# Patient Record
Sex: FEMALE | Race: WHITE | NOT HISPANIC OR LATINO | Employment: OTHER | URBAN - METROPOLITAN AREA
[De-identification: names, ages, dates, MRNs, and addresses within clinical notes are randomized per-mention and may not be internally consistent; named-entity substitution may affect disease eponyms.]

---

## 2017-02-07 ENCOUNTER — TELEPHONE (OUTPATIENT)
Dept: INTERNAL MEDICINE | Facility: CLINIC | Age: 62
End: 2017-02-07

## 2017-02-07 NOTE — TELEPHONE ENCOUNTER
Panel Management Review      Patient has the following on her problem list:     Hypertension   Last three blood pressure readings:  BP Readings from Last 3 Encounters:   10/20/16 130/86   08/18/16 156/96   02/15/16 118/70     Blood pressure: Her 8/18/16 BP was too high, but her BP medications were adjusted to account for this when she was seen both on 8/18/16 and 10/20/16.    HTN Guidelines:  Age 18-59 BP range:  Less than 140/90  Age 60-85 with Diabetes:  Less than 140/90  Age 60-85 without Diabetes:  less than 150/90      Composite cancer screening  Chart review shows that this patient is due/due soon for the following None  Summary:    Patient is due/failing the following:   None    Action needed:   None    Type of outreach:    None needed. She is currently up-to-date.    Questions for provider review:    None                                                                                                                                    Sagrario Quintanilla [facility]       Chart not routed.

## 2017-02-09 ENCOUNTER — APPOINTMENT (OUTPATIENT)
Dept: MRI IMAGING | Facility: CLINIC | Age: 62
End: 2017-02-09
Attending: EMERGENCY MEDICINE
Payer: COMMERCIAL

## 2017-02-09 ENCOUNTER — TELEPHONE (OUTPATIENT)
Dept: INTERNAL MEDICINE | Facility: CLINIC | Age: 62
End: 2017-02-09

## 2017-02-09 ENCOUNTER — APPOINTMENT (OUTPATIENT)
Dept: ULTRASOUND IMAGING | Facility: CLINIC | Age: 62
End: 2017-02-09
Attending: INTERNAL MEDICINE
Payer: COMMERCIAL

## 2017-02-09 ENCOUNTER — HOSPITAL ENCOUNTER (OUTPATIENT)
Facility: CLINIC | Age: 62
Setting detail: OBSERVATION
Discharge: HOME | End: 2017-02-10
Attending: EMERGENCY MEDICINE | Admitting: INTERNAL MEDICINE
Payer: COMMERCIAL

## 2017-02-09 DIAGNOSIS — I63.89 LEFT TEMPORAL LOBE INFARCTION (H): ICD-10-CM

## 2017-02-09 DIAGNOSIS — Q21.12 PFO (PATENT FORAMEN OVALE): Primary | ICD-10-CM

## 2017-02-09 PROBLEM — I63.9 STROKE (H): Status: ACTIVE | Noted: 2017-02-09

## 2017-02-09 LAB
ANION GAP SERPL CALCULATED.3IONS-SCNC: 8 MMOL/L (ref 3–14)
BASOPHILS # BLD AUTO: 0.1 10E9/L (ref 0–0.2)
BASOPHILS NFR BLD AUTO: 0.6 %
BUN SERPL-MCNC: 13 MG/DL (ref 7–30)
CALCIUM SERPL-MCNC: 8.9 MG/DL (ref 8.5–10.1)
CHLORIDE SERPL-SCNC: 103 MMOL/L (ref 94–109)
CO2 SERPL-SCNC: 28 MMOL/L (ref 20–32)
CREAT SERPL-MCNC: 0.9 MG/DL (ref 0.52–1.04)
DIFFERENTIAL METHOD BLD: NORMAL
EOSINOPHIL # BLD AUTO: 0.1 10E9/L (ref 0–0.7)
EOSINOPHIL NFR BLD AUTO: 0.9 %
ERYTHROCYTE [DISTWIDTH] IN BLOOD BY AUTOMATED COUNT: 13.2 % (ref 10–15)
GFR SERPL CREATININE-BSD FRML MDRD: 63 ML/MIN/1.7M2
GLUCOSE SERPL-MCNC: 96 MG/DL (ref 70–99)
HCT VFR BLD AUTO: 41.6 % (ref 35–47)
HGB BLD-MCNC: 13.8 G/DL (ref 11.7–15.7)
IMM GRANULOCYTES # BLD: 0 10E9/L (ref 0–0.4)
IMM GRANULOCYTES NFR BLD: 0.2 %
LYMPHOCYTES # BLD AUTO: 2.1 10E9/L (ref 0.8–5.3)
LYMPHOCYTES NFR BLD AUTO: 23.2 %
MCH RBC QN AUTO: 30.2 PG (ref 26.5–33)
MCHC RBC AUTO-ENTMCNC: 33.2 G/DL (ref 31.5–36.5)
MCV RBC AUTO: 91 FL (ref 78–100)
MONOCYTES # BLD AUTO: 1 10E9/L (ref 0–1.3)
MONOCYTES NFR BLD AUTO: 11.2 %
NEUTROPHILS # BLD AUTO: 5.6 10E9/L (ref 1.6–8.3)
NEUTROPHILS NFR BLD AUTO: 63.9 %
NRBC # BLD AUTO: 0 10*3/UL
NRBC BLD AUTO-RTO: 0 /100
PLATELET # BLD AUTO: 322 10E9/L (ref 150–450)
POTASSIUM SERPL-SCNC: 3.5 MMOL/L (ref 3.4–5.3)
RBC # BLD AUTO: 4.57 10E12/L (ref 3.8–5.2)
SODIUM SERPL-SCNC: 139 MMOL/L (ref 133–144)
WBC # BLD AUTO: 8.8 10E9/L (ref 4–11)

## 2017-02-09 PROCEDURE — 99220 ZZC INITIAL OBSERVATION CARE,LEVL III: CPT | Performed by: INTERNAL MEDICINE

## 2017-02-09 PROCEDURE — A9585 GADOBUTROL INJECTION: HCPCS | Performed by: RADIOLOGY

## 2017-02-09 PROCEDURE — 93880 EXTRACRANIAL BILAT STUDY: CPT

## 2017-02-09 PROCEDURE — 85025 COMPLETE CBC W/AUTO DIFF WBC: CPT | Performed by: EMERGENCY MEDICINE

## 2017-02-09 PROCEDURE — 25000128 H RX IP 250 OP 636: Performed by: EMERGENCY MEDICINE

## 2017-02-09 PROCEDURE — 25000132 ZZH RX MED GY IP 250 OP 250 PS 637: Performed by: INTERNAL MEDICINE

## 2017-02-09 PROCEDURE — 99285 EMERGENCY DEPT VISIT HI MDM: CPT | Mod: 25

## 2017-02-09 PROCEDURE — 25500064 ZZH RX 255 OP 636: Performed by: RADIOLOGY

## 2017-02-09 PROCEDURE — 93005 ELECTROCARDIOGRAM TRACING: CPT

## 2017-02-09 PROCEDURE — G0378 HOSPITAL OBSERVATION PER HR: HCPCS

## 2017-02-09 PROCEDURE — 25000132 ZZH RX MED GY IP 250 OP 250 PS 637: Performed by: EMERGENCY MEDICINE

## 2017-02-09 PROCEDURE — 70553 MRI BRAIN STEM W/O & W/DYE: CPT

## 2017-02-09 PROCEDURE — 80048 BASIC METABOLIC PNL TOTAL CA: CPT | Performed by: EMERGENCY MEDICINE

## 2017-02-09 RX ORDER — LOSARTAN POTASSIUM 25 MG/1
100 TABLET ORAL DAILY
Status: DISCONTINUED | OUTPATIENT
Start: 2017-02-10 | End: 2017-02-10 | Stop reason: HOSPADM

## 2017-02-09 RX ORDER — LABETALOL HYDROCHLORIDE 5 MG/ML
10-40 INJECTION, SOLUTION INTRAVENOUS EVERY 10 MIN PRN
Status: DISCONTINUED | OUTPATIENT
Start: 2017-02-09 | End: 2017-02-10 | Stop reason: HOSPADM

## 2017-02-09 RX ORDER — BISACODYL 10 MG
10 SUPPOSITORY, RECTAL RECTAL DAILY PRN
Status: DISCONTINUED | OUTPATIENT
Start: 2017-02-09 | End: 2017-02-10 | Stop reason: HOSPADM

## 2017-02-09 RX ORDER — ASPIRIN 81 MG/1
324 TABLET, CHEWABLE ORAL ONCE
Status: COMPLETED | OUTPATIENT
Start: 2017-02-09 | End: 2017-02-09

## 2017-02-09 RX ORDER — ACETAMINOPHEN 325 MG/1
650 TABLET ORAL EVERY 4 HOURS PRN
Status: DISCONTINUED | OUTPATIENT
Start: 2017-02-09 | End: 2017-02-10 | Stop reason: HOSPADM

## 2017-02-09 RX ORDER — ONDANSETRON 2 MG/ML
4 INJECTION INTRAMUSCULAR; INTRAVENOUS EVERY 6 HOURS PRN
Status: DISCONTINUED | OUTPATIENT
Start: 2017-02-09 | End: 2017-02-10 | Stop reason: HOSPADM

## 2017-02-09 RX ORDER — PROCHLORPERAZINE 25 MG
25 SUPPOSITORY, RECTAL RECTAL EVERY 12 HOURS PRN
Status: DISCONTINUED | OUTPATIENT
Start: 2017-02-09 | End: 2017-02-10 | Stop reason: HOSPADM

## 2017-02-09 RX ORDER — PROCHLORPERAZINE MALEATE 5 MG
5-10 TABLET ORAL EVERY 6 HOURS PRN
Status: DISCONTINUED | OUTPATIENT
Start: 2017-02-09 | End: 2017-02-10 | Stop reason: HOSPADM

## 2017-02-09 RX ORDER — IMIPRAMINE HCL 25 MG
25 TABLET ORAL DAILY
Status: DISCONTINUED | OUTPATIENT
Start: 2017-02-10 | End: 2017-02-10 | Stop reason: HOSPADM

## 2017-02-09 RX ORDER — GADOBUTROL 604.72 MG/ML
15 INJECTION INTRAVENOUS ONCE
Status: COMPLETED | OUTPATIENT
Start: 2017-02-09 | End: 2017-02-09

## 2017-02-09 RX ORDER — ATORVASTATIN CALCIUM 40 MG/1
40 TABLET, FILM COATED ORAL
Status: DISCONTINUED | OUTPATIENT
Start: 2017-02-09 | End: 2017-02-10 | Stop reason: HOSPADM

## 2017-02-09 RX ORDER — CETIRIZINE HYDROCHLORIDE 10 MG/1
10 TABLET ORAL EVERY EVENING
Status: DISCONTINUED | OUTPATIENT
Start: 2017-02-09 | End: 2017-02-10 | Stop reason: HOSPADM

## 2017-02-09 RX ORDER — AMOXICILLIN 250 MG
1-2 CAPSULE ORAL 2 TIMES DAILY PRN
Status: DISCONTINUED | OUTPATIENT
Start: 2017-02-09 | End: 2017-02-10 | Stop reason: HOSPADM

## 2017-02-09 RX ORDER — ONDANSETRON 4 MG/1
4 TABLET, ORALLY DISINTEGRATING ORAL EVERY 6 HOURS PRN
Status: DISCONTINUED | OUTPATIENT
Start: 2017-02-09 | End: 2017-02-10 | Stop reason: HOSPADM

## 2017-02-09 RX ORDER — ATENOLOL 25 MG/1
50 TABLET ORAL DAILY
Status: DISCONTINUED | OUTPATIENT
Start: 2017-02-10 | End: 2017-02-10 | Stop reason: HOSPADM

## 2017-02-09 RX ORDER — HYDRALAZINE HYDROCHLORIDE 20 MG/ML
10-20 INJECTION INTRAMUSCULAR; INTRAVENOUS
Status: DISCONTINUED | OUTPATIENT
Start: 2017-02-09 | End: 2017-02-10 | Stop reason: HOSPADM

## 2017-02-09 RX ORDER — HYDROCHLOROTHIAZIDE 25 MG/1
25 TABLET ORAL DAILY
Status: DISCONTINUED | OUTPATIENT
Start: 2017-02-10 | End: 2017-02-10 | Stop reason: HOSPADM

## 2017-02-09 RX ORDER — NALOXONE HYDROCHLORIDE 0.4 MG/ML
.1-.4 INJECTION, SOLUTION INTRAMUSCULAR; INTRAVENOUS; SUBCUTANEOUS
Status: DISCONTINUED | OUTPATIENT
Start: 2017-02-09 | End: 2017-02-10 | Stop reason: HOSPADM

## 2017-02-09 RX ORDER — CLOPIDOGREL BISULFATE 75 MG/1
75 TABLET ORAL DAILY
Status: DISCONTINUED | OUTPATIENT
Start: 2017-02-09 | End: 2017-02-10 | Stop reason: HOSPADM

## 2017-02-09 RX ORDER — AMLODIPINE BESYLATE 5 MG/1
5 TABLET ORAL DAILY
Status: DISCONTINUED | OUTPATIENT
Start: 2017-02-10 | End: 2017-02-10 | Stop reason: HOSPADM

## 2017-02-09 RX ADMIN — ASPIRIN 81 MG 324 MG: 81 TABLET ORAL at 17:12

## 2017-02-09 RX ADMIN — CLOPIDOGREL 75 MG: 75 TABLET, FILM COATED ORAL at 20:14

## 2017-02-09 RX ADMIN — GADOBUTROL 12 ML: 604.72 INJECTION INTRAVENOUS at 16:55

## 2017-02-09 RX ADMIN — ATORVASTATIN CALCIUM 40 MG: 40 TABLET, FILM COATED ORAL at 20:14

## 2017-02-09 RX ADMIN — SODIUM CHLORIDE 1000 ML: 9 INJECTION, SOLUTION INTRAVENOUS at 15:51

## 2017-02-09 ASSESSMENT — ENCOUNTER SYMPTOMS
WEAKNESS: 0
CONFUSION: 1
HEADACHES: 1
FACIAL ASYMMETRY: 0
SPEECH DIFFICULTY: 1
NUMBNESS: 1

## 2017-02-09 NOTE — ED PROVIDER NOTES
"  History     Chief Complaint:  Stroke-Like Symptoms     HPI   Jose Coronado is a 61 year old female with a PMH significant for hypertension who presents to the emergency department for evaluation of stroke-like symptoms. The patient reports waking for a short period of time at 0700 this morning feeling fine, but when she woke again later at 0815 she felt \"wierd.\" She reports difficulty holding onto her toothbrush, dizziness while walking down stairs, short-lasting and intermittent confusion, numbness to the right side of her face, and expressive aphasia. The patient denies extremity weakness or facial asymmetry. 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 Here in the ED, the patient says her symptoms have all resolved except for a mild diffuse headache, which she says she has been getting recently. Her primary concern is for TIA. The patient says she has otherwise been feeling well lately.     Allergies:  Contrast  Sulfa     Medications:    Evista  Tofranil  Norvasc  HCTZ  Cozaar  Tenormin  Valtrex  Excedrin Migraine  Zaditor  Zyrtec    Past Medical History:    Diffuse cystic mastopathy  Labyrinthitis  Sleep apnea  Osteopenia  Hypertension  Vitamin D deficiencies  Hyperparathyroidism    Past Surgical History:    Breast biopsies  T&A  [place] teeth extraction  Gastric bypass  Parathyroidectomy  Wrist fracture repair    Family History:    Breast cancer  Hypertension  CVA: maternal grandmother    Social History:   Tobacco use:    Negative  Alcohol use:    Occasional  Marital status:    Single  Accompanied to ED by:  Alone     Review of Systems   Neurological: Positive for speech difficulty (now resolved), numbness (now resolved) and headaches. Negative for facial asymmetry and weakness.   Psychiatric/Behavioral: Positive for confusion (now resolved).   All other systems reviewed and are " negative.    Physical Exam   First Vitals:  /94 mmHg  Pulse 88  Temp(Src) 98.8  F (37.1  C)  Resp 16  SpO2 99%    Physical Exam  Constitutional: Alert, attentive  HENT:    Nose: Nose normal.    Mouth/Throat: Oropharynx is clear, mucous membranes are moist  Eyes: EOM are normal. Pupils are equal, round, and reactive to light.   CV: Regular rate and rhythm, no murmurs, rubs or gallops.  Chest: Effort normal and breath sounds normal.   GI: No distension. There is no tenderness  MSK: Normal range of motion.   Neurological:   GCS 15; A/Ox3; Cranial nerves 2-12 intact;   5/5 strength throughout the upper and lower extremities;   sensation intact to light touch throughout the upper and lower extremities;   2+ DTRs to the bilateral upper and lower extremities (biceps, BRs, patellar, achilles);   normal fine motor coordination intact bilaterally;   normal gait   No meningismus   Skin: Skin is warm and dry.        Emergency Department Course   ECG (15:42:20):  Indication: Screening for cardiovascular disease.   Rate 89 bpm. MN interval 222. QRS duration 96. QT/QTc 370/450. P-R-T axes 47 -25 55.   Interpretation: Sinus rhythm with 1st degree AV block with premature atrial complexes. Incomplete right bundle branch block. Septal infarct, age undetermined. Abnormal ECG.   Agree with computer interpretation.   Interpreted at 1545 by Dr. Mendoza.     Imaging:  Radiographic findings were communicated with the patient who voiced understanding of the findings.  MR brain w/ & w/o contrast:  Small cortical area of restricted diffusion left superior mid temporal lobe consistent with tiny area of recent infarction.  Radiology report.     Laboratory:  CBC: WNL (WBC 8.8, HGB 13.8, )   BMP: WNL (Creatinine 0.90)    Emergency Department Course:  Nursing notes and vitals reviewed.   1541: I performed an exam of the patient as documented above.   The above workup was undertaken.   1551: NS 1 L IV  1712: Aspirin 324 mg Tablet  PO  Findings and plan explained to the Patient who consents to admission. Discussed the patient with Dr. Berry, who will admit the patient to a obs tele bed for further monitoring, evaluation, and treatment.     Impression & Plan      CMS Diagnoses: The patient has stroke symptoms:           ED Stroke specific documentation           NIHSS PDF          Protocol PDF     Patient last known well time: 0700  ED Provider first to bedside at: 1541  CT Results received at: 1707  Patient was not treated with TPA due to the following reason(s):  Out of the treatment time window  Mild stroke symptoms ( NIHSS < 4 and not globally aphasic)    National Institutes of Health Stroke Scale (Baseline)  Time Performed: 1541      Score    Level of consciousness: (0)   Alert, keenly responsive    LOC questions: (0)   Answers both questions correctly    LOC commands: (0)   Performs both tasks correctly    Best gaze: (0)   Normal    Visual: (0)   No visual loss    Facial palsy: (0)   Normal symmetrical movements    Motor arm (left): (0)   No drift    Motor arm (right): (0)   No drift    Motor leg (left): (0)   No drift    Motor leg (right): (0)   No drift    Limb ataxia: (0)   Absent    Sensory: (0)   Normal- no sensory loss    Best language: (0)   Normal- no aphasia    Dysarthria: (0)   Normal    Extinction and inattention: (0)   No abnormality        Total Score:  0        Stroke Mimics were considered (including migraine headache, seizure disorder, hypoglycemia (or hyperglycemia), head or spinal trauma, CNS infection, Toxin ingestion and shock state (e.g. sepsis) .    Medical Decision Making:  Jose Coronado is a 61 year old female with a history of hypertension who presents for evaluation of the above symptoms, most notably mild headache and expressive aphasia, possibly consistent with word salad briefly. Her symptoms are now resolved. NIH is 0. MRI shows a small left temporal infarct, consistent with her symptoms. She is not a TPA  candidate. She will be admitted for further supportive cares and evaluation, as well as neurology consults and secondary stroke prevention initiation. Workup was otherwise unremarkable and he is safe for admission.     Diagnosis:    ICD-10-CM    1. Left temporal lobe infarction (H) I63.50     subacute     Disposition:  Admitted to Dr. Beryr.       I, Pipo Roy, am serving as a scribe at 3:41 PM on 2/9/2017 to document services personally performed by Dr. Mendoza, based on my observations and the provider's statements to me.   Pipo Roy  2/9/2017   [facility] EMERGENCY DEPARTMENT        Senthil Mendoza MD  02/09/17 [id number]

## 2017-02-09 NOTE — ED AVS SNAPSHOT
[facility] Observation Department    [address]    [facility] [address]    Phone:  [phone]                                       Jose Coronado   MRN: [id number]    Department:  [facility] Observation Department   Date of Visit:  2/9/2017           After Visit Summary Signature Page     I have received my discharge instructions, and my questions have been answered. I have discussed any challenges I see with this plan with the nurse or doctor.    ..........................................................................................................................................  Patient/Patient Representative Signature      ..........................................................................................................................................  Patient Representative Print Name and Relationship to Patient    ..................................................               ................................................  Date                                            Time    ..........................................................................................................................................  Reviewed by Signature/Title    ...................................................              ..............................................  Date                                                            Time

## 2017-02-09 NOTE — TELEPHONE ENCOUNTER
"Patient calling in thinking she has had a small stroke/ TIA. When woke up less than one hour later she reports having felt weird\", could walk normally, something was off, not thinking well, more tired, tooth brush fell out of mouth and took a while to look for tooth brush. Dizziness and weakness, right side nose dripping /\"leaking\" down the side of mouth, numbness to right side. Right side sensation felt \"weird\", could still smile. Having trouble putting two and two together. Went to work, drove ok. Had speech problems since 1100 today. Instructed pt that she should go to the ER at immediately. Pt asks if [facility] is ok, this writer informed her it was ok to go there. Pt does not have someone to drive her to the ER, she will drive herself. Pt agrees to plan of care.    "

## 2017-02-09 NOTE — PHARMACY-ADMISSION MEDICATION HISTORY
Admission medication history interview status for this patient is complete. See [facility] admission navigator for allergy information, prior to admission medications and immunization status.     Medication history interview source(s):Patient  Medication history resources (including written lists, pill bottles, clinic record):Epic, pt's med list    Changes made to PTA medication list:  Added: none  Deleted: Fish oil  Changed: calcium, Vit B12, Iron, Vit C      Medication reconciliation/reorder completed by provider prior to medication history? No    For patients on insulin therapy: NO       Prior to Admission medications    Medication Sig Last Dose Taking? Auth Provider   Calcium Citrate-Vitamin D (CALCIUM CITRATE + D PO) Take 1 tablet by mouth every morning 2/9/2017 at Unknown time Yes Unknown, Entered By History   Ferrous Sulfate 27 MG TABS Take 27 mg by mouth 2 times daily 2/9/2017 at am Yes Unknown, Entered By History   Cyanocobalamin 2500 MCG TABS Take 2,500 mcg by mouth twice a week Mon, Thur 2/9/2017 at am Yes Unknown, Entered By History   raloxifene (EVISTA) 60 MG tablet Take 1 tablet (60 mg) by mouth daily 2/9/2017 at Unknown time Yes Mary Mathews MD   imipramine (TOFRANIL) 25 MG tablet Take 1 tablet (25 mg) by mouth daily 2/9/2017 at am Yes Mary Mathews MD   amLODIPine (NORVASC) 5 MG tablet Take 1 tablet (5 mg) by mouth daily 2/9/2017 at Unknown time Yes Mary Mathews MD   hydrochlorothiazide (HYDRODIURIL) 25 MG tablet Take 1 tablet (25 mg) by mouth daily 2/9/2017 at am Yes Mary Mathews MD   losartan (COZAAR) 100 MG tablet Take 1 tablet (100 mg) by mouth daily 2/9/2017 at Unknown time Yes Mary Mathews MD   atenolol (TENORMIN) 50 MG tablet Take 1 tablet (50 mg) by mouth daily 2/9/2017 at Unknown time Yes Mary Mathews MD   ascorbic acid (VITAMIN C) 500 MG tablet Take 250 mg by mouth 2 times daily   2/9/2017 at am Yes Mary Mathews MD   cholecalciferol (VITAMIN D) 1000 UNIT tablet Take 0.5 tablets (500 Units) by mouth daily 2/9/2017 at am Yes Mary Mathews MD   aspirin-acetaminophen-caffeine (EXCEDRIN MIGRAINE) 250-250-65 MG per tablet Take 1 tablet by mouth every 6 hours as needed for headaches  Yes Mary Mathews MD   ketotifen (ZADITOR) 0.025 % SOLN Place 1 drop into both eyes every 12 hours as needed for itching  Yes Mary Mathews MD   cetirizine (ZYRTEC) 10 MG tablet Take 1 tablet by mouth every evening. 2/8/2017 at Unknown time Yes Mary Mathews MD   Probiotic Product (PROBIOTIC PO) Take 1 tablet by mouth daily. 2/9/2017 at Unknown time Yes Reported, Patient   valACYclovir (VALTREX) 1000 mg tablet Take 2 tablets (2,000 mg) by mouth 2 times daily as needed PRN  Mary Mathews MD

## 2017-02-09 NOTE — ED AVS SNAPSHOT
MRN:[id number]                      After Visit Summary   2/9/2017    Jose Coronado    MRN: [id number]           Thank you!     Thank you for choosing [facility] for your care. Our goal is always to provide you with excellent care. Hearing back from our patients is one way we can continue to improve our services. Please take a few minutes to complete the written survey that you may receive in the mail after you visit. If you would like to speak to someone directly about your visit please contact Patient Relations at [phone]. Thank you!          Patient Information     Date Of Birth          1955        About your hospital stay     You were admitted on:  February 9, 2017 You last received care in the:  [facility] Observation Department    You were discharged on:  February 10, 2017        Reason for your hospital stay       You were evaluated in the hospital for difficulty with speech, and on your brain MRI you were noted to have a small temporal stroke.  Your symptoms resolved after arrival in the hospital.  You were brought on observation to complete a stroke workup to evaluate possible causes.  You were monitored on telemetry, a repeat echo was obtained (confirming a patent foramen ovale), and were started on atorvastatin to take each night.  Cardiology was consulted to help with deciding whether further intervention was needed for your PFO.  The cardiologist, Dr. Presley, recommended that you be started on anticoagulation with warfarin (coumadin), as your patent foramen ovale puts you at significant increased stroke risk if you were to form a clot in your vessels.  Take 5 mg warfarin daily. He also recommended a BROCK (trans-esophageal echocardiogram) to evaluate the position of your PFO, and then to follow up with the PFO clinic.  He will help set up these appointments for you.  He also wanted you to be on a cardiac event monitor for the next 30 days to  evaluate for any evidence of episodic irregular cardiac rhythms.  You should also schedule an appointment early next week (Monday or Tuesday) to see a neurologist since you did have a confirmed stroke.  Get your INR checked in clinic on Tuesday.  We will collect labs for a hypercoagulation study prior to your discharge, and those results can be followed up on during your outpatient appointments.                  Who to Call     For medical emergencies, please call 911.  For non-urgent questions about your medical care, please call your primary care provider or clinic, [phone]          Attending Provider     Provider    Senthil Mendoza MD Se[place], Daniel L, MD       Primary Care Provider Office Phone # Fax #    Mary Mathews [phone]       [facility] [address] [place] [address]         When to contact your care team       Call your primary doctor if you have any of the following: temperature greater than 101, increased shortness of breath, increased pain, new difficulty with speech, weakness, or numbness/tingling.                  After Care Instructions     Activity       Your activity upon discharge: activity as tolerated            Diet       Follow this diet upon discharge: Orders Placed This Encounter  Regular Diet Adult                  Follow-up Appointments     Follow-up and recommended labs and tests        Follow up with primary care provider, Mary Mathews, within 7-14 days to evaluate medication change and for hospital follow- up.  No follow up labs or test are needed.    Get your INR checked next week on Tuesday 2/14/16.    Follow up with Cardiology for outpatient BROCK and PFO clinic visit within 1 week.  Dr. Presley is assisting in setting up this outpatient follow up and the [place] [facility] should be contacting you about it.   The results of your cardiac event monitor will be sent to your primary care doctor +/-  your cardiologist +/- neurologist.                  Additional Services     NEUROLOGY ADULT REFERRAL       Your provider has referred you to: [facility]: [facility] [place] [phone]   [email or web address]    Reason for Referral: Consult - Patient had a confirmed temporal CVA on MRI, symptoms completely resolved.  Has PFO confirmed on echo.  Seen by cardiology and started on warfarin.  She is also started on atorvastatin.    Please be aware that coverage of these services is subject to the terms and limitations of your health insurance plan.  Call member services at your health plan with any benefit or coverage questions.      Please bring the following with you to your appointment:    (1) Any X-Rays, CTs or MRIs which have been performed.  Contact the facility where they were done to arrange for  prior to your scheduled appointment.    (2) List of current medications  (3) This referral request   (4) Any documents/labs given to you for this referral            Follow-Up with cardiac sub-specialty clinic                            Future tests that were ordered for you     Cardiac Event Monitor - Peds/Adult           Transesophageal Echocardiogram       The supervising Cardiologist may change the type of echocardiogram requested to answer a specific clinical question based on existing protocols in the Echocardiography laboratory.    Use of contrast is at the discretion of the supervising Cardiologist.                  Warfarin Instruction     You have started taking a medicine called warfarin. This is a blood-thinning medicine (anticoagulant). It helps prevent and treat blood clots.      Before leaving the hospital, make sure you know how much to take and how long to take it.      You will need regular blood tests to make sure your blood is clotting safely. It is very important to see your doctor for regular blood tests.    Talk to your doctor before taking  "any new medicine (this includes over-the-counter drugs and herbal products). Many medicines can interact with warfarin. This may cause more bleeding or too much clotting.     Eating a lot of vitamin K--found in green, leafy vegetables--can change the way warfarin works in your body. Do NOT avoid these foods. Instead, try to eat the same amount each day.     Bleeding is the most common side-effect of warfarin. You may notice bleeding gums, a bloody nose, bruises and bleeding longer when you cut yourself. See a doctor at once if:   o You cough up blood  o You find blood in your stool (poop)  o You have a deep cut, or a cut that bleeds longer than 10 minutes   o You have a bad cut, hard fall, accident or hit your head (go to urgent care or the emergency room).    For women who can get pregnant: This medicine can harm an unborn baby. Be very careful not to get pregnant while taking this medicine. If you think you might be pregnant, call your doctor right away.    For more information, read \"Guide to Warfarin Therapy,  the booklet you received in the hospital.        Pending Results     Date and Time Order Name Status Description    2/10/2017 1226 Cardiac event monitor In process     2/10/2017 1112 Protein S Antigen Free In process     2/10/2017 1112 Protein C chromogenic In process     2/10/2017 1112 Factor 5 leiden mutation analysis In process     2/10/2017 1112 Antithrombin III In process             Statement of Approval     Ordered          02/10/17 1236  I have reviewed and agree with all the recommendations and orders detailed in this document.   EFFECTIVE NOW     Approved and electronically signed by:  Genoveva Jacob PA-C             Admission Information        Provider Department Dept Phone    2/9/2017 Chauncey Berry MD  Observation Dept [phone]      Your Vitals Were     Blood Pressure Pulse Temperature    127/65 mmHg 62 97.6  F (36.4  C) (Oral)    Respirations Height Weight    16 1.664 m (5' " "5.5\") 110.963 kg (244 lb 10.1 oz)    BMI (Body Mass Index) Pulse Oximetry       40.07 kg/m2 92%       Smart GPS Backpackhart Information     [id number] gives you secure access to your electronic health record. If you see a primary care provider, you can also send messages to your care team and make appointments. If you have questions, please call your primary care clinic.  If you do not have a primary care provider, please call [phone] and they will assist you.        Care EveryWhere ID     This is your Care EveryWhere ID. This could be used by other organizations to access your [place] medical records  [id number]           Review of your medicines      START taking        Dose / Directions    atorvastatin 40 MG tablet   Commonly known as:  LIPITOR   Used for:  Left temporal lobe infarction (H)        Dose:  40 mg   Take 1 tablet (40 mg) by mouth daily   Quantity:  30 tablet   Refills:  0       warfarin 5 MG tablet   Commonly known as:  COUMADIN   Used for:  Left temporal lobe infarction (H), PFO (patent foramen ovale)        Dose:  5 mg   Take 1 tablet (5 mg) by mouth daily   Quantity:  30 tablet   Refills:  0         CONTINUE these medicines which have NOT CHANGED        Dose / Directions    amLODIPine 5 MG tablet   Commonly known as:  NORVASC   Used for:  Essential hypertension with goal blood pressure less than 140/90        Dose:  5 mg   Take 1 tablet (5 mg) by mouth daily   Quantity:  90 tablet   Refills:  1       ascorbic acid 500 MG tablet   Commonly known as:  VITAMIN C        Dose:  250 mg   Take 250 mg by mouth 2 times daily   Quantity:  30 tablet   Refills:  0       atenolol 50 MG tablet   Commonly known as:  TENORMIN   Used for:  Palpitations        Dose:  50 mg   Take 1 tablet (50 mg) by mouth daily   Quantity:  90 tablet   Refills:  3       CALCIUM CITRATE + D PO        Dose:  1 tablet   Take 1 tablet by mouth every morning   Refills:  0       cetirizine 10 MG tablet   Commonly known as:  zyrTEC        Dose:  " 10 mg   Take 1 tablet by mouth every evening.   Quantity:  30 tablet   Refills:  1       cholecalciferol 1000 UNIT tablet   Commonly known as:  vitamin D        Dose:  500 Units   Take 0.5 tablets (500 Units) by mouth daily   Quantity:  30 tablet   Refills:  0       Cyanocobalamin 2500 MCG Tabs        Dose:  2500 mcg   Take 2,500 mcg by mouth twice a week Mon, Thur   Refills:  0       Ferrous Sulfate 27 MG Tabs        Dose:  27 mg   Take 27 mg by mouth 2 times daily   Refills:  0       hydrochlorothiazide 25 MG tablet   Commonly known as:  HYDRODIURIL   Used for:  Essential hypertension with goal blood pressure less than 140/90        Dose:  25 mg   Take 1 tablet (25 mg) by mouth daily   Quantity:  90 tablet   Refills:  1       imipramine 25 MG tablet   Commonly known as:  TOFRANIL   Used for:  Bladder dysfunction        Dose:  25 mg   Take 1 tablet (25 mg) by mouth daily   Quantity:  100 tablet   Refills:  2       losartan 100 MG tablet   Commonly known as:  COZAAR   Used for:  Essential hypertension with goal blood pressure less than 140/90        Dose:  100 mg   Take 1 tablet (100 mg) by mouth daily   Quantity:  90 tablet   Refills:  3       PROBIOTIC PO        Dose:  1 tablet   Take 1 tablet by mouth daily.   Refills:  0       raloxifene 60 MG tablet   Commonly known as:  EVISTA   Used for:  Osteopenia        Dose:  1 tablet   Take 1 tablet (60 mg) by mouth daily   Quantity:  90 tablet   Refills:  2       valACYclovir 1000 mg tablet   Commonly known as:  VALTREX   Used for:  Cold sore        Dose:  2000 mg   Take 2 tablets (2,000 mg) by mouth 2 times daily as needed   Quantity:  4 tablet   Refills:  5       ZADITOR 0.025 % Soln ophthalmic solution   Generic drug:  ketotifen        Dose:  1 drop   Place 1 drop into both eyes every 12 hours as needed for itching   Quantity:  1 Bottle   Refills:  0         STOP taking     aspirin-acetaminophen-caffeine 250-250-65 MG per tablet   Commonly known as:  EXCEDRIN  MIGRAINE                Where to get your medicines      These medications were sent to [facility]pharmacy #[id number] - [place], [place] - [address] [address] [facility]  [address], [place] [address]     Phone:  [phone]    - atorvastatin 40 MG tablet  - warfarin 5 MG tablet             Protect others around you: Learn how to safely use, store and throw away your medicines at www.disposemymeds.org.             Medication List: This is a list of all your medications and when to take them. Check marks below indicate your daily home schedule. Keep this list as a reference.      Medications           Morning Afternoon Evening Bedtime As Needed    amLODIPine 5 MG tablet   Commonly known as:  NORVASC   Take 1 tablet (5 mg) by mouth daily   Last time this was given:  5 mg on 2/10/2017  9:44 AM                                ascorbic acid 500 MG tablet   Commonly known as:  VITAMIN C   Take 250 mg by mouth 2 times daily                                atenolol 50 MG tablet   Commonly known as:  TENORMIN   Take 1 tablet (50 mg) by mouth daily   Last time this was given:  50 mg on 2/10/2017  9:44 AM                                atorvastatin 40 MG tablet   Commonly known as:  LIPITOR   Take 1 tablet (40 mg) by mouth daily   Last time this was given:  40 mg on 2/9/2017  8:14 PM                                CALCIUM CITRATE + D PO   Take 1 tablet by mouth every morning                                cetirizine 10 MG tablet   Commonly known as:  zyrTEC   Take 1 tablet by mouth every evening.                                cholecalciferol 1000 UNIT tablet   Commonly known as:  vitamin D   Take 0.5 tablets (500 Units) by mouth daily                                Cyanocobalamin 2500 MCG Tabs   Take 2,500 mcg by mouth twice a week Mon, Thur                                Ferrous Sulfate 27 MG Tabs   Take 27 mg by mouth 2 times daily                                hydrochlorothiazide 25 MG tablet   Commonly known as:  HYDRODIURIL   Take 1  tablet (25 mg) by mouth daily   Last time this was given:  25 mg on 2/10/2017  9:44 AM                                imipramine 25 MG tablet   Commonly known as:  TOFRANIL   Take 1 tablet (25 mg) by mouth daily   Last time this was given:  25 mg on 2/10/2017  9:45 AM                                losartan 100 MG tablet   Commonly known as:  COZAAR   Take 1 tablet (100 mg) by mouth daily   Last time this was given:  100 mg on 2/10/2017  9:44 AM                                PROBIOTIC PO   Take 1 tablet by mouth daily.                                raloxifene 60 MG tablet   Commonly known as:  EVISTA   Take 1 tablet (60 mg) by mouth daily                                valACYclovir 1000 mg tablet   Commonly known as:  VALTREX   Take 2 tablets (2,000 mg) by mouth 2 times daily as needed                                warfarin 5 MG tablet   Commonly known as:  COUMADIN   Take 1 tablet (5 mg) by mouth daily                                ZADITOR 0.025 % Soln ophthalmic solution   Place 1 drop into both eyes every 12 hours as needed for itching   Generic drug:  ketotifen

## 2017-02-09 NOTE — ED NOTES
"Pt awoke from a nap and \"felt weird\" stated \"when i was brushing my teeth the toothbrush fell out of my hand\", and  Per pt \"had and episode and needed to sit down when going up stairs\" Reports when no weekend on either hemisphere. Pt stated little bit of a headache now.   "

## 2017-02-10 ENCOUNTER — TELEPHONE (OUTPATIENT)
Dept: INTERNAL MEDICINE | Facility: CLINIC | Age: 62
End: 2017-02-10

## 2017-02-10 ENCOUNTER — APPOINTMENT (OUTPATIENT)
Dept: CARDIOLOGY | Facility: CLINIC | Age: 62
End: 2017-02-10
Attending: INTERNAL MEDICINE
Payer: COMMERCIAL

## 2017-02-10 VITALS
BODY MASS INDEX: 39.31 KG/M2 | HEART RATE: 62 BPM | RESPIRATION RATE: 18 BRPM | DIASTOLIC BLOOD PRESSURE: 77 MMHG | OXYGEN SATURATION: 99 % | HEIGHT: 66 IN | SYSTOLIC BLOOD PRESSURE: 147 MMHG | WEIGHT: 244.63 LBS | TEMPERATURE: 98.4 F

## 2017-02-10 DIAGNOSIS — I63.89 LEFT TEMPORAL LOBE INFARCTION (H): Primary | ICD-10-CM

## 2017-02-10 LAB
ALBUMIN SERPL-MCNC: 3.2 G/DL (ref 3.4–5)
ALP SERPL-CCNC: 53 U/L (ref 40–150)
ALT SERPL W P-5'-P-CCNC: 20 U/L (ref 0–50)
AST SERPL W P-5'-P-CCNC: 23 U/L (ref 0–45)
BILIRUB DIRECT SERPL-MCNC: 0.1 MG/DL (ref 0–0.2)
BILIRUB SERPL-MCNC: 0.3 MG/DL (ref 0.2–1.3)
CHOLEST SERPL-MCNC: 166 MG/DL
HDLC SERPL-MCNC: 51 MG/DL
INR PPP: 1.23 (ref 0.86–1.14)
INTERPRETATION ECG - MUSE: NORMAL
LDLC SERPL CALC-MCNC: 95 MG/DL
NONHDLC SERPL-MCNC: 115 MG/DL
PROT SERPL-MCNC: 5.9 G/DL (ref 6.8–8.8)
TRIGL SERPL-MCNC: 98 MG/DL
TSH SERPL DL<=0.005 MIU/L-ACNC: 1.05 MU/L (ref 0.4–4)

## 2017-02-10 PROCEDURE — 85300 ANTITHROMBIN III ACTIVITY: CPT | Performed by: PHYSICIAN ASSISTANT

## 2017-02-10 PROCEDURE — 99205 OFFICE O/P NEW HI 60 MIN: CPT | Mod: 25 | Performed by: INTERNAL MEDICINE

## 2017-02-10 PROCEDURE — 85610 PROTHROMBIN TIME: CPT | Performed by: PHYSICIAN ASSISTANT

## 2017-02-10 PROCEDURE — 99217 ZZC OBSERVATION CARE DISCHARGE: CPT | Performed by: PHYSICIAN ASSISTANT

## 2017-02-10 PROCEDURE — G0378 HOSPITAL OBSERVATION PER HR: HCPCS

## 2017-02-10 PROCEDURE — 80076 HEPATIC FUNCTION PANEL: CPT | Performed by: INTERNAL MEDICINE

## 2017-02-10 PROCEDURE — 85303 CLOT INHIBIT PROT C ACTIVITY: CPT | Performed by: PHYSICIAN ASSISTANT

## 2017-02-10 PROCEDURE — 84443 ASSAY THYROID STIM HORMONE: CPT | Performed by: INTERNAL MEDICINE

## 2017-02-10 PROCEDURE — 93306 TTE W/DOPPLER COMPLETE: CPT

## 2017-02-10 PROCEDURE — 25000132 ZZH RX MED GY IP 250 OP 250 PS 637: Performed by: INTERNAL MEDICINE

## 2017-02-10 PROCEDURE — 80061 LIPID PANEL: CPT | Performed by: INTERNAL MEDICINE

## 2017-02-10 PROCEDURE — 93306 TTE W/DOPPLER COMPLETE: CPT | Mod: 26 | Performed by: INTERNAL MEDICINE

## 2017-02-10 PROCEDURE — 93270 REMOTE 30 DAY ECG REV/REPORT: CPT | Performed by: PHYSICIAN ASSISTANT

## 2017-02-10 PROCEDURE — 36415 COLL VENOUS BLD VENIPUNCTURE: CPT | Performed by: PHYSICIAN ASSISTANT

## 2017-02-10 PROCEDURE — 93272 ECG/REVIEW INTERPRET ONLY: CPT | Performed by: INTERNAL MEDICINE

## 2017-02-10 PROCEDURE — 85306 CLOT INHIBIT PROT S FREE: CPT | Performed by: PHYSICIAN ASSISTANT

## 2017-02-10 PROCEDURE — 81241 F5 GENE: CPT | Performed by: PHYSICIAN ASSISTANT

## 2017-02-10 PROCEDURE — 36415 COLL VENOUS BLD VENIPUNCTURE: CPT | Performed by: INTERNAL MEDICINE

## 2017-02-10 RX ORDER — ATORVASTATIN CALCIUM 40 MG/1
40 TABLET, FILM COATED ORAL DAILY
Qty: 30 TABLET | Refills: 0 | Status: SHIPPED | OUTPATIENT
Start: 2017-02-10 | End: 2017-03-09

## 2017-02-10 RX ORDER — WARFARIN SODIUM 5 MG/1
5 TABLET ORAL DAILY
Qty: 30 TABLET | Refills: 0 | Status: SHIPPED | OUTPATIENT
Start: 2017-02-10 | End: 2017-03-07

## 2017-02-10 RX ADMIN — CLOPIDOGREL 75 MG: 75 TABLET, FILM COATED ORAL at 09:44

## 2017-02-10 RX ADMIN — LOSARTAN POTASSIUM 100 MG: 25 TABLET, FILM COATED ORAL at 09:44

## 2017-02-10 RX ADMIN — AMLODIPINE BESYLATE 5 MG: 5 TABLET ORAL at 09:44

## 2017-02-10 RX ADMIN — HYDROCHLOROTHIAZIDE 25 MG: 25 TABLET ORAL at 09:44

## 2017-02-10 RX ADMIN — IMIPRAMINE HYDROCHLORIDE 25 MG: 25 TABLET, FILM COATED ORAL at 09:45

## 2017-02-10 RX ADMIN — ATENOLOL 50 MG: 25 TABLET ORAL at 09:44

## 2017-02-10 NOTE — ED NOTES
PRIMARY DIAGNOSIS: TIA (speech difficulty,numbness,headache,confusion)  resolved  OUTPATIENT/OBSERVATION GOALS TO BE MET BEFORE DISCHARGE:    1. Diagnostic testing complete & WNL:  completed  2. Cleared by neurology consultant (if involved): No  3. Patient at neurological baseline: Yes  4. ADLs back to baseline?  Yes  5. Activity and level of assistance: Ambulating independently.  6. Pain status: Pain free.  7. Barriers to discharge noted No  8. Interpretation of rhythm per telemetry tech:  sr with 1'avb,bbb, pvc/pac    Is patient a falls risk? No  Falls armband on?  No  Within Arm's Reach? No.Reason not within arm's reach is: ind  Bed alarm turned on?   No  Personal alarm in place and turned on?   No    Pt Echo shows PFO. Neuro assessment intact. Patient feels back to baseline. Patient will discharge with monitor and FU with cards and neuro

## 2017-02-10 NOTE — ED NOTES
Observation Brochure and Video   Patient informed of observation status based on provider's order. Observation Brochure was given and video watched.  Chelsy Chaparro RN

## 2017-02-10 NOTE — ED NOTES
OBSERVATION patient IN TIME: 1856  ROOM #225    Living Situation (if not independent, order SW consult):House with sister  Facility name:NA    Activity level at baseline:IND  Activity level on admit:IND    Is patient a falls risk? No  Falls armband on? Not applicable  Within Arm's Reach? No.Reason not within arm's reach is: A/Ox4  Bed alarm turned on?   Not applicable  Personal alarm in place and turned on?   Not applicable    Patient registered to observation; given Patient Bill of Rights; given the opportunity to ask questions about observation status and their plan of care.  Patient has been oriented to the observation room, bathroom, and call light is in place.    :Fatou Urrutia - Sister

## 2017-02-10 NOTE — ED NOTES
PRIMARY DIAGNOSIS: TIA (speech difficulty,numbness,headache,confusion)  resolved  OUTPATIENT/OBSERVATION GOALS TO BE MET BEFORE DISCHARGE:    1. Diagnostic testing complete & WNL:  completed  2. Cleared by neurology consultant (if involved): No  3. Patient at neurological baseline: Yes  4. ADLs back to baseline?  Yes  5. Activity and level of assistance: Ambulating independently.  6. Pain status: Pain free.  7. Barriers to discharge noted No  8. Interpretation of rhythm per telemetry tech:  sr with 1'avb,bbb, pvc/pac    Is patient a falls risk? No  Falls armband on?  No  Within Arm's Reach? No.Reason not within arm's reach is: ind  Bed alarm turned on?   No  Personal alarm in place and turned on?   No    Pt will have bubble Echo and cards consult. Neuro intact. Patient feels back to baseline.

## 2017-02-10 NOTE — ED NOTES
PRIMARY DIAGNOSIS: TIA (speech difficulty,numbness,headache,confusion)  resolved  OUTPATIENT/OBSERVATION GOALS TO BE MET BEFORE DISCHARGE:    1. Diagnostic testing complete & WNL: not completed  2. Cleared by neurology consultant (if involved): No  3. Patient at neurological baseline: Yes  4. ADLs back to baseline?  Yes  5. Activity and level of assistance: Ambulating independently.  6. Pain status: Pain free.  7. Barriers to discharge noted No  8. Interpretation of rhythm per telemetry tech:  sr with 1'avb,bbb,pvc,pac.    Is patient a falls risk? No  Falls armband on?  No  Within Arm's Reach? No.Reason not within arm's reach is: ind  Bed alarm turned on?   No  Personal alarm in place and turned on?   No

## 2017-02-10 NOTE — ED NOTES
PRIMARY DIAGNOSIS: TIA (speech difficulty,numbness,headache,confusion)  resolved  OUTPATIENT/OBSERVATION GOALS TO BE MET BEFORE DISCHARGE:    1. Diagnostic testing complete & WNL: not completed  2. Cleared by neurology consultant (if involved): No  3. Patient at neurological baseline: Yes  4. ADLs back to baseline?  Yes  5. Activity and level of assistance: Ambulating independently.  6. Pain status: Pain free.  7. Barriers to discharge noted No  8. Interpretation of rhythm per telemetry tech:  sr with 1'avb,bbb, pvc/pac    Is patient a falls risk? No  Falls armband on?  No  Within Arm's Reach? No.Reason not within arm's reach is: ind  Bed alarm turned on?   No  Personal alarm in place and turned on?   No    PT/OT/bubble echo/cards/speech

## 2017-02-10 NOTE — CONSULTS
"[facility]    Cardiology Consultation     Date of Admission:  2/9/2017  Date of Consult (When I saw the patient): 02/10/2017    Assessment and Plan  Jose Coronado is a 61 year old female who was admitted on 2/9/2017.    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  No other CVI findings.    2-small PFO/ASD.  There is both left-to-right and right-to-left shunting with a moderately positive bubble study.  Otherwise structurally and functionally normal cardiac echo    3-hypertension, treated for the last 7-8 years.  Fairly resistant has been controlled on 4 medications    4-recent history of palpitations.  Event monitor and seal patch showing nonsustained \"PSVT\" up to 20 or so beats.  In reviewing the strips in detail these rhythms are irregular and could herald the predisposition or even presence of paroxysmal atrial fibrillation.  However she has not had prolonged symptoms, and she is usually sensitive to these runs.    5-mild dilatation of the ascending aorta likely related her history of hypertension.    6-normal carotid ultrasound this admission    Recommendation  1-anticoagulation with warfarin.  She has several potential contributors/possible sources of stroke but PFO with paradoxical embolism is one of the more likely ones.  This should be initially treated with anticoagulation rather than antiplatelet therapy.    2-thrombophilia workup.  I have initiated this prior to discharge.    3-transesophageal echo.  This is necessary to further evaluate the PFO and potential for it to be percutaneously closed    4-event monitor for 1 month to reevaluate presence and/or risk for unrecognized atrial fibrillation    5-she takes Excedrin/aspirin about 4 days a week because of a history of migraine headaches.  This mildly increases her anticoagulation risk.  She could consider other migraine prophylactic/treatment strategies if there are " any problems with continuing her current strategy while anticoagulated.    6-I will arrange follow-up in structural heart clinic shortly after her transesophageal echo.    Paulie Hi M.D.    Primary Care Physician  Mary Mathews    Reason for Consult  Reason for consult: I was asked by Aleks Berry MD to evaluate this patient for role of PFO in her acute CVI.    History of Present Illness  Jose Coronado is a 61 year old female who presents with expressive partial aphasia, facial drooping, confusion, and right facial numbness and is found to have a left temporal small CVI on MRI.  Symptoms rapidly improved and she states they have all resolved today.  She's never had a similar prior episode.  She has a long history of migraine headaches for more than 20 years, often just with visual migraine aura but also with headache.  She's never had any other focal neurologic symptoms with these.  If she takes her Excedrin early in the course of symptoms or even prophylactically this seems to work well.  3 weeks ago she had an unusual episode where the visual symptoms and right-sided visual field loss actually persisted for many hours, and then she felt somewhat strange for several days.  However she had no other neurologic symptoms and no symptoms that she presented with currently.  She has a history of hypertension and has been treated since about 2009, currently requiring 4 drugs.  She states is controlled on that.  Her echo did show some mild dilatation of the ascending aorta suggesting her hypertension may have been more severe and of longer duration.  She presented earlier this year with complaints of paroxysmal palpitations.  Evaluation that time with various event monitors did show nonsustained runs of supraventricular rhythm with a long his episodes about 20 beats.  There is also apparently one episode of 45 beats of wide complex tachycardia.  Evaluation with echocardiography at that time showed  essentially a normal heart other than there is evidence of a PFO with left-to-right shunt as well as a moderately positive bubble study.  In reviewing those event monitors her rhythms are irregular most of the time although no longer than 20 beats.  However , if they persisted long enough they would probably fit in atrial fibrillation defecation.  She has not been aware of any episodes lasting for minutes little lone hours.  She just got back about 10 days ago from a trip to the [place].  Show 5 hour airline flight coming back but states she was up and walking around frequently during that flight.  She has noticed no leg or ankle swelling pain tenderness or erythema.  She otherwise been without any symptoms up until this event including no chest discomfort, shortness of breath, other focal neurologic symptoms, trauma, edema.  She notes she bruises a little bit, relatively easy but no significant bleeding history.  Her mother had a small stroke at age 79.  She was placed on anticoagulation and 6 months later had a large intracerebral bleed causing her death.  Otherwise no family history of cardiac disease, stroke, premature death.  There is no history previously of DVTs or other thrombotic predisposition.    I reviewed with her and her family in detail issues around cryptogenic stroke risks and causes, and possibility of paradoxical embolism through her PFO in this.  She does have other possible risk factors such as hypertension or and a slight possibility that she may have atrial fibrillation, but no other strong likelihood of source.  I did discuss the potential benefits and risks of anticoagulation with warfarin, interactions with her aspirin and how to manage that with other migraine medications.  I discussed that further evaluation and management for a paradoxical embolus source would require transesophageal echo and then  follow-up in structural heart clinic.  We did discuss other options such as  empiric anticoagulation, or just empiric antiplatelet therapy with the addition of Plavix.  After this discussion she wanted to proceed with the above further evaluation for the role and management of the PFO and her events.        Patient Active Problem List   Diagnosis     Family history of malignant neoplasm of breast     Female stress incontinence     Sleep apnea     Osteopenia     S/P gastric bypass     Vitamin D deficiency     Hyperparathyroidism (H)     Hirsutism     Advanced directives, counseling/discussion     Overweight, BMI > 35     Iron deficiency anemia     Lump or mass in breast     PFO (patent foramen ovale)     Essential hypertension with goal blood pressure less than 140/90     Left temporal lobe infarction (H)     Stroke (H)       Past Medical History  I have reviewed this patient's medical history and updated it with pertinent information if needed.   Past Medical History   Diagnosis Date     Diffuse cystic mastopathy      Fibrocystic breast disease     Infectious mononucleosis      Mono at age 16     Labyrinthitis, unspecified      Pain in joint, shoulder region      Secondary to a fall     Sleep apnea      she is on CPAP     Osteopenia      HTN, goal below 140/90      S/P gastric bypass June, 2010     Vitamin D deficiencies      Hyperparathyroidism (H)        Past Surgical History  I have reviewed this patient's surgical history and updated it with pertinent information if needed.  Past Surgical History   Procedure Laterality Date     C nonspecific procedure       S/P multiple breast biopies - all negative / benign     C nonspecific procedure       S/P T&A     C nonspecific procedure       [facility] teeth extraction     C nonspecific procedure       S/P (? unreadable) ankle     Gastric bypass  June 24, 2010     Parathyroidectomy  9/19/11     Orthopedic surgery Left 2010     wrist fracture     Colonoscopy N/A 8/12/2015     Procedure: COLONOSCOPY;  Surgeon: Deandre Brooks MD;  Location:  GI        Prior to Admission Medications  Prior to Admission Medications   Prescriptions Last Dose Informant Patient Reported? Taking?   Calcium Citrate-Vitamin D (CALCIUM CITRATE + D PO) 2/9/2017 at Unknown time  Yes Yes   Sig: Take 1 tablet by mouth every morning   Cyanocobalamin 2500 MCG TABS 2/9/2017 at am  Yes Yes   Sig: Take 2,500 mcg by mouth twice a week Mon, Thur   Ferrous Sulfate 27 MG TABS 2/9/2017 at am  Yes Yes   Sig: Take 27 mg by mouth 2 times daily   Probiotic Product (PROBIOTIC PO) 2/9/2017 at Unknown time  Yes Yes   Sig: Take 1 tablet by mouth daily.   amLODIPine (NORVASC) 5 MG tablet 2/9/2017 at Unknown time  No Yes   Sig: Take 1 tablet (5 mg) by mouth daily   ascorbic acid (VITAMIN C) 500 MG tablet 2/9/2017 at am  Yes Yes   Sig: Take 250 mg by mouth 2 times daily    aspirin-acetaminophen-caffeine (EXCEDRIN MIGRAINE) 250-250-65 MG per tablet   Yes Yes   Sig: Take 1 tablet by mouth every 6 hours as needed for headaches   atenolol (TENORMIN) 50 MG tablet 2/9/2017 at Unknown time  No Yes   Sig: Take 1 tablet (50 mg) by mouth daily   cetirizine (ZYRTEC) 10 MG tablet 2/8/2017 at Unknown time  Yes Yes   Sig: Take 1 tablet by mouth every evening.   cholecalciferol (VITAMIN D) 1000 UNIT tablet 2/9/2017 at am  Yes Yes   Sig: Take 0.5 tablets (500 Units) by mouth daily   hydrochlorothiazide (HYDRODIURIL) 25 MG tablet 2/9/2017 at am  No Yes   Sig: Take 1 tablet (25 mg) by mouth daily   imipramine (TOFRANIL) 25 MG tablet 2/9/2017 at am  No Yes   Sig: Take 1 tablet (25 mg) by mouth daily   ketotifen (ZADITOR) 0.025 % SOLN   Yes Yes   Sig: Place 1 drop into both eyes every 12 hours as needed for itching   losartan (COZAAR) 100 MG tablet 2/9/2017 at Unknown time  No Yes   Sig: Take 1 tablet (100 mg) by mouth daily   raloxifene (EVISTA) 60 MG tablet 2/9/2017 at Unknown time  No Yes   Sig: Take 1 tablet (60 mg) by mouth daily   valACYclovir (VALTREX) 1000 mg tablet PRN  No Yes   Sig: Take 2 tablets (2,000 mg) by mouth  "2 times daily as needed      Facility-Administered Medications: None     Current Facility-Administered Medications   Medication Dose Route Frequency     amLODIPine  5 mg Oral Daily     atenolol  50 mg Oral Daily     cetirizine  10 mg Oral QPM     hydrochlorothiazide  25 mg Oral Daily     imipramine  25 mg Oral Daily     losartan  100 mg Oral Daily     clopidogrel  75 mg Oral or NG Tube Daily     atorvastatin  40 mg Oral Daily at 8 pm     Current Facility-Administered Medications   Medication Last Rate     Allergies  Allergies   Allergen Reactions     Contrast Dye Itching     Reaction of immediate burning and severe itching in Right ear after injection for CT.      Sulfa Drugs      hives       Social History   reports that she has never smoked. She has never used smokeless tobacco. She reports that she drinks alcohol. She reports that she does not use illicit drugs.    Family History  Family History   Problem Relation Age of Onset     Breast Cancer Mother      Hypertension Mother      Arthritis Mother      Thyroid Disease Mother      hypo     Breast Cancer Maternal Aunt      Hypertension Paternal Grandmother      Hypertension Sister      CEREBROVASCULAR DISEASE Maternal Grandmother      Colon Cancer No family hx of        Review of Systems  The 10 point Review of Systems is negative other than noted in the HPI or here.       Physical Exam  Vital Signs with Ranges  Temp:  [96.8  F (36  C)-98.8  F (37.1  C)] 96.9  F (36.1  C)  Pulse:  [62-88] 62  Heart Rate:  [68-88] 68  Resp:  [14-35] 16  BP: (112-144)/(47-97) 112/69 mmHg  SpO2:  [95 %-100 %] 97 %  Filed Vitals:    02/09/17 1903   Weight: 110.963 kg (244 lb 10.1 oz)          Vitals: /69 mmHg  Pulse 62  Temp(Src) 96.9  F (36.1  C) (Oral)  Resp 16  Ht 1.664 m (5' 5.5\")  Wt 110.963 kg (244 lb 10.1 oz)  BMI 40.07 kg/m2  SpO2 97%    Constitutional: Normally developed, resting comfortably, no distress    Skin: Clear    Head/Eyes/ENT:  No icterus, cyanosis, " pallor, trauma.  Throat clear, Mallampati 2    Neck:  Supple, normal carotid upstrokes without bruits, no masses    Chest:  Clear    Cardiac: Regular rhythm without murmur, rub, gallop, JVD or HJR    Abdomen:  Obese but nontender without gross organomegaly and without bruits    Vascular: Peripheral pulses symmetrical and intact and normal.  No bruits    Extremities and Back:  no deformities, cyanosis, erythema, edema    Neurological:    Alert and oriented ×3.  Pupils are equal.  Extraocular motions intact.  No diplopia.  Face is symmetrical, tongue midline, no focal cranial nerve findings.     No lab results found in last 7 days.    Invalid input(s): TROPONINIES      Recent Labs  Lab 02/10/17  0635 02/09/17  1545   WBC  --  8.8   HGB  --  13.8   MCV  --  91   PLT  --  322   NA  --  139   POTASSIUM  --  3.5   CHLORIDE  --  103   CO2  --  28   BUN  --  13   CR  --  0.90   GFRESTIMATED  --  63   GFRESTBLACK  --  77   ANIONGAP  --  8   MAXIMILIANO  --  8.9   GLC  --  96   ALBUMIN 3.2*  --    PROTTOTAL 5.9*  --    BILITOTAL 0.3  --    ALKPHOS 53  --    ALT 20  --    AST 23  --        Imaging:  Recent Results (from the past 48 hour(s))   MR Brain w/o & w Contrast    Narrative    MR BRAIN WITHOUT AND WITH CONTRAST  2/9/2017 4:57 PM    HISTORY: Expressive aphasia, right facial numbness    COMPARISON: None.    TECHNIQUE: Sagittal T1, axial diffusion scan, axial T2, axial coronal  FLAIR, coronal T2, axial T1, axial and coronal post gadolinium  enhanced T1-weighted images. 12 mL Gadavist.    FINDINGS: No convincing infarction on diffusion study. There is a tiny  area of high signal on the mid left temporal lobe on series 2 image 75  of the diffusion study, also seen on series 9 image 73 of the coronal  perfusion study. This is consistent with a tiny area of infarction.  Mild scattered hyperintensities in the white matter of both cerebral  hemispheres consistent with chronic small vessel ischemic disease.  There is no pathologic  enhancement with gadolinium.    Sinuses are clear.      Impression    IMPRESSION: Small cortical area of restricted diffusion left superior  mid temporal lobe consistent with tiny area of recent infarction.    MICHELLE FLOWERS MD   US carotid bilateral    Narrative    BILATERAL DUPLEX CAROTID ULTRASOUND  2017 10:52 PM     HISTORY: Cerebral venous infarction.    COMPARISON: None.    FINDINGS: There is no atherosclerotic plaque in the carotid  bifurcations. Flow velocities and waveforms show less than 50%  diameter stenosis in both the right and left internal carotid arteries  as assessed by each ICA PSV and EDV and ICA/DCCA ratio. Antegrade flow  is present in both vertebral and external carotid arteries.       Impression    IMPRESSION: Normal carotid ultrasound with less than 50% diameter  stenosis in both internal carotid arteries relative to the distal ICA  diameters.     NICKY HUGHES MD       Echo:  Recent Results (from the past 4320 hour(s))   Echocardiogram - bubble study    Narrative    [id number]  [id number]  [id number]  [id number]^JOAN^NICKY^LIZETT           [facility]  Echocardiography Laboratory  [address] [place] [address]        Name: LASHELL BOYD  MRN: [id number]  : 1955  Study Date: 02/10/2017 08:12 AM  Age: 61 yrs  Gender: Female  Patient Location: [facility]  Reason For Study: Cerebrovascular Incident  Ordering Physician: NICKY FRANCO  Referring Physician: TAMMIE WAGNER  Performed By: Julian Ireland RDCS     BSA: 2.2 m2  Height: 65 in  Weight: 244 lb  HR: 70  BP: 144/71 mmHg  _____________________________________________________________________________  __        Procedure  Complete Portable Echo Adult.  _____________________________________________________________________________  __        Interpretation Summary     The left ventricle is normal in size. There is normal left ventricular wall  thickness. Left ventricular systolic function is normal. The visual  ejection  fraction is estimated at 55-60%. Grade I or early diastolic dysfunction. No  regional wall motion abnormalities noted.  Normal right ventricular size and systolic function.  Normal left atrial size. Right atrial size is normal. A patent foramen ovale  is present.  Trace to mild mitral and tricuspid regurgitation.  Mild dilatation of the aortic root and ascending aorta.  No pericardial effusion.  In comparison to the previous report dated 01/18/2016, the findings are  similar.  _____________________________________________________________________________  __        Left Ventricle  The left ventricle is normal in size. There is normal left ventricular wall  thickness. Left ventricular systolic function is normal. The visual ejection  fraction is estimated at 55-60%. Grade I or early diastolic dysfunction. No  regional wall motion abnormalities noted.     Right Ventricle  The right ventricle is normal size. The right ventricular systolic function is  normal.     Atria  Normal left atrial size. Right atrial size is normal. A patent foramen ovale  is present.     Mitral Valve  The mitral valve leaflets are mildly thickened. There is trace mitral  regurgitation.        Tricuspid Valve  There is mild (1+) tricuspid regurgitation. The right ventricular systolic  pressure is approximated at 23.7 mmHg plus the right atrial pressure.     Aortic Valve  There is mild trileaflet aortic sclerosis. There is trace aortic  regurgitation. No aortic stenosis is present.     Pulmonic Valve  There is trace pulmonic valvular regurgitation. There is no pulmonic valvular  stenosis.     Vessels  Mild aortic root dilatation. The ascending aorta is Mildly dilated. Descending  aortic velocity normal. The IVC is normal in size and reactivity with  respiration, suggesting normal central venous pressure.     Rhythm  The rhythm was normal sinus.      _____________________________________________________________________________  __  MMode/2D Measurements & Calculations  IVSd: 1.0 cm  LVIDd: 4.9 cm  LVIDs: 2.9 cm  LVPWd: 1.1 cm  FS: 40.5 %  EDV(Teich): 111.4 ml  ESV(Teich): 32.2 ml  LV mass(C)d: 185.1 grams     Ao root diam: 3.8 cm  LA dimension: 3.5 cm  asc Aorta Diam: 3.9 cm  LA/Ao: 0.91        Doppler Measurements & Calculations  MV E max hay: 71.3 cm/sec  MV A max hay: 87.1 cm/sec  MV E/A: 0.82  MV dec time: 0.25 sec  PA acc time: 0.09 sec  TR max hay: 243.2 cm/sec  TR max P.7 mmHg  Lateral E/e': 11.3  Medial E/e': 12.0              _____________________________________________________________________________  __        Report approved by: Clarice Connor 02/10/2017 09:28 AM

## 2017-02-10 NOTE — PHARMACY-ANTICOAGULATION SERVICE
Clinical Pharmacy- Warfarin Discharge Note  This patient is currently on warfarin for the treatment of Atrial fibrillation.  INR Goal= 2-3  Expected length of therapy per PCP.    Anticoagulation Dose History     Recent Dosing and Labs Latest Ref Rng 2/4/2010 2/10/2017    INR 0.86 - 1.14 0.92 1.23(H)          Recommend discharging the patient on a warfarin regimen of 5mg daily with a prescription for warfarin 5mg tablets.      The patient should have an INR checked on Tuesday 2/14/17.    Leila Gross, PharmD  February 10, 2017

## 2017-02-10 NOTE — H&P
[facility]  History and Physical   Hospitalist Service    Chauncey Berry MD    Jose Coronado MRN# [id number]   YOB: 1955 Age: 61 year old      Date of Admission:  2/9/2017           Assessment and Plan:   Patient is a 61-year-old female with history of obstructive sleep apnea for which she uses C Pap, hypertension, osteopenia, vitamin D deficiency, hyperparathyroidism, supraventricular tachycardia, and a moderate patent foramen ovale identified by echocardiogram on 1/18/16.  She presented to the emergency department on 2/9/17 for evaluation of expressive aphasia, headache, dizziness and some difficulty with complex ideas.  Emergency department workup showed small left temporal lobe stroke by MRI.  The patient's symptoms had almost entirely resolved during her time in the emergency department.  I was asked to admit her to observation to complete stroke workup.    Problem list:    1.  Small left temporal lobe stroke. Symptoms were headache, dizziness, expressive aphasia, and some difficulty with complex ideas.  Symptoms have resolved.  Stroke was identified by MRI. Patient has risk factors for stroke of hypertension and known moderate patent foramen ovale (this was diagnosed by echocardiogram on 1/18/16).  She will be admitted under observation status to complete stroke workup.  She'll be watched on telemetry.  She will be started on high intensity statin therapy with Lipitor 40 mg each night.  Liver function tests and fasting lipid panel will be checked in the morning.  She does not take aspirin daily for prophylaxis but does take Excedrin Migraine which contains aspirin on most days.  With this in mind, I will start Plavix 75 mg daily.  Carotid artery ultrasound will be obtained. Echocardiogram will be obtained to rule out intracardiac clot.  I suspect that moderate patent foramen ovale will be confirmed.  I will request cardiology consult to help determine if patient would benefit  from closure of patent foramen ovale. Physical therapy, occupational therapy, and speech therapy will be consulted.    2.  Obstructive sleep apnea.  C Pap will be ordered as used at home.    3.  Hypertension.  Continue prior to admission antihypertensive medications including Norvasc, hydrochlorothiazide, Cozaar, and Tenormin.    Full code  Mechanical DVT prophylaxis  Disposition.  Admit under observation status.       Code Status:   Full Code         Primary Care Physician:   Mary Mathews [phone]         Chief Complaint:   Difficulty with word finding, headache, dizziness, and difficulties with complex ideas    History is obtained from Jose, her family, Dr. Mendoza, and the medical record         History of Present Illness:   Jose Coronado is a 61-year-old female with history of obstructive sleep apnea for which she uses C Pap, hypertension, osteopenia, vitamin D deficiency, hyperparathyroidism, supraventricular tachycardia, and a moderate patent foramen ovale identified by echocardiogram on 1/18/16.  She presented to the emergency department on 2/9/17 for evaluation of expressive aphasia, headache, dizziness and some difficulty with complex ideas. She noted these symptoms when she woke up at approximately 8:00 this morning. She had some headache and felt slightly dizzy.  She did not feel quite right.  She went to the bathroom to do her hair and ended up not doing her hair because the task seemed to complex. She did not have focal weakness or problems with coordination, however. The right side of her face felt funny and when she looked in the mirror she noticed that her nose was running from her right nostril and that she could not feel it. These symptoms persisted somewhat and as she began to have conversation with people during the day she realized that she was having difficulty with word finding when it came to complex subjects.  She came to the emergency department for evaluation.   Vital signs were stable.  Labs including basic metabolic panel and CBC were unremarkable.  MRI of the brain showed small left temporal lobe stroke.  She was given aspirin in the emergency department.  Her symptoms almost entirely resolved during her time in the emergency department.  I was asked to admit her to observation to complete stroke workup.  Of note, a little more than one year agoJan had some evaluation for tachycardia.  I believe she was found to have supraventricular tachycardia.  Echocardiogram at that time showed moderate patent foramen ovale.  This was on 1/18/16.          Past Medical History:     Patient Active Problem List   Diagnosis     Family history of malignant neoplasm of breast     Female stress incontinence     Sleep apnea     Osteopenia     S/P gastric bypass     Vitamin D deficiency     Hyperparathyroidism (H)     Hirsutism     Advanced directives, counseling/discussion     Overweight, BMI > 35     Iron deficiency anemia     Lump or mass in breast     PFO (patent foramen ovale)     Essential hypertension with goal blood pressure less than 140/90     Left temporal lobe infarction (H)     Stroke (H)      Past Medical History   Diagnosis Date     Diffuse cystic mastopathy      Fibrocystic breast disease     Infectious mononucleosis      Mono at age 16     Labyrinthitis, unspecified      Pain in joint, shoulder region      Secondary to a fall     Sleep apnea      she is on CPAP     Osteopenia      HTN, goal below 140/90      S/P gastric bypass June, 2010     Vitamin D deficiencies      Hyperparathyroidism (H)              Past Surgical History:     Past Surgical History   Procedure Laterality Date     C nonspecific procedure       S/P multiple breast biopies - all negative / benign     C nonspecific procedure       S/P T&A     C nonspecific procedure       Rayne teeth extraction     C nonspecific procedure       S/P (? unreadable) ankle     Gastric bypass  June 24, 2010     Parathyroidectomy   9/19/11     Orthopedic surgery Left 2010     wrist fracture     Colonoscopy N/A 8/12/2015     Procedure: COLONOSCOPY;  Surgeon: Deandre Brooks MD;  Location:  GI            Home Medications:     Prior to Admission medications    Medication Sig Last Dose Taking? Auth Provider   Calcium Citrate-Vitamin D (CALCIUM CITRATE + D PO) Take 1 tablet by mouth every morning 2/9/2017 at Unknown time Yes Unknown, Entered By History   Ferrous Sulfate 27 MG TABS Take 27 mg by mouth 2 times daily 2/9/2017 at am Yes Unknown, Entered By History   Cyanocobalamin 2500 MCG TABS Take 2,500 mcg by mouth twice a week Mon, Thur 2/9/2017 at am Yes Unknown, Entered By History   raloxifene (EVISTA) 60 MG tablet Take 1 tablet (60 mg) by mouth daily 2/9/2017 at Unknown time Yes Mray Mathews MD   imipramine (TOFRANIL) 25 MG tablet Take 1 tablet (25 mg) by mouth daily 2/9/2017 at am Yes Mary Mathews MD   amLODIPine (NORVASC) 5 MG tablet Take 1 tablet (5 mg) by mouth daily 2/9/2017 at Unknown time Yes Mary Mathews MD   hydrochlorothiazide (HYDRODIURIL) 25 MG tablet Take 1 tablet (25 mg) by mouth daily 2/9/2017 at am Yes Mary Mathews MD   losartan (COZAAR) 100 MG tablet Take 1 tablet (100 mg) by mouth daily 2/9/2017 at Unknown time Yes Mary Mathews MD   atenolol (TENORMIN) 50 MG tablet Take 1 tablet (50 mg) by mouth daily 2/9/2017 at Unknown time Yes Mary Mathews MD   valACYclovir (VALTREX) 1000 mg tablet Take 2 tablets (2,000 mg) by mouth 2 times daily as needed PRN Yes Mary Mathews MD   ascorbic acid (VITAMIN C) 500 MG tablet Take 250 mg by mouth 2 times daily  2/9/2017 at am Yes Mary Mathews MD   cholecalciferol (VITAMIN D) 1000 UNIT tablet Take 0.5 tablets (500 Units) by mouth daily 2/9/2017 at am Yes Mary Mathews MD   aspirin-acetaminophen-caffeine (EXCEDRIN MIGRAINE)  "250-250-65 MG per tablet Take 1 tablet by mouth every 6 hours as needed for headaches  Yes Mary Mathews MD   ketotifen (ZADITOR) 0.025 % SOLN Place 1 drop into both eyes every 12 hours as needed for itching  Yes Mary Mathews MD   cetirizine (ZYRTEC) 10 MG tablet Take 1 tablet by mouth every evening. 2/8/2017 at Unknown time Yes Mary Mathews MD   Probiotic Product (PROBIOTIC PO) Take 1 tablet by mouth daily. 2/9/2017 at Unknown time Yes Reported, Patient            Allergies:     Allergies   Allergen Reactions     Contrast Dye Itching     Reaction of immediate burning and severe itching in Right ear after injection for CT.      Sulfa Drugs      hives            Social History:     Social History   Substance Use Topics     Smoking status: Never Smoker      Smokeless tobacco: Never Used     Alcohol Use: 0.0 oz/week     0 Standard drinks or equivalent per week      Comment: occasional             Family History:     Family History   Problem Relation Age of Onset     Breast Cancer Mother      Hypertension Mother      Arthritis Mother      Thyroid Disease Mother      hypo     Breast Cancer Maternal Aunt      Hypertension Paternal Grandmother      Hypertension Sister      CEREBROVASCULAR DISEASE Maternal Grandmother      Colon Cancer No family hx of               Review of Systems:   The 10 point Review of Systems is negative other than as noted in the HPI.           Physical Exam:   Blood pressure 144/71, pulse 81, temperature 96.8  F (36  C), temperature source Oral, resp. rate 16, height 1.664 m (5' 5.5\"), weight 110.963 kg (244 lb 10.1 oz), SpO2 95 %, not currently breastfeeding.  244 lbs 10.08 oz      GENERAL: Pleasant and cooperative. No acute distress.  EYES: Pupils equal and round. No scleral erythema or icterus.  ENT: External ears are normal without deformity. Posterior oropharynx is without erythem, swelling, or exudate.  NECK: Supple. No masses or " swelling. No tenderness. Thyroid is normal without mass or tenderness.  CHEST: Clear to auscultation. Normal breath sounds. No retractions.   CV: Regular rate and rhythm. No JVD. Pulses normal.  ABDOMEN: Bowel sounds present. No tenderness. No masses or hernia.  EXTREMETIES: No clubbing, cyanosis, or ischemia.  SKIN: Warm and dry to touch. No wounds or rashes.  NEUROLOGIC: Strength and sensation are normal. Deep tendon reflexes are normal. Cranial nerves are normal. No confusion or disorientation.            Data:   All new lab and imaging data was reviewed.     Results for orders placed or performed during the hospital encounter of 02/09/17 (from the past 24 hour(s))   EKG 12-lead, tracing only   Result Value Ref Range    Interpretation ECG Click View Image link to view waveform and result    CBC + differential   Result Value Ref Range    WBC 8.8 4.0 - 11.0 10e9/L    RBC Count 4.57 3.8 - 5.2 10e12/L    Hemoglobin 13.8 11.7 - 15.7 g/dL    Hematocrit 41.6 35.0 - 47.0 %    MCV 91 78 - 100 fl    MCH 30.2 26.5 - 33.0 pg    MCHC 33.2 31.5 - 36.5 g/dL    RDW 13.2 10.0 - 15.0 %    Platelet Count 322 150 - 450 10e9/L    Diff Method Automated Method     % Neutrophils 63.9 %    % Lymphocytes 23.2 %    % Monocytes 11.2 %    % Eosinophils 0.9 %    % Basophils 0.6 %    % Immature Granulocytes 0.2 %    Nucleated RBCs 0 0 /100    Absolute Neutrophil 5.6 1.6 - 8.3 10e9/L    Absolute Lymphocytes 2.1 0.8 - 5.3 10e9/L    Absolute Monocytes 1.0 0.0 - 1.3 10e9/L    Absolute Eosinophils 0.1 0.0 - 0.7 10e9/L    Absolute Basophils 0.1 0.0 - 0.2 10e9/L    Abs Immature Granulocytes 0.0 0 - 0.4 10e9/L    Absolute Nucleated RBC 0.0    Basic metabolic panel (BMP)   Result Value Ref Range    Sodium 139 133 - 144 mmol/L    Potassium 3.5 3.4 - 5.3 mmol/L    Chloride 103 94 - 109 mmol/L    Carbon Dioxide 28 20 - 32 mmol/L    Anion Gap 8 3 - 14 mmol/L    Glucose 96 70 - 99 mg/dL    Urea Nitrogen 13 7 - 30 mg/dL    Creatinine 0.90 0.52 - 1.04 mg/dL     GFR Estimate 63 >60 mL/min/1.7m2    GFR Estimate If Black 77 >60 mL/min/1.7m2    Calcium 8.9 8.5 - 10.1 mg/dL   MR Brain w/o & w Contrast    Narrative    MR BRAIN WITHOUT AND WITH CONTRAST  2/9/2017 4:57 PM    HISTORY: Expressive aphasia, right facial numbness    COMPARISON: None.    TECHNIQUE: Sagittal T1, axial diffusion scan, axial T2, axial coronal  FLAIR, coronal T2, axial T1, axial and coronal post gadolinium  enhanced T1-weighted images. 12 mL Gadavist.    FINDINGS: No convincing infarction on diffusion study. There is a tiny  area of high signal on the mid left temporal lobe on series 2 image 75  of the diffusion study, also seen on series 9 image 73 of the coronal  perfusion study. This is consistent with a tiny area of infarction.  Mild scattered hyperintensities in the white matter of both cerebral  hemispheres consistent with chronic small vessel ischemic disease.  There is no pathologic enhancement with gadolinium.    Sinuses are clear.      Impression    IMPRESSION: Small cortical area of restricted diffusion left superior  mid temporal lobe consistent with tiny area of recent infarction.    MICHELLE FLOWERS MD

## 2017-02-10 NOTE — TELEPHONE ENCOUNTER
"Pt calls, reports d/c'd from hospital today d/t \"stroke\" per epic pt was seen for TIA with echo showing PFO. Asking who would be f/u with her and follow lab results and records as her PCP is on leave at this time. Discuss may have f/u appt with any other provider. Pt requests appt with Dr. Martinez. Scheduled for 02/23 as pt was requested to f/u within 14 days. Indicates she's f/u with cardio and neuro as well. Also was instructed to f/u with INR check on Tues the 14th AM.    Please advise if pt needs INR referral.    Sent to Dr. Martinez as FYI of pt's situation and INR referral. Sent to INR clinic's nurse to be aware.  "

## 2017-02-10 NOTE — TELEPHONE ENCOUNTER
Left message to call office to schedule INR appointment.    Please sign referral.  Bree Mejia RN

## 2017-02-10 NOTE — ED NOTES
PRIMARY DIAGNOSIS: TIA (speech difficulty,numbness,headache,confusion)  resolved  OUTPATIENT/OBSERVATION GOALS TO BE MET BEFORE DISCHARGE:    1. Diagnostic testing complete & WNL: not completed  2. Cleared by neurology consultant (if involved): No  3. Patient at neurological baseline: Yes  4. ADLs back to baseline?  Yes  5. Activity and level of assistance: Ambulating independently.  6. Pain status: Pain free.  7. Barriers to discharge noted No  8. Interpretation of rhythm per telemetry tech:  sr    Is patient a falls risk? No  Falls armband on?  No  Within Arm's Reach? No.Reason not within arm's reach is: ind  Bed alarm turned on?   No  Personal alarm in place and turned on?   No

## 2017-02-10 NOTE — PHARMACY - DISCHARGE MEDICATION RECONCILIATION
Discharge medication review for this patient is complete.   Patient was not counseled or given any education materials as discharged before pharmacist was able to  on new Warfarin & Atorvastatin.  See EPIC for allergy information, prior to admission medications and immunization status.   Pharmacist assisted with medication reconciliation of discharge medications with PTA medications.    MD was contacted with any questions/concerns:None    Additional medication history information:None    Discharge Medication List     Review of your medicines      START taking       Dose / Directions    atorvastatin 40 MG tablet   Commonly known as:  LIPITOR   Used for:  Left temporal lobe infarction (H)        Dose:  40 mg   Take 1 tablet (40 mg) by mouth daily   Quantity:  30 tablet   Refills:  0       warfarin 5 MG tablet   Commonly known as:  COUMADIN   Used for:  Left temporal lobe infarction (H), PFO (patent foramen ovale)        Dose:  5 mg   Take 1 tablet (5 mg) by mouth daily   Quantity:  30 tablet   Refills:  0         CONTINUE these medicines which have NOT CHANGED       Dose / Directions    amLODIPine 5 MG tablet   Commonly known as:  NORVASC   Used for:  Essential hypertension with goal blood pressure less than 140/90        Dose:  5 mg   Take 1 tablet (5 mg) by mouth daily   Quantity:  90 tablet   Refills:  1       ascorbic acid 500 MG tablet   Commonly known as:  VITAMIN C        Dose:  250 mg   Take 250 mg by mouth 2 times daily   Quantity:  30 tablet   Refills:  0       atenolol 50 MG tablet   Commonly known as:  TENORMIN   Used for:  Palpitations        Dose:  50 mg   Take 1 tablet (50 mg) by mouth daily   Quantity:  90 tablet   Refills:  3       CALCIUM CITRATE + D PO        Dose:  1 tablet   Take 1 tablet by mouth every morning   Refills:  0       cetirizine 10 MG tablet   Commonly known as:  zyrTEC        Dose:  10 mg   Take 1 tablet by mouth every evening.   Quantity:  30 tablet   Refills:  1        cholecalciferol 1000 UNIT tablet   Commonly known as:  vitamin D        Dose:  500 Units   Take 0.5 tablets (500 Units) by mouth daily   Quantity:  30 tablet   Refills:  0       Cyanocobalamin 2500 MCG Tabs        Dose:  2500 mcg   Take 2,500 mcg by mouth twice a week Mon, Thur   Refills:  0       Ferrous Sulfate 27 MG Tabs        Dose:  27 mg   Take 27 mg by mouth 2 times daily   Refills:  0       hydrochlorothiazide 25 MG tablet   Commonly known as:  HYDRODIURIL   Used for:  Essential hypertension with goal blood pressure less than 140/90        Dose:  25 mg   Take 1 tablet (25 mg) by mouth daily   Quantity:  90 tablet   Refills:  1       imipramine 25 MG tablet   Commonly known as:  TOFRANIL   Used for:  Bladder dysfunction        Dose:  25 mg   Take 1 tablet (25 mg) by mouth daily   Quantity:  100 tablet   Refills:  2       losartan 100 MG tablet   Commonly known as:  COZAAR   Used for:  Essential hypertension with goal blood pressure less than 140/90        Dose:  100 mg   Take 1 tablet (100 mg) by mouth daily   Quantity:  90 tablet   Refills:  3       PROBIOTIC PO        Dose:  1 tablet   Take 1 tablet by mouth daily.   Refills:  0       raloxifene 60 MG tablet   Commonly known as:  EVISTA   Used for:  Osteopenia        Dose:  1 tablet   Take 1 tablet (60 mg) by mouth daily   Quantity:  90 tablet   Refills:  2       valACYclovir 1000 mg tablet   Commonly known as:  VALTREX   Used for:  Cold sore        Dose:  2000 mg   Take 2 tablets (2,000 mg) by mouth 2 times daily as needed   Quantity:  4 tablet   Refills:  5       ZADITOR 0.025 % Soln ophthalmic solution   Generic drug:  ketotifen        Dose:  1 drop   Place 1 drop into both eyes every 12 hours as needed for itching   Quantity:  1 Bottle   Refills:  0         STOP taking          aspirin-acetaminophen-caffeine 250-250-65 MG per tablet   Commonly known as:  EXCEDRIN MIGRAINE                Where to get your medicines      These medications were sent to  [id number] - [place], [place] - [address]  [address] [address] [facility] [address]     Phone:  [phone]    - atorvastatin 40 MG tablet  - warfarin 5 MG tablet

## 2017-02-11 LAB — AT III ACT/NOR PPP CHRO: 107 % (ref 85–135)

## 2017-02-12 NOTE — DISCHARGE SUMMARY
[facility] Outpatient / Observation Unit  Discharge Summary        Jose Coronado MRN# [id number]   YOB: 1955 Age: 61 year old     Date of Admission:  2/9/2017  Date of Discharge:  2/10/2017  Admitting Physician:  Chauncey Berry MD  Discharge Physician: Genoveva Jacob PA-C  Discharging Service: Hospitalist      Primary Provider: Mary Mathews  Primary Care Physician Phone Number: [phone]         Primary Discharge Diagnoses:    Jose Coronado is a 61-year-old female with history of obstructive sleep apnea for which she uses C Pap, hypertension, osteopenia, vitamin D deficiency, hyperparathyroidism, supraventricular tachycardia, and a moderate patent foramen ovale identified by echocardiogram on 1/18/16. She presented to the emergency department on 2/9/17 for evaluation of expressive aphasia, headache, dizziness and some difficulty with complex ideas. Emergency department workup showed small left temporal lobe stroke by MRI. The patient's symptoms had almost entirely resolved during her time in the emergency department. She was registered to observation for complete stroke workup.     1. Small left temporal lobe stroke: Identified on MRI of brain.  Symptoms now resolved. No events noted on telemetry overnight.  Started on high intensity statin therapy Lipitor 40 mg HS. Carotid ultrasound wnl. Echocardiogram repeated and reconfirmed moderate PFO seen on echo 1/18/16.  PT, OT, and SLP were initially consulted but then cancelled as her symptoms were completely resolved overnight.  Cardiology saw the patient in consultation and gave the following recommendations carried out upon discharge:  -Start anticoagulation for cryptogenic stroke with confirmed PFO.  Started on warfarin 5 mg daily on day of admission.  -Discontinuation of Excedrin/aspirin for migraine headaches given initiation of anticoagulation.  -INR check in clinic on 2/14/17.  -Thrombophilia workup initiated (protein C, protein  S, antithrombin III, Factor V Leiden), drawn and sent for outpatient follow up.  -Transesophageal echo to further evaluated PFO and potential for percutaneous closure.  -Follow up in PFO clinic to be arranged with assistance of cardiology after undergoing BROCK.  -Continue PTA anihypertensive regimen as well as Lipitor 40 mg HS.  -Cardiac event monitor x 30 days to evaluate for further dysrhythmias (has possible hx of non-sustained PSVT, ?a fib seen on previous event monitor evaluation).  -We have also requested that she follow up closely in the outpatient setting with neurology following this CVA for any further recommendations, referral provided.          Secondary Discharge Diagnoses:     Past Medical History   Diagnosis Date     Diffuse cystic mastopathy      Fibrocystic breast disease     HTN, goal below 140/90      Hyperparathyroidism (H)      Infectious mononucleosis      Mono at age 16     Labyrinthitis, unspecified      Osteopenia      Pain in joint, shoulder region      Secondary to a fall     S/P gastric bypass June, 2010     Sleep apnea      she is on CPAP     Vitamin D deficiencies                 Code Status:      Full Code        Brief Hospital Summary:        Reason for your hospital stay       You were evaluated in the hospital for difficulty with speech, and on   your brain MRI you were noted to have a small temporal stroke.  Your   symptoms resolved after arrival in the hospital.  You were brought on   observation to complete a stroke workup to evaluate possible causes.  You   were monitored on telemetry, a repeat echo was obtained (confirming a   patent foramen ovale), and were started on atorvastatin to take each   night.  Cardiology was consulted to help with deciding whether further   intervention was needed for your PFO.  The cardiologist, Dr. Presley,   recommended that you be started on anticoagulation with warfarin   (coumadin), as your patent foramen ovale puts you at significant increased    stroke risk if you were to form a clot in your vessels.  Take 5 mg   warfarin daily. He also recommended a BROCK (trans-esophageal   echocardiogram) to evaluate the position of your PFO, and then to follow   up with the PFO clinic.  He will help set up these appointments for you.    He also wanted you to be on a cardiac event monitor for the next 30 days   to evaluate for any evidence of episodic irregular cardiac rhythms.  You   should also schedule an appointment early next week (Monday or Tuesday) to   see a neurologist since you did have a confirmed stroke.  Get your INR   checked in clinic on Tuesday.  We will collect labs for a hypercoagulation   study prior to your discharge, and those results can be followed up on   during your outpatient appointments.                    Please refer to initial admission history and physical for further details.   Briefly, Jose Coronado was admitted on 2/9/2017 for complaints of expressive aphasia, dizziness, headache, difficulty with complex ideas, concerning for CVA.    Initial work up in the ED revealed a negative CT scan of the head for any acute process.   EKG did not reveal evidence of significant cardiac arrhythmias or ischemia.  MRI of brain confirmed small left temporal lobe stroke.  Symptoms were resolving in the ED. Pt was registered to the Observation Unit for further evaluation.     Pt was placed on telemetry and underwent frequent neuro checks.   Pt was started on Lipitor 40 mg HS and Plavix 75 mg daily.  Laboratory results were reviewed and significant findings addressed.   ECHO with bubble was performed as well as MR of the brain and carotid ultrasound of the neck (with results listed below) Neurology consult was not obtained in the inpatient setting, recommended close outpatient follow up with them and provided outpatient referral.  Cardiology consultation was obtained with recommendations provided as listed above in Assessment & Plan.  On the day of  discharge, patient had resolution of the symptoms, telemetry did not reveal any significant arrhythmias, vitals remained stable and pt was deemed safe for discharge. Medications were reviewed and adjustments made as necessary. Pt is instructed to follow up as below.           Significant Labs & Imaging During Hospitalization:        Results for orders placed or performed during the hospital encounter of 02/09/17   MR Brain w/o & w Contrast    Narrative    MR BRAIN WITHOUT AND WITH CONTRAST  2/9/2017 4:57 PM    HISTORY: Expressive aphasia, right facial numbness    COMPARISON: None.    TECHNIQUE: Sagittal T1, axial diffusion scan, axial T2, axial coronal  FLAIR, coronal T2, axial T1, axial and coronal post gadolinium  enhanced T1-weighted images. 12 mL Gadavist.    FINDINGS: No convincing infarction on diffusion study. There is a tiny  area of high signal on the mid left temporal lobe on series 2 image 75  of the diffusion study, also seen on series 9 image 73 of the coronal  perfusion study. This is consistent with a tiny area of infarction.  Mild scattered hyperintensities in the white matter of both cerebral  hemispheres consistent with chronic small vessel ischemic disease.  There is no pathologic enhancement with gadolinium.    Sinuses are clear.      Impression    IMPRESSION: Small cortical area of restricted diffusion left superior  mid temporal lobe consistent with tiny area of recent infarction.    MICHELLE FLOWERS MD   US carotid bilateral    Narrative    BILATERAL DUPLEX CAROTID ULTRASOUND  2/9/2017 10:52 PM     HISTORY: Cerebral venous infarction.    COMPARISON: None.    FINDINGS: There is no atherosclerotic plaque in the carotid  bifurcations. Flow velocities and waveforms show less than 50%  diameter stenosis in both the right and left internal carotid arteries  as assessed by each ICA PSV and EDV and ICA/DCCA ratio. Antegrade flow  is present in both vertebral and external carotid arteries.        Impression    IMPRESSION: Normal carotid ultrasound with less than 50% diameter  stenosis in both internal carotid arteries relative to the distal ICA  diameters.     NICKY HUGHES MD   CBC + differential   Result Value Ref Range    WBC 8.8 4.0 - 11.0 10e9/L    RBC Count 4.57 3.8 - 5.2 10e12/L    Hemoglobin 13.8 11.7 - 15.7 g/dL    Hematocrit 41.6 35.0 - 47.0 %    MCV 91 78 - 100 fl    MCH 30.2 26.5 - 33.0 pg    MCHC 33.2 31.5 - 36.5 g/dL    RDW 13.2 10.0 - 15.0 %    Platelet Count 322 150 - 450 10e9/L    Diff Method Automated Method     % Neutrophils 63.9 %    % Lymphocytes 23.2 %    % Monocytes 11.2 %    % Eosinophils 0.9 %    % Basophils 0.6 %    % Immature Granulocytes 0.2 %    Nucleated RBCs 0 0 /100    Absolute Neutrophil 5.6 1.6 - 8.3 10e9/L    Absolute Lymphocytes 2.1 0.8 - 5.3 10e9/L    Absolute Monocytes 1.0 0.0 - 1.3 10e9/L    Absolute Eosinophils 0.1 0.0 - 0.7 10e9/L    Absolute Basophils 0.1 0.0 - 0.2 10e9/L    Abs Immature Granulocytes 0.0 0 - 0.4 10e9/L    Absolute Nucleated RBC 0.0    Basic metabolic panel (BMP)   Result Value Ref Range    Sodium 139 133 - 144 mmol/L    Potassium 3.5 3.4 - 5.3 mmol/L    Chloride 103 94 - 109 mmol/L    Carbon Dioxide 28 20 - 32 mmol/L    Anion Gap 8 3 - 14 mmol/L    Glucose 96 70 - 99 mg/dL    Urea Nitrogen 13 7 - 30 mg/dL    Creatinine 0.90 0.52 - 1.04 mg/dL    GFR Estimate 63 >60 mL/min/1.7m2    GFR Estimate If Black 77 >60 mL/min/1.7m2    Calcium 8.9 8.5 - 10.1 mg/dL   Lipid panel reflex to direct LDL   Result Value Ref Range    Cholesterol 166 <200 mg/dL    Triglycerides 98 <150 mg/dL    HDL Cholesterol 51 >49 mg/dL    LDL Cholesterol Calculated 95 <100 mg/dL    Non HDL Cholesterol 115 <130 mg/dL   TSH with free T4 reflex   Result Value Ref Range    TSH 1.05 0.40 - 4.00 mU/L   Hepatic panel   Result Value Ref Range    Bilirubin Direct 0.1 0.0 - 0.2 mg/dL    Bilirubin Total 0.3 0.2 - 1.3 mg/dL    Albumin 3.2 (L) 3.4 - 5.0 g/dL    Protein Total 5.9 (L) 6.8 - 8.8 g/dL     Alkaline Phosphatase 53 40 - 150 U/L    ALT 20 0 - 50 U/L    AST 23 0 - 45 U/L   Antithrombin III   Result Value Ref Range    Antithrombin III Chromogenic 107 85 - 135 %   INR   Result Value Ref Range    INR 1.23 (H) 0.86 - 1.14   EKG 12-lead, tracing only   Result Value Ref Range    Interpretation ECG Click View Image link to view waveform and result    Cardiac event monitor    Narrative    [facility] OBSERVATION DEPARTMENT  [address] [place] [address]  [phone]  2/10/2017      Patient:  Jose Coronado  Chart: [id number]  :  1955  Age:  61 year old  Sex:  female       Procedure:  Event Monitor Placed: Please see scanned document for  result once interpretation is completed.        Technician performing hook-up:  Jenny Sheppard       Echocardiogram - bubble study    Narrative    [id number]  [id number]  [id number]  [id number]^JOAN^NICKY^LIZETT           [facility]  Echocardiography Laboratory  [address] [place] [address]        Name: JOSE CORONADO  MRN: [id number]  : 1955  Study Date: 02/10/2017 08:12 AM  Age: 61 yrs  Gender: Female  Patient Location: [facility]  Reason For Study: Cerebrovascular Incident  Ordering Physician: NICKY FRANCO  Referring Physician: TAMMIE WAGNER  Performed By: Julian Ireland RDCS     BSA: 2.2 m2  Height: 65 in  Weight: 244 lb  HR: 70  BP: 144/71 mmHg  _____________________________________________________________________________  __        Procedure  Complete Portable Echo Adult.  _____________________________________________________________________________  __        Interpretation Summary     The left ventricle is normal in size. There is normal left ventricular wall  thickness. Left ventricular systolic function is normal. The visual ejection  fraction is estimated at 55-60%. Grade I or early diastolic dysfunction. No  regional wall motion abnormalities noted.  Normal right ventricular size and systolic  function.  Normal left atrial size. Right atrial size is normal. A patent foramen ovale  is present.  Trace to mild mitral and tricuspid regurgitation.  Mild dilatation of the aortic root and ascending aorta.  No pericardial effusion.  In comparison to the previous report dated 01/18/2016, the findings are  similar.  _____________________________________________________________________________  __        Left Ventricle  The left ventricle is normal in size. There is normal left ventricular wall  thickness. Left ventricular systolic function is normal. The visual ejection  fraction is estimated at 55-60%. Grade I or early diastolic dysfunction. No  regional wall motion abnormalities noted.     Right Ventricle  The right ventricle is normal size. The right ventricular systolic function is  normal.     Atria  Normal left atrial size. Right atrial size is normal. A patent foramen ovale  is present.     Mitral Valve  The mitral valve leaflets are mildly thickened. There is trace mitral  regurgitation.        Tricuspid Valve  There is mild (1+) tricuspid regurgitation. The right ventricular systolic  pressure is approximated at 23.7 mmHg plus the right atrial pressure.     Aortic Valve  There is mild trileaflet aortic sclerosis. There is trace aortic  regurgitation. No aortic stenosis is present.     Pulmonic Valve  There is trace pulmonic valvular regurgitation. There is no pulmonic valvular  stenosis.     Vessels  Mild aortic root dilatation. The ascending aorta is Mildly dilated. Descending  aortic velocity normal. The IVC is normal in size and reactivity with  respiration, suggesting normal central venous pressure.     Rhythm  The rhythm was normal sinus.     _____________________________________________________________________________  __  MMode/2D Measurements & Calculations  IVSd: 1.0 cm  LVIDd: 4.9 cm  LVIDs: 2.9 cm  LVPWd: 1.1 cm  FS: 40.5 %  EDV(Teich): 111.4 ml  ESV(Teich): 32.2 ml  LV mass(C)d: 185.1 grams      Ao root diam: 3.8 cm  LA dimension: 3.5 cm  asc Aorta Diam: 3.9 cm  LA/Ao: 0.91        Doppler Measurements & Calculations  MV E max hay: 71.3 cm/sec  MV A max hay: 87.1 cm/sec  MV E/A: 0.82  MV dec time: 0.25 sec  PA acc time: 0.09 sec  TR max hay: 243.2 cm/sec  TR max P.7 mmHg  Lateral E/e': 11.3  Medial E/e': 12.0              _____________________________________________________________________________  __        Report approved by: Clarice Connor 02/10/2017 09:28 AM                Pending Results:        Unresulted Labs Ordered in the Past 30 Days of this Admission     Date and Time Order Name Status Description    2/10/2017 1112 Protein S Antigen Free In process     2/10/2017 1112 Protein C chromogenic In process     2/10/2017 1112 Factor 5 leiden mutation analysis In process               Consultations This Hospital Stay:      Consultation during this admission received from cardiology         Discharge Instructions and Follow-Up:      Follow-up Appointments     Follow-up and recommended labs and tests        Follow up with primary care provider, Mary Mathews,   within 7-14 days to evaluate medication change and for hospital follow-   up.  No follow up labs or test are needed.    Get your INR checked next week on 16.    Follow up with Cardiology for outpatient BROCK and PFO clinic visit within 1   week.  Dr. Presley is assisting in setting up this outpatient follow up and   the [place] [facility] should be contacting you about it.   The results of your cardiac event monitor will be sent to your primary   care doctor +/- your cardiologist +/- neurologist.                  Pt instructed to follow up with PCP or Neurologist within 2-3 days of discharge.    Follow-up Labs INR 17        Discharge Disposition:      Discharged to home         Discharge Medications:        Discharge Medication List as of 2/10/2017  2:05 PM      START taking these medications    Details    atorvastatin (LIPITOR) 40 MG tablet Take 1 tablet (40 mg) by mouth daily, Disp-30 tablet, R-0, E-Prescribe      warfarin (COUMADIN) 5 MG tablet Take 1 tablet (5 mg) by mouth daily, Disp-30 tablet, R-0, E-Prescribe         CONTINUE these medications which have NOT CHANGED    Details   Calcium Citrate-Vitamin D (CALCIUM CITRATE + D PO) Take 1 tablet by mouth every morning, Historical      Ferrous Sulfate 27 MG TABS Take 27 mg by mouth 2 times daily, Historical      Cyanocobalamin 2500 MCG TABS Take 2,500 mcg by mouth twice a week Mon, Thur, Historical      raloxifene (EVISTA) 60 MG tablet Take 1 tablet (60 mg) by mouth daily, Disp-90 tablet, R-2, E-Prescribe      imipramine (TOFRANIL) 25 MG tablet Take 1 tablet (25 mg) by mouth daily, Disp-100 tablet, R-2, E-Prescribe      amLODIPine (NORVASC) 5 MG tablet Take 1 tablet (5 mg) by mouth daily, Disp-90 tablet, R-1, E-Prescribe      hydrochlorothiazide (HYDRODIURIL) 25 MG tablet Take 1 tablet (25 mg) by mouth daily, Disp-90 tablet, R-1, E-Prescribe      losartan (COZAAR) 100 MG tablet Take 1 tablet (100 mg) by mouth daily, Disp-90 tablet, R-3, E-Prescribe      atenolol (TENORMIN) 50 MG tablet Take 1 tablet (50 mg) by mouth daily, Disp-90 tablet, R-3, E-Prescribe      valACYclovir (VALTREX) 1000 mg tablet Take 2 tablets (2,000 mg) by mouth 2 times daily as needed, Disp-4 tablet, R-5, E-Prescribe      ascorbic acid (VITAMIN C) 500 MG tablet Take 250 mg by mouth 2 times daily , Disp-30 tablet, Historical      cholecalciferol (VITAMIN D) 1000 UNIT tablet Take 0.5 tablets (500 Units) by mouth daily, Disp-30 tablet, Historical      ketotifen (ZADITOR) 0.025 % SOLN Place 1 drop into both eyes every 12 hours as needed for itching, Disp-1 Bottle, Historical      cetirizine (ZYRTEC) 10 MG tablet Take 1 tablet by mouth every evening., Disp-30 tablet, R-1, Historical      Probiotic Product (PROBIOTIC PO) Take 1 tablet by mouth daily., Historical         STOP taking these  "medications       aspirin-acetaminophen-caffeine (EXCEDRIN MIGRAINE) 250-250-65 MG per tablet Comments:   Reason for Stopping:                   Allergies:         Allergies   Allergen Reactions     Contrast Dye Itching     Reaction of immediate burning and severe itching in Right ear after injection for CT.      Sulfa Drugs      hives           Condition and Physical on Discharge:      Discharge condition: Stable   Vitals: Blood pressure 147/77, pulse 62, temperature 98.4  F (36.9  C), temperature source Oral, resp. rate 18, height 1.664 m (5' 5.5\"), weight 111 kg (244 lb 10.1 oz), SpO2 99 %, not currently breastfeeding.  244 lbs 10.08 oz      GENERAL:  Comfortable.  PSYCH: pleasant, oriented, No acute distress.  HEENT:  PERRLA. Normal conjunctiva, normal hearing, nasal mucosa and Oropharynx are normal.  NECK:  Supple, no neck vein distention, adenopathy or bruits, normal thyroid.  HEART:  Normal S1, S2 with no murmur, no pericardial rub, gallops or S3 or S4.  LUNGS:  Clear to auscultation, normal Respiratory effort. No wheezing, rales or ronchi.  ABDOMEN:  Soft, no hepatosplenomegaly, normal bowel sounds. Non-tender, non distended.   EXTREMITIES:  No pedal edema, +2 pulses bilateral and equal.  SKIN:  Dry to touch, No rash, wound or ulcerations.  NEUROLOGIC:  CN 2-12 intact, BL 5/5 symmetric upper and lower extremity strength, sensation is intact with no focal deficits.         "

## 2017-02-14 ENCOUNTER — CARE COORDINATION (OUTPATIENT)
Dept: CARDIOLOGY | Facility: CLINIC | Age: 62
End: 2017-02-14

## 2017-02-14 ENCOUNTER — ANTICOAGULATION THERAPY VISIT (OUTPATIENT)
Dept: ANTICOAGULATION | Facility: CLINIC | Age: 62
End: 2017-02-14
Payer: COMMERCIAL

## 2017-02-14 DIAGNOSIS — Z79.01 LONG-TERM (CURRENT) USE OF ANTICOAGULANTS: ICD-10-CM

## 2017-02-14 LAB
COPATH REPORT: NORMAL
INR POINT OF CARE: 1 (ref 0.86–1.14)
PROT C ACT/NOR PPP CHRO: 118 % (ref 70–170)
PROT S FREE AG ACT/NOR PPP IA: 140 % (ref 55–125)

## 2017-02-14 PROCEDURE — 85610 PROTHROMBIN TIME: CPT | Mod: QW

## 2017-02-14 PROCEDURE — 99211 OFF/OP EST MAY X REQ PHY/QHP: CPT

## 2017-02-14 PROCEDURE — 36416 COLLJ CAPILLARY BLOOD SPEC: CPT

## 2017-02-14 NOTE — MR AVS SNAPSHOT
Jose Coronado   2/14/2017 9:15 AM   Anticoagulation Therapy Visit    Description:  61 year old female   Provider:  [facility]   Department:  [facility]           INR as of 2/14/2017     Today's INR 1.0!      Anticoagulation Summary as of 2/14/2017     INR goal 2.0-3.0   Today's INR 1.0!   Full instructions 2/14: 10 mg; 2/15: 5 mg; 2/16: 10 mg; Otherwise No maintenance plan   Next INR check 2/17/2017    Indications   Long-term (current) use of anticoagulants [Z79.01] [Z79.01]  Stroke (H) [I63.9]         Your next Anticoagulation Clinic appointment(s)     Feb 17, 2017 11:15 AM CST   [facility]agulation [facility] with [facility]   [facility]    [address] [facility] [address]  [facility] [id number] [phone]            Feb 21, 2017  4:30 PM CST   [facility]agulation Visit with [facility]   [facility]    [address] [address]  [facility] [id number] [phone]              Contact Numbers     [facility] Phone Numbers:  Anticoagulation Clinic Appointments : [phone]  Anticoagulation Nurse: [phone]         February 2017 Details    Sun Mon Tue Wed Thu Fri Sat        1               2               3               4                 5               6               7               8               9               10               11                 12               13               14      10 mg   See details      15      5 mg         16      10 mg         17            18                 19               20               21               22               23               24               25                 26               27               28                    Date Details   02/14 This INR check       Date of next INR:  2/17/2017         How to take your warfarin dose     To take:  5 mg Take 1 of the 5 mg tablets.    To take:  10 mg Take 2 of the 5 mg  tablets.

## 2017-02-14 NOTE — PROGRESS NOTES
ANTICOAGULATION INITIAL CLINIC VISIT    Patient Name:  Jose Coronado  Date:  2/14/2017  Referred by: Mary Spaulding  Contact Type:  Face to Face    SUBJECTIVE:  Coumadin education was completed today.  Topics covered include:  -Introduction to coumadin  -Proper Administration  -INR Testing  -Sign/Symptoms of Bleeding  -Signs/Symptoms of Clot Formation or Stroke  -Dietary Intake of Vitamin K  -Drug Interactions  -Anticoagulation Identification (bracelet, necklace or wallet card)  -Future Surgery  -Effects of Alcohol, Tobacco, and Exercise on Coumadin    Coumadin Education Booklet and Coumadin Identification Wallet Card were given to the patient.       Patient Findings     Positives Initiation of therapy          OBJECTIVE    INR Protime   Date Value Ref Range Status   02/14/2017 1.0 0.86 - 1.14 Final       ASSESSMENT / PLAN  INR assessment SUB    Recheck INR In: 3 DAYS    INR Location Clinic      Anticoagulation Summary as of 2/14/2017     INR goal 2.0-3.0   Today's INR 1.0!   Maintenance plan No maintenance plan   Full instructions 2/14: 10 mg; 2/15: 5 mg; 2/16: 10 mg; Otherwise No maintenance plan   Next INR check 2/17/2017   Target end date     Indications   Long-term (current) use of anticoagulants [Z79.01] [Z79.01]  Stroke (H) [I63.9]         Anticoagulation Episode Summary     INR check location     Preferred lab     Send INR reminders to [facility]    Comments       Anticoagulation Care Providers     Provider Role Specialty Phone number    Mary Mathews MD [facility] [phone]            See the Encounter Report to view Anticoagulation Flowsheet and Dosing Calendar (Go to Encounters tab in chart review, and find the Anticoagulation Therapy Visit)    Dosage adjustment made based on physician directed care plan.    Millie Peacock RN

## 2017-02-14 NOTE — PROGRESS NOTES
Called patient to discuss any post hospital d/c questions she may have, review medication changes, and confirm f/u appts. Patient denied any questions regarding new medications or changes to some of her current medications that she was taking prior to admission. Patient denied any SOB, chest pain, or light headedness. RN confirmed with patient that she has an apt scheduled on 2/14/17. Patient advised to call clinic with any cardiac related questions or concerns. Patient verbalized understanding and agreed with plan.

## 2017-02-15 ENCOUNTER — TELEPHONE (OUTPATIENT)
Dept: CARDIOLOGY | Facility: CLINIC | Age: 62
End: 2017-02-15

## 2017-02-15 NOTE — TELEPHONE ENCOUNTER
Contacted patient to discuss BROCK prep instructions. She is scheduled for BROCK on 2/16/17 for further evaluate the PFO and potential for it to be percutaneously closed. Pt aware that she will be NPO 6 hours prior to procedure. She will take her medications with small sips of water the morning of the procedure, with the exception of her hydrochlorothiazide. She denied having any loose teeth or trouble swallowing. Arrival/Procedure times confirmed as well as date and location ([facility]). All questions were answered.

## 2017-02-16 ENCOUNTER — HOSPITAL ENCOUNTER (OUTPATIENT)
Facility: CLINIC | Age: 62
Discharge: HOME | End: 2017-02-16
Attending: INTERNAL MEDICINE | Admitting: INTERNAL MEDICINE
Payer: COMMERCIAL

## 2017-02-16 ENCOUNTER — HOSPITAL ENCOUNTER (OUTPATIENT)
Dept: CARDIOLOGY | Facility: CLINIC | Age: 62
End: 2017-02-16
Attending: INTERNAL MEDICINE | Admitting: INTERNAL MEDICINE
Payer: COMMERCIAL

## 2017-02-16 VITALS
HEART RATE: 56 BPM | TEMPERATURE: 98 F | BODY MASS INDEX: 55.54 KG/M2 | WEIGHT: 240 LBS | HEIGHT: 55 IN | DIASTOLIC BLOOD PRESSURE: 61 MMHG | OXYGEN SATURATION: 96 % | RESPIRATION RATE: 16 BRPM | SYSTOLIC BLOOD PRESSURE: 98 MMHG

## 2017-02-16 DIAGNOSIS — I63.89 LEFT TEMPORAL LOBE INFARCTION (H): ICD-10-CM

## 2017-02-16 DIAGNOSIS — Q21.12 PFO (PATENT FORAMEN OVALE): ICD-10-CM

## 2017-02-16 PROBLEM — R94.31 ABNORMAL ELECTROCARDIOGRAM: Status: ACTIVE | Noted: 2017-02-16

## 2017-02-16 PROBLEM — R94.31 ABNORMAL ELECTROCARDIOGRAM: Status: RESOLVED | Noted: 2017-02-16 | Resolved: 2017-02-16

## 2017-02-16 PROBLEM — I25.9 CARDIAC ISCHEMIA: Status: RESOLVED | Noted: 2017-02-16 | Resolved: 2017-02-16

## 2017-02-16 PROBLEM — I25.9 CARDIAC ISCHEMIA: Status: ACTIVE | Noted: 2017-02-16

## 2017-02-16 PROCEDURE — 25000128 H RX IP 250 OP 636: Performed by: INTERNAL MEDICINE

## 2017-02-16 PROCEDURE — 40000857 ZZH STATISTIC TEE INCLUDES SEDATION

## 2017-02-16 PROCEDURE — 40000235 ZZH STATISTIC TELEMETRY

## 2017-02-16 PROCEDURE — 93320 DOPPLER ECHO COMPLETE: CPT | Mod: 26 | Performed by: INTERNAL MEDICINE

## 2017-02-16 PROCEDURE — 25000132 ZZH RX MED GY IP 250 OP 250 PS 637: Performed by: INTERNAL MEDICINE

## 2017-02-16 PROCEDURE — 93325 DOPPLER ECHO COLOR FLOW MAPG: CPT

## 2017-02-16 PROCEDURE — 93312 ECHO TRANSESOPHAGEAL: CPT | Mod: 26 | Performed by: INTERNAL MEDICINE

## 2017-02-16 PROCEDURE — 25800025 ZZH RX 258: Performed by: INTERNAL MEDICINE

## 2017-02-16 PROCEDURE — 25000125 ZZHC RX 250: Performed by: INTERNAL MEDICINE

## 2017-02-16 PROCEDURE — 93325 DOPPLER ECHO COLOR FLOW MAPG: CPT | Mod: 26 | Performed by: INTERNAL MEDICINE

## 2017-02-16 RX ORDER — FENTANYL CITRATE 50 UG/ML
25-50 INJECTION, SOLUTION INTRAMUSCULAR; INTRAVENOUS
Status: DISCONTINUED | OUTPATIENT
Start: 2017-02-16 | End: 2017-02-16 | Stop reason: HOSPADM

## 2017-02-16 RX ORDER — SODIUM CHLORIDE 9 MG/ML
INJECTION, SOLUTION INTRAVENOUS CONTINUOUS PRN
Status: DISCONTINUED | OUTPATIENT
Start: 2017-02-16 | End: 2017-02-16 | Stop reason: HOSPADM

## 2017-02-16 RX ORDER — GLYCOPYRROLATE 0.2 MG/ML
0.1 INJECTION, SOLUTION INTRAMUSCULAR; INTRAVENOUS ONCE
Status: COMPLETED | OUTPATIENT
Start: 2017-02-16 | End: 2017-02-16

## 2017-02-16 RX ORDER — FLUMAZENIL 0.1 MG/ML
0.2 INJECTION, SOLUTION INTRAVENOUS
Status: DISCONTINUED | OUTPATIENT
Start: 2017-02-16 | End: 2017-02-16 | Stop reason: HOSPADM

## 2017-02-16 RX ORDER — ACETAMINOPHEN 325 MG/1
325-975 TABLET ORAL EVERY 4 HOURS PRN
Qty: 1 BOTTLE | COMMUNITY
Start: 2017-02-16 | End: 2017-02-18

## 2017-02-16 RX ORDER — FENTANYL CITRATE 50 UG/ML
50-100 INJECTION, SOLUTION INTRAMUSCULAR; INTRAVENOUS
Status: COMPLETED | OUTPATIENT
Start: 2017-02-16 | End: 2017-02-16

## 2017-02-16 RX ORDER — ALUMINA, MAGNESIA, AND SIMETHICONE 2400; 2400; 240 MG/30ML; MG/30ML; MG/30ML
SUSPENSION ORAL
Qty: 1 BOTTLE | Refills: 0 | COMMUNITY
Start: 2017-02-16 | End: 2017-04-05

## 2017-02-16 RX ORDER — DEXTROSE MONOHYDRATE 25 G/50ML
9.5 INJECTION, SOLUTION INTRAVENOUS
Status: DISCONTINUED | OUTPATIENT
Start: 2017-02-16 | End: 2017-02-16 | Stop reason: HOSPADM

## 2017-02-16 RX ORDER — LIDOCAINE HYDROCHLORIDE 40 MG/ML
1.5 SOLUTION TOPICAL ONCE
Status: COMPLETED | OUTPATIENT
Start: 2017-02-16 | End: 2017-02-16

## 2017-02-16 RX ORDER — NALOXONE HYDROCHLORIDE 0.4 MG/ML
.1-.4 INJECTION, SOLUTION INTRAMUSCULAR; INTRAVENOUS; SUBCUTANEOUS
Status: DISCONTINUED | OUTPATIENT
Start: 2017-02-16 | End: 2017-02-16 | Stop reason: HOSPADM

## 2017-02-16 RX ADMIN — DEXTROSE MONOHYDRATE 9.5 ML: 25 INJECTION, SOLUTION INTRAVENOUS at 08:55

## 2017-02-16 RX ADMIN — FENTANYL CITRATE 50 MCG: 50 INJECTION, SOLUTION INTRAMUSCULAR; INTRAVENOUS at 08:43

## 2017-02-16 RX ADMIN — MIDAZOLAM HYDROCHLORIDE 2 MG: 1 INJECTION, SOLUTION INTRAMUSCULAR; INTRAVENOUS at 08:41

## 2017-02-16 RX ADMIN — FENTANYL CITRATE 50 MCG: 50 INJECTION, SOLUTION INTRAMUSCULAR; INTRAVENOUS at 08:41

## 2017-02-16 RX ADMIN — SODIUM CHLORIDE: 9 INJECTION, SOLUTION INTRAVENOUS at 08:13

## 2017-02-16 RX ADMIN — LIDOCAINE HYDROCHLORIDE 1.5 ML: 40 SOLUTION TOPICAL at 08:15

## 2017-02-16 RX ADMIN — BENZOCAINE 2 SPRAY: 220 SPRAY, METERED PERIODONTAL at 08:21

## 2017-02-16 RX ADMIN — GLYCOPYRROLATE 0.1 MG: 0.2 INJECTION, SOLUTION INTRAMUSCULAR; INTRAVENOUS at 08:13

## 2017-02-16 RX ADMIN — MIDAZOLAM HYDROCHLORIDE 4 MG: 1 INJECTION, SOLUTION INTRAMUSCULAR; INTRAVENOUS at 08:57

## 2017-02-16 NOTE — DISCHARGE INSTRUCTIONS
BROCK  (Transesophageal Echocardiogram)   Discharge Instructions    After you go home:      Have an adult stay with you for 6 hours.       For 24 hours - due to the sedation you received:    Relax and take it easy.    Do NOT make any important or legal decisions.    Do NOT drive or operate machines at home or at work.    Do NOT drink alcohol.    Diet:      You may resume your normal diet, but no scratchy foods for two days.    If your throat is sore, eat cold, bland or soft foods.    You may have heartburn if the tube used in the exam entered your stomach.  If so:   - Do not eat acidic and spicy foods.   - Do not eat three hours before bedtime.  Clear liquids are okay.   - When lying down, use two pillows to raise your head.    Medicines:      Take your medications, including blood thinners, unless your provider tells you not to.    If you have stopped any other medicines, check with your provider about when to restart them.    You may take Tylenol (Acetaminophen) if your throat is sore.    You may take antacids if you have heartburn.      Follow Up Appointments:      Follow up with your cardiologist at [facility] of patient preference as instructed.    Follow up with your primary care provider as needed.      Call the clinic if:      You have heartburn that is severe or lasts more than 72 hours.    You have a sore throat that feels worse after 72 hours.    You have shortness of breath, neck pain, chest pain, fever, chills, coughing up blood, or other unusual signs.    Call your cardiologist right away if you have an irregular heartbeat, shortness of breath or feel dizzy.    Questions or concerns      [facility] [place]:    [phone] [facility] (7 days a week)

## 2017-02-16 NOTE — IP AVS SNAPSHOT
MRN:[id number]                      After Visit Summary   2/16/2017    Jose Coronado    MRN: [id number]           Visit Information        Department      2/16/2017  6:53 AM [facility]          Review of your medicines      UNREVIEWED medicines. Ask your doctor about these medicines        Dose / Directions    amLODIPine 5 MG tablet   Commonly known as:  NORVASC   Used for:  Essential hypertension with goal blood pressure less than 140/90        Dose:  5 mg   Take 1 tablet (5 mg) by mouth daily   Quantity:  90 tablet   Refills:  1       ascorbic acid 500 MG tablet   Commonly known as:  VITAMIN C        Dose:  250 mg   Take 250 mg by mouth 2 times daily   Quantity:  30 tablet   Refills:  0       atenolol 50 MG tablet   Commonly known as:  TENORMIN   Used for:  Palpitations        Dose:  50 mg   Take 1 tablet (50 mg) by mouth daily   Quantity:  90 tablet   Refills:  3       atorvastatin 40 MG tablet   Commonly known as:  LIPITOR   Used for:  Left temporal lobe infarction (H)        Dose:  40 mg   Take 1 tablet (40 mg) by mouth daily   Quantity:  30 tablet   Refills:  0       CALCIUM CITRATE + D PO        Dose:  1 tablet   Take 1 tablet by mouth every morning   Refills:  0       cetirizine 10 MG tablet   Commonly known as:  zyrTEC        Dose:  10 mg   Take 1 tablet by mouth every evening.   Quantity:  30 tablet   Refills:  1       cholecalciferol 1000 UNIT tablet   Commonly known as:  vitamin D        Dose:  500 Units   Take 0.5 tablets (500 Units) by mouth daily   Quantity:  30 tablet   Refills:  0       Cyanocobalamin 2500 MCG Tabs        Dose:  2500 mcg   Take 2,500 mcg by mouth twice a week Mon, Thur   Refills:  0       Ferrous Sulfate 27 MG Tabs        Dose:  27 mg   Take 27 mg by mouth 2 times daily   Refills:  0       hydrochlorothiazide 25 MG tablet   Commonly known as:  HYDRODIURIL   Used for:  Essential hypertension with goal blood pressure less than 140/90        Dose:   25 mg   Take 1 tablet (25 mg) by mouth daily   Quantity:  90 tablet   Refills:  1       imipramine 25 MG tablet   Commonly known as:  TOFRANIL   Used for:  Bladder dysfunction        Dose:  25 mg   Take 1 tablet (25 mg) by mouth daily   Quantity:  100 tablet   Refills:  2       losartan 100 MG tablet   Commonly known as:  COZAAR   Used for:  Essential hypertension with goal blood pressure less than 140/90        Dose:  100 mg   Take 1 tablet (100 mg) by mouth daily   Quantity:  90 tablet   Refills:  3       PROBIOTIC PO        Dose:  1 tablet   Take 1 tablet by mouth daily.   Refills:  0       raloxifene 60 MG tablet   Commonly known as:  EVISTA   Used for:  Osteopenia        Dose:  1 tablet   Take 1 tablet (60 mg) by mouth daily   Quantity:  90 tablet   Refills:  2       valACYclovir 1000 mg tablet   Commonly known as:  VALTREX   Used for:  Cold sore        Dose:  2000 mg   Take 2 tablets (2,000 mg) by mouth 2 times daily as needed   Quantity:  4 tablet   Refills:  5       warfarin 5 MG tablet   Commonly known as:  COUMADIN   Used for:  Left temporal lobe infarction (H), PFO (patent foramen ovale)        Dose:  5 mg   Take 1 tablet (5 mg) by mouth daily   Quantity:  30 tablet   Refills:  0       ZADITOR 0.025 % Soln ophthalmic solution   Generic drug:  ketotifen        Dose:  1 drop   Place 1 drop into both eyes every 12 hours as needed for itching   Quantity:  1 Bottle   Refills:  0                Protect others around you: Learn how to safely use, store and throw away your medicines at www.disposemymeds.org.         Follow-ups after your visit        Your next 10 appointments already scheduled     Feb 16, 2017  8:30 AM ALE Meyer with SHECHR2   [facility] ([facility])    [address] [place] [address]   [phone]           1.  Please bring or wear a comfortable two-piece outfit. 2.  Arrival time: -   [facility]:  arrive 75 minutes prior to examination time. -    [facility]:  arrive 90 minutes prior to examination time. -   [facility]:   arrive 15 minutes prior to examination time. 3.  Plan to have someone here to drive you home after the test. -   Someone should stay with you for 6 hours after your test. 4.  No food or drink: -   6 hours before the test 5.  If you take antacids or water pills (diuretics): Do not take them until after your test. You may take blood pressure medicine with a few sips of water. 6.  If you have diabetes: -   Morning slots preferred -   If you take insulin, call your diabetes care team. Ask if you should take a   dose the morning of your test. -   If you take diabetes medicine by mouth, don't take it on the morning of your test. Bring it with you to take after the test. (If you have questions, call your diabetes care team.) 7.  Bring a list of any medicines you are taking. 8.  Do not drive for 24 hours after the test. 9.  For any questions that cannot be answered, please contact the ordering physician            Feb 17, 2017 11:15 AM CST   [facility]tico[facility] Visit with [facility]   [facility]    [address] [place] [address]  [facility] [address] [phone]            Feb 21, 2017  4:30 PM CST   Anticoagulation Visit with [facility]   [facility])    [address]  [facility] [address]   [phone]            Feb 23, 2017  9:20 AM CST   SHORT with Lian Martinez MD   [facility] ([facility])    [address]  [facility] [address] [phone]            Mar 07, 2017 10:15 AM CST   Stuctural Heart New with Luis Church MD   [facility] [place] ([facility])    [address] [place], [place] [address]  [facility] [id number] [phone]               Care Instructions        Further instructions from your care team        BROCK  (Transesophageal Echocardiogram)   Discharge Instructions    After you go home:      Have an adult stay with you for 6 hours.       For 24 hours - due to the sedation you received:    Relax and take it easy.    Do NOT make any important or legal decisions.    Do NOT drive or operate machines at home or at work.    Do NOT drink alcohol.    Diet:      You may resume your normal diet, but no scratchy foods for two days.    If your throat is sore, eat cold, bland or soft foods.    You may have heartburn if the tube used in the exam entered your stomach.  If so:   - Do not eat acidic and spicy foods.   - Do not eat three hours before bedtime.  Clear liquids are okay.   - When lying down, use two pillows to raise your head.    Medicines:      Take your medications, including blood thinners, unless your provider tells you not to.    If you have stopped any other medicines, check with your provider about when to restart them.    You may take Tylenol (Acetaminophen) if your throat is sore.    You may take antacids if you have heartburn.      Follow Up Appointments:      Follow up with your cardiologist at [facility] of patient preference as instructed.    Follow up with your primary care provider as needed.      Call the clinic if:      You have heartburn that is severe or lasts more than 72 hours.    You have a sore throat that feels worse after 72 hours.    You have shortness of breath, neck pain, chest pain, fever, chills, coughing up blood, or other unusual signs.    Call your cardiologist right away if you have an irregular heartbeat, shortness of breath or feel dizzy.    Questions or concerns      [facility] [place]:    [phone] [facility] (7 days a week)                 Additional Information About Your Visit        [facility] Information     [facility] gives you secure access to your electronic health record. If you see a primary care provider, you can also send messages to  "your care team and make appointments. If you have questions, please call your primary care clinic.  If you do not have a primary care provider, please call [phone] and they will assist you.        Care EveryWhere ID     This is your Care EveryWhere ID. This could be used by other organizations to access your [facility] medical records  [id number]        Your Vitals Were     Blood Pressure Pulse Temperature Respirations Height Weight    121/71 (BP Location: Left arm) 70 98  F (36.7  C) (Oral) 18 0.055 m (2.17\") 108.9 kg (240 lb)    Pulse Oximetry BMI (Body Mass Index)                96% 35,999.64 kg/m2           Primary Care Provider Office Phone # Fax #    Mary Mathews [phone]      Thank you!     Thank you for choosing [facility] for your care. Our goal is always to provide you with excellent care. Hearing back from our patients is one way we can continue to improve our services. Please take a few minutes to complete the written survey that you may receive in the mail after you visit with us. Thank you!             Medication List: This is a list of all your medications and when to take them. Check marks below indicate your daily home schedule. Keep this list as a reference.      Medications           Morning Afternoon Evening Bedtime As Needed    amLODIPine 5 MG tablet   Commonly known as:  NORVASC   Take 1 tablet (5 mg) by mouth daily                                ascorbic acid 500 MG tablet   Commonly known as:  VITAMIN C   Take 250 mg by mouth 2 times daily                                atenolol 50 MG tablet   Commonly known as:  TENORMIN   Take 1 tablet (50 mg) by mouth daily                                atorvastatin 40 MG tablet   Commonly known as:  LIPITOR   Take 1 tablet (40 mg) by mouth daily                                CALCIUM CITRATE + D PO   Take 1 tablet by mouth every morning                                cetirizine 10 MG tablet   Commonly known as:  " zyrTEC   Take 1 tablet by mouth every evening.                                cholecalciferol 1000 UNIT tablet   Commonly known as:  vitamin D   Take 0.5 tablets (500 Units) by mouth daily                                Cyanocobalamin 2500 MCG Tabs   Take 2,500 mcg by mouth twice a week Mon, Thur                                Ferrous Sulfate 27 MG Tabs   Take 27 mg by mouth 2 times daily                                hydrochlorothiazide 25 MG tablet   Commonly known as:  HYDRODIURIL   Take 1 tablet (25 mg) by mouth daily                                imipramine 25 MG tablet   Commonly known as:  TOFRANIL   Take 1 tablet (25 mg) by mouth daily                                losartan 100 MG tablet   Commonly known as:  COZAAR   Take 1 tablet (100 mg) by mouth daily                                PROBIOTIC PO   Take 1 tablet by mouth daily.                                raloxifene 60 MG tablet   Commonly known as:  EVISTA   Take 1 tablet (60 mg) by mouth daily                                valACYclovir 1000 mg tablet   Commonly known as:  VALTREX   Take 2 tablets (2,000 mg) by mouth 2 times daily as needed                                warfarin 5 MG tablet   Commonly known as:  COUMADIN   Take 1 tablet (5 mg) by mouth daily                                ZADITOR 0.025 % Soln ophthalmic solution   Place 1 drop into both eyes every 12 hours as needed for itching   Generic drug:  ketotifen

## 2017-02-16 NOTE — IP AVS SNAPSHOT
[place] [address]    [place] [address]    Phone:  [phone]                                       After Visit Summary   2/16/2017    Jose Coronado    MRN: [id number]           After Visit Summary Signature Page     I have received my discharge instructions, and my questions have been answered. I have discussed any challenges I see with this plan with the nurse or doctor.    ..........................................................................................................................................  Patient/Patient Representative Signature      ..........................................................................................................................................  Patient Representative Print Name and Relationship to Patient    ..................................................               ................................................  Date                                            Time    ..........................................................................................................................................  Reviewed by Signature/Title    ...................................................              ..............................................  Date                                                            Time

## 2017-02-16 NOTE — PROGRESS NOTES
Heart tones normal.  Explained BROCK and gave d/c instructions with verbalized understanding.  Resting on cart with call light in reach.

## 2017-02-16 NOTE — PROGRESS NOTES
Denies c/o other than a dry mouth.  See flow sheets.  Care Suites Discharge Summary    Discharge Criteria:   Discharge Criteria met per MD orders: Yes.   Vital signs stable.     Pt demonstrates ability to ambulate safely: Yes.  (See discharge questionnaire for additional information)    Discharge instructions & education:   Discharge instructions reviewed with patient . Patient verbalizes  understanding.    Medications:   Patient will be discharging on new medications- No. Patient verbalizes reason for use, start date, and side effects NA.    Items returned to patient:   Home and hospital acquired medications returned to patient NA   Listed belongings gathered and returned to patient: Yes    Patient discharged to home via w/c with sister.    Carola Wang

## 2017-02-17 ENCOUNTER — ANTICOAGULATION THERAPY VISIT (OUTPATIENT)
Dept: ANTICOAGULATION | Facility: CLINIC | Age: 62
End: 2017-02-17
Payer: COMMERCIAL

## 2017-02-17 DIAGNOSIS — Z79.01 LONG-TERM (CURRENT) USE OF ANTICOAGULANTS: ICD-10-CM

## 2017-02-17 LAB — INR POINT OF CARE: 1.8 (ref 0.86–1.14)

## 2017-02-17 PROCEDURE — 36416 COLLJ CAPILLARY BLOOD SPEC: CPT

## 2017-02-17 PROCEDURE — 99207 ZZC NO CHARGE NURSE ONLY: CPT

## 2017-02-17 PROCEDURE — 85610 PROTHROMBIN TIME: CPT | Mod: QW

## 2017-02-17 NOTE — MR AVS SNAPSHOT
Jose Coronado   2/17/2017 11:15 AM   Anticoagulation Therapy Visit    Description:  61 year old female   Provider[facility]  [facility]   Department:  [facility]           INR as of 2/17/2017     Today's INR 1.8!      Anticoagulation Summary as of 2/17/2017     INR goal 2.0-3.0   Today's INR 1.8!   Full instructions 2/17: 5 mg; 2/18: 5 mg; 2/19: 5 mg; 2/20: 5 mg; Otherwise No maintenance plan   Next INR check 2/21/2017    Indications   Long-term (current) use of anticoagulants [Z79.01] [Z79.01]  Stroke (H) [I63.9]         Your next Anticoagulation Clinic appointment(s)     Feb 21, 2017  4:30 PM CST   Anticoagulation Visit with [facility]   [facility] ([facility])    [address] [facility] [address]  [facility] [address] [phone]              Contact Numbers     [facility] Phone Numbers:  Anticoagulation Clinic Appointments : [phone]  Anticoagulation Nurse: [phone]         February 2017 Details    Sun Mon Tue Wed Thu Fri Sat        1               2               3               4                 5               6               7               8               9               10               11                 12               13               14               15               16               17      5 mg   See details      18      5 mg           19      5 mg         20      5 mg         21            22               23               24               25                 26               27               28                    Date Details   02/17 This INR check       Date of next INR:  2/21/2017         How to take your warfarin dose     To take:  5 mg Take 1 of the 5 mg tablets.

## 2017-02-17 NOTE — PROGRESS NOTES
ANTICOAGULATION FOLLOW-UP CLINIC VISIT    Patient Name:  Jose Coronado  Date:  2/17/2017  Contact Type:  Face to Face    SUBJECTIVE:     Patient Findings     Positives Initiation of therapy (Started on [employer] )           OBJECTIVE    INR Protime   Date Value Ref Range Status   02/17/2017 1.8 (A) 0.86 - 1.14 Final       ASSESSMENT / PLAN  INR assessment SUB    Recheck INR In: 4 DAYS    INR Location Clinic      Anticoagulation Summary as of 2/17/2017     INR goal 2.0-3.0   Today's INR 1.8!   Maintenance plan No maintenance plan   Full instructions 2/17: 5 mg; 2/18: 5 mg; 2/19: 5 mg; 2/20: 5 mg; Otherwise No maintenance plan   Next INR check 2/21/2017   Target end date     Indications   Long-term (current) use of anticoagulants [Z79.01] [Z79.01]  Stroke (H) [I63.9]         Anticoagulation Episode Summary     INR check location     Preferred lab     Send INR reminders to [facility]    Comments       Anticoagulation Care Providers     Provider Role Specialty Phone number    Mary Mathews MD [facility] Medicine [phone]            See the Encounter Report to view Anticoagulation Flowsheet and Dosing Calendar (Go to Encounters tab in chart review, and find the Anticoagulation Therapy Visit)        Millie Peacock RN

## 2017-02-21 ENCOUNTER — ANTICOAGULATION THERAPY VISIT (OUTPATIENT)
Dept: ANTICOAGULATION | Facility: CLINIC | Age: 62
End: 2017-02-21
Payer: COMMERCIAL

## 2017-02-21 DIAGNOSIS — Z79.01 LONG-TERM (CURRENT) USE OF ANTICOAGULANTS: ICD-10-CM

## 2017-02-21 LAB — INR POINT OF CARE: 2.3 (ref 0.86–1.14)

## 2017-02-21 PROCEDURE — 36416 COLLJ CAPILLARY BLOOD SPEC: CPT

## 2017-02-21 PROCEDURE — 85610 PROTHROMBIN TIME: CPT | Mod: QW

## 2017-02-21 PROCEDURE — 99207 ZZC NO CHARGE NURSE ONLY: CPT

## 2017-02-21 NOTE — MR AVS SNAPSHOT
Jose Coronado   2/21/2017 4:30 PM   Anticoagulation Therapy Visit    Description:  61 year old female   Provider[facility]  [facility]   Department:  [facility]           INR as of 2/21/2017     Today's INR 2.3      Anticoagulation Summary as of 2/21/2017     INR goal 2.0-3.0   Today's INR 2.3   Full instructions 2/21: 5 mg; 2/22: 10 mg; 2/23: 5 mg; Otherwise No maintenance plan   Next INR check 2/24/2017    Indications   Long-term (current) use of anticoagulants [Z79.01] [Z79.01]  Stroke (H) [I63.9]         Your next Anticoagulation Clinic appointment(s)     Feb 24, 2017  2:45 PM CST   Anticoagulation Visit with [facility]   [facility]    [address] [facility] [address]  [facility] [id number] [phone]              Contact Numbers     [facility] Phone Numbers:  Anticoagulation Clinic Appointments : [phone]  Anticoagulation Nurse: [phone]         February 2017 Details    Sun Mon Tue Wed Thu Fri Sat        1               2               3               4                 5               6               7               8               9               10               11                 12               13               14               15               16               17               18                 19               20               21      5 mg   See details      22      10 mg         23      5 mg         24            25                 26               27               28                    Date Details   02/21 This INR check       Date of next INR:  2/24/2017         How to take your warfarin dose     To take:  5 mg Take 1 of the 5 mg tablets.    To take:  10 mg Take 2 of the 5 mg tablets.

## 2017-02-21 NOTE — PROGRESS NOTES
ANTICOAGULATION FOLLOW-UP CLINIC VISIT    Patient Name:  Jose Coronado  Date:  2/21/2017  Contact Type:  Face to Face    SUBJECTIVE:     Patient Findings     Positives Initiation of therapy, No Problem Findings           OBJECTIVE    INR Protime   Date Value Ref Range Status   02/21/2017 2.3 (A) 0.86 - 1.14 Final       ASSESSMENT / PLAN  INR assessment THER    Recheck INR In: 3 DAYS    INR Location Clinic      Anticoagulation Summary as of 2/21/2017     INR goal 2.0-3.0   Today's INR 2.3   Maintenance plan No maintenance plan   Full instructions 2/21: 5 mg; 2/22: 10 mg; 2/23: 5 mg; Otherwise No maintenance plan   Next INR check 2/24/2017   Target end date     Indications   Long-term (current) use of anticoagulants [Z79.01] [Z79.01]  Stroke (H) [I63.9]         Anticoagulation Episode Summary     INR check location     Preferred lab     Send INR reminders to [facility]    Comments       Anticoagulation Care Providers     Provider Role Specialty Phone number    Mary Mathews MD [facility] Internal Medicine [phone]            See the Encounter Report to view Anticoagulation Flowsheet and Dosing Calendar (Go to Encounters tab in chart review, and find the Anticoagulation Therapy Visit)    Dosage adjustment made based on physician directed care plan.    Sylvia Bright RN

## 2017-02-23 ENCOUNTER — OFFICE VISIT (OUTPATIENT)
Dept: INTERNAL MEDICINE | Facility: CLINIC | Age: 62
End: 2017-02-23
Payer: COMMERCIAL

## 2017-02-23 VITALS
HEIGHT: 66 IN | TEMPERATURE: 98.6 F | DIASTOLIC BLOOD PRESSURE: 62 MMHG | SYSTOLIC BLOOD PRESSURE: 102 MMHG | OXYGEN SATURATION: 92 % | WEIGHT: 240 LBS | RESPIRATION RATE: 14 BRPM | BODY MASS INDEX: 38.57 KG/M2 | HEART RATE: 97 BPM

## 2017-02-23 DIAGNOSIS — E66.01 MORBID OBESITY DUE TO EXCESS CALORIES (H): Chronic | ICD-10-CM

## 2017-02-23 DIAGNOSIS — Q21.12 PFO (PATENT FORAMEN OVALE): Chronic | ICD-10-CM

## 2017-02-23 DIAGNOSIS — I63.89 LEFT TEMPORAL LOBE INFARCTION (H): Primary | ICD-10-CM

## 2017-02-23 DIAGNOSIS — Z11.59 NEED FOR HEPATITIS C SCREENING TEST: ICD-10-CM

## 2017-02-23 PROCEDURE — 99495 TRANSJ CARE MGMT MOD F2F 14D: CPT | Performed by: INTERNAL MEDICINE

## 2017-02-23 NOTE — MR AVS SNAPSHOT
After Visit Summary   2/23/2017    Jose Coronado    MRN: [id number]           Patient Information     Date Of Birth          1955        Visit Information        Provider Department      2/23/2017 9:20 AM Lian Martinez MD [facility]        Today's Diagnoses     Left temporal lobe infarction (H)    -  1    Morbid obesity due to excess calories (H)        PFO (patent foramen ovale)        Need for hepatitis C screening test           Follow-ups after your visit        Your next 10 appointments already scheduled     Feb 24, 2017  2:45 PM CST   Anticoagulation Visit with [facility]   [facility] ([facility])    [address] [place] [address]  [facility] [id number] [phone]            Mar 07, 2017 10:15 AM CST   Stuctural Heart New with Luis Church MD   [facility] [place] ([facility])    [address] [place], [place] [id number]  [facility] [id number] [phone]              Who to contact     If you have questions or need follow up information about today's clinic visit or your schedule please contact [facility] directly at [phone].  Normal or non-critical lab and imaging results will be communicated to you by MyChart, letter or phone within 4 business days after the clinic has received the results. If you do not hear from us within 7 days, please contact the clinic through MyChart or phone. If you have a critical or abnormal lab result, we will notify you by phone as soon as possible.  Submit refill requests through [employer] or call your pharmacy and they will forward the refill request to us. Please allow 3 business days for your refill to be completed.          Additional Information About Your Visit        MyChart Information     [employer] gives you secure access to your electronic health record. If you see a primary care provider, you can also send messages to  "your care team and make appointments. If you have questions, please call your primary care clinic.  If you do not have a primary care provider, please call [phone] and they will assist you.        Care EveryWhere ID     This is your Care EveryWhere ID. This could be used by other organizations to access your [facility] medical records  [id number]        Your Vitals Were     Pulse Temperature Respirations Height Pulse Oximetry Breastfeeding?    97 98.6  F (37  C) (Oral) 14 5' 5.5\" (1.664 m) 92% No    BMI (Body Mass Index)                   39.33 kg/m2            Blood Pressure from Last 3 Encounters:   02/23/17 102/62   02/16/17 98/61   02/10/17 147/77    Weight from Last 3 Encounters:   02/23/17 240 lb (108.9 kg)   02/16/17 240 lb (108.9 kg)   02/09/17 244 lb 10.1 oz (111 kg)              We Performed the Following     Hepatitis C antibody          Today's Medication Changes          These changes are accurate as of: 2/23/17 11:53 AM.  If you have any questions, ask your nurse or doctor.               These medicines have changed or have updated prescriptions.        Dose/Directions    warfarin 5 MG tablet   Commonly known as:  COUMADIN   This may have changed:  additional instructions   Used for:  Left temporal lobe infarction (H), PFO (patent foramen ovale)        Dose:  5 mg   Take 1 tablet (5 mg) by mouth daily   Quantity:  30 tablet   Refills:  0                Primary Care Provider Office Phone # Fax #    Mary Haydee Mathews [phone]       [facility] [address] [place] [address]        Thank you!     Thank you for choosing [facility]  for your care. Our goal is always to provide you with excellent care. Hearing back from our patients is one way we can continue to improve our services. Please take a few minutes to complete the written survey that you may receive in the mail after your visit with us. Thank you!             Your Updated " Medication List - Protect others around you: Learn how to safely use, store and throw away your medicines at www.disposemymeds.org.          This list is accurate as of: 2/23/17 11:53 AM.  Always use your most recent med list.                   Brand Name Dispense Instructions for use    alum & mag hydroxide-simethicone 400-400-40 MG/5ML Susp suspension    MYLANTA ES/MAALOX  ES    1 Bottle    Shake well.  Take 30 mL (2 tablespoons) by mouth every 8 hours as needed for heartburn for 2 days.       amLODIPine 5 MG tablet    NORVASC    90 tablet    Take 1 tablet (5 mg) by mouth daily       ascorbic acid 500 MG tablet    VITAMIN C    30 tablet    Take 250 mg by mouth 2 times daily       atenolol 50 MG tablet    TENORMIN    90 tablet    Take 1 tablet (50 mg) by mouth daily       atorvastatin 40 MG tablet    LIPITOR    30 tablet    Take 1 tablet (40 mg) by mouth daily       CALCIUM CITRATE + D PO      Take 1 tablet by mouth every morning       cetirizine 10 MG tablet    zyrTEC    30 tablet    Take 1 tablet by mouth every evening.       cholecalciferol 1000 UNIT tablet    vitamin D    30 tablet    Take 0.5 tablets (500 Units) by mouth daily       Cyanocobalamin 2500 MCG Tabs      Take 2,500 mcg by mouth twice a week Mon, Thur       Ferrous Sulfate 27 MG Tabs      Take 27 mg by mouth 2 times daily       hydrochlorothiazide 25 MG tablet    HYDRODIURIL    90 tablet    Take 1 tablet (25 mg) by mouth daily       imipramine 25 MG tablet    TOFRANIL    100 tablet    Take 1 tablet (25 mg) by mouth daily       losartan 100 MG tablet    COZAAR    90 tablet    Take 1 tablet (100 mg) by mouth daily       PROBIOTIC PO      Take 1 tablet by mouth daily.       raloxifene 60 MG tablet    EVISTA    90 tablet    Take 1 tablet (60 mg) by mouth daily       valACYclovir 1000 mg tablet    VALTREX    4 tablet    Take 2 tablets (2,000 mg) by mouth 2 times daily as needed       warfarin 5 MG tablet    COUMADIN    30 tablet    Take 1 tablet (5 mg)  by mouth daily       ZADITOR 0.025 % Soln ophthalmic solution   Generic drug:  ketotifen     1 Bottle    Place 1 drop into both eyes every 12 hours as needed for itching

## 2017-02-23 NOTE — NURSING NOTE
"Chief Complaint   Patient presents with     Hospital F/U       Initial /62 (BP Location: Left arm, Patient Position: Chair, Cuff Size: Adult Large)  Pulse 97  Temp 98.6  F (37  C) (Oral)  Resp 14  Ht 5' 5.5\" (1.664 m)  Wt 240 lb (108.9 kg)  SpO2 92%  Breastfeeding? No  BMI 39.33 kg/m2 Estimated body mass index is 39.33 kg/(m^2) as calculated from the following:    Height as of this encounter: 5' 5.5\" (1.664 m).    Weight as of this encounter: 240 lb (108.9 kg).  Medication Reconciliation: complete   Jesica CARDOZA      "

## 2017-02-24 ENCOUNTER — ANTICOAGULATION THERAPY VISIT (OUTPATIENT)
Dept: ANTICOAGULATION | Facility: CLINIC | Age: 62
End: 2017-02-24
Payer: COMMERCIAL

## 2017-02-24 DIAGNOSIS — Z79.01 LONG-TERM (CURRENT) USE OF ANTICOAGULANTS: ICD-10-CM

## 2017-02-24 DIAGNOSIS — I63.9 STROKE (H): ICD-10-CM

## 2017-02-24 LAB — INR POINT OF CARE: 2.8 (ref 0.86–1.14)

## 2017-02-24 PROCEDURE — 99207 ZZC NO CHARGE NURSE ONLY: CPT

## 2017-02-24 PROCEDURE — 36416 COLLJ CAPILLARY BLOOD SPEC: CPT

## 2017-02-24 PROCEDURE — 85610 PROTHROMBIN TIME: CPT | Mod: QW

## 2017-02-24 NOTE — MR AVS SNAPSHOT
Jose Coronado   2/24/2017 2:45 PM   Anticoagulation Therapy Visit    Description:  61 year old female   Provider:  [facility]   Department:  [facility] Anti Coagulation           INR as of 2/24/2017     Today's INR 2.8      Anticoagulation Summary as of 2/24/2017     INR goal 2.0-3.0   Today's INR 2.8   Full instructions 2/24: 5 mg; 2/25: 5 mg; 2/26: 5 mg; 2/27: 5 mg; Otherwise No maintenance plan   Next INR check 2/28/2017    Indications   Long-term (current) use of anticoagulants [Z79.01] [Z79.01]  Stroke (H) [I63.9]         Your next Anticoagulation Clinic appointment(s)     Feb 28, 2017 11:45 AM CST   Anticoagulation Visit with [facility]   [facility]    [address] [facility] [address]  [facility] [address] [phone]              Contact Numbers     [facility] Phone Numbers:  Anticoagulation Clinic Appointments : [phone]  Anticoagulation Nurse: [phone]         February 2017 Details    Sun Mon Tue Wed Thu Fri Sat        1               2               3               4                 5               6               7               8               9               10               11                 12               13               14               15               16               17               18                 19               20               21               22               23               24      5 mg   See details      25      5 mg           26      5 mg         27      5 mg         28                 Date Details   02/24 This INR check       Date of next INR:  2/28/2017         How to take your warfarin dose     To take:  5 mg Take 1 of the 5 mg tablets.

## 2017-02-24 NOTE — PROGRESS NOTES
ANTICOAGULATION FOLLOW-UP CLINIC VISIT    Patient Name:  Jose Coronado  Date:  2/24/2017  Contact Type:  Face to Face    SUBJECTIVE:     Patient Findings     Positives No Problem Findings           OBJECTIVE    INR Protime   Date Value Ref Range Status   02/24/2017 2.8 (A) 0.86 - 1.14 Final       ASSESSMENT / PLAN  INR assessment THER    Recheck INR In: 4 DAYS    INR Location Clinic      Anticoagulation Summary as of 2/24/2017     INR goal 2.0-3.0   Today's INR 2.8   Maintenance plan No maintenance plan   Full instructions 2/24: 5 mg; 2/25: 5 mg; 2/26: 5 mg; 2/27: 5 mg; Otherwise No maintenance plan   Next INR check 2/28/2017   Target end date     Indications   Long-term (current) use of anticoagulants [Z79.01] [Z79.01]  Stroke (H) [I63.9]         Anticoagulation Episode Summary     INR check location     Preferred lab     Send INR reminders to [facility]    Comments       Anticoagulation Care Providers     Provider Role Specialty Phone number    Mary Mathews MD [facility] Internal Medicine [phone]            See the Encounter Report to view Anticoagulation Flowsheet and Dosing Calendar (Go to Encounters tab in chart review, and find the Anticoagulation Therapy Visit)    Dosage adjustment made based on physician directed care plan.    Bree Mejia RN

## 2017-02-27 ENCOUNTER — TRANSFERRED RECORDS (OUTPATIENT)
Dept: CARDIOLOGY | Facility: CLINIC | Age: 62
End: 2017-02-27

## 2017-02-27 DIAGNOSIS — G43.109 MIGRAINE WITH AURA AND WITHOUT STATUS MIGRAINOSUS, NOT INTRACTABLE: ICD-10-CM

## 2017-02-27 DIAGNOSIS — I63.312 CEREBROVASCULAR ACCIDENT (CVA) DUE TO THROMBOSIS OF LEFT MIDDLE CEREBRAL ARTERY (H): ICD-10-CM

## 2017-02-27 DIAGNOSIS — I10 HTN (HYPERTENSION): Primary | ICD-10-CM

## 2017-02-28 ENCOUNTER — ANTICOAGULATION THERAPY VISIT (OUTPATIENT)
Dept: ANTICOAGULATION | Facility: CLINIC | Age: 62
End: 2017-02-28
Payer: COMMERCIAL

## 2017-02-28 DIAGNOSIS — G43.109 MIGRAINE WITH AURA AND WITHOUT STATUS MIGRAINOSUS, NOT INTRACTABLE: ICD-10-CM

## 2017-02-28 DIAGNOSIS — Z79.01 LONG-TERM (CURRENT) USE OF ANTICOAGULANTS: ICD-10-CM

## 2017-02-28 DIAGNOSIS — I63.312 CEREBROVASCULAR ACCIDENT (CVA) DUE TO THROMBOSIS OF LEFT MIDDLE CEREBRAL ARTERY (H): ICD-10-CM

## 2017-02-28 DIAGNOSIS — I10 HTN (HYPERTENSION): ICD-10-CM

## 2017-02-28 LAB
ERYTHROCYTE [SEDIMENTATION RATE] IN BLOOD BY WESTERGREN METHOD: 8 MM/H (ref 0–30)
INR POINT OF CARE: 2.6 (ref 0.86–1.14)

## 2017-02-28 PROCEDURE — 36416 COLLJ CAPILLARY BLOOD SPEC: CPT

## 2017-02-28 PROCEDURE — 85652 RBC SED RATE AUTOMATED: CPT | Performed by: PSYCHIATRY & NEUROLOGY

## 2017-02-28 PROCEDURE — 99207 ZZC NO CHARGE NURSE ONLY: CPT

## 2017-02-28 PROCEDURE — 85610 PROTHROMBIN TIME: CPT | Mod: QW

## 2017-02-28 NOTE — MR AVS SNAPSHOT
Jose MERVIN Coronado   2/28/2017 11:45 AM   Anticoagulation Therapy Visit    Description:  61 year old female   Provider:  [facility]   Department:  [facility] Anti Coagulation           INR as of 2/28/2017     Today's INR 2.6      Anticoagulation Summary as of 2/28/2017     INR goal 2.0-3.0   Today's INR 2.6   Full instructions 7.5 mg on Mon, Thu; 5 mg all other days   Next INR check 3/7/2017    Indications   Long-term (current) use of anticoagulants [Z79.01] [Z79.01]  Stroke (H) [I63.9]         Your next Anticoagulation Clinic appointment(s)     Mar 07, 2017  2:45 PM CST   [facility]    [address] [facility] [address]  [facility] [address] [phone]              Contact Numbers     [facility] Phone Numbers:  Anticoagulation Clinic Appointments : [phone]  Anticoagulation Nurse: [phone]         February 2017 Details    Sun Mon Tue Wed Thu Fri Sat        1               2               3               4                 5               6               7               8               9               10               11                 12               13               14               15               16               17               18                 19               20               21               22               23               24               25                 26               27               28      5 mg   See details           Date Details   02/28 This INR check               How to take your warfarin dose     To take:  5 mg Take 1 of the 5 mg tablets.           March 2017 Details    Sun Mon Tue Wed Thu Fri Sat        1      5 mg         2      7.5 mg         3      5 mg         4      5 mg           5      5 mg         6      7.5 mg         7            8               9               10               11                 12               13               14               15                16               17               18                 19               20               21               22               23               24               25                 26               27               28               29               30               31                 Date Details   No additional details    Date of next INR:  3/7/2017         How to take your warfarin dose     To take:  5 mg Take 1 of the 5 mg tablets.    To take:  7.5 mg Take 1.5 of the 5 mg tablets.

## 2017-02-28 NOTE — PROGRESS NOTES
ANTICOAGULATION FOLLOW-UP CLINIC VISIT    Patient Name:  Jose Coronado  Date:  2/28/2017  Contact Type:  Face to Face    SUBJECTIVE:     Patient Findings     Positives Change in medications (Pt states will begin a seizure med today but does not know the name.), Initiation of therapy           OBJECTIVE    INR Protime   Date Value Ref Range Status   02/28/2017 2.6 (A) 0.86 - 1.14 Corrected       ASSESSMENT / PLAN  INR assessment THER    Recheck INR In: 1 WEEK    INR Location Clinic      Anticoagulation Summary as of 2/28/2017     INR goal 2.0-3.0   Today's INR 2.6   Maintenance plan 7.5 mg (5 mg x 1.5) on Mon, Thu; 5 mg (5 mg x 1) all other days   Full instructions 7.5 mg on Mon, Thu; 5 mg all other days   Weekly total 40 mg   Plan last modified Sylvia Bright RN (2/28/2017)   Next INR check 3/7/2017   Priority INR   Target end date     Indications   Long-term (current) use of anticoagulants [Z79.01] [Z79.01]  Stroke (H) [I63.9]         Anticoagulation Episode Summary     INR check location     Preferred lab     Send INR reminders to [facility]    Comments       Anticoagulation Care Providers     Provider Role Specialty Phone number    Mary Mathews MD Responsible Internal Medicine [phone]            See the Encounter Report to view Anticoagulation Flowsheet and Dosing Calendar (Go to Encounters tab in chart review, and find the Anticoagulation Therapy Visit)    Dosage adjustment made based on physician directed care plan.    Sylvia Bright, RN

## 2017-03-02 ENCOUNTER — TRANSFERRED RECORDS (OUTPATIENT)
Dept: CARDIOLOGY | Facility: CLINIC | Age: 62
End: 2017-03-02

## 2017-03-07 ENCOUNTER — TELEPHONE (OUTPATIENT)
Dept: OBGYN | Facility: CLINIC | Age: 62
End: 2017-03-07

## 2017-03-07 ENCOUNTER — ANTICOAGULATION THERAPY VISIT (OUTPATIENT)
Dept: ANTICOAGULATION | Facility: CLINIC | Age: 62
End: 2017-03-07
Payer: COMMERCIAL

## 2017-03-07 ENCOUNTER — OFFICE VISIT (OUTPATIENT)
Dept: CARDIOLOGY | Facility: CLINIC | Age: 62
End: 2017-03-07
Attending: INTERNAL MEDICINE
Payer: COMMERCIAL

## 2017-03-07 VITALS
HEIGHT: 65 IN | SYSTOLIC BLOOD PRESSURE: 110 MMHG | DIASTOLIC BLOOD PRESSURE: 70 MMHG | BODY MASS INDEX: 39.85 KG/M2 | WEIGHT: 239.2 LBS | HEART RATE: 68 BPM

## 2017-03-07 DIAGNOSIS — Z79.01 LONG-TERM (CURRENT) USE OF ANTICOAGULANTS: ICD-10-CM

## 2017-03-07 DIAGNOSIS — Q21.12 PFO (PATENT FORAMEN OVALE): ICD-10-CM

## 2017-03-07 DIAGNOSIS — I63.89 LEFT TEMPORAL LOBE INFARCTION (H): ICD-10-CM

## 2017-03-07 LAB — INR POINT OF CARE: 2.2 (ref 0.86–1.14)

## 2017-03-07 PROCEDURE — 99215 OFFICE O/P EST HI 40 MIN: CPT | Performed by: INTERNAL MEDICINE

## 2017-03-07 PROCEDURE — 99207 ZZC NO CHARGE NURSE ONLY: CPT

## 2017-03-07 PROCEDURE — 36416 COLLJ CAPILLARY BLOOD SPEC: CPT

## 2017-03-07 PROCEDURE — 85610 PROTHROMBIN TIME: CPT | Mod: QW

## 2017-03-07 RX ORDER — WARFARIN SODIUM 5 MG/1
TABLET ORAL
Qty: 96 TABLET | Refills: 0 | Status: ON HOLD | OUTPATIENT
Start: 2017-03-07 | End: 2017-04-10

## 2017-03-07 NOTE — TELEPHONE ENCOUNTER
Pt calling in to share some information and to ask if she needs to be on a new med.    Pt has a small stroke on 2/9/17, pt in to see cardiologist this am.  He noted that pt has been on raloxifene (EVISTA) 60 MG tablet, which can cause strokes.    Pt took her pill this am so she will stop tomorrow March 8, 2017.  Pt is wondering if she needs to be on something else in replace for Evista or can just be done with this med.    Please advise  Alphonso SANTIAGO RN

## 2017-03-07 NOTE — TELEPHONE ENCOUNTER
Pt called and reached.  Informed of Dr. Perez comments.  She states that she has appt with Dr. Spaulding in about a month will discuss medication with her.    Alphonso SANTIAGO RN

## 2017-03-07 NOTE — TELEPHONE ENCOUNTER
Please advise     Thank-you for update re: change in medical status     Is appropriate to stop Evista, but in reviewing last bone density        -may wish to start an alternative (non-estrogen related) choice     May start through our office or IM may start

## 2017-03-07 NOTE — MR AVS SNAPSHOT
After Visit Summary   3/7/2017    Jose Coronado    MRN: [id number]           Patient Information     Date Of Birth          1955        Visit Information        Provider Department      3/7/2017 10:15 AM Luis Santamaria MD [facility]        Today's Diagnoses     Left temporal lobe infarction (H)        PFO (patent foramen ovale)           Follow-ups after your visit        Additional Services     Follow-Up with Cardiologist       1-2 months with tutu santamaria                  Your next 10 appointments already scheduled     Mar 07, 2017  2:45 PM CST   Anticoagulation Visit with [facility]    [address]  [facility] [address] [phone]              Future tests that were ordered for you today     Open Future Orders        Priority Expected Expires Ordered    Follow-Up with Cardiologist Routine 4/6/2017 3/7/2018 3/7/2017    US Lower Extremity Venous Duplex Bilateral Routine 3/14/2017 3/7/2018 3/7/2017            Who to contact     If you have questions or need follow up information about today's clinic visit or your schedule please contact [facility] directly at [phone].  Normal or non-critical lab and imaging results will be communicated to you by MyChart, letter or phone within 4 business days after the clinic has received the results. If you do not hear from us within 7 days, please contact the clinic through MyChart or phone. If you have a critical or abnormal lab result, we will notify you by phone as soon as possible.  Submit refill requests through [employer] or call your pharmacy and they will forward the refill request to us. Please allow 3 business days for your refill to be completed.          Additional Information About Your Visit        [employer] Information     [employer] gives you secure access to  "your electronic health record. If you see a primary care provider, you can also send messages to your care team and make appointments. If you have questions, please call your primary care clinic.  If you do not have a primary care provider, please call [phone] and they will assist you.        Care EveryWhere ID     This is your Care EveryWhere ID. This could be used by other organizations to access your [place] medical records  [id number]        Your Vitals Were     Pulse Height BMI (Body Mass Index)             68 1.651 m (5' 5\") 39.8 kg/m2          Blood Pressure from Last 3 Encounters:   03/07/17 110/70   02/23/17 102/62   02/16/17 98/61    Weight from Last 3 Encounters:   03/07/17 108.5 kg (239 lb 3.2 oz)   02/23/17 108.9 kg (240 lb)   02/16/17 108.9 kg (240 lb)              We Performed the Following     Follow-Up with cardiac sub-specialty clinic          Today's Medication Changes          These changes are accurate as of: 3/7/17 11:12 AM.  If you have any questions, ask your nurse or doctor.               These medicines have changed or have updated prescriptions.        Dose/Directions    warfarin 5 MG tablet   Commonly known as:  COUMADIN   This may have changed:  additional instructions   Used for:  Left temporal lobe infarction (H), PFO (patent foramen ovale)        Dose:  5 mg   Take 1 tablet (5 mg) by mouth daily   Quantity:  30 tablet   Refills:  0                Primary Care Provider Office Phone # Fax #    Mary Haydee Mathews [phone]       [facility] [address] [place] [address]        Thank you!     Thank you for choosing [facility] [place]  for your care. Our goal is always to provide you with excellent care. Hearing back from our patients is one way we can continue to improve our services. Please take a few minutes to complete the written survey that you may receive in the mail after your visit with " us. Thank you!             Your Updated Medication List - Protect others around you: Learn how to safely use, store and throw away your medicines at www.disposemymeds.org.          This list is accurate as of: 3/7/17 11:12 AM.  Always use your most recent med list.                   Brand Name Dispense Instructions for use    alum & mag hydroxide-simethicone 400-400-40 MG/5ML Susp suspension    MYLANTA ES/MAALOX  ES    1 Bottle    Shake well.  Take 30 mL (2 tablespoons) by mouth every 8 hours as needed for heartburn for 2 days.       amLODIPine 5 MG tablet    NORVASC    90 tablet    Take 1 tablet (5 mg) by mouth daily       ascorbic acid 500 MG tablet    VITAMIN C    30 tablet    Take 250 mg by mouth 2 times daily       atenolol 50 MG tablet    TENORMIN    90 tablet    Take 1 tablet (50 mg) by mouth daily       atorvastatin 40 MG tablet    LIPITOR    30 tablet    Take 1 tablet (40 mg) by mouth daily       CALCIUM CITRATE + D PO      Take 1 tablet by mouth every morning       cetirizine 10 MG tablet    zyrTEC    30 tablet    Take 1 tablet by mouth every evening.       cholecalciferol 1000 UNIT tablet    vitamin D    30 tablet    Take 0.5 tablets (500 Units) by mouth daily       Cyanocobalamin 2500 MCG Tabs      Take 2,500 mcg by mouth twice a week Mon, Thur       Ferrous Sulfate 27 MG Tabs      Take 27 mg by mouth 2 times daily       hydrochlorothiazide 25 MG tablet    HYDRODIURIL    90 tablet    Take 1 tablet (25 mg) by mouth daily       imipramine 25 MG tablet    TOFRANIL    100 tablet    Take 1 tablet (25 mg) by mouth daily       losartan 100 MG tablet    COZAAR    90 tablet    Take 1 tablet (100 mg) by mouth daily       PROBIOTIC PO      Take 1 tablet by mouth daily.       raloxifene 60 MG tablet    EVISTA    90 tablet    Take 1 tablet (60 mg) by mouth daily       valACYclovir 1000 mg tablet    VALTREX    4 tablet    Take 2 tablets (2,000 mg) by mouth 2 times daily as needed       warfarin 5 MG tablet    COUMADIN     30 tablet    Take 1 tablet (5 mg) by mouth daily       ZADITOR 0.025 % Soln ophthalmic solution   Generic drug:  ketotifen     1 Bottle    Place 1 drop into both eyes every 12 hours as needed for itching

## 2017-03-07 NOTE — PROGRESS NOTES
ANTICOAGULATION FOLLOW-UP CLINIC VISIT    Patient Name:  Jose Coronado  Date:  3/7/2017  Contact Type:  Face to Face    SUBJECTIVE:     Patient Findings     Positives Change in medications (Neuro  stopped Evista), Initiation of therapy           OBJECTIVE    INR Protime   Date Value Ref Range Status   03/07/2017 2.2 (A) 0.86 - 1.14 Final       ASSESSMENT / PLAN  No question data found.  Anticoagulation Summary as of 3/7/2017     INR goal 2.0-3.0   Today's INR 2.2   Maintenance plan 7.5 mg (5 mg x 1.5) on Mon, Thu; 5 mg (5 mg x 1) all other days   Full instructions 7.5 mg on Mon, Thu; 5 mg all other days   Weekly total 40 mg   Plan last modified Sylvia Bright RN (2/28/2017)   Next INR check 3/13/2017   Priority INR   Target end date     Indications   Long-term (current) use of anticoagulants [Z79.01] [Z79.01]  Stroke (H) [I63.9]         Anticoagulation Episode Summary     INR check location     Preferred lab     Send INR reminders to [facility]    Comments       Anticoagulation Care Providers     Provider Role Specialty Phone number    Mary Mathwes MD Responsible Internal Medicine [phone]            See the Encounter Report to view Anticoagulation Flowsheet and Dosing Calendar (Go to Encounters tab in chart review, and find the Anticoagulation Therapy Visit)    Dosage adjustment made based on physician directed care plan.    Sylvia Bright, RN

## 2017-03-07 NOTE — MR AVS SNAPSHOT
Jose Coronado   3/7/2017 2:45 PM   Anticoagulation Therapy Visit    Description:  61 year old female   Provider[facility]  [facility]   Department:  [facility]           INR as of 3/7/2017     Today's INR 2.2      Anticoagulation Summary as of 3/7/2017     INR goal 2.0-3.0   Today's INR 2.2   Full instructions 7.5 mg on Mon, Thu; 5 mg all other days   Next INR check 3/13/2017    Indications   Long-term (current) use of anticoagulants [Z79.01] [Z79.01]  Stroke (H) [I63.9]         Your next Anticoagulation Clinic appointment(s)     Mar 13, 2017  2:00 PM CDT   [facility]ticoagulation Visit with [facility]   [facility] ([facility]    [address] [facility] [place] [address]  [facility] [address] [phone]              Contact Numbers     [facility] Phone Numbers:  Anticoagulation Clinic Appointments : [phone]  Anticoagulation Nurse: [phone]         March 2017 Details    Sun Mon Tue Wed Thu Fri Sat        1               2               3               4                 5               6               7      5 mg   See details      8      5 mg         9      7.5 mg         10      5 mg         11      5 mg           12      5 mg         13            14               15               16               17               18                 19               20               21               22               23               24               25                 26               27               28               29               30               31                 Date Details   03/07 This INR check       Date of next INR:  3/13/2017         How to take your warfarin dose     To take:  5 mg Take 1 of the 5 mg tablets.    To take:  7.5 mg Take 1.5 of the 5 mg tablets.

## 2017-03-08 ENCOUNTER — TELEPHONE (OUTPATIENT)
Dept: CARDIOLOGY | Facility: CLINIC | Age: 62
End: 2017-03-08

## 2017-03-08 ENCOUNTER — TRANSFERRED RECORDS (OUTPATIENT)
Dept: CARDIOLOGY | Facility: CLINIC | Age: 62
End: 2017-03-08

## 2017-03-08 DIAGNOSIS — I63.89 LEFT TEMPORAL LOBE INFARCTION (H): ICD-10-CM

## 2017-03-08 NOTE — PROGRESS NOTES
HISTORY OF PRESENT ILLNESS:  I had the pleasure of meeting Jose Coronado today at the [facility] [place].  She is a 61-year-old woman referred in to see me by my partner, Dr. Hi.  This patient has a longstanding history of migraine headaches.  She also has hypertension and sleep apnea.  She uses a CPAP machine.  She saw Dr. Ean Adams a year ago and was noted to have brief, nonsustained VT and up to 11 seconds of PAT but not atrial fibrillation.  In addition, the patient is on Evista.  This patient had a stroke about a month ago.  She was hospitalized and she had a workup including MRI of the brain that showed the stroke in the left side; carotid disease was essentially ruled out.  A mini hypercoagulable workup was negative including a negative factor V Leiden, antithrombin III, protein C and protein S levels.  A transesophageal echo was performed which I reviewed with her today.  Her heart function is normal.  She has an obvious patent foramen ovale.   I would say it is actually a stretched PFO since there was spontaneous left-to-right flow in addition to right-to-left flow. My guess is a PFO which stretched over time because of history of hypertension or sleep apnea.  There was no clot in left atrium.  When she was in the hospital there was no atrial fibrillation.  She is currently wearing a 30-day event monitor.  She has not completed it yet so we cannot determine if she has any atrial fib.  She was sent to see me for evaluation of PFO closure.  I went through the details, I showed her the device, we talked about how the procedure is done including the usual overnight stay, general anesthesia likely.  We talked about risks and benefits including bleeding, infection, cardiac perforation, tamponade device migration and arrhythmia.  At this point, I think this story is very, very complicated and we should not rush into it.  She had a history of migraine headaches with aura 2-3 weeks before the stroke occurred.   She saw Dr. Tello.  I do not have Dr. Tello' note but I suspect Dr. Tello was strongly suspicious that the migraine headache could have led to the stroke.  However, I would also caution that the week before the stroke, she was on a long airplane flight to the [facility] [place] and then of course came back.  She was not checked for DVT in the hospital.  She has no history of hypercoagulable state.  Therefore, this is very muddy picture.  She could have had a stroke from migraine headache.  She has the clinical set up of being on Evista which can cause hypercoagulable state, long airplane ride and then literally a week later having a stroke which could represent paradoxical stroke of the PFO.  She has a history of atrial arrhythmia but as far as we know so far not atrial fib.        At this juncture I have explained this in detail to her over the course of this 1-hour visit.  I believe we should do the following:  She is still wearing the heart monitor so I will ask her to complete that and will see her in a month or two after we get the report back from the heart monitor to see if she has any atrial fib.  I actually even told her about the Reveal implantable EKG device that runs for 3 years.  I am going to ask her to call her OB/GYN doctor to determine if she should come off Evista given that she had a stroke regardless of what the cause was.   She is currently on Coumadin so she is protected.  She has a followup visit with Dr. Iona Tello and I asked Dr. Tello if she would be willing to make a statement in terms of what she thinks the stroke may have come from.  Since it appears to be only a single area, it does not really help as opposed to a cardioembolic stroke which could be multiple areas.  We will reconvene after she sees Dr. Tello and hopefully Dr Tello can put forward a statement about what she thinks the relative risk of the stroke for migraine versus anything else was.  Certainly if we see atrial  fibrillation, the patient will need to be on Coumadin lifelong.  I will go ahead and do a venous ultrasound of the lower extremities.  Again if we see clot, that would again necessitate Coumadin.  If Dr. Tello does not think the migraine caused the stroke with this complex cardiac history and travel history and we do not find anything else, then we could make a case for possible PFO closure.  We reviewed with the patient today the trials of cryptogenic stroke with PFO closure as being positive trials but again we have multiple loose ends here.       Thank you for allowing me to see Ms. Coronado.  We will keep you apprised of her workup and followup visits.  This was a greater than 60-minute visit; greater than 50% counseling.      Sincerely,      Luis Byrne MD      cc:     Paulie Hi MD   [facility] at [address], [facility] [address]   [place]   [address]      Mary Mathews MD   [facility]   [address] [place]   [place]   [address]      [facility]   [address] [place]   [place]  [address]      Trae Perez MD   [facility]   [address] [place]   [address]         LUIS BYRNE MD             D: 2017 11:11   T: 2017 18:21   MT: MICHAEL      Name:     LASHELL CORONADO   MRN:      [id number]        Account:      [id number]   :      1955           Service Date: 2017      Document: [id number]

## 2017-03-08 NOTE — TELEPHONE ENCOUNTER
Pt asking that Holter report be sent to DR Humphries as soon as it is available. 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 Dr Humphries will be waiting to find out if Pt has Afib. Per Pt DR Humphries did not feel aneurysm was reason for CVA.  GARRICK Alaniz RN

## 2017-03-09 RX ORDER — ATORVASTATIN CALCIUM 40 MG/1
40 TABLET, FILM COATED ORAL DAILY
Qty: 90 TABLET | Refills: 1 | Status: ON HOLD | OUTPATIENT
Start: 2017-03-09 | End: 2017-07-13

## 2017-03-09 NOTE — TELEPHONE ENCOUNTER
Left message for Pt to call. Per strips reviewed with DR Church Pt has Afib, and DR Church recommends staying on warfain and no PFO closure.  GARRICK Alaniz RN

## 2017-03-09 NOTE — TELEPHONE ENCOUNTER
Atorvastatin   Need as new--started 2/10/17 during hospitalization, 0 refills          Last Written Prescription Date: 2/10/17  Last Fill Quantity: 30, # refills: 0    Last Office Visit with [facility], [facility] or [facility] prescribing provider:  2/23/17   Future Office Visit:    Next 5 appointments (look out 90 days)     May 26, 2017  7:45 AM CDT   Return Visit with Luis Church MD   [facility]    [address] [place] [id number] [phone]                  Cholesterol   Date Value Ref Range Status   02/10/2017 166 <200 mg/dL Final     HDL Cholesterol   Date Value Ref Range Status   02/10/2017 51 >49 mg/dL Final     LDL Cholesterol Calculated   Date Value Ref Range Status   02/10/2017 95 <100 mg/dL Final     Comment:     Desirable:       <100 mg/dl     Triglycerides   Date Value Ref Range Status   02/10/2017 98 <150 mg/dL Final     Cholesterol/HDL Ratio   Date Value Ref Range Status   07/29/2015 2.9 0.0 - 5.0 Final     ALT   Date Value Ref Range Status   02/10/2017 20 0 - 50 U/L Final

## 2017-03-10 NOTE — TELEPHONE ENCOUNTER
Pt informed of Afib on her Holter per DR Church. She will remain on coumadin, and no PFO closure. Pt asking if DR Church will be her cardiologist and assist with her aneurysm repair with giving instructions for coumadin and any bridging needed. Informed Pt would message  her request.  Did review Afib and with Pt. GARRICK Alaniz RN

## 2017-03-12 NOTE — TELEPHONE ENCOUNTER
i have no knowledge of aneurysm-i didn't get neurologist note   Bridge/coumadin can be managed by us or her pmd depending on what they have planned for her  She can f/u with our clinic anytime  She was only sent to see me to discuss pfo closure which in light of afib we won't be doing.  I or EP can be her cardiologist since afib is now the main cardio issue

## 2017-03-13 ENCOUNTER — ANTICOAGULATION THERAPY VISIT (OUTPATIENT)
Dept: ANTICOAGULATION | Facility: CLINIC | Age: 62
End: 2017-03-13
Payer: COMMERCIAL

## 2017-03-13 DIAGNOSIS — G43.909 MIGRAINE: Primary | ICD-10-CM

## 2017-03-13 DIAGNOSIS — Z79.01 LONG-TERM (CURRENT) USE OF ANTICOAGULANTS: ICD-10-CM

## 2017-03-13 LAB — INR POINT OF CARE: 2.1 (ref 0.86–1.14)

## 2017-03-13 PROCEDURE — 36416 COLLJ CAPILLARY BLOOD SPEC: CPT

## 2017-03-13 PROCEDURE — 99207 ZZC NO CHARGE NURSE ONLY: CPT

## 2017-03-13 PROCEDURE — 85610 PROTHROMBIN TIME: CPT | Mod: QW

## 2017-03-13 RX ORDER — TOPIRAMATE 25 MG/1
25 TABLET, FILM COATED ORAL 2 TIMES DAILY
Qty: 180 TABLET | Refills: 3
Start: 2017-03-13 | End: 2018-12-05

## 2017-03-13 NOTE — PROGRESS NOTES
ANTICOAGULATION FOLLOW-UP CLINIC VISIT    Patient Name:  Jose Coronado  Date:  3/13/2017  Contact Type:  Face to Face    SUBJECTIVE:     Patient Findings     Positives No Problem Findings           OBJECTIVE    INR Protime   Date Value Ref Range Status   03/13/2017 2.1 (A) 0.86 - 1.14 Final       ASSESSMENT / PLAN  INR assessment THER    Recheck INR In: 10 DAYS    INR Location Clinic      Anticoagulation Summary as of 3/13/2017     INR goal 2.0-3.0   Today's INR 2.1   Maintenance plan 7.5 mg (5 mg x 1.5) on Mon, Thu; 5 mg (5 mg x 1) all other days   Full instructions 7.5 mg on Mon, Thu; 5 mg all other days   Weekly total 40 mg   No change documented Millie Peacock, RN   Plan last modified Sylvia Bright RN (2/28/2017)   Next INR check 3/23/2017   Priority INR   Target end date     Indications   Long-term (current) use of anticoagulants [Z79.01] [Z79.01]  Stroke (H) [I63.9]         Anticoagulation Episode Summary     INR check location     Preferred lab     Send INR reminders to [facility]    Comments       Anticoagulation Care Providers     Provider Role Specialty Phone number    Mary Mathews MD Responsible Internal Medicine [phone]            See the Encounter Report to view Anticoagulation Flowsheet and Dosing Calendar (Go to Encounters tab in chart review, and find the Anticoagulation Therapy Visit)    Dosage adjustment made based on physician directed care plan.    Millie Peacock RN

## 2017-03-13 NOTE — MR AVS SNAPSHOT
Jose MERVIN Coronado   3/13/2017 3:45 PM   Anticoagulation Therapy Visit    Description:  61 year old female   Provider[facility]  [facility]   Department:  [facility]           INR as of 3/13/2017     Today's INR 2.1      Anticoagulation Summary as of 3/13/2017     INR goal 2.0-3.0   Today's INR 2.1   Full instructions 7.5 mg on Mon, Thu; 5 mg all other days   Next INR check 3/23/2017    Indications   Long-term (current) use of anticoagulants [Z79.01] [Z79.01]  Stroke (H) [I63.9]         Your next Anticoagulation Clinic appointment(s)     Mar 23, 2017  2:30 PM CDT   [facility]agulation [facility] with [facility]   [facility]    [address] [facility] [place] [address]  [facility] [address] [phone]              Contact Numbers     [facility] Phone Numbers:  Anticoagulation Clinic Appointments : [phone]  Anticoagulation Nurse: [phone]         March 2017 Details    Sun Mon Tue Wed Thu Fri Sat        1               2               3               4                 5               6               7               8               9               10               11                 12               13      7.5 mg   See details      14      5 mg         15      5 mg         16      7.5 mg         17      5 mg         18      5 mg           19      5 mg         20      7.5 mg         21      5 mg         22      5 mg         23            24               25                 26               27               28               29               30               31                 Date Details   03/13 This INR check       Date of next INR:  3/23/2017         How to take your warfarin dose     To take:  5 mg Take 1 of the 5 mg tablets.    To take:  7.5 mg Take 1.5 of the 5 mg tablets.

## 2017-03-14 ENCOUNTER — TELEPHONE (OUTPATIENT)
Dept: CARDIOLOGY | Facility: CLINIC | Age: 62
End: 2017-03-14

## 2017-03-14 NOTE — TELEPHONE ENCOUNTER
Pt asking if she should still do lower ext duplex? Spoke with DR Church and yes. GARRICK Alaniz RN

## 2017-03-15 ENCOUNTER — RADIANT APPOINTMENT (OUTPATIENT)
Dept: VASCULAR ULTRASOUND | Facility: CLINIC | Age: 62
End: 2017-03-15
Attending: INTERNAL MEDICINE
Payer: COMMERCIAL

## 2017-03-15 DIAGNOSIS — I63.89 LEFT TEMPORAL LOBE INFARCTION (H): ICD-10-CM

## 2017-03-15 PROCEDURE — 93970 EXTREMITY STUDY: CPT | Mod: 26 | Performed by: INTERNAL MEDICINE

## 2017-03-16 ENCOUNTER — TELEPHONE (OUTPATIENT)
Dept: CARDIOLOGY | Facility: CLINIC | Age: 62
End: 2017-03-16

## 2017-03-16 NOTE — TELEPHONE ENCOUNTER
LOWER EXTREMITY AND LIMITED ILIAC BILATERAL VENOUS DUPLEX ULTRASOUND STUDY- Normal and Pt informed of results. GARRICK Alaniz RN

## 2017-03-22 ENCOUNTER — TELEPHONE (OUTPATIENT)
Dept: OTHER | Facility: CLINIC | Age: 62
End: 2017-03-22

## 2017-03-23 ENCOUNTER — ANTICOAGULATION THERAPY VISIT (OUTPATIENT)
Dept: ANTICOAGULATION | Facility: CLINIC | Age: 62
End: 2017-03-23
Payer: COMMERCIAL

## 2017-03-23 DIAGNOSIS — Z79.01 LONG-TERM (CURRENT) USE OF ANTICOAGULANTS: ICD-10-CM

## 2017-03-23 LAB — INR POINT OF CARE: 1.8 (ref 0.86–1.14)

## 2017-03-23 PROCEDURE — 85610 PROTHROMBIN TIME: CPT | Mod: QW

## 2017-03-23 PROCEDURE — 36416 COLLJ CAPILLARY BLOOD SPEC: CPT

## 2017-03-23 PROCEDURE — 99207 ZZC NO CHARGE NURSE ONLY: CPT

## 2017-03-23 NOTE — PROGRESS NOTES
ANTICOAGULATION FOLLOW-UP CLINIC VISIT    Patient Name:  Jose Coronado  Date:  3/23/2017  Contact Type:  Face to Face    SUBJECTIVE:     Patient Findings     Positives Change in diet/appetite (Pt reports greens consistent in diet), Unexplained INR or factor level change    Comments Pt reports no changes           OBJECTIVE    INR Protime   Date Value Ref Range Status   03/23/2017 1.8 (A) 0.86 - 1.14 Final       ASSESSMENT / PLAN  INR assessment SUB    Recheck INR In: 2 WEEKS    INR Location Clinic      Anticoagulation Summary as of 3/23/2017     INR goal 2.0-3.0   Today's INR 1.8!   Maintenance plan 7.5 mg (5 mg x 1.5) on Mon, Thu; 5 mg (5 mg x 1) all other days   Full instructions 3/23: 10 mg; 3/24: 7.5 mg; 4/1: 7.5 mg; Otherwise 7.5 mg on Mon, Thu; 5 mg all other days   Weekly total 40 mg   Plan last modified Sylvia Bright RN (2/28/2017)   Next INR check 4/6/2017   Priority INR   Target end date     Indications   Long-term (current) use of anticoagulants [Z79.01] [Z79.01]  Stroke (H) [I63.9]         Anticoagulation Episode Summary     INR check location     Preferred lab     Send INR reminders to [facility]    Comments       Anticoagulation Care Providers     Provider Role Specialty Phone number    Mary Mathews MD [facility] Internal Medicine [phone]            See the Encounter Report to view Anticoagulation Flowsheet and Dosing Calendar (Go to Encounters tab in chart review, and find the Anticoagulation Therapy Visit)    Dosage adjustment made based on physician directed care plan.    Sylvia Bright, RN

## 2017-03-23 NOTE — MR AVS SNAPSHOT
Jose Coronado   3/23/2017 2:30 PM   Anticoagulation Therapy Visit    Description:  61 year old female   Provider[facility]  [facility]   Department:  [facility]           INR as of 3/23/2017     Today's INR 1.8!      Anticoagulation Summary as of 3/23/2017     INR goal 2.0-3.0   Today's INR 1.8!   Full instructions 3/23: 10 mg; 3/24: 7.5 mg; 4/1: 7.5 mg; Otherwise 7.5 mg on Mon, Thu; 5 mg all other days   Next INR check 4/6/2017    Indications   Long-term (current) use of anticoagulants [Z79.01] [Z79.01]  Stroke (H) [I63.9]         Your next Anticoagulation Clinic appointment(s)     Apr 06, 2017  2:30 PM CDT   [facility]    [address] [facility] [address]  [facility] [id number] [phone]              Contact Numbers     [facility] Phone Numbers:  Anticoagulation Clinic Appointments : [phone]  Anticoagulation Nurse: [phone]         March 2017 Details    Sun Mon Tue Wed Thu Fri Sat        1               2               3               4                 5               6               7               8               9               10               11                 12               13               14               15               16               17               18                 19               20               21               22               23      10 mg   See details      24      7.5 mg         25      5 mg           26      5 mg         27      7.5 mg         28      5 mg         29      5 mg         30      7.5 mg         31      5 mg           Date Details   03/23 This INR check               How to take your warfarin dose     To take:  5 mg Take 1 of the 5 mg tablets.    To take:  7.5 mg Take 1.5 of the 5 mg tablets.    To take:  10 mg Take 2 of the 5 mg tablets.           April 2017 Details    Sun Mon Tue Wed Thu Fri Sat           1      7.5 mg           2      5  mg         3      7.5 mg         4      5 mg         5      5 mg         6            7               8                 9               10               11               12               13               14               15                 16               17               18               19               20               21               22                 23               24               25               26               27               28               29                 30                      Date Details   No additional details    Date of next INR:  4/6/2017         How to take your warfarin dose     To take:  5 mg Take 1 of the 5 mg tablets.    To take:  7.5 mg Take 1.5 of the 5 mg tablets.

## 2017-03-30 ENCOUNTER — TRANSFERRED RECORDS (OUTPATIENT)
Dept: HEALTH INFORMATION MANAGEMENT | Facility: CLINIC | Age: 62
End: 2017-03-30

## 2017-03-31 ENCOUNTER — TELEPHONE (OUTPATIENT)
Dept: INTERNAL MEDICINE | Facility: CLINIC | Age: 62
End: 2017-03-31

## 2017-03-31 ENCOUNTER — TELEPHONE (OUTPATIENT)
Dept: CARDIOLOGY | Facility: CLINIC | Age: 62
End: 2017-03-31

## 2017-03-31 ENCOUNTER — CARE COORDINATION (OUTPATIENT)
Dept: CARE COORDINATION | Facility: CLINIC | Age: 62
End: 2017-03-31

## 2017-03-31 DIAGNOSIS — I67.1 CEREBRAL ANEURYSM, NONRUPTURED: Primary | ICD-10-CM

## 2017-03-31 NOTE — TELEPHONE ENCOUNTER
Care Coordination/Jsoe has reached out to patient.  Will hold encounter open for now.  BROOKLYNN Rosario R.N.

## 2017-03-31 NOTE — TELEPHONE ENCOUNTER
Call from patient with update for Dr Church ie Hx CFA, PFO, Atrial fib and on warfarin.  Had brain aneurysms and is scheduled to have Cerebral angio w/stenting and coilng on 4/10/2017 - plavix and ASA will be started post procedure for approx 3 months but will not know how long until procedure. Surgeon asking for 4 days off warfarin hold preop w/ bridging and possible switch to another form of anticoagulation later ie eliquis. See notes in EPIC. See notes from Dr Church and Mitali EDWARDS 3/10 - 3/11/2017. 30 day event monitor completed and scanned in chart - short runs atrial fib. Per Patient  Ghanshyam wants to switch to eliquis post procedure.   Reviewed w/ Dr Church -ok to hold warfarin the 4 days, no lovenox bridging is needed or recommended as majority of time in SR, while on plavix would prefer warfarin as studies on xarelto but not eliquis with plavix, prefer not on asa with plavix and warfarin. Consider eliquis at later date. Patient will be seeing anticoag nurse next week and will have her contact Dr Church team w/ questions.  Lily Bonds RN

## 2017-03-31 NOTE — TELEPHONE ENCOUNTER
Patient just had a stroke was found to have A-fib, PFO, aneurysmal septum, and newly discovered cerebral aneurysm.  Has been trying to coordinate her cares between all of her specialists and is so tired and frustrated.  Fees that no one is taking control of situation, hearing conflicting things from providers.      She had a 4/10 appt scheduled to see PCP but interventional radiologist will be doing cerebral angiogram that day now so needed to cancel.  Needs to come off the warfarin and be bridged with Lovenox, has spoken with INR nurse and will get instructions through them on giving shots.  Vascular neurologist (Dr. Navas) does not even want her on warfarin, he wants her on Eliquis which is another current issue.      Patient had to get off phone to take call from one of her specialists so have not yet rescheduled her.  She wants MD to know what has been going on and to review her chart before she comes in as a 20 minute appointment will not be long enough she states.  BROOKLYNN Rosario R.N.

## 2017-03-31 NOTE — PROGRESS NOTES
Clinic Care Coordination Contact  [facility]/Voicemail    Referral Source: PCP  Clinical Data: Care Coordinator Outreach  Outreach attempted x 1.  Left message on voicemail with call back information and requested return call.  Plan: Care Coordinator will try to reach patient again in 1-2 business days.    Jose Fernando RN/CC  Care Coordinator [facility]  [phone]

## 2017-03-31 NOTE — TELEPHONE ENCOUNTER
I received the notes about the stroke. Really sorry. i was aware about the storke, but not about all these struggling.     Would care coordinating be helpful for her ?   If yes, Jose please make referral

## 2017-04-04 ENCOUNTER — CARE COORDINATION (OUTPATIENT)
Dept: CARE COORDINATION | Facility: CLINIC | Age: 62
End: 2017-04-04

## 2017-04-04 NOTE — PROGRESS NOTES
Clinic Care Coordination Contact  OUTREACH    Referral Information:  Referral Source: PCP  Reason for Contact: Referral   Care Conference: No     Universal Utilization:   ED Visits in last year:  (na)  Hospital visits in last year:  (na)  Last PCP appointment:  (na)  Missed Appointments:  (na)  Concerns:  (na)  Multiple Providers or Specialists:  (na)    Clinical Concerns:  Current Medical Concerns: Pt reported that she has managed to get her follow up.  Will have surgical intervention with cerebral angiogram and coiling on 4/10.  Has scheduled up with her PCP on 4/17.  She will also have a pre-op with provider on 4/5.    Pt stated she is more comfortable with her plans at this time.  We discussed care coordination at this time and she declined but stated she will reach out if needs present.      Medication Management:  Pt stated she understands her medications and has not questions. Declined review     Functional Status:  Mobility Status: Independent     Transportation: Self/family           Psychosocial:  Current living arrangement:: I live in a private home     Advanced Care Plans/Directives on file::  (na)       Patient/Caregiver understanding: Pt has a good understanding of her medical management  Frequency of Care Coordination:  (na)  Upcoming appointment: 04/05/17     Plan: Pt will follow up as needed and as scheduled with providers.  Pt declined care coordination but will reach out if needs present.    Jose Fernando RN/CC  Care Coordinator [facility]  [phone]

## 2017-04-05 ENCOUNTER — RADIANT APPOINTMENT (OUTPATIENT)
Dept: GENERAL RADIOLOGY | Facility: CLINIC | Age: 62
End: 2017-04-05
Attending: NURSE PRACTITIONER
Payer: COMMERCIAL

## 2017-04-05 ENCOUNTER — OFFICE VISIT (OUTPATIENT)
Dept: INTERNAL MEDICINE | Facility: CLINIC | Age: 62
End: 2017-04-05
Payer: COMMERCIAL

## 2017-04-05 VITALS
OXYGEN SATURATION: 96 % | TEMPERATURE: 98.1 F | HEART RATE: 88 BPM | HEIGHT: 65 IN | DIASTOLIC BLOOD PRESSURE: 66 MMHG | BODY MASS INDEX: 37.74 KG/M2 | SYSTOLIC BLOOD PRESSURE: 94 MMHG | WEIGHT: 226.5 LBS

## 2017-04-05 DIAGNOSIS — Z01.818 PREOP GENERAL PHYSICAL EXAM: ICD-10-CM

## 2017-04-05 DIAGNOSIS — Z01.818 PREOP GENERAL PHYSICAL EXAM: Primary | ICD-10-CM

## 2017-04-05 LAB
ERYTHROCYTE [DISTWIDTH] IN BLOOD BY AUTOMATED COUNT: 13.9 % (ref 10–15)
HCT VFR BLD AUTO: 41.3 % (ref 35–47)
HGB BLD-MCNC: 13.8 G/DL (ref 11.7–15.7)
MCH RBC QN AUTO: 30.2 PG (ref 26.5–33)
MCHC RBC AUTO-ENTMCNC: 33.4 G/DL (ref 31.5–36.5)
MCV RBC AUTO: 90 FL (ref 78–100)
PLATELET # BLD AUTO: 243 10E9/L (ref 150–450)
RBC # BLD AUTO: 4.57 10E12/L (ref 3.8–5.2)
WBC # BLD AUTO: 5.5 10E9/L (ref 4–11)

## 2017-04-05 PROCEDURE — 99214 OFFICE O/P EST MOD 30 MIN: CPT | Performed by: NURSE PRACTITIONER

## 2017-04-05 PROCEDURE — 85027 COMPLETE CBC AUTOMATED: CPT | Performed by: NURSE PRACTITIONER

## 2017-04-05 PROCEDURE — 71020 XR CHEST 2 VW: CPT

## 2017-04-05 PROCEDURE — 36415 COLL VENOUS BLD VENIPUNCTURE: CPT | Performed by: NURSE PRACTITIONER

## 2017-04-05 PROCEDURE — 80048 BASIC METABOLIC PNL TOTAL CA: CPT | Performed by: NURSE PRACTITIONER

## 2017-04-05 RX ORDER — CLOPIDOGREL BISULFATE 75 MG/1
75 TABLET ORAL DAILY
Qty: 90 TABLET | Refills: 3 | COMMUNITY
Start: 2017-04-05 | End: 2017-08-01

## 2017-04-05 NOTE — MR AVS SNAPSHOT
After Visit Summary   4/5/2017    Jose Coronado    MRN: [id number]           Patient Information     Date Of Birth          1955        Visit Information        Provider Department      4/5/2017 8:20 AM Emma Taveras NP [facility]        Today's Diagnoses     Preop general physical exam    -  1      Care Instructions    Labs and CXR pending  Follow blood thinner directions per surgeons    Emma Taveras CNP          Follow-ups after your visit        Your next 10 appointments already scheduled     Apr 06, 2017  2:30 PM CDT   Anticoagulation Visit with [facility] ANT[facility]AGULATION CLINIC   [facility] ([facility]    [address] [place] [address]  [facility] [address]   [phone]            Apr 07, 2017  1:00 PM CDT   LAB with  LAB ONLY   [facility] ([facility])    [address] [place] [address] [phone]           Patient must bring picture ID.  Patient should be prepared to give a urine specimen  Please do not eat 10-12 hours before your appointment if you are coming in fasting for labs on lipids, cholesterol, or glucose (sugar).  Pregnant women should follow their Care Team instructions. Water with medications is okay. Do not drink coffee or other fluids.   If you have concerns about taking  your medications, please ask at office or if scheduling via Ub[employer], send a message by clicking on Secure Messaging, Message Your Care Team.            Apr 10, 2017   Procedure with GENERIC ANESTHESIA PROVIDER   [facility] PeriOP Services (--)    [address]  [facility] [id number] [phone]            Apr 10, 2017  9:00 AM CDT   IR INTRACRANIAL EMBOLIZATION RIGHT with SHIR3   [facility] Interventional Radiology ([facility])    [address] [facility] [address] [phone]           1. You will need to have had a history and physical exam within 7  days of the procedure. 2. Laboratory test are to be obtained by your doctor prior to the exam (CBCP, INR and PTT) 3. Someone will need to drive you to and from the hospital. 4. If you are or may be pregnant, contact your doctor or a Radiology nurse prior to the day of the exam. 5. If you have diabetes, check with your doctor or a Radiology nurse to see if your insulin needs to be adjusted for the exam. 6. If you are taking Coumadin (to thin you blood) please contact your doctor or a Radiology nurse at least 3 days before the exam for special instructions. 7. The day before your exam you may eat your regular diet and are encouraged to drink at least 2 quarts of clear liquids. Drink no alcoholic beverages for 24 hours prior to the exam. 8. Do not eat any solid food or milk products for 6 hours prior to the exam. You may drink clear liquids until 2 hours prior to the exam. Clear liquids include the following: water, Jell-O, clear broth, apple juice or any noncarbonated drink that you can see through (no pop!) 9. The morning of the exam you may brush your teeth and take medications as directed with a sip of water. 10. Tell the Radiology nurse if you have any allergies.            Apr 17, 2017  9:40 AM CDT   MyChart Long with Lian Martinez MD   [facility]    [address]  [facility] [address]   [phone]            May 26, 2017  7:45 AM CDT   Return Visit with Luis Church MD   [facility] ([facility])    [address] [place] [id number] [phone]              Future tests that were ordered for you today     Open Future Orders        Priority Expected Expires Ordered    XR Chest 2 Views Routine 4/5/2017 4/5/2018 4/5/2017            Who to contact     If you have questions or need follow up information about today's clinic visit or your schedule please contact [facility]  "[facility] directly at [phone].  Normal or non-critical lab and imaging results will be communicated to you by MyChart, letter or phone within 4 business days after the clinic has received the results. If you do not hear from us within 7 days, please contact the clinic through [employer] or phone. If you have a critical or abnormal lab result, we will notify you by phone as soon as possible.  Submit refill requests through [employer] or call your pharmacy and they will forward the refill request to us. Please allow 3 business days for your refill to be completed.          Additional Information About Your Visit        [employer] Information     [employer] gives you secure access to your electronic health record. If you see a primary care provider, you can also send messages to your care team and make appointments. If you have questions, please call your primary care clinic.  If you do not have a primary care provider, please call [phone] and they will assist you.        Care EveryWhere ID     This is your Care EveryWhere ID. This could be used by other organizations to access your [place] medical records  [id number]        Your Vitals Were     Pulse Temperature Height Pulse Oximetry Breastfeeding? BMI (Body Mass Index)    88 98.1  F (36.7  C) (Oral) 5' 5\" (1.651 m) 96% No 37.69 kg/m2       Blood Pressure from Last 3 Encounters:   04/05/17 94/66   03/07/17 110/70   02/23/17 102/62    Weight from Last 3 Encounters:   04/05/17 226 lb 8 oz (102.7 kg)   03/07/17 239 lb 3.2 oz (108.5 kg)   02/23/17 240 lb (108.9 kg)              We Performed the Following     Basic metabolic panel     CBC with platelets        Primary Care Provider Office Phone # Fax #    Mary Mathews [phone]       [facility] [address] [facility] [address]        Thank you!     Thank you for choosing [facility]  for your care. Our goal is always to provide you with " excellent care. Hearing back from our patients is one way we can continue to improve our services. Please take a few minutes to complete the written survey that you may receive in the mail after your visit with us. Thank you!             Your Updated Medication List - Protect others around you: Learn how to safely use, store and throw away your medicines at www.disposemymeds.org.          This list is accurate as of: 4/5/17  9:13 AM.  Always use your most recent med list.                   Brand Name Dispense Instructions for use    amLODIPine 5 MG tablet    NORVASC    90 tablet    Take 1 tablet (5 mg) by mouth daily       ascorbic acid 500 MG tablet    VITAMIN C    30 tablet    Take 250 mg by mouth 2 times daily       aspirin 81 MG tablet     30 tablet    Take by mouth daily Taking this in preparation for her surgery 4/10/17       atenolol 50 MG tablet    TENORMIN    90 tablet    Take 1 tablet (50 mg) by mouth daily       atorvastatin 40 MG tablet    LIPITOR    90 tablet    Take 1 tablet (40 mg) by mouth daily       CALCIUM CITRATE + D PO      Take 1 tablet by mouth every morning       cetirizine 10 MG tablet    zyrTEC    30 tablet    Take 1 tablet by mouth every evening.       cholecalciferol 1000 UNIT tablet    vitamin D    30 tablet    Take 0.5 tablets (500 Units) by mouth daily       Cyanocobalamin 2500 MCG Tabs      Take 2,500 mcg by mouth twice a week Mon, Thur       Ferrous Sulfate 27 MG Tabs      Take 27 mg by mouth 2 times daily       hydrochlorothiazide 25 MG tablet    HYDRODIURIL    90 tablet    Take 1 tablet (25 mg) by mouth daily       imipramine 25 MG tablet    TOFRANIL    100 tablet    Take 1 tablet (25 mg) by mouth daily       losartan 100 MG tablet    COZAAR    90 tablet    Take 1 tablet (100 mg) by mouth daily       PLAVIX 75 MG tablet   Generic drug:  clopidogrel     90 tablet    Take 1 tablet (75 mg) by mouth daily Taking this in preparation for her surgery 4/10/17       PROBIOTIC PO      Take 1  tablet by mouth daily.       topiramate 25 MG tablet    TOPAMAX    180 tablet    Take 1 tablet (25 mg) by mouth 2 times daily Prescribed by Dr Tello       valACYclovir 1000 mg tablet    VALTREX    4 tablet    Take 2 tablets (2,000 mg) by mouth 2 times daily as needed       warfarin 5 MG tablet    COUMADIN    96 tablet    Take 1 tablet (5 mg) every day except take 1 1/2 tablets (7.5 mg) on Mon and Thur or as instructed by INR clinic       ZADITOR 0.025 % Soln ophthalmic solution   Generic drug:  ketotifen     1 Bottle    Place 1 drop into both eyes every 12 hours as needed for itching

## 2017-04-05 NOTE — NURSING NOTE
"Chief Complaint   Patient presents with     Pre-Op Exam     Was advised she needs Hemoglobin, PLT, Basic Metabolic, EKG, and chest x-ray       Initial BP 94/66 (BP Location: Left arm, Patient Position: Chair, Cuff Size: Adult Large)  Pulse 88  Temp 98.1  F (36.7  C) (Oral)  Ht 5' 5\" (1.651 m)  Wt 226 lb 8 oz (102.7 kg)  SpO2 96%  Breastfeeding? No  BMI 37.69 kg/m2 Estimated body mass index is 37.69 kg/(m^2) as calculated from the following:    Height as of this encounter: 5' 5\" (1.651 m).    Weight as of this encounter: 226 lb 8 oz (102.7 kg).  Medication Reconciliation: complete   Sagrario Quintanilla CMA      "

## 2017-04-05 NOTE — PROGRESS NOTES
[facility]  [address]  [facility] [address]  [phone]  Dept: [phone]    PRE-OP EVALUATION:  Today's date: 2017    Jose Coronado (: 1955) presents for pre-operative evaluation assessment as requested by Dr. Maikol Peace and Dr. Ayden Grider. She requires evaluation and anesthesia risk assessment prior to undergoing surgery/procedure for treatment of cerebral aneurysm.  Proposed procedure: Cerebral angiogram, coiling embolization and neuroform stent    Date of Surgery/ Procedure: 4/10/17  Time of Surgery/ Procedure: 9AM  Hospital/Surgical Facility: [facility]  Needs pre-op faxed to [phone].  Primary Physician: Mary Mathews  Type of Anesthesia Anticipated: General    Patient has a Health Care Directive or Living Will:  YES she plans to bring a copy of her health care directive and her POA with her to the hospital.    1. YES - DO YOU HAVE A HISTORY OF HEART ATTACK, STROKE, STENT, BYPASS OR SURGERY ON AN ARTERY IN THE HEAD, NECK, HEART OR LEG? Stroke 2017  2. NO - Do you ever have any pain or discomfort in your chest?  3. NO - Do you have a history of  Heart Failure?  4. NO - Are you troubled by shortness of breath when: walking on the level, up a slight hill or at night?  5. NO - Do you currently have a cold, bronchitis or other respiratory infection?  6. NO - Do you have a cough, shortness of breath or wheezing?  7. NO - Do you sometimes get pains in the calves of your legs when you walk?  8. YES - DO YOU OR ANYONE IN YOUR FAMILY HAVE PREVIOUS HISTORY OF BLOOD CLOTS? Mother possible  9. NO - Do you or does anyone in your family have a serious bleeding problem such as prolonged bleeding following surgeries or cuts?  10. YES - HAVE YOU EVER HAD PROBLEMS WITH ANEMIA OR BEEN TOLD TO TAKE IRON PILLS? Currently on iron supps  11. NO - Have you had any abnormal blood loss such as black, tarry or bloody stools, or  abnormal vaginal bleeding?  12. NO - Have you ever had a blood transfusion?  13. YES - HAVE YOU OR ANY OF YOUR RELATIVES EVER HAD PROBLEMS WITH ANESTHESIA? Headaches  14. YES - DO YOU HAVE SLEEP APNEA, EXCESSIVE SNORING OR DAYTIME DROWSINESS? CPAP  15. NO - Do you have any prosthetic heart valves?  16. NO - Do you have prosthetic joints?  17. NO - Is there any chance that you may be pregnant?      HPI:                                                      Brief HPI related to upcoming procedure: Cerebral aneurysm       HYPERTENSION - Patient has longstanding history of mod-severe HTN , currently denies any symptoms referable to elevated blood pressure. Specifically denies chest pain, palpitations, dyspnea, orthopnea, PND or peripheral edema. Blood pressure readings have been in normal range. Current medication regimen is as listed below. Patient denies any side effects of medication.                                                                                                                                                                                          .  ANEMIA - Patient has a recent history of moderate severity anemia, which has not been symptomatic. Work up to date has revealed Hgb 13.8. Treatment has been ferrous sulfate.                                                                                                                   .    MEDICAL HISTORY:                                                      Patient Active Problem List    Diagnosis Date Noted     Long-term (current) use of anticoagulants [Z79.01] 02/14/2017     Priority: Medium     Left temporal lobe infarction (H) 02/09/2017     Priority: Medium     Stroke (H) 02/09/2017     Priority: Medium     Essential hypertension with goal blood pressure less than 140/90 10/31/2016     Priority: Medium     PFO (patent foramen ovale) 02/15/2016     Priority: Medium     Lump or mass in breast 11/09/2015     Priority: Medium     Overweight, BMI >  35 10/16/2015     Priority: Medium     Iron deficiency anemia 10/16/2015     Priority: Medium     Advanced directives, counseling/discussion 08/23/2012     Discussed Advance Directive planning with patient; information given to patient to review.       Hirsutism 05/24/2011     Vitamin D deficiency      (Problem list name updated by automated process. Provider to review and confirm.)       Hyperparathyroidism (H)      S/P gastric bypass 06/01/2010     Osteopenia      Sleep apnea 08/28/2009     Female stress incontinence 06/09/2008     (Problem list name updated by automated process. Provider to review and confirm.)       Family history of malignant neoplasm of breast 10/02/2003      Past Medical History:   Diagnosis Date     CVA (cerebral vascular accident) (H) 2017    ?migraine, ?pfo--negative vasc w/u, neg hypercoag w/u     Diffuse cystic mastopathy     Fibrocystic breast disease     HTN, goal below 140/90      Hyperparathyroidism (H)      Infectious mononucleosis     Mono at age 16     Labyrinthitis, unspecified      Migraine headache with aura      Osteopenia      Pain in joint, shoulder region     Secondary to a fall     PFO (patent foramen ovale)      S/P gastric bypass June, 2010     Sleep apnea     she is on CPAP     Vitamin D deficiencies      Past Surgical History:   Procedure Laterality Date     C NONSPECIFIC PROCEDURE      S/P multiple breast biopies - all negative / benign     C NONSPECIFIC PROCEDURE      S/P T&A     C NONSPECIFIC PROCEDURE      Rudyard teeth extraction     C NONSPECIFIC PROCEDURE      S/P (? unreadable) ankle     COLONOSCOPY N/A 8/12/2015    Procedure: COLONOSCOPY;  Surgeon: Deandre Brooks MD;  Location:  GI     GASTRIC BYPASS  June 24, 2010     ORTHOPEDIC SURGERY Left 2010    wrist fracture     PARATHYROIDECTOMY  9/19/11     Current Outpatient Prescriptions   Medication Sig Dispense Refill     topiramate (TOPAMAX) 25 MG tablet Take 1 tablet (25 mg) by mouth 2 times daily  Prescribed by Dr Tello 180 tablet 3     atorvastatin (LIPITOR) 40 MG tablet Take 1 tablet (40 mg) by mouth daily 90 tablet 1     warfarin (COUMADIN) 5 MG tablet Take 1 tablet (5 mg) every day except take 1 1/2 tablets (7.5 mg) on Mon and Thur or as instructed by INR clinic 96 tablet 0     alum & mag hydroxide-simethicone (MYLANTA ES/MAALOX  ES) 400-400-40 MG/5ML SUSP suspension Shake well.  Take 30 mL (2 tablespoons) by mouth every 8 hours as needed for heartburn for 2 days. 1 Bottle 0     Calcium Citrate-Vitamin D (CALCIUM CITRATE + D PO) Take 1 tablet by mouth every morning       Ferrous Sulfate 27 MG TABS Take 27 mg by mouth 2 times daily       Cyanocobalamin 2500 MCG TABS Take 2,500 mcg by mouth twice a week Mon, Thur       imipramine (TOFRANIL) 25 MG tablet Take 1 tablet (25 mg) by mouth daily 100 tablet 2     amLODIPine (NORVASC) 5 MG tablet Take 1 tablet (5 mg) by mouth daily 90 tablet 1     hydrochlorothiazide (HYDRODIURIL) 25 MG tablet Take 1 tablet (25 mg) by mouth daily 90 tablet 1     losartan (COZAAR) 100 MG tablet Take 1 tablet (100 mg) by mouth daily 90 tablet 3     atenolol (TENORMIN) 50 MG tablet Take 1 tablet (50 mg) by mouth daily 90 tablet 3     valACYclovir (VALTREX) 1000 mg tablet Take 2 tablets (2,000 mg) by mouth 2 times daily as needed 4 tablet 5     ascorbic acid (VITAMIN C) 500 MG tablet Take 250 mg by mouth 2 times daily  30 tablet      cholecalciferol (VITAMIN D) 1000 UNIT tablet Take 0.5 tablets (500 Units) by mouth daily 30 tablet      ketotifen (ZADITOR) 0.025 % SOLN Place 1 drop into both eyes every 12 hours as needed for itching 1 Bottle      cetirizine (ZYRTEC) 10 MG tablet Take 1 tablet by mouth every evening. 30 tablet 1     Probiotic Product (PROBIOTIC PO) Take 1 tablet by mouth daily.       OTC products: None, except as noted above    Allergies   Allergen Reactions     Contrast Dye Itching     Reaction of immediate burning and severe itching in Right ear after injection for CT.       Sulfa Drugs      hives      Latex Allergy: No problems with latex, but she does have issues with certain types of adhesives.    Social History   Substance Use Topics     Smoking status: Never Smoker     Smokeless tobacco: Never Used     Alcohol use 0.0 oz/week     0 Standard drinks or equivalent per week      Comment: 1 glass wine 4-5 days a week     History   Drug Use No       REVIEW OF SYSTEMS:                                                    C: NEGATIVE for fever, chills, change in weight  E/M: NEGATIVE for ear, mouth and throat problems  R: NEGATIVE for significant cough or SOB  CV: NEGATIVE for chest pain, palpitations or peripheral edema  GI: NEGATIVE for nausea, abdominal pain, heartburn, or change in bowel habits  : NEGATIVE for frequency, dysuria, or hematuria  M: NEGATIVE for significant arthralgias or myalgia  N: NEGATIVE for weakness, dizziness or paresthesias  E: NEGATIVE for temperature intolerance, skin/hair changes  H: NEGATIVE for bleeding problems  P: NEGATIVE for changes in mood or affect    EXAM:                                                    There were no vitals taken for this visit.    GENERAL APPEARANCE: healthy, alert and no distress     NECK: no adenopathy, no asymmetry, masses, or scars and thyroid normal to palpation     RESP: lungs clear to auscultation - no rales, rhonchi or wheezes     CV: regular rates and rhythm, normal S1 S2, no S3 or S4 and no murmur, click or rub     ABDOMEN:  soft, nontender, no HSM or masses and bowel sounds normal     MS: extremities normal- no gross deformities noted, no evidence of inflammation in joints, FROM in all extremities.     NEURO: Normal strength and tone, sensory exam grossly normal, mentation intact and speech normal     PSYCH: mentation appears normal. and affect normal/bright     LYMPHATICS: No axillary, cervical, or supraclavicular nodes    DIAGNOSTICS:                                                    EKG: sinus rhythm with 1st  degree AV block, PAC on 2/9/17    Recent Labs   Lab Test 03/23/17 03/13/17 02/09/17   1545  08/10/16   0937   06/17/10   0922   01/14/10   0822   HGB   --    --    --   13.8  13.6   < >   --    < >   --    PLT   --    --    --   322  237   < >   --    < >   --    INR  1.8*  2.1*   < >   --    --    --    --    < >   --    NA   --    --    --   139  142   < >  140   < >  138   POTASSIUM   --    --    --   3.5  4.0   < >  4.1   < >  4.6   CR   --    --    --   0.90  0.73   < >  0.95   < >  0.80   A1C   --    --    --    --    --    --   5.7   --   6.1*    < > = values in this interval not displayed.        IMPRESSION:                                                    Reason for surgery/procedure: cerebral aneurysm     The proposed surgical procedure is considered INTERMEDIATE risk.    REVISED CARDIAC RISK INDEX  The patient has the following serious cardiovascular risks for perioperative complications such as (MI, PE, VFib and 3  AV Block):  Cerebrovascular Disease (TIA or CVA)  INTERPRETATION: 1 risks: Class II (low risk - 0.9% complication rate)    The patient has the following additional risks for perioperative complications:  No identified additional risks    (Z01.818) Preop general physical exam  (primary encounter diagnosis)  Comment:   Plan: CBC with platelets, Basic metabolic panel, XR         Chest 2 Views                RECOMMENDATIONS:                                                      --Consult hospital rounder / IM to assist post-op medical management               Signed Electronically by: Emma Taveras NP    Copy of this evaluation report is provided to requesting physicianRonna Young Preop Guidelines

## 2017-04-06 LAB
ANION GAP SERPL CALCULATED.3IONS-SCNC: 5 MMOL/L (ref 3–14)
BUN SERPL-MCNC: 14 MG/DL (ref 7–30)
CALCIUM SERPL-MCNC: 9.2 MG/DL (ref 8.5–10.1)
CHLORIDE SERPL-SCNC: 105 MMOL/L (ref 94–109)
CO2 SERPL-SCNC: 30 MMOL/L (ref 20–32)
CREAT SERPL-MCNC: 0.8 MG/DL (ref 0.52–1.04)
GFR SERPL CREATININE-BSD FRML MDRD: 73 ML/MIN/1.7M2
GLUCOSE SERPL-MCNC: 90 MG/DL (ref 70–99)
POTASSIUM SERPL-SCNC: 3.7 MMOL/L (ref 3.4–5.3)
SODIUM SERPL-SCNC: 140 MMOL/L (ref 133–144)

## 2017-04-06 NOTE — H&P (VIEW-ONLY)
[facility]  [address]  [facility] [address]  [phone]  Dept: [phone]    PRE-OP EVALUATION:  Today's date: 2017    Jose Coronado (: 1955) presents for pre-operative evaluation assessment as requested by Dr. Maikol Peace and Dr. Ayden Grider. She requires evaluation and anesthesia risk assessment prior to undergoing surgery/procedure for treatment of cerebral aneurysm.  Proposed procedure: Cerebral angiogram, coiling embolization and neuroform stent    Date of Surgery/ Procedure: 4/10/17  Time of Surgery/ Procedure: 9AM  Hospital/Surgical Facility: [facility]  Needs pre-op faxed to [phone].  Primary Physician: Mary Mathews  Type of Anesthesia Anticipated: General    Patient has a Health Care Directive or Living Will:  YES she plans to bring a copy of her health care directive and her POA with her to the hospital.    1. YES - DO YOU HAVE A HISTORY OF HEART ATTACK, STROKE, STENT, BYPASS OR SURGERY ON AN ARTERY IN THE HEAD, NECK, HEART OR LEG? Stroke 2017  2. NO - Do you ever have any pain or discomfort in your chest?  3. NO - Do you have a history of  Heart Failure?  4. NO - Are you troubled by shortness of breath when: walking on the level, up a slight hill or at night?  5. NO - Do you currently have a cold, bronchitis or other respiratory infection?  6. NO - Do you have a cough, shortness of breath or wheezing?  7. NO - Do you sometimes get pains in the calves of your legs when you walk?  8. YES - DO YOU OR ANYONE IN YOUR FAMILY HAVE PREVIOUS HISTORY OF BLOOD CLOTS? Mother possible  9. NO - Do you or does anyone in your family have a serious bleeding problem such as prolonged bleeding following surgeries or cuts?  10. YES - HAVE YOU EVER HAD PROBLEMS WITH ANEMIA OR BEEN TOLD TO TAKE IRON PILLS? Currently on iron supps  11. NO - Have you had any abnormal blood loss such as black, tarry or bloody stools, or  abnormal vaginal bleeding?  12. NO - Have you ever had a blood transfusion?  13. YES - HAVE YOU OR ANY OF YOUR RELATIVES EVER HAD PROBLEMS WITH ANESTHESIA? Headaches  14. YES - DO YOU HAVE SLEEP APNEA, EXCESSIVE SNORING OR DAYTIME DROWSINESS? CPAP  15. NO - Do you have any prosthetic heart valves?  16. NO - Do you have prosthetic joints?  17. NO - Is there any chance that you may be pregnant?      HPI:                                                      Brief HPI related to upcoming procedure: Cerebral aneurysm       HYPERTENSION - Patient has longstanding history of mod-severe HTN , currently denies any symptoms referable to elevated blood pressure. Specifically denies chest pain, palpitations, dyspnea, orthopnea, PND or peripheral edema. Blood pressure readings have been in normal range. Current medication regimen is as listed below. Patient denies any side effects of medication.                                                                                                                                                                                          .  ANEMIA - Patient has a recent history of moderate severity anemia, which has not been symptomatic. Work up to date has revealed Hgb 13.8. Treatment has been ferrous sulfate.                                                                                                                   .    MEDICAL HISTORY:                                                      Patient Active Problem List    Diagnosis Date Noted     Long-term (current) use of anticoagulants [Z79.01] 02/14/2017     Priority: Medium     Left temporal lobe infarction (H) 02/09/2017     Priority: Medium     Stroke (H) 02/09/2017     Priority: Medium     Essential hypertension with goal blood pressure less than 140/90 10/31/2016     Priority: Medium     PFO (patent foramen ovale) 02/15/2016     Priority: Medium     Lump or mass in breast 11/09/2015     Priority: Medium     Overweight, BMI >  35 10/16/2015     Priority: Medium     Iron deficiency anemia 10/16/2015     Priority: Medium     Advanced directives, counseling/discussion 08/23/2012     Discussed Advance Directive planning with patient; information given to patient to review.       Hirsutism 05/24/2011     Vitamin D deficiency      (Problem list name updated by automated process. Provider to review and confirm.)       Hyperparathyroidism (H)      S/P gastric bypass 06/01/2010     Osteopenia      Sleep apnea 08/28/2009     Female stress incontinence 06/09/2008     (Problem list name updated by automated process. Provider to review and confirm.)       Family history of malignant neoplasm of breast 10/02/2003      Past Medical History:   Diagnosis Date     CVA (cerebral vascular accident) (H) 2017    ?migraine, ?pfo--negative vasc w/u, neg hypercoag w/u     Diffuse cystic mastopathy     Fibrocystic breast disease     HTN, goal below 140/90      Hyperparathyroidism (H)      Infectious mononucleosis     Mono at age 16     Labyrinthitis, unspecified      Migraine headache with aura      Osteopenia      Pain in joint, shoulder region     Secondary to a fall     PFO (patent foramen ovale)      S/P gastric bypass June, 2010     Sleep apnea     she is on CPAP     Vitamin D deficiencies      Past Surgical History:   Procedure Laterality Date     C NONSPECIFIC PROCEDURE      S/P multiple breast biopies - all negative / benign     C NONSPECIFIC PROCEDURE      S/P T&A     C NONSPECIFIC PROCEDURE      Kell teeth extraction     C NONSPECIFIC PROCEDURE      S/P (? unreadable) ankle     COLONOSCOPY N/A 8/12/2015    Procedure: COLONOSCOPY;  Surgeon: Deandre Brooks MD;  Location:  GI     GASTRIC BYPASS  June 24, 2010     ORTHOPEDIC SURGERY Left 2010    wrist fracture     PARATHYROIDECTOMY  9/19/11     Current Outpatient Prescriptions   Medication Sig Dispense Refill     topiramate (TOPAMAX) 25 MG tablet Take 1 tablet (25 mg) by mouth 2 times daily  Prescribed by Dr Tello 180 tablet 3     atorvastatin (LIPITOR) 40 MG tablet Take 1 tablet (40 mg) by mouth daily 90 tablet 1     warfarin (COUMADIN) 5 MG tablet Take 1 tablet (5 mg) every day except take 1 1/2 tablets (7.5 mg) on Mon and Thur or as instructed by INR clinic 96 tablet 0     alum & mag hydroxide-simethicone (MYLANTA ES/MAALOX  ES) 400-400-40 MG/5ML SUSP suspension Shake well.  Take 30 mL (2 tablespoons) by mouth every 8 hours as needed for heartburn for 2 days. 1 Bottle 0     Calcium Citrate-Vitamin D (CALCIUM CITRATE + D PO) Take 1 tablet by mouth every morning       Ferrous Sulfate 27 MG TABS Take 27 mg by mouth 2 times daily       Cyanocobalamin 2500 MCG TABS Take 2,500 mcg by mouth twice a week Mon, Thur       imipramine (TOFRANIL) 25 MG tablet Take 1 tablet (25 mg) by mouth daily 100 tablet 2     amLODIPine (NORVASC) 5 MG tablet Take 1 tablet (5 mg) by mouth daily 90 tablet 1     hydrochlorothiazide (HYDRODIURIL) 25 MG tablet Take 1 tablet (25 mg) by mouth daily 90 tablet 1     losartan (COZAAR) 100 MG tablet Take 1 tablet (100 mg) by mouth daily 90 tablet 3     atenolol (TENORMIN) 50 MG tablet Take 1 tablet (50 mg) by mouth daily 90 tablet 3     valACYclovir (VALTREX) 1000 mg tablet Take 2 tablets (2,000 mg) by mouth 2 times daily as needed 4 tablet 5     ascorbic acid (VITAMIN C) 500 MG tablet Take 250 mg by mouth 2 times daily  30 tablet      cholecalciferol (VITAMIN D) 1000 UNIT tablet Take 0.5 tablets (500 Units) by mouth daily 30 tablet      ketotifen (ZADITOR) 0.025 % SOLN Place 1 drop into both eyes every 12 hours as needed for itching 1 Bottle      cetirizine (ZYRTEC) 10 MG tablet Take 1 tablet by mouth every evening. 30 tablet 1     Probiotic Product (PROBIOTIC PO) Take 1 tablet by mouth daily.       OTC products: None, except as noted above    Allergies   Allergen Reactions     Contrast Dye Itching     Reaction of immediate burning and severe itching in Right ear after injection for CT.       Sulfa Drugs      hives      Latex Allergy: No problems with latex, but she does have issues with certain types of adhesives.    Social History   Substance Use Topics     Smoking status: Never Smoker     Smokeless tobacco: Never Used     Alcohol use 0.0 oz/week     0 Standard drinks or equivalent per week      Comment: 1 glass wine 4-5 days a week     History   Drug Use No       REVIEW OF SYSTEMS:                                                    C: NEGATIVE for fever, chills, change in weight  E/M: NEGATIVE for ear, mouth and throat problems  R: NEGATIVE for significant cough or SOB  CV: NEGATIVE for chest pain, palpitations or peripheral edema  GI: NEGATIVE for nausea, abdominal pain, heartburn, or change in bowel habits  : NEGATIVE for frequency, dysuria, or hematuria  M: NEGATIVE for significant arthralgias or myalgia  N: NEGATIVE for weakness, dizziness or paresthesias  E: NEGATIVE for temperature intolerance, skin/hair changes  H: NEGATIVE for bleeding problems  P: NEGATIVE for changes in mood or affect    EXAM:                                                    There were no vitals taken for this visit.    GENERAL APPEARANCE: healthy, alert and no distress     NECK: no adenopathy, no asymmetry, masses, or scars and thyroid normal to palpation     RESP: lungs clear to auscultation - no rales, rhonchi or wheezes     CV: regular rates and rhythm, normal S1 S2, no S3 or S4 and no murmur, click or rub     ABDOMEN:  soft, nontender, no HSM or masses and bowel sounds normal     MS: extremities normal- no gross deformities noted, no evidence of inflammation in joints, FROM in all extremities.     NEURO: Normal strength and tone, sensory exam grossly normal, mentation intact and speech normal     PSYCH: mentation appears normal. and affect normal/bright     LYMPHATICS: No axillary, cervical, or supraclavicular nodes    DIAGNOSTICS:                                                    EKG: sinus rhythm with 1st  degree AV block, PAC on 2/9/17    Recent Labs   Lab Test 03/23/17 03/13/17 02/09/17   1545  08/10/16   0937   06/17/10   0922   01/14/10   0822   HGB   --    --    --   13.8  13.6   < >   --    < >   --    PLT   --    --    --   322  237   < >   --    < >   --    INR  1.8*  2.1*   < >   --    --    --    --    < >   --    NA   --    --    --   139  142   < >  140   < >  138   POTASSIUM   --    --    --   3.5  4.0   < >  4.1   < >  4.6   CR   --    --    --   0.90  0.73   < >  0.95   < >  0.80   A1C   --    --    --    --    --    --   5.7   --   6.1*    < > = values in this interval not displayed.        IMPRESSION:                                                    Reason for surgery/procedure: cerebral aneurysm     The proposed surgical procedure is considered INTERMEDIATE risk.    REVISED CARDIAC RISK INDEX  The patient has the following serious cardiovascular risks for perioperative complications such as (MI, PE, VFib and 3  AV Block):  Cerebrovascular Disease (TIA or CVA)  INTERPRETATION: 1 risks: Class II (low risk - 0.9% complication rate)    The patient has the following additional risks for perioperative complications:  No identified additional risks    (Z01.818) Preop general physical exam  (primary encounter diagnosis)  Comment:   Plan: CBC with platelets, Basic metabolic panel, XR         Chest 2 Views                RECOMMENDATIONS:                                                      --Consult hospital rounder / IM to assist post-op medical management               Signed Electronically by: Emma Taveras NP    Copy of this evaluation report is provided to requesting physicianRonna Young Preop Guidelines

## 2017-04-07 ENCOUNTER — HOSPITAL ENCOUNTER (OUTPATIENT)
Dept: LAB | Facility: CLINIC | Age: 62
Discharge: HOME | End: 2017-04-07
Attending: NURSE PRACTITIONER | Admitting: NURSE PRACTITIONER
Payer: COMMERCIAL

## 2017-04-07 ENCOUNTER — TRANSFERRED RECORDS (OUTPATIENT)
Dept: HEALTH INFORMATION MANAGEMENT | Facility: CLINIC | Age: 62
End: 2017-04-07

## 2017-04-07 DIAGNOSIS — I67.1 CEREBRAL ANEURYSM, NONRUPTURED: ICD-10-CM

## 2017-04-07 LAB — PLATELET REACTIVITY P2Y12: 54 PRU

## 2017-04-07 PROCEDURE — 85576 BLOOD PLATELET AGGREGATION: CPT | Performed by: NURSE PRACTITIONER

## 2017-04-07 PROCEDURE — 36415 COLL VENOUS BLD VENIPUNCTURE: CPT | Performed by: NURSE PRACTITIONER

## 2017-04-10 ENCOUNTER — ANESTHESIA EVENT (OUTPATIENT)
Dept: SURGERY | Facility: CLINIC | Age: 62
DRG: 026 | End: 2017-04-10
Payer: COMMERCIAL

## 2017-04-10 ENCOUNTER — APPOINTMENT (OUTPATIENT)
Dept: INTERVENTIONAL RADIOLOGY/VASCULAR | Facility: CLINIC | Age: 62
DRG: 026 | End: 2017-04-10
Attending: PSYCHIATRY & NEUROLOGY
Payer: COMMERCIAL

## 2017-04-10 ENCOUNTER — APPOINTMENT (OUTPATIENT)
Dept: INTERVENTIONAL RADIOLOGY/VASCULAR | Facility: CLINIC | Age: 62
DRG: 026 | End: 2017-04-10
Attending: RADIOLOGY
Payer: COMMERCIAL

## 2017-04-10 ENCOUNTER — HOSPITAL ENCOUNTER (INPATIENT)
Facility: CLINIC | Age: 62
LOS: 1 days | Discharge: HOME | DRG: 026 | End: 2017-04-11
Attending: RADIOLOGY | Admitting: RADIOLOGY
Payer: COMMERCIAL

## 2017-04-10 ENCOUNTER — TELEPHONE (OUTPATIENT)
Dept: CARDIOLOGY | Facility: CLINIC | Age: 62
End: 2017-04-10

## 2017-04-10 ENCOUNTER — ANESTHESIA (OUTPATIENT)
Dept: SURGERY | Facility: CLINIC | Age: 62
DRG: 026 | End: 2017-04-10
Payer: COMMERCIAL

## 2017-04-10 DIAGNOSIS — I67.1 CEREBRAL ANEURYSM WITHOUT RUPTURE: Primary | ICD-10-CM

## 2017-04-10 DIAGNOSIS — I48.0 PAROXYSMAL ATRIAL FIBRILLATION (H): ICD-10-CM

## 2017-04-10 DIAGNOSIS — I67.1 CEREBRAL ANEURYSM, NONRUPTURED: ICD-10-CM

## 2017-04-10 DIAGNOSIS — Q21.12 PFO WITH ATRIAL SEPTAL ANEURYSM: ICD-10-CM

## 2017-04-10 DIAGNOSIS — I25.3 PFO WITH ATRIAL SEPTAL ANEURYSM: ICD-10-CM

## 2017-04-10 LAB
ANION GAP SERPL CALCULATED.3IONS-SCNC: 7 MMOL/L (ref 3–14)
BUN SERPL-MCNC: 14 MG/DL (ref 7–30)
CALCIUM SERPL-MCNC: 9.1 MG/DL (ref 8.5–10.1)
CHLORIDE SERPL-SCNC: 104 MMOL/L (ref 94–109)
CO2 SERPL-SCNC: 30 MMOL/L (ref 20–32)
CREAT SERPL-MCNC: 0.81 MG/DL (ref 0.52–1.04)
ERYTHROCYTE [DISTWIDTH] IN BLOOD BY AUTOMATED COUNT: 13.5 % (ref 10–15)
GFR SERPL CREATININE-BSD FRML MDRD: 72 ML/MIN/1.7M2
GLUCOSE SERPL-MCNC: 96 MG/DL (ref 70–99)
HCT VFR BLD AUTO: 38.3 % (ref 35–47)
HGB BLD-MCNC: 13.3 G/DL (ref 11.7–15.7)
INR PPP: 1.07 (ref 0.86–1.14)
MCH RBC QN AUTO: 30.7 PG (ref 26.5–33)
MCHC RBC AUTO-ENTMCNC: 34.7 G/DL (ref 31.5–36.5)
MCV RBC AUTO: 89 FL (ref 78–100)
PLATELET # BLD AUTO: 245 10E9/L (ref 150–450)
POTASSIUM SERPL-SCNC: 3.4 MMOL/L (ref 3.4–5.3)
RBC # BLD AUTO: 4.33 10E12/L (ref 3.8–5.2)
SODIUM SERPL-SCNC: 141 MMOL/L (ref 133–144)
WBC # BLD AUTO: 5.5 10E9/L (ref 4–11)

## 2017-04-10 PROCEDURE — C1769 GUIDE WIRE: HCPCS

## 2017-04-10 PROCEDURE — 85027 COMPLETE CBC AUTOMATED: CPT | Performed by: NURSE PRACTITIONER

## 2017-04-10 PROCEDURE — 25000125 ZZHC RX 250: Performed by: NURSE ANESTHETIST, CERTIFIED REGISTERED

## 2017-04-10 PROCEDURE — 25000566 ZZH SEVOFLURANE, EA 15 MIN

## 2017-04-10 PROCEDURE — 03LK3DZ OCCLUSION OF RIGHT INTERNAL CAROTID ARTERY WITH INTRALUMINAL DEVICE, PERCUTANEOUS APPROACH: ICD-10-PCS | Performed by: RADIOLOGY

## 2017-04-10 PROCEDURE — 25500064 ZZH RX 255 OP 636: Performed by: RADIOLOGY

## 2017-04-10 PROCEDURE — 20000003 ZZH R&B ICU

## 2017-04-10 PROCEDURE — 27210732 ZZH ACCESSORY CR1

## 2017-04-10 PROCEDURE — 93005 ELECTROCARDIOGRAM TRACING: CPT

## 2017-04-10 PROCEDURE — 25000132 ZZH RX MED GY IP 250 OP 250 PS 637

## 2017-04-10 PROCEDURE — 85610 PROTHROMBIN TIME: CPT | Performed by: RADIOLOGY

## 2017-04-10 PROCEDURE — 25000128 H RX IP 250 OP 636

## 2017-04-10 PROCEDURE — 25000128 H RX IP 250 OP 636: Performed by: NURSE ANESTHETIST, CERTIFIED REGISTERED

## 2017-04-10 PROCEDURE — 36415 COLL VENOUS BLD VENIPUNCTURE: CPT | Performed by: RADIOLOGY

## 2017-04-10 PROCEDURE — 27210889 IR INTRACRANIAL EMBOLIZATION RIGHT

## 2017-04-10 PROCEDURE — 25000128 H RX IP 250 OP 636: Performed by: NURSE PRACTITIONER

## 2017-04-10 PROCEDURE — 25000128 H RX IP 250 OP 636: Performed by: RADIOLOGY

## 2017-04-10 PROCEDURE — 27210738 ZZH ACCESSORY CR2

## 2017-04-10 PROCEDURE — 37000008 ZZH ANESTHESIA TECHNICAL FEE, 1ST 30 MIN

## 2017-04-10 PROCEDURE — 75898 FOLLOW-UP ANGIOGRAPHY: CPT

## 2017-04-10 PROCEDURE — 27210906 ZZH KIT CR8

## 2017-04-10 PROCEDURE — C1887 CATHETER, GUIDING: HCPCS

## 2017-04-10 PROCEDURE — 93010 ELECTROCARDIOGRAM REPORT: CPT | Performed by: INTERNAL MEDICINE

## 2017-04-10 PROCEDURE — 84132 ASSAY OF SERUM POTASSIUM: CPT | Performed by: RADIOLOGY

## 2017-04-10 PROCEDURE — 27210806 ZZH SHEATH CR5

## 2017-04-10 PROCEDURE — 27211145 ZZHC CATH NEURO CR19

## 2017-04-10 PROCEDURE — 25000132 ZZH RX MED GY IP 250 OP 250 PS 637: Performed by: RADIOLOGY

## 2017-04-10 PROCEDURE — 80048 BASIC METABOLIC PNL TOTAL CA: CPT | Performed by: NURSE PRACTITIONER

## 2017-04-10 PROCEDURE — 71000015 ZZH RECOVERY PHASE 1 LEVEL 2 EA ADDTL HR

## 2017-04-10 PROCEDURE — 40000333 IR DISCONTINUE SHEATH

## 2017-04-10 PROCEDURE — 85347 COAGULATION TIME ACTIVATED: CPT

## 2017-04-10 PROCEDURE — 37000009 ZZH ANESTHESIA TECHNICAL FEE, EACH ADDTL 15 MIN

## 2017-04-10 PROCEDURE — 27210804 ZZH SHEATH CR3

## 2017-04-10 PROCEDURE — 25000125 ZZHC RX 250: Performed by: ANESTHESIOLOGY

## 2017-04-10 PROCEDURE — 40000169 ZZH STATISTIC PRE-PROCEDURE ASSESSMENT I

## 2017-04-10 PROCEDURE — 71000014 ZZH RECOVERY PHASE 1 LEVEL 2 FIRST HR

## 2017-04-10 PROCEDURE — 36223 PLACE CATH CAROTID/INOM ART: CPT

## 2017-04-10 RX ORDER — DIPHENHYDRAMINE HYDROCHLORIDE 50 MG/ML
50 INJECTION INTRAMUSCULAR; INTRAVENOUS
Status: COMPLETED | OUTPATIENT
Start: 2017-04-10 | End: 2017-04-10

## 2017-04-10 RX ORDER — FENTANYL CITRATE 50 UG/ML
INJECTION, SOLUTION INTRAMUSCULAR; INTRAVENOUS PRN
Status: DISCONTINUED | OUTPATIENT
Start: 2017-04-10 | End: 2017-04-10

## 2017-04-10 RX ORDER — METHYLPREDNISOLONE SODIUM SUCCINATE 125 MG/2ML
125 INJECTION, POWDER, LYOPHILIZED, FOR SOLUTION INTRAMUSCULAR; INTRAVENOUS
Status: COMPLETED | OUTPATIENT
Start: 2017-04-10 | End: 2017-04-10

## 2017-04-10 RX ORDER — IOPAMIDOL 612 MG/ML
150 INJECTION, SOLUTION INTRAVASCULAR ONCE
Status: DISCONTINUED | OUTPATIENT
Start: 2017-04-10 | End: 2017-04-10 | Stop reason: HOSPADM

## 2017-04-10 RX ORDER — DIPHENHYDRAMINE HYDROCHLORIDE 50 MG/ML
INJECTION INTRAMUSCULAR; INTRAVENOUS
Status: COMPLETED
Start: 2017-04-10 | End: 2017-04-10

## 2017-04-10 RX ORDER — SODIUM CHLORIDE 9 MG/ML
INJECTION, SOLUTION INTRAVENOUS CONTINUOUS
Status: DISCONTINUED | OUTPATIENT
Start: 2017-04-10 | End: 2017-04-11 | Stop reason: HOSPADM

## 2017-04-10 RX ORDER — ASPIRIN 81 MG/1
81 TABLET ORAL DAILY
Status: DISCONTINUED | OUTPATIENT
Start: 2017-04-11 | End: 2017-04-11 | Stop reason: HOSPADM

## 2017-04-10 RX ORDER — NEOSTIGMINE METHYLSULFATE 1 MG/ML
VIAL (ML) INJECTION PRN
Status: DISCONTINUED | OUTPATIENT
Start: 2017-04-10 | End: 2017-04-10

## 2017-04-10 RX ORDER — VECURONIUM BROMIDE 1 MG/ML
INJECTION, POWDER, LYOPHILIZED, FOR SOLUTION INTRAVENOUS PRN
Status: DISCONTINUED | OUTPATIENT
Start: 2017-04-10 | End: 2017-04-10

## 2017-04-10 RX ORDER — OXYCODONE HYDROCHLORIDE 5 MG/1
5 TABLET ORAL EVERY 4 HOURS PRN
Status: DISCONTINUED | OUTPATIENT
Start: 2017-04-10 | End: 2017-04-11 | Stop reason: HOSPADM

## 2017-04-10 RX ORDER — LIDOCAINE HYDROCHLORIDE 20 MG/ML
INJECTION, SOLUTION INFILTRATION; PERINEURAL PRN
Status: DISCONTINUED | OUTPATIENT
Start: 2017-04-10 | End: 2017-04-10

## 2017-04-10 RX ORDER — SODIUM CHLORIDE, SODIUM LACTATE, POTASSIUM CHLORIDE, CALCIUM CHLORIDE 600; 310; 30; 20 MG/100ML; MG/100ML; MG/100ML; MG/100ML
INJECTION, SOLUTION INTRAVENOUS CONTINUOUS
Status: DISCONTINUED | OUTPATIENT
Start: 2017-04-10 | End: 2017-04-10 | Stop reason: HOSPADM

## 2017-04-10 RX ORDER — ATORVASTATIN CALCIUM 10 MG/1
40 TABLET, FILM COATED ORAL AT BEDTIME
Status: DISCONTINUED | OUTPATIENT
Start: 2017-04-10 | End: 2017-04-11 | Stop reason: HOSPADM

## 2017-04-10 RX ORDER — AMLODIPINE BESYLATE 5 MG/1
5 TABLET ORAL DAILY
Status: DISCONTINUED | OUTPATIENT
Start: 2017-04-11 | End: 2017-04-11 | Stop reason: HOSPADM

## 2017-04-10 RX ORDER — ACETAMINOPHEN 325 MG/1
325-650 TABLET ORAL EVERY 4 HOURS PRN
Status: DISCONTINUED | OUTPATIENT
Start: 2017-04-10 | End: 2017-04-11 | Stop reason: HOSPADM

## 2017-04-10 RX ORDER — ONDANSETRON 4 MG/1
4 TABLET, ORALLY DISINTEGRATING ORAL EVERY 30 MIN PRN
Status: DISCONTINUED | OUTPATIENT
Start: 2017-04-10 | End: 2017-04-10 | Stop reason: HOSPADM

## 2017-04-10 RX ORDER — ONDANSETRON 2 MG/ML
4 INJECTION INTRAMUSCULAR; INTRAVENOUS EVERY 30 MIN PRN
Status: DISCONTINUED | OUTPATIENT
Start: 2017-04-10 | End: 2017-04-10 | Stop reason: HOSPADM

## 2017-04-10 RX ORDER — EPHEDRINE SULFATE 50 MG/ML
INJECTION, SOLUTION INTRAMUSCULAR; INTRAVENOUS; SUBCUTANEOUS PRN
Status: DISCONTINUED | OUTPATIENT
Start: 2017-04-10 | End: 2017-04-10

## 2017-04-10 RX ORDER — IMIPRAMINE HCL 25 MG
25 TABLET ORAL DAILY
Status: DISCONTINUED | OUTPATIENT
Start: 2017-04-10 | End: 2017-04-11 | Stop reason: HOSPADM

## 2017-04-10 RX ORDER — ATENOLOL 25 MG/1
50 TABLET ORAL DAILY
Status: DISCONTINUED | OUTPATIENT
Start: 2017-04-11 | End: 2017-04-11 | Stop reason: HOSPADM

## 2017-04-10 RX ORDER — NALOXONE HYDROCHLORIDE 0.4 MG/ML
.1-.4 INJECTION, SOLUTION INTRAMUSCULAR; INTRAVENOUS; SUBCUTANEOUS
Status: DISCONTINUED | OUTPATIENT
Start: 2017-04-10 | End: 2017-04-11 | Stop reason: HOSPADM

## 2017-04-10 RX ORDER — HYDROMORPHONE HYDROCHLORIDE 1 MG/ML
0.2 INJECTION, SOLUTION INTRAMUSCULAR; INTRAVENOUS; SUBCUTANEOUS
Status: DISCONTINUED | OUTPATIENT
Start: 2017-04-10 | End: 2017-04-11 | Stop reason: HOSPADM

## 2017-04-10 RX ORDER — GLYCOPYRROLATE 0.2 MG/ML
INJECTION, SOLUTION INTRAMUSCULAR; INTRAVENOUS PRN
Status: DISCONTINUED | OUTPATIENT
Start: 2017-04-10 | End: 2017-04-10

## 2017-04-10 RX ORDER — TOPIRAMATE 25 MG/1
25 TABLET, FILM COATED ORAL 2 TIMES DAILY
Status: DISCONTINUED | OUTPATIENT
Start: 2017-04-10 | End: 2017-04-11 | Stop reason: HOSPADM

## 2017-04-10 RX ORDER — FENTANYL CITRATE 50 UG/ML
25-50 INJECTION, SOLUTION INTRAMUSCULAR; INTRAVENOUS
Status: DISCONTINUED | OUTPATIENT
Start: 2017-04-10 | End: 2017-04-10 | Stop reason: HOSPADM

## 2017-04-10 RX ORDER — LOSARTAN POTASSIUM 100 MG/1
100 TABLET ORAL DAILY
Status: DISCONTINUED | OUTPATIENT
Start: 2017-04-11 | End: 2017-04-11 | Stop reason: HOSPADM

## 2017-04-10 RX ORDER — HYDROMORPHONE HYDROCHLORIDE 1 MG/ML
.3-.5 INJECTION, SOLUTION INTRAMUSCULAR; INTRAVENOUS; SUBCUTANEOUS EVERY 5 MIN PRN
Status: DISCONTINUED | OUTPATIENT
Start: 2017-04-10 | End: 2017-04-10 | Stop reason: HOSPADM

## 2017-04-10 RX ORDER — IOPAMIDOL 612 MG/ML
150 INJECTION, SOLUTION INTRAVASCULAR ONCE
Status: COMPLETED | OUTPATIENT
Start: 2017-04-10 | End: 2017-04-10

## 2017-04-10 RX ORDER — PROPOFOL 10 MG/ML
INJECTION, EMULSION INTRAVENOUS PRN
Status: DISCONTINUED | OUTPATIENT
Start: 2017-04-10 | End: 2017-04-10

## 2017-04-10 RX ORDER — ONDANSETRON 2 MG/ML
INJECTION INTRAMUSCULAR; INTRAVENOUS PRN
Status: DISCONTINUED | OUTPATIENT
Start: 2017-04-10 | End: 2017-04-10

## 2017-04-10 RX ORDER — SODIUM CHLORIDE 9 MG/ML
INJECTION, SOLUTION INTRAVENOUS CONTINUOUS
Status: DISCONTINUED | OUTPATIENT
Start: 2017-04-10 | End: 2017-04-10 | Stop reason: HOSPADM

## 2017-04-10 RX ORDER — LIDOCAINE 40 MG/G
CREAM TOPICAL
Status: DISCONTINUED | OUTPATIENT
Start: 2017-04-10 | End: 2017-04-10 | Stop reason: HOSPADM

## 2017-04-10 RX ORDER — SODIUM CHLORIDE 9 MG/ML
INJECTION, SOLUTION INTRAVENOUS CONTINUOUS PRN
Status: DISCONTINUED | OUTPATIENT
Start: 2017-04-10 | End: 2017-04-10

## 2017-04-10 RX ORDER — FENTANYL CITRATE 50 UG/ML
INJECTION, SOLUTION INTRAMUSCULAR; INTRAVENOUS
Status: DISCONTINUED
Start: 2017-04-10 | End: 2017-04-10 | Stop reason: HOSPADM

## 2017-04-10 RX ORDER — HYDROCHLOROTHIAZIDE 25 MG/1
25 TABLET ORAL DAILY
Status: DISCONTINUED | OUTPATIENT
Start: 2017-04-11 | End: 2017-04-11 | Stop reason: HOSPADM

## 2017-04-10 RX ORDER — ONDANSETRON 4 MG/1
4 TABLET, ORALLY DISINTEGRATING ORAL EVERY 6 HOURS PRN
Status: DISCONTINUED | OUTPATIENT
Start: 2017-04-10 | End: 2017-04-11 | Stop reason: HOSPADM

## 2017-04-10 RX ORDER — CLOPIDOGREL BISULFATE 75 MG/1
75 TABLET ORAL DAILY
Status: DISCONTINUED | OUTPATIENT
Start: 2017-04-11 | End: 2017-04-11 | Stop reason: HOSPADM

## 2017-04-10 RX ORDER — ONDANSETRON 2 MG/ML
4 INJECTION INTRAMUSCULAR; INTRAVENOUS EVERY 6 HOURS PRN
Status: DISCONTINUED | OUTPATIENT
Start: 2017-04-10 | End: 2017-04-11 | Stop reason: HOSPADM

## 2017-04-10 RX ORDER — HEPARIN SODIUM 1000 [USP'U]/ML
INJECTION, SOLUTION INTRAVENOUS; SUBCUTANEOUS
Status: COMPLETED
Start: 2017-04-10 | End: 2017-04-10

## 2017-04-10 RX ORDER — ALUMINA, MAGNESIA, AND SIMETHICONE 2400; 2400; 240 MG/30ML; MG/30ML; MG/30ML
30 SUSPENSION ORAL EVERY 4 HOURS PRN
Status: DISCONTINUED | OUTPATIENT
Start: 2017-04-10 | End: 2017-04-11 | Stop reason: HOSPADM

## 2017-04-10 RX ADMIN — ATORVASTATIN CALCIUM 40 MG: 10 TABLET, FILM COATED ORAL at 23:09

## 2017-04-10 RX ADMIN — VECURONIUM BROMIDE 2 MG: 1 INJECTION, POWDER, LYOPHILIZED, FOR SOLUTION INTRAVENOUS at 10:30

## 2017-04-10 RX ADMIN — Medication 10 MG: at 09:23

## 2017-04-10 RX ADMIN — Medication 10 MG: at 09:20

## 2017-04-10 RX ADMIN — GLYCOPYRROLATE 1 MG: 0.2 INJECTION, SOLUTION INTRAMUSCULAR; INTRAVENOUS at 11:22

## 2017-04-10 RX ADMIN — SODIUM CHLORIDE: 9 INJECTION, SOLUTION INTRAVENOUS at 07:45

## 2017-04-10 RX ADMIN — LIDOCAINE HYDROCHLORIDE 0.3 ML: 10 INJECTION, SOLUTION EPIDURAL; INFILTRATION; INTRACAUDAL; PERINEURAL at 08:33

## 2017-04-10 RX ADMIN — Medication 10 MG: at 10:56

## 2017-04-10 RX ADMIN — Medication 10 MG: at 10:05

## 2017-04-10 RX ADMIN — METHYLPREDNISOLONE SODIUM SUCCINATE 125 MG: 125 INJECTION, POWDER, FOR SOLUTION INTRAMUSCULAR; INTRAVENOUS at 09:25

## 2017-04-10 RX ADMIN — ONDANSETRON 4 MG: 2 INJECTION INTRAMUSCULAR; INTRAVENOUS at 10:47

## 2017-04-10 RX ADMIN — PROPOFOL 200 MG: 10 INJECTION, EMULSION INTRAVENOUS at 09:14

## 2017-04-10 RX ADMIN — DIPHENHYDRAMINE HYDROCHLORIDE 50 MG: 50 INJECTION INTRAMUSCULAR; INTRAVENOUS at 09:23

## 2017-04-10 RX ADMIN — Medication 10 MG: at 09:33

## 2017-04-10 RX ADMIN — NEOSTIGMINE METHYLSULFATE 5 MG: 1 INJECTION INTRAMUSCULAR; INTRAVENOUS; SUBCUTANEOUS at 11:22

## 2017-04-10 RX ADMIN — PHENYLEPHRINE HYDROCHLORIDE 100 MCG: 10 INJECTION, SOLUTION INTRAMUSCULAR; INTRAVENOUS; SUBCUTANEOUS at 09:32

## 2017-04-10 RX ADMIN — SODIUM CHLORIDE: 9 INJECTION, SOLUTION INTRAVENOUS at 10:27

## 2017-04-10 RX ADMIN — Medication 2000 UNITS: at 10:26

## 2017-04-10 RX ADMIN — LIDOCAINE HYDROCHLORIDE 100 MG: 20 INJECTION, SOLUTION INFILTRATION; PERINEURAL at 09:14

## 2017-04-10 RX ADMIN — TOPIRAMATE 25 MG: 25 TABLET, FILM COATED ORAL at 19:49

## 2017-04-10 RX ADMIN — MIDAZOLAM HYDROCHLORIDE 2 MG: 1 INJECTION, SOLUTION INTRAMUSCULAR; INTRAVENOUS at 09:03

## 2017-04-10 RX ADMIN — IMIPRAMINE HYDROCHLORIDE 25 MG: 25 TABLET, FILM COATED ORAL at 18:26

## 2017-04-10 RX ADMIN — SODIUM CHLORIDE: 9 INJECTION, SOLUTION INTRAVENOUS at 14:27

## 2017-04-10 RX ADMIN — PHENYLEPHRINE HYDROCHLORIDE 100 MCG: 10 INJECTION, SOLUTION INTRAMUSCULAR; INTRAVENOUS; SUBCUTANEOUS at 10:05

## 2017-04-10 RX ADMIN — ACETAMINOPHEN 650 MG: 325 TABLET, FILM COATED ORAL at 23:07

## 2017-04-10 RX ADMIN — DIPHENHYDRAMINE HYDROCHLORIDE 50 MG: 50 INJECTION, SOLUTION INTRAMUSCULAR; INTRAVENOUS at 09:23

## 2017-04-10 RX ADMIN — Medication 6000 UNITS: at 10:00

## 2017-04-10 RX ADMIN — SODIUM CHLORIDE: 9 INJECTION, SOLUTION INTRAVENOUS at 08:33

## 2017-04-10 RX ADMIN — Medication 2000 UNITS: at 10:14

## 2017-04-10 RX ADMIN — IOPAMIDOL 70 ML: 612 INJECTION, SOLUTION INTRAVENOUS at 10:48

## 2017-04-10 RX ADMIN — FENTANYL CITRATE 100 MCG: 50 INJECTION, SOLUTION INTRAMUSCULAR; INTRAVENOUS at 09:14

## 2017-04-10 RX ADMIN — ACETAMINOPHEN 650 MG: 325 TABLET, FILM COATED ORAL at 16:22

## 2017-04-10 RX ADMIN — PHENYLEPHRINE HYDROCHLORIDE 100 MCG: 10 INJECTION, SOLUTION INTRAMUSCULAR; INTRAVENOUS; SUBCUTANEOUS at 09:25

## 2017-04-10 RX ADMIN — ROCURONIUM BROMIDE 50 MG: 10 INJECTION INTRAVENOUS at 09:15

## 2017-04-10 RX ADMIN — PHENYLEPHRINE HYDROCHLORIDE 0.25 MCG/KG/MIN: 10 INJECTION, SOLUTION INTRAMUSCULAR; INTRAVENOUS; SUBCUTANEOUS at 09:37

## 2017-04-10 RX ADMIN — Medication 5 MG: at 09:18

## 2017-04-10 RX ADMIN — ONDANSETRON 4 MG: 2 INJECTION INTRAMUSCULAR; INTRAVENOUS at 10:46

## 2017-04-10 RX ADMIN — Medication 140 MG: at 18:26

## 2017-04-10 RX ADMIN — GLYCOPYRROLATE 0.2 MG: 0.2 INJECTION, SOLUTION INTRAMUSCULAR; INTRAVENOUS at 09:25

## 2017-04-10 RX ADMIN — PHENYLEPHRINE HYDROCHLORIDE 100 MCG: 10 INJECTION, SOLUTION INTRAMUSCULAR; INTRAVENOUS; SUBCUTANEOUS at 10:54

## 2017-04-10 RX ADMIN — Medication 2 LOZENGE: at 23:12

## 2017-04-10 ASSESSMENT — LIFESTYLE VARIABLES: TOBACCO_USE: 0

## 2017-04-10 NOTE — CONSULTS
[facility] [place]  [facility]    NeuroCritical Care Consultation Note     Jose Coronado MRN# [id number]   YOB: 1955 Age: 61 year old    Code Status:Prior   Date of Admission: 4/10/2017  Date of Consult: 04/10/2017    _________________________________   Primary Care Physician   Lian Martinez  ______________________________________________         Assessment & Plan   ______________________________________________  (I67.1) Cerebral aneurysm, nonruptured  --Successful embolization of 3.5 mm posterior paraophthalmic right ICA aneurysm  --Continue clinical observation  --Continue aspirin / plavix  ------Hold anticoagulation for now (used for atrial fibrillation)  --D/c home tomorrow  #. DVT Prophylaxis  --Mechanical   #. PT/OT/Speech  --no needs  #. Nutrition / GI Prophylaxis  --Per recommendations of speech therapy    #. Code Status: Full Code    ----------------------------------------------------------------------------------  ----------------------------------------------------------------------------------  Reason for consult: I was asked by Dr. Grider to evaluate this patient for aneurysm .    Chief Complaint   ______________________________________________  Aneurysm coiling  History is obtained from the patient    History of Present Illness   ______________________________________________  Patient reports she had a stroke in February 2017 and had MRA imaging which showed incidental right internal carotid artery aneurysm. Patient met with Dr. Grider in clinic on 3/14/2017 to discuss this aneurysm and treatment options. Patient has elected to proceed with cerebral angiogram and possible endovascular coil embolization of a right internal carotid artery aneurysm and possible Neuroform stent.  Procedure was performed on 4/10/17 without complications. NCC was asked to follow along in ICU   Past Medical History    ______________________________________________  Past  Medical History:   Diagnosis Date     Anemia      CVA (cerebral vascular accident) (H) 2017    ?migraine, ?pfo--negative vasc w/u, neg hypercoag w/u     Diffuse cystic mastopathy     Fibrocystic breast disease     HTN, goal below 140/90      Hyperparathyroidism (H)      Infectious mononucleosis     Mono at age 16     Irregular heart beat     atrial fibulation     Labyrinthitis, unspecified      Migraine headache with aura      Osteopenia      Pain in joint, shoulder region     Secondary to a fall     PFO (patent foramen ovale)      S/P gastric bypass June, 2010     Sleep apnea     she is on CPAP     Vitamin D deficiencies      Past Surgical History   ______________________________________________  Past Surgical History:   Procedure Laterality Date     C NONSPECIFIC PROCEDURE      S/P multiple breast biopies - all negative / benign     C NONSPECIFIC PROCEDURE      S/P T&A     C NONSPECIFIC PROCEDURE      Vaughn teeth extraction     C NONSPECIFIC PROCEDURE      S/P (? unreadable) ankle     COLONOSCOPY N/A 8/12/2015    Procedure: COLONOSCOPY;  Surgeon: Deandre Brooks MD;  Location:  GI     GASTRIC BYPASS  June 24, 2010     ORTHOPEDIC SURGERY Left 2010    wrist fracture     PARATHYROIDECTOMY  9/19/11     Prior to Admission Medications   ______________________________________________  Prior to Admission Medications   Prescriptions Last Dose Informant Patient Reported? Taking?   Ascorbic Acid (VITAMIN C PO) 4/9/2017 at PM Self Yes Yes   Sig: Take 250 mg by mouth 2 times daily (0.5 x 500 mg tablet = 250 mg dose)   Calcium Citrate-Vitamin D (CALCIUM CITRATE + D PO) 4/9/2017 at AM Self Yes Yes   Sig: Take 1 tablet by mouth every morning   Cyanocobalamin 2500 MCG TABS 4/6/2017 at AM Self Yes Yes   Sig: Take 2,500 mcg by mouth twice a week Mon, Thur   Ferrous Sulfate 27 MG TABS 4/9/2017 at PM Self Yes Yes   Sig: Take 27 mg by mouth 2 times daily   Probiotic Product (PROBIOTIC PO) 4/9/2017 at AM Self Yes Yes   Sig: Take  1 tablet by mouth daily.   VITAMIN D, CHOLECALCIFEROL, PO 4/9/2017 at AM Self Yes Yes   Sig: Take 1,000 Units by mouth daily   WARFARIN SODIUM PO 4/5/2017 at PM Self Yes No   Sig: Take 5-7.5 mg by mouth daily -take 7.5 mg on Monday and Thursday  -take 5 mg on Tuesday, Wednesday, Friday, Saturday and Sunday   amLODIPine (NORVASC) 5 MG tablet 4/10/2017 at 0600 Self No Yes   Sig: Take 1 tablet (5 mg) by mouth daily   aspirin 81 MG tablet 4/10/2017 at 0600 Self Yes Yes   Sig: Take 81 mg by mouth daily (Taking this in preparation for her surgery 4/10/17)   atenolol (TENORMIN) 50 MG tablet 4/10/2017 at 0600 Self No Yes   Sig: Take 1 tablet (50 mg) by mouth daily   atorvastatin (LIPITOR) 40 MG tablet 4/9/2017 at PM Self No Yes   Sig: Take 1 tablet (40 mg) by mouth daily   cetirizine (ZYRTEC) 10 MG tablet 4/9/2017 at PM Self Yes Yes   Sig: Take 1 tablet by mouth every evening.   clopidogrel (PLAVIX) 75 MG tablet 4/10/2017 at 0600 Self Yes Yes   Sig: Take 1 tablet (75 mg) by mouth daily Taking this in preparation for her surgery 4/10/17   hydrochlorothiazide (HYDRODIURIL) 25 MG tablet 4/10/2017 at 0600 Self No Yes   Sig: Take 1 tablet (25 mg) by mouth daily   imipramine (TOFRANIL) 25 MG tablet 4/9/2017 at AM Self No Yes   Sig: Take 1 tablet (25 mg) by mouth daily   ketotifen (ZADITOR) 0.025 % SOLN 4/9/2017 at PM Self Yes Yes   Sig: Place 1 drop into both eyes every 12 hours as needed for itching   losartan (COZAAR) 100 MG tablet 4/10/2017 at 0600 Self No Yes   Sig: Take 1 tablet (100 mg) by mouth daily   topiramate (TOPAMAX) 25 MG tablet 4/9/2017 at PM Self No Yes   Sig: Take 1 tablet (25 mg) by mouth 2 times daily Prescribed by Dr Tello   valACYclovir (VALTREX) 1000 mg tablet More than a Month at PRN Self No Yes   Sig: Take 2 tablets (2,000 mg) by mouth 2 times daily as needed      Facility-Administered Medications: None     Allergies   Allergies   Allergen Reactions     Contrast Dye Itching     Reaction of immediate  burning and severe itching in Right ear after injection for CT.      Sulfa Drugs      hives       Social History   ______________________________________________  Social History     Social History     Marital status: Single     Spouse name: N/A     Number of children: N/A     Years of education: N/A     Social History Main Topics     Smoking status: Never Smoker     Smokeless tobacco: Never Used     Alcohol use 0.0 oz/week     0 Standard drinks or equivalent per week      Comment: 1 glass wine 4-5 days a week     Drug use: No     Sexual activity: Not Currently     Partners: Male     Other Topics Concern     None     Social History Narrative       Family History   ______________________________________________  Family History   Problem Relation Age of Onset     Breast Cancer Mother      Hypertension Mother      Arthritis Mother      Thyroid Disease Mother      hypo     Breast Cancer Maternal Aunt      Hypertension Paternal Grandmother      Hypertension Sister      CEREBROVASCULAR DISEASE Maternal Grandmother      Colon Cancer No family hx of        Review of Systems   ______________________________________________  A comprehensive review of  10 systems was performed and found to be negative except as described in this note  CONSTITUTIONAL: negative for fever, chills, change in weight  INTEGUMENTARY/SKIN: no rash or obvious new lesions  ENT/MOUTH: no sore throat, new sinus pain or nasal drainage, no neck mass noted  RESP: No pleuretic pain, No cough, no hemoptysis, No SOB   CV: negative for chest pain, palpitations or peripheral edema  GI: no nausea, vomiting, change in stools  : no dysuria or hematuria  MUSCULOSKELETAL: no myalgias, arthralgias or join efffusion  ENDOCRINE: no history of polyuria, polydyspsia or symptoms of thyroid dysfunction  PSYCHIATRIC: no change in mood stable  LYMPHATIC: no new lymphadenopathy  HEME: no bleeding or easy bruisability  NEURO: see HPI    Physical Exam  "  ______________________________________________  Weight:226 lbs 0 oz; Height:5' 6\"  Temp: 98.3  F (36.8  C) Temp src: Oral BP: 90/66 Pulse: 65 Heart Rate: 56 Resp: 15 SpO2: 95 % O2 Device: None (Room air)    General Appearance:  No acute distress  Neuro:       Mental Status Exam:   Awake, alert, oriented X3. Speech and language are intact. Mental status is normal       Cranial Nerves:  Pupils 3 mm, reactive. EOMI. Face sensation is normal. Face is symmetric.Tongue and uvula are midline. Other CN are normal           Motor:  5/5 X 4. Tone and bulk are normal           Reflexes:  Normal DTR.Toes downgoing.        Sensory:  Normal to PP, LT, vibration and JUSTO                   Coordination:   Intact finger-to-nose and toe-to-finger tests       Gait: bedrest  Neck: no nuchal rigidity, normal thyroid. No carotid bruits.    Cardiovascular: Regular rate and rhythm, no m/r/g  Lungs: Clear to auscultation  Abdomen: Soft, not tender, not distended  Extremities: No clubbing, no cyanosis, no edema    Data   ______________________________________________  All Data personally reviewed:       Labs:   CBC RESULTS:     Recent Labs  Lab 04/10/17  0759 04/05/17  0916   WBC 5.5 5.5   RBC 4.33 4.57   HGB 13.3 13.8   HCT 38.3 41.3    243     Basic Metabolic Panel:   Recent Labs   Lab Test  04/10/17   0759 04/05/17   0916  02/09/17   1545   NA  141  140  139   POTASSIUM  3.4  3.7  3.5   CHLORIDE  104  105  103   CO2  30  30  28   BUN  14  14  13   CR  0.81  0.80  0.90   GLC  96  90  96   MAXIMILIANO  9.1  9.2  8.9     Liver panel:  Recent Labs   Lab Test  02/10/17   0635  08/10/16   0937  07/29/15   0845  09/14/12   0955  05/17/11   1527   PROTTOTAL  5.9*  6.5*  6.3*  6.2*  6.6*   ALBUMIN  3.2*  3.6  3.4  3.8  3.9   BILITOTAL  0.3  0.4  0.3  0.5  0.3   ALKPHOS  53  73  68  66  84   AST  23  17  15  29  29   ALT  20  20  20  12  21     INR:  Recent Labs   Lab Test  04/10/17   0759 03/23/17 03/13/17 03/07/17 02/28/17   INR  1.07  1.8*  2.1* "  2.2*  2.6*      Lipid Profile:  Recent Labs   Lab Test  02/10/17   0635  08/10/16   0937  07/29/15   0845  09/14/12   0955  10/03/11   1321  03/07/11   0842  06/17/10   0922   CHOL  166  192  178  170  156  151  187   HDL  51  65  62  73  59  51  51   LDL  95  112*  100  84  86  86  105   TRIG  98  77  78  65  52  76  154*   CHOLHDLRATIO   --    --   2.9  2.3  2.6  3.0  3.7     Thyroid Panel:  Recent Labs   Lab Test  02/10/17   0635  08/10/16   0937  11/16/15   1459  09/14/12   0955  11/25/11   0900  10/03/11   0750  05/17/11   1527   TSH  1.05  0.83  0.53  0.63  0.60  0.11*  0.57   T4   --    --    --   0.90  1.33  1.13  0.93      Vitamin B12:   Recent Labs   Lab Test  08/10/16   0937  02/08/16   1438  08/24/15   0917   B12  866  1731*  1184*      Vitamin D level:   Recent Labs   Lab Test  08/10/16   0937  02/08/16   1438  07/29/15   0845  09/14/12   0955   VITDT  37  56  49  55     A1C:   Recent Labs   Lab Test  06/17/10   0922  01/14/10   0822  09/28/09   1651   A1C  5.7  6.1*  6.0     Troponin I: No lab results found.  Ammonia: No lab results found.  CK: No lab results found.     CRP inflammation: No lab results found.  ESR:   Recent Labs   Lab Test  02/28/17   1122   SED  8       JUDITH: No lab results found.    ANCA: No lab results found.   Drug Screen: No lab results found.  Alcohol level:No lab results found.  UA Results:  Recent Labs   Lab Test  09/12/14   1056   COLOR  Yellow   APPEARANCE  Clear   URINEGLC  Negative   URINEBILI  Negative   URINEKETONE  Negative   SG  1.015   UBLD  Small*   URINEPH  6.5   PROTEIN  Negative   UROBILINOGEN  0.2   NITRITE  Negative   LEUKEST  Moderate*   RBCU  O - 2   WBCU  5-10*     Most Recent 6 Bacteria Isolates From Any Culture (See EPIC Reports for Culture Details):  Recent Labs   Lab Test  09/12/14   1305   CULT  >100,000 colonies/mL Proteus mirabilis*            Imaging:   All imaging studies were reviewed personally        Recent Results (from the past 24 hour(s))   IR  Intracranial Embolization Right    Narrative    PROCEDURES:    1. RIGHT AND LEFT COMMON CAROTID ARTERY ANGIOGRAPHY FOCUSED ON THE  NECK VASCULATURE.  2. RIGHT INTERNAL CAROTID ARTERY AND LEFT COMMON CAROTID ARTERY  ANGIOGRAPHY FOCUSED ON THE INTRACRANIAL VASCULATURE.  3. LEFT VERTEBRAL ARTERY ANGIOGRAPHY FOCUSED ON THE INTRACRANIAL  VASCULATURE.  4. ENDOVASCULAR COIL EMBOLIZATION OF A 3.5 MM POSTERIOR PARAOPHTHALMIC  RIGHT INTERNAL CAROTID ARTERY ANEURYSM AUGMENTED BY NEUROFORM STENT  PLACEMENT UNDER GENERAL ANESTHESIA.    CLINICAL HISTORY:  61-year-old female with an incidental 3.5 mm saccular aneurysm arising  from the proximal posterior supraclinoid right internal carotid artery  by MRA. Patient today presents for endovascular coil embolization of  this aneurysm under general anesthesia likely augmented by Neuroform  stent placement.    The risks and potential complications associated with this procedure  were explained to the patient and her family prior to surgery and  informed verbal and written consent was obtained.    TECHNIQUE:    Utilizing sterile technique, local anesthesia, and fluoroscopic  guidance, direct percutaneous puncture of the right femoral artery was  performed and a 7 Maltese sheath was placed into the right femoral  artery. The patient was fully anticoagulated with heparin. A 6 Maltese  diagnostic catheter was then utilized to subselectively catheterize  the right common carotid artery, the cervical right internal carotid  artery, the left common carotid artery, and the left vertebral artery  in sequential fashion. Diagnostic carotid and cerebral angiography was  performed in each of these separate catheter positions focused on the  neck vasculature and the intracranial vasculature multiple  projections.    FINDINGS:    Right internal carotid artery angiography demonstrates presence of a  saccular aneurysm arising from the posterior proximal supraclinoid  right internal carotid artery, a 3.5  mm posterior paraophthalmic  aneurysm with a 3 mm neck. No other aneurysms or other significant  intracranial abnormalities are identified.    Left common carotid artery angiography focused on the intracranial  vasculature is unremarkable. No aneurysm or other significant  intracranial abnormality is identified.    Left vertebral artery angiography is unremarkable. No aneurysm or  other significant intracranial normality is identified. The left  vertebral artery is nondominant. The right vertebral artery is  dominant.    Right and left common carotid artery angiography focused on the neck  vasculature are both normal. No stenosis is suggested at the right or  left carotid bifurcation.    Following diagnostic angiography the procedure continued with  endovascular coil embolization of the posterior paraophthalmic right  internal carotid artery aneurysm.    TECHNIQUE:    With the 6 Italian catheter and the proximal cervical right internal  carotid artery a Neuroform stent 4.5 mm in diameter by 20 mm in length  was advanced and deployed within the supraclinoid and cavernous  segments of the right internal carotid artery across the neck of the  posterior paraophthalmic right internal carotid artery aneurysm.  Following stent deployment right internal carotid artery angiography  confirms appropriate stent placement and fails to demonstrate or  suggest complication to stent placement. At this point in the  procedure a microcatheter was inserted in coaxial fashion over a  microguidewire and was utilized to subselectively catheterize the  distal right internal carotid artery and subsequently the posterior  paraophthalmic right internal carotid artery aneurysm. Following  subselective catheterization of the aneurysm the aneurysm was  successfully coil embolized utilizing 3 separate Barricade coils. The  coils are as follows: Complex finish coil 3 mm in diameter by 10 cm in  length. Complex finish coil 1 mm in diameter by 3 cm  in length.  Complex finish coil 1 mm in diameter by 3 cm in length. Prior to  deployment of each of these 3 separate coils for total coil length of  16 cm diagnostic follow-up right internal carotid artery angiography  was performed confirm coil position within the aneurysm and the  absence of parent artery compromise. Following deployment of the third  and final coil diagnostic right internal carotid artery angiography  suggests occlusion/near occlusion of the posterior paraophthalmic  aneurysm. At this point the procedure was terminated. The  microcatheter was withdrawn from the patient. Final diagnostic right  internal carotid artery angiography confirms occlusion/near occlusion  of the posterior paraophthalmic right internal carotid artery aneurysm  post coil embolization augmented by Neuroform stent placement. No  angiographic complications are identified are suggested. Following the  procedure all catheters were removed the patient. The right femoral  arterial sheath will be removed 3 hours postprocedure pending  resolution of any coagulation. The patient awoke from general  anesthesia neurologically intact and her preprocedure baseline.    TOTAL FLUOROSCOPY TIME: 18.8 minutes.    TOTAL FLUOROSCOPY DOSE: 2702 mGy.    TOTAL CONTRAST: 70 cc of Isovue 320.    CONCLUSION:    TECHNICALLY SUCCESSFUL ENDOVASCULAR COIL EMBOLIZATION AND  OCCLUSION/NEAR OCCLUSION OF A 3.5 MM SACCULAR POSTERIOR PARAOPHTHALMIC  RIGHT INTERNAL CAROTID ARTERY ANEURYSM WITH 3 SEPARATE COILS FOR TOTAL  COIL LENGTH OF 16 CM. COIL EMBOLIZATION AUGMENTED BY NEUROFORM STENT  PLACEMENT. PATIENT WILL BE MAINTAINED ON ASPIRIN AND PLAVIX FOR. A 3  MONTHS OF FOLLOW-UP MRA WILL BE OBTAINED IN ONE YEAR. REPORT CALLED  [facility].    ANA LUISA LEE MD   IR Discontinue Sheath    Narrative    This exam was marked as non-reportable because it will not be read by a   radiologist or a [facility] non-radiologist provider.

## 2017-04-10 NOTE — PLAN OF CARE
Problem: Individualization  Goal: Patient Preferences  Outcome: No Change  Vitally and neurologically stable, as documented.  Slight headache.  Zamzam Radford, CCRN-CSC

## 2017-04-10 NOTE — ANESTHESIA CARE TRANSFER NOTE
Patient: Jose Coronado    Procedure(s):  coiling embolization     Diagnosis: aneurysm   Diagnosis Additional Information: No value filed.    Anesthesia Type:   ETT, General     Note:  Airway :Face Mask  Patient transferred to:PACU  Comments: Pt exhibits spont resps, all monitors and alarms on in pacu, report given to RN, vss.      Vitals: (Last set prior to Anesthesia Care Transfer)    CRNA VITALS  4/10/2017 1100 - 4/10/2017 1141      4/10/2017             Pulse: 78    Ht Rate: 78    SpO2: 100 %    Resp Rate (observed): (!)  1    Resp Rate (set): 10                Electronically Signed By: RON Thomson CRNA  April 10, 2017  11:41 AM

## 2017-04-10 NOTE — PROCEDURES
Interventional Neuroradiology Post Procedure    Patient Name: Jose Coronado  MRN: [id number]  Date of Procedure: April 10, 2017    Procedure: Cerebral angiogram and endovascular coil embolization of right posterior paraophthalmic internal carotid artery aneurysm augmented by Neuroform stent     Radiologist: Ayden Grider    Contrast: 70 ml Isovue  Fluoro Time: 18.8 minutes  Air Kerma: 2702 mGy  Medications: Heparin 10,000 units IV     Sedation Time: general anesthesia (see flowsheets)    EBL: minimal  Complications: none    Preliminary Report:   (See dictation for full detail)  Occlusion/near occlusion of 3.5 mm right posterior paraophthalmic internal carotid artery aneurysm post stent/coil embolization.     Assess/Plan:  Admit to ICU for overnight monitoring  Bedrest while sheath in place and for 4 hours after removal  Connect sheath to pressure bag, transducer, and monitoring   Sheath out in 3 hours  Aspirin and Plavix for 3 months   May resume warfarin this evening   Follow up MRA in 1 year   Report to Dr. Ghanshyam Grider MD  [phone]

## 2017-04-10 NOTE — PROGRESS NOTES
IR Nursing note: right 7fr sheath removed fully intact. Manual presure held 15 min, no signs oozing or hematoma noted. Pt given instructions on proper groin care and importance of keeping flat. Pt understands. Site double checked with Zamzam EDWARDS. Total Face to Face time 25 min.

## 2017-04-10 NOTE — IP AVS SNAPSHOT
[facility] Intensive [facility] Unit    [address] [place] [address]    Phone:  [phone]                                       After Visit Summary   4/10/2017    Jose Coronado    MRN: [id number]           After Visit Summary Signature Page     I have received my discharge instructions, and my questions have been answered. I have discussed any challenges I see with this plan with the nurse or doctor.    ..........................................................................................................................................  Patient/Patient Representative Signature      ..........................................................................................................................................  Patient Representative Print Name and Relationship to Patient    ..................................................               ................................................  Date                                            Time    ..........................................................................................................................................  Reviewed by Signature/Title    ...................................................              ..............................................  Date                                                            Time

## 2017-04-10 NOTE — PROGRESS NOTES
Admission medication history interview status for the 4/10/2017  admission is complete. See EPIC admission navigator for prior to admission medications     Medication history source reliability:Good    Medication history interview source(s):Patient    Medication history resources (including written lists, pill bottles, clinic record):None    Primary pharmacy.[facility]    Additional medication history information not noted on PTA med list :None    Time spent in this activity: 40  minutes    Prior to Admission medications    Medication Sig Last Dose Taking? Auth Provider   Ascorbic Acid (VITAMIN C PO) Take 250 mg by mouth 2 times daily (0.5 x 500 mg tablet = 250 mg dose) 4/9/2017 at PM Yes Reported, Patient   VITAMIN D, CHOLECALCIFEROL, PO Take 1,000 Units by mouth daily 4/9/2017 at AM Yes Reported, Patient   clopidogrel (PLAVIX) 75 MG tablet Take 1 tablet (75 mg) by mouth daily Taking this in preparation for her surgery 4/10/17 4/10/2017 at 0600 Yes Emma Taveras NP   aspirin 81 MG tablet Take 81 mg by mouth daily (Taking this in preparation for her surgery 4/10/17) 4/10/2017 at 0600 Yes Emma Taveras NP   topiramate (TOPAMAX) 25 MG tablet Take 1 tablet (25 mg) by mouth 2 times daily Prescribed by Dr Tello 4/9/2017 at PM Yes Mary Mathews MD   atorvastatin (LIPITOR) 40 MG tablet Take 1 tablet (40 mg) by mouth daily 4/9/2017 at PM Yes Mary Mathews MD   Calcium Citrate-Vitamin D (CALCIUM CITRATE + D PO) Take 1 tablet by mouth every morning 4/9/2017 at AM Yes Unknown, Entered By History   Ferrous Sulfate 27 MG TABS Take 27 mg by mouth 2 times daily 4/9/2017 at PM Yes Unknown, Entered By History   Cyanocobalamin 2500 MCG TABS Take 2,500 mcg by mouth twice a week Mon, Thur 4/6/2017 at AM Yes Unknown, Entered By History   imipramine (TOFRANIL) 25 MG tablet Take 1 tablet (25 mg) by mouth daily 4/9/2017 at AM Yes Mary Mathews MD   amLODIPine (NORVASC) 5 MG  tablet Take 1 tablet (5 mg) by mouth daily 4/10/2017 at 0600 Yes Mary Mathews MD   hydrochlorothiazide (HYDRODIURIL) 25 MG tablet Take 1 tablet (25 mg) by mouth daily 4/10/2017 at 0600 Yes Mary Mathews MD   losartan (COZAAR) 100 MG tablet Take 1 tablet (100 mg) by mouth daily 4/10/2017 at 0600 Yes Mary Mathews MD   atenolol (TENORMIN) 50 MG tablet Take 1 tablet (50 mg) by mouth daily 4/10/2017 at 0600 Yes Mary Mathews MD   valACYclovir (VALTREX) 1000 mg tablet Take 2 tablets (2,000 mg) by mouth 2 times daily as needed More than a Month at PRN Yes Mary Mathews MD   ketotifen (ZADITOR) 0.025 % SOLN Place 1 drop into both eyes every 12 hours as needed for itching 4/9/2017 at PM Yes Mary Mathews MD   cetirizine (ZYRTEC) 10 MG tablet Take 1 tablet by mouth every evening. 4/9/2017 at PM Yes Mary Mathews MD   Probiotic Product (PROBIOTIC PO) Take 1 tablet by mouth daily. 4/9/2017 at AM Yes Reported, Patient   WARFARIN SODIUM PO Take 5-7.5 mg by mouth daily -take 7.5 mg on Monday and Thursday  -take 5 mg on Tuesday, Wednesday, Friday, Saturday and Sunday 4/5/2017 at PM  Reported, Patient

## 2017-04-10 NOTE — IP AVS SNAPSHOT
MRN:[id number]                      After Visit Summary   4/10/2017    Jose Coronado    MRN: [id number]           Thank you!     Thank you for choosing [facility] for your care. Our goal is always to provide you with excellent care. Hearing back from our patients is one way we can continue to improve our services. Please take a few minutes to complete the written survey that you may receive in the mail after you visit with us. Thank you!        Patient Information     Date Of Birth          1955        Designated Caregiver       Most Recent Value    Caregiver    Will someone help with your care after discharge? yes    Name of designated caregiver reina my sister      About your hospital stay     You were admitted on:  April 10, 2017 You last received care in the:  [facility]    You were discharged on:  April 11, 2017        Reason for your hospital stay       Coil embolization of cerebral aneurysm                  Who to Call     For medical emergencies, please call 911.  For non-urgent questions about your medical care, please call your primary care provider or clinic, [phone]  For questions related to your surgery, please call your surgery clinic        Attending Provider     Provider Specialty    Maikol Peace MD Radiology    [place], Ayden MCCAULEY MD Radiology       Primary Care Provider Office Phone # Fax #    Lian Martinez [phone]       [facility] [address]   [place] [id number]        After Care Instructions     Activity       Your activity upon discharge: avoid lifting more than 10 pounds or strenuous activity for 2 days            Diet       Follow this diet upon discharge: Orders Placed This Encounter      Advance Diet as Tolerated: Regular Diet Adult            Wound care and dressings       Instructions to care for your wound at home: Okay to shower today, no tub for 5 days. May remove  "dressing today or tomorrow                  Follow-up Appointments     Follow Up and recommended labs and tests       If you do not hear from Dr Holland office regarding blood thinner management by Thursday please call his office            Follow-up and recommended labs and tests        MRA in 3 months per Dr Morrissey                  Your next 10 appointments already scheduled     Apr 17, 2017  9:40 AM CDT   [facility]Chart Long with Lian [facility] MD   [facility]    [address]  [facility] [address] [phone]            May 26, 2017  7:45 AM CDT   Return Visit with Luis Church MD   [facility] [place] [facility]    [address]  [place] [address] [phone]              Pending Results     Date and Time Order Name Status Description    4/10/2017 0741 EKG 12-lead, tracing only Preliminary             Statement of Approval     Ordered          04/11/17 0921  I have reviewed and agree with all the recommendations and orders detailed in this document.  EFFECTIVE NOW     Approved and electronically signed by:  Padmaja Ahuja APRN CNP             Admission Information     Date & Time Provider Department Dept. Phone    4/10/2017 Ayden Grider MD [facility] Intensive [facility] Unit [phone]      Your Vitals Were     Blood Pressure Pulse Temperature Respirations Height Weight    103/67 65 97.8  F (36.6  C) (Oral) 21 1.676 m (5' 6\") 105.3 kg (232 lb 2.3 oz)    Pulse Oximetry BMI (Body Mass Index)                96% 37.47 kg/m2          MyChart Information     [facility] gives you secure access to your electronic health record. If you see a primary care provider, you can also send messages to your care team and make appointments. If you have questions, please call your primary care clinic.  If you do not have a primary care provider, please call [phone] and they will assist " you.        Care EveryWhere ID     This is your Care EveryWhere ID. This could be used by other organizations to access your [facility] medical records  [id number]           Review of your medicines      START taking        Dose / Directions    methylPREDNISolone 4 MG tablet   Commonly known as:  MEDROL DOSEPAK   Used for:  Cerebral aneurysm, nonruptured        Follow package instructions   Quantity:  21 tablet   Refills:  0       oxyCODONE 5 MG IR tablet   Commonly known as:  ROXICODONE   Used for:  Cerebral aneurysm, nonruptured        Dose:  5 mg   Take 1 tablet (5 mg) by mouth every 4 hours as needed for moderate to severe pain   Quantity:  15 tablet   Refills:  0         CONTINUE these medicines which have NOT CHANGED        Dose / Directions    amLODIPine 5 MG tablet   Commonly known as:  NORVASC   Used for:  Essential hypertension with goal blood pressure less than 140/90        Dose:  5 mg   Take 1 tablet (5 mg) by mouth daily   Quantity:  90 tablet   Refills:  1       aspirin 81 MG tablet        Dose:  81 mg   Take 81 mg by mouth daily (Taking this in preparation for her surgery 4/10/17)   Quantity:  30 tablet   Refills:  0       atenolol 50 MG tablet   Commonly known as:  TENORMIN   Used for:  Palpitations        Dose:  50 mg   Take 1 tablet (50 mg) by mouth daily   Quantity:  90 tablet   Refills:  3       atorvastatin 40 MG tablet   Commonly known as:  LIPITOR   Used for:  Left temporal lobe infarction (H)        Dose:  40 mg   Take 1 tablet (40 mg) by mouth daily   Quantity:  90 tablet   Refills:  1       CALCIUM CITRATE + D PO        Dose:  1 tablet   Take 1 tablet by mouth every morning   Refills:  0       cetirizine 10 MG tablet   Commonly known as:  zyrTEC        Dose:  10 mg   Take 1 tablet by mouth every evening.   Quantity:  30 tablet   Refills:  1       Cyanocobalamin 2500 MCG Tabs        Dose:  2500 mcg   Take 2,500 mcg by mouth twice a week Mon, Thur   Refills:  0       Ferrous Sulfate 27 MG  Tabs        Dose:  27 mg   Take 27 mg by mouth 2 times daily   Refills:  0       hydrochlorothiazide 25 MG tablet   Commonly known as:  HYDRODIURIL   Used for:  Essential hypertension with goal blood pressure less than 140/90        Dose:  25 mg   Take 1 tablet (25 mg) by mouth daily   Quantity:  90 tablet   Refills:  1       imipramine 25 MG tablet   Commonly known as:  TOFRANIL   Used for:  Bladder dysfunction        Dose:  25 mg   Take 1 tablet (25 mg) by mouth daily   Quantity:  100 tablet   Refills:  2       losartan 100 MG tablet   Commonly known as:  COZAAR   Used for:  Essential hypertension with goal blood pressure less than 140/90        Dose:  100 mg   Take 1 tablet (100 mg) by mouth daily   Quantity:  90 tablet   Refills:  3       PLAVIX 75 MG tablet   Generic drug:  clopidogrel        Dose:  75 mg   Take 1 tablet (75 mg) by mouth daily Taking this in preparation for her surgery 4/10/17   Quantity:  90 tablet   Refills:  3       PROBIOTIC PO        Dose:  1 tablet   Take 1 tablet by mouth daily.   Refills:  0       topiramate 25 MG tablet   Commonly known as:  TOPAMAX   Used for:  Migraine        Dose:  25 mg   Take 1 tablet (25 mg) by mouth 2 times daily Prescribed by Dr Tello   Quantity:  180 tablet   Refills:  3       valACYclovir 1000 mg tablet   Commonly known as:  VALTREX   Used for:  Cold sore        Dose:  2000 mg   Take 2 tablets (2,000 mg) by mouth 2 times daily as needed   Quantity:  4 tablet   Refills:  5       VITAMIN C PO        Dose:  250 mg   Take 250 mg by mouth 2 times daily (0.5 x 500 mg tablet = 250 mg dose)   Refills:  0       VITAMIN D (CHOLECALCIFEROL) PO        Dose:  1000 Units   Take 1,000 Units by mouth daily   Refills:  0       ZADITOR 0.025 % Soln ophthalmic solution   Generic drug:  ketotifen        Dose:  1 drop   Place 1 drop into both eyes every 12 hours as needed for itching   Quantity:  1 Bottle   Refills:  0         STOP taking     WARFARIN SODIUM PO                 Where to get your medicines      These medications were sent to [facility] [place], [place] - [address]  [address] [place], [place] [address]     Phone:  [phone]     methylPREDNISolone 4 MG tablet         Some of these will need a paper prescription and others can be bought over the counter. Ask your nurse if you have questions.     Bring a paper prescription for each of these medications     oxyCODONE 5 MG IR tablet                Protect others around you: Learn how to safely use, store and throw away your medicines at www.disposemymeds.org.             Medication List: This is a list of all your medications and when to take them. Check marks below indicate your daily home schedule. Keep this list as a reference.      Medications           Morning Afternoon Evening Bedtime As Needed    amLODIPine 5 MG tablet   Commonly known as:  NORVASC   Take 1 tablet (5 mg) by mouth daily   Last time this was given:  5 mg on 4/11/2017  8:53 AM                                aspirin 81 MG tablet   Take 81 mg by mouth daily (Taking this in preparation for her surgery 4/10/17)                                atenolol 50 MG tablet   Commonly known as:  TENORMIN   Take 1 tablet (50 mg) by mouth daily   Last time this was given:  50 mg on 4/11/2017  8:53 AM                                atorvastatin 40 MG tablet   Commonly known as:  LIPITOR   Take 1 tablet (40 mg) by mouth daily   Last time this was given:  40 mg on 4/10/2017 11:09 PM                                CALCIUM CITRATE + D PO   Take 1 tablet by mouth every morning                                cetirizine 10 MG tablet   Commonly known as:  zyrTEC   Take 1 tablet by mouth every evening.                                Cyanocobalamin 2500 MCG Tabs   Take 2,500 mcg by mouth twice a week Mon, Thur                                Ferrous Sulfate 27 MG Tabs   Take 27 mg by mouth 2 times daily                                hydrochlorothiazide 25  MG tablet   Commonly known as:  HYDRODIURIL   Take 1 tablet (25 mg) by mouth daily   Last time this was given:  25 mg on 4/11/2017  8:53 AM                                imipramine 25 MG tablet   Commonly known as:  TOFRANIL   Take 1 tablet (25 mg) by mouth daily   Last time this was given:  25 mg on 4/11/2017  8:53 AM                                losartan 100 MG tablet   Commonly known as:  COZAAR   Take 1 tablet (100 mg) by mouth daily   Last time this was given:  100 mg on 4/11/2017  8:53 AM                                methylPREDNISolone 4 MG tablet   Commonly known as:  MEDROL DOSEPAK   Follow package instructions                                oxyCODONE 5 MG IR tablet   Commonly known as:  ROXICODONE   Take 1 tablet (5 mg) by mouth every 4 hours as needed for moderate to severe pain                                PLAVIX 75 MG tablet   Take 1 tablet (75 mg) by mouth daily Taking this in preparation for her surgery 4/10/17   Last time this was given:  75 mg on 4/11/2017  8:53 AM   Generic drug:  clopidogrel                                PROBIOTIC PO   Take 1 tablet by mouth daily.                                topiramate 25 MG tablet   Commonly known as:  TOPAMAX   Take 1 tablet (25 mg) by mouth 2 times daily Prescribed by Dr Tello   Last time this was given:  25 mg on 4/11/2017  8:53 AM                                valACYclovir 1000 mg tablet   Commonly known as:  VALTREX   Take 2 tablets (2,000 mg) by mouth 2 times daily as needed                                VITAMIN C PO   Take 250 mg by mouth 2 times daily (0.5 x 500 mg tablet = 250 mg dose)                                VITAMIN D (CHOLECALCIFEROL) PO   Take 1,000 Units by mouth daily                                ZADITOR 0.025 % Soln ophthalmic solution   Place 1 drop into both eyes every 12 hours as needed for itching   Generic drug:  ketotifen

## 2017-04-10 NOTE — TELEPHONE ENCOUNTER
Did review with NP doing H&P for Pt's surgery that DR Church did not think bridging required for procedure. Informed her do not know her dosing for coumadin and she would need to contact Pt's coumadin clinic. Informed her typically when coumadin restarted is at Pt's normal dosing.  GARRICK Alaniz RN

## 2017-04-10 NOTE — PROGRESS NOTES
[facility] Pre-Procedure Note      Reason for procedure: Cerebral aneurysm     HPI: Patient reports she had a stroke in February 2017 and had MRA imaging which showed incidental right internal carotid artery aneurysm. Patient met with Dr. Grider in clinic on 3/14/2017 to discuss this aneurysm and treatment options. Patient has elected to proceed with cerebral angiogram and possible endovascular coil embolization of a right internal carotid artery aneurysm and possible Neuroform stent.      History and physical reviewed and no updates needed. Yes    Past Medical History:   Diagnosis Date     Anemia      CVA (cerebral vascular accident) (H) 2017    ?migraine, ?pfo--negative vasc w/u, neg hypercoag w/u     Diffuse cystic mastopathy     Fibrocystic breast disease     HTN, goal below 140/90      Hyperparathyroidism (H)      Infectious mononucleosis     Mono at age 16     Irregular heart beat     atrial fibulation     Labyrinthitis, unspecified      Migraine headache with aura      Osteopenia      Pain in joint, shoulder region     Secondary to a fall     PFO (patent foramen ovale)      S/P gastric bypass June, 2010     Sleep apnea     she is on CPAP     Vitamin D deficiencies      Past Surgical History:   Procedure Laterality Date     C NONSPECIFIC PROCEDURE      S/P multiple breast biopies - all negative / benign     C NONSPECIFIC PROCEDURE      S/P T&A     C NONSPECIFIC PROCEDURE      [place] teeth extraction     C NONSPECIFIC PROCEDURE      S/P (? unreadable) ankle     COLONOSCOPY N/A 8/12/2015    Procedure: COLONOSCOPY;  Surgeon: Deandre Brooks MD;  Location:  GI     GASTRIC BYPASS  June 24, 2010     ORTHOPEDIC SURGERY Left 2010    wrist fracture     PARATHYROIDECTOMY  9/19/11       Imaging:   On PACS - MRA 3/2/2017    Medications:    Current Facility-Administered Medications:      lidocaine 1 % 1 mL, 1 mL, Other, Q1H PRN, Padmaja Ahuja, APRN CNP     lidocaine (LMX4) cream, ,  "Topical, Q1H PRN, Padmaja Ahuja APRN CNP     sodium chloride (PF) 0.9% PF flush 3 mL, 3 mL, Intracatheter, Q1H PRN, Padmaja Ahuja APRN CNP     sodium chloride (PF) 0.9% PF flush 3 mL, 3 mL, Intracatheter, Q8H, Padmaja Ahuja APRN CNP     medication instruction, , Does not apply, Continuous PRN, Padmaja Ahuja APRN CNP     0.9% sodium chloride infusion, , Intravenous, Continuous, Padmaja Ahuja APRN CNP     lidocaine 1 % 1 mL, 1 mL, Other, Q1H PRN, Gjerde, Jenny     sodium chloride (PF) 0.9% PF flush 3 mL, 3 mL, Intracatheter, Q1H PRN, Gjerde, Jenny     lactated ringers infusion, , Intravenous, Continuous, Gjerde, Jenny     heparin (porcine) (PRESSURE BAG) 2-0.9 UNIT/ML-% infusion, , , ,      midazolam (VERSED) 1 MG/ML injection, , , ,      fentaNYL Citrate (PF) (SUBLIMAZE) 100 MCG/2ML injection, , , ,     Allergies:   Allergies   Allergen Reactions     Contrast Dye Itching     Reaction of immediate burning and severe itching in Right ear after injection for CT.      Sulfa Drugs      hives       Pre-Procedure Assessment done at 0830.  Pulse 65  Temp 97.6  F (36.4  C) (Temporal)  Resp 16  Ht 1.676 m (5' 6\")  Wt 102.5 kg (226 lb)  SpO2 99%  BMI 36.48 kg/m2    Heart: Normal heart sounds and rate  Lungs: Lungs Clear with good breath sounds bilaterally.  Mallampati:  Grade 3:  Soft palate visible, posterior pharyngeal wall not visible  ASA Class:  Class 2 - MILD SYSTEMIC DISEASE, NO ACUTE PROBLEMS, NO FUNCTIONAL LIMITATIONS.  Abnormal reaction to sedation in the past: No   Expected Level:  Deep Sedation - General anesthesia   PO Intake:  Appropriately NPO for procedure  Sleep apnea: Yes, uses CPAP    Labs:  Lab Results   Component Value Date    HGB 13.3 04/10/2017     Lab Results   Component Value Date     04/10/2017     Creatinine   Date Value Ref Range Status   04/10/2017 0.81 0.52 - 1.04 mg/dL Final   ]  Lab Results   Component Value Date    INR 1.07 04/10/2017     PRU 4/7/2017:  54    Impression: " This is a 61 year old female with incidentally discovered right internal carotid artery aneurysm during workup of stroke in February 2017. She is on warfarin for history of atrial fibrillation. Last dose of warfarin was on 4/5/2017 in preparation for procedure. She started aspirin and Plavix about 1 week ago. Plavix resistance level was checked on 4/7 and is therapeutic at 54. She was advised to continue 75 mg Plavix daily including morning of the procedure. She has a history of contrast allergy - will premedicate pre-procedure. Otherwise, patient is adequately NPO and medically stable to proceed with planned procedure under general anesthesia.       Plan: Cerebral angiogram and possible endovascular coil embolization and possible Neuroform stent under general anesthesia    The procedure its risks, benefits, and alternatives including but not limited to stroke, bleeding, renal failure, contrast dye or medication reaction, vessel injury, infection were discussed with the patient and her family. Questions answered and the patient gives written and verbal consent to proceed.      Karmen Avalos, CNP

## 2017-04-10 NOTE — ANESTHESIA POSTPROCEDURE EVALUATION
Patient: Jose Coronado    Procedure(s):  coiling embolization     Diagnosis:aneurysm   Diagnosis Additional Information: No value filed.    Anesthesia Type:  ETT, General    Note:  Anesthesia Post Evaluation    Patient location during evaluation: PACU  Patient participation: Able to fully participate in evaluation  Level of consciousness: awake and alert  Pain management: adequate  Airway patency: patent  Cardiovascular status: acceptable  Respiratory status: acceptable  Hydration status: acceptable  PONV: none     Anesthetic complications: None          Last vitals:  Vitals:    04/10/17 1320 04/10/17 1330 04/10/17 1425   BP: 98/56 91/55    Pulse:      Resp: 12 11 12   Temp:   36.8  C (98.3  F)   SpO2: 94% 93% 96%         Electronically Signed By: Senthil Cordova MD  April 10, 2017  3:09 PM

## 2017-04-11 VITALS
DIASTOLIC BLOOD PRESSURE: 67 MMHG | TEMPERATURE: 97.8 F | SYSTOLIC BLOOD PRESSURE: 103 MMHG | BODY MASS INDEX: 37.31 KG/M2 | HEIGHT: 66 IN | RESPIRATION RATE: 21 BRPM | OXYGEN SATURATION: 96 % | WEIGHT: 232.14 LBS | HEART RATE: 65 BPM

## 2017-04-11 LAB
ANION GAP SERPL CALCULATED.3IONS-SCNC: 8 MMOL/L (ref 3–14)
BUN SERPL-MCNC: 8 MG/DL (ref 7–30)
CALCIUM SERPL-MCNC: 8.2 MG/DL (ref 8.5–10.1)
CHLORIDE SERPL-SCNC: 108 MMOL/L (ref 94–109)
CO2 SERPL-SCNC: 28 MMOL/L (ref 20–32)
CREAT SERPL-MCNC: 0.79 MG/DL (ref 0.52–1.04)
ERYTHROCYTE [DISTWIDTH] IN BLOOD BY AUTOMATED COUNT: 13.8 % (ref 10–15)
GFR SERPL CREATININE-BSD FRML MDRD: 73 ML/MIN/1.7M2
GLUCOSE SERPL-MCNC: 86 MG/DL (ref 70–99)
HCT VFR BLD AUTO: 36 % (ref 35–47)
HGB BLD-MCNC: 12.1 G/DL (ref 11.7–15.7)
MCH RBC QN AUTO: 30.3 PG (ref 26.5–33)
MCHC RBC AUTO-ENTMCNC: 33.6 G/DL (ref 31.5–36.5)
MCV RBC AUTO: 90 FL (ref 78–100)
PLATELET # BLD AUTO: 226 10E9/L (ref 150–450)
POTASSIUM SERPL-SCNC: 3.3 MMOL/L (ref 3.4–5.3)
RBC # BLD AUTO: 3.99 10E12/L (ref 3.8–5.2)
SODIUM SERPL-SCNC: 144 MMOL/L (ref 133–144)
WBC # BLD AUTO: 7 10E9/L (ref 4–11)

## 2017-04-11 PROCEDURE — 36415 COLL VENOUS BLD VENIPUNCTURE: CPT | Performed by: RADIOLOGY

## 2017-04-11 PROCEDURE — 25000132 ZZH RX MED GY IP 250 OP 250 PS 637: Performed by: RADIOLOGY

## 2017-04-11 PROCEDURE — 80048 BASIC METABOLIC PNL TOTAL CA: CPT | Performed by: RADIOLOGY

## 2017-04-11 PROCEDURE — 25000128 H RX IP 250 OP 636: Performed by: RADIOLOGY

## 2017-04-11 PROCEDURE — 85027 COMPLETE CBC AUTOMATED: CPT | Performed by: RADIOLOGY

## 2017-04-11 RX ORDER — METHYLPREDNISOLONE 4 MG
TABLET, DOSE PACK ORAL
Qty: 21 TABLET | Refills: 0 | Status: SHIPPED | OUTPATIENT
Start: 2017-04-11 | End: 2017-04-17

## 2017-04-11 RX ORDER — OXYCODONE HYDROCHLORIDE 5 MG/1
5 TABLET ORAL EVERY 4 HOURS PRN
Qty: 15 TABLET | Refills: 0 | Status: SHIPPED | OUTPATIENT
Start: 2017-04-11 | End: 2017-04-17

## 2017-04-11 RX ADMIN — IMIPRAMINE HYDROCHLORIDE 25 MG: 25 TABLET, FILM COATED ORAL at 08:53

## 2017-04-11 RX ADMIN — LOSARTAN POTASSIUM 100 MG: 100 TABLET, FILM COATED ORAL at 08:53

## 2017-04-11 RX ADMIN — Medication 140 MG: at 08:53

## 2017-04-11 RX ADMIN — TOPIRAMATE 25 MG: 25 TABLET, FILM COATED ORAL at 08:53

## 2017-04-11 RX ADMIN — SODIUM CHLORIDE: 9 INJECTION, SOLUTION INTRAVENOUS at 03:10

## 2017-04-11 RX ADMIN — ASPIRIN 81 MG: 81 TABLET, COATED ORAL at 08:53

## 2017-04-11 RX ADMIN — ATENOLOL 50 MG: 25 TABLET ORAL at 08:53

## 2017-04-11 RX ADMIN — AMLODIPINE BESYLATE 5 MG: 5 TABLET ORAL at 08:53

## 2017-04-11 RX ADMIN — HYDROCHLOROTHIAZIDE 25 MG: 25 TABLET ORAL at 08:53

## 2017-04-11 RX ADMIN — CLOPIDOGREL BISULFATE 75 MG: 75 TABLET, FILM COATED ORAL at 08:53

## 2017-04-11 NOTE — PLAN OF CARE
Problem: Individualization  Goal: Patient Preferences  Outcome: Improving  Pt is A&O x4. Neuro's intact. Right groin site CD&I. Up with minimal assist to restroom. C/O sore throat x1, lozengers given x1. Pt slept with home CPAP.

## 2017-04-11 NOTE — DISCHARGE SUMMARY
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 augmented by Neuroform stent          Medications Prior to Admission:     Prescriptions Prior to Admission   Medication Sig Dispense Refill Last Dose     Ascorbic Acid (VITAMIN C PO) Take 250 mg by mouth 2 times daily (0.5 x 500 mg tablet = 250 mg dose)   4/9/2017 at PM     VITAMIN D, CHOLECALCIFEROL, PO Take 1,000 Units by mouth daily   4/9/2017 at AM     clopidogrel (PLAVIX) 75 MG tablet Take 1 tablet (75 mg) by mouth daily Taking this in preparation for her surgery 4/10/17 90 tablet 3 4/10/2017 at 0600     aspirin 81 MG tablet Take 81 mg by mouth daily (Taking this in preparation for her surgery 4/10/17) 30 tablet  4/10/2017 at 0600     topiramate (TOPAMAX) 25 MG tablet Take 1 tablet (25 mg) by mouth 2 times daily Prescribed by Dr Tello 180 tablet 3 4/9/2017 at PM     atorvastatin (LIPITOR) 40 MG tablet Take 1 tablet (40 mg) by mouth daily 90 tablet 1 4/9/2017 at PM     Calcium Citrate-Vitamin D (CALCIUM CITRATE + D PO) Take 1 tablet by mouth every morning   4/9/2017 at AM     Ferrous Sulfate 27 MG TABS Take 27 mg by mouth 2 times daily   4/9/2017 at PM     Cyanocobalamin 2500 MCG TABS Take 2,500 mcg by mouth twice a week Mon, Thur 4/6/2017 at AM     imipramine (TOFRANIL) 25 MG tablet Take 1 tablet (25 mg) by mouth daily 100 tablet 2 4/9/2017 at  AM     amLODIPine (NORVASC) 5 MG tablet Take 1 tablet (5 mg) by mouth daily 90 tablet 1 4/10/2017 at 0600     hydrochlorothiazide (HYDRODIURIL) 25 MG tablet Take 1 tablet (25 mg) by mouth daily 90 tablet 1 4/10/2017 at 0600     losartan (COZAAR) 100 MG tablet Take 1 tablet (100 mg) by mouth daily 90 tablet 3 4/10/2017 at 0600     atenolol (TENORMIN) 50 MG tablet Take 1 tablet (50 mg) by mouth daily 90 tablet 3 4/10/2017 at 0600     valACYclovir (VALTREX) 1000 mg tablet Take 2 tablets (2,000 mg) by mouth 2 times daily as needed 4 tablet 5 More than a Month at PRN     ketotifen (ZADITOR) 0.025 % SOLN Place 1 drop into both eyes every 12 hours as needed for itching 1 Bottle  4/9/2017 at PM     cetirizine (ZYRTEC) 10 MG tablet Take 1 tablet by mouth every evening. 30 tablet 1 4/9/2017 at PM     Probiotic Product (PROBIOTIC PO) Take 1 tablet by mouth daily.   4/9/2017 at AM     [DISCONTINUED] WARFARIN SODIUM PO Take 5-7.5 mg by mouth daily -take 7.5 mg on Monday and Thursday  -take 5 mg on Tuesday, Wednesday, Friday, Saturday and Sunday 4/5/2017 at PM             Discharge Medications:     Current Discharge Medication List      START taking these medications    Details   oxyCODONE (ROXICODONE) 5 MG IR tablet Take 1 tablet (5 mg) by mouth every 4 hours as needed for moderate to severe pain  Qty: 15 tablet, Refills: 0    Associated Diagnoses: Cerebral aneurysm, nonruptured      methylPREDNISolone (MEDROL DOSEPAK) 4 MG tablet Follow package instructions  Qty: 21 tablet, Refills: 0    Associated Diagnoses: Cerebral aneurysm, nonruptured         CONTINUE these medications which have NOT CHANGED    Details   Ascorbic Acid (VITAMIN C PO) Take 250 mg by mouth 2 times daily (0.5 x 500 mg tablet = 250 mg dose)      VITAMIN D, CHOLECALCIFEROL, PO Take 1,000 Units by mouth daily      clopidogrel (PLAVIX) 75 MG tablet Take 1 tablet (75 mg) by mouth daily Taking this in preparation for her surgery 4/10/17 for 3 months then  discontinue  Qty: 90 tablet, Refills: 3      aspirin 81 MG tablet Take 81 mg by mouth daily (Taking this in preparation for her surgery 4/10/17)  Qty: 30 tablet      topiramate (TOPAMAX) 25 MG tablet Take 1 tablet (25 mg) by mouth 2 times daily Prescribed by Dr Tello  Qty: 180 tablet, Refills: 3    Associated Diagnoses: Migraine      atorvastatin (LIPITOR) 40 MG tablet Take 1 tablet (40 mg) by mouth daily  Qty: 90 tablet, Refills: 1    Associated Diagnoses: Left temporal lobe infarction (H)      Calcium Citrate-Vitamin D (CALCIUM CITRATE + D PO) Take 1 tablet by mouth every morning      Ferrous Sulfate 27 MG TABS Take 27 mg by mouth 2 times daily      Cyanocobalamin 2500 MCG TABS Take 2,500 mcg by mouth twice a week Mon, Thur      imipramine (TOFRANIL) 25 MG tablet Take 1 tablet (25 mg) by mouth daily  Qty: 100 tablet, Refills: 2    Associated Diagnoses: Bladder dysfunction      amLODIPine (NORVASC) 5 MG tablet Take 1 tablet (5 mg) by mouth daily  Qty: 90 tablet, Refills: 1    Associated Diagnoses: Essential hypertension with goal blood pressure less than 140/90      hydrochlorothiazide (HYDRODIURIL) 25 MG tablet Take 1 tablet (25 mg) by mouth daily  Qty: 90 tablet, Refills: 1    Associated Diagnoses: Essential hypertension with goal blood pressure less than 140/90      losartan (COZAAR) 100 MG tablet Take 1 tablet (100 mg) by mouth daily  Qty: 90 tablet, Refills: 3    Associated Diagnoses: Essential hypertension with goal blood pressure less than 140/90      atenolol (TENORMIN) 50 MG tablet Take 1 tablet (50 mg) by mouth daily  Qty: 90 tablet, Refills: 3    Associated Diagnoses: Palpitations      valACYclovir (VALTREX) 1000 mg tablet Take 2 tablets (2,000 mg) by mouth 2 times daily as needed  Qty: 4 tablet, Refills: 5    Associated Diagnoses: Cold sore      ketotifen (ZADITOR) 0.025 % SOLN Place 1 drop into both eyes every 12 hours as needed for itching  Qty: 1 Bottle      cetirizine (ZYRTEC) 10 MG tablet Take 1  tablet by mouth every evening.  Qty: 30 tablet, Refills: 1      Probiotic Product (PROBIOTIC PO) Take 1 tablet by mouth daily.         STOP taking these medications       WARFARIN SODIUM PO Comments:   Reason for Stopping:                     Consultations:   Consultation during this admission received from neurology          Brief History of Illness:     Patient had a stroke in February 2017 and had MRA imaging which showed incidental right internal carotid artery aneurysm. Patient met with Dr. Grider in clinic on 3/14/2017 to discuss this aneurysm and treatment options.  It was decided to proceed with coil embolization       Hospital Course:   Admitted on the morning of the procedure.  Underwent coil embolization augmented by Neuroform stent of unruptured Rt ICA aneurysm.  Following the procedure she was admitted to the ICU. The femoral sheath was removed 3 hours post procedure.  She was stable during the night.  On the morning after the procedure she is sitting up in bed.  She reports mild headache, has eaten a regular breakfast, is neurologically intact, and femoral puncture site is soft           Discharge Instructions and Follow-Up:   Discharge diet: Regular   Discharge activity: Lifting restricted to 10 pounds for 2 days   Discharge follow-up: Dr Morrissey is planning a follow up MRA in 3 months.  We will review this when obtained and make a recommendation for further follow up   Wound care: Okay to shower and remove dressing today. No tub for 5 days     Patient has questions regarding restarting warfarin.  She states Dr Morrissey has told her to stop taking until plavix complete (in 3 months) however her cardiologist has told her to restart warfarin now.  She discussed this with Dr Morrissey this morning when he rounded and he indicated he would talk to her cardiologist and make a plan.  I informed her to hold on warfarin for now as per Dr Morrissey recommendation but if she does not hear from his office in 2 days she  should call them.         Discharge Disposition:   Discharged to home      Attestation:  I have reviewed today's vital signs, notes, medications, labs and imaging.  Amount of time performed on this discharge summary: 40 minutes.  Care coordination / counseling time: 20 minutes    RON Sanders CNP   Office [phone]  Pager [phone]

## 2017-04-11 NOTE — PROGRESS NOTES
[facility]  Ne[facility] [place]  Neurology Daily Note      Admission Date:4/10/2017   Date of service: 04/11/2017   Hospital Day: 2      Assessment & Plan   _______________________________  (I67.1) Cerebral aneurysm without rupture    --Stent assisted coiling of the right paraophthalmic aneurysm  --Aspirin / Plavix combination for 3 months  (I48.0) Paroxysmal atrial fibrillation (H)  --Diagnosed through [facility]  --Will require long-term anticoagulation  --Will start Apixaban in 3 months after stopping Plavix  (Q21.1) PFO with atrial septal aneurysm  --Would not recommend closure due to atrial fibrillation and need for long-term anticoagulation  #. PT/OT/Speech  --continue evaluations  #.Nutrition  --Per speech therapy evaluation   #. DVT Prophylaxis  --Mechanical, patient is able to ambulate    Code Status: Prior    Disposition: Expected discharge today.    Interval History   _______________________________  No issues overnight. No headache, no weakness, no numbness  Review of Systems   _______________________________  The Review of Systems is negative other than noted in the HPI  Physical Exam   _______________________________  Vitals: Temp: 97.8  F (36.6  C) Temp src: Oral BP: 111/66   Heart Rate: 84 Resp: 16 SpO2: 98 % O2 Device: BiPAP/CPAP    Vital Signs with Ranges: Temp:  [96.7  F (35.9  C)-98.3  F (36.8  C)] 97.8  F (36.6  C)  Heart Rate:  [51-84] 84  Resp:  [0-38] 16  BP: ()/(44-83) 111/66  MAP:  [2 mmHg-88 mmHg] 74 mmHg  Arterial Line BP: (102-127)/(43-65) 111/56  SpO2:  [90 %-100 %] 98 %    General Appearance:  No acute distress  Neuro:       Mental Status Exam:   Awake, alert, oriented X3. Speech and language are intact. Mental status is normal       Cranial Nerves:  Pupils 3 mm, reactive. EOMI. Face sensation is normal. Face is symmetric.Tongue and uvula are midline. Other CN are normal           Motor:  5/5 X 4. Tone and bulk are normal           Reflexes:  Normal  DTR.Toes downgoing.        Sensory:  Normal to PP, LT, vibration and JUSTO                   Coordination:   Intact finger-to-nose and toe-to-finger tests       Gait:  No significant difficulties  Cardiovascular: Regular rate and rhythm, no m/r/g  Lungs: Clear to auscultation  Abdomen: Soft, not tender, not distended  Extremities: No clubbing, no cyanosis, no edema    Medications   _______________________________    NaCl 75 mL/hr at 04/11/17 0310     niCARdipine 40 mg in 200 mL 0.9% NaCl         amLODIPine  5 mg Oral Daily     aspirin EC  81 mg Oral Daily     atenolol  50 mg Oral Daily     atorvastatin  40 mg Oral At Bedtime     clopidogrel  75 mg Oral Daily     ferrous sulfate  140 mg Oral BID     hydrochlorothiazide  25 mg Oral Daily     imipramine  25 mg Oral Daily     losartan  100 mg Oral Daily     topiramate  25 mg Oral BID       Data   _______________________________      Lab Data:   All data was reviewed by me personally  CBC RESULTS:  Recent Labs   Lab Test  04/11/17   0425  04/10/17   0759  04/05/17   0916   WBC  7.0  5.5  5.5   RBC  3.99  4.33  4.57   HGB  12.1  13.3  13.8   HCT  36.0  38.3  41.3   PLT  226  245  243     Basic Metabolic Panel:  Recent Labs   Lab Test  04/11/17   0425  04/10/17   0759  04/05/17   0916   NA  144  141  140   POTASSIUM  3.3*  3.4  3.7   CHLORIDE  108  104  105   CO2  28  30  30   BUN  8  14  14   CR  0.79  0.81  0.80   GLC  86  96  90   MAXIMILIANO  8.2*  9.1  9.2     Liver panel:  Recent Labs   Lab Test  02/10/17   0635  08/10/16   0937  07/29/15   0845  09/14/12   0955  05/17/11   1527   PROTTOTAL  5.9*  6.5*  6.3*  6.2*  6.6*   ALBUMIN  3.2*  3.6  3.4  3.8  3.9   BILITOTAL  0.3  0.4  0.3  0.5  0.3   ALKPHOS  53  73  68  66  84   AST  23  17  15  29  29   ALT  20  20  20  12  21     Coagulation  Recent Labs   Lab Test  04/10/17   0759 03/23/17 03/13/17 03/07/17 02/28/17   INR  1.07  1.8*  2.1*  2.2*  2.6*      Lipid Profile:  Recent Labs   Lab Test  02/10/17   0635  08/10/16   0937   07/29/15   0845  09/14/12   0955   CHOL  166  192  178  170   HDL  51  65  62  73   LDL  95  112*  100  84   TRIG  98  77  78  65   CHOLHDLRATIO   --    --   2.9  2.3     Thyroid Panel:  Recent Labs   Lab Test  02/10/17   0635  08/10/16   0937  11/16/15   1459  09/14/12   0955  11/25/11   0900  10/03/11   0750   TSH  1.05  0.83  0.53  0.63  0.60  0.11*   T4   --    --    --   0.90  1.33  1.13      Vitamin B12:   Recent Labs   Lab Test  08/10/16   0937  02/08/16   1438  08/24/15   0917   B12  866  1731*  1184*      Vitamin D level:   Recent Labs   Lab Test  08/10/16   0937  02/08/16   1438  07/29/15   0845  09/14/12   0955   VITDT  37  56  49  55     A1C:   Recent Labs   Lab Test  06/17/10   0922  01/14/10   0822  09/28/09   1651   A1C  5.7  6.1*  6.0     Troponin I: No lab results found.  CK: No lab results found.    CRP inflammation: No lab results found.  ESR:   Recent Labs   Lab Test  02/28/17   1122   SED  8       JUDITH: No lab results found.    ANCA: No lab results found.   Ammonia: No lab results found.  UA Results:  Recent Labs   Lab Test  09/12/14   1056   COLOR  Yellow   APPEARANCE  Clear   URINEGLC  Negative   URINEBILI  Negative   URINEKETONE  Negative   SG  1.015   UBLD  Small*   URINEPH  6.5   PROTEIN  Negative   UROBILINOGEN  0.2   NITRITE  Negative   LEUKEST  Moderate*   RBCU  O - 2   WBCU  5-10*        Cardiac US:   A patent foramen ovale is present.  Atrial bi-directional shunt  A contrast injection (Bubble Study) was performed that was mildly positive for  flow across the interatrial septum.  The ascending aorta is Mildly dilated.  The atrial septum is aneurysmal.  The study was technically difficult.       Imaging:   All imaging studies were reviewed personally  Cerebral angiogram:   TECHNICALLY SUCCESSFUL ENDOVASCULAR COIL EMBOLIZATION AND  OCCLUSION/NEAR OCCLUSION OF A 3.5 MM SACCULAR POSTERIOR PARAOPHTHALMIC  RIGHT INTERNAL CAROTID ARTERY ANEURYSM WITH 3 SEPARATE COILS FOR TOTAL  COIL LENGTH OF  16 CM. COIL EMBOLIZATION AUGMENTED BY NEUROFORM STENT  PLACEMENT. PATIENT WILL BE MAINTAINED ON ASPIRIN AND PLAVIX FOR A 3  MONTHS OF FOLLOW-UP MRA WILL BE OBTAINED IN ONE YEAR

## 2017-04-12 ENCOUNTER — TELEPHONE (OUTPATIENT)
Dept: INTERNAL MEDICINE | Facility: CLINIC | Age: 62
End: 2017-04-12

## 2017-04-12 LAB — INTERPRETATION ECG - MUSE: NORMAL

## 2017-04-12 NOTE — TELEPHONE ENCOUNTER
IP F/U    Date: 4/11/17  Diagnosis: Cerebral Aneurysm Without Rupture, Cerebral Aneurysm, Nonruptured  Is patient active in care coordination? No  Was patient in TCU? No    Next 5 appointments (look out 90 days)     Apr 17, 2017  9:40 AM CDT   Sabrinat Wade with Lian Martinez MD   [facility] ([facility]    [address]  [facility] [address]   [phone]            May 26, 2017  7:45 AM CDT   Return Visit with Luis Church MD   [facility] PHYSICIANS [facility] AT [place] ([facility])    [address] [place] [address] [address]  [facility] [phone]

## 2017-04-12 NOTE — TELEPHONE ENCOUNTER
"ED/Discharge Protocol    \"Hi, my name is Phylicia Rosario, a registered nurse, and I am calling on behalf of Dr. Martinez's office at [place].  I am calling to follow up and see how things are going for you after your recent visit.\"    \"I see that you were in the (ER/UC/IP) on 4/10/17.    How are you doing now that you are home?\" Doing well, had aneurysm clip placed and came home yesterday.  Resting.    Is patient experiencing symptoms that may require a hospital visit?  no    Discharge Instructions    \"Let's review your discharge instructions.  What is/are the follow-up recommendations?  Pt. Response: Follow up with IM Mon. 4/17/17    \"Were you instructed to make a follow-up appointment?\"  Pt. Response: Yes.  Has appointment been made?   Yes      \"When you see the provider, I would recommend that you bring your discharge instructions with you.    Medications    \"How many new medications are you on since your hospitalization/ED visit?\"    0-1  \"How many of your current medicines changed (dose, timing, name, etc.) while you were in the hospital/ED visit?\"   0-1  \"Do you have questions about your medications?\"   No  \"Were you newly diagnosed with heart failure, COPD, diabetes or did you have a heart attack?\"   No  For patients on insulin: \"Did you start on insulin in the hospital or did you have your insulin dose changed?\"   No    Medication reconciliation completed? Yes    Was MTM referral placed (*Make sure to put transitions as reason for referral)?   No    Call Summary    \"Do you have any questions or concerns about your condition or care plan at the moment?\"    No  Triage nurse advice given: Call with questions/concerns, keep appt as scheduled.    Patient was in ER 1 in the past year (assess appropriateness of ER visits.)      \"If you have questions or things don't continue to improve, we encourage you contact us through the main clinic number,  [phone].  Even if the clinic is not open, triage nurses are " "available 24/7 to help you.     We would like you to know that our clinic has extended hours (provide information).  We also have urgent care (provide details on closest location and hours/contact info)\"      \"Thank you for your time and take care!\"        "

## 2017-04-14 LAB
KCT BLD-ACNC: 138 SEC (ref 105–167)
KCT BLD-ACNC: 218 SEC (ref 105–167)
KCT BLD-ACNC: 229 SEC (ref 105–167)
KCT BLD-ACNC: 246 SEC (ref 105–167)

## 2017-04-17 ENCOUNTER — OFFICE VISIT (OUTPATIENT)
Dept: INTERNAL MEDICINE | Facility: CLINIC | Age: 62
End: 2017-04-17
Payer: COMMERCIAL

## 2017-04-17 VITALS
BODY MASS INDEX: 35.84 KG/M2 | TEMPERATURE: 98.6 F | OXYGEN SATURATION: 97 % | HEART RATE: 90 BPM | HEIGHT: 66 IN | DIASTOLIC BLOOD PRESSURE: 74 MMHG | SYSTOLIC BLOOD PRESSURE: 96 MMHG | WEIGHT: 223 LBS

## 2017-04-17 DIAGNOSIS — Z11.59 NEED FOR HEPATITIS C SCREENING TEST: ICD-10-CM

## 2017-04-17 DIAGNOSIS — I10 ESSENTIAL HYPERTENSION WITH GOAL BLOOD PRESSURE LESS THAN 140/90: Primary | Chronic | ICD-10-CM

## 2017-04-17 DIAGNOSIS — I48.0 PAROXYSMAL ATRIAL FIBRILLATION (H): ICD-10-CM

## 2017-04-17 DIAGNOSIS — E21.3 HYPERPARATHYROIDISM (H): ICD-10-CM

## 2017-04-17 DIAGNOSIS — E66.01 MORBID OBESITY DUE TO EXCESS CALORIES (H): ICD-10-CM

## 2017-04-17 DIAGNOSIS — I67.1 CEREBRAL ANEURYSM WITHOUT RUPTURE: ICD-10-CM

## 2017-04-17 DIAGNOSIS — I63.89 LEFT TEMPORAL LOBE INFARCTION (H): ICD-10-CM

## 2017-04-17 DIAGNOSIS — E87.6 LOW BLOOD POTASSIUM: ICD-10-CM

## 2017-04-17 PROCEDURE — 80048 BASIC METABOLIC PNL TOTAL CA: CPT | Performed by: INTERNAL MEDICINE

## 2017-04-17 PROCEDURE — 99496 TRANSJ CARE MGMT HIGH F2F 7D: CPT | Performed by: INTERNAL MEDICINE

## 2017-04-17 PROCEDURE — 86803 HEPATITIS C AB TEST: CPT | Performed by: INTERNAL MEDICINE

## 2017-04-17 PROCEDURE — 36415 COLL VENOUS BLD VENIPUNCTURE: CPT | Performed by: INTERNAL MEDICINE

## 2017-04-17 NOTE — MR AVS SNAPSHOT
After Visit Summary   4/17/2017    Jose Coronado    MRN: [id number]           Patient Information     Date Of Birth          1955        Visit Information        Provider Department      4/17/2017 9:40 AM Lian Martinez MD [facility]        Today's Diagnoses     Essential hypertension with goal blood pressure less than 140/90    -  1    Low blood potassium        Morbid obesity due to excess calories (H)        Left temporal lobe infarction (H)        Cerebral aneurysm without rupture        Paroxysmal atrial fibrillation (H)        Hyperparathyroidism (H)           Follow-ups after your visit        Your next 10 appointments already scheduled     May 26, 2017  7:45 AM CDT   Return Visit with Luis Church MD   [facility] PHYSICIANS [facility] AT [place] ([facility])    [address] [place] [id number] [phone]              Future tests that were ordered for you today     Open Future Orders        Priority Expected Expires Ordered    Basic metabolic panel  (Ca, Cl, CO2, Creat, Gluc, K, Na, BUN) Routine  6/17/2017 4/17/2017            Who to contact     If you have questions or need follow up information about today's clinic visit or your schedule please contact [facility] directly at [phone].  Normal or non-critical lab and imaging results will be communicated to you by MyChart, letter or phone within 4 business days after the clinic has received the results. If you do not hear from us within 7 days, please contact the clinic through MyChart or phone. If you have a critical or abnormal lab result, we will notify you by phone as soon as possible.  Submit refill requests through [employer] or call your pharmacy and they will forward the refill request to us. Please allow 3 business days for your refill to be completed.          Additional Information About Your Visit        MyChart Information     [employer] gives  "you secure access to your electronic health record. If you see a primary care provider, you can also send messages to your care team and make appointments. If you have questions, please call your primary care clinic.  If you do not have a primary care provider, please call [phone] and they will assist you.        Care EveryWhere ID     This is your Care EveryWhere ID. This could be used by other organizations to access your [facility] medical records  [id number]        Your Vitals Were     Pulse Temperature Height Pulse Oximetry Breastfeeding? BMI (Body Mass Index)    90 98.6  F (37  C) (Oral) 5' 6\" (1.676 m) 97% No 35.99 kg/m2       Blood Pressure from Last 3 Encounters:   04/17/17 96/74   04/11/17 103/67   04/05/17 94/66    Weight from Last 3 Encounters:   04/17/17 223 lb (101.2 kg)   04/11/17 232 lb 2.3 oz (105.3 kg)   04/05/17 226 lb 8 oz (102.7 kg)               Primary Care Provider Office Phone # Fax #    Lian Martinez [phone]       [facility] [address]  [facility] [address]        Thank you!     Thank you for choosing [facility]  for your care. Our goal is always to provide you with excellent care. Hearing back from our patients is one way we can continue to improve our services. Please take a few minutes to complete the written survey that you may receive in the mail after your visit with us. Thank you!             Your Updated Medication List - Protect others around you: Learn how to safely use, store and throw away your medicines at www.disposemymeds.org.          This list is accurate as of: 4/17/17 11:01 AM.  Always use your most recent med list.                   Brand Name Dispense Instructions for use    amLODIPine 5 MG tablet    NORVASC    90 tablet    Take 1 tablet (5 mg) by mouth daily       aspirin 81 MG tablet     30 tablet    Take 81 mg by mouth daily (Taking this in preparation for her surgery 4/10/17)       atenolol " 50 MG tablet    TENORMIN    90 tablet    Take 1 tablet (50 mg) by mouth daily       atorvastatin 40 MG tablet    LIPITOR    90 tablet    Take 1 tablet (40 mg) by mouth daily       CALCIUM CITRATE + D PO      Take 1 tablet by mouth every morning       cetirizine 10 MG tablet    zyrTEC    30 tablet    Take 1 tablet by mouth every evening.       Cyanocobalamin 2500 MCG Tabs      Take 2,500 mcg by mouth twice a week Mon, Thur       Ferrous Sulfate 27 MG Tabs      Take 27 mg by mouth 2 times daily       hydrochlorothiazide 25 MG tablet    HYDRODIURIL    90 tablet    Take 1 tablet (25 mg) by mouth daily       imipramine 25 MG tablet    TOFRANIL    100 tablet    Take 1 tablet (25 mg) by mouth daily       losartan 100 MG tablet    COZAAR    90 tablet    Take 1 tablet (100 mg) by mouth daily       PLAVIX 75 MG tablet   Generic drug:  clopidogrel     90 tablet    Take 1 tablet (75 mg) by mouth daily Taking this in preparation for her surgery 4/10/17       PROBIOTIC PO      Take 1 tablet by mouth daily.       topiramate 25 MG tablet    TOPAMAX    180 tablet    Take 1 tablet (25 mg) by mouth 2 times daily Prescribed by Dr Tello       valACYclovir 1000 mg tablet    VALTREX    4 tablet    Take 2 tablets (2,000 mg) by mouth 2 times daily as needed       VITAMIN C PO      Take 250 mg by mouth 2 times daily (0.5 x 500 mg tablet = 250 mg dose)       VITAMIN D (CHOLECALCIFEROL) PO      Take 1,000 Units by mouth daily       ZADITOR 0.025 % Soln ophthalmic solution   Generic drug:  ketotifen     1 Bottle    Place 1 drop into both eyes every 12 hours as needed for itching

## 2017-04-17 NOTE — PROGRESS NOTES
SUBJECTIVE:                                                    Jose Coronado is a 61 year old female who presents to clinic today for the following health issues:    Hospital Follow-up Visit:    Hospital/Nursing Home/IP Rehab Facility: [facility]  Date of Admission: 4-  Date of Discharge: 4-  Reason(s) for Admission: brain anuerysm            Problems taking medications regularly:  None       Medication changes since discharge: None       Problems adhering to non-medication therapy:  None    Summary of hospitalization:  [facility] discharge summary reviewed  Diagnostic Tests/Treatments reviewed.  Follow up needed: with Cardiology Dr Church and Dr Ash Neurosurgery.  Other Healthcare Providers Involved in Patient s Care:         None  Update since discharge: improved. She has had no further stroke symptoms. She has no pain at the catheter site. Her energy is good. Appetite is good.    She is frustrated as once she is off Plavix and Neurosurgery wants her to go on Eliquis and Cardiology is pushing for Coumadin. She wants to know what I think. Will be 3 months before she can come off the Plavix.    They have been increasing her blood pressure medications but she has reached a point where she is lightheaded and dizzy much of the time now. She understands she needs to loose wt. She is fact is down over 35 lbs from October.     She had potassium of 3.3 on 4/11.     Post Discharge Medication Reconciliation: discharge medications reconciled and changed, per note/orders (see AVS).  Plan of care communicated with patient     Coding guidelines for this visit:  Type of Medical   Decision Making Face-to-Face Visit       within 7 Days of discharge Face-to-Face Visit        within 14 days of discharge   Moderate Complexity [id number]   High Complexity [id number]            Problem list and histories reviewed & adjusted, as indicated.  Additional history: as documented    Patient Active  Problem List   Diagnosis     Family history of malignant neoplasm of breast     Female stress incontinence     Sleep apnea     Osteopenia     S/P gastric bypass     Vitamin D deficiency     Hyperparathyroidism (H)     Hirsutism     Advanced directives, counseling/discussion     Overweight, BMI > 35     Iron deficiency anemia     Lump or mass in breast     PFO (patent foramen ovale)     Essential hypertension with goal blood pressure less than 140/90     Left temporal lobe infarction (H)     Stroke (H)     Long-term (current) use of anticoagulants [Z79.01]     Cerebral aneurysm without rupture     Past Surgical History:   Procedure Laterality Date     C NONSPECIFIC PROCEDURE      S/P multiple breast biopies - all negative / benign     C NONSPECIFIC PROCEDURE      S/P T&A     C NONSPECIFIC PROCEDURE      [place] teeth extraction     C NONSPECIFIC PROCEDURE      S/P (? unreadable) ankle     COLONOSCOPY N/A 8/12/2015    Procedure: COLONOSCOPY;  Surgeon: Deandre Brooks MD;  Location:  GI     GASTRIC BYPASS  June 24, 2010     ORTHOPEDIC SURGERY Left 2010    wrist fracture     PARATHYROIDECTOMY  9/19/11       Social History   Substance Use Topics     Smoking status: Never Smoker     Smokeless tobacco: Never Used     Alcohol use 0.0 oz/week     0 Standard drinks or equivalent per week      Comment: 1 glass wine 4-5 days a week     Family History   Problem Relation Age of Onset     Breast Cancer Mother      Hypertension Mother      Arthritis Mother      Thyroid Disease Mother      hypo     Breast Cancer Maternal Aunt      Hypertension Paternal Grandmother      Hypertension Sister      CEREBROVASCULAR DISEASE Maternal Grandmother      Colon Cancer No family hx of            Reviewed and updated as needed this visit by clinical staff  Tobacco  Allergies  Meds  Med Hx  Surg Hx  Fam Hx  Soc Hx      Reviewed and updated as needed this visit by Provider         ROS:  Constitutional, HEENT, cardiovascular, pulmonary,  "GI, , musculoskeletal, neuro, skin, endocrine and psych systems are negative, except as otherwise noted.    OBJECTIVE:                                                    BP 96/74 (BP Location: Left arm, Patient Position: Chair, Cuff Size: Adult Large)  Pulse 90  Temp 98.6  F (37  C) (Oral)  Ht 5' 6\" (1.676 m)  Wt 223 lb (101.2 kg)  SpO2 97%  Breastfeeding? No  BMI 35.99 kg/m2  Body mass index is 35.99 kg/(m^2).  GENERAL: healthy, alert and no distress  EYES: Eyes grossly normal to inspection, PERRL and conjunctivae and sclerae normal  NECK: no adenopathy, no asymmetry, masses, or scars and thyroid normal to palpation  RESP: lungs clear to auscultation - no rales, rhonchi or wheezes  CV: regular rate and rhythm, normal S1 S2, no S3 or S4, no murmur, click or rub, no peripheral edema and peripheral pulses strong  ABDOMEN: soft, nontender, no hepatosplenomegaly, no masses and bowel sounds normal  MS: no gross musculoskeletal defects noted, no edema  NEURO: Normal strength and tone, mentation intact and speech normal  PSYCH: mentation appears normal, affect normal/bright       ASSESSMENT/PLAN:                                                        1. Essential hypertension with goal blood pressure less than 140/90  Will stop Norvasc, Follow up in 1 month. If blood pressure still low would cut back on Losartan next, her Atenolol is helping both her migraines and atrial fibrillation     2. Low blood potassium  Due for bmp    3. Morbid obesity due to excess calories (H)  Continue to encourage wt loss     4. Left temporal lobe infarction (H)  No residual    5. Cerebral aneurysm without rupture   successful stent placed, continue ASA and Plavix for 3 months     6. Paroxysmal atrial fibrillation (H)  On Plavix and ASA for now. Will need either Eliquis or coumadin in 3 months    7. Hyperparathyroidism (H)  history of, no sign of .anb calcium since surgery in 2011      Follow up in 1 month     Lian Martinez, " MD  [facility]

## 2017-04-17 NOTE — NURSING NOTE
"Chief Complaint   Patient presents with     Hospital F/U       Initial BP 96/74 (BP Location: Left arm, Patient Position: Chair, Cuff Size: Adult Large)  Pulse 90  Temp 98.6  F (37  C) (Oral)  Ht 5' 6\" (1.676 m)  Wt 223 lb (101.2 kg)  SpO2 97%  Breastfeeding? No  BMI 35.99 kg/m2 Estimated body mass index is 35.99 kg/(m^2) as calculated from the following:    Height as of this encounter: 5' 6\" (1.676 m).    Weight as of this encounter: 223 lb (101.2 kg).  Medication Reconciliation: complete    "

## 2017-04-18 LAB
ANION GAP SERPL CALCULATED.3IONS-SCNC: 9 MMOL/L (ref 3–14)
BUN SERPL-MCNC: 16 MG/DL (ref 7–30)
CALCIUM SERPL-MCNC: 9.6 MG/DL (ref 8.5–10.1)
CHLORIDE SERPL-SCNC: 103 MMOL/L (ref 94–109)
CO2 SERPL-SCNC: 28 MMOL/L (ref 20–32)
CREAT SERPL-MCNC: 0.79 MG/DL (ref 0.52–1.04)
GFR SERPL CREATININE-BSD FRML MDRD: 73 ML/MIN/1.7M2
GLUCOSE SERPL-MCNC: 84 MG/DL (ref 70–99)
HCV AB SERPL QL IA: NORMAL
POTASSIUM SERPL-SCNC: 4 MMOL/L (ref 3.4–5.3)
SODIUM SERPL-SCNC: 140 MMOL/L (ref 133–144)

## 2017-04-19 ENCOUNTER — TRANSFERRED RECORDS (OUTPATIENT)
Dept: HEALTH INFORMATION MANAGEMENT | Facility: CLINIC | Age: 62
End: 2017-04-19

## 2017-04-21 DIAGNOSIS — I67.1 CEREBRAL ANEURYSM: Primary | ICD-10-CM

## 2017-04-21 DIAGNOSIS — G47.33 OBSTRUCTIVE SLEEP APNEA SYNDROME: ICD-10-CM

## 2017-04-21 DIAGNOSIS — I48.0 PAROXYSMAL ATRIAL FIBRILLATION (H): ICD-10-CM

## 2017-04-21 DIAGNOSIS — Q21.12 PFO WITH ATRIAL SEPTAL ANEURYSM: ICD-10-CM

## 2017-04-21 DIAGNOSIS — I25.3 PFO WITH ATRIAL SEPTAL ANEURYSM: ICD-10-CM

## 2017-04-24 ENCOUNTER — HOSPITAL ENCOUNTER (OUTPATIENT)
Dept: LAB | Facility: CLINIC | Age: 62
Discharge: HOME | End: 2017-04-24
Attending: PSYCHIATRY & NEUROLOGY | Admitting: PSYCHIATRY & NEUROLOGY
Payer: COMMERCIAL

## 2017-04-24 DIAGNOSIS — I67.1 CEREBRAL ANEURYSM: ICD-10-CM

## 2017-04-24 DIAGNOSIS — I48.0 PAROXYSMAL ATRIAL FIBRILLATION (H): ICD-10-CM

## 2017-04-24 DIAGNOSIS — G47.33 OBSTRUCTIVE SLEEP APNEA SYNDROME: ICD-10-CM

## 2017-04-24 DIAGNOSIS — Q21.12 PFO WITH ATRIAL SEPTAL ANEURYSM: ICD-10-CM

## 2017-04-24 DIAGNOSIS — I25.3 PFO WITH ATRIAL SEPTAL ANEURYSM: ICD-10-CM

## 2017-04-24 LAB
PLATELET FUNCTION ASA: 476 ARU
PLATELET REACTIVITY P2Y12: 9 PRU

## 2017-04-24 PROCEDURE — 36415 COLL VENOUS BLD VENIPUNCTURE: CPT | Performed by: PSYCHIATRY & NEUROLOGY

## 2017-04-24 PROCEDURE — 85576 BLOOD PLATELET AGGREGATION: CPT | Performed by: PSYCHIATRY & NEUROLOGY

## 2017-05-26 ENCOUNTER — OFFICE VISIT (OUTPATIENT)
Dept: CARDIOLOGY | Facility: CLINIC | Age: 62
End: 2017-05-26
Attending: INTERNAL MEDICINE
Payer: COMMERCIAL

## 2017-05-26 VITALS
HEART RATE: 72 BPM | SYSTOLIC BLOOD PRESSURE: 102 MMHG | DIASTOLIC BLOOD PRESSURE: 62 MMHG | BODY MASS INDEX: 34.55 KG/M2 | WEIGHT: 215 LBS | HEIGHT: 66 IN

## 2017-05-26 DIAGNOSIS — Q21.12 PFO (PATENT FORAMEN OVALE): ICD-10-CM

## 2017-05-26 PROCEDURE — 99213 OFFICE O/P EST LOW 20 MIN: CPT | Performed by: INTERNAL MEDICINE

## 2017-05-26 NOTE — LETTER
5/26/2017      RE: Jose Coronado       Dear Colleague,    I had the pleasure of seeing Jose Coronado in the [facility].    This is a followup office note to my visit with Ms. Coronado originally seen on 03/07/2017.  She was referred in by my partners.  She had a stroke.  History can be found in the 03/07/2017 note.  Since that time, she wore a 30 day event monitor.  Initially, I thought that it was atrial fib, but on further review, it turns out she was having multiple episodes of PAT, all under 10 seconds.  Initial thought was that I was seeing a continuous rhythm, and then I realized that the strips were isolated in time and were not connected; i.e., there was no sustained arrhythmia, only PAT.  Therefore, since she does not have atrial fib, she does not have an absolute cardiac indication for Coumadin or a NOAC.  Since I saw her, she had an aneurysm coiling, but everyone agrees that that was an incidental finding.  The other issue that came up was could the stroke have been a migraine related to stroke, but I think the jury is moving away from that.  I will remind everyone that she was on a long plane ride shortly before the stroke occurred.  She does have a stretched PFO, which we could actually call an atrial septal defect since there was spontaneous left-to-right and right-to-left flow.  Given now that the aneurysm was coiled, she will be on Plavix for 3 months per their protocol, and she is going to have a followup MRI study to make sure that the stent did not move.  Given that there was not atrial fib and given that it looks like the migraine is not likely in association with the stroke, it is moving more towards suggesting that we close the PFO.  I am going to have the patient check with Dr. Morrissey and Dr. Grider just to make sure that they have no contraindication to us moving forward with the PFO.  Our protocol is baby aspirin at 81 mg a day plus Plavix at 75 mg a day for 6  months, and that 6 months would start from the date of my procedure, not the date of the aneurysm coil, and then at the 6 month visit, we stop the Plavix and we continue aspirin, however, at 325 mg a day.  We could overlap these timings if it is all right with Neurology and Neuroradiology Departments; otherwise, it can wait until they complete their 3 month MRI visit, and then we can schedule her for the PFO closure at that time.  Previous notes have shown that we discussed the device, the contraindications, reviewed the risk/benefit ratio and the old studies.  I will have the patient call our office to schedule after she has been cleared by Dr. Morrissey and Dr. Grider.        This was a 20 minute visit, all counseling.      Again, thank you for allowing me to participate in the care of your patient.        Sincerely,    Luis Church MD     [facility]    cc:   Jeovany Morrissey MD, PhD, [facility]    [address] [address]      Ayden Grider MD   [facility], [address] [address]      Iona Tello MD   [facility]    [address] [place] [address]

## 2017-05-26 NOTE — TELEPHONE ENCOUNTER
5/26/2017    Call Regarding Onboarding [employer]    Attempt 2    Message on voicemail     Comments:       Outreach   KV

## 2017-05-26 NOTE — LETTER
5/26/2017    Lian Martinez MD  [facility]   [address]  [facility] [address]    RE: Jose Borgeser     Dear Colleague,    I had the pleasure of seeing Jose Coronado in the [facility].    This is a followup office note to my visit with Ms. Coronado originally seen on 03/07/2017.  She was referred in by my partners.  She had a stroke.  History can be found in the 03/07/2017 note.  Since that time, she wore a 30 day event monitor.  Initially, I thought that it was atrial fib, but on further review, it turns out she was having multiple episodes of PAT, all under 10 seconds.  Initial thought was that I was seeing a continuous rhythm, and then I realized that the strips were isolated in time and were not connected; i.e., there was no sustained arrhythmia, only PAT.  Therefore, since she does not have atrial fib, she does not have an absolute cardiac indication for Coumadin or a NOAC.  Since I saw her, she had an aneurysm coiling, but everyone agrees that that was an incidental finding.  The other issue that came up was could the stroke have been a migraine related to stroke, but I think the jury is moving away from that.  I will remind everyone that she was on a long plane ride shortly before the stroke occurred.  She does have a stretched PFO, which we could actually call an atrial septal defect since there was spontaneous left-to-right and right-to-left flow.  Given now that the aneurysm was coiled, she will be on Plavix for 3 months per their protocol, and she is going to have a followup MRI study to make sure that the stent did not move.  Given that there was not atrial fib and given that it looks like the migraine is not likely in association with the stroke, it is moving more towards suggesting that we close the PFO.      Outpatient Encounter Prescriptions as of 5/26/2017   Medication Sig Dispense Refill     hydrochlorothiazide (HYDRODIURIL) 25 MG tablet  TAKE 1 TABLET (25 MG) BY MOUTH DAILY 90 tablet 1     Ascorbic Acid (VITAMIN C PO) Take 250 mg by mouth 2 times daily (0.5 x 500 mg tablet = 250 mg dose)       VITAMIN D, CHOLECALCIFEROL, PO Take 1,000 Units by mouth daily       clopidogrel (PLAVIX) 75 MG tablet Take 1 tablet (75 mg) by mouth daily Taking this in preparation for her surgery 4/10/17 90 tablet 3     aspirin 81 MG tablet Take 81 mg by mouth daily (Taking this in preparation for her surgery 4/10/17) 30 tablet      topiramate (TOPAMAX) 25 MG tablet Take 1 tablet (25 mg) by mouth 2 times daily Prescribed by Dr Tello 180 tablet 3     atorvastatin (LIPITOR) 40 MG tablet Take 1 tablet (40 mg) by mouth daily 90 tablet 1     Calcium Citrate-Vitamin D (CALCIUM CITRATE + D PO) Take 1 tablet by mouth every morning       Ferrous Sulfate 27 MG TABS Take 27 mg by mouth 2 times daily       Cyanocobalamin 2500 MCG TABS Take 2,500 mcg by mouth twice a week Mon, Thur       imipramine (TOFRANIL) 25 MG tablet Take 1 tablet (25 mg) by mouth daily 100 tablet 2     losartan (COZAAR) 100 MG tablet Take 1 tablet (100 mg) by mouth daily 90 tablet 3     atenolol (TENORMIN) 50 MG tablet Take 1 tablet (50 mg) by mouth daily 90 tablet 3     valACYclovir (VALTREX) 1000 mg tablet Take 2 tablets (2,000 mg) by mouth 2 times daily as needed 4 tablet 5     ketotifen (ZADITOR) 0.025 % SOLN Place 1 drop into both eyes every 12 hours as needed for itching 1 Bottle      cetirizine (ZYRTEC) 10 MG tablet Take 1 tablet by mouth every evening. 30 tablet 1     Probiotic Product (PROBIOTIC PO) Take 1 tablet by mouth daily.       [DISCONTINUED] amLODIPine (NORVASC) 5 MG tablet Take 1 tablet (5 mg) by mouth daily 90 tablet 1     No facility-administered encounter medications on file as of 5/26/2017.      I am going to have the patient check with Dr. Morrissey and Dr. Grider just to make sure that they have no contraindication to us moving forward with the PFO.  Our protocol is baby aspirin at 81 mg a  day plus Plavix at 75 mg a day for 6 months, and that 6 months would start from the date of my procedure, not the date of the aneurysm coil, and then at the 6 month visit, we stop the Plavix and we continue aspirin, however, at 325 mg a day.  We could overlap these timings if it is all right with Neurology and Neuroradiology Departments; otherwise, it can wait until they complete their 3 month MRI visit, and then we can schedule her for the PFO closure at that time.  Previous notes have shown that we discussed the device, the contraindications, reviewed the risk/benefit ratio and the old studies.  I will have the patient call our office to schedule after she has been cleared by Dr. Morrissey and Dr. Grider.        This was a 20 minute visit, all counseling.                Again, thank you for allowing me to participate in the care of your patient.            Sincerely,    Luis Church MD     [facility]    cc:   Jeovany Morrissey MD, PhD, [facility]    [address] [address]      Ayden Grider MD   [facility]    [address] [place] [id number]      Iona Tello MD   [facility]    [address] [place] [address]

## 2017-05-26 NOTE — MR AVS SNAPSHOT
After Visit Summary   5/26/2017    Jose Coronado    MRN: [id number]           Patient Information     Date Of Birth          1955        Visit Information        Provider Department      5/26/2017 7:45 AM Luis Church MD [facility]        Today's Diagnoses     PFO (patent foramen ovale)           Follow-ups after your visit        Your next 10 appointments already scheduled     May 31, 2017 10:00 AM CDT   Kailee Long with Lian Martinez MD   [facility]    [address]  [facility] [id number] [phone]              Who to contact     If you have questions or need follow up information about today's clinic visit or your schedule please contact [facility] directly at [phone].  Normal or non-critical lab and imaging results will be communicated to you by MyChart, letter or phone within 4 business days after the clinic has received the results. If you do not hear from us within 7 days, please contact the clinic through MyChart or phone. If you have a critical or abnormal lab result, we will notify you by phone as soon as possible.  Submit refill requests through [employer] or call your pharmacy and they will forward the refill request to us. Please allow 3 business days for your refill to be completed.          Additional Information About Your Visit        MyChart Information     [employer] gives you secure access to your electronic health record. If you see a primary care provider, you can also send messages to your care team and make appointments. If you have questions, please call your primary care clinic.  If you do not have a primary care provider, please call [phone] and they will assist you.        Care EveryWhere ID     This is your Care EveryWhere ID. This could be used by other organizations to access your [facility]  "records  [id number]        Your Vitals Were     Pulse Height BMI (Body Mass Index)             72 1.676 m (5' 6\") 34.7 kg/m2          Blood Pressure from Last 3 Encounters:   05/26/17 102/62   04/17/17 96/74   04/11/17 103/67    Weight from Last 3 Encounters:   05/26/17 97.5 kg (215 lb)   04/17/17 101.2 kg (223 lb)   04/11/17 105.3 kg (232 lb 2.3 oz)              We Performed the Following     Follow-Up with Cardiologist        Primary Care Provider Office Phone # Fax #    Lian Martinez [phone]       [facility] [address]  [facility] [address]        Thank you!     Thank you for choosing [facility] [place]  for your care. Our goal is always to provide you with excellent care. Hearing back from our patients is one way we can continue to improve our services. Please take a few minutes to complete the written survey that you may receive in the mail after your visit with us. Thank you!             Your Updated Medication List - Protect others around you: Learn how to safely use, store and throw away your medicines at www.disposemymeds.org.          This list is accurate as of: 5/26/17  8:36 AM.  Always use your most recent med list.                   Brand Name Dispense Instructions for use    aspirin 81 MG tablet     30 tablet    Take 81 mg by mouth daily (Taking this in preparation for her surgery 4/10/17)       atenolol 50 MG tablet    TENORMIN    90 tablet    Take 1 tablet (50 mg) by mouth daily       atorvastatin 40 MG tablet    LIPITOR    90 tablet    Take 1 tablet (40 mg) by mouth daily       CALCIUM CITRATE + D PO      Take 1 tablet by mouth every morning       cetirizine 10 MG tablet    zyrTEC    30 tablet    Take 1 tablet by mouth every evening.       Cyanocobalamin 2500 MCG Tabs      Take 2,500 mcg by mouth twice a week Mon, Thur       Ferrous Sulfate 27 MG Tabs      Take 27 mg by mouth 2 times daily       " hydrochlorothiazide 25 MG tablet    HYDRODIURIL    90 tablet    TAKE 1 TABLET (25 MG) BY MOUTH DAILY       imipramine 25 MG tablet    TOFRANIL    100 tablet    Take 1 tablet (25 mg) by mouth daily       losartan 100 MG tablet    COZAAR    90 tablet    Take 1 tablet (100 mg) by mouth daily       PLAVIX 75 MG tablet   Generic drug:  clopidogrel     90 tablet    Take 1 tablet (75 mg) by mouth daily Taking this in preparation for her surgery 4/10/17       PROBIOTIC PO      Take 1 tablet by mouth daily.       topiramate 25 MG tablet    TOPAMAX    180 tablet    Take 1 tablet (25 mg) by mouth 2 times daily Prescribed by Dr Tello       valACYclovir 1000 mg tablet    VALTREX    4 tablet    Take 2 tablets (2,000 mg) by mouth 2 times daily as needed       VITAMIN C PO      Take 250 mg by mouth 2 times daily (0.5 x 500 mg tablet = 250 mg dose)       VITAMIN D (CHOLECALCIFEROL) PO      Take 1,000 Units by mouth daily       ZADITOR 0.025 % Soln ophthalmic solution   Generic drug:  ketotifen     1 Bottle    Place 1 drop into both eyes every 12 hours as needed for itching

## 2017-05-26 NOTE — PROGRESS NOTES
HPI and Plan:   See dictation    No orders of the defined types were placed in this encounter.    No orders of the defined types were placed in this encounter.    Medications Discontinued During This Encounter   Medication Reason     amLODIPine (NORVASC) 5 MG tablet Stopped by Patient         Encounter Diagnosis   Name Primary?     PFO (patent foramen ovale)        CURRENT MEDICATIONS:  Current Outpatient Prescriptions   Medication Sig Dispense Refill     hydrochlorothiazide (HYDRODIURIL) 25 MG tablet TAKE 1 TABLET (25 MG) BY MOUTH DAILY 90 tablet 1     Ascorbic Acid (VITAMIN C PO) Take 250 mg by mouth 2 times daily (0.5 x 500 mg tablet = 250 mg dose)       VITAMIN D, CHOLECALCIFEROL, PO Take 1,000 Units by mouth daily       clopidogrel (PLAVIX) 75 MG tablet Take 1 tablet (75 mg) by mouth daily Taking this in preparation for her surgery 4/10/17 90 tablet 3     aspirin 81 MG tablet Take 81 mg by mouth daily (Taking this in preparation for her surgery 4/10/17) 30 tablet      topiramate (TOPAMAX) 25 MG tablet Take 1 tablet (25 mg) by mouth 2 times daily Prescribed by Dr Tello 180 tablet 3     atorvastatin (LIPITOR) 40 MG tablet Take 1 tablet (40 mg) by mouth daily 90 tablet 1     Calcium Citrate-Vitamin D (CALCIUM CITRATE + D PO) Take 1 tablet by mouth every morning       Ferrous Sulfate 27 MG TABS Take 27 mg by mouth 2 times daily       Cyanocobalamin 2500 MCG TABS Take 2,500 mcg by mouth twice a week Mon Thsun       imipramine (TOFRANIL) 25 MG tablet Take 1 tablet (25 mg) by mouth daily 100 tablet 2     losartan (COZAAR) 100 MG tablet Take 1 tablet (100 mg) by mouth daily 90 tablet 3     atenolol (TENORMIN) 50 MG tablet Take 1 tablet (50 mg) by mouth daily 90 tablet 3     valACYclovir (VALTREX) 1000 mg tablet Take 2 tablets (2,000 mg) by mouth 2 times daily as needed 4 tablet 5     ketotifen (ZADITOR) 0.025 % SOLN Place 1 drop into both eyes every 12 hours as needed for itching 1 Bottle      cetirizine (ZYRTEC) 10 MG  tablet Take 1 tablet by mouth every evening. 30 tablet 1     Probiotic Product (PROBIOTIC PO) Take 1 tablet by mouth daily.         ALLERGIES     Allergies   Allergen Reactions     Contrast Dye Itching     Reaction of immediate burning and severe itching in Right ear after injection for CT.      Sulfa Drugs      hives       PAST MEDICAL HISTORY:  Past Medical History:   Diagnosis Date     Anemia      CVA (cerebral vascular accident) (H) 2017    ?migraine, ?pfo--negative vasc w/u, neg hypercoag w/u     Diffuse cystic mastopathy     Fibrocystic breast disease     HTN, goal below 140/90      Hyperparathyroidism (H)      Infectious mononucleosis     Mono at age 16     Irregular heart beat     PAT no afib on 30day monitor     Labyrinthitis, unspecified      Migraine headache with aura      Osteopenia      Pain in joint, shoulder region     Secondary to a fall     PFO (patent foramen ovale)      S/P gastric bypass June, 2010     Sleep apnea     she is on CPAP     Vitamin D deficiencies        PAST SURGICAL HISTORY:  Past Surgical History:   Procedure Laterality Date     C ANEURYSM, INTRACRAN, SIMPLE SURG  04/2017    coil of aneurysm right posterior paraophthalmic artery     C NONSPECIFIC PROCEDURE      S/P multiple breast biopies - all negative / benign     C NONSPECIFIC PROCEDURE      S/P T&A     C NONSPECIFIC PROCEDURE      [place] teeth extraction     C NONSPECIFIC PROCEDURE      S/P (? unreadable) ankle     COLONOSCOPY N/A 8/12/2015    Procedure: COLONOSCOPY;  Surgeon: Deandre Brooks MD;  Location:  GI     GASTRIC BYPASS  June 24, 2010     ORTHOPEDIC SURGERY Left 2010    wrist fracture     PARATHYROIDECTOMY  9/19/11       FAMILY HISTORY:  Family History   Problem Relation Age of Onset     Breast Cancer Mother      Hypertension Mother      Arthritis Mother      Thyroid Disease Mother      hypo     Breast Cancer Maternal Aunt      Hypertension Paternal Grandmother      Hypertension Sister      CEREBROVASCULAR  "DISEASE Maternal Grandmother      Colon Cancer No family hx of        SOCIAL HISTORY:  Social History     Social History     Marital status: Single     Spouse name: N/A     Number of children: N/A     Years of education: N/A     Social History Main Topics     Smoking status: Never Smoker     Smokeless tobacco: Never Used     Alcohol use 0.0 oz/week     0 Standard drinks or equivalent per week      Comment: 1 glass wine 4-5 days a week     Drug use: No     Sexual activity: Not Currently     Partners: Male     Other Topics Concern     None     Social History Narrative       Review of Systems:  Skin:  Negative     Eyes:  Negative    ENT:  Positive for hearing loss  Respiratory:  Positive for sleep apnea;CPAP  Cardiovascular:    Positive for;dizziness;palpitations  Gastroenterology: Positive for    Genitourinary:  Negative    Musculoskeletal:  Negative    Neurologic:  Positive for headaches  Psychiatric:  Negative    Heme/Lymph/Imm:  Negative    Endocrine:  Negative      Physical Exam:  Vitals: /62  Pulse 72  Ht 1.676 m (5' 6\")  Wt 97.5 kg (215 lb)  BMI 34.7 kg/m2    Constitutional:           Skin:           Head:           Eyes:           ENT:           Neck:           Chest:             Cardiac:                    Abdomen:           Vascular:                                          Extremities and Back:                 Neurological:             Recent Lab Results:  LIPID RESULTS:  Lab Results   Component Value Date    CHOL 166 02/10/2017    HDL 51 02/10/2017    LDL 95 02/10/2017    TRIG 98 02/10/2017    CHOLHDLRATIO 2.9 07/29/2015       LIVER ENZYME RESULTS:  Lab Results   Component Value Date    AST 23 02/10/2017    ALT 20 02/10/2017       CBC RESULTS:  Lab Results   Component Value Date    WBC 7.0 04/11/2017    RBC 3.99 04/11/2017    HGB 12.1 04/11/2017    HCT 36.0 04/11/2017    MCV 90 04/11/2017    MCH 30.3 04/11/2017    MCHC 33.6 04/11/2017    RDW 13.8 04/11/2017     04/11/2017       BMP " RESULTS:  Lab Results   Component Value Date     04/17/2017    POTASSIUM 4.0 04/17/2017    CHLORIDE 103 04/17/2017    CO2 28 04/17/2017    ANIONGAP 9 04/17/2017    GLC 84 04/17/2017    BUN 16 04/17/2017    CR 0.79 04/17/2017    GFRESTIMATED 73 04/17/2017    GFRESTBLACK 89 04/17/2017    MAXIMILIANO 9.6 04/17/2017        A1C RESULTS:  Lab Results   Component Value Date    A1C 5.7 06/17/2010       INR RESULTS:  Lab Results   Component Value Date    INR 1.07 04/10/2017    INR 1.8 (A) 03/23/2017    INR 2.1 (A) 03/13/2017    INR 1.23 (H) 02/10/2017           CC  Luis Church MD   [facility]  [address]  [place] [id number]

## 2017-05-30 NOTE — PROGRESS NOTES
This is a followup office note to my visit with Ms. Coronado originally seen on 03/07/2017.  She was referred in by my partners.  She had a stroke.  History can be found in the 03/07/2017 note.  Since that time, she wore a 30 day event monitor.  Initially, I thought that it was atrial fib, but on further review, it turns out she was having multiple episodes of PAT, all under 10 seconds.  Initial thought was that I was seeing a continuous rhythm, and then I realized that the strips were isolated in time and were not connected; i.e., there was no sustained arrhythmia, only PAT.  Therefore, since she does not have atrial fib, she does not have an absolute cardiac indication for Coumadin or a NOAC.  Since I saw her, she had an aneurysm coiling, but everyone agrees that that was an incidental finding.  The other issue that came up was could the stroke have been a migraine related to stroke, but I think the jury is moving away from that.  I will remind everyone that she was on a long plane ride shortly before the stroke occurred.  She does have a stretched PFO, which we could actually call an atrial septal defect since there was spontaneous left-to-right and right-to-left flow.  Given now that the aneurysm was coiled, she will be on Plavix for 3 months per their protocol, and she is going to have a followup MRI study to make sure that the stent did not move.  Given that there was not atrial fib and given that it looks like the migraine is not likely in association with the stroke, it is moving more towards suggesting that we close the PFO.  I am going to have the patient check with Dr. Morrissey and Dr. Grider just to make sure that they have no contraindication to us moving forward with the PFO.  Our protocol is baby aspirin at 81 mg a day plus Plavix at 75 mg a day for 6 months, and that 6 months would start from the date of my procedure, not the date of the aneurysm coil, and then at the 6 month visit, we stop the Plavix  and we continue aspirin, however, at 325 mg a day.  We could overlap these timings if it is all right with Neurology and Neuroradiology Departments; otherwise, it can wait until they complete their 3 month MRI visit, and then we can schedule her for the PFO closure at that time.  Previous notes have shown that we discussed the device, the contraindications, reviewed the risk/benefit ratio and the old studies.  I will have the patient call our office to schedule after she has been cleared by Dr. Morrissey and Dr. Grider.        This was a 20 minute visit, all counseling.      cc:   Lian Martinez MD   [facility] [address] [place] [address]      Jeovany Morrissey MD, PhD, [facility]    [address] [place] [address]      Ayden Grider MD   [facility], [place] [id number]      Iona Tello MD   [facility]    [address] [place] [address]         NILA BYRNE MD             D: 2017 08:29   T: 2017 09:34   MT: [email or web address]      Name:     LASHELL BOYD   MRN:      [id number]        Account:      [id number]   :      1955           Service Date: 2017      Document: [id number]

## 2017-05-31 ENCOUNTER — OFFICE VISIT (OUTPATIENT)
Dept: INTERNAL MEDICINE | Facility: CLINIC | Age: 62
End: 2017-05-31
Payer: COMMERCIAL

## 2017-05-31 VITALS
BODY MASS INDEX: 34.55 KG/M2 | DIASTOLIC BLOOD PRESSURE: 70 MMHG | SYSTOLIC BLOOD PRESSURE: 108 MMHG | TEMPERATURE: 98.6 F | HEIGHT: 66 IN | HEART RATE: 78 BPM | WEIGHT: 215 LBS

## 2017-05-31 DIAGNOSIS — I10 ESSENTIAL HYPERTENSION WITH GOAL BLOOD PRESSURE LESS THAN 140/90: Primary | Chronic | ICD-10-CM

## 2017-05-31 PROCEDURE — 99213 OFFICE O/P EST LOW 20 MIN: CPT | Performed by: INTERNAL MEDICINE

## 2017-05-31 NOTE — MR AVS SNAPSHOT
"              After Visit Summary   5/31/2017    Jose Coronado    MRN: [id number]           Patient Information     Date Of Birth          1955        Visit Information        Provider Department      5/31/2017 10:00 AM Lian Martinez MD [facility]        Today's Diagnoses     Essential hypertension with goal blood pressure less than 140/90    -  1       Follow-ups after your visit        Who to contact     If you have questions or need follow up information about today's clinic visit or your schedule please contact [facility] directly at [phone].  Normal or non-critical lab and imaging results will be communicated to you by [employer], letter or phone within 4 business days after the clinic has received the results. If you do not hear from us within 7 days, please contact the clinic through [employer] or phone. If you have a critical or abnormal lab result, we will notify you by phone as soon as possible.  Submit refill requests through [employer] or call your pharmacy and they will forward the refill request to us. Please allow 3 business days for your refill to be completed.          Additional Information About Your Visit        MyChart Information     [employer] gives you secure access to your electronic health record. If you see a primary care provider, you can also send messages to your care team and make appointments. If you have questions, please call your primary care clinic.  If you do not have a primary care provider, please call [phone] and they will assist you.        Care EveryWhere ID     This is your Care EveryWhere ID. This could be used by other organizations to access your [facility] medical records  [id number]        Your Vitals Were     Pulse Temperature Height Breastfeeding? BMI (Body Mass Index)       78 98.6  F (37  C) (Oral) 5' 6\" (1.676 m) No 34.7 kg/m2        Blood Pressure from Last 3 Encounters:   05/31/17 108/70   05/26/17 102/62   04/17/17 " 96/74    Weight from Last 3 Encounters:   05/31/17 215 lb (97.5 kg)   05/26/17 215 lb (97.5 kg)   04/17/17 223 lb (101.2 kg)              Today, you had the following     No orders found for display       Primary Care Provider Office Phone # Fax #    Lian Martinez [phone]       [facility] [address]  [facility] [address]        Thank you!     Thank you for choosing [facility]  for your care. Our goal is always to provide you with excellent care. Hearing back from our patients is one way we can continue to improve our services. Please take a few minutes to complete the written survey that you may receive in the mail after your visit with us. Thank you!             Your Updated Medication List - Protect others around you: Learn how to safely use, store and throw away your medicines at www.disposemymeds.org.          This list is accurate as of: 5/31/17 10:55 AM.  Always use your most recent med list.                   Brand Name Dispense Instructions for use    aspirin 81 MG tablet     30 tablet    Take 81 mg by mouth daily (Taking this in preparation for her surgery 4/10/17)       atenolol 50 MG tablet    TENORMIN    90 tablet    Take 1 tablet (50 mg) by mouth daily       atorvastatin 40 MG tablet    LIPITOR    90 tablet    Take 1 tablet (40 mg) by mouth daily       CALCIUM CITRATE + D PO      Take 1 tablet by mouth every morning       cetirizine 10 MG tablet    zyrTEC    30 tablet    Take 1 tablet by mouth every evening.       Cyanocobalamin 2500 MCG Tabs      Take 2,500 mcg by mouth twice a week Mon, Thur       Ferrous Sulfate 27 MG Tabs      Take 27 mg by mouth 2 times daily       hydrochlorothiazide 25 MG tablet    HYDRODIURIL    90 tablet    TAKE 1 TABLET (25 MG) BY MOUTH DAILY       imipramine 25 MG tablet    TOFRANIL    100 tablet    Take 1 tablet (25 mg) by mouth daily       losartan 100 MG tablet    COZAAR    90 tablet    Take 1 tablet  (100 mg) by mouth daily       PLAVIX 75 MG tablet   Generic drug:  clopidogrel     90 tablet    Take 1 tablet (75 mg) by mouth daily Taking this in preparation for her surgery 4/10/17       PROBIOTIC PO      Take 1 tablet by mouth daily.       topiramate 25 MG tablet    TOPAMAX    180 tablet    Take 1 tablet (25 mg) by mouth 2 times daily Prescribed by Dr Tello       valACYclovir 1000 mg tablet    VALTREX    4 tablet    Take 2 tablets (2,000 mg) by mouth 2 times daily as needed       VITAMIN C PO      Take 250 mg by mouth 2 times daily (0.5 x 500 mg tablet = 250 mg dose)       VITAMIN D (CHOLECALCIFEROL) PO      Take 1,000 Units by mouth daily       ZADITOR 0.025 % Soln ophthalmic solution   Generic drug:  ketotifen     1 Bottle    Place 1 drop into both eyes every 12 hours as needed for itching

## 2017-05-31 NOTE — PROGRESS NOTES
"  SUBJECTIVE:                                                    Jose Coronado is a 61 year old female who presents to clinic today for the following health issues:    Hypertension Follow-up      Outpatient blood pressures are being checked at home.  Results are \"low\".    Low Salt Diet: low salt       Amount of exercise or physical activity: 4-5 days/week     Problems taking medications regularly: No    Medication side effects: none    Diet: Weight Watchers- down 30 pounds!!!       HPI:   Please see my note from last month. She has jv loosing wt and with it her blood pressure has been dropping and she has been getting lightheaded. So we stopped the norvasc her lightheadedness is better but her blood pressure is still often below 100 systolic.     Problem list and histories reviewed & adjusted, as indicated.  Additional history: as documented    Patient Active Problem List   Diagnosis     Family history of malignant neoplasm of breast     Female stress incontinence     Sleep apnea     Osteopenia     S/P gastric bypass     Vitamin D deficiency     Hyperparathyroidism (H)     Hirsutism     Advanced directives, counseling/discussion     Overweight, BMI > 35     Iron deficiency anemia     Lump or mass in breast     PFO (patent foramen ovale)     Essential hypertension with goal blood pressure less than 140/90     Left temporal lobe infarction (H)     Stroke (H)     Long-term (current) use of anticoagulants [Z79.01]     Cerebral aneurysm without rupture     Past Surgical History:   Procedure Laterality Date     C ANEURYSM, INTRACRAN, SIMPLE SURG  04/2017    coil of aneurysm right posterior paraophthalmic artery     C NONSPECIFIC PROCEDURE      S/P multiple breast biopies - all negative / benign     C NONSPECIFIC PROCEDURE      S/P T&A     C NONSPECIFIC PROCEDURE      [place] teeth extraction     C NONSPECIFIC PROCEDURE      S/P (? unreadable) ankle     COLONOSCOPY N/A 8/12/2015    Procedure: COLONOSCOPY;  Surgeon: Todd" "Deandre Nunez MD;  Location: [facility]     GASTRIC BYPASS  June 24, 2010     ORTHOPEDIC SURGERY Left 2010    wrist fracture     PARATHYROIDECTOMY  9/19/11       Social History   Substance Use Topics     Smoking status: Never Smoker     Smokeless tobacco: Never Used     Alcohol use 0.0 oz/week     0 Standard drinks or equivalent per week      Comment: 1 glass wine 4-5 days a week     Family History   Problem Relation Age of Onset     Breast Cancer Mother      Hypertension Mother      Arthritis Mother      Thyroid Disease Mother      hypo     Breast Cancer Maternal Aunt      Hypertension Paternal Grandmother      Hypertension Sister      CEREBROVASCULAR DISEASE Maternal Grandmother      Colon Cancer No family hx of            Reviewed and updated as needed this visit by clinical staff       Reviewed and updated as needed this visit by Provider         ROS:  Constitutional, HEENT, cardiovascular, pulmonary, gi and gu systems are negative, except as otherwise noted.    OBJECTIVE:                                                    /70 (BP Location: Right arm, Patient Position: Chair, Cuff Size: Adult Large)  Pulse 78  Temp 98.6  F (37  C) (Oral)  Ht 5' 6\" (1.676 m)  Wt 215 lb (97.5 kg)  Breastfeeding? No  BMI 34.7 kg/m2  Body mass index is 34.7 kg/(m^2).  GENERAL: healthy, alert and no distress  NECK: no adenopathy, no asymmetry, masses, or scars and thyroid normal to palpation  RESP: lungs clear to auscultation - no rales, rhonchi or wheezes  CV: regular rate and rhythm, normal S1 S2, no S3 or S4, no murmur, click or rub, no peripheral edema and peripheral pulses strong  ABDOMEN: soft, nontender, no hepatosplenomegaly, no masses and bowel sounds normal  MS: no gross musculoskeletal defects noted, no edema       ASSESSMENT/PLAN:                                                        1. Essential hypertension with goal blood pressure less than 140/90  Will lower Losartan to 50 mg qd. Follow up in 1 month. "       Lian Martinez MD  [facility]

## 2017-06-02 ENCOUNTER — TELEPHONE (OUTPATIENT)
Dept: CARDIOLOGY | Facility: CLINIC | Age: 62
End: 2017-06-02

## 2017-06-02 NOTE — TELEPHONE ENCOUNTER
Pt calling in to make arrangements for PFO closure and asked that orders be put in [facility]. Reviewed DR Church note with Pt. Pt has spoke with DR Navas and he told her going ahead with closure is fine. Pt has not yet spoke with Marni. Did ask Pt to please have DR Navas do a note regarding this if possible.  Informed Pt will need to find out from DR Church what closure device will be used also, and will need to find opening with Edel NP for H&P.  Pt agreed with all of this information. GARRICK Alaniz RN

## 2017-06-03 NOTE — TELEPHONE ENCOUNTER
If pfo closed before 6/26/17 doesn't need to see melchor since i did h/p already  She is already on plavix  If after 6/26 will need drake  Please put orders in or let me know what to do

## 2017-06-09 PROBLEM — I48.0 PAROXYSMAL ATRIAL FIBRILLATION (H): Status: ACTIVE | Noted: 2017-06-09

## 2017-06-13 DIAGNOSIS — Q21.12 PFO (PATENT FORAMEN OVALE): Primary | ICD-10-CM

## 2017-06-13 NOTE — TELEPHONE ENCOUNTER
Scheduling called me about setting up this patient  For closure    Did she ever speak to Dr. Grider as pointed out in your note here or have a repeat 3 month MRI of her coiling as Ranjit mentions in his note?    We are looking at July 13th for closure

## 2017-06-13 NOTE — TELEPHONE ENCOUNTER
Spoke with Pt informed her would have scheduling call her to set up OV with NP and date for PFO closure. GARRICK Alaniz RN

## 2017-06-15 ENCOUNTER — DOCUMENTATION ONLY (OUTPATIENT)
Dept: CARDIOLOGY | Facility: CLINIC | Age: 62
End: 2017-06-15

## 2017-06-15 NOTE — TELEPHONE ENCOUNTER
Call out to DR Navas and Dr Grider's office to discuss if Pt is ok to have PFO closure per there recommendations. Per DR Chruch note-  I am going to have the patient check with Dr. Morrissey and Dr. Grider just to make sure that they have no contraindication to us moving forward with the PFO.  Our protocol is baby aspirin at 81 mg a day plus Plavix at 75 mg a day for 6 months, and that 6 months would start from the date of my procedure, not the date of the aneurysm coil, and then at the 6 month visit, we stop the Plavix and we continue aspirin, however, at 325 mg a day.  We could overlap these timings if it is all right with Neurology and Neuroradiology Departments; otherwise, it can wait until they complete their 3 month MRI visit, and then we can schedule her for the PFO closure at that time.  Waiting for reply.  GARRICK Alaniz RN

## 2017-06-21 ENCOUNTER — OFFICE VISIT (OUTPATIENT)
Dept: URGENT CARE | Facility: URGENT CARE | Age: 62
End: 2017-06-21
Payer: COMMERCIAL

## 2017-06-21 VITALS
OXYGEN SATURATION: 99 % | WEIGHT: 210 LBS | TEMPERATURE: 98 F | HEART RATE: 72 BPM | SYSTOLIC BLOOD PRESSURE: 102 MMHG | DIASTOLIC BLOOD PRESSURE: 70 MMHG | BODY MASS INDEX: 33.89 KG/M2
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
## 2017-06-21 DIAGNOSIS — S61.451A CAT BITE OF HAND, RIGHT, INITIAL ENCOUNTER: ICD-10-CM

## 2017-06-21 PROCEDURE — 99213 OFFICE O/P EST LOW 20 MIN: CPT | Performed by: FAMILY MEDICINE

## 2017-06-21 RX ORDER — LOSARTAN POTASSIUM 100 MG/1
50 TABLET ORAL DAILY
Qty: 90 TABLET | Refills: 3 | Status: ON HOLD | COMMUNITY
Start: 2017-06-21 | End: 2017-07-13

## 2017-06-21 NOTE — MR AVS SNAPSHOT
After Visit Summary   6/21/2017    Jose Coronado    MRN: [id number]           Patient Information     Date Of Birth          1955        Visit Information        Provider Department      6/21/2017 1:15 PM Geovanny Wilson MD [facility]        Today's Diagnoses     Cat bite of hand, right, initial encounter          Care Instructions    Place warmth onto the red, swollen area of the right hand for 20 minutes at a time, every 2-3 hours while awake.     follow up with the primary care provider if the redness keeps expanding.    Go to the emergency room if any fevers appear.                Follow-ups after your visit        Your next 10 appointments already scheduled     Jul 11, 2017 12:00 PM CDT   Return Visit with RON Bolton CNP   [facility] PHYSICIANS HEART AT [place] ([facility])    [address]  [place] [address]   [phone]            Jul 13, 2017  8:30 AM CDT   Cath 90 Minute with [email or web address]   [facility] Cardiac Catheterization Lab ([facility])    [address] [place] [address]   [phone]            Jul 13, 2017  8:30 AM CDT   Ech Mitch with SHECHR2   [facility] Radiology ([facility])    [address] [place] [address]   [phone]           1.  Please bring or wear a comfortable two-piece outfit. 2.  Arrival time: -   [facility]:  arrive 75 minutes prior to examination time. -   [facility]:  arrive 90 minutes prior to examination time. -   [facility]:   arrive 15 minutes prior to examination time. 3.  Plan to have someone here to drive you home after the test. -   Someone should stay with you for 6 hours after your test. 4.  No food or drink: -   6 hours before the test 5.  If you take antacids or water pills (diuretics): Do not take them until after your test. You may take blood pressure medicine with a few sips of water. 6.  If you have  diabetes: -   Morning slots preferred -   If you take insulin, call your diabetes care team. Ask if you should take a   dose the morning of your test. -   If you take diabetes medicine by mouth, don't take it on the morning of your test. Bring it with you to take after the test. (If you have questions, call your diabetes care team.) 7.  Bring a list of any medicines you are taking. 8.  Do not drive for 24 hours after the test. 9.   A responsible adult must stay with you for 24 hours after the test.  10.  For any questions that cannot be answered, please contact the ordering physician            Jul 13, 2017   Procedure with GENERIC ANESTHESIA PROVIDER   [facility] PeriOP Services (--)    [address]  [facility] [address] [phone]            Jul 18, 2017  9:30 AM CDT   Ech Complete with [email or web address]   [facility] CV Echocardiography (Cardiovascular Imaging at [facility])    [address]  [address]  [facility] [address] [phone]           1.  Please bring or wear a comfortable two-piece outfit. 2.  You may eat, drink and take your normal medicines. 3.  For any questions that cannot be answered, please contact the ordering physician            Jul 18, 2017 12:30 PM CDT   Return Visit with RON Bolton CNP   [facility] AT [place] ([facility])    [address]  [facility] [address] [phone]            Jul 26, 2017 10:00 AM CDT   Kailee Olvera with Lian Martinez MD   [facility] ([facility])    [address]  [facility] [id number] [phone]              Who to contact     If you have questions or need follow up information about today's clinic visit or your schedule please contact [facility] directly at [phone].  Normal or non-critical lab and imaging results will be communicated to you by MyChart, letter or  phone within 4 business days after the clinic has received the results. If you do not hear from us within 7 days, please contact the clinic through [employer] or phone. If you have a critical or abnormal lab result, we will notify you by phone as soon as possible.  Submit refill requests through [employer] or call your pharmacy and they will forward the refill request to us. Please allow 3 business days for your refill to be completed.          Additional Information About Your Visit        [employer] Information     [employer] gives you secure access to your electronic health record. If you see a primary care provider, you can also send messages to your care team and make appointments. If you have questions, please call your primary care clinic.  If you do not have a primary care provider, please call [phone] and they will assist you.        Care EveryWhere ID     This is your Care EveryWhere ID. This could be used by other organizations to access your [facility] medical records  [id number]        Your Vitals Were     Pulse Temperature Pulse Oximetry BMI (Body Mass Index)          72 98  F (36.7  C) (Oral) 99% 33.89 kg/m2         Blood Pressure from Last 3 Encounters:   06/21/17 102/70   05/31/17 108/70   05/26/17 102/62    Weight from Last 3 Encounters:   06/21/17 210 lb (95.3 kg)   05/31/17 215 lb (97.5 kg)   05/26/17 215 lb (97.5 kg)              Today, you had the following     No orders found for display         Today's Medication Changes          These changes are accurate as of: 6/21/17  2:45 PM.  If you have any questions, ask your nurse or doctor.               Start taking these medicines.        Dose/Directions    amoxicillin-clavulanate 875-125 MG per tablet   Commonly known as:  AUGMENTIN   Used for:  Cat bite of hand, right, initial encounter   Started by:  Geovanny Wilson MD        Dose:  1 tablet   Take 1 tablet by mouth 2 times daily for 14 days   Quantity:  28 tablet   Refills:  0         These medicines  have changed or have updated prescriptions.        Dose/Directions    losartan 100 MG tablet   Commonly known as:  COZAAR   This may have changed:  how much to take   Used for:  Cat bite of hand, right, initial encounter   Changed by:  Geovanny Wilson MD        Dose:  50 mg   Take 0.5 tablets (50 mg) by mouth daily   Quantity:  90 tablet   Refills:  3            Where to get your medicines      These medications were sent to [facility]/pharmacy #[id number] - [place], [place] - [address]  [address] [address]     Phone:  [phone]     amoxicillin-clavulanate 875-125 MG per tablet                Primary Care Provider Office Phone # Fax #    Lian Martinez [phone]       [facility] [address]   [facility] [address]        Equal Access to Services     [facility]: Hadii ana osorio hadasho Soomaali, waaxda luqadaha, qaybta kaalmada adeegyada, eve dias . So [place] [phone].    ATENCIÓN: Si habla español, tiene a tuttle disposición servicios gratuitos de asistencia lingüística. [facility] [phone].    We comply with applicable federal civil rights laws and [place] laws. We do not discriminate on the basis of race, color, national origin, age, disability sex, sexual orientation or gender identity.            Thank you!     Thank you for choosing [facility]  for your care. Our goal is always to provide you with excellent care. Hearing back from our patients is one way we can continue to improve our services. Please take a few minutes to complete the written survey that you may receive in the mail after your visit with us. Thank you!             Your Updated Medication List - Protect others around you: Learn how to safely use, store and throw away your medicines at www.disposemymeds.org.          This list is accurate as of: 6/21/17  2:45 PM.  Always use your most recent med list.                   Brand Name  Dispense Instructions for use Diagnosis    amoxicillin-clavulanate 875-125 MG per tablet    AUGMENTIN    28 tablet    Take 1 tablet by mouth 2 times daily for 14 days    Cat bite of hand, right, initial encounter       aspirin 81 MG tablet     30 tablet    Take 81 mg by mouth daily (Taking this in preparation for her surgery 4/10/17)        atenolol 50 MG tablet    TENORMIN    90 tablet    Take 1 tablet (50 mg) by mouth daily    Palpitations       atorvastatin 40 MG tablet    LIPITOR    90 tablet    Take 1 tablet (40 mg) by mouth daily    Left temporal lobe infarction (H)       CALCIUM CITRATE + D PO      Take 1 tablet by mouth every morning        cetirizine 10 MG tablet    zyrTEC    30 tablet    Take 1 tablet by mouth every evening.        Cyanocobalamin 2500 MCG Tabs      Take 2,500 mcg by mouth twice a week Mon, Thur        Ferrous Sulfate 27 MG Tabs      Take 27 mg by mouth 2 times daily        hydrochlorothiazide 25 MG tablet    HYDRODIURIL    90 tablet    TAKE 1 TABLET (25 MG) BY MOUTH DAILY    Essential hypertension with goal blood pressure less than 140/90       imipramine 25 MG tablet    TOFRANIL    100 tablet    Take 1 tablet (25 mg) by mouth daily    Bladder dysfunction       losartan 100 MG tablet    COZAAR    90 tablet    Take 0.5 tablets (50 mg) by mouth daily    Cat bite of hand, right, initial encounter       PLAVIX 75 MG tablet   Generic drug:  clopidogrel     90 tablet    Take 1 tablet (75 mg) by mouth daily Taking this in preparation for her surgery 4/10/17        PROBIOTIC PO      Take 1 tablet by mouth daily.        topiramate 25 MG tablet    TOPAMAX    180 tablet    Take 1 tablet (25 mg) by mouth 2 times daily Prescribed by Dr Tello    Migraine       valACYclovir 1000 mg tablet    VALTREX    4 tablet    Take 2 tablets (2,000 mg) by mouth 2 times daily as needed    Cold sore       VITAMIN C PO      Take 250 mg by mouth 2 times daily (0.5 x 500 mg tablet = 250 mg dose)        VITAMIN D  (CHOLECALCIFEROL) PO      Take 1,000 Units by mouth daily        ZADITOR 0.025 % Soln ophthalmic solution   Generic drug:  ketotifen     1 Bottle    Place 1 drop into both eyes every 12 hours as needed for itching

## 2017-06-21 NOTE — PATIENT INSTRUCTIONS
Place warmth onto the red, swollen area of the right hand for 20 minutes at a time, every 2-3 hours while awake.     follow up with the primary care provider if the redness keeps expanding.    Go to the emergency room if any fevers appear.

## 2017-06-21 NOTE — NURSING NOTE
"Chief Complaint   Patient presents with     Urgent Care     Cat Bite     Right arm bite last night       Initial /70  Pulse 72  Temp 98  F (36.7  C) (Oral)  Wt 210 lb (95.3 kg)  SpO2 99%  BMI 33.89 kg/m2 Estimated body mass index is 33.89 kg/(m^2) as calculated from the following:    Height as of 5/31/17: 5' 6\" (1.676 m).    Weight as of this encounter: 210 lb (95.3 kg).  Medication Reconciliation: complete   Karissa WASHBURN M.A.      "

## 2017-06-21 NOTE — TELEPHONE ENCOUNTER
Call from DR Grider's office from NP/PA Sophia Ahuja stating PFO closure is ok per DR Grider. GARRICK Alaniz RN

## 2017-06-21 NOTE — PROGRESS NOTES
SUBJECTIVE:  Jose Coronado is a 61 year old female who presents with a chief complaint of an animal bite on the right wrist.  She was bitten by her brother's pet cat yesterday night.   Cicumstances of bite: patient was coaxing the cat out from under the bed to eat something.  All of a sudden, the cat bit the patient's right wrist quickly. .  Animal's immunizations up to date   Associated symptoms: soreness.  Patient has noticed redness, swelling, tenderness recently today. No fevers.      last tetanus booster within 10 years (October 4, 2010).     Past Medical History:   Diagnosis Date     Anemia      CVA (cerebral vascular accident) (H) 2017    ?migraine, ?pfo--negative vasc w/u, neg hypercoag w/u     Diffuse cystic mastopathy     Fibrocystic breast disease     HTN, goal below 140/90      Hyperparathyroidism (H)      Infectious mononucleosis     Mono at age 16     Irregular heart beat     PAT no afib on 30day monitor     Labyrinthitis, unspecified      Migraine headache with aura      Osteopenia      Pain in joint, shoulder region     Secondary to a fall     PFO (patent foramen ovale)      S/P gastric bypass June, 2010     Sleep apnea     she is on CPAP     Vitamin D deficiencies        Current Outpatient Prescriptions:      losartan (COZAAR) 100 MG tablet, Take 0.5 tablets (50 mg) by mouth daily, Disp: 90 tablet, Rfl: 3     hydrochlorothiazide (HYDRODIURIL) 25 MG tablet, TAKE 1 TABLET (25 MG) BY MOUTH DAILY, Disp: 90 tablet, Rfl: 1     Ascorbic Acid (VITAMIN C PO), Take 250 mg by mouth 2 times daily (0.5 x 500 mg tablet = 250 mg dose), Disp: , Rfl:      VITAMIN D, CHOLECALCIFEROL, PO, Take 1,000 Units by mouth daily, Disp: , Rfl:      clopidogrel (PLAVIX) 75 MG tablet, Take 1 tablet (75 mg) by mouth daily Taking this in preparation for her surgery 4/10/17, Disp: 90 tablet, Rfl: 3     aspirin 81 MG tablet, Take 81 mg by mouth daily (Taking this in preparation for her surgery 4/10/17), Disp: 30 tablet, Rfl:       topiramate (TOPAMAX) 25 MG tablet, Take 1 tablet (25 mg) by mouth 2 times daily Prescribed by Dr Tello, Disp: 180 tablet, Rfl: 3     atorvastatin (LIPITOR) 40 MG tablet, Take 1 tablet (40 mg) by mouth daily, Disp: 90 tablet, Rfl: 1     Calcium Citrate-Vitamin D (CALCIUM CITRATE + D PO), Take 1 tablet by mouth every morning, Disp: , Rfl:      Ferrous Sulfate 27 MG TABS, Take 27 mg by mouth 2 times daily, Disp: , Rfl:      Cyanocobalamin 2500 MCG TABS, Take 2,500 mcg by mouth twice a week Mon, Thur, Disp: , Rfl:      imipramine (TOFRANIL) 25 MG tablet, Take 1 tablet (25 mg) by mouth daily, Disp: 100 tablet, Rfl: 2     atenolol (TENORMIN) 50 MG tablet, Take 1 tablet (50 mg) by mouth daily, Disp: 90 tablet, Rfl: 3     valACYclovir (VALTREX) 1000 mg tablet, Take 2 tablets (2,000 mg) by mouth 2 times daily as needed, Disp: 4 tablet, Rfl: 5     ketotifen (ZADITOR) 0.025 % SOLN, Place 1 drop into both eyes every 12 hours as needed for itching, Disp: 1 Bottle, Rfl:      cetirizine (ZYRTEC) 10 MG tablet, Take 1 tablet by mouth every evening., Disp: 30 tablet, Rfl: 1     Probiotic Product (PROBIOTIC PO), Take 1 tablet by mouth daily., Disp: , Rfl:      [DISCONTINUED] losartan (COZAAR) 100 MG tablet, Take 1 tablet (100 mg) by mouth daily, Disp: 90 tablet, Rfl: 3  Social History   Substance Use Topics     Smoking status: Never Smoker     Smokeless tobacco: Never Used     Alcohol use 0.0 oz/week     0 Standard drinks or equivalent per week      Comment: 1 glass wine 4-5 days a week       ROS:  Review of systems negative except as stated above.    OBJECTIVE:  /70  Pulse 72  Temp 98  F (36.7  C) (Oral)  Wt 210 lb (95.3 kg)  SpO2 99%  BMI 33.89 kg/m2  GENERAL: healthy, alert no acute distress  SKIN: 3 puncture wounds at the base of the right thumb with surrounding erythema, edema.    MS:extremities normal- no gross deformities noted,  FROM noted in all extremities  NEURO: Normal strength and tone, sensory exam grossly  normal,  normal speech and mentation    ASSESSMENT:  Cat Bite on the right hand    PLAN:  Rx:  Augmentin  Warmth  follow up with the primary care provider if the fever keeps expanding.  Go to the emergency room if any fevers appear.    See orders in epic    Geovanny Wilson MD

## 2017-06-22 ENCOUNTER — TELEPHONE (OUTPATIENT)
Dept: CARDIOLOGY | Facility: CLINIC | Age: 62
End: 2017-06-22

## 2017-06-22 NOTE — TELEPHONE ENCOUNTER
Dr Navas's office called and his assistance said she would speak with  and have letter drafted addressing PFO closure. GARRICK Alaniz RN

## 2017-07-07 ENCOUNTER — ANTICOAGULATION THERAPY VISIT (OUTPATIENT)
Dept: ANTICOAGULATION | Facility: CLINIC | Age: 62
End: 2017-07-07

## 2017-07-07 DIAGNOSIS — I63.9 STROKE (H): ICD-10-CM

## 2017-07-07 DIAGNOSIS — Z79.01 LONG-TERM (CURRENT) USE OF ANTICOAGULANTS: ICD-10-CM

## 2017-07-07 NOTE — PROGRESS NOTES
Per review of records, pt was switched to Plavix and ASA after hospitalization in April.  Removed pt from [facility] monitoring at this time.  Bree Mejia RN

## 2017-07-07 NOTE — MR AVS SNAPSHOT
Jose Coronado   7/7/2017   Anticoagulation Therapy Visit    Description:  61 year old female   Provider:  Lian Martinez MD   Department:  [facility]           INR as of 7/7/2017     Today's INR       Anticoagulation Summary as of 7/7/2017     INR goal 2.0-3.0   Today's INR    Full instructions 7.5 mg on Mon, Thu; 5 mg all other days   Next INR check     Indications   Long-term (current) use of anticoagulants [Z79.01] [Z79.01]  Stroke (H) [I63.9]         Anticoagulation Episode Summary     Resolved date 7/7/2017    Resolved reason Therapy  Complete      Contact Numbers     [facility] Phone Numbers:  Anticoagulation Clinic Appointments : [phone]  Anticoagulation Nurse: [phone]

## 2017-07-10 ENCOUNTER — DOCUMENTATION ONLY (OUTPATIENT)
Dept: CARDIOLOGY | Facility: CLINIC | Age: 62
End: 2017-07-10

## 2017-07-11 ENCOUNTER — OFFICE VISIT (OUTPATIENT)
Dept: CARDIOLOGY | Facility: CLINIC | Age: 62
End: 2017-07-11
Attending: INTERNAL MEDICINE
Payer: COMMERCIAL

## 2017-07-11 ENCOUNTER — ANESTHESIA EVENT (OUTPATIENT)
Dept: SURGERY | Facility: CLINIC | Age: 62
DRG: 272 | End: 2017-07-11
Payer: COMMERCIAL

## 2017-07-11 VITALS
DIASTOLIC BLOOD PRESSURE: 72 MMHG | HEIGHT: 66 IN | BODY MASS INDEX: 33.52 KG/M2 | SYSTOLIC BLOOD PRESSURE: 122 MMHG | WEIGHT: 208.6 LBS | HEART RATE: 68 BPM

## 2017-07-11 DIAGNOSIS — Q21.12 PFO (PATENT FORAMEN OVALE): Primary | ICD-10-CM

## 2017-07-11 PROCEDURE — 99215 OFFICE O/P EST HI 40 MIN: CPT | Mod: 25 | Performed by: NURSE PRACTITIONER

## 2017-07-11 NOTE — PROGRESS NOTES
This is to sirisha and core    There is a note from sirisha that jitendra will be sending a letter regarding closure  Have we seen this???

## 2017-07-11 NOTE — MR AVS SNAPSHOT
After Visit Summary   7/11/2017    Jose Coronado    MRN: [id number]           Patient Information     Date Of Birth          1955        Visit Information        Provider Department      7/11/2017 12:00 PM Joanie Soriano APRN CNP [facility] [place]        Today's Diagnoses     PFO (patent foramen ovale)          Care Instructions    Schedule follow up for 3 months with Dr. Haas    See me back as scheduled          Follow-ups after your visit        Your next 10 appointments already scheduled     Jul 13, 2017  8:30 AM CDT   Cath 90 Minute with [email or web address]   [facility] ([facility])    [address]  [place] [address]   [phone]            Jul 13, 2017  8:30 AM CDT   Ech Mitch with [email or web address]   [facility] ([facility])    [address] [place] [address]   [phone]           1.  Please bring or wear a comfortable two-piece outfit. 2.  Arrival time: -   [facility]:  arrive 75 minutes prior to examination time. -   [facility]:  arrive 90 minutes prior to examination time. -   [facility]:   arrive 15 minutes prior to examination time. 3.  Plan to have someone here to drive you home after the test. -   Someone should stay with you for 6 hours after your test. 4.  No food or drink: -   6 hours before the test 5.  If you take antacids or water pills (diuretics): Do not take them until after your test. You may take blood pressure medicine with a few sips of water. 6.  If you have diabetes: -   Morning slots preferred -   If you take insulin, call your diabetes care team. Ask if you should take a   dose the morning of your test. -   If you take diabetes medicine by mouth, don't take it on the morning of your test. Bring it with you to take after the test. (If you have questions, call your diabetes care team.) 7.  Bring a list of any medicines you are taking.  8.  Do not drive for 24 hours after the test. 9.   A responsible adult must stay with you for 24 hours after the test.  10.  For any questions that cannot be answered, please contact the ordering physician            Jul 13, 2017   Procedure with GENERIC ANESTHESIA PROVIDER   [facility] (--)    [address]  [place] [address] [phone]            Jul 18, 2017  9:30 AM CDT   Ech Complete with [id number]   [facility]chocardi[facility] ([facility] at [facility])    [address]  [facility] [address] [phone]           1.  Please bring or wear a comfortable two-piece outfit. 2.  You may eat, drink and take your normal medicines. 3.  For any questions that cannot be answered, please contact the ordering physician            Jul 18, 2017 12:30 PM CDT   Return Visit with RON Bolton CNP   [facility] ([facility])    [address]  [facility] [address] [phone]            Jul 27, 2017  9:20 AM CDT   MyChart Long with Lian Martinez MD   [facility] ([facility]    [address]  [facility] [address] [phone]              Future tests that were ordered for you today     Open Future Orders        Priority Expected Expires Ordered    Echocardiogram Routine 10/9/2017 7/11/2018 7/11/2017            Who to contact     If you have questions or need follow up information about today's clinic visit or your schedule please contact [facility] AT [place] directly at [phone].  Normal or non-critical lab and imaging results will be communicated to you by MyChart, letter or phone within 4 business days after the clinic has received the results. If you do not hear from us within 7 days, please contact the clinic through MyChart or phone. If you have a  "critical or abnormal lab result, we will notify you by phone as soon as possible.  Submit refill requests through [facility] or call your pharmacy and they will forward the refill request to us. Please allow 3 business days for your refill to be completed.          Additional Information About Your Visit        MyChart Information     [employer] gives you secure access to your electronic health record. If you see a primary care provider, you can also send messages to your care team and make appointments. If you have questions, please call your primary care clinic.  If you do not have a primary care provider, please call [phone] and they will assist you.        Care EveryWhere ID     This is your Care EveryWhere ID. This could be used by other organizations to access your [facility] medical records  [id number]        Your Vitals Were     Pulse Height BMI (Body Mass Index)             68 1.676 m (5' 6\") 33.67 kg/m2          Blood Pressure from Last 3 Encounters:   07/11/17 122/72   06/21/17 102/70   05/31/17 108/70    Weight from Last 3 Encounters:   07/11/17 94.6 kg (208 lb 9.6 oz)   06/21/17 95.3 kg (210 lb)   05/31/17 97.5 kg (215 lb)              We Performed the Following     Follow-Up with Cardiac Advanced Practice Provider        Primary Care Provider Office Phone # Fax #    Lian Martinez [phone]       [facility] [address]   [facility] [address]        Equal Access to Services     LOREN BALDERRAMA : Hadii aad ku hadasho Soomaali, waaxda luqadaha, qaybta kaalmada adeegyada, eve dias . So [facility] [phone].    ATENCIÓN: Si habla español, tiene a tuttle disposición servicios gratuitos de asistencia lingüística. Jen al [phone].    We comply with applicable federal civil rights laws and [place] laws. We do not discriminate on the basis of race, color, national origin, age, disability sex, sexual orientation or gender " identity.            Thank you!     Thank you for choosing [facility] [place]  for your care. Our goal is always to provide you with excellent care. Hearing back from our patients is one way we can continue to improve our services. Please take a few minutes to complete the written survey that you may receive in the mail after your visit with us. Thank you!             Your Updated Medication List - Protect others around you: Learn how to safely use, store and throw away your medicines at www.disposemymeds.org.          This list is accurate as of: 7/11/17 12:50 PM.  Always use your most recent med list.                   Brand Name Dispense Instructions for use Diagnosis    aspirin 81 MG tablet     30 tablet    Take 81 mg by mouth daily (Taking this in preparation for her surgery 4/10/17)        atenolol 50 MG tablet    TENORMIN    90 tablet    Take 1 tablet (50 mg) by mouth daily    Palpitations       atorvastatin 40 MG tablet    LIPITOR    90 tablet    Take 1 tablet (40 mg) by mouth daily    Left temporal lobe infarction (H)       CALCIUM CITRATE + D PO      Take 1 tablet by mouth every morning        cetirizine 10 MG tablet    zyrTEC    30 tablet    Take 1 tablet by mouth every evening.        Cyanocobalamin 2500 MCG Tabs      Take 2,500 mcg by mouth twice a week Mon, Thur        Ferrous Sulfate 27 MG Tabs      Take 27 mg by mouth 2 times daily        FIBER COMPLETE PO      Take by mouth daily        hydrochlorothiazide 25 MG tablet    HYDRODIURIL    90 tablet    TAKE 1 TABLET (25 MG) BY MOUTH DAILY    Essential hypertension with goal blood pressure less than 140/90       imipramine 25 MG tablet    TOFRANIL    100 tablet    Take 1 tablet (25 mg) by mouth daily    Bladder dysfunction       losartan 100 MG tablet    COZAAR    90 tablet    Take 0.5 tablets (50 mg) by mouth daily    Cat bite of hand, right, initial encounter       PLAVIX 75 MG tablet   Generic drug:  clopidogrel      90 tablet    Take 1 tablet (75 mg) by mouth daily Taking this in preparation for her surgery 4/10/17        PROBIOTIC PO      Take 1 tablet by mouth daily.        topiramate 25 MG tablet    TOPAMAX    180 tablet    Take 1 tablet (25 mg) by mouth 2 times daily Prescribed by Dr Elisha Penaloza       UNABLE TO FIND      CPAP machine every night        valACYclovir 1000 mg tablet    VALTREX    4 tablet    Take 2 tablets (2,000 mg) by mouth 2 times daily as needed    Cold sore       VITAMIN C PO      Take 250 mg by mouth 2 times daily (0.5 x 500 mg tablet = 250 mg dose)        VITAMIN D (CHOLECALCIFEROL) PO      Take 1,000 Units by mouth daily        ZADITOR 0.025 % Soln ophthalmic solution   Generic drug:  ketotifen     1 Bottle    Place 1 drop into both eyes every 12 hours as needed for itching

## 2017-07-11 NOTE — LETTER
7/11/2017    Lian Martinez MD  [facility]   [address] [facility] [address]  [facility] [address]    RE: Jose Borgeser       Dear Colleague,    I had the pleasure of seeing Jose Coronado in the [facility].    HPI and Plan:     Jose Coronado is a 61-year-old female here today for cardiac assessment in preparation for her upcoming percutaneous closure of a patent foramen ovale/ASD.    She was hospitalized in February . She had been to the [facility] in January and several days after the flight home noted disorientation. 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    She was diagnosed at that point with a stroke;  hypercoagulable panel was performed, most of which was negative. However, lupus anticoagulant and cardiolipin antibodies were not checked that I can find.    She was initially placed on warfarin, however this was discontinued after an incidental finding of a right posterior paraophthalmic/internal carotid aneurysm which was coiled and and stented. Plavix and Asprin were initiated following this procedure and continue to date.    A 30 day event recorder was thought to show atrial fibrillation however on further review was found to be PAT.    Echocardiography was performed revealing a stretched patent foramen ovale with left-to-right and right-to-left flow.BROKC subsequently confirmed in aneurysmal atrial septum with a positive double study with bi-directional flow. This has been reviewed with Dr. Church.    She was seen by Dr Morrissey and Dr. Grider (who did coiling); both have cleared the patient to proceed with closure of her atrial septal defect.    This patient met with Dr. Church who recommended closure pending review by Dr. Morrissey and Dr. Grider, both of whom agree that should be undertaken.    Jose returns today stating that she is feeling well without residual  stroke symptoms. She was seen in the emergency department on June 21st following a cat bite to the right wrist. This was treated with Augmentin and has resolved. There is no ongoing redness swelling or fever. She notes no residual stroke symptoms.     Of note her ascending aorta was dilated by echocardiography at 3.9cm. She remains on multiple antihypertensives and is working with primary care to make dose adjustments.    She has a history of contrast allergy. She has no prior history of reactions to anesthesia or conscious sedation.    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    Impression and plan    1. Stroke in February 2017 with evidence of a patent foramen ovale with bidirectional flow. No residual stroke symptoms. Aneurysmal atrial septum is present. She will proceed with percutaneous closure under general anesthesia using the Amplatzer device. We have reviewed the risks of the procedure including infection, bleeding, iatrogenic vessel wall trauma, stroke, reactions to anesthesia, and death. She accepts the risks and is willing to proceed. She will remain on aspirin 81 mg per day and Plavix 75 mg per day, taking a dose of each the morning of her procedure. We discussed to avoid dental work over the next 6 months and if necessary she would need SBE prophylaxis. Consents have been signed. NPO after midnight status was reviewed beginning the evening prior to the procedure. I will complete the hypercoaguable panel upon check in including lupus anticoagulant and cardiolipin antibody. She is scheduled post procedure with me. All discharge instructions have been reviewed and given to the patient in writing.    2. Hypertension reasonable control managed by PMD-hold HCT the am of the procedure.    3. 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 Follow up MRI was done this am.    4. No evidence of atrial fibrillation upon further review of  event recorder.     5. Mild dyslipidemia treated with a atorvastatin with LDL 95/HDL 51    6. Hx of migraine headaches.    It has been a pleasure meeting Jose in follow up today. Greater than 50% of this one hour visit was spent in counseling.     Orders Placed This Encounter   Procedures     Echocardiogram       Orders Placed This Encounter   Medications     FIBER COMPLETE PO     Sig: Take by mouth daily     UNABLE TO FIND     Sig: CPAP machine every night       There are no discontinued medications.      Encounter Diagnosis   Name Primary?     PFO (patent foramen ovale)        CURRENT MEDICATIONS:  Current Outpatient Prescriptions   Medication Sig Dispense Refill     FIBER COMPLETE PO Take by mouth daily       UNABLE TO FIND CPAP machine every night       losartan (COZAAR) 100 MG tablet Take 0.5 tablets (50 mg) by mouth daily 90 tablet 3     hydrochlorothiazide (HYDRODIURIL) 25 MG tablet TAKE 1 TABLET (25 MG) BY MOUTH DAILY 90 tablet 1     Ascorbic Acid (VITAMIN C PO) Take 250 mg by mouth 2 times daily (0.5 x 500 mg tablet = 250 mg dose)       VITAMIN D, CHOLECALCIFEROL, PO Take 1,000 Units by mouth daily       clopidogrel (PLAVIX) 75 MG tablet Take 1 tablet (75 mg) by mouth daily Taking this in preparation for her surgery 4/10/17 90 tablet 3     aspirin 81 MG tablet Take 81 mg by mouth daily (Taking this in preparation for her surgery 4/10/17) 30 tablet      topiramate (TOPAMAX) 25 MG tablet Take 1 tablet (25 mg) by mouth 2 times daily Prescribed by Dr Tello 180 tablet 3     atorvastatin (LIPITOR) 40 MG tablet Take 1 tablet (40 mg) by mouth daily 90 tablet 1     Calcium Citrate-Vitamin D (CALCIUM CITRATE + D PO) Take 1 tablet by mouth every morning       Ferrous Sulfate 27 MG TABS Take 27 mg by mouth 2 times daily       Cyanocobalamin 2500 MCG TABS Take 2,500 mcg by mouth twice a week Mon, Thur       imipramine (TOFRANIL) 25 MG tablet Take 1 tablet (25 mg) by mouth daily 100 tablet 2     atenolol (TENORMIN) 50 MG  tablet Take 1 tablet (50 mg) by mouth daily 90 tablet 3     valACYclovir (VALTREX) 1000 mg tablet Take 2 tablets (2,000 mg) by mouth 2 times daily as needed 4 tablet 5     ketotifen (ZADITOR) 0.025 % SOLN Place 1 drop into both eyes every 12 hours as needed for itching 1 Bottle      cetirizine (ZYRTEC) 10 MG tablet Take 1 tablet by mouth every evening. 30 tablet 1     Probiotic Product (PROBIOTIC PO) Take 1 tablet by mouth daily.         ALLERGIES     Allergies   Allergen Reactions     Contrast Dye Itching     Reaction of immediate burning and severe itching in Right ear after injection for CT.      Sulfa Drugs      hives       PAST MEDICAL HISTORY:  Past Medical History:   Diagnosis Date     Anemia      CVA (cerebral vascular accident) (H) 2017    ?migraine, ?pfo--negative vasc w/u, neg hypercoag w/u     Diffuse cystic mastopathy     Fibrocystic breast disease     HTN, goal below 140/90      Hyperparathyroidism (H)      Infectious mononucleosis     Mono at age 16     Irregular heart beat     PAT no afib on 30day monitor     Labyrinthitis, unspecified      Migraine headache with aura      Osteopenia      Pain in joint, shoulder region     Secondary to a fall     PFO (patent foramen ovale)      S/P gastric bypass June, 2010     Sleep apnea     she is on CPAP     Vitamin D deficiencies        PAST SURGICAL HISTORY:  Past Surgical History:   Procedure Laterality Date     C ANEURYSM, INTRACRAN, SIMPLE SURG  04/2017    coil of aneurysm right posterior paraophthalmic artery     C NONSPECIFIC PROCEDURE      S/P multiple breast biopies - all negative / benign     C NONSPECIFIC PROCEDURE      S/P T&A     C NONSPECIFIC PROCEDURE      [place] teeth extraction     C NONSPECIFIC PROCEDURE      S/P (? unreadable) ankle     COLONOSCOPY N/A 8/12/2015    Procedure: COLONOSCOPY;  Surgeon: Deandre Brooks MD;  Location:  GI     GASTRIC BYPASS  June 24, 2010     ORTHOPEDIC SURGERY Left 2010    wrist fracture     PARATHYROIDECTOMY  " 9/19/11       FAMILY HISTORY:  Family History   Problem Relation Age of Onset     Breast Cancer Mother      Hypertension Mother      Arthritis Mother      Thyroid Disease Mother      hypo     Breast Cancer Maternal Aunt      Hypertension Paternal Grandmother      CEREBROVASCULAR DISEASE Maternal Grandmother      Family History Negative Maternal Grandfather      Family History Negative Paternal Grandfather      Family History Negative Son      Family History Negative Daughter      Family History Negative Daughter      Colon Cancer No family hx of        SOCIAL HISTORY:  Social History     Social History     Marital status: Single     Spouse name: N/A     Number of children: N/A     Years of education: N/A     Social History Main Topics     Smoking status: Never Smoker     Smokeless tobacco: Never Used     Alcohol use 0.0 oz/week     0 Standard drinks or equivalent per week      Comment: 1 glass wine 4-5 days a week     Drug use: No     Sexual activity: Not Currently     Partners: Male     Other Topics Concern     None     Social History Narrative       Review of Systems:  Skin:  Negative       Eyes:  Negative      ENT:  Positive for hearing loss    Respiratory:  Positive for sleep apnea;CPAP     Cardiovascular:    Positive for;dizziness;palpitations occ, pmd stopped amlodipine, low bp  Gastroenterology: Positive for   loose stool since starting iron   Genitourinary:  Negative      Musculoskeletal:  Negative      Neurologic:  Positive for headaches    Psychiatric:  Negative      Heme/Lymph/Imm:  Negative      Endocrine:  Negative        Physical Exam:  Vitals: /72  Pulse 68  Ht 1.676 m (5' 6\")  Wt 94.6 kg (208 lb 9.6 oz)  BMI 33.67 kg/m2    Constitutional:  cooperative, alert and oriented, well developed, well nourished, in no acute distress        Skin:  warm and dry to the touch, no apparent skin lesions or masses noted        Head:  normocephalic, no masses or lesions        Eyes:  pupils equal and " round, conjunctivae and lids unremarkable, sclera white, no xanthalasma, EOMS intact, no nystagmus        ENT:  no pallor or cyanosis, dentition good        Neck:  carotid pulses are full and equal bilaterally, JVP normal, no carotid bruit, no thyromegaly        Chest:  normal breath sounds, clear to auscultation, normal A-P diameter, normal symmetry, normal respiratory excursion, no use of accessory muscles          Cardiac: regular rhythm, normal S1/S2, no S3 or S4, apical impulse not displaced, no murmurs, gallops or rubs                  Abdomen:  abdomen soft, non-tender, BS normoactive, no mass, no HSM, no bruits obese      Vascular: pulses full and equal, no bruits auscultated                                        Extremities and Back:  no deformities, clubbing, cyanosis, erythema observed         healed mirella bite right wrist    Neurological:  affect appropriate, oriented to time, person and place        Thank you for allowing me to participate in the care of your patient.    Sincerely,     Joanie Soriano, RON CNP

## 2017-07-11 NOTE — PROGRESS NOTES
HPI and Plan:     Jose Coronado is a 61-year-old female here today for cardiac assessment in preparation for her upcoming percutaneous closure of a patent foramen ovale/ASD.    She was hospitalized in February . She had been to the [facility] [place] in January and several days after the flight home noted disorientation. 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    She was diagnosed at that point with a stroke;  hypercoagulable panel was performed, most of which was negative. However, lupus anticoagulant and cardiolipin antibodies were not checked that I can find.    She was initially placed on warfarin, however this was discontinued after an incidental finding of a right posterior paraophthalmic/internal carotid aneurysm which was coiled and and stented. Plavix and Asprin were initiated following this procedure and continue to date.    A 30 day event recorder was thought to show atrial fibrillation however on further review was found to be PAT.    Echocardiography was performed revealing a stretched patent foramen ovale with left-to-right and right-to-left flow.BROCK subsequently confirmed in aneurysmal atrial septum with a positive double study with bi-directional flow. This has been reviewed with Dr. Church.    She was seen by Dr Morrissey and Dr. Grider (who did coiling); both have cleared the patient to proceed with closure of her atrial septal defect.    This patient met with Dr. Church who recommended closure pending review by Dr. Morrissey and Dr. Grider, both of whom agree that should be undertaken.    Jose returns today stating that she is feeling well without residual stroke symptoms. She was seen in the emergency department on June 21st following a cat bite to the right wrist. This was treated with Augmentin and has resolved. There is no ongoing redness swelling or fever. She notes no residual stroke symptoms.     Of  note her ascending aorta was dilated by echocardiography at 3.9cm. She remains on multiple antihypertensives and is working with primary care to make dose adjustments.    She has a history of contrast allergy. She has no prior history of reactions to anesthesia or conscious sedation.    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    Impression and plan    1. Stroke in February 2017 with evidence of a patent foramen ovale with bidirectional flow. No residual stroke symptoms. Aneurysmal atrial septum is present. She will proceed with percutaneous closure under general anesthesia using the Amplatzer device. We have reviewed the risks of the procedure including infection, bleeding, iatrogenic vessel wall trauma, stroke, reactions to anesthesia, and death. She accepts the risks and is willing to proceed. She will remain on aspirin 81 mg per day and Plavix 75 mg per day, taking a dose of each the morning of her procedure. We discussed to avoid dental work over the next 6 months and if necessary she would need SBE prophylaxis. Consents have been signed. NPO after midnight status was reviewed beginning the evening prior to the procedure. I will complete the hypercoaguable panel upon check in including lupus anticoagulant and cardiolipin antibody. She is scheduled post procedure with me. All discharge instructions have been reviewed and given to the patient in writing.    2. Hypertension reasonable control managed by PMD-hold HCT the am of the procedure.    3. 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 Follow up MRI was done this am.    4. No evidence of atrial fibrillation upon further review of event recorder.     5. Mild dyslipidemia treated with a atorvastatin with LDL 95/HDL 51    6. Hx of migraine headaches.    It has been a pleasure meeting Jose in follow up today. Greater than 50% of this one hour visit was spent in counseling.     Orders  Placed This Encounter   Procedures     Echocardiogram       Orders Placed This Encounter   Medications     FIBER COMPLETE PO     Sig: Take by mouth daily     UNABLE TO FIND     Sig: CPAP machine every night       There are no discontinued medications.      Encounter Diagnosis   Name Primary?     PFO (patent foramen ovale)        CURRENT MEDICATIONS:  Current Outpatient Prescriptions   Medication Sig Dispense Refill     FIBER COMPLETE PO Take by mouth daily       UNABLE TO FIND CPAP machine every night       losartan (COZAAR) 100 MG tablet Take 0.5 tablets (50 mg) by mouth daily 90 tablet 3     hydrochlorothiazide (HYDRODIURIL) 25 MG tablet TAKE 1 TABLET (25 MG) BY MOUTH DAILY 90 tablet 1     Ascorbic Acid (VITAMIN C PO) Take 250 mg by mouth 2 times daily (0.5 x 500 mg tablet = 250 mg dose)       VITAMIN D, CHOLECALCIFEROL, PO Take 1,000 Units by mouth daily       clopidogrel (PLAVIX) 75 MG tablet Take 1 tablet (75 mg) by mouth daily Taking this in preparation for her surgery 4/10/17 90 tablet 3     aspirin 81 MG tablet Take 81 mg by mouth daily (Taking this in preparation for her surgery 4/10/17) 30 tablet      topiramate (TOPAMAX) 25 MG tablet Take 1 tablet (25 mg) by mouth 2 times daily Prescribed by Dr Tello 180 tablet 3     atorvastatin (LIPITOR) 40 MG tablet Take 1 tablet (40 mg) by mouth daily 90 tablet 1     Calcium Citrate-Vitamin D (CALCIUM CITRATE + D PO) Take 1 tablet by mouth every morning       Ferrous Sulfate 27 MG TABS Take 27 mg by mouth 2 times daily       Cyanocobalamin 2500 MCG TABS Take 2,500 mcg by mouth twice a week Mon, Thur       imipramine (TOFRANIL) 25 MG tablet Take 1 tablet (25 mg) by mouth daily 100 tablet 2     atenolol (TENORMIN) 50 MG tablet Take 1 tablet (50 mg) by mouth daily 90 tablet 3     valACYclovir (VALTREX) 1000 mg tablet Take 2 tablets (2,000 mg) by mouth 2 times daily as needed 4 tablet 5     ketotifen (ZADITOR) 0.025 % SOLN Place 1 drop into both eyes every 12 hours as  needed for itching 1 Bottle      cetirizine (ZYRTEC) 10 MG tablet Take 1 tablet by mouth every evening. 30 tablet 1     Probiotic Product (PROBIOTIC PO) Take 1 tablet by mouth daily.         ALLERGIES     Allergies   Allergen Reactions     Contrast Dye Itching     Reaction of immediate burning and severe itching in Right ear after injection for CT.      Sulfa Drugs      hives       PAST MEDICAL HISTORY:  Past Medical History:   Diagnosis Date     Anemia      CVA (cerebral vascular accident) (H) 2017    ?migraine, ?pfo--negative vasc w/u, neg hypercoag w/u     Diffuse cystic mastopathy     Fibrocystic breast disease     HTN, goal below 140/90      Hyperparathyroidism (H)      Infectious mononucleosis     Mono at age 16     Irregular heart beat     PAT no afib on 30day monitor     Labyrinthitis, unspecified      Migraine headache with aura      Osteopenia      Pain in joint, shoulder region     Secondary to a fall     PFO (patent foramen ovale)      S/P gastric bypass June, 2010     Sleep apnea     she is on CPAP     Vitamin D deficiencies        PAST SURGICAL HISTORY:  Past Surgical History:   Procedure Laterality Date     C ANEURYSM, INTRACRAN, SIMPLE SURG  04/2017    coil of aneurysm right posterior paraophthalmic artery     C NONSPECIFIC PROCEDURE      S/P multiple breast biopies - all negative / benign     C NONSPECIFIC PROCEDURE      S/P T&A     C NONSPECIFIC PROCEDURE      [place] teeth extraction     C NONSPECIFIC PROCEDURE      S/P (? unreadable) ankle     COLONOSCOPY N/A 8/12/2015    Procedure: COLONOSCOPY;  Surgeon: Deandre Brooks MD;  Location:  GI     GASTRIC BYPASS  June 24, 2010     ORTHOPEDIC SURGERY Left 2010    wrist fracture     PARATHYROIDECTOMY  9/19/11       FAMILY HISTORY:  Family History   Problem Relation Age of Onset     Breast Cancer Mother      Hypertension Mother      Arthritis Mother      Thyroid Disease Mother      hypo     Breast Cancer Maternal Aunt      Hypertension Paternal  "Grandmother      CEREBROVASCULAR DISEASE Maternal Grandmother      Family History Negative Maternal Grandfather      Family History Negative Paternal Grandfather      Family History Negative Son      Family History Negative Daughter      Family History Negative Daughter      Colon Cancer No family hx of        SOCIAL HISTORY:  Social History     Social History     Marital status: Single     Spouse name: N/A     Number of children: N/A     Years of education: N/A     Social History Main Topics     Smoking status: Never Smoker     Smokeless tobacco: Never Used     Alcohol use 0.0 oz/week     0 Standard drinks or equivalent per week      Comment: 1 glass wine 4-5 days a week     Drug use: No     Sexual activity: Not Currently     Partners: Male     Other Topics Concern     None     Social History Narrative       Review of Systems:  Skin:  Negative       Eyes:  Negative      ENT:  Positive for hearing loss    Respiratory:  Positive for sleep apnea;CPAP     Cardiovascular:    Positive for;dizziness;palpitations occ, pmd stopped amlodipine, low bp  Gastroenterology: Positive for   loose stool since starting iron   Genitourinary:  Negative      Musculoskeletal:  Negative      Neurologic:  Positive for headaches    Psychiatric:  Negative      Heme/Lymph/Imm:  Negative      Endocrine:  Negative        Physical Exam:  Vitals: /72  Pulse 68  Ht 1.676 m (5' 6\")  Wt 94.6 kg (208 lb 9.6 oz)  BMI 33.67 kg/m2    Constitutional:  cooperative, alert and oriented, well developed, well nourished, in no acute distress        Skin:  warm and dry to the touch, no apparent skin lesions or masses noted        Head:  normocephalic, no masses or lesions        Eyes:  pupils equal and round, conjunctivae and lids unremarkable, sclera white, no xanthalasma, EOMS intact, no nystagmus        ENT:  no pallor or cyanosis, dentition good        Neck:  carotid pulses are full and equal bilaterally, JVP normal, no carotid bruit, no " thyromegaly        Chest:  normal breath sounds, clear to auscultation, normal A-P diameter, normal symmetry, normal respiratory excursion, no use of accessory muscles          Cardiac: regular rhythm, normal S1/S2, no S3 or S4, apical impulse not displaced, no murmurs, gallops or rubs                  Abdomen:  abdomen soft, non-tender, BS normoactive, no mass, no HSM, no bruits obese      Vascular: pulses full and equal, no bruits auscultated                                        Extremities and Back:  no deformities, clubbing, cyanosis, erythema observed         healed mirella bite right wrist    Neurological:  affect appropriate, oriented to time, person and place              CC  Luis Church MD   [facility]  [address]  [place] [id number]

## 2017-07-12 DIAGNOSIS — Z53.9 ERRONEOUS ENCOUNTER--DISREGARD: Primary | ICD-10-CM

## 2017-07-12 NOTE — H&P (VIEW-ONLY)
HPI and Plan:     Jose Coronado is a 61-year-old female here today for cardiac assessment in preparation for her upcoming percutaneous closure of a patent foramen ovale/ASD.    She was hospitalized in February . She had been to the [facility] [place] in January and several days after the flight home noted disorientation. 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    She was diagnosed at that point with a stroke;  hypercoagulable panel was performed, most of which was negative. However, lupus anticoagulant and cardiolipin antibodies were not checked that I can find.    She was initially placed on warfarin, however this was discontinued after an incidental finding of a right posterior paraophthalmic/internal carotid aneurysm which was coiled and and stented. Plavix and Asprin were initiated following this procedure and continue to date.    A 30 day event recorder was thought to show atrial fibrillation however on further review was found to be PAT.    Echocardiography was performed revealing a stretched patent foramen ovale with left-to-right and right-to-left flow.BROCK subsequently confirmed in aneurysmal atrial septum with a positive double study with bi-directional flow. This has been reviewed with Dr. Church.    She was seen by Dr Morrissey and Dr. Grider (who did coiling); both have cleared the patient to proceed with closure of her atrial septal defect.    This patient met with Dr. Church who recommended closure pending review by Dr. Morrissey and Dr. Grider, both of whom agree that should be undertaken.    Jose returns today stating that she is feeling well without residual stroke symptoms. She was seen in the emergency department on June 21st following a cat bite to the right wrist. This was treated with Augmentin and has resolved. There is no ongoing redness swelling or fever. She notes no residual stroke symptoms.     Of  note her ascending aorta was dilated by echocardiography at 3.9cm. She remains on multiple antihypertensives and is working with primary care to make dose adjustments.    She has a history of contrast allergy. She has no prior history of reactions to anesthesia or conscious sedation.    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    Impression and plan    1. Stroke in February 2017 with evidence of a patent foramen ovale with bidirectional flow. No residual stroke symptoms. Aneurysmal atrial septum is present. She will proceed with percutaneous closure under general anesthesia using the Amplatzer device. We have reviewed the risks of the procedure including infection, bleeding, iatrogenic vessel wall trauma, stroke, reactions to anesthesia, and death. She accepts the risks and is willing to proceed. She will remain on aspirin 81 mg per day and Plavix 75 mg per day, taking a dose of each the morning of her procedure. We discussed to avoid dental work over the next 6 months and if necessary she would need SBE prophylaxis. Consents have been signed. NPO after midnight status was reviewed beginning the evening prior to the procedure. I will complete the hypercoaguable panel upon check in including lupus anticoagulant and cardiolipin antibody. She is scheduled post procedure with me. All discharge instructions have been reviewed and given to the patient in writing.    2. Hypertension reasonable control managed by PMD-hold HCT the am of the procedure.    3. 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 Follow up MRI was done this am.    4. No evidence of atrial fibrillation upon further review of event recorder.     5. Mild dyslipidemia treated with a atorvastatin with LDL 95/HDL 51    6. Hx of migraine headaches.    It has been a pleasure meeting Jose in follow up today. Greater than 50% of this one hour visit was spent in counseling.     Orders  Placed This Encounter   Procedures     Echocardiogram       Orders Placed This Encounter   Medications     FIBER COMPLETE PO     Sig: Take by mouth daily     UNABLE TO FIND     Sig: CPAP machine every night       There are no discontinued medications.      Encounter Diagnosis   Name Primary?     PFO (patent foramen ovale)        CURRENT MEDICATIONS:  Current Outpatient Prescriptions   Medication Sig Dispense Refill     FIBER COMPLETE PO Take by mouth daily       UNABLE TO FIND CPAP machine every night       losartan (COZAAR) 100 MG tablet Take 0.5 tablets (50 mg) by mouth daily 90 tablet 3     hydrochlorothiazide (HYDRODIURIL) 25 MG tablet TAKE 1 TABLET (25 MG) BY MOUTH DAILY 90 tablet 1     Ascorbic Acid (VITAMIN C PO) Take 250 mg by mouth 2 times daily (0.5 x 500 mg tablet = 250 mg dose)       VITAMIN D, CHOLECALCIFEROL, PO Take 1,000 Units by mouth daily       clopidogrel (PLAVIX) 75 MG tablet Take 1 tablet (75 mg) by mouth daily Taking this in preparation for her surgery 4/10/17 90 tablet 3     aspirin 81 MG tablet Take 81 mg by mouth daily (Taking this in preparation for her surgery 4/10/17) 30 tablet      topiramate (TOPAMAX) 25 MG tablet Take 1 tablet (25 mg) by mouth 2 times daily Prescribed by Dr Tello 180 tablet 3     atorvastatin (LIPITOR) 40 MG tablet Take 1 tablet (40 mg) by mouth daily 90 tablet 1     Calcium Citrate-Vitamin D (CALCIUM CITRATE + D PO) Take 1 tablet by mouth every morning       Ferrous Sulfate 27 MG TABS Take 27 mg by mouth 2 times daily       Cyanocobalamin 2500 MCG TABS Take 2,500 mcg by mouth twice a week Mon, Thur       imipramine (TOFRANIL) 25 MG tablet Take 1 tablet (25 mg) by mouth daily 100 tablet 2     atenolol (TENORMIN) 50 MG tablet Take 1 tablet (50 mg) by mouth daily 90 tablet 3     valACYclovir (VALTREX) 1000 mg tablet Take 2 tablets (2,000 mg) by mouth 2 times daily as needed 4 tablet 5     ketotifen (ZADITOR) 0.025 % SOLN Place 1 drop into both eyes every 12 hours as  needed for itching 1 Bottle      cetirizine (ZYRTEC) 10 MG tablet Take 1 tablet by mouth every evening. 30 tablet 1     Probiotic Product (PROBIOTIC PO) Take 1 tablet by mouth daily.         ALLERGIES     Allergies   Allergen Reactions     Contrast Dye Itching     Reaction of immediate burning and severe itching in Right ear after injection for CT.      Sulfa Drugs      hives       PAST MEDICAL HISTORY:  Past Medical History:   Diagnosis Date     Anemia      CVA (cerebral vascular accident) (H) 2017    ?migraine, ?pfo--negative vasc w/u, neg hypercoag w/u     Diffuse cystic mastopathy     Fibrocystic breast disease     HTN, goal below 140/90      Hyperparathyroidism (H)      Infectious mononucleosis     Mono at age 16     Irregular heart beat     PAT no afib on 30day monitor     Labyrinthitis, unspecified      Migraine headache with aura      Osteopenia      Pain in joint, shoulder region     Secondary to a fall     PFO (patent foramen ovale)      S/P gastric bypass June, 2010     Sleep apnea     she is on CPAP     Vitamin D deficiencies        PAST SURGICAL HISTORY:  Past Surgical History:   Procedure Laterality Date     C ANEURYSM, INTRACRAN, SIMPLE SURG  04/2017    coil of aneurysm right posterior paraophthalmic artery     C NONSPECIFIC PROCEDURE      S/P multiple breast biopies - all negative / benign     C NONSPECIFIC PROCEDURE      S/P T&A     C NONSPECIFIC PROCEDURE      [place] teeth extraction     C NONSPECIFIC PROCEDURE      S/P (? unreadable) ankle     COLONOSCOPY N/A 8/12/2015    Procedure: COLONOSCOPY;  Surgeon: Deandre Brooks MD;  Location:  GI     GASTRIC BYPASS  June 24, 2010     ORTHOPEDIC SURGERY Left 2010    wrist fracture     PARATHYROIDECTOMY  9/19/11       FAMILY HISTORY:  Family History   Problem Relation Age of Onset     Breast Cancer Mother      Hypertension Mother      Arthritis Mother      Thyroid Disease Mother      hypo     Breast Cancer Maternal Aunt      Hypertension Paternal  "Grandmother      CEREBROVASCULAR DISEASE Maternal Grandmother      Family History Negative Maternal Grandfather      Family History Negative Paternal Grandfather      Family History Negative Son      Family History Negative Daughter      Family History Negative Daughter      Colon Cancer No family hx of        SOCIAL HISTORY:  Social History     Social History     Marital status: Single     Spouse name: N/A     Number of children: N/A     Years of education: N/A     Social History Main Topics     Smoking status: Never Smoker     Smokeless tobacco: Never Used     Alcohol use 0.0 oz/week     0 Standard drinks or equivalent per week      Comment: 1 glass wine 4-5 days a week     Drug use: No     Sexual activity: Not Currently     Partners: Male     Other Topics Concern     None     Social History Narrative       Review of Systems:  Skin:  Negative       Eyes:  Negative      ENT:  Positive for hearing loss    Respiratory:  Positive for sleep apnea;CPAP     Cardiovascular:    Positive for;dizziness;palpitations occ, pmd stopped amlodipine, low bp  Gastroenterology: Positive for   loose stool since starting iron   Genitourinary:  Negative      Musculoskeletal:  Negative      Neurologic:  Positive for headaches    Psychiatric:  Negative      Heme/Lymph/Imm:  Negative      Endocrine:  Negative        Physical Exam:  Vitals: /72  Pulse 68  Ht 1.676 m (5' 6\")  Wt 94.6 kg (208 lb 9.6 oz)  BMI 33.67 kg/m2    Constitutional:  cooperative, alert and oriented, well developed, well nourished, in no acute distress        Skin:  warm and dry to the touch, no apparent skin lesions or masses noted        Head:  normocephalic, no masses or lesions        Eyes:  pupils equal and round, conjunctivae and lids unremarkable, sclera white, no xanthalasma, EOMS intact, no nystagmus        ENT:  no pallor or cyanosis, dentition good        Neck:  carotid pulses are full and equal bilaterally, JVP normal, no carotid bruit, no " thyromegaly        Chest:  normal breath sounds, clear to auscultation, normal A-P diameter, normal symmetry, normal respiratory excursion, no use of accessory muscles          Cardiac: regular rhythm, normal S1/S2, no S3 or S4, apical impulse not displaced, no murmurs, gallops or rubs                  Abdomen:  abdomen soft, non-tender, BS normoactive, no mass, no HSM, no bruits obese      Vascular: pulses full and equal, no bruits auscultated                                        Extremities and Back:  no deformities, clubbing, cyanosis, erythema observed         healed mirella bite right wrist    Neurological:  affect appropriate, oriented to time, person and place              CC  Luis Church MD   [facility]  [address]  [place] [id number]

## 2017-07-13 ENCOUNTER — HOSPITAL ENCOUNTER (OUTPATIENT)
Dept: CARDIOLOGY | Facility: CLINIC | Age: 62
DRG: 272 | End: 2017-07-13
Attending: INTERNAL MEDICINE
Payer: COMMERCIAL

## 2017-07-13 ENCOUNTER — ANESTHESIA (OUTPATIENT)
Dept: SURGERY | Facility: CLINIC | Age: 62
DRG: 272 | End: 2017-07-13
Payer: COMMERCIAL

## 2017-07-13 ENCOUNTER — HOSPITAL ENCOUNTER (INPATIENT)
Facility: CLINIC | Age: 62
LOS: 1 days | Discharge: HOME | DRG: 272 | End: 2017-07-14
Attending: INTERNAL MEDICINE | Admitting: INTERNAL MEDICINE
Payer: COMMERCIAL

## 2017-07-13 DIAGNOSIS — Q21.12 PFO (PATENT FORAMEN OVALE): ICD-10-CM

## 2017-07-13 DIAGNOSIS — Z87.74 S/P DEVICE CLOSURE OF ATRIAL SEPTAL DEFECT: ICD-10-CM

## 2017-07-13 DIAGNOSIS — R52 GENERALIZED PAIN: Primary | ICD-10-CM

## 2017-07-13 PROBLEM — Q21.10 ASD (ATRIAL SEPTAL DEFECT): Status: ACTIVE | Noted: 2017-07-13

## 2017-07-13 LAB
ANION GAP SERPL CALCULATED.3IONS-SCNC: 7 MMOL/L (ref 3–14)
APTT PPP: 25 SEC (ref 22–37)
CHLORIDE SERPL-SCNC: 101 MMOL/L (ref 94–109)
CO2 SERPL-SCNC: 28 MMOL/L (ref 20–32)
CREAT SERPL-MCNC: 0.74 MG/DL (ref 0.52–1.04)
ERYTHROCYTE [DISTWIDTH] IN BLOOD BY AUTOMATED COUNT: 13.4 % (ref 10–15)
GFR SERPL CREATININE-BSD FRML MDRD: 79 ML/MIN/1.7M2
HCT VFR BLD AUTO: 39 % (ref 35–47)
HGB BLD-MCNC: 13.6 G/DL (ref 11.7–15.7)
INR PPP: 0.93 (ref 0.86–1.14)
KCT BLD-ACNC: 254 SEC (ref 105–167)
MCH RBC QN AUTO: 30.7 PG (ref 26.5–33)
MCHC RBC AUTO-ENTMCNC: 34.9 G/DL (ref 31.5–36.5)
MCV RBC AUTO: 88 FL (ref 78–100)
PLATELET # BLD AUTO: 271 10E9/L (ref 150–450)
POTASSIUM SERPL-SCNC: 3.5 MMOL/L (ref 3.4–5.3)
RBC # BLD AUTO: 4.43 10E12/L (ref 3.8–5.2)
SODIUM SERPL-SCNC: 136 MMOL/L (ref 133–144)
WBC # BLD AUTO: 7.7 10E9/L (ref 4–11)

## 2017-07-13 PROCEDURE — 25000128 H RX IP 250 OP 636: Performed by: NURSE ANESTHETIST, CERTIFIED REGISTERED

## 2017-07-13 PROCEDURE — 71000014 ZZH RECOVERY PHASE 1 LEVEL 2 FIRST HR

## 2017-07-13 PROCEDURE — 27210795 ZZH PAD DEFIB QUICK CR4

## 2017-07-13 PROCEDURE — 36415 COLL VENOUS BLD VENIPUNCTURE: CPT | Performed by: NURSE PRACTITIONER

## 2017-07-13 PROCEDURE — 93005 ELECTROCARDIOGRAM TRACING: CPT

## 2017-07-13 PROCEDURE — 02LR3DT OCCLUSION OF DUCTUS ARTERIOSUS WITH INTRALUMINAL DEVICE, PERCUTANEOUS APPROACH: ICD-10-PCS | Performed by: INTERNAL MEDICINE

## 2017-07-13 PROCEDURE — 93312 ECHO TRANSESOPHAGEAL: CPT | Mod: 26 | Performed by: INTERNAL MEDICINE

## 2017-07-13 PROCEDURE — C1817 SEPTAL DEFECT IMP SYS: HCPCS

## 2017-07-13 PROCEDURE — S0020 INJECTION, BUPIVICAINE HYDRO: HCPCS | Performed by: INTERNAL MEDICINE

## 2017-07-13 PROCEDURE — 25000128 H RX IP 250 OP 636: Performed by: INTERNAL MEDICINE

## 2017-07-13 PROCEDURE — 86147 CARDIOLIPIN ANTIBODY EA IG: CPT | Performed by: NURSE PRACTITIONER

## 2017-07-13 PROCEDURE — 93325 DOPPLER ECHO COLOR FLOW MAPG: CPT | Mod: 26 | Performed by: INTERNAL MEDICINE

## 2017-07-13 PROCEDURE — 93321 DOPPLER ECHO F-UP/LMTD STD: CPT | Mod: 26 | Performed by: INTERNAL MEDICINE

## 2017-07-13 PROCEDURE — 76376 3D RENDER W/INTRP POSTPROCES: CPT

## 2017-07-13 PROCEDURE — 25000132 ZZH RX MED GY IP 250 OP 250 PS 637: Performed by: INTERNAL MEDICINE

## 2017-07-13 PROCEDURE — 37000009 ZZH ANESTHESIA TECHNICAL FEE, EACH ADDTL 15 MIN

## 2017-07-13 PROCEDURE — 85730 THROMBOPLASTIN TIME PARTIAL: CPT | Performed by: NURSE PRACTITIONER

## 2017-07-13 PROCEDURE — 85610 PROTHROMBIN TIME: CPT | Performed by: NURSE PRACTITIONER

## 2017-07-13 PROCEDURE — 25000125 ZZHC RX 250: Performed by: INTERNAL MEDICINE

## 2017-07-13 PROCEDURE — 93580 TRANSCATH CLOSURE OF ASD: CPT | Performed by: INTERNAL MEDICINE

## 2017-07-13 PROCEDURE — 25000566 ZZH SEVOFLURANE, EA 15 MIN

## 2017-07-13 PROCEDURE — 81240 F2 GENE: CPT | Performed by: NURSE PRACTITIONER

## 2017-07-13 PROCEDURE — 27210787 ZZH MANIFOLD CR2

## 2017-07-13 PROCEDURE — 21000000 ZZH R&B IMCU HEART CARE

## 2017-07-13 PROCEDURE — 85347 COAGULATION TIME ACTIVATED: CPT

## 2017-07-13 PROCEDURE — 27210946 ZZH KIT HC TOTES DISP CR8

## 2017-07-13 PROCEDURE — 25000125 ZZHC RX 250: Performed by: ANESTHESIOLOGY

## 2017-07-13 PROCEDURE — 37000008 ZZH ANESTHESIA TECHNICAL FEE, 1ST 30 MIN

## 2017-07-13 PROCEDURE — 93010 ELECTROCARDIOGRAM REPORT: CPT | Mod: 59 | Performed by: INTERNAL MEDICINE

## 2017-07-13 PROCEDURE — 82565 ASSAY OF CREATININE: CPT | Performed by: NURSE PRACTITIONER

## 2017-07-13 PROCEDURE — 85027 COMPLETE CBC AUTOMATED: CPT | Performed by: NURSE PRACTITIONER

## 2017-07-13 PROCEDURE — 85613 RUSSELL VIPER VENOM DILUTED: CPT | Performed by: NURSE PRACTITIONER

## 2017-07-13 PROCEDURE — 25000128 H RX IP 250 OP 636: Performed by: ANESTHESIOLOGY

## 2017-07-13 PROCEDURE — 25000125 ZZHC RX 250: Performed by: NURSE ANESTHETIST, CERTIFIED REGISTERED

## 2017-07-13 PROCEDURE — 80051 ELECTROLYTE PANEL: CPT | Performed by: NURSE PRACTITIONER

## 2017-07-13 PROCEDURE — 25000128 H RX IP 250 OP 636: Performed by: NURSE PRACTITIONER

## 2017-07-13 PROCEDURE — 00000401 ZZHCL STATISTIC THROMBIN TIME NC: Performed by: NURSE PRACTITIONER

## 2017-07-13 PROCEDURE — 93325 DOPPLER ECHO COLOR FLOW MAPG: CPT

## 2017-07-13 PROCEDURE — 40000170 ZZH STATISTIC PRE-PROCEDURE ASSESSMENT II

## 2017-07-13 PROCEDURE — 93580 TRANSCATH CLOSURE OF ASD: CPT

## 2017-07-13 PROCEDURE — 27211089 ZZH KIT ACIST INJECTOR CR3

## 2017-07-13 RX ORDER — ONDANSETRON 4 MG/1
4 TABLET, ORALLY DISINTEGRATING ORAL EVERY 30 MIN PRN
Status: DISCONTINUED | OUTPATIENT
Start: 2017-07-13 | End: 2017-07-13 | Stop reason: HOSPADM

## 2017-07-13 RX ORDER — PROMETHAZINE HYDROCHLORIDE 25 MG/ML
6.25-25 INJECTION, SOLUTION INTRAMUSCULAR; INTRAVENOUS EVERY 4 HOURS PRN
Status: DISCONTINUED | OUTPATIENT
Start: 2017-07-13 | End: 2017-07-13

## 2017-07-13 RX ORDER — METOPROLOL TARTRATE 1 MG/ML
5 INJECTION, SOLUTION INTRAVENOUS EVERY 5 MIN PRN
Status: DISCONTINUED | OUTPATIENT
Start: 2017-07-13 | End: 2017-07-13

## 2017-07-13 RX ORDER — NITROGLYCERIN 5 MG/ML
100-500 VIAL (ML) INTRAVENOUS
Status: DISCONTINUED | OUTPATIENT
Start: 2017-07-13 | End: 2017-07-13

## 2017-07-13 RX ORDER — ONDANSETRON 2 MG/ML
4 INJECTION INTRAMUSCULAR; INTRAVENOUS EVERY 30 MIN PRN
Status: DISCONTINUED | OUTPATIENT
Start: 2017-07-13 | End: 2017-07-13 | Stop reason: HOSPADM

## 2017-07-13 RX ORDER — VERAPAMIL HYDROCHLORIDE 2.5 MG/ML
1-2.5 INJECTION, SOLUTION INTRAVENOUS
Status: DISCONTINUED | OUTPATIENT
Start: 2017-07-13 | End: 2017-07-13

## 2017-07-13 RX ORDER — CLOPIDOGREL BISULFATE 75 MG/1
75 TABLET ORAL DAILY
Status: DISCONTINUED | OUTPATIENT
Start: 2017-07-14 | End: 2017-07-14 | Stop reason: HOSPADM

## 2017-07-13 RX ORDER — NITROGLYCERIN 0.4 MG/1
0.4 TABLET SUBLINGUAL EVERY 5 MIN PRN
Status: DISCONTINUED | OUTPATIENT
Start: 2017-07-13 | End: 2017-07-14 | Stop reason: HOSPADM

## 2017-07-13 RX ORDER — ONDANSETRON 2 MG/ML
INJECTION INTRAMUSCULAR; INTRAVENOUS PRN
Status: DISCONTINUED | OUTPATIENT
Start: 2017-07-13 | End: 2017-07-13

## 2017-07-13 RX ORDER — ONDANSETRON 2 MG/ML
4 INJECTION INTRAMUSCULAR; INTRAVENOUS EVERY 4 HOURS PRN
Status: DISCONTINUED | OUTPATIENT
Start: 2017-07-13 | End: 2017-07-13

## 2017-07-13 RX ORDER — LIDOCAINE 40 MG/G
CREAM TOPICAL
Status: DISCONTINUED | OUTPATIENT
Start: 2017-07-13 | End: 2017-07-14 | Stop reason: HOSPADM

## 2017-07-13 RX ORDER — ENALAPRILAT 1.25 MG/ML
1.25-2.5 INJECTION INTRAVENOUS
Status: DISCONTINUED | OUTPATIENT
Start: 2017-07-13 | End: 2017-07-13

## 2017-07-13 RX ORDER — ACETAMINOPHEN 325 MG/1
650 TABLET ORAL EVERY 4 HOURS PRN
Status: DISCONTINUED | OUTPATIENT
Start: 2017-07-13 | End: 2017-07-14 | Stop reason: HOSPADM

## 2017-07-13 RX ORDER — NITROGLYCERIN 5 MG/ML
100-200 VIAL (ML) INTRAVENOUS
Status: DISCONTINUED | OUTPATIENT
Start: 2017-07-13 | End: 2017-07-13

## 2017-07-13 RX ORDER — LORAZEPAM 1 MG/1
1-2 TABLET ORAL EVERY 4 HOURS PRN
Status: DISCONTINUED | OUTPATIENT
Start: 2017-07-13 | End: 2017-07-14 | Stop reason: HOSPADM

## 2017-07-13 RX ORDER — DOBUTAMINE HYDROCHLORIDE 200 MG/100ML
2-20 INJECTION INTRAVENOUS CONTINUOUS PRN
Status: DISCONTINUED | OUTPATIENT
Start: 2017-07-13 | End: 2017-07-13

## 2017-07-13 RX ORDER — NIFEDIPINE 10 MG/1
10 CAPSULE ORAL
Status: DISCONTINUED | OUTPATIENT
Start: 2017-07-13 | End: 2017-07-13

## 2017-07-13 RX ORDER — FENTANYL CITRATE 0.05 MG/ML
25-50 INJECTION, SOLUTION INTRAMUSCULAR; INTRAVENOUS
Status: DISCONTINUED | OUTPATIENT
Start: 2017-07-13 | End: 2017-07-13 | Stop reason: HOSPADM

## 2017-07-13 RX ORDER — FERROUS SULFATE 325(65) MG
325 TABLET ORAL 2 TIMES DAILY
Status: DISCONTINUED | OUTPATIENT
Start: 2017-07-13 | End: 2017-07-14 | Stop reason: HOSPADM

## 2017-07-13 RX ORDER — ATORVASTATIN CALCIUM 40 MG/1
40 TABLET, FILM COATED ORAL AT BEDTIME
Status: DISCONTINUED | OUTPATIENT
Start: 2017-07-13 | End: 2017-07-14 | Stop reason: HOSPADM

## 2017-07-13 RX ORDER — DEXTROSE MONOHYDRATE 25 G/50ML
12.5-5 INJECTION, SOLUTION INTRAVENOUS EVERY 30 MIN PRN
Status: DISCONTINUED | OUTPATIENT
Start: 2017-07-13 | End: 2017-07-13

## 2017-07-13 RX ORDER — FENTANYL CITRATE 50 UG/ML
INJECTION, SOLUTION INTRAMUSCULAR; INTRAVENOUS PRN
Status: DISCONTINUED | OUTPATIENT
Start: 2017-07-13 | End: 2017-07-13

## 2017-07-13 RX ORDER — PROTAMINE SULFATE 10 MG/ML
1-5 INJECTION, SOLUTION INTRAVENOUS
Status: DISCONTINUED | OUTPATIENT
Start: 2017-07-13 | End: 2017-07-13

## 2017-07-13 RX ORDER — ASPIRIN 81 MG/1
324 TABLET, CHEWABLE ORAL ONCE
Status: COMPLETED | OUTPATIENT
Start: 2017-07-13 | End: 2017-07-13

## 2017-07-13 RX ORDER — PROPOFOL 10 MG/ML
INJECTION, EMULSION INTRAVENOUS PRN
Status: DISCONTINUED | OUTPATIENT
Start: 2017-07-13 | End: 2017-07-13

## 2017-07-13 RX ORDER — CLOPIDOGREL BISULFATE 75 MG/1
75 TABLET ORAL DAILY
Status: DISCONTINUED | OUTPATIENT
Start: 2017-07-13 | End: 2017-07-13

## 2017-07-13 RX ORDER — PROTAMINE SULFATE 10 MG/ML
25-100 INJECTION, SOLUTION INTRAVENOUS EVERY 5 MIN PRN
Status: DISCONTINUED | OUTPATIENT
Start: 2017-07-13 | End: 2017-07-13

## 2017-07-13 RX ORDER — SODIUM CHLORIDE, SODIUM LACTATE, POTASSIUM CHLORIDE, CALCIUM CHLORIDE 600; 310; 30; 20 MG/100ML; MG/100ML; MG/100ML; MG/100ML
INJECTION, SOLUTION INTRAVENOUS CONTINUOUS PRN
Status: DISCONTINUED | OUTPATIENT
Start: 2017-07-13 | End: 2017-07-13

## 2017-07-13 RX ORDER — MULTIVIT WITH MINERALS/LUTEIN
250 TABLET ORAL 2 TIMES DAILY
Status: DISCONTINUED | OUTPATIENT
Start: 2017-07-13 | End: 2017-07-14 | Stop reason: HOSPADM

## 2017-07-13 RX ORDER — POTASSIUM CHLORIDE 29.8 MG/ML
20 INJECTION INTRAVENOUS
Status: DISCONTINUED | OUTPATIENT
Start: 2017-07-13 | End: 2017-07-13

## 2017-07-13 RX ORDER — ASPIRIN 81 MG/1
81 TABLET ORAL DAILY
Status: DISCONTINUED | OUTPATIENT
Start: 2017-07-14 | End: 2017-07-14 | Stop reason: HOSPADM

## 2017-07-13 RX ORDER — OXYCODONE AND ACETAMINOPHEN 5; 325 MG/1; MG/1
1-2 TABLET ORAL EVERY 4 HOURS PRN
Status: DISCONTINUED | OUTPATIENT
Start: 2017-07-13 | End: 2017-07-14 | Stop reason: HOSPADM

## 2017-07-13 RX ORDER — CLOPIDOGREL BISULFATE 75 MG/1
300-600 TABLET ORAL
Status: DISCONTINUED | OUTPATIENT
Start: 2017-07-13 | End: 2017-07-13

## 2017-07-13 RX ORDER — FUROSEMIDE 10 MG/ML
20-100 INJECTION INTRAMUSCULAR; INTRAVENOUS
Status: DISCONTINUED | OUTPATIENT
Start: 2017-07-13 | End: 2017-07-13

## 2017-07-13 RX ORDER — SODIUM CHLORIDE 9 MG/ML
INJECTION, SOLUTION INTRAVENOUS CONTINUOUS
Status: DISCONTINUED | OUTPATIENT
Start: 2017-07-13 | End: 2017-07-14 | Stop reason: HOSPADM

## 2017-07-13 RX ORDER — DIPHENHYDRAMINE HYDROCHLORIDE 50 MG/ML
25-50 INJECTION INTRAMUSCULAR; INTRAVENOUS
Status: DISCONTINUED | OUTPATIENT
Start: 2017-07-13 | End: 2017-07-13

## 2017-07-13 RX ORDER — LIDOCAINE 40 MG/G
CREAM TOPICAL
Status: DISCONTINUED | OUTPATIENT
Start: 2017-07-13 | End: 2017-07-13 | Stop reason: HOSPADM

## 2017-07-13 RX ORDER — LIDOCAINE HYDROCHLORIDE 10 MG/ML
1-10 INJECTION, SOLUTION EPIDURAL; INFILTRATION; INTRACAUDAL; PERINEURAL
Status: DISCONTINUED | OUTPATIENT
Start: 2017-07-13 | End: 2017-07-13

## 2017-07-13 RX ORDER — ASPIRIN 81 MG/1
81-324 TABLET, CHEWABLE ORAL
Status: DISCONTINUED | OUTPATIENT
Start: 2017-07-13 | End: 2017-07-13

## 2017-07-13 RX ORDER — SODIUM CHLORIDE, SODIUM LACTATE, POTASSIUM CHLORIDE, CALCIUM CHLORIDE 600; 310; 30; 20 MG/100ML; MG/100ML; MG/100ML; MG/100ML
INJECTION, SOLUTION INTRAVENOUS CONTINUOUS
Status: DISCONTINUED | OUTPATIENT
Start: 2017-07-13 | End: 2017-07-13 | Stop reason: HOSPADM

## 2017-07-13 RX ORDER — LORAZEPAM 2 MG/ML
.5-2 INJECTION INTRAMUSCULAR EVERY 4 HOURS PRN
Status: DISCONTINUED | OUTPATIENT
Start: 2017-07-13 | End: 2017-07-13

## 2017-07-13 RX ORDER — ALUMINA, MAGNESIA, AND SIMETHICONE 2400; 2400; 240 MG/30ML; MG/30ML; MG/30ML
15-30 SUSPENSION ORAL EVERY 4 HOURS PRN
Status: DISCONTINUED | OUTPATIENT
Start: 2017-07-13 | End: 2017-07-14 | Stop reason: HOSPADM

## 2017-07-13 RX ORDER — NITROGLYCERIN 0.4 MG/1
0.4 TABLET SUBLINGUAL EVERY 5 MIN PRN
Status: DISCONTINUED | OUTPATIENT
Start: 2017-07-13 | End: 2017-07-13

## 2017-07-13 RX ORDER — NICARDIPINE HYDROCHLORIDE 2.5 MG/ML
100 INJECTION INTRAVENOUS
Status: DISCONTINUED | OUTPATIENT
Start: 2017-07-13 | End: 2017-07-13

## 2017-07-13 RX ORDER — BUPIVACAINE HYDROCHLORIDE 2.5 MG/ML
1-10 INJECTION, SOLUTION EPIDURAL; INFILTRATION; INTRACAUDAL
Status: COMPLETED | OUTPATIENT
Start: 2017-07-13 | End: 2017-07-13

## 2017-07-13 RX ORDER — PRASUGREL 10 MG/1
10-60 TABLET, FILM COATED ORAL
Status: DISCONTINUED | OUTPATIENT
Start: 2017-07-13 | End: 2017-07-13

## 2017-07-13 RX ORDER — ASPIRIN 81 MG/1
81 TABLET ORAL DAILY
Status: DISCONTINUED | OUTPATIENT
Start: 2017-07-13 | End: 2017-07-13

## 2017-07-13 RX ORDER — DIPHENHYDRAMINE HYDROCHLORIDE 50 MG/ML
INJECTION INTRAMUSCULAR; INTRAVENOUS PRN
Status: DISCONTINUED | OUTPATIENT
Start: 2017-07-13 | End: 2017-07-13

## 2017-07-13 RX ORDER — NALOXONE HYDROCHLORIDE 0.4 MG/ML
0.4 INJECTION, SOLUTION INTRAMUSCULAR; INTRAVENOUS; SUBCUTANEOUS EVERY 5 MIN PRN
Status: DISCONTINUED | OUTPATIENT
Start: 2017-07-13 | End: 2017-07-13

## 2017-07-13 RX ORDER — LORAZEPAM 2 MG/ML
1-2 INJECTION INTRAMUSCULAR EVERY 4 HOURS PRN
Status: DISCONTINUED | OUTPATIENT
Start: 2017-07-13 | End: 2017-07-14 | Stop reason: HOSPADM

## 2017-07-13 RX ORDER — PHENYLEPHRINE HCL IN 0.9% NACL 1 MG/10 ML
20-100 SYRINGE (ML) INTRAVENOUS
Status: DISCONTINUED | OUTPATIENT
Start: 2017-07-13 | End: 2017-07-13

## 2017-07-13 RX ORDER — NALOXONE HYDROCHLORIDE 0.4 MG/ML
.1-.4 INJECTION, SOLUTION INTRAMUSCULAR; INTRAVENOUS; SUBCUTANEOUS
Status: DISCONTINUED | OUTPATIENT
Start: 2017-07-13 | End: 2017-07-14 | Stop reason: HOSPADM

## 2017-07-13 RX ORDER — POTASSIUM CHLORIDE 7.45 MG/ML
10 INJECTION INTRAVENOUS
Status: DISCONTINUED | OUTPATIENT
Start: 2017-07-13 | End: 2017-07-13

## 2017-07-13 RX ORDER — SODIUM NITROPRUSSIDE 25 MG/ML
100-200 INJECTION INTRAVENOUS
Status: DISCONTINUED | OUTPATIENT
Start: 2017-07-13 | End: 2017-07-13

## 2017-07-13 RX ORDER — IMIPRAMINE HCL 25 MG
25 TABLET ORAL DAILY
Status: DISCONTINUED | OUTPATIENT
Start: 2017-07-13 | End: 2017-07-14 | Stop reason: HOSPADM

## 2017-07-13 RX ORDER — NEOSTIGMINE METHYLSULFATE 1 MG/ML
VIAL (ML) INJECTION PRN
Status: DISCONTINUED | OUTPATIENT
Start: 2017-07-13 | End: 2017-07-13

## 2017-07-13 RX ORDER — CEFAZOLIN SODIUM 2 G/100ML
2 INJECTION, SOLUTION INTRAVENOUS EVERY 8 HOURS
Status: COMPLETED | OUTPATIENT
Start: 2017-07-13 | End: 2017-07-14

## 2017-07-13 RX ORDER — ATROPINE SULFATE 0.1 MG/ML
.5-1 INJECTION INTRAVENOUS
Status: DISCONTINUED | OUTPATIENT
Start: 2017-07-13 | End: 2017-07-13

## 2017-07-13 RX ORDER — CLOPIDOGREL BISULFATE 75 MG/1
600 TABLET ORAL ONCE
Status: DISCONTINUED | OUTPATIENT
Start: 2017-07-13 | End: 2017-07-13 | Stop reason: ALTCHOICE

## 2017-07-13 RX ORDER — LIDOCAINE HYDROCHLORIDE 20 MG/ML
INJECTION, SOLUTION INFILTRATION; PERINEURAL PRN
Status: DISCONTINUED | OUTPATIENT
Start: 2017-07-13 | End: 2017-07-13

## 2017-07-13 RX ORDER — ASPIRIN 81 MG/1
81 TABLET, CHEWABLE ORAL ONCE
Status: DISCONTINUED | OUTPATIENT
Start: 2017-07-13 | End: 2017-07-13 | Stop reason: HOSPADM

## 2017-07-13 RX ORDER — ACETAMINOPHEN 650 MG/1
650 SUPPOSITORY RECTAL EVERY 4 HOURS PRN
Status: DISCONTINUED | OUTPATIENT
Start: 2017-07-13 | End: 2017-07-14 | Stop reason: HOSPADM

## 2017-07-13 RX ORDER — TOPIRAMATE 25 MG/1
25 TABLET, FILM COATED ORAL 2 TIMES DAILY
Status: DISCONTINUED | OUTPATIENT
Start: 2017-07-13 | End: 2017-07-14 | Stop reason: HOSPADM

## 2017-07-13 RX ORDER — DOPAMINE HYDROCHLORIDE 160 MG/100ML
2-20 INJECTION, SOLUTION INTRAVENOUS CONTINUOUS PRN
Status: DISCONTINUED | OUTPATIENT
Start: 2017-07-13 | End: 2017-07-13

## 2017-07-13 RX ORDER — LIDOCAINE HYDROCHLORIDE 10 MG/ML
30 INJECTION, SOLUTION EPIDURAL; INFILTRATION; INTRACAUDAL; PERINEURAL
Status: COMPLETED | OUTPATIENT
Start: 2017-07-13 | End: 2017-07-13

## 2017-07-13 RX ORDER — HYDROCHLOROTHIAZIDE 25 MG/1
25 TABLET ORAL DAILY
Status: DISCONTINUED | OUTPATIENT
Start: 2017-07-13 | End: 2017-07-14 | Stop reason: HOSPADM

## 2017-07-13 RX ORDER — CEPHALEXIN 500 MG/1
500 CAPSULE ORAL 3 TIMES DAILY
Status: DISCONTINUED | OUTPATIENT
Start: 2017-07-14 | End: 2017-07-14 | Stop reason: HOSPADM

## 2017-07-13 RX ORDER — CLOPIDOGREL BISULFATE 75 MG/1
75 TABLET ORAL
Status: DISCONTINUED | OUTPATIENT
Start: 2017-07-13 | End: 2017-07-13

## 2017-07-13 RX ORDER — FLUMAZENIL 0.1 MG/ML
0.2 INJECTION, SOLUTION INTRAVENOUS
Status: DISCONTINUED | OUTPATIENT
Start: 2017-07-13 | End: 2017-07-13

## 2017-07-13 RX ORDER — FENTANYL CITRATE 50 UG/ML
25-50 INJECTION, SOLUTION INTRAMUSCULAR; INTRAVENOUS
Status: DISCONTINUED | OUTPATIENT
Start: 2017-07-13 | End: 2017-07-13

## 2017-07-13 RX ORDER — ATENOLOL 50 MG/1
50 TABLET ORAL DAILY
Status: DISCONTINUED | OUTPATIENT
Start: 2017-07-14 | End: 2017-07-14 | Stop reason: HOSPADM

## 2017-07-13 RX ORDER — CEFAZOLIN SODIUM 2 G/100ML
2 INJECTION, SOLUTION INTRAVENOUS
Status: COMPLETED | OUTPATIENT
Start: 2017-07-13 | End: 2017-07-13

## 2017-07-13 RX ORDER — HEPARIN SODIUM 1000 [USP'U]/ML
1000-10000 INJECTION, SOLUTION INTRAVENOUS; SUBCUTANEOUS EVERY 5 MIN PRN
Status: DISCONTINUED | OUTPATIENT
Start: 2017-07-13 | End: 2017-07-13

## 2017-07-13 RX ORDER — HYDRALAZINE HYDROCHLORIDE 20 MG/ML
10-20 INJECTION INTRAMUSCULAR; INTRAVENOUS
Status: DISCONTINUED | OUTPATIENT
Start: 2017-07-13 | End: 2017-07-13

## 2017-07-13 RX ORDER — ADENOSINE 3 MG/ML
12-12000 INJECTION, SOLUTION INTRAVENOUS
Status: DISCONTINUED | OUTPATIENT
Start: 2017-07-13 | End: 2017-07-13

## 2017-07-13 RX ORDER — EPHEDRINE SULFATE 50 MG/ML
INJECTION, SOLUTION INTRAMUSCULAR; INTRAVENOUS; SUBCUTANEOUS PRN
Status: DISCONTINUED | OUTPATIENT
Start: 2017-07-13 | End: 2017-07-13

## 2017-07-13 RX ORDER — LOSARTAN POTASSIUM 50 MG/1
50 TABLET ORAL DAILY
Status: DISCONTINUED | OUTPATIENT
Start: 2017-07-14 | End: 2017-07-14 | Stop reason: HOSPADM

## 2017-07-13 RX ORDER — NITROGLYCERIN 20 MG/100ML
.07-2 INJECTION INTRAVENOUS CONTINUOUS PRN
Status: DISCONTINUED | OUTPATIENT
Start: 2017-07-13 | End: 2017-07-13

## 2017-07-13 RX ORDER — ASCORBIC ACID 250 MG
250 TABLET,CHEWABLE ORAL 2 TIMES DAILY
Status: DISCONTINUED | OUTPATIENT
Start: 2017-07-13 | End: 2017-07-13

## 2017-07-13 RX ORDER — MORPHINE SULFATE 2 MG/ML
1-2 INJECTION, SOLUTION INTRAMUSCULAR; INTRAVENOUS EVERY 5 MIN PRN
Status: DISCONTINUED | OUTPATIENT
Start: 2017-07-13 | End: 2017-07-13

## 2017-07-13 RX ORDER — CETIRIZINE HYDROCHLORIDE 10 MG/1
10 TABLET ORAL DAILY
Status: DISCONTINUED | OUTPATIENT
Start: 2017-07-13 | End: 2017-07-14 | Stop reason: HOSPADM

## 2017-07-13 RX ORDER — DEXAMETHASONE SODIUM PHOSPHATE 4 MG/ML
INJECTION, SOLUTION INTRA-ARTICULAR; INTRALESIONAL; INTRAMUSCULAR; INTRAVENOUS; SOFT TISSUE PRN
Status: DISCONTINUED | OUTPATIENT
Start: 2017-07-13 | End: 2017-07-13

## 2017-07-13 RX ORDER — METHYLPREDNISOLONE SODIUM SUCCINATE 125 MG/2ML
125 INJECTION, POWDER, LYOPHILIZED, FOR SOLUTION INTRAMUSCULAR; INTRAVENOUS
Status: DISCONTINUED | OUTPATIENT
Start: 2017-07-13 | End: 2017-07-13

## 2017-07-13 RX ORDER — GLYCOPYRROLATE 0.2 MG/ML
INJECTION, SOLUTION INTRAMUSCULAR; INTRAVENOUS PRN
Status: DISCONTINUED | OUTPATIENT
Start: 2017-07-13 | End: 2017-07-13

## 2017-07-13 RX ORDER — SODIUM CHLORIDE 9 MG/ML
INJECTION, SOLUTION INTRAVENOUS CONTINUOUS
Status: DISCONTINUED | OUTPATIENT
Start: 2017-07-13 | End: 2017-07-13 | Stop reason: HOSPADM

## 2017-07-13 RX ADMIN — DIPHENHYDRAMINE HYDROCHLORIDE 12.5 MG: 50 INJECTION, SOLUTION INTRAMUSCULAR; INTRAVENOUS at 09:16

## 2017-07-13 RX ADMIN — LIDOCAINE HYDROCHLORIDE 0.2 ML: 10 INJECTION, SOLUTION EPIDURAL; INFILTRATION; INTRACAUDAL; PERINEURAL at 08:06

## 2017-07-13 RX ADMIN — LIDOCAINE HYDROCHLORIDE 60 MG: 20 INJECTION, SOLUTION INFILTRATION; PERINEURAL at 08:59

## 2017-07-13 RX ADMIN — ROCURONIUM BROMIDE 50 MG: 10 INJECTION INTRAVENOUS at 09:00

## 2017-07-13 RX ADMIN — CETIRIZINE HYDROCHLORIDE 10 MG: 10 TABLET, FILM COATED ORAL at 12:54

## 2017-07-13 RX ADMIN — ASCORBIC ACID TAB 250 MG 250 MG: 250 TAB at 20:38

## 2017-07-13 RX ADMIN — VITAMIN D, TAB 1000IU (100/BT) 1000 UNITS: 25 TAB at 12:54

## 2017-07-13 RX ADMIN — SODIUM CHLORIDE, POTASSIUM CHLORIDE, SODIUM LACTATE AND CALCIUM CHLORIDE: 600; 310; 30; 20 INJECTION, SOLUTION INTRAVENOUS at 08:05

## 2017-07-13 RX ADMIN — MIDAZOLAM HYDROCHLORIDE 2 MG: 1 INJECTION, SOLUTION INTRAMUSCULAR; INTRAVENOUS at 08:48

## 2017-07-13 RX ADMIN — ATORVASTATIN CALCIUM 40 MG: 40 TABLET, FILM COATED ORAL at 20:38

## 2017-07-13 RX ADMIN — LIDOCAINE HYDROCHLORIDE 90 MG: 10 INJECTION, SOLUTION EPIDURAL; INFILTRATION; INTRACAUDAL; PERINEURAL at 09:11

## 2017-07-13 RX ADMIN — OXYCODONE HYDROCHLORIDE AND ACETAMINOPHEN 1 TABLET: 5; 325 TABLET ORAL at 12:58

## 2017-07-13 RX ADMIN — SODIUM CHLORIDE, POTASSIUM CHLORIDE, SODIUM LACTATE AND CALCIUM CHLORIDE: 600; 310; 30; 20 INJECTION, SOLUTION INTRAVENOUS at 08:48

## 2017-07-13 RX ADMIN — NEOSTIGMINE METHYLSULFATE 5 MG: 1 INJECTION INTRAMUSCULAR; INTRAVENOUS; SUBCUTANEOUS at 09:57

## 2017-07-13 RX ADMIN — OXYCODONE HYDROCHLORIDE AND ACETAMINOPHEN 1 TABLET: 5; 325 TABLET ORAL at 19:19

## 2017-07-13 RX ADMIN — Medication 5 MG: at 09:55

## 2017-07-13 RX ADMIN — CEFAZOLIN SODIUM 2 G: 2 INJECTION, SOLUTION INTRAVENOUS at 09:09

## 2017-07-13 RX ADMIN — GLYCOPYRROLATE 0.8 MG: 0.2 INJECTION, SOLUTION INTRAMUSCULAR; INTRAVENOUS at 09:57

## 2017-07-13 RX ADMIN — Medication 2 LOZENGE: at 18:02

## 2017-07-13 RX ADMIN — TOPIRAMATE 25 MG: 25 TABLET, FILM COATED ORAL at 20:38

## 2017-07-13 RX ADMIN — IMIPRAMINE HYDROCHLORIDE 25 MG: 25 TABLET, FILM COATED ORAL at 12:58

## 2017-07-13 RX ADMIN — Medication 10 MG: at 09:20

## 2017-07-13 RX ADMIN — BUPIVACAINE HYDROCHLORIDE 8 ML: 2.5 INJECTION, SOLUTION EPIDURAL; INFILTRATION; INTRACAUDAL at 09:40

## 2017-07-13 RX ADMIN — SODIUM CHLORIDE, POTASSIUM CHLORIDE, SODIUM LACTATE AND CALCIUM CHLORIDE: 600; 310; 30; 20 INJECTION, SOLUTION INTRAVENOUS at 09:44

## 2017-07-13 RX ADMIN — PROPOFOL 150 MG: 10 INJECTION, EMULSION INTRAVENOUS at 08:59

## 2017-07-13 RX ADMIN — PHENYLEPHRINE HYDROCHLORIDE 0.25 MCG/KG/MIN: 10 INJECTION, SOLUTION INTRAMUSCULAR; INTRAVENOUS; SUBCUTANEOUS at 09:15

## 2017-07-13 RX ADMIN — ASPIRIN 81 MG 324 MG: 81 TABLET ORAL at 19:19

## 2017-07-13 RX ADMIN — Medication 9400 UNITS: at 09:15

## 2017-07-13 RX ADMIN — Medication 5 MG: at 09:03

## 2017-07-13 RX ADMIN — SODIUM CHLORIDE: 9 INJECTION, SOLUTION INTRAVENOUS at 12:23

## 2017-07-13 RX ADMIN — ONDANSETRON 4 MG: 2 INJECTION INTRAMUSCULAR; INTRAVENOUS at 09:44

## 2017-07-13 RX ADMIN — CEFAZOLIN SODIUM 2 G: 2 INJECTION, SOLUTION INTRAVENOUS at 17:04

## 2017-07-13 RX ADMIN — FERROUS SULFATE TAB 325 MG (65 MG ELEMENTAL FE) 325 MG: 325 (65 FE) TAB at 12:54

## 2017-07-13 RX ADMIN — HYDROCHLOROTHIAZIDE 25 MG: 25 TABLET ORAL at 12:54

## 2017-07-13 RX ADMIN — DEXAMETHASONE SODIUM PHOSPHATE 4 MG: 4 INJECTION, SOLUTION INTRA-ARTICULAR; INTRALESIONAL; INTRAMUSCULAR; INTRAVENOUS; SOFT TISSUE at 09:12

## 2017-07-13 RX ADMIN — FERROUS SULFATE TAB 325 MG (65 MG ELEMENTAL FE) 325 MG: 325 (65 FE) TAB at 20:38

## 2017-07-13 RX ADMIN — FENTANYL CITRATE 100 MCG: 50 INJECTION, SOLUTION INTRAMUSCULAR; INTRAVENOUS at 08:59

## 2017-07-13 ASSESSMENT — VISUAL ACUITY
OU: NORMAL ACUITY

## 2017-07-13 ASSESSMENT — LIFESTYLE VARIABLES: TOBACCO_USE: 0

## 2017-07-13 NOTE — ANESTHESIA PREPROCEDURE EVALUATION
Procedure: Procedure(s):  ANESTHSIA ASD/PFO CLOSURE ADULT  Preop diagnosis: PFO     Allergies   Allergen Reactions     Contrast Dye Itching     Reaction of immediate burning and severe itching in Right ear after injection for CT.      Sulfa Drugs      hives     Past Medical History:   Diagnosis Date     Anemia      CVA (cerebral vascular accident) (H) 2017    ?migraine, ?pfo--negative vasc w/u, neg hypercoag w/u     Diffuse cystic mastopathy     Fibrocystic breast disease     HTN, goal below 140/90      Hyperparathyroidism (H)      Infectious mononucleosis     Mono at age 16     Irregular heart beat     PAT no afib on 30day monitor     Labyrinthitis, unspecified      Migraine headache with aura      Osteopenia      Pain in joint, shoulder region     Secondary to a fall     PFO (patent foramen ovale)      S/P gastric bypass June, 2010     Sleep apnea     she is on CPAP     Vitamin D deficiencies      Past Surgical History:   Procedure Laterality Date     C ANEURYSM, INTRACRAN, SIMPLE SURG  04/2017    coil of aneurysm right posterior paraophthalmic artery     C NONSPECIFIC PROCEDURE      S/P multiple breast biopies - all negative / benign     C NONSPECIFIC PROCEDURE      S/P T&A     C NONSPECIFIC PROCEDURE      [place] teeth extraction     C NONSPECIFIC PROCEDURE      S/P (? unreadable) ankle     COLONOSCOPY N/A 8/12/2015    Procedure: COLONOSCOPY;  Surgeon: Deandre Brooks MD;  Location:  GI     GASTRIC BYPASS  June 24, 2010     ORTHOPEDIC SURGERY Left 2010    wrist fracture     PARATHYROIDECTOMY  9/19/11     Prior to Admission medications    Medication Sig Start Date End Date Taking? Authorizing Provider   Atorvastatin Calcium (LIPITOR PO) Take 40 mg by mouth At Bedtime   Yes Reported, Patient   HYDROCHLOROTHIAZIDE PO Take 25 mg by mouth daily   Yes Reported, Patient   LOSARTAN POTASSIUM PO Take 50 mg by mouth daily (0.5 x 100 mg tablet = 50 mg dose)   Yes Reported, Patient   FIBER COMPLETE PO Take 1 capsule  by mouth daily    Yes Reported, Patient   Ascorbic Acid (VITAMIN C PO) Take 250 mg by mouth 2 times daily (0.5 x 500 mg tablet = 250 mg dose)   Yes Reported, Patient   VITAMIN D, CHOLECALCIFEROL, PO Take 1,000 Units by mouth daily   Yes Reported, Patient   clopidogrel (PLAVIX) 75 MG tablet Take 75 mg by mouth daily  4/5/17  Yes Emma Taveras NP   aspirin 81 MG tablet Take 81 mg by mouth daily  4/5/17  Yes Emma Taveras NP   topiramate (TOPAMAX) 25 MG tablet Take 1 tablet (25 mg) by mouth 2 times daily Prescribed by Dr Tello 3/13/17  Yes Mary Mathews MD   Calcium Citrate-Vitamin D (CALCIUM CITRATE + D PO) Take 2 tablets by mouth every morning    Yes Unknown, Entered By History   Ferrous Sulfate 27 MG TABS Take 27 mg by mouth 2 times daily   Yes Unknown, Entered By History   Cyanocobalamin 2500 MCG TABS Take 2,500 mcg by mouth twice a week Mon, Thur   Yes Unknown, Entered By History   imipramine (TOFRANIL) 25 MG tablet Take 1 tablet (25 mg) by mouth daily 12/19/16  Yes Mary Mathews MD   atenolol (TENORMIN) 50 MG tablet Take 1 tablet (50 mg) by mouth daily 8/18/16  Yes Mray Mathews MD   valACYclovir (VALTREX) 1000 mg tablet Take 2 tablets (2,000 mg) by mouth 2 times daily as needed 10/26/15  Yes Mary Mathews MD   ketotifen (ZADITOR) 0.025 % SOLN Place 1 drop into both eyes every 12 hours as needed for itching 7/28/15  Yes Mary Mathews MD   cetirizine (ZYRTEC) 10 MG tablet Take 10 mg by mouth daily  9/13/12  Yes Mary Mathews MD   Probiotic Product (PROBIOTIC PO) Take 1 tablet by mouth daily.   Yes Reported, Patient   UNABLE TO FIND CPAP machine every night    Reported, Patient     Current Facility-Administered Medications Ordered in Epic   Medication Dose Route Frequency Last Rate Last Dose     lidocaine 1 % 1 mL  1 mL Other Q1H PRN         lidocaine (LMX4) cream   Topical Q1H PRN          sodium chloride (PF) 0.9% PF flush 3 mL  3 mL Intracatheter Q1H PRN         sodium chloride (PF) 0.9% PF flush 3 mL  3 mL Intracatheter Q8H         0.9% sodium chloride infusion   Intravenous Continuous         aspirin chewable tablet 81 mg  81 mg Oral Once         ceFAZolin sodium-dextrose (ANCEF) infusion 2 g  2 g Intravenous Pre-Op/Pre-procedure x 1 dose         lidocaine 1 % 1 mL  1 mL Other Q1H PRN         sodium chloride (PF) 0.9% PF flush 3 mL  3 mL Intracatheter Q1H PRN         lactated ringers infusion   Intravenous Continuous         No current [facility]-ordered outpatient prescriptions on file.     Wt Readings from Last 1 Encounters:   07/13/17 94.3 kg (208 lb)     Temp Readings from Last 1 Encounters:   07/13/17 36.5  C (97.7  F) (Temporal)     BP Readings from Last 6 Encounters:   07/13/17 114/69   07/11/17 122/72   06/21/17 102/70   05/31/17 108/70   05/26/17 102/62   04/17/17 96/74     Pulse Readings from Last 4 Encounters:   07/11/17 68   06/21/17 72   05/31/17 78   05/26/17 72     Resp Readings from Last 1 Encounters:   07/13/17 16     SpO2 Readings from Last 1 Encounters:   07/13/17 97%     Recent Labs   Lab Test  07/13/17   0700  04/17/17   1229  04/11/17   0425   NA  136  140  144   POTASSIUM  3.5  4.0  3.3*   CHLORIDE  101  103  108   CO2  28  28  28   ANIONGAP  7  9  8   GLC   --   84  86   BUN   --   16  8   CR  0.74  0.79  0.79   MAXIMILIANO   --   9.6  8.2*     Recent Labs   Lab Test  07/13/17   0700  04/11/17   0425   WBC  7.7  7.0   HGB  13.6  12.1   PLT  271  226     Recent Labs   Lab Test  07/13/17   0700  04/10/17   0759   INR  0.93  1.07      RECENT LABS:   ECG:   ECHO:   CXR:    Anesthesia Evaluation     .             ROS/MED HX    ENT/Pulmonary:     (+)sleep apnea, uses CPAP , . .   (-) tobacco use   Neurologic: Comment: S/p cerebral aneurysm stenting/coiling    (+)migraines, CVA date: 2/2017 without deficits    Cardiovascular:     (+) hypertension----. : . . . :. . congenital heart disease PFO:,  Previous cardiac testing Echodate:results:Interpretation Summary     A patent foramen ovale is present.  Atrial bi-directional shunt  A contrast injection (Bubble Study) was performed that was mildly positive for  flow across the interatrial septum.  The ascending aorta is Mildly dilated.  The atrial septum is aneurysmal.  The study was technically difficult.  _____________________________________________________________________________  __        BROCK  Versed (6mg) was given intravenously. Fentanyl (100mcg) was given  intravenously. Total sedation time: 21 minutes of continuous bedside 1:1  monitoring. Consent to the procedure was obtained prior to sedation. Prior to  the exam, the oral cavity was checked and no overcrowding was noted. The  transesophageal probe was passed without difficulty. There were no  complications associated with this procedure.     Left Ventricle  The left ventricle is normal in size. The visual ejection fraction is  estimated at 55-60%. Left ventricular systolic function is normal.     Right Ventricle  The right ventricle is normal in size and function.     Atria  The left atrium is mildly dilated. No thrombus is detected in the left atrial  appendage. No left atrial mass or thrombus visualized. Right atrial size is  normal. No evidence of right atrial mass/thrombus. Lipomatous hypertrophy of  the interatrial septum is noted. Left to right atrial shunt, small. Right to  left atrial shunt, small. A contrast injection (Bubble Study) was performed  that was mildly positive for flow across the interatrial septum. A contrast  injection (Bubble Study) was performed that was positive early after the  injection. A patent foramen ovale is present. Atrial bi-directional shunt. The  atrial septum is aneurysmal.        Mitral Valve  There is trace mitral regurgitation.     Tricuspid Valve  There is mild (1+) tricuspid regurgitation.     Aortic Valve  The aortic valve is trileaflet. There is trace  aortic regurgitation. No aortic  stenosis is present.     Pulmonic Valve  There is no pulmonic valvular regurgitation. Normal pulmonic valve velocity.     Vessels  The ascending aorta is Mildly dilated. Normal pulmonary venous drainage.        Pericardial/Pleural  There is no pericardial effusion.     Rhythm  The rhythm was sinus bradycardia.  date: results: date: results: date: results:          METS/Exercise Tolerance:     Hematologic:     (+) Anemia, -      Musculoskeletal:         GI/Hepatic: Comment: S/p gastric bypass       (-) GERD   Renal/Genitourinary:         Endo:     (+) Obesity, .      Psychiatric:         Infectious Disease:         Malignancy:         Other:                     Physical Exam  Normal systems: cardiovascular, pulmonary and dental    Airway   Mallampati: III  TM distance: >3 FB  Neck ROM: full    Dental     Cardiovascular       Pulmonary                         Anesthesia Plan      History & Physical Review  History and physical reviewed and following examination; no interval change.    ASA Status:  2 .    NPO Status:  > 8 hours    Plan for ETT and General with Intravenous and Propofol induction. Maintenance will be Inhalation.    PONV prophylaxis:  Ondansetron (or other 5HT-3) and Dexamethasone or Solumedrol  Additional equipment: Videolaryngoscope 12.5mg Beandryl, 4mg Decadron and Zofran for PONV Prophy      Postoperative Care  Postoperative pain management:  IV analgesics and Oral pain medications.      Consents  Anesthetic plan, risks, benefits and alternatives discussed with:  Patient and Sibling..                          .

## 2017-07-13 NOTE — PROGRESS NOTES
Pt seen in OR preop  No new changes from office visit yesterday  ecg and labs ok  Will check if anticardiolipin etc ordered  Pt on asa and plavix  VSS  Lung clear  Cor rrr  abd bs nl  Cns non focal  PFO-  asd closure today

## 2017-07-13 NOTE — PROGRESS NOTES
Admission medication history interview status for the 7/13/2017  admission is complete. See EPIC admission navigator for prior to admission medications     Medication history source reliability:Good    Medication history interview source(s):Patient    Medication history resources (including written lists, pill bottles, clinic record):None    Primary pharmacy.[facility]    Additional medication history information not noted on PTA med list :None    Time spent in this activity: 40 minutes    Prior to Admission medications    Medication Sig Last Dose Taking? Auth Provider   Atorvastatin Calcium (LIPITOR PO) Take 40 mg by mouth At Bedtime 7/12/2017 at PM Yes Reported, Patient   HYDROCHLOROTHIAZIDE PO Take 25 mg by mouth daily 7/12/2017 at AM Yes Reported, Patient   LOSARTAN POTASSIUM PO Take 50 mg by mouth daily (0.5 x 100 mg tablet = 50 mg dose) 7/13/2017 at 0538 Yes Reported, Patient   FIBER COMPLETE PO Take 1 capsule by mouth daily  7/12/2017 at AM Yes Reported, Patient   Ascorbic Acid (VITAMIN C PO) Take 250 mg by mouth 2 times daily (0.5 x 500 mg tablet = 250 mg dose) 7/12/2017 at PM Yes Reported, Patient   VITAMIN D, CHOLECALCIFEROL, PO Take 1,000 Units by mouth daily 7/12/2017 at AM Yes Reported, Patient   clopidogrel (PLAVIX) 75 MG tablet Take 75 mg by mouth daily  7/12/2017 at AM Yes Emma Taveras NP   aspirin 81 MG tablet Take 81 mg by mouth daily  7/13/2017 at 0538 Yes Emma Taveras NP   topiramate (TOPAMAX) 25 MG tablet Take 1 tablet (25 mg) by mouth 2 times daily Prescribed by Dr Tello 7/13/2017 at 0538 Yes Mary Mathews MD   Calcium Citrate-Vitamin D (CALCIUM CITRATE + D PO) Take 2 tablets by mouth every morning  7/12/2017 at AM Yes Unknown, Entered By History   Ferrous Sulfate 27 MG TABS Take 27 mg by mouth 2 times daily 7/12/2017 at PM Yes Unknown, Entered By History   Cyanocobalamin 2500 MCG TABS Take 2,500 mcg by mouth twice a week Mon, Thur 7/10/2017 at AM Yes Unknown,  Entered By History   imipramine (TOFRANIL) 25 MG tablet Take 1 tablet (25 mg) by mouth daily 7/12/2017 at AM Yes Mayr Mathews MD   atenolol (TENORMIN) 50 MG tablet Take 1 tablet (50 mg) by mouth daily 7/13/2017 at 0538 Yes Mary Mathews MD   valACYclovir (VALTREX) 1000 mg tablet Take 2 tablets (2,000 mg) by mouth 2 times daily as needed More than a Month at PRN Yes Mary Mathews MD   ketotifen (ZADITOR) 0.025 % SOLN Place 1 drop into both eyes every 12 hours as needed for itching 7/12/2017 at PRN Yes Mary Mathews MD   cetirizine (ZYRTEC) 10 MG tablet Take 10 mg by mouth daily  7/12/2017 at AM Yes Mary Mathews MD   Probiotic Product (PROBIOTIC PO) Take 1 tablet by mouth daily. 7/12/2017 at AM Yes Reported, Patient   UNABLE TO FIND CPAP machine every night   Reported, Patient

## 2017-07-13 NOTE — ANESTHESIA CARE TRANSFER NOTE
Patient: Jose Coronado    Procedure(s):  PFO CLOSURE (PATIENT REQUESTS DR. CESAR MARTINES)    Diagnosis: PFO   Diagnosis Additional Information: No value filed.    Anesthesia Type:   General     Note:  Airway :Face Mask  Patient transferred to:PACU        Vitals: (Last set prior to Anesthesia Care Transfer)    CRNA VITALS  7/13/2017 0940 - 7/13/2017 1011      7/13/2017             Pulse: 67    SpO2: 100 %    Resp Rate (set): 10                Electronically Signed By: RON Jarquin CRNA  July 13, 2017  10:11 AM

## 2017-07-13 NOTE — PROGRESS NOTES
"Called by RN for oozing  Cath lab staff came up earlier and placed fem stop  CICU staff didn't realize this was VENOUS access so they had been holding ABOVE not BELOW puncture site for venous  I took fem stop off and watched for 10 min, no bleed, no ecchymosis, no hematoma, good pulse (but it was venous)  I betadine the area and rec tegaderm and cont bed rest for 2 more hours before moving around.  Pt confirmed that bleed only started when she was transferred from [facility] to CICU bed and \"rolled\" around during transfer.  All seems good now. I reviewed with her the procedure and f/u plans for tomorrow  "

## 2017-07-13 NOTE — IP AVS SNAPSHOT
MRN:[id number]                      After Visit Summary   7/13/2017    Jose Coronado    MRN: [id number]           Thank you!     Thank you for choosing [facility] for your care. Our goal is always to provide you with excellent care. Hearing back from our patients is one way we can continue to improve our services. Please take a few minutes to complete the written survey that you may receive in the mail after you visit with us. Thank you!        Patient Information     Date Of Birth          1955        Designated Caregiver       Most Recent Value    Caregiver    Will someone help with your care after discharge? yes    Name of designated caregiver Fatou Daniel    Phone number of caregiver [phone]    Caregiver address [address] unknown at this time      About your hospital stay     You were admitted on:  July 13, 2017 You last received care in the:  [facility]    You were discharged on:  July 14, 2017       Who to Call     For medical emergencies, please call 911.  For non-urgent questions about your medical care, please call your primary care provider or clinic, [phone]  For questions related to your surgery, please call your surgery clinic        Attending Provider     Provider Luis Mccoy MD Cardiology       Primary Care Provider Office Phone # Fax #    Lian Martinez [phone]      Your next 10 appointments already scheduled     Jul 18, 2017  9:30 AM CDT   Ech Complete with [id number]   [facility] CV Echocardiography (Cardiovascular [facility] at [facility])    [address] [place] [id number] [phone]           1.  Please bring or wear a comfortable two-piece outfit. 2.  You may eat, drink and take your normal medicines. 3.  For any questions that cannot be answered, please contact the ordering physician            Jul 18, 2017 12:30 PM CDT   Return Visit with Joanie JEFFRIES  RON Soriano CNP   [facility] [place] ([facility] [place])    [address] [facility] [address]  [place] [address] [phone]            Jul 27, 2017  9:20 AM CDT   MyChart Long with Lian Martinez MD   [facility] ([facility])    [address]  [facility] [id number] [phone]            Oct 04, 2017 11:00 AM CDT   Ech Complete with [address] ([facility])    [address]  [facility] [id number] [phone]           1.  Please bring or wear a comfortable two-piece outfit. 2.  You may eat, drink and take your normal medicines. 3.  For any questions that cannot be answered, please contact the ordering physician ***Please check-in at the [facility] located in [address] in the [facility] building. When you are finished registering, please go to [address] and have a seat. The technician will call your name for the test.            Oct 09, 2017 10:00 AM CDT   Return Visit with Luis Church MD   [facility] [place] ([facility])    [address]  [facility] [id number] [phone]              Pending Results     Date and Time Order Name Status Description    7/14/2017 0600 EKG 12-lead, tracing only Preliminary     7/13/2017 1156 Echocardiogram Limited In process     7/13/2017 0700 Cardiolipin Gisel IgG and IgM In process     7/13/2017 0650 Factor 2 and 5 mutation analysis In process     7/13/2017 0650 Lupus Anticoagulant Panel In process     7/13/2017 0650 EKG 12-lead, tracing only Preliminary             Statement of Approval     Ordered          07/14/17 1103  I have reviewed and agree with all the recommendations and orders detailed in this document.  EFFECTIVE NOW     Approved and electronically signed by:  Carola Gallegos APRN CNP         "     Admission Information     Date & Time Provider Department Dept. Phone    7/13/2017 Luis Church MD [facility] [phone]      Your Vitals Were     Blood Pressure Temperature Respirations Height Weight Pulse Oximetry    94/58 98.9  F (37.2  C) (Oral) 14 1.664 m (5' 5.5\") 94.3 kg (208 lb) 100%    BMI (Body Mass Index)                   34.09 kg/m2           [employer] Information     [employer] gives you secure access to your electronic health record. If you see a primary care provider, you can also send messages to your care team and make appointments. If you have questions, please call your primary care clinic.  If you do not have a primary care provider, please call [phone] and they will assist you.        Care EveryWhere ID     This is your Care EveryWhere ID. This could be used by other organizations to access your [facility] medical records  [id number]        Equal Access to Services     LOREN BALDERRAMA AH: Hadii ana mchugho Somalissa, waaxda luqadaha, qaybta kaalmada adedori, eve frederick. So [place] [phone].    ATENCIÓN: Si habla español, tiene a tuttle disposición servicios gratuitos de asistencia lingüística. Llame al [phone].    We comply with applicable federal civil rights laws and [place] laws. We do not discriminate on the basis of race, color, national origin, age, disability sex, sexual orientation or gender identity.               Review of your medicines      START taking        Dose / Directions    acetaminophen 325 MG tablet   Commonly known as:  TYLENOL   Used for:  Generalized pain        Dose:  650 mg   Take 2 tablets (650 mg) by mouth every 4 hours as needed for mild pain   Quantity:  100 tablet   Refills:  0       cephALEXin 500 MG capsule   Commonly known as:  KEFLEX   Indication:  Surgical Prophylaxis   Used for:  S/P device closure of atrial septal defect        Dose:  500 mg   Take 1 capsule (500 mg) by mouth 3 times " daily   Quantity:  9 capsule   Refills:  0         CONTINUE these medicines which have NOT CHANGED        Dose / Directions    aspirin 81 MG tablet        Dose:  81 mg   Take 81 mg by mouth daily   Quantity:  30 tablet   Refills:  0       atenolol 50 MG tablet   Commonly known as:  TENORMIN   Used for:  Palpitations        Dose:  50 mg   Take 1 tablet (50 mg) by mouth daily   Quantity:  90 tablet   Refills:  3       CALCIUM CITRATE + D PO        Dose:  2 tablet   Take 2 tablets by mouth every morning   Refills:  0       cetirizine 10 MG tablet   Commonly known as:  zyrTEC        Dose:  10 mg   Take 10 mg by mouth daily   Quantity:  30 tablet   Refills:  1       Cyanocobalamin 2500 MCG Tabs        Dose:  2500 mcg   Take 2,500 mcg by mouth twice a week Mon, Thur   Refills:  0       Ferrous Sulfate 27 MG Tabs        Dose:  27 mg   Take 27 mg by mouth 2 times daily   Refills:  0       FIBER COMPLETE PO        Dose:  1 capsule   Take 1 capsule by mouth daily   Refills:  0       HYDROCHLOROTHIAZIDE PO        Dose:  25 mg   Take 25 mg by mouth daily   Refills:  0       imipramine 25 MG tablet   Commonly known as:  TOFRANIL   Used for:  Bladder dysfunction        Dose:  25 mg   Take 1 tablet (25 mg) by mouth daily   Quantity:  100 tablet   Refills:  2       LIPITOR PO        Dose:  40 mg   Take 40 mg by mouth At Bedtime   Refills:  0       LOSARTAN POTASSIUM PO        Dose:  50 mg   Take 50 mg by mouth daily (0.5 x 100 mg tablet = 50 mg dose)   Refills:  0       PLAVIX 75 MG tablet   Generic drug:  clopidogrel        Dose:  75 mg   Take 75 mg by mouth daily   Quantity:  90 tablet   Refills:  3       PROBIOTIC PO        Dose:  1 tablet   Take 1 tablet by mouth daily.   Refills:  0       topiramate 25 MG tablet   Commonly known as:  TOPAMAX   Used for:  Migraine        Dose:  25 mg   Take 1 tablet (25 mg) by mouth 2 times daily Prescribed by Dr Tello   Quantity:  180 tablet   Refills:  3       UNABLE TO FIND        CPAP  machine every night   Refills:  0       valACYclovir 1000 mg tablet   Commonly known as:  VALTREX   Used for:  Cold sore        Dose:  2000 mg   Take 2 tablets (2,000 mg) by mouth 2 times daily as needed   Quantity:  4 tablet   Refills:  5       VITAMIN C PO        Dose:  250 mg   Take 250 mg by mouth 2 times daily (0.5 x 500 mg tablet = 250 mg dose)   Refills:  0       VITAMIN D (CHOLECALCIFEROL) PO        Dose:  1000 Units   Take 1,000 Units by mouth daily   Refills:  0       ZADITOR 0.025 % Soln ophthalmic solution   Generic drug:  ketotifen        Dose:  1 drop   Place 1 drop into both eyes every 12 hours as needed for itching   Quantity:  1 Bottle   Refills:  0            Where to get your medicines      These medications were sent to [facility] - [address]  [address], [place] [address]     Phone:  [phone]     cephALEXin 500 MG capsule         Some of these will need a paper prescription and others can be bought over the counter. Ask your nurse if you have questions.     You don't need a prescription for these medications     acetaminophen 325 MG tablet                Protect others around you: Learn how to safely use, store and throw away your medicines at www.disposemymeds.org.             Medication List: This is a list of all your medications and when to take them. Check marks below indicate your daily home schedule. Keep this list as a reference.      Medications           Morning Afternoon Evening Bedtime As Needed    acetaminophen 325 MG tablet   Commonly known as:  TYLENOL   Take 2 tablets (650 mg) by mouth every 4 hours as needed for mild pain                                aspirin 81 MG tablet   Take 81 mg by mouth daily         0800 am                       atenolol 50 MG tablet   Commonly known as:  TENORMIN   Take 1 tablet (50 mg) by mouth daily   Last time this was given:  50 mg on 7/14/2017  7:54 AM         0800 am                        CALCIUM CITRATE + D PO   Take 2 tablets by mouth every morning                                cephALEXin 500 MG capsule   Commonly known as:  KEFLEX   Take 1 capsule (500 mg) by mouth 3 times daily   Last time this was given:  500 mg on 7/14/2017  7:55 AM         0800 am    4 pm        9 pm           cetirizine 10 MG tablet   Commonly known as:  zyrTEC   Take 10 mg by mouth daily   Last time this was given:  10 mg on 7/14/2017  7:55 AM         0800 am                       Cyanocobalamin 2500 MCG Tabs   Take 2,500 mcg by mouth twice a week Mon Thsun                                Ferrous Sulfate 27 MG Tabs   Take 27 mg by mouth 2 times daily   Last time this was given:  325 mg on 7/14/2017  7:53 AM         0800 am                         FIBER COMPLETE PO   Take 1 capsule by mouth daily                                HYDROCHLOROTHIAZIDE PO   Take 25 mg by mouth daily   Last time this was given:  25 mg on 7/14/2017  7:54 AM         0800 am                       imipramine 25 MG tablet   Commonly known as:  TOFRANIL   Take 1 tablet (25 mg) by mouth daily   Last time this was given:  25 mg on 7/14/2017  7:54 AM         0800 am                       LIPITOR PO   Take 40 mg by mouth At Bedtime   Last time this was given:  40 mg on 7/13/2017  8:38 PM                     9 pm           LOSARTAN POTASSIUM PO   Take 50 mg by mouth daily (0.5 x 100 mg tablet = 50 mg dose)   Last time this was given:  50 mg on 7/14/2017  7:53 AM         0800 am                       PLAVIX 75 MG tablet   Take 75 mg by mouth daily   Last time this was given:  75 mg on 7/14/2017  7:54 AM   Generic drug:  clopidogrel         0800 am                       PROBIOTIC PO   Take 1 tablet by mouth daily.                                topiramate 25 MG tablet   Commonly known as:  TOPAMAX   Take 1 tablet (25 mg) by mouth 2 times daily Prescribed by Dr Tello   Last time this was given:  25 mg on 7/14/2017  7:55 AM         0800 am            9 pm            UNABLE TO FIND   CPAP machine every night                                valACYclovir 1000 mg tablet   Commonly known as:  VALTREX   Take 2 tablets (2,000 mg) by mouth 2 times daily as needed                                VITAMIN C PO   Take 250 mg by mouth 2 times daily (0.5 x 500 mg tablet = 250 mg dose)   Last time this was given:  250 mg on 7/14/2017  7:54 AM         0800 am            9 pm           VITAMIN D (CHOLECALCIFEROL) PO   Take 1,000 Units by mouth daily   Last time this was given:  1,000 Units on 7/14/2017  7:55 AM         0800 am                       ZADITOR 0.025 % Soln ophthalmic solution   Place 1 drop into both eyes every 12 hours as needed for itching   Generic drug:  ketotifen

## 2017-07-13 NOTE — OR NURSING
Dr Lowry in to see patient in PACU earlier   C/o eyes aching when opening and following light - lights bright in PACU and curtain pulled and states feels better -light sensitive

## 2017-07-13 NOTE — PLAN OF CARE
Problem: Goal Outcome Summary  Goal: Goal Outcome Summary  Outcome: No Change  Pressure to right groin x15 mins. Below incisional site--drainage appears to have stopprd. Area sl. Bruised.

## 2017-07-13 NOTE — IP AVS SNAPSHOT
[facility]    [address]    [facility] [address]    Phone:  [phone]                                       After Visit Summary   7/13/2017    Jose Coronado    MRN: [id number]           After Visit Summary Signature Page     I have received my discharge instructions, and my questions have been answered. I have discussed any challenges I see with this plan with the nurse or doctor.    ..........................................................................................................................................  Patient/Patient Representative Signature      ..........................................................................................................................................  Patient Representative Print Name and Relationship to Patient    ..................................................               ................................................  Date                                            Time    ..........................................................................................................................................  Reviewed by Signature/Title    ...................................................              ..............................................  Date                                                            Time

## 2017-07-13 NOTE — PROGRESS NOTES
.MD Notification    Notified Person:  MD    Notified Persons Name:Ranjit    Notification Date/Time:7/13 242    Notification Interaction:  Texted     Purpose of Notification:Cont. Oozing of right groin even after manual pressure.     Orders Received:Apply pressure below incisional site x 15 mins    Comments:

## 2017-07-13 NOTE — PLAN OF CARE
Problem: Goal Outcome Summary  Goal: Goal Outcome Summary  Outcome: No Change  Returned from PACU with right groin oozing and quarter size hematoma.. Pressure to site. Groin area intact with scissor eight stitch. Right groin cont to ooze at intervals. Voided. Med. For sore throat. Taking H2O. Sleepy but arouses easily. Bedrest. Flat. Neuros intact

## 2017-07-13 NOTE — ANESTHESIA POSTPROCEDURE EVALUATION
Patient: Jose Coronado    Procedure(s):  PFO CLOSURE (PATIENT REQUESTS DR. CESAR MARTINES)    Diagnosis:PFO   Diagnosis Additional Information: No value filed.    Anesthesia Type:  General    Note:  Anesthesia Post Evaluation    Patient location during evaluation: PACU  Patient participation: Able to fully participate in evaluation  Level of consciousness: awake  Pain management: adequate  Airway patency: patent  Cardiovascular status: acceptable  Respiratory status: acceptable  Hydration status: acceptable  PONV: none     Anesthetic complications: None          Last vitals:  Vitals:    07/13/17 1100 07/13/17 1110 07/13/17 1120   BP: 99/56 92/50 94/52   Resp: 16 12 16   Temp:      SpO2: 100% 99% 99%         Electronically Signed By: Ayden Gillette MD  July 13, 2017  1:50 PM

## 2017-07-13 NOTE — PROGRESS NOTES
Pt seen post PAR  Awake alert normal neuro exam  RFV looks fine  RN and pt reports no issues  I spoke with family

## 2017-07-14 ENCOUNTER — APPOINTMENT (OUTPATIENT)
Dept: CARDIOLOGY | Facility: CLINIC | Age: 62
DRG: 272 | End: 2017-07-14
Attending: INTERNAL MEDICINE
Payer: COMMERCIAL

## 2017-07-14 ENCOUNTER — CARE COORDINATION (OUTPATIENT)
Dept: CARDIOLOGY | Facility: CLINIC | Age: 62
End: 2017-07-14

## 2017-07-14 ENCOUNTER — APPOINTMENT (OUTPATIENT)
Dept: GENERAL RADIOLOGY | Facility: CLINIC | Age: 62
DRG: 272 | End: 2017-07-14
Attending: INTERNAL MEDICINE
Payer: COMMERCIAL

## 2017-07-14 VITALS
RESPIRATION RATE: 14 BRPM | OXYGEN SATURATION: 100 % | SYSTOLIC BLOOD PRESSURE: 94 MMHG | DIASTOLIC BLOOD PRESSURE: 58 MMHG | HEIGHT: 66 IN | TEMPERATURE: 98.9 F | WEIGHT: 208 LBS | BODY MASS INDEX: 33.43 KG/M2

## 2017-07-14 LAB
CARDIOLIPIN ANTIBODY IGG: NORMAL GPL-U/ML (ref 0–19.9)
CARDIOLIPIN ANTIBODY IGM: 2.8 MPL-U/ML (ref 0–19.9)
INTERPRETATION ECG - MUSE: NORMAL

## 2017-07-14 PROCEDURE — 71020 XR CHEST 2 VW: CPT

## 2017-07-14 PROCEDURE — 93325 DOPPLER ECHO COLOR FLOW MAPG: CPT | Mod: 26 | Performed by: INTERNAL MEDICINE

## 2017-07-14 PROCEDURE — 93321 DOPPLER ECHO F-UP/LMTD STD: CPT | Mod: 26 | Performed by: INTERNAL MEDICINE

## 2017-07-14 PROCEDURE — 93308 TTE F-UP OR LMTD: CPT

## 2017-07-14 PROCEDURE — 93010 ELECTROCARDIOGRAM REPORT: CPT | Performed by: INTERNAL MEDICINE

## 2017-07-14 PROCEDURE — 25000132 ZZH RX MED GY IP 250 OP 250 PS 637: Performed by: INTERNAL MEDICINE

## 2017-07-14 PROCEDURE — 93005 ELECTROCARDIOGRAM TRACING: CPT

## 2017-07-14 PROCEDURE — 93308 TTE F-UP OR LMTD: CPT | Mod: 26 | Performed by: INTERNAL MEDICINE

## 2017-07-14 PROCEDURE — 25000128 H RX IP 250 OP 636: Performed by: INTERNAL MEDICINE

## 2017-07-14 RX ORDER — ACETAMINOPHEN 325 MG/1
650 TABLET ORAL EVERY 4 HOURS PRN
Qty: 100 TABLET | COMMUNITY
Start: 2017-07-14 | End: 2020-03-02

## 2017-07-14 RX ORDER — CEPHALEXIN 500 MG/1
500 CAPSULE ORAL 3 TIMES DAILY
Qty: 9 CAPSULE | Refills: 0 | Status: SHIPPED | OUTPATIENT
Start: 2017-07-14 | End: 2017-07-18

## 2017-07-14 RX ADMIN — ASCORBIC ACID TAB 250 MG 250 MG: 250 TAB at 07:54

## 2017-07-14 RX ADMIN — IMIPRAMINE HYDROCHLORIDE 25 MG: 25 TABLET, FILM COATED ORAL at 07:54

## 2017-07-14 RX ADMIN — CEFAZOLIN SODIUM 2 G: 2 INJECTION, SOLUTION INTRAVENOUS at 00:08

## 2017-07-14 RX ADMIN — ASPIRIN 81 MG: 81 TABLET, COATED ORAL at 07:56

## 2017-07-14 RX ADMIN — CETIRIZINE HYDROCHLORIDE 10 MG: 10 TABLET, FILM COATED ORAL at 07:55

## 2017-07-14 RX ADMIN — LOSARTAN POTASSIUM 50 MG: 50 TABLET ORAL at 07:53

## 2017-07-14 RX ADMIN — CEPHALEXIN 500 MG: 500 CAPSULE ORAL at 07:55

## 2017-07-14 RX ADMIN — ATENOLOL 50 MG: 50 TABLET ORAL at 07:54

## 2017-07-14 RX ADMIN — FERROUS SULFATE TAB 325 MG (65 MG ELEMENTAL FE) 325 MG: 325 (65 FE) TAB at 07:53

## 2017-07-14 RX ADMIN — TOPIRAMATE 25 MG: 25 TABLET, FILM COATED ORAL at 07:55

## 2017-07-14 RX ADMIN — VITAMIN D, TAB 1000IU (100/BT) 1000 UNITS: 25 TAB at 07:55

## 2017-07-14 RX ADMIN — HYDROCHLOROTHIAZIDE 25 MG: 25 TABLET ORAL at 07:54

## 2017-07-14 RX ADMIN — CLOPIDOGREL BISULFATE 75 MG: 75 TABLET, FILM COATED ORAL at 07:54

## 2017-07-14 NOTE — DISCHARGE SUMMARY
[facility]    Cardiology Progress Note/Discharge Note    Date of Service (when I saw the patient): 07/14/2017     Assessment & Plan   Jose Coronado is a 61 year old female with history of PFO, stroke, HTN, HL, and migraine who was admitted on 7/13/2017 for elective percutaneous closure of PFO.    1) PFO s/p percutaneous closure under general anesthesia using Amplatz device  -stable overnight  -tele has remained sinus  -figure 8 stitch removed without complication; no bleeding; pressure dressing applied using 4x4 and elastoplast to be removed later today or tomorrow  -CXR unremarkable  -echo shows closure device is well seated and no effusion per preliminary tech report.   -Rx for abx sent to pharmacy here to be given to patient prior to dc to take home  -ASA/plavix for 6 mo    2) HTN  -adequately controlled and even low at times  -feels fine  -continue with home meds    3) HL  -continue statin    Plan home today. She has follow up already arranged in the clinic next week with Joanie on 7/18. Echo at 9:30 am and appt time at 12:30 pm.     Carola Gallegos, APRN, CNP    Interval History   No events over night. Feels good. Sore throat, but not bad. No CP or SOB.     Physical Exam   Temp: 97.2  F (36.2  C) Temp src: Oral BP: 94/58   Heart Rate: 58 Resp: 14 SpO2: 100 % O2 Device: Nasal cannula Oxygen Delivery: 2 LPM  Vitals:    07/13/17 0710   Weight: 94.3 kg (208 lb)     Vital Signs with Ranges  Temp:  [97.2  F (36.2  C)-97.8  F (36.6  C)] 97.2  F (36.2  C)  Heart Rate:  [53-75] 58  Resp:  [10-37] 14  BP: ()/(43-64) 94/58  SpO2:  [84 %-100 %] 100 %  I/O last 3 completed shifts:  In: 2555 [P.O.:400; I.V.:2155]  Out: 4375 [Urine:4375]    Constitutional: awake, alert, cooperative, no apparent distress, and appears stated age  Respiratory: No increased work of breathing, good air exchange, clear to auscultation bilaterally, no crackles or wheezing  Cardiovascular: Normal apical impulse, regular  rate and rhythm, normal S1 and S2, no S3 or S4, and no murmur noted  GI: normal bowel sounds, soft and non-distended  Neurologic: Alert and oriented. Nonfocal.     Medications     NaCl 75 mL/hr at 07/14/17 0200     - MEDICATION INSTRUCTIONS -       - MEDICATION INSTRUCTIONS -         cetirizine  10 mg Oral Daily     atenolol  50 mg Oral Daily     imipramine  25 mg Oral Daily     ferrous sulfate  325 mg Oral BID     zz extemporaneous template  2,500 mcg Oral Once per day on Mon Thu     topiramate  25 mg Oral BID     aspirin EC  81 mg Oral Daily     cholecalciferol  1,000 Units Oral Daily     atorvastatin (LIPITOR) tablet 40 mg  40 mg Oral At Bedtime     hydrochlorothiazide (HYDRODIURIL) tablet 25 mg  25 mg Oral Daily     losartan (COZAAR) tablet 50 mg  50 mg Oral Daily     cephalexin  500 mg Oral TID     clopidogrel  75 mg Oral Daily     ascorbic acid  250 mg Oral BID     sodium chloride (PF)  3 mL Intracatheter Q8H       Data   Results for orders placed or performed during the hospital encounter of 07/13/17 (from the past 24 hour(s))   Activated clotting time POCT   Result Value Ref Range    Activated Clot Time 254 (H) 105 - 167 sec   EKG 12-lead, tracing only   Result Value Ref Range    Interpretation ECG Click View Image link to view waveform and result

## 2017-07-14 NOTE — PLAN OF CARE
Problem: Goal Outcome Summary  Goal: Goal Outcome Summary  Outcome: No Change  A&O, denies pain. Rt groin stable overnight, CMS intact. No bruit. BP's soft, otherwise VSS on RA. O2sats dropped to 84% while sleeping, placed on 2L NC. NS @ 75mL/hr. Slept between cares. Grewal removed @ 0642, due to void. Tele: SBw/1*AVB. Plan for: suture removal, Echo, and CXR.

## 2017-07-14 NOTE — PROGRESS NOTES
Mailed abx prophylaxis letter to pt's dental provider.     [facility]  [address] [facility] [address]

## 2017-07-14 NOTE — PLAN OF CARE
Problem: Goal Outcome Summary  Goal: Goal Outcome Summary  Outcome: No Change  Had  Stitches from right groin dcd. Area tender-no drainage. Had 2 view chest x-ray. Up in room. Waiting for echo to be done

## 2017-07-14 NOTE — PLAN OF CARE
Problem: Goal Outcome Summary  Goal: Goal Outcome Summary  Outcome: No Change  Has dc instructions. Given RX antibiotic.IV dcd. Tele dcd. DCD to home with sisters

## 2017-07-17 ENCOUNTER — CARE COORDINATION (OUTPATIENT)
Dept: CARDIOLOGY | Facility: CLINIC | Age: 62
End: 2017-07-17

## 2017-07-17 ENCOUNTER — TELEPHONE (OUTPATIENT)
Dept: CARDIOLOGY | Facility: CLINIC | Age: 62
End: 2017-07-17

## 2017-07-17 LAB — LA PPP-IMP: NORMAL

## 2017-07-17 NOTE — TELEPHONE ENCOUNTER
Pt calling in with Bladder infection. Pt had PFO closure done last week. Pt asking what she should do. Informed Pt she needs to see PMD for treatment.  Did review this with DR Church who agreed. GARRICK Alaniz RN

## 2017-07-17 NOTE — PROGRESS NOTES
Called patient to discuss any post hospital d/c questions she may have, review medication changes, and confirm f/u appts. Patient denied any questions regarding new medications or changes to some of her current medications that she was taking prior to admission. Patient denied any SOB, chest pain, or light headedness. RN confirmed with patient that she has an apt scheduled on 7/18/17 ECHO and with KHURRAM Joanie Soriano (9:30am ECHO and 12:30pm with KHURRAM). Patient advised to call clinic with any cardiac related questions or concerns prior to her apt on 7/18/17. Patient verbalized understanding and agreed with plan.

## 2017-07-18 ENCOUNTER — HOSPITAL ENCOUNTER (OUTPATIENT)
Dept: CARDIOLOGY | Facility: CLINIC | Age: 62
Discharge: HOME | End: 2017-07-18
Attending: INTERNAL MEDICINE | Admitting: INTERNAL MEDICINE
Payer: COMMERCIAL

## 2017-07-18 ENCOUNTER — OFFICE VISIT (OUTPATIENT)
Dept: CARDIOLOGY | Facility: CLINIC | Age: 62
End: 2017-07-18
Attending: INTERNAL MEDICINE
Payer: COMMERCIAL

## 2017-07-18 VITALS
DIASTOLIC BLOOD PRESSURE: 70 MMHG | BODY MASS INDEX: 33.07 KG/M2 | SYSTOLIC BLOOD PRESSURE: 110 MMHG | HEIGHT: 66 IN | HEART RATE: 58 BPM | WEIGHT: 205.8 LBS

## 2017-07-18 DIAGNOSIS — Q21.12 PFO (PATENT FORAMEN OVALE): Primary | ICD-10-CM

## 2017-07-18 DIAGNOSIS — Q21.12 PFO (PATENT FORAMEN OVALE): ICD-10-CM

## 2017-07-18 DIAGNOSIS — I42.9 CARDIOMYOPATHY OF UNDETERMINED TYPE (H): ICD-10-CM

## 2017-07-18 LAB — INTERPRETATION ECG - MUSE: NORMAL

## 2017-07-18 PROCEDURE — 99214 OFFICE O/P EST MOD 30 MIN: CPT | Mod: 25 | Performed by: NURSE PRACTITIONER

## 2017-07-18 PROCEDURE — 93308 TTE F-UP OR LMTD: CPT | Mod: 26 | Performed by: INTERNAL MEDICINE

## 2017-07-18 PROCEDURE — 93325 DOPPLER ECHO COLOR FLOW MAPG: CPT | Mod: 26 | Performed by: INTERNAL MEDICINE

## 2017-07-18 PROCEDURE — 93321 DOPPLER ECHO F-UP/LMTD STD: CPT | Mod: 26 | Performed by: INTERNAL MEDICINE

## 2017-07-18 PROCEDURE — 93308 TTE F-UP OR LMTD: CPT

## 2017-07-18 NOTE — MR AVS SNAPSHOT
After Visit Summary   7/18/2017    Jose Coronado    MRN: [id number]           Patient Information     Date Of Birth          1955        Visit Information        Provider Department      7/18/2017 12:30 PM Joanie Soriano APRN CNP [facility] [place]        Today's Diagnoses     PFO (patent foramen ovale)          Care Instructions    Use Senna-S 1-4 per day of constipated    If you cannot go, use Miralax    See us back in October with echocardiogram and bubble study              Follow-ups after your visit        Your next 10 appointments already scheduled     Jul 27, 2017  9:20 AM CDT   MyChart Long with Lian Martinez MD   [facility] ([facility])    [address]  [facility] [id number] [phone]            Oct 04, 2017 11:00 AM CDT   Ech Complete with [address] ([facility])    [address]  [facility] [id number] [phone]           1.  Please bring or wear a comfortable two-piece outfit. 2.  You may eat, drink and take your normal medicines. 3.  For any questions that cannot be answered, please contact the ordering physician ***Please check-in at the [facility] located in [address] in the [facility] building. When you are finished registering, please go to [address] and have a seat. The technician will call your name for the test.            Oct 09, 2017 10:00 AM CDT   Return Visit with Luis Church MD   [facility] ([facility])    [address]  [facility] [id number] [phone]              Future tests that were ordered for you today     Open Future Orders        Priority Expected Expires Ordered    Echocardiogram Limited Routine 9/11/2017 6/13/2018 6/13/2017            Who to contact     If you have questions  "or need follow up information about today's clinic visit or your schedule please contact [facility] [place] directly at [phone].  Normal or non-critical lab and imaging results will be communicated to you by MyChart, letter or phone within 4 business days after the clinic has received the results. If you do not hear from us within 7 days, please contact the clinic through veriCARhart or phone. If you have a critical or abnormal lab result, we will notify you by phone as soon as possible.  Submit refill requests through Teburu or call your pharmacy and they will forward the refill request to us. Please allow 3 business days for your refill to be completed.          Additional Information About Your Visit        veriCARharJumpStart Information     Teburu gives you secure access to your electronic health record. If you see a primary care provider, you can also send messages to your care team and make appointments. If you have questions, please call your primary care clinic.  If you do not have a primary care provider, please call [phone] and they will assist you.        Care EveryWhere ID     This is your Care EveryWhere ID. This could be used by other organizations to access your [place] medical records  [id number]        Your Vitals Were     Pulse Height BMI (Body Mass Index)             58 1.676 m (5' 6\") 33.22 kg/m2          Blood Pressure from Last 3 Encounters:   07/18/17 110/70   07/14/17 94/58   07/11/17 122/72    Weight from Last 3 Encounters:   07/18/17 93.4 kg (205 lb 12.8 oz)   07/13/17 94.3 kg (208 lb)   07/11/17 94.6 kg (208 lb 9.6 oz)              We Performed the Following     Follow-Up with Cardiac Advanced Practice Provider          Today's Medication Changes          These changes are accurate as of: 7/18/17  1:17 PM.  If you have any questions, ask your nurse or doctor.               Stop taking these medicines if you haven't already. Please contact your care team if " you have questions.     cephALEXin 500 MG capsule   Commonly known as:  KEFLEX   Stopped by:  Joanie Soriano APRN CNP                    Primary Care Provider Office Phone # Fax #    Lian Martinez [phone]       [facility] [address] [facility] [address]  [facility] [address]        Equal Access to Services     [facility]: Hadii aad ku hadasho Soomaali, waaxda luqadaha, qaybta kaalmada adeegyada, waxay idiin hayaan adeeg kharash la'aan . So [facility] [phone].    ATENCIÓN: Si habla español, tiene a tuttle disposición servicios gratuitos de asistencia lingüística. Jen al [phone].    We comply with applicable federal civil rights laws and [place] laws. We do not discriminate on the basis of race, color, national origin, age, disability sex, sexual orientation or gender identity.            Thank you!     Thank you for choosing [facility] [place]  for your care. Our goal is always to provide you with excellent care. Hearing back from our patients is one way we can continue to improve our services. Please take a few minutes to complete the written survey that you may receive in the mail after your visit with us. Thank you!             Your Updated Medication List - Protect others around you: Learn how to safely use, store and throw away your medicines at www.disposemymeds.org.          This list is accurate as of: 7/18/17  1:17 PM.  Always use your most recent med list.                   Brand Name Dispense Instructions for use Diagnosis    acetaminophen 325 MG tablet    TYLENOL    100 tablet    Take 2 tablets (650 mg) by mouth every 4 hours as needed for mild pain    Generalized pain       aspirin 81 MG tablet     30 tablet    Take 81 mg by mouth daily        atenolol 50 MG tablet    TENORMIN    90 tablet    Take 1 tablet (50 mg) by mouth daily    Palpitations       CALCIUM CITRATE + D PO      Take 2 tablets by mouth every morning         cetirizine 10 MG tablet    zyrTEC    30 tablet    Take 10 mg by mouth daily        Cyanocobalamin 2500 MCG Tabs      Take 2,500 mcg by mouth twice a week Mon, Thur        Ferrous Sulfate 27 MG Tabs      Take 27 mg by mouth 2 times daily        FIBER COMPLETE PO      Take 1 capsule by mouth daily        HYDROCHLOROTHIAZIDE PO      Take 25 mg by mouth daily        imipramine 25 MG tablet    TOFRANIL    100 tablet    Take 1 tablet (25 mg) by mouth daily    Bladder dysfunction       LIPITOR PO      Take 40 mg by mouth At Bedtime        LOSARTAN POTASSIUM PO      Take 50 mg by mouth daily (0.5 x 100 mg tablet = 50 mg dose)        PLAVIX 75 MG tablet   Generic drug:  clopidogrel     90 tablet    Take 75 mg by mouth daily        PROBIOTIC PO      Take 1 tablet by mouth daily.        topiramate 25 MG tablet    TOPAMAX    180 tablet    Take 1 tablet (25 mg) by mouth 2 times daily Prescribed by Dr Tello    Migraine       UNABLE TO FIND      CPAP machine every night        valACYclovir 1000 mg tablet    VALTREX    4 tablet    Take 2 tablets (2,000 mg) by mouth 2 times daily as needed    Cold sore       VITAMIN C PO      Take 250 mg by mouth 2 times daily (0.5 x 500 mg tablet = 250 mg dose)        VITAMIN D (CHOLECALCIFEROL) PO      Take 1,000 Units by mouth daily        ZADITOR 0.025 % Soln ophthalmic solution   Generic drug:  ketotifen     1 Bottle    Place 1 drop into both eyes every 12 hours as needed for itching

## 2017-07-18 NOTE — PATIENT INSTRUCTIONS
Use Senna-S 1-4 per day of constipated    If you cannot go, use Miralax    See us back in October with echocardiogram and bubble study

## 2017-07-18 NOTE — LETTER
7/18/2017    Lian Martinez MD  [facility]   [address]  [facility] [address]    RE: Jose Borgeser       Dear Colleague,    I had the pleasure of seeing Jose Coronado in the [facility].    Jose Coronado is a 61-year-old female who is followed here by Dr. Luis Church. She returns today after a recent percutaneous closure of patent foramen ovale using a #25 Amplatzer device.    She was hospitalized in February of this year after having been to the [place] in January. Several days after the flight home she noted disorientation. 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 Hypercoagulable panel was performed however lupus anticoagulant and cardiolipin antibodies were not checked; nor were Factor 2 and Factor 5 levels assessed.  I did draw these prior to her procedure and the only thing pending is the factor II and factor V 5. The other levels are unremarkable    She was initially placed on warfarin and however there was some question if she had paroxysmal atrial fibrillation during her admission. She was then switched to Plavix and aspirin. She had an incidental finding of a right posterior para ophthalmic/internal carotid aneurysm and this was coiled and stented. Thirty day event recorder upon further review appeared to be paroxysmal atrial tachycardia, no atrial fibrillation was identified.    She sees Dr. Morrissey for neurology care and Dr. Grider did her coiling. She just had follow-up with them; she reports all is well.    There's been some discussion about aggressively treating her dyslipidemia. She is on Lipitor 40 mg per day;we would prefer LDLs closer 70-they have been running around 100.. She plans to talk to her primary care physician about increasing statin and continues her weight loss plan.    Of note,  her aorta by echo is dilated at 3.9 cm. This will need long-term follow-up intermittently by echocardiography or CT scan.    Her procedure went very well last week. The only mild complication was of bleeding in the right groin femoral vein site. This volodymyr left her with a 2 x 6 cm hematoma in the right inguinal fold. This is soft without hum or bruit.     She is overall doing well. She was bothered initially by some bladder spasms and had concern that she had a bladder infection. She talked with her primary care doctor who reportedly told her that the Keflex we gave her post procedure likely covered this. Her symptoms have now resolved. She's also having some gas and bloating with some constipation. She tends to have this issue anyway. I suggested she try senna-S or MiraLAX if needed.    A limited echocardiogram was done today and looks fine. There is no color flow through the device. The device is well-seated only function is rated at 50-55%. I will have Dr. Church review this. Perhaps the limited views, for the mild decrease as previous ejection fraction was 55-60%.    She had multiple questions today which I did my best to answer.    Physical exam is outlined below and right groin site is described above.    Impression/Plan:  1. Stroke in February 2017 with patent foramen ovale identified on work-up. There was bidirectional shuntint. This has been successfully closed with an Amplatzer device. Right groin area has a hematoma, but is soft and healing. She will continue aspirin 81 mg a day with Plavix 75 mg daily for 6 months. At that point,the Plavix will be discontinued and aspirin likely increased to 325 mg per day. We reviewed again no contact sports, scuba diving d or uvaldo diving for the next 3 months. At week one post procedure, her lifting restriction of 10 pounds will go away to up to 30 pounds for the next 2 weeks. She will have SBE prophylaxis over the next 6 months if dental work is necessary, otherwise we  would avoid routine dental work during that time frame. I will await her factor II and factor V levels. If those are unremarkable, her full hypercoagulable panel has been done.  2. Hypertension- reasonable control. Management at primary care  3. 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 Followed by neurology.  4. No evidence of atrial fibrillation on further review, but does have paroxysmal atrial tachycardia. Atrial arrhythmias are more common in this population.  5. Mild dyslipidemia on Atorvastatin 40mg per day with LDL 95 HDL 51. There may be some benefit to driving her LDL closer to 70. She will talk to her primary care physician and continue attempts at weight loss as well.  6. History of migraine headaches.    A pleasure seeing Leelee follow-up greater than 50% of this 30 minute visit today was in counseling.  She will return to see us at 3 months post closure with and echocardiogram and bubble study.    Addendum: echo reviewed by Dr Church; ef 50-55% and similar to past; will schedule a nuclear stress test prior to October visit--pt messaged in PuzzleSocial  Factor II and Factor V levels have returned and are normal-pt messaged in [employer]zzleSocial. dtk    Greater than 50% of this 30 minute visit today was spent in counseling    No orders of the defined types were placed in this encounter.      No orders of the defined types were placed in this encounter.      Medications Discontinued During This Encounter   Medication Reason     cephALEXin (KEFLEX) 500 MG capsule          Encounter Diagnosis   Name Primary?     PFO (patent foramen ovale)        CURRENT MEDICATIONS:  Current Outpatient Prescriptions   Medication Sig Dispense Refill     acetaminophen (TYLENOL) 325 MG tablet Take 2 tablets (650 mg) by mouth every 4 hours as needed for mild pain 100 tablet      Atorvastatin Calcium (LIPITOR PO) Take 40 mg by mouth At Bedtime       HYDROCHLOROTHIAZIDE PO Take 25 mg by mouth daily        LOSARTAN POTASSIUM PO Take 50 mg by mouth daily (0.5 x 100 mg tablet = 50 mg dose)       FIBER COMPLETE PO Take 1 capsule by mouth daily        UNABLE TO FIND CPAP machine every night       Ascorbic Acid (VITAMIN C PO) Take 250 mg by mouth 2 times daily (0.5 x 500 mg tablet = 250 mg dose)       VITAMIN D, CHOLECALCIFEROL, PO Take 1,000 Units by mouth daily       clopidogrel (PLAVIX) 75 MG tablet Take 75 mg by mouth daily  90 tablet 3     aspirin 81 MG tablet Take 81 mg by mouth daily  30 tablet      topiramate (TOPAMAX) 25 MG tablet Take 1 tablet (25 mg) by mouth 2 times daily Prescribed by Dr Tello 180 tablet 3     Calcium Citrate-Vitamin D (CALCIUM CITRATE + D PO) Take 2 tablets by mouth every morning        Ferrous Sulfate 27 MG TABS Take 27 mg by mouth 2 times daily       Cyanocobalamin 2500 MCG TABS Take 2,500 mcg by mouth twice a week Mon, Thur       imipramine (TOFRANIL) 25 MG tablet Take 1 tablet (25 mg) by mouth daily 100 tablet 2     atenolol (TENORMIN) 50 MG tablet Take 1 tablet (50 mg) by mouth daily 90 tablet 3     valACYclovir (VALTREX) 1000 mg tablet Take 2 tablets (2,000 mg) by mouth 2 times daily as needed 4 tablet 5     ketotifen (ZADITOR) 0.025 % SOLN Place 1 drop into both eyes every 12 hours as needed for itching 1 Bottle      cetirizine (ZYRTEC) 10 MG tablet Take 10 mg by mouth daily  30 tablet 1     Probiotic Product (PROBIOTIC PO) Take 1 tablet by mouth daily.         ALLERGIES     Allergies   Allergen Reactions     Contrast Dye Itching     Reaction of immediate burning and severe itching in Right ear after injection for CT.      Sulfa Drugs      hives       PAST MEDICAL HISTORY:  Past Medical History:   Diagnosis Date     Anemia      CVA (cerebral vascular accident) (H) 2017    ?migraine, ?pfo--negative vasc w/u, neg hypercoag w/u     Diffuse cystic mastopathy     Fibrocystic breast disease     HTN, goal below 140/90      Hyperparathyroidism (H)      Infectious mononucleosis     Mono at age  16     Irregular heart beat     PAT no afib on 30day monitor     Labyrinthitis, unspecified      Migraine headache with aura      Osteopenia      Pain in joint, shoulder region     Secondary to a fall     PFO (patent foramen ovale)     s/p closure with amplazter device 7/13/17     S/P gastric bypass June, 2010     Sleep apnea     she is on CPAP     Vitamin D deficiencies        PAST SURGICAL HISTORY:  Past Surgical History:   Procedure Laterality Date     C ANEURYSM, INTRACRAN, SIMPLE SURG  04/2017    coil of aneurysm right posterior paraophthalmic artery     C NONSPECIFIC PROCEDURE      S/P multiple breast biopies - all negative / benign     C NONSPECIFIC PROCEDURE      S/P T&A     C NONSPECIFIC PROCEDURE      [place] teeth extraction     C NONSPECIFIC PROCEDURE      S/P (? unreadable) ankle     COLONOSCOPY N/A 8/12/2015    Procedure: COLONOSCOPY;  Surgeon: Deandre Brooks MD;  Location: [facility]     GASTRIC BYPASS  June 24, 2010     ORTHOPEDIC SURGERY Left 2010    wrist fracture     PARATHYROIDECTOMY  9/19/11     FAMILY HISTORY:  Family History   Problem Relation Age of Onset     Breast Cancer Mother      Hypertension Mother      Arthritis Mother      Thyroid Disease Mother      hypo     Breast Cancer Maternal Aunt      Hypertension Paternal Grandmother      CEREBROVASCULAR DISEASE Maternal Grandmother      Family History Negative Maternal Grandfather      Family History Negative Paternal Grandfather      Family History Negative Son      Family History Negative Daughter      Family History Negative Daughter      Colon Cancer No family hx of      SOCIAL HISTORY:  Social History     Social History     Marital status: Single     Spouse name: N/A     Number of children: N/A     Years of education: N/A     Social History Main Topics     Smoking status: Never Smoker     Smokeless tobacco: Never Used     Alcohol use 0.0 oz/week     0 Standard drinks or equivalent per week      Comment: 1 glass wine 4-5 days a week      "Drug use: No     Sexual activity: Not Currently     Partners: Male     Other Topics Concern     None     Social History Narrative       Review of Systems:  Skin:  Negative       Eyes:  Positive for contacts (left eye only)    ENT:  Positive for hearing loss (left ear only)    Respiratory:  Positive for sleep apnea;CPAP     Cardiovascular:  Negative      Gastroenterology: Negative      Genitourinary:  Negative      Musculoskeletal:  Negative      Neurologic:  Positive for migraine headaches;stroke (hx of migraines)    Psychiatric:  Negative      Heme/Lymph/Imm:  Negative      Endocrine:  Negative        Physical Exam:  Vitals: /70  Pulse 58  Ht 1.676 m (5' 6\")  Wt 93.4 kg (205 lb 12.8 oz)  BMI 33.22 kg/m2    Constitutional:  cooperative, alert and oriented, well developed, well nourished, in no acute distress        Skin:  warm and dry to the touch, no apparent skin lesions or masses noted   scattered ecchymosis    Head:  normocephalic        Eyes:  pupils equal and round        ENT:  no pallor or cyanosis        Neck:  JVP normal        Chest:  normal breath sounds, clear to auscultation, normal A-P diameter, normal symmetry, normal respiratory excursion, no use of accessory muscles          Cardiac: regular rhythm, normal S1/S2, no S3 or S4, apical impulse not displaced, no murmurs, gallops or rubs                  Abdomen:  abdomen soft obese      Vascular: pulses full and equal                                   rigth groin with moderate hemtoma  2cm by 6cm    Extremities and Back:  no deformities, clubbing, cyanosis, erythema observed;no edema         healed mirella bite right wrist    Neurological:  affect appropriate, oriented to time, person and place        Thank you for allowing me to participate in the care of your patient.    Sincerely,     RON Bolton [facility] [place] [facility]  "

## 2017-07-18 NOTE — PROGRESS NOTES
VANITA Coronado is a 61-year-old female who is followed here by Dr. Luis Church. She returns today after a recent percutaneous closure of patent foramen ovale using a #25 Amplatzer device.    She was hospitalized in February of this year after having been to the [place] in January. Several days after the flight home she noted disorientation. 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 Hypercoagulable panel was performed however lupus anticoagulant and cardiolipin antibodies were not checked; nor were Factor 2 and Factor 5 levels assessed.  I did draw these prior to her procedure and the only thing pending is the factor II and factor V 5. The other levels are unremarkable    She was initially placed on warfarin and however there was some question if she had paroxysmal atrial fibrillation during her admission. She was then switched to Plavix and aspirin. She had an incidental finding of a right posterior para ophthalmic/internal carotid aneurysm and this was coiled and stented. Thirty day event recorder upon further review appeared to be paroxysmal atrial tachycardia, no atrial fibrillation was identified.    She sees Dr. Morrissey for neurology care and Dr. Grider did her coiling. She just had follow-up with them; she reports all is well.    There's been some discussion about aggressively treating her dyslipidemia. She is on Lipitor 40 mg per day;we would prefer LDLs closer 70-they have been running around 100.. She plans to talk to her primary care physician about increasing statin and continues her weight loss plan.    Of note, her aorta by echo is dilated at 3.9 cm. This will need long-term follow-up intermittently by echocardiography or CT scan.    Her procedure went very well last week. The only mild complication was of bleeding in the right groin femoral vein site.  This volodymyr left her with a 2 x 6 cm hematoma in the right inguinal fold. This is soft without hum or bruit.     She is overall doing well. She was bothered initially by some bladder spasms and had concern that she had a bladder infection. She talked with her primary care doctor who reportedly told her that the Keflex we gave her post procedure likely covered this. Her symptoms have now resolved. She's also having some gas and bloating with some constipation. She tends to have this issue anyway. I suggested she try senna-S or MiraLAX if needed.    A limited echocardiogram was done today and looks fine. There is no color flow through the device. The device is well-seated only function is rated at 50-55%. I will have Dr. Church review this. Perhaps the limited views, for the mild decrease as previous ejection fraction was 55-60%.    She had multiple questions today which I did my best to answer.    Physical exam is outlined below and right groin site is described above.    Impression/Plan:  1. Stroke in February 2017 with patent foramen ovale identified on work-up. There was bidirectional shuntint. This has been successfully closed with an Amplatzer device. Right groin area has a hematoma, but is soft and healing. She will continue aspirin 81 mg a day with Plavix 75 mg daily for 6 months. At that point,the Plavix will be discontinued and aspirin likely increased to 325 mg per day. We reviewed again no contact sports, scuba diving d or uvaldo diving for the next 3 months. At week one post procedure, her lifting restriction of 10 pounds will go away to up to 30 pounds for the next 2 weeks. She will have SBE prophylaxis over the next 6 months if dental work is necessary, otherwise we would avoid routine dental work during that time frame. I will await her factor II and factor V levels. If those are unremarkable, her full hypercoagulable panel has been done.  2. Hypertension- reasonable control. Management at primary care  3.  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 Followed by neurology.  4. No evidence of atrial fibrillation on further review, but does have paroxysmal atrial tachycardia. Atrial arrhythmias are more common in this population.  5. Mild dyslipidemia on Atorvastatin 40mg per day with LDL 95 HDL 51. There may be some benefit to driving her LDL closer to 70. She will talk to her primary care physician and continue attempts at weight loss as well.  6. History of migraine headaches.    A pleasure seeing Leelee follow-up greater than 50% of this 30 minute visit today was in counseling.  She will return to see us at 3 months post closure with and echocardiogram and bubble study.    Addendum: echo reviewed by Dr Church; ef 50-55% and similar to past; will schedule a nuclear stress test prior to October visit--pt messaged in [employer]  Factor II and Factor V levels have returned and are normal-pt messaged in [employer]. dtk    Greater than 50% of this 30 minute visit today was spent in counseling    No orders of the defined types were placed in this encounter.      No orders of the defined types were placed in this encounter.      Medications Discontinued During This Encounter   Medication Reason     cephALEXin (KEFLEX) 500 MG capsule          Encounter Diagnosis   Name Primary?     PFO (patent foramen ovale)        CURRENT MEDICATIONS:  Current Outpatient Prescriptions   Medication Sig Dispense Refill     acetaminophen (TYLENOL) 325 MG tablet Take 2 tablets (650 mg) by mouth every 4 hours as needed for mild pain 100 tablet      Atorvastatin Calcium (LIPITOR PO) Take 40 mg by mouth At Bedtime       HYDROCHLOROTHIAZIDE PO Take 25 mg by mouth daily       LOSARTAN POTASSIUM PO Take 50 mg by mouth daily (0.5 x 100 mg tablet = 50 mg dose)       FIBER COMPLETE PO Take 1 capsule by mouth daily        UNABLE TO FIND CPAP machine every night       Ascorbic Acid (VITAMIN C PO) Take 250 mg by mouth 2 times  daily (0.5 x 500 mg tablet = 250 mg dose)       VITAMIN D, CHOLECALCIFEROL, PO Take 1,000 Units by mouth daily       clopidogrel (PLAVIX) 75 MG tablet Take 75 mg by mouth daily  90 tablet 3     aspirin 81 MG tablet Take 81 mg by mouth daily  30 tablet      topiramate (TOPAMAX) 25 MG tablet Take 1 tablet (25 mg) by mouth 2 times daily Prescribed by Dr Tello 180 tablet 3     Calcium Citrate-Vitamin D (CALCIUM CITRATE + D PO) Take 2 tablets by mouth every morning        Ferrous Sulfate 27 MG TABS Take 27 mg by mouth 2 times daily       Cyanocobalamin 2500 MCG TABS Take 2,500 mcg by mouth twice a week Mon, Thur       imipramine (TOFRANIL) 25 MG tablet Take 1 tablet (25 mg) by mouth daily 100 tablet 2     atenolol (TENORMIN) 50 MG tablet Take 1 tablet (50 mg) by mouth daily 90 tablet 3     valACYclovir (VALTREX) 1000 mg tablet Take 2 tablets (2,000 mg) by mouth 2 times daily as needed 4 tablet 5     ketotifen (ZADITOR) 0.025 % SOLN Place 1 drop into both eyes every 12 hours as needed for itching 1 Bottle      cetirizine (ZYRTEC) 10 MG tablet Take 10 mg by mouth daily  30 tablet 1     Probiotic Product (PROBIOTIC PO) Take 1 tablet by mouth daily.         ALLERGIES     Allergies   Allergen Reactions     Contrast Dye Itching     Reaction of immediate burning and severe itching in Right ear after injection for CT.      Sulfa Drugs      hives       PAST MEDICAL HISTORY:  Past Medical History:   Diagnosis Date     Anemia      CVA (cerebral vascular accident) (H) 2017    ?migraine, ?pfo--negative vasc w/u, neg hypercoag w/u     Diffuse cystic mastopathy     Fibrocystic breast disease     HTN, goal below 140/90      Hyperparathyroidism (H)      Infectious mononucleosis     Mono at age 16     Irregular heart beat     PAT no afib on 30day monitor     Labyrinthitis, unspecified      Migraine headache with aura      Osteopenia      Pain in joint, shoulder region     Secondary to a fall     PFO (patent foramen ovale)     s/p closure  with amplazter device 7/13/17     S/P gastric bypass June, 2010     Sleep apnea     she is on CPAP     Vitamin D deficiencies        PAST SURGICAL HISTORY:  Past Surgical History:   Procedure Laterality Date     C ANEURYSM, INTRACRAN, SIMPLE SURG  04/2017    coil of aneurysm right posterior paraophthalmic artery     C NONSPECIFIC PROCEDURE      S/P multiple breast biopies - all negative / benign     C NONSPECIFIC PROCEDURE      S/P T&A     C NONSPECIFIC PROCEDURE      [place] teeth extraction     C NONSPECIFIC PROCEDURE      S/P (? unreadable) ankle     COLONOSCOPY N/A 8/12/2015    Procedure: COLONOSCOPY;  Surgeon: Deandre Brooks MD;  Location: [facility]     GASTRIC BYPASS  June 24, 2010     ORTHOPEDIC SURGERY Left 2010    wrist fracture     PARATHYROIDECTOMY  9/19/11       FAMILY HISTORY:  Family History   Problem Relation Age of Onset     Breast Cancer Mother      Hypertension Mother      Arthritis Mother      Thyroid Disease Mother      hypo     Breast Cancer Maternal Aunt      Hypertension Paternal Grandmother      CEREBROVASCULAR DISEASE Maternal Grandmother      Family History Negative Maternal Grandfather      Family History Negative Paternal Grandfather      Family History Negative Son      Family History Negative Daughter      Family History Negative Daughter      Colon Cancer No family hx of        SOCIAL HISTORY:  Social History     Social History     Marital status: Single     Spouse name: N/A     Number of children: N/A     Years of education: N/A     Social History Main Topics     Smoking status: Never Smoker     Smokeless tobacco: Never Used     Alcohol use 0.0 oz/week     0 Standard drinks or equivalent per week      Comment: 1 glass wine 4-5 days a week     Drug use: No     Sexual activity: Not Currently     Partners: Male     Other Topics Concern     None     Social History Narrative       Review of Systems:  Skin:  Negative       Eyes:  Positive for contacts (left eye only)    ENT:  Positive for  "hearing loss (left ear only)    Respiratory:  Positive for sleep apnea;CPAP     Cardiovascular:  Negative      Gastroenterology: Negative      Genitourinary:  Negative      Musculoskeletal:  Negative      Neurologic:  Positive for migraine headaches;stroke (hx of migraines)    Psychiatric:  Negative      Heme/Lymph/Imm:  Negative      Endocrine:  Negative        Physical Exam:  Vitals: /70  Pulse 58  Ht 1.676 m (5' 6\")  Wt 93.4 kg (205 lb 12.8 oz)  BMI 33.22 kg/m2    Constitutional:  cooperative, alert and oriented, well developed, well nourished, in no acute distress        Skin:  warm and dry to the touch, no apparent skin lesions or masses noted   scattered ecchymosis    Head:  normocephalic        Eyes:  pupils equal and round        ENT:  no pallor or cyanosis        Neck:  JVP normal        Chest:  normal breath sounds, clear to auscultation, normal A-P diameter, normal symmetry, normal respiratory excursion, no use of accessory muscles          Cardiac: regular rhythm, normal S1/S2, no S3 or S4, apical impulse not displaced, no murmurs, gallops or rubs                  Abdomen:  abdomen soft obese      Vascular: pulses full and equal                                   rigth groin with moderate hemtoma  2cm by 6cm    Extremities and Back:  no deformities, clubbing, cyanosis, erythema observed;no edema         healed mirella bite right wrist    Neurological:  affect appropriate, oriented to time, person and place              CC  Luis Church MD   [facility]  [address]  [place] [id number]              "

## 2017-07-19 LAB — COPATH REPORT: NORMAL

## 2017-07-24 ENCOUNTER — DOCUMENTATION ONLY (OUTPATIENT)
Dept: OTHER | Facility: CLINIC | Age: 62
End: 2017-07-24

## 2017-07-24 DIAGNOSIS — Z71.89 ACP (ADVANCE CARE PLANNING): Chronic | ICD-10-CM

## 2017-07-25 ENCOUNTER — TELEPHONE (OUTPATIENT)
Dept: CARDIOLOGY | Facility: CLINIC | Age: 62
End: 2017-07-25

## 2017-07-25 NOTE — TELEPHONE ENCOUNTER
Left message for  Pt Dr Church did review factor 5 results all found to be negative. GARRICK Alaniz RN

## 2017-07-27 ENCOUNTER — OFFICE VISIT (OUTPATIENT)
Dept: INTERNAL MEDICINE | Facility: CLINIC | Age: 62
End: 2017-07-27
Payer: COMMERCIAL

## 2017-07-27 VITALS
TEMPERATURE: 98.6 F | SYSTOLIC BLOOD PRESSURE: 118 MMHG | OXYGEN SATURATION: 99 % | HEART RATE: 86 BPM | WEIGHT: 203 LBS | BODY MASS INDEX: 32.62 KG/M2 | HEIGHT: 66 IN | DIASTOLIC BLOOD PRESSURE: 72 MMHG

## 2017-07-27 DIAGNOSIS — I10 ESSENTIAL HYPERTENSION WITH GOAL BLOOD PRESSURE LESS THAN 140/90: Chronic | ICD-10-CM

## 2017-07-27 DIAGNOSIS — I48.0 PAROXYSMAL ATRIAL FIBRILLATION (H): ICD-10-CM

## 2017-07-27 DIAGNOSIS — E78.00 HYPERCHOLESTEREMIA: ICD-10-CM

## 2017-07-27 DIAGNOSIS — I67.1 CEREBRAL ANEURYSM WITHOUT RUPTURE: ICD-10-CM

## 2017-07-27 DIAGNOSIS — Q21.12 PFO (PATENT FORAMEN OVALE): Primary | Chronic | ICD-10-CM

## 2017-07-27 DIAGNOSIS — I63.10 CEREBROVASCULAR ACCIDENT (CVA) DUE TO EMBOLISM OF PRECEREBRAL ARTERY (H): ICD-10-CM

## 2017-07-27 LAB
CHOLEST SERPL-MCNC: 116 MG/DL
HDLC SERPL-MCNC: 60 MG/DL
LDLC SERPL CALC-MCNC: 45 MG/DL
NONHDLC SERPL-MCNC: 56 MG/DL
TRIGL SERPL-MCNC: 55 MG/DL

## 2017-07-27 PROCEDURE — 36415 COLL VENOUS BLD VENIPUNCTURE: CPT | Performed by: INTERNAL MEDICINE

## 2017-07-27 PROCEDURE — 99495 TRANSJ CARE MGMT MOD F2F 14D: CPT | Performed by: INTERNAL MEDICINE

## 2017-07-27 PROCEDURE — 80061 LIPID PANEL: CPT | Performed by: INTERNAL MEDICINE

## 2017-07-27 NOTE — MR AVS SNAPSHOT
After Visit Summary   7/27/2017    Jose Coronado    MRN: [id number]           Patient Information     Date Of Birth          1955        Visit Information        Provider Department      7/27/2017 9:20 AM Lian Martinez MD [facility]        Today's Diagnoses     PFO (patent foramen ovale)    -  1    Cerebrovascular accident (CVA) due to embolism of precerebral artery (H)        Cerebral aneurysm without rupture        Paroxysmal atrial fibrillation (H)        Hypercholesteremia        Essential hypertension with goal blood pressure less than 140/90           Follow-ups after your visit        Your next 10 appointments already scheduled     Oct 04, 2017 11:00 AM CDT   Ech Complete with [id number] ([facility])    [address]  [facility] [address] [phone]           1. Please bring or wear a comfortable two-piece outfit. 2. You may eat, drink and take your normal medicines. 3. For any questions that cannot be answered, please contact the ordering physician ***Please check-in at the [facility] Registration Office located in [address] in the [facility] building. When you are finished registering, please go to [address] and have a seat. The technician will call your name for the test.            Oct 09, 2017 10:00 AM CDT   Return Visit with Luis Church MD   [facility] [place] ([facility])    [address]  [facility] [id number] [phone]              Who to contact     If you have questions or need follow up information about today's clinic visit or your schedule please contact [facility] directly at [phone].  Normal or non-critical lab and imaging results will be communicated to you by MyChart, letter or phone within 4 business days after the clinic has received the results. If you do not hear  "from us within 7 days, please contact the clinic through [employer] or phone. If you have a critical or abnormal lab result, we will notify you by phone as soon as possible.  Submit refill requests through [employer] or call your pharmacy and they will forward the refill request to us. Please allow 3 business days for your refill to be completed.          Additional Information About Your Visit        [employer] Information     [employer] gives you secure access to your electronic health record. If you see a primary care provider, you can also send messages to your care team and make appointments. If you have questions, please call your primary care clinic.  If you do not have a primary care provider, please call [phone] and they will assist you.        Care EveryWhere ID     This is your Care EveryWhere ID. This could be used by other organizations to access your [place] medical records  [id number]        Your Vitals Were     Pulse Temperature Height Pulse Oximetry Breastfeeding? BMI (Body Mass Index)    86 98.6  F (37  C) (Oral) 5' 6\" (1.676 m) 99% No 32.77 kg/m2       Blood Pressure from Last 3 Encounters:   07/27/17 118/72   07/18/17 110/70   07/14/17 94/58    Weight from Last 3 Encounters:   07/27/17 203 lb (92.1 kg)   07/18/17 205 lb 12.8 oz (93.4 kg)   07/13/17 208 lb (94.3 kg)              We Performed the Following     Lipid panel reflex to direct LDL        Primary Care Provider Office Phone # Fax #    Lian Martinez [phone]       [facility] [address]   [facility] [address]        Equal Access to Services     [facility] CenterANTONIA : Hadii aad ku hadasho Soomaali, waaxda luqadaha, qaybta kaalmada adedori, eve frederick. So [facility] [phone].    ATENCIÓN: Si habla español, tiene a tuttle disposición servicios gratuitos de asistencia lingüística. Jen al [phone].    We comply with applicable federal civil rights laws and [place] laws. " We do not discriminate on the basis of race, color, national origin, age, disability sex, sexual orientation or gender identity.            Thank you!     Thank you for choosing [facility]  for your care. Our goal is always to provide you with excellent care. Hearing back from our patients is one way we can continue to improve our services. Please take a few minutes to complete the written survey that you may receive in the mail after your visit with us. Thank you!             Your Updated Medication List - Protect others around you: Learn how to safely use, store and throw away your medicines at www.disposemymeds.org.          This list is accurate as of: 7/27/17 11:40 AM.  Always use your most recent med list.                   Brand Name Dispense Instructions for use Diagnosis    acetaminophen 325 MG tablet    TYLENOL    100 tablet    Take 2 tablets (650 mg) by mouth every 4 hours as needed for mild pain    Generalized pain       aspirin 81 MG tablet     30 tablet    Take 81 mg by mouth daily        atenolol 50 MG tablet    TENORMIN    90 tablet    Take 1 tablet (50 mg) by mouth daily    Palpitations       CALCIUM CITRATE + D PO      Take 2 tablets by mouth every morning        cetirizine 10 MG tablet    zyrTEC    30 tablet    Take 10 mg by mouth daily        Cyanocobalamin 2500 MCG Tabs      Take 2,500 mcg by mouth twice a week Mon, Thur        Ferrous Sulfate 27 MG Tabs      Take 27 mg by mouth 2 times daily        FIBER COMPLETE PO      Take 1 capsule by mouth daily        HYDROCHLOROTHIAZIDE PO      Take 25 mg by mouth daily        imipramine 25 MG tablet    TOFRANIL    100 tablet    Take 1 tablet (25 mg) by mouth daily    Bladder dysfunction       LIPITOR PO      Take 40 mg by mouth At Bedtime        LOSARTAN POTASSIUM PO      Take 50 mg by mouth daily (0.5 x 100 mg tablet = 50 mg dose)        PLAVIX 75 MG tablet   Generic drug:  clopidogrel     90 tablet    Take 75 mg by mouth daily         PROBIOTIC PO      Take 1 tablet by mouth daily.        topiramate 25 MG tablet    TOPAMAX    180 tablet    Take 1 tablet (25 mg) by mouth 2 times daily Prescribed by Dr Tello    Migraine       UNABLE TO FIND      CPAP machine every night        valACYclovir 1000 mg tablet    VALTREX    4 tablet    Take 2 tablets (2,000 mg) by mouth 2 times daily as needed    Cold sore       VITAMIN C PO      Take 250 mg by mouth 2 times daily (0.5 x 500 mg tablet = 250 mg dose)        VITAMIN D (CHOLECALCIFEROL) PO      Take 1,000 Units by mouth daily        ZADITOR 0.025 % Soln ophthalmic solution   Generic drug:  ketotifen     1 Bottle    Place 1 drop into both eyes every 12 hours as needed for itching

## 2017-07-27 NOTE — PROGRESS NOTES
SUBJECTIVE:                                                    Jose Coronado is a 61 year old female who presents to clinic today for the following health issues:    Hypertension Follow-up      Outpatient blood pressures are being checked at home.  Results are 90/50.    Low Salt Diet: low salt      Amount of exercise or physical activity: 2-3 days/week for an average of 30-45 minutes    Problems taking medications regularly: No    Medication side effects: none  Diet: low salt      Hospital Follow-up Visit:    Hospital/Nursing Home/IP Rehab Facility: [facility]  Date of Admission: 7-  Date of Discharge: 7-  Reason(s) for Admission: surgery            Problems taking medications regularly:  None       Medication changes since discharge: None       Problems adhering to non-medication therapy:  None    Summary of hospitalization:  [facility] discharge summary reviewed  Diagnostic Tests/Treatments reviewed.  Follow up needed: with cardiology and Neurology    Other Healthcare Providers Involved in Patient s Care:         None  Update since discharge: improved. She had PFO closure due to CVA thought from embolus. She was negative for coagulopathy. They will do echo with bubble study later in the fall. She follows up with Neurology later in the fall as well. They want her LDL below 70 but she is steadily loosing wt and they did not recommended going up on statin dose at this time.     She is feeling well. Denies any problem with chest pain, pressure or dyspnea on exertion. No palpitations she is still getting lightheadedness especially with getting up. Her systolic blood pressure at home runs in the 90s. She also is very tired.     Post Discharge Medication Reconciliation: discharge medications reconciled and changed, per note/orders (see AVS).  Plan of care communicated with patient     Coding guidelines for this visit:  Type of Medical   Decision Making Face-to-Face Visit       within  7 Days of discharge Face-to-Face Visit        within 14 days of discharge   Moderate Complexity [id number]   High Complexity 18004 [id number]              Problem list and histories reviewed & adjusted, as indicated.  Additional history: as documented    Patient Active Problem List   Diagnosis     Family history of malignant neoplasm of breast     Female stress incontinence     Sleep apnea     Osteopenia     S/P gastric bypass     Vitamin D deficiency     Hyperparathyroidism (H)     Hirsutism     ACP (advance care planning)     Overweight, BMI > 35     Iron deficiency anemia     Lump or mass in breast     PFO (patent foramen ovale)     Essential hypertension with goal blood pressure less than 140/90     Left temporal lobe infarction (H)     Stroke (H)     Long-term (current) use of anticoagulants [Z79.01]     Cerebral aneurysm without rupture     Paroxysmal atrial fibrillation (H)     ASD (atrial septal defect)     Past Surgical History:   Procedure Laterality Date     C ANEURYSM, INTRACRAN, SIMPLE SURG  04/2017    coil of aneurysm right posterior paraophthalmic artery     C NONSPECIFIC PROCEDURE      S/P multiple breast biopies - all negative / benign     C NONSPECIFIC PROCEDURE      S/P T&A     C NONSPECIFIC PROCEDURE      [place] teeth extraction     C NONSPECIFIC PROCEDURE      S/P (? unreadable) ankle     COLONOSCOPY N/A 8/12/2015    Procedure: COLONOSCOPY;  Surgeon: Deandre Brooks MD;  Location: [facility]     GASTRIC BYPASS  June 24, 2010     ORTHOPEDIC SURGERY Left 2010    wrist fracture     PARATHYROIDECTOMY  9/19/11       Social History   Substance Use Topics     Smoking status: Never Smoker     Smokeless tobacco: Never Used     Alcohol use 0.0 oz/week     0 Standard drinks or equivalent per week      Comment: 1 glass wine 4-5 days a week     Family History   Problem Relation Age of Onset     Breast Cancer Mother      Hypertension Mother      Arthritis Mother      Thyroid Disease Mother      hypo     Breast  "Cancer Maternal Aunt      Hypertension Paternal Grandmother      CEREBROVASCULAR DISEASE Maternal Grandmother      Family History Negative Maternal Grandfather      Family History Negative Paternal Grandfather      Family History Negative Son      Family History Negative Daughter      Family History Negative Daughter      Colon Cancer No family hx of              Reviewed and updated as needed this visit by clinical staffTobacco  Allergies  Meds  Med Hx  Surg Hx  Fam Hx  Soc Hx      Reviewed and updated as needed this visit by Provider         ROS:  Constitutional, HEENT, cardiovascular, pulmonary, GI, , musculoskeletal, neuro, skin, endocrine and psych systems are negative, except as otherwise noted.      OBJECTIVE:   /72 (BP Location: Left arm, Patient Position: Chair, Cuff Size: Adult Large)  Pulse 86  Temp 98.6  F (37  C) (Oral)  Ht 5' 6\" (1.676 m)  Wt 203 lb (92.1 kg)  SpO2 99%  Breastfeeding? No  BMI 32.77 kg/m2  Body mass index is 32.77 kg/(m^2).  GENERAL: healthy, alert and no distress  NECK: no adenopathy, no asymmetry, masses, or scars and thyroid normal to palpation  RESP: lungs clear to auscultation - no rales, rhonchi or wheezes  CV: regular rate and rhythm, normal S1 S2, no S3 or S4, no murmur, click or rub, no peripheral edema and peripheral pulses strong  ABDOMEN: soft, nontender, no hepatosplenomegaly, no masses and bowel sounds normal  MS: no gross musculoskeletal defects noted, no edema  NEURO: Normal strength and tone, mentation intact and speech normal  PSYCH: mentation appears normal, affect normal/bright      ASSESSMENT/PLAN:       1. PFO (patent foramen ovale)  status post closure doing well, follow up echo in the fall    2. Cerebrovascular accident (CVA) due to embolism of precerebral artery (H)  Goal for LDL below 70 continue to encourage wt loss Will check fasting lipid panel today    3. Cerebral aneurysm without rupture  Follow up with Neurology     4. Paroxysmal " atrial fibrillation (H)  under good control     5. Hypercholesteremia  Will check   - Lipid panel reflex to direct LDL    6. Essential hypertension with goal blood pressure less than 140/90  Will have her cut Chlorthalidone in half for 12.5 mg qd. Follow up in 3 months       Lian Martinez MD  [facility]

## 2017-07-27 NOTE — NURSING NOTE
"Chief Complaint   Patient presents with     RECHECK     Hypertension     [facility] F/U       Initial /72 (BP Location: Left arm, Patient Position: Chair, Cuff Size: Adult Large)  Pulse 86  Temp 98.6  F (37  C) (Oral)  Ht 5' 6\" (1.676 m)  Wt 203 lb (92.1 kg)  SpO2 99%  Breastfeeding? No  BMI 32.77 kg/m2 Estimated body mass index is 32.77 kg/(m^2) as calculated from the following:    Height as of this encounter: 5' 6\" (1.676 m).    Weight as of this encounter: 203 lb (92.1 kg).  Medication Reconciliation: complete    "

## 2017-08-01 DIAGNOSIS — I48.91 ATRIAL FIBRILLATION (H): Primary | ICD-10-CM

## 2017-08-01 RX ORDER — CLOPIDOGREL BISULFATE 75 MG/1
75 TABLET ORAL DAILY
Qty: 90 TABLET | Refills: 3 | Status: SHIPPED | OUTPATIENT
Start: 2017-08-01 | End: 2018-07-10

## 2017-08-08 ENCOUNTER — MYC MEDICAL ADVICE (OUTPATIENT)
Dept: INTERNAL MEDICINE | Facility: CLINIC | Age: 62
End: 2017-08-08

## 2017-08-08 DIAGNOSIS — B00.1 COLD SORE: ICD-10-CM

## 2017-08-09 RX ORDER — VALACYCLOVIR HYDROCHLORIDE 1 G/1
2000 TABLET, FILM COATED ORAL 2 TIMES DAILY PRN
Qty: 4 TABLET | Refills: 5 | Status: SHIPPED | OUTPATIENT
Start: 2017-08-09 | End: 2018-09-04

## 2017-08-10 ENCOUNTER — MYC MEDICAL ADVICE (OUTPATIENT)
Dept: INTERNAL MEDICINE | Facility: CLINIC | Age: 62
End: 2017-08-10

## 2017-08-10 NOTE — TELEPHONE ENCOUNTER
Juan-see my chart message. The quantity was #4 with last rx's also. For break out is dosing 2000mg bid 1x?

## 2017-09-02 DIAGNOSIS — I63.89 LEFT TEMPORAL LOBE INFARCTION (H): ICD-10-CM

## 2017-09-05 RX ORDER — ATORVASTATIN CALCIUM 40 MG/1
TABLET, FILM COATED ORAL
Qty: 90 TABLET | Refills: 2 | Status: SHIPPED | OUTPATIENT
Start: 2017-09-05 | End: 2018-05-27

## 2017-09-05 NOTE — TELEPHONE ENCOUNTER
Atorvastatin      Last Written Prescription Date: 3/9/17  Last Fill Quantity: 90, # refills: 1  Last Office Visit with [facility], [facility] or [facility] prescribing provider: 7/27/17  Next 5 appointments (look out 90 days)     Oct 09, 2017 10:00 AM CDT   Return Visit with Luis Church MD   [facility] AT [place] ([facility])    [address]  [place] [id number] [phone]            Oct 18, 2017  9:40 AM CDT   MyChart Long with Lian Martinez MD   [facility] ([facility])    [address]  [place] [id number] [phone]                   Lab Results   Component Value Date    CHOL 116 07/27/2017     Lab Results   Component Value Date    HDL 60 07/27/2017     Lab Results   Component Value Date    LDL 45 07/27/2017     Lab Results   Component Value Date    TRIG 55 07/27/2017     Lab Results   Component Value Date    CHOLHDLRATIO 2.9 07/29/2015       Labs showing if normal/abnormal  Lab Results   Component Value Date    CHOL 116 07/27/2017    TRIG 55 07/27/2017    HDL 60 07/27/2017    LDL 45 07/27/2017    VLDL 16 07/29/2015    CHOLHDLRATIO 2.9 07/29/2015

## 2017-09-12 DIAGNOSIS — R00.2 PALPITATIONS: ICD-10-CM

## 2017-09-12 RX ORDER — ATENOLOL 50 MG/1
TABLET ORAL
Qty: 90 TABLET | Refills: 2 | Status: SHIPPED | OUTPATIENT
Start: 2017-09-12 | End: 2018-04-16

## 2017-09-12 NOTE — TELEPHONE ENCOUNTER
ATENOLOL      Last Written Prescription Date: 08/18/16  Last Fill Quantity: 90, # refills: 3    Last Office Visit with [facility], [facility] or [facility] prescribing provider:  07/27/17   Future Office Visit:    Next 5 appointments (look out 90 days)     Oct 09, 2017 10:00 AM CDT   Return Visit with Luis Church MD   [facility] AT [place] ([facility])    [address]  [place] [id number] [phone]            Oct 18, 2017  9:40 AM CDT   [facility]Chart Long with Lian [facility] MD   [facility]    [address]  [place] [address] [phone]                    BP Readings from Last 3 Encounters:   07/27/17 118/72   07/18/17 110/70   07/14/17 94/58

## 2017-10-01 DIAGNOSIS — I63.89 LEFT TEMPORAL LOBE INFARCTION (H): ICD-10-CM

## 2017-10-03 RX ORDER — ATORVASTATIN CALCIUM 40 MG/1
TABLET, FILM COATED ORAL
Qty: 90 TABLET | Refills: 1 | OUTPATIENT
Start: 2017-10-03

## 2017-10-04 ENCOUNTER — HOSPITAL ENCOUNTER (OUTPATIENT)
Dept: CARDIOLOGY | Facility: CLINIC | Age: 62
Discharge: HOME | End: 2017-10-04
Attending: NURSE PRACTITIONER | Admitting: NURSE PRACTITIONER
Payer: COMMERCIAL

## 2017-10-04 ENCOUNTER — DOCUMENTATION ONLY (OUTPATIENT)
Dept: CARDIOLOGY | Facility: CLINIC | Age: 62
End: 2017-10-04

## 2017-10-04 DIAGNOSIS — Q21.12 PFO (PATENT FORAMEN OVALE): ICD-10-CM

## 2017-10-04 PROCEDURE — 93308 TTE F-UP OR LMTD: CPT | Mod: 26 | Performed by: INTERNAL MEDICINE

## 2017-10-04 PROCEDURE — 93321 DOPPLER ECHO F-UP/LMTD STD: CPT | Mod: 26 | Performed by: INTERNAL MEDICINE

## 2017-10-04 PROCEDURE — 93308 TTE F-UP OR LMTD: CPT

## 2017-10-04 PROCEDURE — 93325 DOPPLER ECHO COLOR FLOW MAPG: CPT | Mod: 26 | Performed by: INTERNAL MEDICINE

## 2017-10-04 RX ORDER — MAGNESIUM HYDROXIDE 1200 MG/15ML
30 LIQUID ORAL ONCE
Status: DISCONTINUED | OUTPATIENT
Start: 2017-10-04 | End: 2017-10-05 | Stop reason: HOSPADM

## 2017-10-04 NOTE — PROGRESS NOTES
Patient seeing  10/9/17 as a 3 month f/u. Pt was to have Echo and Nuc done prior to OV w/ .   Nuc was never scheduled. Patient came into clinic for echo today, spoke with her in person about scheduling the stress test this week.   Patient is leaving town for the rest of the week.   Stress test order in [facility] for scheduling after OV w/ .   GRACE Peña

## 2017-10-05 DIAGNOSIS — N31.9 BLADDER DYSFUNCTION: ICD-10-CM

## 2017-10-05 RX ORDER — IMIPRAMINE HCL 25 MG
TABLET ORAL
Qty: 100 TABLET | Refills: 0 | Status: SHIPPED | OUTPATIENT
Start: 2017-10-05 | End: 2017-12-30

## 2017-10-05 NOTE — TELEPHONE ENCOUNTER
Imipramine     Last Written Prescription Date: 12/19/16  Last Fill Quantity: 100, # refills: 2  Last Office Visit with [facility], [facility] or [facility] prescribing provider: 07/27/17   Next 5 appointments (look out 90 days)     Oct 09, 2017 10:00 AM CDT   Return Visit with Luis Church MD   [facility] AT [place] ([facility])    [address]  [place] [address] [phone]            Oct 18, 2017  9:40 AM CDT   MyChart Long with Lian Martinez MD   [facility]    [address]  [place] [address] [phone]                   BP Readings from Last 3 Encounters:   07/27/17 118/72   07/18/17 110/70   07/14/17 94/58     Last PHQ-9 score on record= No flowsheet data found.

## 2017-10-09 ENCOUNTER — OFFICE VISIT (OUTPATIENT)
Dept: CARDIOLOGY | Facility: CLINIC | Age: 62
End: 2017-10-09
Attending: INTERNAL MEDICINE
Payer: COMMERCIAL

## 2017-10-09 VITALS
HEIGHT: 66 IN | HEART RATE: 64 BPM | DIASTOLIC BLOOD PRESSURE: 60 MMHG | BODY MASS INDEX: 32 KG/M2 | WEIGHT: 199.1 LBS | SYSTOLIC BLOOD PRESSURE: 122 MMHG

## 2017-10-09 DIAGNOSIS — Q21.12 PFO (PATENT FORAMEN OVALE): ICD-10-CM

## 2017-10-09 DIAGNOSIS — I42.9 CARDIOMYOPATHY, UNSPECIFIED TYPE (H): Primary | ICD-10-CM

## 2017-10-09 PROCEDURE — 99214 OFFICE O/P EST MOD 30 MIN: CPT | Performed by: INTERNAL MEDICINE

## 2017-10-09 RX ORDER — HYDROCHLOROTHIAZIDE 25 MG/1
12.5 TABLET ORAL DAILY
COMMUNITY
End: 2017-10-18

## 2017-10-09 NOTE — PROGRESS NOTES
HPI and Plan:   See dictation    Orders Placed This Encounter   Procedures     MRI Cardiac w/contrast and stress     Follow-Up with Cardiac Advanced Practice Provider     Orders Placed This Encounter   Medications     hydrochlorothiazide (HYDRODIURIL) 25 MG tablet     Sig: Take 12.5 mg by mouth daily     Medications Discontinued During This Encounter   Medication Reason     Atorvastatin Calcium (LIPITOR PO) Medication Reconciliation Clean Up     HYDROCHLOROTHIAZIDE PO Medication Reconciliation Clean Up         Encounter Diagnoses   Name Primary?     PFO (patent foramen ovale)      Cardiomyopathy, unspecified type (H) Yes       CURRENT MEDICATIONS:  Current Outpatient Prescriptions   Medication Sig Dispense Refill     hydrochlorothiazide (HYDRODIURIL) 25 MG tablet Take 12.5 mg by mouth daily       imipramine (TOFRANIL) 25 MG tablet TAKE 1 TABLET (25 MG) BY MOUTH DAILY 100 tablet 0     atenolol (TENORMIN) 50 MG tablet TAKE 1 TABLET (50 MG) BY MOUTH DAILY 90 tablet 2     atorvastatin (LIPITOR) 40 MG tablet TAKE 1 TABLET (40 MG) BY MOUTH DAILY 90 tablet 2     valACYclovir (VALTREX) 1000 mg tablet Take 2 tablets (2,000 mg) by mouth 2 times daily as needed 4 tablet 5     clopidogrel (PLAVIX) 75 MG tablet Take 1 tablet (75 mg) by mouth daily Take 75 mg by mouth daily 90 tablet 3     acetaminophen (TYLENOL) 325 MG tablet Take 2 tablets (650 mg) by mouth every 4 hours as needed for mild pain 100 tablet      LOSARTAN POTASSIUM PO Take 50 mg by mouth daily (0.5 x 100 mg tablet = 50 mg dose)       FIBER COMPLETE PO Take 1 capsule by mouth daily        UNABLE TO FIND CPAP machine every night       Ascorbic Acid (VITAMIN C PO) Take 250 mg by mouth 2 times daily (0.5 x 500 mg tablet = 250 mg dose)       VITAMIN D, CHOLECALCIFEROL, PO Take 1,000 Units by mouth daily       aspirin 81 MG tablet Take 81 mg by mouth daily  30 tablet      topiramate (TOPAMAX) 25 MG tablet Take 1 tablet (25 mg) by mouth 2 times daily Prescribed by   Abols 180 tablet 3     Calcium Citrate-Vitamin D (CALCIUM CITRATE + D PO) Take 2 tablets by mouth every morning        Ferrous Sulfate 27 MG TABS Take 27 mg by mouth 2 times daily       Cyanocobalamin 2500 MCG TABS Take 2,500 mcg by mouth twice a week Mon, Thur       ketotifen (ZADITOR) 0.025 % SOLN Place 1 drop into both eyes every 12 hours as needed for itching 1 Bottle      cetirizine (ZYRTEC) 10 MG tablet Take 10 mg by mouth daily  30 tablet 1     Probiotic Product (PROBIOTIC PO) Take 1 tablet by mouth daily.       [DISCONTINUED] Atorvastatin Calcium (LIPITOR PO) Take 40 mg by mouth At Bedtime         ALLERGIES     Allergies   Allergen Reactions     Contrast Dye Itching     Reaction of immediate burning and severe itching in Right ear after injection for CT.      Sulfa Drugs      hives       PAST MEDICAL HISTORY:  Past Medical History:   Diagnosis Date     Anemia      CVA (cerebral vascular accident) (H) 2017    ?migraine, ?pfo--negative vasc w/u, neg hypercoag w/u     Diffuse cystic mastopathy     Fibrocystic breast disease     HTN, goal below 140/90      Hyperparathyroidism (H)      Infectious mononucleosis     Mono at age 16     Irregular heart beat     PAT no afib on 30day monitor     Labyrinthitis, unspecified      Migraine headache with aura      Osteopenia      Pain in joint, shoulder region     Secondary to a fall     PFO (patent foramen ovale)     s/p closure with amplazter device 7/13/17     S/P gastric bypass June, 2010     Sleep apnea     she is on CPAP     Vitamin D deficiencies        PAST SURGICAL HISTORY:  Past Surgical History:   Procedure Laterality Date     C ANEURYSM, INTRACRAN, SIMPLE SURG  04/2017    coil of aneurysm right posterior paraophthalmic artery     C NONSPECIFIC PROCEDURE      S/P multiple breast biopies - all negative / benign     C NONSPECIFIC PROCEDURE      S/P T&A     C NONSPECIFIC PROCEDURE      [place] teeth extraction     C NONSPECIFIC PROCEDURE      S/P (? unreadable) ankle  "    COLONOSCOPY N/A 8/12/2015    Procedure: COLONOSCOPY;  Surgeon: Deandre Brooks MD;  Location:  GI     GASTRIC BYPASS  June 24, 2010     ORTHOPEDIC SURGERY Left 2010    wrist fracture     PARATHYROIDECTOMY  9/19/11       FAMILY HISTORY:  Family History   Problem Relation Age of Onset     Breast Cancer Mother      Hypertension Mother      Arthritis Mother      Thyroid Disease Mother      hypo     Breast Cancer Maternal Aunt      Hypertension Paternal Grandmother      CEREBROVASCULAR DISEASE Maternal Grandmother      Family History Negative Maternal Grandfather      Family History Negative Paternal Grandfather      Family History Negative Son      Family History Negative Daughter      Family History Negative Daughter      Colon Cancer No family hx of        SOCIAL HISTORY:  Social History     Social History     Marital status: Single     Spouse name: N/A     Number of children: N/A     Years of education: N/A     Social History Main Topics     Smoking status: Never Smoker     Smokeless tobacco: Never Used     Alcohol use 0.0 oz/week     0 Standard drinks or equivalent per week      Comment: 1 glass wine 4-5 days a week     Drug use: No     Sexual activity: Not Currently     Partners: Male     Other Topics Concern     None     Social History Narrative       Review of Systems:  Skin:  not assessed     Eyes:  Positive for contacts;glasses  ENT:  Negative    Respiratory:  Positive for sleep apnea;CPAP  Cardiovascular:  Negative    Gastroenterology: not assessed    Genitourinary:  not assessed    Musculoskeletal:  not assessed    Neurologic:  Negative    Psychiatric:  not assessed    Heme/Lymph/Imm:  not assessed    Endocrine:  Negative      Physical Exam:  Vitals: /60 (BP Location: Right arm, Patient Position: Sitting, Cuff Size: Adult Regular)  Pulse 64  Ht 1.676 m (5' 6\")  Wt 90.3 kg (199 lb 1.6 oz)  Breastfeeding? No  BMI 32.14 kg/m2    Constitutional:           Skin:           Head:       "     Eyes:           ENT:           Neck:           Chest:             Cardiac:                    Abdomen:           Vascular:                                          Extremities and Back:                 Neurological:             Recent Lab Results:  LIPID RESULTS:  Lab Results   Component Value Date    CHOL 116 07/27/2017    HDL 60 07/27/2017    LDL 45 07/27/2017    TRIG 55 07/27/2017    CHOLHDLRATIO 2.9 07/29/2015       LIVER ENZYME RESULTS:  Lab Results   Component Value Date    AST 23 02/10/2017    ALT 20 02/10/2017       CBC RESULTS:  Lab Results   Component Value Date    WBC 7.7 07/13/2017    RBC 4.43 07/13/2017    HGB 13.6 07/13/2017    HCT 39.0 07/13/2017    MCV 88 07/13/2017    MCH 30.7 07/13/2017    MCHC 34.9 07/13/2017    RDW 13.4 07/13/2017     07/13/2017       BMP RESULTS:  Lab Results   Component Value Date     07/13/2017    POTASSIUM 3.5 07/13/2017    CHLORIDE 101 07/13/2017    CO2 28 07/13/2017    ANIONGAP 7 07/13/2017    GLC 84 04/17/2017    BUN 16 04/17/2017    CR 0.74 07/13/2017    GFRESTIMATED 79 07/13/2017    GFRESTBLACK >90   GFR Calc   07/13/2017    MAXIMILIANO 9.6 04/17/2017        A1C RESULTS:  Lab Results   Component Value Date    A1C 5.7 06/17/2010       INR RESULTS:  Lab Results   Component Value Date    INR 0.93 07/13/2017    INR 1.07 04/10/2017           CC  Luis Church MD  [address]  [place] [address]

## 2017-10-09 NOTE — LETTER
10/9/2017      Lian Martinez MD  [address] [facility] [address]  [place] [address]    RE: Jose Coronado     Dear Colleague,    It was a pleasure of following up on our mutual patient, Jose Coronado.  She is a delightful 61-year-old woman.  Her history has been outlined previously but briefly, she was on an airplane ride to [place] at the beginning of this year.  Several days after flight, she was noted to be disoriented with speech disturbance and found out that she had a stroke.  Her hypercoagulable workup was negative.  She was placed initially on Coumadin when there was a thought she was having paroxysmal atrial fib but in fact it turned out it was actually PAT.  She is on beta blocker and states she almost has no palpitations now.  Incidentally noted was a cerebral aneurysm which was coiled by Dr. Grider.  I think that was unrelated to her stroke.  Her aorta was mildly dilated at 39 mm.  She has a history of hypertension.  She has lost some weight and actually her internist is now decreasing her blood pressure pills as her blood pressure comes down into the normal range.  She underwent a PFO closure approximately 3 months ago with no problem.  She states everything is going quite well.  Her followup echo shows by bubble study that the hole is now closed but they do note the ejection fraction is low normal at 50%-55%.      Outpatient Encounter Prescriptions as of 10/9/2017   Medication Sig Dispense Refill     hydrochlorothiazide (HYDRODIURIL) 25 MG tablet Take 12.5 mg by mouth daily       imipramine (TOFRANIL) 25 MG tablet TAKE 1 TABLET (25 MG) BY MOUTH DAILY 100 tablet 0     atenolol (TENORMIN) 50 MG tablet TAKE 1 TABLET (50 MG) BY MOUTH DAILY 90 tablet 2     atorvastatin (LIPITOR) 40 MG tablet TAKE 1 TABLET (40 MG) BY MOUTH DAILY 90 tablet 2     valACYclovir (VALTREX) 1000 mg tablet Take 2 tablets (2,000 mg) by mouth 2 times daily as needed 4 tablet 5     clopidogrel (PLAVIX) 75 MG tablet Take 1  tablet (75 mg) by mouth daily Take 75 mg by mouth daily 90 tablet 3     acetaminophen (TYLENOL) 325 MG tablet Take 2 tablets (650 mg) by mouth every 4 hours as needed for mild pain 100 tablet      LOSARTAN POTASSIUM PO Take 50 mg by mouth daily (0.5 x 100 mg tablet = 50 mg dose)       FIBER COMPLETE PO Take 1 capsule by mouth daily        UNABLE TO FIND CPAP machine every night       Ascorbic Acid (VITAMIN C PO) Take 250 mg by mouth 2 times daily (0.5 x 500 mg tablet = 250 mg dose)       VITAMIN D, CHOLECALCIFEROL, PO Take 1,000 Units by mouth daily       aspirin 81 MG tablet Take 81 mg by mouth daily  30 tablet      topiramate (TOPAMAX) 25 MG tablet Take 1 tablet (25 mg) by mouth 2 times daily Prescribed by Dr Tello 180 tablet 3     Calcium Citrate-Vitamin D (CALCIUM CITRATE + D PO) Take 2 tablets by mouth every morning        Ferrous Sulfate 27 MG TABS Take 27 mg by mouth 2 times daily       Cyanocobalamin 2500 MCG TABS Take 2,500 mcg by mouth twice a week Mon, Thur       ketotifen (ZADITOR) 0.025 % SOLN Place 1 drop into both eyes every 12 hours as needed for itching 1 Bottle      cetirizine (ZYRTEC) 10 MG tablet Take 10 mg by mouth daily  30 tablet 1     Probiotic Product (PROBIOTIC PO) Take 1 tablet by mouth daily.       [DISCONTINUED] Atorvastatin Calcium (LIPITOR PO) Take 40 mg by mouth At Bedtime       [DISCONTINUED] HYDROCHLOROTHIAZIDE PO Take 25 mg by mouth daily       No facility-administered encounter medications on file as of 10/9/2017.      Today, the patient and I reviewed the actual echocardiogram and I agree the EF looks at the low end of normal whereas before it was normal.  I am not exactly certain that this is a problem since it is in the low range of normal but previously the ejection fraction was higher.  They also comment and I agree the right ventricle is not ideally visualized and there is a question if it might have been enlarged.  Again, this is actually a PFO, not an actual ASD that we  closed.  However, on the pictures preop, there was actually some spontaneous left-to-right flow, so technically it actually was an ASD but not of the type that should be causing any volume overloaded heart.  Clinically, again the patient feels completely normal.  At this juncture, I am going to recommend that we do an MRI stress test.  We can do in 3 months.  This will serve multiple purposes.  It will allow us to look at the LV and RV size and function more accurately.  It will also look for any coronary disease that might account for some drop in ejection fraction.  With regard to her medicines, she is on low dose beta blocker which is being used not only for blood pressure but for PAT control and doing a fine job.  She is also on losartan at a very low dose at 50.  We may want to keep that medicine going if we do identify a mild cardiomyopathy with low EF.  The hydrochlorothiazide could probably come off if her blood pressure is completely normal.              All this was reviewed today.  Today's visit was 30 minutes, all of review of the images and wrote out a plan.  She will stop the Plavix 3 months from now and then switch from a baby aspirin to a full 325 mg aspirin.  We will see her back after that to review those changes plus the results of her MRI stress test.      Thank you for allowing me to see Ms. Coronado.  Today's visit was 30 minutes, all counseling.      Sincerely,    Luis Church MD     [facility]    cc:  Jeovany Morrissey MD, PhD, [facility]   [address] [place] [address]

## 2017-10-09 NOTE — MR AVS SNAPSHOT
After Visit Summary   10/9/2017    Jose Coronado    MRN: [id number]           Patient Information     Date Of Birth          1955        Visit Information        Provider Department      10/9/2017 10:00 AM Luis Church MD [facility]        Today's Diagnoses     Cardiomyopathy, unspecified type (H)    -  1    PFO (patent foramen ovale)           Follow-ups after your visit        Additional Services     Follow-Up with Cardiac Advanced Practice Provider                 Your next 10 appointments already scheduled     Oct 18, 2017  9:40 AM CDT   Kailee Olvera with Lian Martinez MD   [facility]    [address]  [facility] [id number] [phone]              Future tests that were ordered for you today     Open Future Orders        Priority Expected Expires Ordered    MRI Cardiac w/contrast and stress Routine 1/7/2018 10/9/2018 10/9/2017    Follow-Up with Cardiac Advanced Practice Provider Routine 1/7/2018 10/9/2018 10/9/2017            Who to contact     If you have questions or need follow up information about today's clinic visit or your schedule please contact [facility] directly at [phone].  Normal or non-critical lab and imaging results will be communicated to you by MyChart, letter or phone within 4 business days after the clinic has received the results. If you do not hear from us within 7 days, please contact the clinic through [employer] or phone. If you have a critical or abnormal lab result, we will notify you by phone as soon as possible.  Submit refill requests through PROFICIO or call your pharmacy and they will forward the refill request to us. Please allow 3 business days for your refill to be completed.          Additional Information About Your Visit        MyChart Information     PROFICIO gives you secure access to your  "electronic health record. If you see a primary care provider, you can also send messages to your care team and make appointments. If you have questions, please call your primary care clinic.  If you do not have a primary care provider, please call [phone] and they will assist you.        Care EveryWhere ID     This is your Care EveryWhere ID. This could be used by other organizations to access your [place] medical records  [id number]        Your Vitals Were     Pulse Height Breastfeeding? BMI (Body Mass Index)          64 1.676 m (5' 6\") No 32.14 kg/m2         Blood Pressure from Last 3 Encounters:   10/09/17 122/60   07/27/17 118/72   07/18/17 110/70    Weight from Last 3 Encounters:   10/09/17 90.3 kg (199 lb 1.6 oz)   07/27/17 92.1 kg (203 lb)   07/18/17 93.4 kg (205 lb 12.8 oz)              We Performed the Following     Follow-Up with Cardiac [facility]        Primary Care Provider Office Phone # Fax #    Lian Martinez [phone]       [address] [facility] [address]  [facility] [address]        Equal Access to Services     [facility] Center[facility] : Hadii aad ku hadasho Soomaali, waaxda luqadaha, qaybta kaalmada adeegyada, waxay emilianain haymaikn silvana dias ah. So [facility] [phone].    ATENCIÓN: Si habla español, tiene a tuttle disposición servicios gratuitos de asistencia lingüística. [facility] [phone].    We comply with applicable federal civil rights laws and [place] laws. We do not discriminate on the basis of race, color, national origin, age, disability, sex, sexual orientation, or gender identity.            Thank you!     Thank you for choosing [facility] [place]  for your care. Our goal is always to provide you with excellent care. Hearing back from our patients is one way we can continue to improve our services. Please take a few minutes to complete the written survey that you may receive in the mail after your visit with us. Thank " you!             Your Updated Medication List - Protect others around you: Learn how to safely use, store and throw away your medicines at www.disposemymeds.org.          This list is accurate as of: 10/9/17 10:44 AM.  Always use your most recent med list.                   Brand Name Dispense Instructions for use Diagnosis    acetaminophen 325 MG tablet    TYLENOL    100 tablet    Take 2 tablets (650 mg) by mouth every 4 hours as needed for mild pain    Generalized pain       aspirin 81 MG tablet     30 tablet    Take 81 mg by mouth daily        atenolol 50 MG tablet    TENORMIN    90 tablet    TAKE 1 TABLET (50 MG) BY MOUTH DAILY    Palpitations       atorvastatin 40 MG tablet    LIPITOR    90 tablet    TAKE 1 TABLET (40 MG) BY MOUTH DAILY    Left temporal lobe infarction (H)       CALCIUM CITRATE + D PO      Take 2 tablets by mouth every morning        cetirizine 10 MG tablet    zyrTEC    30 tablet    Take 10 mg by mouth daily        clopidogrel 75 MG tablet    PLAVIX    90 tablet    Take 1 tablet (75 mg) by mouth daily Take 75 mg by mouth daily    Atrial fibrillation (H)       Cyanocobalamin 2500 MCG Tabs      Take 2,500 mcg by mouth twice a week Mon, Thur        Ferrous Sulfate 27 MG Tabs      Take 27 mg by mouth 2 times daily        FIBER COMPLETE PO      Take 1 capsule by mouth daily        hydrochlorothiazide 25 MG tablet    HYDRODIURIL     Take 12.5 mg by mouth daily        imipramine 25 MG tablet    TOFRANIL    100 tablet    TAKE 1 TABLET (25 MG) BY MOUTH DAILY    Bladder dysfunction       LOSARTAN POTASSIUM PO      Take 50 mg by mouth daily (0.5 x 100 mg tablet = 50 mg dose)        PROBIOTIC PO      Take 1 tablet by mouth daily.        topiramate 25 MG tablet    TOPAMAX    180 tablet    Take 1 tablet (25 mg) by mouth 2 times daily Prescribed by Dr Tello    Migraine       UNABLE TO FIND      CPAP machine every night        valACYclovir 1000 mg tablet    VALTREX    4 tablet    Take 2 tablets (2,000 mg) by  mouth 2 times daily as needed    Cold sore       VITAMIN C PO      Take 250 mg by mouth 2 times daily (0.5 x 500 mg tablet = 250 mg dose)        VITAMIN D (CHOLECALCIFEROL) PO      Take 1,000 Units by mouth daily        ZADITOR 0.025 % Soln ophthalmic solution   Generic drug:  ketotifen     1 Bottle    Place 1 drop into both eyes every 12 hours as needed for itching

## 2017-10-09 NOTE — PROGRESS NOTES
HISTORY OF PRESENT ILLNESS:  It was a pleasure of following up on our mutual patient, Jose Coronado.  She is a delightful 61-year-old woman.  Her history has been outlined previously but briefly, she was on an airplane ride to [place] at the beginning of this year.  Several days after flight, she was noted to be disoriented with speech disturbance and found out that she had a stroke.  Her hypercoagulable workup was negative.  She was placed initially on Coumadin when there was a thought she was having paroxysmal atrial fib but in fact it turned out it was actually PAT.  She is on beta blocker and states she almost has no palpitations now.  Incidentally noted was a cerebral aneurysm which was coiled by Dr. Grider.  I think that was unrelated to her stroke.  Her aorta was mildly dilated at 39 mm.  She has a history of hypertension.  She has lost some weight and actually her internist is now decreasing her blood pressure pills as her blood pressure comes down into the normal range.  She underwent a PFO closure approximately 3 months ago with no problem.  She states everything is going quite well.  Her followup echo shows by bubble study that the hole is now closed but they do note the ejection fraction is low normal at 50%-55%.  Today, the patient and I reviewed the actual echocardiogram and I agree the EF looks at the low end of normal whereas before it was normal.  I am not exactly certain that this is a problem since it is in the low range of normal but previously the ejection fraction was higher.  They also comment and I agree the right ventricle is not ideally visualized and there is a question if it might have been enlarged.  Again, this is actually a PFO, not an actual ASD that we closed.  However, on the pictures preop, there was actually some spontaneous left-to-right flow, so technically it actually was an ASD but not of the type that should be causing any volume overloaded heart.  Clinically, again  the patient feels completely normal.  At this juncture, I am going to recommend that we do an MRI stress test.  We can do in 3 months.  This will serve multiple purposes.  It will allow us to look at the LV and RV size and function more accurately.  It will also look for any coronary disease that might account for some drop in ejection fraction.  With regard to her medicines, she is on low dose beta blocker which is being used not only for blood pressure but for PAT control and doing a fine job.  She is also on losartan at a very low dose at 50.  We may want to keep that medicine going if we do identify a mild cardiomyopathy with low EF.  The hydrochlorothiazide could probably come off if her blood pressure is completely normal.      All this was reviewed today.  Today's visit was 30 minutes, all of review of the images and wrote out a plan.  She will stop the Plavix 3 months from now and then switch from a baby aspirin to a full 325 mg aspirin.  We will see her back after that to review those changes plus the results of her MRI stress test.      Thank you for allowing me to see Ms. Coronado.  Today's visit was 30 minutes, all counseling.      cc:   Lian Martinez MD    [facility]    [address] [place] [address]   [place] [address]      Jeovany Morrissey MD, PhD, [facility]   [address], [facility] [address]   [place] [id number]         NILA BYRNE MD             D: 10/09/2017 10:47   T: 10/09/2017 12:18   MT: [id number]      Name:     LASHELL CORONADO   MRN:      [id number]        Account:      [id number]   :      1955           Service Date: 10/09/2017      Document: [id number]

## 2017-10-10 DIAGNOSIS — I10 ESSENTIAL HYPERTENSION WITH GOAL BLOOD PRESSURE LESS THAN 140/90: Chronic | ICD-10-CM

## 2017-10-10 RX ORDER — HYDROCHLOROTHIAZIDE 25 MG/1
12.5 TABLET ORAL DAILY
Qty: 45 TABLET | Refills: 3 | Status: SHIPPED | OUTPATIENT
Start: 2017-10-10 | End: 2018-07-10

## 2017-10-10 NOTE — TELEPHONE ENCOUNTER
HCTZ      Last Written Prescription Date: 4/17/17  Last Fill Quantity: 90, # refills: 1  Last Office Visit with G, P or [facility] prescribing provider: 7/27/17      Per 10/9 Cardio dict-Pt reported dose is now 12.5mg qd    Called pt-left message. Needs to verify dose       Next 5 appointments (look out 90 days)     Oct 18, 2017  9:40 AM CDT   Kailee Olvera with Lian Martinez MD   [facility]    [address]  [facility] [id number] [phone]                   Potassium   Date Value Ref Range Status   07/13/2017 3.5 3.4 - 5.3 mmol/L Final     Creatinine   Date Value Ref Range Status   07/13/2017 0.74 0.52 - 1.04 mg/dL Final     BP Readings from Last 3 Encounters:   10/09/17 122/60   07/27/17 118/72   07/18/17 110/70

## 2017-10-18 ENCOUNTER — OFFICE VISIT (OUTPATIENT)
Dept: INTERNAL MEDICINE | Facility: CLINIC | Age: 62
End: 2017-10-18
Payer: COMMERCIAL

## 2017-10-18 VITALS
HEART RATE: 80 BPM | WEIGHT: 197 LBS | BODY MASS INDEX: 31.66 KG/M2 | OXYGEN SATURATION: 98 % | TEMPERATURE: 98.6 F | DIASTOLIC BLOOD PRESSURE: 78 MMHG | HEIGHT: 66 IN | SYSTOLIC BLOOD PRESSURE: 128 MMHG

## 2017-10-18 DIAGNOSIS — I10 ESSENTIAL HYPERTENSION WITH GOAL BLOOD PRESSURE LESS THAN 140/90: Primary | ICD-10-CM

## 2017-10-18 DIAGNOSIS — Z23 NEED FOR PROPHYLACTIC VACCINATION AND INOCULATION AGAINST INFLUENZA: ICD-10-CM

## 2017-10-18 PROCEDURE — G0008 ADMIN INFLUENZA VIRUS VAC: HCPCS | Performed by: INTERNAL MEDICINE

## 2017-10-18 PROCEDURE — 99213 OFFICE O/P EST LOW 20 MIN: CPT | Mod: 25 | Performed by: INTERNAL MEDICINE

## 2017-10-18 PROCEDURE — 90686 IIV4 VACC NO PRSV 0.5 ML IM: CPT | Performed by: INTERNAL MEDICINE

## 2017-10-18 NOTE — NURSING NOTE
"Chief Complaint   Patient presents with     RECHECK     Hypertension       Initial /78 (BP Location: Right arm, Patient Position: Chair, Cuff Size: Adult Large)  Pulse 80  Temp 98.6  F (37  C) (Oral)  Ht 5' 6\" (1.676 m)  Wt 197 lb (89.4 kg)  SpO2 98%  Breastfeeding? No  BMI 31.8 kg/m2 Estimated body mass index is 31.8 kg/(m^2) as calculated from the following:    Height as of this encounter: 5' 6\" (1.676 m).    Weight as of this encounter: 197 lb (89.4 kg).  Medication Reconciliation: complete    "

## 2017-10-18 NOTE — PROGRESS NOTES
SUBJECTIVE:   Jose Coronado is a 61 year old female who presents to clinic today for the following health issues:    Hypertension Follow-up      Outpatient blood pressures are not being checked.    Low Salt Diet: low salt      Amount of exercise or physical activity: Walks 7 days a week.    Problems taking medications regularly: No    Medication side effects: none  Diet: low salt      Problem list and histories reviewed & adjusted, as indicated.  Additional history: as documented    Patient Active Problem List   Diagnosis     Family history of malignant neoplasm of breast     Female stress incontinence     Sleep apnea     Osteopenia     S/P gastric bypass     Vitamin D deficiency     Hyperparathyroidism (H)     Hirsutism     ACP (advance care planning)     Overweight, BMI > 35     Iron deficiency anemia     Lump or mass in breast     PFO (patent foramen ovale)     Essential hypertension with goal blood pressure less than 140/90     Left temporal lobe infarction (H)     Stroke (H)     Long-term (current) use of anticoagulants [Z79.01]     Cerebral aneurysm without rupture     Paroxysmal atrial fibrillation (H)     ASD (atrial septal defect)     Past Surgical History:   Procedure Laterality Date     C ANEURYSM, INTRACRAN, SIMPLE SURG  04/2017    coil of aneurysm right posterior paraophthalmic artery     C NONSPECIFIC PROCEDURE      S/P multiple breast biopies - all negative / benign     C NONSPECIFIC PROCEDURE      S/P T&A     C NONSPECIFIC PROCEDURE      Gilliam teeth extraction     C NONSPECIFIC PROCEDURE      S/P (? unreadable) ankle     COLONOSCOPY N/A 8/12/2015    Procedure: COLONOSCOPY;  Surgeon: Deandre Brooks MD;  Location:  GI     GASTRIC BYPASS  June 24, 2010     ORTHOPEDIC SURGERY Left 2010    wrist fracture     PARATHYROIDECTOMY  9/19/11       Social History   Substance Use Topics     Smoking status: Never Smoker     Smokeless tobacco: Never Used     Alcohol use 0.0 oz/week     0 Standard drinks  "or equivalent per week      Comment: 1 glass wine 4-5 days a week     Family History   Problem Relation Age of Onset     Breast Cancer Mother      Hypertension Mother      Arthritis Mother      Thyroid Disease Mother      hypo     Breast Cancer Maternal Aunt      Hypertension Paternal Grandmother      CEREBROVASCULAR DISEASE Maternal Grandmother      Family History Negative Maternal Grandfather      Family History Negative Paternal Grandfather      Family History Negative Son      Family History Negative Daughter      Family History Negative Daughter      Colon Cancer No family hx of              Reviewed and updated as needed this visit by clinical staffTobacco  Allergies  Meds  Med Hx  Surg Hx  Fam Hx  Soc Hx      Reviewed and updated as needed this visit by Provider         ROS:  Constitutional, HEENT, cardiovascular, pulmonary, gi and gu systems are negative, except as otherwise noted.      OBJECTIVE:   /78 (BP Location: Right arm, Patient Position: Chair, Cuff Size: Adult Large)  Pulse 80  Temp 98.6  F (37  C) (Oral)  Ht 5' 6\" (1.676 m)  Wt 197 lb (89.4 kg)  SpO2 98%  Breastfeeding? No  BMI 31.8 kg/m2  Body mass index is 31.8 kg/(m^2).  GENERAL: healthy, alert and no distress  RESP: lungs clear to auscultation - no rales, rhonchi or wheezes  CV: regular rate and rhythm, normal S1 S2, no S3 or S4, no murmur, click or rub, no peripheral edema and peripheral pulses strong  ABDOMEN: soft, nontender, no hepatosplenomegaly, no masses and bowel sounds normal  MS: no gross musculoskeletal defects noted, no edema      ASSESSMENT/PLAN:       1. Essential hypertension with goal blood pressure less than 140/90  hypertension under good control Continue current medications. Follow up in 6 months     2. Need for prophylactic vaccination and inoculation against influenza     - FLU VAC, SPLIT VIRUS IM > 3 YO (QUADRIVALENT) [86396]  - Vaccine Administration, Initial [77161]      Lian Martinez MD  [facility]  Injectable Influenza Immunization Documentation    1.  Is the person to be vaccinated sick today?   No    2. Does the person to be vaccinated have an allergy to a component   of the vaccine?   No    3. Has the person to be vaccinated ever had a serious reaction   to influenza vaccine in the past?   No    4. Has the person to be vaccinated ever had Guillain-Barré syndrome?   No    Form completed by ALYSHA Everett LPN

## 2017-10-18 NOTE — MR AVS SNAPSHOT
"              After Visit Summary   10/18/2017    Jose Coronado    MRN: [id number]           Patient Information     Date Of Birth          1955        Visit Information        Provider Department      10/18/2017 9:40 AM Lian Martinez MD [facility]        Today's Diagnoses     Essential hypertension with goal blood pressure less than 140/90    -  1    Need for prophylactic vaccination and inoculation against influenza           Follow-ups after your visit        Who to contact     If you have questions or need follow up information about today's clinic visit or your schedule please contact [facility] directly at [phone].  Normal or non-critical lab and imaging results will be communicated to you by [employer], letter or phone within 4 business days after the clinic has received the results. If you do not hear from us within 7 days, please contact the clinic through [employer] or phone. If you have a critical or abnormal lab result, we will notify you by phone as soon as possible.  Submit refill requests through [employer] or call your pharmacy and they will forward the refill request to us. Please allow 3 business days for your refill to be completed.          Additional Information About Your Visit        MyChart Information     [employer] gives you secure access to your electronic health record. If you see a primary care provider, you can also send messages to your care team and make appointments. If you have questions, please call your primary care clinic.  If you do not have a primary care provider, please call [phone] and they will assist you.        Care EveryWhere ID     This is your Care EveryWhere ID. This could be used by other organizations to access your [place] medical records  [id number]        Your Vitals Were     Pulse Temperature Height Pulse Oximetry Breastfeeding? BMI (Body Mass Index)    80 98.6  F (37  C) (Oral) 5' 6\" (1.676 m) 98% No 31.8 kg/m2    "    Blood Pressure from Last 3 Encounters:   10/18/17 128/78   10/09/17 122/60   07/27/17 118/72    Weight from Last 3 Encounters:   10/18/17 197 lb (89.4 kg)   10/09/17 199 lb 1.6 oz (90.3 kg)   07/27/17 203 lb (92.1 kg)              We Performed the Following     FLU VAC, SPLIT VIRUS IM > 3 YO (QUADRIVALENT) [42250]     Vaccine Administration, Initial [34008]          Today's Medication Changes          These changes are accurate as of: 10/18/17 12:15 PM.  If you have any questions, ask your nurse or doctor.               These medicines have changed or have updated prescriptions.        Dose/Directions    hydrochlorothiazide 25 MG tablet   Commonly known as:  HYDRODIURIL   This may have changed:  Another medication with the same name was removed. Continue taking this medication, and follow the directions you see here.   Used for:  Essential hypertension with goal blood pressure less than 140/90   Changed by:  Lian Martinez MD        Dose:  12.5 mg   Take 0.5 tablets (12.5 mg) by mouth daily   Quantity:  45 tablet   Refills:  3                Primary Care Provider Office Phone # Fax #    Lian Martinez [phone]       [address]  [place] [address]        Equal Access to Services     [facility] CenterANTONIA AH: Hadii ana ku hadasho Soomaali, waaxda luqadaha, qaybta kaalmada adeegyada, waxay piedad frederick. So [facility] [phone].    ATENCIÓN: Si habla español, tiene a tuttle disposición servicios gratuitos de asistencia lingüística. Llame al [phone].    We comply with applicable federal civil rights laws and [place] laws. We do not discriminate on the basis of race, color, national origin, age, disability, sex, sexual orientation, or gender identity.            Thank you!     Thank you for choosing [facility]  for your care. Our goal is always to provide you with excellent care. Hearing back from our patients is one way we can continue to  improve our services. Please take a few minutes to complete the written survey that you may receive in the mail after your visit with us. Thank you!             Your Updated Medication List - Protect others around you: Learn how to safely use, store and throw away your medicines at www.disposemymeds.org.          This list is accurate as of: 10/18/17 12:15 PM.  Always use your most recent med list.                   Brand Name Dispense Instructions for use Diagnosis    acetaminophen 325 MG tablet    TYLENOL    100 tablet    Take 2 tablets (650 mg) by mouth every 4 hours as needed for mild pain    Generalized pain       aspirin 81 MG tablet     30 tablet    Take 81 mg by mouth daily        atenolol 50 MG tablet    TENORMIN    90 tablet    TAKE 1 TABLET (50 MG) BY MOUTH DAILY    Palpitations       atorvastatin 40 MG tablet    LIPITOR    90 tablet    TAKE 1 TABLET (40 MG) BY MOUTH DAILY    Left temporal lobe infarction (H)       CALCIUM CITRATE + D PO      Take 2 tablets by mouth every morning        cetirizine 10 MG tablet    zyrTEC    30 tablet    Take 10 mg by mouth daily        clopidogrel 75 MG tablet    PLAVIX    90 tablet    Take 1 tablet (75 mg) by mouth daily Take 75 mg by mouth daily    Atrial fibrillation (H)       Cyanocobalamin 2500 MCG Tabs      Take 2,500 mcg by mouth twice a week Mon, Thur        Ferrous Sulfate 27 MG Tabs      Take 27 mg by mouth 2 times daily        FIBER COMPLETE PO      Take 1 capsule by mouth daily        hydrochlorothiazide 25 MG tablet    HYDRODIURIL    45 tablet    Take 0.5 tablets (12.5 mg) by mouth daily    Essential hypertension with goal blood pressure less than 140/90       imipramine 25 MG tablet    TOFRANIL    100 tablet    TAKE 1 TABLET (25 MG) BY MOUTH DAILY    Bladder dysfunction       LOSARTAN POTASSIUM PO      Take 50 mg by mouth daily (0.5 x 100 mg tablet = 50 mg dose)        PROBIOTIC PO      Take 1 tablet by mouth daily.        topiramate 25 MG tablet    TOPAMAX     180 tablet    Take 1 tablet (25 mg) by mouth 2 times daily Prescribed by Dr Telol    Migraine       UNABLE TO FIND      CPAP machine every night        valACYclovir 1000 mg tablet    VALTREX    4 tablet    Take 2 tablets (2,000 mg) by mouth 2 times daily as needed    Cold sore       VITAMIN C PO      Take 250 mg by mouth 2 times daily (0.5 x 500 mg tablet = 250 mg dose)        VITAMIN D (CHOLECALCIFEROL) PO      Take 1,000 Units by mouth daily        ZADITOR 0.025 % Soln ophthalmic solution   Generic drug:  ketotifen     1 Bottle    Place 1 drop into both eyes every 12 hours as needed for itching

## 2017-11-09 ENCOUNTER — OFFICE VISIT (OUTPATIENT)
Dept: URGENT CARE | Facility: URGENT CARE | Age: 62
End: 2017-11-09
Payer: COMMERCIAL

## 2017-11-09 ENCOUNTER — MYC MEDICAL ADVICE (OUTPATIENT)
Dept: INTERNAL MEDICINE | Facility: CLINIC | Age: 62
End: 2017-11-09

## 2017-11-09 VITALS
BODY MASS INDEX: 31.13 KG/M2 | DIASTOLIC BLOOD PRESSURE: 76 MMHG | OXYGEN SATURATION: 100 % | WEIGHT: 192.9 LBS | TEMPERATURE: 98 F | HEART RATE: 65 BPM | SYSTOLIC BLOOD PRESSURE: 116 MMHG

## 2017-11-09 DIAGNOSIS — R30.0 DYSURIA: Primary | ICD-10-CM

## 2017-11-09 DIAGNOSIS — N30.01 ACUTE CYSTITIS WITH HEMATURIA: Primary | ICD-10-CM

## 2017-11-09 DIAGNOSIS — R30.0 DYSURIA: ICD-10-CM

## 2017-11-09 LAB
ALBUMIN UR-MCNC: NEGATIVE MG/DL
APPEARANCE UR: CLEAR
BACTERIA #/AREA URNS HPF: ABNORMAL /HPF
BILIRUB UR QL STRIP: NEGATIVE
COLOR UR AUTO: YELLOW
GLUCOSE UR STRIP-MCNC: NEGATIVE MG/DL
HGB UR QL STRIP: ABNORMAL
KETONES UR STRIP-MCNC: NEGATIVE MG/DL
LEUKOCYTE ESTERASE UR QL STRIP: ABNORMAL
NITRATE UR QL: POSITIVE
NON-SQ EPI CELLS #/AREA URNS LPF: ABNORMAL /LPF
PH UR STRIP: 5.5 PH (ref 5–7)
RBC #/AREA URNS AUTO: ABNORMAL /HPF
SOURCE: ABNORMAL
SP GR UR STRIP: 1.01 (ref 1–1.03)
UROBILINOGEN UR STRIP-ACNC: 0.2 EU/DL (ref 0.2–1)
WBC #/AREA URNS AUTO: ABNORMAL /HPF

## 2017-11-09 PROCEDURE — 87186 SC STD MICRODIL/AGAR DIL: CPT | Performed by: PHYSICIAN ASSISTANT

## 2017-11-09 PROCEDURE — 99213 OFFICE O/P EST LOW 20 MIN: CPT | Performed by: PHYSICIAN ASSISTANT

## 2017-11-09 PROCEDURE — 87088 URINE BACTERIA CULTURE: CPT | Performed by: PHYSICIAN ASSISTANT

## 2017-11-09 PROCEDURE — 81001 URINALYSIS AUTO W/SCOPE: CPT | Performed by: PHYSICIAN ASSISTANT

## 2017-11-09 PROCEDURE — 87086 URINE CULTURE/COLONY COUNT: CPT | Performed by: PHYSICIAN ASSISTANT

## 2017-11-09 RX ORDER — NITROFURANTOIN 25; 75 MG/1; MG/1
100 CAPSULE ORAL 2 TIMES DAILY
Qty: 14 CAPSULE | Refills: 0 | Status: SHIPPED | OUTPATIENT
Start: 2017-11-09 | End: 2018-01-11

## 2017-11-09 NOTE — MR AVS SNAPSHOT
"              After Visit Summary   11/9/2017    Jose Coronado    MRN: [id number]           Patient Information     Date Of Birth          1955        Visit Information        Provider Department      11/9/2017 9:00 AM Celio Gil PA-C [facility]        Today's Diagnoses     Acute cystitis with hematuria    -  1    Dysuria          Care Instructions       * BLADDER INFECTION,Female (Adult)    A bladder infection (\"cystitis\" or \"UTI\") usually causes a constant urge to urinate and a burning when passing urine. Urine may be cloudy, smelly or dark. There may be pain in the lower abdomen. A bladder infection occurs when bacteria from the vaginal area enter the bladder opening (urethra). This can occur from sexual intercourse, wearing tight clothing, dehydration and other factors.  HOME CARE:  1. Drink lots of fluids (at least 6-8 glasses a day, unless you must restrict fluids for other medical reasons). This will force the medicine into your urinary system and flush the bacteria out of your body. Cranberry juice has been shown to help clear out the bacteria.  2. Avoid sexual intercourse until your symptoms are gone.  3. A bladder infection is treated with antibiotics. You may also be given Pyridium (generic = phenazopyridine) to reduce the burning sensation. This medicine will cause your urine to become a bright orange color. The orange urine may stain clothing. You may wear a pad or panty-liner to protect clothing.  PREVENTING FUTURE INFECTIONS:  1. Always wipe from front to back after a bowel movement.  2. Keep the genital area clean and dry.  3. Drink plenty of fluids each day to avoid dehydration.  4. Urinate right after intercourse to flush out the bladder.  5. Wear cotton underwear and cotton-lined panty hose; avoid tight-fitting pants.  6. If you are on birth control pills and are having frequent bladder infections, discuss with your doctor.  FOLLOW UP: Return to this " facility or see your doctor if ALL symptoms are not gone after three days of treatment.  GET PROMPT MEDICAL ATTENTION if any of the following occur:    Fever over 101 F (38.3 C)    No improvement by the third day of treatment    Increasing back or abdominal pain    Repeated vomiting; unable to keep medicine down    Weakness, dizziness or fainting    Vaginal discharge    Pain, redness or swelling in the labia (outer vaginal area)    1556-6193 [employer]. [address] [place] [address], [place] [address]. All rights reserved. This information is not intended as a substitute for professional medical care. Always follow your healthcare professional's instructions.  This information has been modified by your health care provider with permission from the publisher.            Follow-ups after your visit        Who to contact     If you have questions or need follow up information about today's clinic visit or your schedule please contact [facility] directly at [phone].  Normal or non-critical lab and imaging results will be communicated to you by youwhohart, letter or phone within 4 business days after the clinic has received the results. If you do not hear from us within 7 days, please contact the clinic through [employer] or phone. If you have a critical or abnormal lab result, we will notify you by phone as soon as possible.  Submit refill requests through [employer] or call your pharmacy and they will forward the refill request to us. Please allow 3 business days for your refill to be completed.          Additional Information About Your Visit        youwhoharSceneDoc Information     [employer] gives you secure access to your electronic health record. If you see a primary care provider, you can also send messages to your care team and make appointments. If you have questions, please call your primary care clinic.  If you do not have a primary care provider, please call [phone] and they will assist you.         Care EveryWhere ID     This is your Care EveryWhere ID. This could be used by other organizations to access your [facility] medical records  [id number]        Your Vitals Were     Pulse Temperature Pulse Oximetry BMI (Body Mass Index)          65 98  F (36.7  C) (Oral) 100% 31.13 kg/m2         Blood Pressure from Last 3 Encounters:   11/09/17 116/76   10/18/17 128/78   10/09/17 122/60    Weight from Last 3 Encounters:   11/09/17 192 lb 14.4 oz (87.5 kg)   10/18/17 197 lb (89.4 kg)   10/09/17 199 lb 1.6 oz (90.3 kg)              We Performed the Following     UA reflex to Microscopic and Culture     Urine Culture Aerobic Bacterial     Urine Microscopic          Today's Medication Changes          These changes are accurate as of: 11/9/17  9:44 AM.  If you have any questions, ask your nurse or doctor.               Start taking these medicines.        Dose/Directions    nitroFURantoin (macrocrystal-monohydrate) 100 MG capsule   Commonly known as:  MACROBID   Used for:  Acute cystitis with hematuria   Started by:  Celio Gil PA-C        Dose:  100 mg   Take 1 capsule (100 mg) by mouth 2 times daily   Quantity:  14 capsule   Refills:  0            Where to get your medicines      These medications were sent to [facility]/pharmacy #[id number] - [facility] [address]  [address] [facility] [address]     Phone:  [phone]     nitroFURantoin (macrocrystal-monohydrate) 100 MG capsule                Primary Care Provider Office Phone # Fax #    Lian Martinez [phone]       [address] [facility] [address]  [facility] [address]        Equal Access to Services     [facility] HospitalANTONIA AH: Hadii ana leger Somalissa, waaxda luqadaha, qaybta kaalmada geovany, eve frederick. So [facility] [phone].    ATENCIÓN: Si habla español, tiene a tuttle disposición servicios gratuitos de asistencia lingüística. Llame al [phone].    We comply with applicable  federal civil rights laws and [place] laws. We do not discriminate on the basis of race, color, national origin, age, disability, sex, sexual orientation, or gender identity.            Thank you!     Thank you for choosing [facility]  for your care. Our goal is always to provide you with excellent care. Hearing back from our patients is one way we can continue to improve our services. Please take a few minutes to complete the written survey that you may receive in the mail after your visit with us. Thank you!             Your Updated Medication List - Protect others around you: Learn how to safely use, store and throw away your medicines at www.disposemymeds.org.          This list is accurate as of: 11/9/17  9:44 AM.  Always use your most recent med list.                   Brand Name Dispense Instructions for use Diagnosis    acetaminophen 325 MG tablet    TYLENOL    100 tablet    Take 2 tablets (650 mg) by mouth every 4 hours as needed for mild pain    Generalized pain       aspirin 81 MG tablet     30 tablet    Take 81 mg by mouth daily        atenolol 50 MG tablet    TENORMIN    90 tablet    TAKE 1 TABLET (50 MG) BY MOUTH DAILY    Palpitations       atorvastatin 40 MG tablet    LIPITOR    90 tablet    TAKE 1 TABLET (40 MG) BY MOUTH DAILY    Left temporal lobe infarction (H)       CALCIUM CITRATE + D PO      Take 2 tablets by mouth every morning        cetirizine 10 MG tablet    zyrTEC    30 tablet    Take 10 mg by mouth daily        clopidogrel 75 MG tablet    PLAVIX    90 tablet    Take 1 tablet (75 mg) by mouth daily Take 75 mg by mouth daily    Atrial fibrillation (H)       Cyanocobalamin 2500 MCG Tabs      Take 2,500 mcg by mouth twice a week Mon, Thur        Ferrous Sulfate 27 MG Tabs      Take 27 mg by mouth 2 times daily        FIBER COMPLETE PO      Take 1 capsule by mouth daily        hydrochlorothiazide 25 MG tablet    HYDRODIURIL    45 tablet    Take 0.5 tablets (12.5 mg) by mouth  daily    Essential hypertension with goal blood pressure less than 140/90       imipramine 25 MG tablet    TOFRANIL    100 tablet    TAKE 1 TABLET (25 MG) BY MOUTH DAILY    Bladder dysfunction       LOSARTAN POTASSIUM PO      Take 50 mg by mouth daily (0.5 x 100 mg tablet = 50 mg dose)        nitroFURantoin (macrocrystal-monohydrate) 100 MG capsule    MACROBID    14 capsule    Take 1 capsule (100 mg) by mouth 2 times daily    Acute cystitis with hematuria       PROBIOTIC PO      Take 1 tablet by mouth daily.        topiramate 25 MG tablet    TOPAMAX    180 tablet    Take 1 tablet (25 mg) by mouth 2 times daily Prescribed by Dr Tello    Migraine       UNABLE TO FIND      CPAP machine every night        valACYclovir 1000 mg tablet    VALTREX    4 tablet    Take 2 tablets (2,000 mg) by mouth 2 times daily as needed    Cold sore       VITAMIN C PO      Take 250 mg by mouth 2 times daily (0.5 x 500 mg tablet = 250 mg dose)        VITAMIN D (CHOLECALCIFEROL) PO      Take 1,000 Units by mouth daily        ZADITOR 0.025 % Soln ophthalmic solution   Generic drug:  ketotifen     1 Bottle    Place 1 drop into both eyes every 12 hours as needed for itching

## 2017-11-09 NOTE — NURSING NOTE
"Chief Complaint   Patient presents with     Urgent Care     UTI     start: 5 days ago sx: bladder spams, increased frequency,limited stream, retention, blood in urine, bladder tenderness, hx of UTI's in past  tx: increased fluids, Advil ,cranberry juice       Initial /76 (BP Location: Right arm, Patient Position: Chair, Cuff Size: Adult Regular)  Pulse 65  Temp 98  F (36.7  C) (Oral)  Wt 192 lb 14.4 oz (87.5 kg)  SpO2 100%  BMI 31.13 kg/m2 Estimated body mass index is 31.13 kg/(m^2) as calculated from the following:    Height as of 10/18/17: 5' 6\" (1.676 m).    Weight as of this encounter: 192 lb 14.4 oz (87.5 kg).  Medication Reconciliation: complete   Mary Dimas MA      "

## 2017-11-09 NOTE — PROGRESS NOTES
Jose Coronadopresents to clinic today c/o day hx of bladder spasms/end stream dysuria, urinary urgency/frequency and suprapubic area pressure. She also reports odor to urine. Additionally, yesterday,  she noted some blood in urine (no clots). Urine appears clear to her today.     Urologic HX:  Last UTI approximately 1+ years ago.   Admits to hx of prolapse (by patient report) for a couple of years. Her longstanding prolapse sxs include incomplete bladder emptying (which she refers to as retention), bladder spasms and leaking of urine. No hx of resistant infection, kidney stones, or kidney infections. She denies any urinary retention that required catheter assisted drainage.     Red Flag Review: Denies any abdominal pain or flank pain. Denies any F/C/N/V/D or other acute sxs.    General Disposition. Despite above acute illness sxs, patient still alert, active and taking in PO solids and fluids.      Past Medical History:   Diagnosis Date     Anemia      CVA (cerebral vascular accident) (H) 2017    ?migraine, ?pfo--negative vasc w/u, neg hypercoag w/u     Diffuse cystic mastopathy     Fibrocystic breast disease     HTN, goal below 140/90      Hyperparathyroidism (H)      Infectious mononucleosis     Mono at age 16     Irregular heart beat     PAT no afib on 30day monitor     Labyrinthitis, unspecified      Migraine headache with aura      Osteopenia      Pain in joint, shoulder region     Secondary to a fall     PFO (patent foramen ovale)     s/p closure with amplazter device 7/13/17     S/P gastric bypass June, 2010     Sleep apnea     she is on CPAP     Vitamin D deficiencies        Current Outpatient Prescriptions   Medication     hydrochlorothiazide (HYDRODIURIL) 25 MG tablet     imipramine (TOFRANIL) 25 MG tablet     atenolol (TENORMIN) 50 MG tablet     atorvastatin (LIPITOR) 40 MG tablet     valACYclovir (VALTREX) 1000 mg tablet     clopidogrel (PLAVIX) 75 MG tablet     acetaminophen (TYLENOL) 325 MG tablet      LOSARTAN POTASSIUM PO     FIBER COMPLETE PO     UNABLE TO FIND     Ascorbic Acid (VITAMIN C PO)     VITAMIN D, CHOLECALCIFEROL, PO     aspirin 81 MG tablet     topiramate (TOPAMAX) 25 MG tablet     Calcium Citrate-Vitamin D (CALCIUM CITRATE + D PO)     Ferrous Sulfate 27 MG TABS     Cyanocobalamin 2500 MCG TABS     ketotifen (ZADITOR) 0.025 % SOLN     cetirizine (ZYRTEC) 10 MG tablet     Probiotic Product (PROBIOTIC PO)     No current facility-administered medications for this visit.        Allergies   Allergen Reactions     Contrast Dye Itching     Reaction of immediate burning and severe itching in Right ear after injection for CT.      Sulfa Drugs      hives         ROS:     GI: Denies any abdominal pain. Denies any F/C/N/V/D.   GYN:Denies any hx of GYN CA. Denies any post-menopausal bleeding or spotting   SKIN: Denies rash   NEURO: Denies any high fevers, confusion or mental status changes      OBJECTIVE:  /76 (BP Location: Right arm, Patient Position: Chair, Cuff Size: Adult Regular)  Pulse 65  Temp 98  F (36.7  C) (Oral)  Wt 192 lb 14.4 oz (87.5 kg)  SpO2 100%  BMI 31.13 kg/m2        GENERAL:  Very pleasant, comfortable and generally well appearing.  SKIN: No rashes.  Normal color.  Sclera clear.  CARDIAC:NORMAL - regular rate and rhythm without murmur., normal s1/s2 and without extra heart sounds  RESP: Normal - CTA without rales, rhonchi, or wheezing.  ABDOMEN:  Soft, non-tender, non-distended.  Positive normal bowel sounds.  No HSM or masses.  Positive, mild, suprapubic tenderness.  No CVA tenderness.  NEURO: Alert and oriented.  Normal speech and mentation.  CN II/XII grossly intact.  Gait within normal limits.      Results for orders placed or performed in visit on 11/09/17   UA reflex to Microscopic and Culture   Result Value Ref Range    Color Urine Yellow     Appearance Urine Clear     Glucose Urine Negative NEG^Negative mg/dL    Bilirubin Urine Negative NEG^Negative    Ketones Urine  "Negative NEG^Negative mg/dL    Specific Gravity Urine 1.010 1.003 - 1.035    Blood Urine Large (A) NEG^Negative    pH Urine 5.5 5.0 - 7.0 pH    Protein Albumin Urine Negative NEG^Negative mg/dL    Urobilinogen Urine 0.2 0.2 - 1.0 EU/dL    Nitrite Urine Positive (A) NEG^Negative    Leukocyte Esterase Urine Small (A) NEG^Negative    Source Midstream Urine    Urine Microscopic   Result Value Ref Range    WBC Urine 5-10 (A) OTO2^O - 2 /HPF    RBC Urine O - 2 OTO2^O - 2 /HPF    Squamous Epithelial /LPF Urine Few FEW^Few /LPF    Bacteria Urine Many (A) NEG^Negative /HPF         ASSESSMENT/PLAN:    (N30.01) Acute cystitis with hematuria  (primary encounter diagnosis)  Comment: Transient hematuria yesterday by patient report. Sulfa allergy.   Plan:         1. Abx as per above.     2. Push plain, non-carbonated water.  Avoid acidic fluids and bladder irritants (reviewed with patient). Take full course of antibiotic as prescribed.     3. Follow-up immediately if any fever, chills, nausea, vomiting, back or flank pain.  Follow-up with PCP if there are any residual urinary symptoms after course of prescribed antibiotics.        4. Follow-up with PCP if sxs change, worsen or fail to fully resolve with above tx.       5. In addition to the above, pyelonephritis and UTI \"red flag\" signs and sxs are reviewed with pt both verbally and by way of printed educational material for home review.  Pt verbalizes understanding of and agrees to the above plan.         (R30.0) Dysuria  Plan: UA reflex to Microscopic and Culture, Urine         Microscopic, Urine Culture Aerobic Bacterial  As per above           "

## 2017-11-09 NOTE — PATIENT INSTRUCTIONS
"   * BLADDER INFECTION,Female (Adult)    A bladder infection (\"cystitis\" or \"UTI\") usually causes a constant urge to urinate and a burning when passing urine. Urine may be cloudy, smelly or dark. There may be pain in the lower abdomen. A bladder infection occurs when bacteria from the vaginal area enter the bladder opening (urethra). This can occur from sexual intercourse, wearing tight clothing, dehydration and other factors.  HOME CARE:  1. Drink lots of fluids (at least 6-8 glasses a day, unless you must restrict fluids for other medical reasons). This will force the medicine into your urinary system and flush the bacteria out of your body. Cranberry juice has been shown to help clear out the bacteria.  2. Avoid sexual intercourse until your symptoms are gone.  3. A bladder infection is treated with antibiotics. You may also be given Pyridium (generic = phenazopyridine) to reduce the burning sensation. This medicine will cause your urine to become a bright orange color. The orange urine may stain clothing. You may wear a pad or panty-liner to protect clothing.  PREVENTING FUTURE INFECTIONS:  1. Always wipe from front to back after a bowel movement.  2. Keep the genital area clean and dry.  3. Drink plenty of fluids each day to avoid dehydration.  4. Urinate right after intercourse to flush out the bladder.  5. Wear cotton underwear and cotton-lined panty hose; avoid tight-fitting pants.  6. If you are on birth control pills and are having frequent bladder infections, discuss with your doctor.  FOLLOW UP: Return to this facility or see your doctor if ALL symptoms are not gone after three days of treatment.  GET PROMPT MEDICAL ATTENTION if any of the following occur:    Fever over 101 F (38.3 C)    No improvement by the third day of treatment    Increasing back or abdominal pain    Repeated vomiting; unable to keep medicine down    Weakness, dizziness or fainting    Vaginal discharge    Pain, redness or swelling in " the labia (outer vaginal area)    4582-4142 [employer]. [address] [place] [address], [place] [address]. All rights reserved. This information is not intended as a substitute for professional medical care. Always follow your healthcare professional's instructions.  This information has been modified by your health care provider with permission from the publisher.

## 2017-11-11 LAB
BACTERIA SPEC CULT: ABNORMAL
SPECIMEN SOURCE: ABNORMAL

## 2017-11-24 DIAGNOSIS — R30.0 DYSURIA: ICD-10-CM

## 2017-11-24 LAB
ALBUMIN UR-MCNC: NEGATIVE MG/DL
APPEARANCE UR: CLEAR
BILIRUB UR QL STRIP: NEGATIVE
COLOR UR AUTO: YELLOW
GLUCOSE UR STRIP-MCNC: NEGATIVE MG/DL
HGB UR QL STRIP: NEGATIVE
KETONES UR STRIP-MCNC: ABNORMAL MG/DL
LEUKOCYTE ESTERASE UR QL STRIP: ABNORMAL
NITRATE UR QL: NEGATIVE
NON-SQ EPI CELLS #/AREA URNS LPF: ABNORMAL /LPF
PH UR STRIP: 6 PH (ref 5–7)
RBC #/AREA URNS AUTO: ABNORMAL /HPF
SOURCE: ABNORMAL
SP GR UR STRIP: 1.01 (ref 1–1.03)
UROBILINOGEN UR STRIP-ACNC: 1 EU/DL (ref 0.2–1)
WBC #/AREA URNS AUTO: ABNORMAL /HPF

## 2017-11-24 PROCEDURE — 81001 URINALYSIS AUTO W/SCOPE: CPT | Performed by: INTERNAL MEDICINE

## 2017-11-24 PROCEDURE — 87086 URINE CULTURE/COLONY COUNT: CPT | Performed by: INTERNAL MEDICINE

## 2017-11-25 LAB
BACTERIA SPEC CULT: NORMAL
SPECIMEN SOURCE: NORMAL

## 2017-11-28 ENCOUNTER — MYC MEDICAL ADVICE (OUTPATIENT)
Dept: INTERNAL MEDICINE | Facility: CLINIC | Age: 62
End: 2017-11-28

## 2017-11-28 NOTE — TELEPHONE ENCOUNTER
Per 11/9 my chart-If you are symptom free at the end of the antibiotics I would wait until you are off the antibiotics for 7 days before checking the urine. If you are still having symptoms at the end of the antibiotics then I would check the urine right away. Orders have been placed. Just make a lab appointment when you are ready.       Sent pt message stating above

## 2017-11-29 ENCOUNTER — MYC MEDICAL ADVICE (OUTPATIENT)
Dept: INTERNAL MEDICINE | Facility: CLINIC | Age: 62
End: 2017-11-29

## 2017-11-29 DIAGNOSIS — R35.0 URINARY FREQUENCY: Primary | ICD-10-CM

## 2017-11-30 RX ORDER — CIPROFLOXACIN 500 MG/1
500 TABLET, FILM COATED ORAL 2 TIMES DAILY
Qty: 14 TABLET | Refills: 0 | Status: SHIPPED | OUTPATIENT
Start: 2017-11-30 | End: 2017-12-07

## 2017-11-30 NOTE — TELEPHONE ENCOUNTER
Per mychart message below, pt (according to recent lab results) doesn't think her UTI is gone.    Additional med?    Please advise, thanks.

## 2017-12-04 DIAGNOSIS — R35.0 URINARY FREQUENCY: ICD-10-CM

## 2017-12-04 LAB
ALBUMIN UR-MCNC: NEGATIVE MG/DL
APPEARANCE UR: CLEAR
BILIRUB UR QL STRIP: NEGATIVE
COLOR UR AUTO: YELLOW
GLUCOSE UR STRIP-MCNC: NEGATIVE MG/DL
HGB UR QL STRIP: NEGATIVE
KETONES UR STRIP-MCNC: NEGATIVE MG/DL
LEUKOCYTE ESTERASE UR QL STRIP: NEGATIVE
NITRATE UR QL: NEGATIVE
PH UR STRIP: 5.5 PH (ref 5–7)
RBC #/AREA URNS AUTO: NORMAL /HPF
SOURCE: NORMAL
SP GR UR STRIP: 1.01 (ref 1–1.03)
UROBILINOGEN UR STRIP-ACNC: 0.2 EU/DL (ref 0.2–1)
WBC #/AREA URNS AUTO: NORMAL /HPF

## 2017-12-04 PROCEDURE — 81001 URINALYSIS AUTO W/SCOPE: CPT | Performed by: INTERNAL MEDICINE

## 2017-12-07 DIAGNOSIS — Z12.31 VISIT FOR SCREENING MAMMOGRAM: ICD-10-CM

## 2017-12-07 PROCEDURE — G0202 SCR MAMMO BI INCL CAD: HCPCS | Mod: TC

## 2017-12-30 DIAGNOSIS — N31.9 BLADDER DYSFUNCTION: ICD-10-CM

## 2018-01-05 RX ORDER — IMIPRAMINE HCL 25 MG
TABLET ORAL
Qty: 100 TABLET | Refills: 0 | Status: SHIPPED | OUTPATIENT
Start: 2018-01-05 | End: 2018-04-06

## 2018-01-05 NOTE — TELEPHONE ENCOUNTER
Requested Prescriptions   Pending Prescriptions Disp Refills     imipramine (TOFRANIL) 25 MG tablet [Pharmacy Med Name: IMIPRAMINE HCL 25 MG TABLET] 100 tablet 0     Sig: TAKE 1 TABLET (25 MG) BY MOUTH DAILY    Tricyclic Antidepressants Protocol Passed    12/30/2017  1:18 AM       Passed - Blood pressure under 140/90    BP Readings from Last 3 Encounters:   11/09/17 116/76   10/18/17 128/78   10/09/17 122/60          Passed - Recent (12 mo) or future (30 d) visit with authorizing provider's specialty     Patient had office visit in the last year or has a visit in the next 30 days with authorizing provider.  See chart review.   Last OV: 10/18/17, per OV note: f/u in 6 months.       Passed - Patient is age 18 or older       Passed - No active pregnancy on record       Passed - No positive pregnancy test in past 12 months      Medication is being filled for 1 time refill only due to:  due for appt in april

## 2018-01-08 ENCOUNTER — HOSPITAL ENCOUNTER (OUTPATIENT)
Dept: CARDIOLOGY | Facility: CLINIC | Age: 63
Discharge: HOME | End: 2018-01-08
Attending: INTERNAL MEDICINE | Admitting: INTERNAL MEDICINE
Payer: COMMERCIAL

## 2018-01-08 VITALS — DIASTOLIC BLOOD PRESSURE: 61 MMHG | SYSTOLIC BLOOD PRESSURE: 103 MMHG | HEART RATE: 63 BPM

## 2018-01-08 DIAGNOSIS — Q21.12 PFO (PATENT FORAMEN OVALE): ICD-10-CM

## 2018-01-08 DIAGNOSIS — I42.9 CARDIOMYOPATHY, UNSPECIFIED TYPE (H): ICD-10-CM

## 2018-01-08 LAB
CREAT BLD-MCNC: 0.7 MG/DL (ref 0.52–1.04)
GFR SERPL CREATININE-BSD FRML MDRD: 85 ML/MIN/1.7M2

## 2018-01-08 PROCEDURE — 82565 ASSAY OF CREATININE: CPT

## 2018-01-08 PROCEDURE — 93018 CV STRESS TEST I&R ONLY: CPT | Performed by: INTERNAL MEDICINE

## 2018-01-08 PROCEDURE — 25000128 H RX IP 250 OP 636: Performed by: INTERNAL MEDICINE

## 2018-01-08 PROCEDURE — A9585 GADOBUTROL INJECTION: HCPCS | Performed by: INTERNAL MEDICINE

## 2018-01-08 PROCEDURE — 75563 CARD MRI W/STRESS IMG & DYE: CPT | Mod: 26 | Performed by: INTERNAL MEDICINE

## 2018-01-08 PROCEDURE — 75563 CARD MRI W/STRESS IMG & DYE: CPT

## 2018-01-08 PROCEDURE — 75565 CARD MRI VELOC FLOW MAPPING: CPT | Mod: 26 | Performed by: INTERNAL MEDICINE

## 2018-01-08 PROCEDURE — 93016 CV STRESS TEST SUPVJ ONLY: CPT | Performed by: INTERNAL MEDICINE

## 2018-01-08 RX ORDER — ACYCLOVIR 200 MG/1
0-1 CAPSULE ORAL
Status: DISCONTINUED | OUTPATIENT
Start: 2018-01-08 | End: 2018-01-09 | Stop reason: HOSPADM

## 2018-01-08 RX ORDER — METHYLPREDNISOLONE SODIUM SUCCINATE 125 MG/2ML
125 INJECTION, POWDER, LYOPHILIZED, FOR SOLUTION INTRAMUSCULAR; INTRAVENOUS
Status: DISCONTINUED | OUTPATIENT
Start: 2018-01-08 | End: 2018-01-09 | Stop reason: HOSPADM

## 2018-01-08 RX ORDER — REGADENOSON 0.08 MG/ML
0.4 INJECTION, SOLUTION INTRAVENOUS ONCE
Status: COMPLETED | OUTPATIENT
Start: 2018-01-08 | End: 2018-01-08

## 2018-01-08 RX ORDER — DIPHENHYDRAMINE HCL 25 MG
25 CAPSULE ORAL
Status: DISCONTINUED | OUTPATIENT
Start: 2018-01-08 | End: 2018-01-09 | Stop reason: HOSPADM

## 2018-01-08 RX ORDER — DIAZEPAM 5 MG
5 TABLET ORAL EVERY 30 MIN PRN
Status: DISCONTINUED | OUTPATIENT
Start: 2018-01-08 | End: 2018-01-09 | Stop reason: HOSPADM

## 2018-01-08 RX ORDER — ALBUTEROL SULFATE 90 UG/1
2 AEROSOL, METERED RESPIRATORY (INHALATION) EVERY 5 MIN PRN
Status: DISCONTINUED | OUTPATIENT
Start: 2018-01-08 | End: 2018-01-09 | Stop reason: HOSPADM

## 2018-01-08 RX ORDER — DIPHENHYDRAMINE HYDROCHLORIDE 50 MG/ML
25-50 INJECTION INTRAMUSCULAR; INTRAVENOUS
Status: DISCONTINUED | OUTPATIENT
Start: 2018-01-08 | End: 2018-01-09 | Stop reason: HOSPADM

## 2018-01-08 RX ORDER — ONDANSETRON 2 MG/ML
4 INJECTION INTRAMUSCULAR; INTRAVENOUS
Status: DISCONTINUED | OUTPATIENT
Start: 2018-01-08 | End: 2018-01-09 | Stop reason: HOSPADM

## 2018-01-08 RX ORDER — GADOBUTROL 604.72 MG/ML
5-65 INJECTION INTRAVENOUS ONCE
Status: COMPLETED | OUTPATIENT
Start: 2018-01-08 | End: 2018-01-08

## 2018-01-08 RX ORDER — AMINOPHYLLINE 25 MG/ML
100 INJECTION, SOLUTION INTRAVENOUS ONCE
Status: COMPLETED | OUTPATIENT
Start: 2018-01-08 | End: 2018-01-08

## 2018-01-08 RX ADMIN — GADOBUTROL 22 ML: 604.72 INJECTION INTRAVENOUS at 13:31

## 2018-01-08 RX ADMIN — REGADENOSON 0.4 MG: 0.08 INJECTION, SOLUTION INTRAVENOUS at 12:21

## 2018-01-08 RX ADMIN — AMINOPHYLLINE 100 MG: 25 INJECTION, SOLUTION INTRAVENOUS at 12:27

## 2018-01-11 ENCOUNTER — OFFICE VISIT (OUTPATIENT)
Dept: CARDIOLOGY | Facility: CLINIC | Age: 63
End: 2018-01-11
Attending: INTERNAL MEDICINE
Payer: COMMERCIAL

## 2018-01-11 VITALS
SYSTOLIC BLOOD PRESSURE: 102 MMHG | DIASTOLIC BLOOD PRESSURE: 55 MMHG | BODY MASS INDEX: 30.86 KG/M2 | WEIGHT: 192 LBS | HEIGHT: 66 IN | HEART RATE: 66 BPM

## 2018-01-11 DIAGNOSIS — I42.9 CARDIOMYOPATHY, UNSPECIFIED TYPE (H): ICD-10-CM

## 2018-01-11 DIAGNOSIS — Q21.12 PFO (PATENT FORAMEN OVALE): ICD-10-CM

## 2018-01-11 PROCEDURE — 99214 OFFICE O/P EST MOD 30 MIN: CPT | Performed by: NURSE PRACTITIONER

## 2018-01-11 RX ORDER — LORATADINE 10 MG/1
10 TABLET ORAL EVERY MORNING
COMMUNITY
End: 2023-11-30

## 2018-01-11 NOTE — MR AVS SNAPSHOT
After Visit Summary   1/11/2018    Jose Coronado    MRN: [id number]           Patient Information     Date Of Birth          1955        Visit Information        Provider Department      1/11/2018 1:10 PM Joanie Soriano APRN CNP [facility]        Today's Diagnoses     PFO (patent foramen ovale)        Cardiomyopathy, unspecified type (H)          Care Instructions    -Stop Plavix    Increase Aspirin to 325mg daily when you stop Plavix    Echocardiogram 4-6 months after stopping Plavix and see me back  Schedule at [facility]              Follow-ups after your visit        Additional Services     Follow-Up with Cardiac Advanced Practice Provider                 Future tests that were ordered for you today     Open Future Orders        Priority Expected Expires Ordered    Follow-Up with Cardiac Advanced Practice Provider Routine 6/13/2018 1/11/2019 1/11/2018    Echocardiogram Routine 6/20/2018 1/11/2019 1/11/2018            Who to contact     If you have questions or need follow up information about today's clinic visit or your schedule please contact [facility] directly at [phone].  Normal or non-critical lab and imaging results will be communicated to you by [employer], letter or phone within 4 business days after the clinic has received the results. If you do not hear from us within 7 days, please contact the clinic through [employer] or phone. If you have a critical or abnormal lab result, we will notify you by phone as soon as possible.  Submit refill requests through [employer] or call your pharmacy and they will forward the refill request to us. Please allow 3 business days for your refill to be completed.          Additional Information About Your Visit        MyChart Information     [employer] gives you secure access to your electronic health record. If you see a primary care provider, you can also send messages to  "your care team and make appointments. If you have questions, please call your primary care clinic.  If you do not have a primary care provider, please call [phone] and they will assist you.        Care EveryWhere ID     This is your Care EveryWhere ID. This could be used by other organizations to access your [facility] medical records  [id number]        Your Vitals Were     Pulse Height BMI (Body Mass Index)             66 1.676 m (5' 6\") 30.99 kg/m2          Blood Pressure from Last 3 Encounters:   01/11/18 102/55   01/08/18 103/61   11/09/17 116/76    Weight from Last 3 Encounters:   01/11/18 87.1 kg (192 lb)   11/09/17 87.5 kg (192 lb 14.4 oz)   10/18/17 89.4 kg (197 lb)              We Performed the Following     Follow-Up with Cardiac Advanced Practice Provider          Today's Medication Changes          These changes are accurate as of: 1/11/18  2:04 PM.  If you have any questions, ask your nurse or doctor.               Stop taking these medicines if you haven't already. Please contact your care team if you have questions.     cetirizine 10 MG tablet   Commonly known as:  zyrTEC   Stopped by:  Joanie Soriano, RON CNP                    Primary Care Provider Office Phone # Fax #    Lian Martinez [phone]       [address] [facility] [address]  [facility] [address]        Equal Access to Services     [facility] Hospital[facility] AH: Hadii aad ku hadasho Soomaali, waaxda luqadaha, qaybta kaalmada adeegyada, eve frederick. So [facility] [phone].    ATENCIÓN: Si habla español, tiene a tuttle disposición servicios gratuitos de asistencia lingüística. Jen al [phone].    We comply with applicable federal civil rights laws and [place] laws. We do not discriminate on the basis of race, color, national origin, age, disability, sex, sexual orientation, or gender identity.            Thank you!     Thank you for choosing [facility]   [facility]  " for your care. Our goal is always to provide you with excellent care. Hearing back from our patients is one way we can continue to improve our services. Please take a few minutes to complete the written survey that you may receive in the mail after your visit with us. Thank you!             Your Updated Medication List - Protect others around you: Learn how to safely use, store and throw away your medicines at www.disposemymeds.org.          This list is accurate as of: 1/11/18  2:04 PM.  Always use your most recent med list.                   Brand Name Dispense Instructions for use Diagnosis    acetaminophen 325 MG tablet    TYLENOL    100 tablet    Take 2 tablets (650 mg) by mouth every 4 hours as needed for mild pain    Generalized pain       aspirin 81 MG tablet     30 tablet    Take 81 mg by mouth daily        atenolol 50 MG tablet    TENORMIN    90 tablet    TAKE 1 TABLET (50 MG) BY MOUTH DAILY    Palpitations       atorvastatin 40 MG tablet    LIPITOR    90 tablet    TAKE 1 TABLET (40 MG) BY MOUTH DAILY    Left temporal lobe infarction (H)       CALCIUM CITRATE + D PO      Take 2 tablets by mouth every morning        CLARITIN 10 MG tablet   Generic drug:  loratadine      Take 10 mg by mouth daily        clopidogrel 75 MG tablet    PLAVIX    90 tablet    Take 1 tablet (75 mg) by mouth daily Take 75 mg by mouth daily    Atrial fibrillation (H)       Cyanocobalamin 2500 MCG Tabs      Take 2,500 mcg by mouth twice a week Mon, Thur        Ferrous Sulfate 27 MG Tabs      Take 27 mg by mouth 2 times daily        FIBER COMPLETE PO      Take 1 capsule by mouth daily        hydrochlorothiazide 25 MG tablet    HYDRODIURIL    45 tablet    Take 0.5 tablets (12.5 mg) by mouth daily    Essential hypertension with goal blood pressure less than 140/90       imipramine 25 MG tablet    TOFRANIL    100 tablet    TAKE 1 TABLET (25 MG) BY MOUTH DAILY    Bladder dysfunction       LOSARTAN POTASSIUM PO      Take 50 mg by mouth  daily (0.5 x 100 mg tablet = 50 mg dose)        topiramate 25 MG tablet    TOPAMAX    180 tablet    Take 1 tablet (25 mg) by mouth 2 times daily Prescribed by Dr Tello    Migraine       UNABLE TO FIND      CPAP machine every night        valACYclovir 1000 mg tablet    VALTREX    4 tablet    Take 2 tablets (2,000 mg) by mouth 2 times daily as needed    Cold sore       VITAMIN C PO      Take 250 mg by mouth 2 times daily (0.5 x 500 mg tablet = 250 mg dose)        VITAMIN D (CHOLECALCIFEROL) PO      Take 500 Units by mouth daily        ZADITOR 0.025 % Soln ophthalmic solution   Generic drug:  ketotifen     1 Bottle    Place 1 drop into both eyes every 12 hours as needed for itching

## 2018-01-11 NOTE — PATIENT INSTRUCTIONS
-Stop Plavix    Increase Aspirin to 325mg daily when you stop Plavix    Echocardiogram 4-6 months after stopping Plavix and see me back  Schedule at [facility]

## 2018-01-11 NOTE — PROGRESS NOTES
History of Present Illness:    Jose Coronado is a 62 year old female followed here by Dr. Church.  She returns today to follow-up on a cardiac MRI.  She was on an airplane ride home from the [place] at the beginning of this year.  Several days after that flight she was found to be disoriented with a sleep disturbance.  She was assessed and was found to have a stroke.  Hypercoagulable panel was unremarkable.  She was placed on warfarin initially as it was felt that this could have been paroxysmal atrial fibrillation as she has a history of palpitations on atenolol therapy.  As it turns out it was paroxysmal atrial tachycardia.  During the workup she was found to have an incidental cerebral aneurysm which was coiled by Dr. Grider.    She has a mildly dilated aorta at 39 mm.  She has a history of hypertension.  She has been in weight watchers and has now lost a total of about 60 pounds over the past year according to her reports.      She underwent percutaneous closure of her patent foramen ovale on July 13, 2017 using an Amplatzer septal occluder device.  Her three-month echo with bubble study showed no ongoing shunt however her ejection fraction was low normal at 50-55% with no history of coronary artery disease in the past    Based on this the patient was sent for an MRI stress test.    Cardiac MRI:  Left ventricular function is 61%, right ventricular function 56%.  Both atria are normal in size.  There is no evidence of intracardiac shunting no evidence of ischemia fibrosis or infiltrative disease.  She has trace tricuspid and pulmonic insufficiency.    She will be completing her 6 month of Plavix post PFO closure shortly.  She plans to discontinue this medication and increase her aspirin to 325 mg daily    With her ongoing weight loss efforts she has been working with her primary care physician to reduce some of her antihypertensives.  She does have mild orthostatic symptoms when she goes from sitting  to standing.  The plan is to eliminate the hydrochlorothiazide next if her blood pressure allows.    She is planning on her first airplane ride soon.  We talked at length about wearing knee-high support hose and walking around the aircraft during flight.    She inadvertently was taking lower dose of atenolol recently due to the pharmacy filling it with different strength tablets and not telling her.  She noted that her palpitations started again during that time.  These have improved back on her higher dose of atenolol.    She has no cardiac complaints today.  She is overall doing very well.  Exam is unremarkable.        Impression/Plan:     1.  Patent foramen ovale status post closure.  -Stop Plavix during the next week  -Creased aspirin to 325 mg daily  -No further need for SBE prophylaxis  -Echocardiogram in 4-6 months of Plavix to look for any thrombus formation  -Follow-up visit with me at that time    2.  Mild cardiomyopathy on echocardiogram  -Ejection fraction on MRI normal with no evidence of ischemia    3.  History of CVA  -Continue risk factor modification  -Wear support hose on upcoming flight  -Websites have been given to the patient to purchase support hose with 15-20 mmHg of pressure    4.  Dyslipidemia  -Treated to goal continue Lipitor    5.  Hypertension  -Controlled  -Wean meds through primary care as weight loss efforts continue    6. 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    She will return to see me following her next echocardiogram after Plavix has been discontinued for a 4 to six-month timeframe.    Greater than 50% of this 30 minute visit was spent in counseling    Joanie Soriano, MSN, APRN-BC, CNP  Cardiology    Orders Placed This Encounter   Procedures     Follow-Up with Cardiac Advanced Practice Provider     Echocardiogram     Orders Placed This Encounter   Medications      loratadine (CLARITIN) 10 MG tablet     Sig: Take 10 mg by mouth daily     Medications Discontinued During This Encounter   Medication Reason     cetirizine (ZYRTEC) 10 MG tablet Alternate therapy     Probiotic Product (PROBIOTIC PO) Stopped by Patient     nitroFURantoin, macrocrystal-monohydrate, (MACROBID) 100 MG capsule Therapy completed         Encounter Diagnoses   Name Primary?     PFO (patent foramen ovale)      Cardiomyopathy, unspecified type (H)        CURRENT MEDICATIONS:  Current Outpatient Prescriptions   Medication Sig Dispense Refill     loratadine (CLARITIN) 10 MG tablet Take 10 mg by mouth daily       imipramine (TOFRANIL) 25 MG tablet TAKE 1 TABLET (25 MG) BY MOUTH DAILY 100 tablet 0     hydrochlorothiazide (HYDRODIURIL) 25 MG tablet Take 0.5 tablets (12.5 mg) by mouth daily 45 tablet 3     atenolol (TENORMIN) 50 MG tablet TAKE 1 TABLET (50 MG) BY MOUTH DAILY 90 tablet 2     atorvastatin (LIPITOR) 40 MG tablet TAKE 1 TABLET (40 MG) BY MOUTH DAILY 90 tablet 2     valACYclovir (VALTREX) 1000 mg tablet Take 2 tablets (2,000 mg) by mouth 2 times daily as needed 4 tablet 5     clopidogrel (PLAVIX) 75 MG tablet Take 1 tablet (75 mg) by mouth daily Take 75 mg by mouth daily 90 tablet 3     acetaminophen (TYLENOL) 325 MG tablet Take 2 tablets (650 mg) by mouth every 4 hours as needed for mild pain 100 tablet      LOSARTAN POTASSIUM PO Take 50 mg by mouth daily (0.5 x 100 mg tablet = 50 mg dose)       FIBER COMPLETE PO Take 1 capsule by mouth daily        Ascorbic Acid (VITAMIN C PO) Take 250 mg by mouth 2 times daily (0.5 x 500 mg tablet = 250 mg dose)       VITAMIN D, CHOLECALCIFEROL, PO Take 500 Units by mouth daily        aspirin 81 MG tablet Take 81 mg by mouth daily  30 tablet      topiramate (TOPAMAX) 25 MG tablet Take 1 tablet (25 mg) by mouth 2 times daily Prescribed by Dr Tello 180 tablet 3     Calcium Citrate-Vitamin D (CALCIUM CITRATE + D PO) Take 2 tablets by mouth every morning        Ferrous  Sulfate 27 MG TABS Take 27 mg by mouth 2 times daily       Cyanocobalamin 2500 MCG TABS Take 2,500 mcg by mouth twice a week Mon, Thur       ketotifen (ZADITOR) 0.025 % SOLN Place 1 drop into both eyes every 12 hours as needed for itching 1 Bottle      UNABLE TO FIND CPAP machine every night         ALLERGIES     Allergies   Allergen Reactions     Contrast Dye Itching     Reaction of immediate burning and severe itching in Right ear after injection for CT.      Sulfa Drugs      hives       PAST MEDICAL HISTORY:  Past Medical History:   Diagnosis Date     Anemia      CVA (cerebral vascular accident) (H) 2017    ?migraine, ?pfo--negative vasc w/u, neg hypercoag w/u     Diffuse cystic mastopathy     Fibrocystic breast disease     HTN, goal below 140/90      Hyperparathyroidism (H)      Infectious mononucleosis     Mono at age 16     Irregular heart beat     PAT no afib on 30day monitor     Labyrinthitis, unspecified      Migraine headache with aura      Osteopenia      Pain in joint, shoulder region     Secondary to a fall     PFO (patent foramen ovale)     s/p closure with amplazter device 7/13/17     S/P gastric bypass June, 2010     Sleep apnea     she is on CPAP     Vitamin D deficiencies        PAST SURGICAL HISTORY:  Past Surgical History:   Procedure Laterality Date     C ANEURYSM, INTRACRAN, SIMPLE SURG  04/2017    coil of aneurysm right posterior paraophthalmic artery     C NONSPECIFIC PROCEDURE      S/P multiple breast biopies - all negative / benign     C NONSPECIFIC PROCEDURE      S/P T&A     C NONSPECIFIC PROCEDURE      [place] teeth extraction     C NONSPECIFIC PROCEDURE      S/P (? unreadable) ankle     COLONOSCOPY N/A 8/12/2015    Procedure: COLONOSCOPY;  Surgeon: Deandre Brooks MD;  Location:  GI     GASTRIC BYPASS  June 24, 2010     ORTHOPEDIC SURGERY Left 2010    wrist fracture     PARATHYROIDECTOMY  9/19/11       FAMILY HISTORY:  Family History   Problem Relation Age of Onset     Breast  "Cancer Mother      Hypertension Mother      Arthritis Mother      Thyroid Disease Mother      hypo     Breast Cancer Maternal Aunt      Hypertension Paternal Grandmother      CEREBROVASCULAR DISEASE Maternal Grandmother      Family History Negative Maternal Grandfather      Family History Negative Paternal Grandfather      Family History Negative Son      Family History Negative Daughter      Family History Negative Daughter      Colon Cancer No family hx of        SOCIAL HISTORY:  Social History     Social History     Marital status: Single     Spouse name: N/A     Number of children: N/A     Years of education: N/A     Social History Main Topics     Smoking status: Never Smoker     Smokeless tobacco: Never Used     Alcohol use 0.0 oz/week     0 Standard drinks or equivalent per week      Comment: 1 glass wine 4-5 days a week     Drug use: No     Sexual activity: Not Currently     Partners: Male     Other Topics Concern     None     Social History Narrative       Review of Systems:  Skin:  Negative     Eyes:  Positive for contacts;glasses  ENT:  Negative    Respiratory:  Positive for sleep apnea;CPAP  Cardiovascular:    Positive for;lightheadedness (occasional with changing positions)  Gastroenterology: Negative    Genitourinary:  not assessed    Musculoskeletal:  Negative    Neurologic:  Negative    Psychiatric:  Negative    Heme/Lymph/Imm:  Positive for allergies  Endocrine:  Negative      Physical Exam:  Vitals: /55  Pulse 66  Ht 1.676 m (5' 6\")  Wt 87.1 kg (192 lb)  BMI 30.99 kg/m2    Constitutional:  cooperative, alert and oriented, well developed, well nourished, in no acute distress        Skin:  warm and dry to the touch, no apparent skin lesions or masses noted   scattered ecchymosis    Head:  normocephalic        Eyes:  pupils equal and round        ENT:  no pallor or cyanosis        Neck:  JVP normal        Chest:  normal breath sounds, clear to auscultation, normal A-P diameter, normal " symmetry, normal respiratory excursion, no use of accessory muscles        Cardiac: regular rhythm, normal S1/S2, no S3 or S4, apical impulse not displaced, no murmurs, gallops or rubs                  Abdomen:  abdomen soft obese      Vascular: pulses full and equal                                 rigth groin with moderate hemtoma  2cm by 6cm    Extremities and Back:  no deformities, clubbing, cyanosis, erythema observed;no edema   healed mirella bite right wrist    Neurological:             Recent Lab Results:  LIPID RESULTS:  Lab Results   Component Value Date    CHOL 116 07/27/2017    HDL 60 07/27/2017    LDL 45 07/27/2017    TRIG 55 07/27/2017    CHOLHDLRATIO 2.9 07/29/2015       LIVER ENZYME RESULTS:  Lab Results   Component Value Date    AST 23 02/10/2017    ALT 20 02/10/2017       CBC RESULTS:  Lab Results   Component Value Date    WBC 7.7 07/13/2017    RBC 4.43 07/13/2017    HGB 13.6 07/13/2017    HCT 39.0 07/13/2017    MCV 88 07/13/2017    MCH 30.7 07/13/2017    MCHC 34.9 07/13/2017    RDW 13.4 07/13/2017     07/13/2017       BMP RESULTS:  Lab Results   Component Value Date     07/13/2017    POTASSIUM 3.5 07/13/2017    CHLORIDE 101 07/13/2017    CO2 28 07/13/2017    ANIONGAP 7 07/13/2017    GLC 84 04/17/2017    BUN 16 04/17/2017    CR 0.74 07/13/2017    GFRESTIMATED 85 01/08/2018    GFRESTBLACK >90 01/08/2018    MAXIMILIANO 9.6 04/17/2017        A1C RESULTS:  Lab Results   Component Value Date    A1C 5.7 06/17/2010       INR RESULTS:  Lab Results   Component Value Date    INR 0.93 07/13/2017    INR 1.07 04/10/2017           CC  Luis Church MD  [id number]  [place] [id number]

## 2018-01-11 NOTE — LETTER
1/11/2018    Lian Martinez MD  [address] [facility] [address]  [facility] [address]    RE: Jose Coronado       Dear Colleague,    I had the pleasure of seeing Jose Coronado in the [facility].    History of Present Illness:    Jose Cornoado is a 62 year old female followed here by Dr. Church.  She returns today to follow-up on a cardiac MRI.  She was on an airplane ride home from the [place] at the beginning of this year.  Several days after that flight she was found to be disoriented with a sleep disturbance.  She was assessed and was found to have a stroke.  Hypercoagulable panel was unremarkable.  She was placed on warfarin initially as it was felt that this could have been paroxysmal atrial fibrillation as she has a history of palpitations on atenolol therapy.  As it turns out it was paroxysmal atrial tachycardia.  During the workup she was found to have an incidental cerebral aneurysm which was coiled by Dr. Grider.    She has a mildly dilated aorta at 39 mm.  She has a history of hypertension.  She has been in weight watchers and has now lost a total of about 60 pounds over the past year according to her reports.      She underwent percutaneous closure of her patent foramen ovale on July 13, 2017 using an Amplatzer septal occluder device.  Her three-month echo with bubble study showed no ongoing shunt however her ejection fraction was low normal at 50-55% with no history of coronary artery disease in the past    Based on this the patient was sent for an MRI stress test.    Cardiac MRI:  Left ventricular function is 61%, right ventricular function 56%.  Both atria are normal in size.  There is no evidence of intracardiac shunting no evidence of ischemia fibrosis or infiltrative disease.  She has trace tricuspid and pulmonic insufficiency.    She will be completing her 6 month of Plavix post PFO closure shortly.  She plans to discontinue this medication and increase  her aspirin to 325 mg daily    With her ongoing weight loss efforts she has been working with her primary care physician to reduce some of her antihypertensives.  She does have mild orthostatic symptoms when she goes from sitting to standing.  The plan is to eliminate the hydrochlorothiazide next if her blood pressure allows.    She is planning on her first airplane ride soon.  We talked at length about wearing knee-high support hose and walking around the aircraft during flight.    She inadvertently was taking lower dose of atenolol recently due to the pharmacy filling it with different strength tablets and not telling her.  She noted that her palpitations started again during that time.  These have improved back on her higher dose of atenolol.    She has no cardiac complaints today.  She is overall doing very well.  Exam is unremarkable.        Impression/Plan:     1.  Patent foramen ovale status post closure.  -Stop Plavix during the next week  -Creased aspirin to 325 mg daily  -No further need for SBE prophylaxis  -Echocardiogram in 4-6 months of Plavix to look for any thrombus formation  -Follow-up visit with me at that time    2.  Mild cardiomyopathy on echocardiogram  -Ejection fraction on MRI normal with no evidence of ischemia    3.  History of CVA  -Continue risk factor modification  -Wear support hose on upcoming flight  -Websites have been given to the patient to purchase support hose with 15-20 mmHg of pressure    4.  Dyslipidemia  -Treated to goal continue Lipitor    5.  Hypertension  -Controlled  -Wean meds through primary care as weight loss efforts continue    6. 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    She will return to see me following her next echocardiogram after Plavix has been discontinued for a 4 to six-month timeframe.    Greater than 50% of this 30 minute visit was spent in  counseling    Joanie Soriano, MSN, APRN-BC, CNP  Cardiology    Orders Placed This Encounter   Procedures     Follow-Up with Cardiac Advanced Practice Provider     Echocardiogram     Orders Placed This Encounter   Medications     loratadine (CLARITIN) 10 MG tablet     Sig: Take 10 mg by mouth daily     Medications Discontinued During This Encounter   Medication Reason     cetirizine (ZYRTEC) 10 MG tablet Alternate therapy     Probiotic Product (PROBIOTIC PO) Stopped by Patient     nitroFURantoin, macrocrystal-monohydrate, (MACROBID) 100 MG capsule Therapy completed         Encounter Diagnoses   Name Primary?     PFO (patent foramen ovale)      Cardiomyopathy, unspecified type (H)        CURRENT MEDICATIONS:  Current Outpatient Prescriptions   Medication Sig Dispense Refill     loratadine (CLARITIN) 10 MG tablet Take 10 mg by mouth daily       imipramine (TOFRANIL) 25 MG tablet TAKE 1 TABLET (25 MG) BY MOUTH DAILY 100 tablet 0     hydrochlorothiazide (HYDRODIURIL) 25 MG tablet Take 0.5 tablets (12.5 mg) by mouth daily 45 tablet 3     atenolol (TENORMIN) 50 MG tablet TAKE 1 TABLET (50 MG) BY MOUTH DAILY 90 tablet 2     atorvastatin (LIPITOR) 40 MG tablet TAKE 1 TABLET (40 MG) BY MOUTH DAILY 90 tablet 2     valACYclovir (VALTREX) 1000 mg tablet Take 2 tablets (2,000 mg) by mouth 2 times daily as needed 4 tablet 5     clopidogrel (PLAVIX) 75 MG tablet Take 1 tablet (75 mg) by mouth daily Take 75 mg by mouth daily 90 tablet 3     acetaminophen (TYLENOL) 325 MG tablet Take 2 tablets (650 mg) by mouth every 4 hours as needed for mild pain 100 tablet      LOSARTAN POTASSIUM PO Take 50 mg by mouth daily (0.5 x 100 mg tablet = 50 mg dose)       FIBER COMPLETE PO Take 1 capsule by mouth daily        Ascorbic Acid (VITAMIN C PO) Take 250 mg by mouth 2 times daily (0.5 x 500 mg tablet = 250 mg dose)       VITAMIN D, CHOLECALCIFEROL, PO Take 500 Units by mouth daily        aspirin 81 MG tablet Take 81 mg by mouth daily  30  tablet      topiramate (TOPAMAX) 25 MG tablet Take 1 tablet (25 mg) by mouth 2 times daily Prescribed by Dr Tello 180 tablet 3     Calcium Citrate-Vitamin D (CALCIUM CITRATE + D PO) Take 2 tablets by mouth every morning        Ferrous Sulfate 27 MG TABS Take 27 mg by mouth 2 times daily       Cyanocobalamin 2500 MCG TABS Take 2,500 mcg by mouth twice a week Mon, Thur       ketotifen (ZADITOR) 0.025 % SOLN Place 1 drop into both eyes every 12 hours as needed for itching 1 Bottle      UNABLE TO FIND CPAP machine every night         ALLERGIES     Allergies   Allergen Reactions     Contrast Dye Itching     Reaction of immediate burning and severe itching in Right ear after injection for CT.      Sulfa Drugs      hives       PAST MEDICAL HISTORY:  Past Medical History:   Diagnosis Date     Anemia      CVA (cerebral vascular accident) (H) 2017    ?migraine, ?pfo--negative vasc w/u, neg hypercoag w/u     Diffuse cystic mastopathy     Fibrocystic breast disease     HTN, goal below 140/90      Hyperparathyroidism (H)      Infectious mononucleosis     Mono at age 16     Irregular heart beat     PAT no afib on 30day monitor     Labyrinthitis, unspecified      Migraine headache with aura      Osteopenia      Pain in joint, shoulder region     Secondary to a fall     PFO (patent foramen ovale)     s/p closure with amplazter device 7/13/17     S/P gastric bypass June, 2010     Sleep apnea     she is on CPAP     Vitamin D deficiencies        PAST SURGICAL HISTORY:  Past Surgical History:   Procedure Laterality Date     C ANEURYSM, INTRACRAN, SIMPLE SURG  04/2017    coil of aneurysm right posterior paraophthalmic artery     C NONSPECIFIC PROCEDURE      S/P multiple breast biopies - all negative / benign     C NONSPECIFIC PROCEDURE      S/P T&A     C NONSPECIFIC PROCEDURE      Roann teeth extraction     C NONSPECIFIC PROCEDURE      S/P (? unreadable) ankle     COLONOSCOPY N/A 8/12/2015    Procedure: COLONOSCOPY;  Surgeon: Todd  "Deandre Nunez MD;  Location: [facility]     GASTRIC BYPASS  June 24, 2010     ORTHOPEDIC SURGERY Left 2010    wrist fracture     PARATHYROIDECTOMY  9/19/11       FAMILY HISTORY:  Family History   Problem Relation Age of Onset     Breast Cancer Mother      Hypertension Mother      Arthritis Mother      Thyroid Disease Mother      hypo     Breast Cancer Maternal Aunt      Hypertension Paternal Grandmother      CEREBROVASCULAR DISEASE Maternal Grandmother      Family History Negative Maternal Grandfather      Family History Negative Paternal Grandfather      Family History Negative Son      Family History Negative Daughter      Family History Negative Daughter      Colon Cancer No family hx of        SOCIAL HISTORY:  Social History     Social History     Marital status: Single     Spouse name: N/A     Number of children: N/A     Years of education: N/A     Social History Main Topics     Smoking status: Never Smoker     Smokeless tobacco: Never Used     Alcohol use 0.0 oz/week     0 Standard drinks or equivalent per week      Comment: 1 glass wine 4-5 days a week     Drug use: No     Sexual activity: Not Currently     Partners: Male     Other Topics Concern     None     Social History Narrative       Review of Systems:  Skin:  Negative     Eyes:  Positive for contacts;glasses  ENT:  Negative    Respiratory:  Positive for sleep apnea;CPAP  Cardiovascular:    Positive for;lightheadedness (occasional with changing positions)  Gastroenterology: Negative    Genitourinary:  not assessed    Musculoskeletal:  Negative    Neurologic:  Negative    Psychiatric:  Negative    Heme/Lymph/Imm:  Positive for allergies  Endocrine:  Negative      Physical Exam:  Vitals: /55  Pulse 66  Ht 1.676 m (5' 6\")  Wt 87.1 kg (192 lb)  BMI 30.99 kg/m2    Constitutional:  cooperative, alert and oriented, well developed, well nourished, in no acute distress        Skin:  warm and dry to the touch, no apparent skin lesions or masses noted   " scattered ecchymosis    Head:  normocephalic        Eyes:  pupils equal and round        ENT:  no pallor or cyanosis        Neck:  JVP normal        Chest:  normal breath sounds, clear to auscultation, normal A-P diameter, normal symmetry, normal respiratory excursion, no use of accessory muscles        Cardiac: regular rhythm, normal S1/S2, no S3 or S4, apical impulse not displaced, no murmurs, gallops or rubs                  Abdomen:  abdomen soft obese      Vascular: pulses full and equal                                 rigth groin with moderate hemtoma  2cm by 6cm    Extremities and Back:  no deformities, clubbing, cyanosis, erythema observed;no edema   healed mirella bite right wrist    Neurological:             Recent Lab Results:  LIPID RESULTS:  Lab Results   Component Value Date    CHOL 116 07/27/2017    HDL 60 07/27/2017    LDL 45 07/27/2017    TRIG 55 07/27/2017    CHOLHDLRATIO 2.9 07/29/2015       LIVER ENZYME RESULTS:  Lab Results   Component Value Date    AST 23 02/10/2017    ALT 20 02/10/2017       CBC RESULTS:  Lab Results   Component Value Date    WBC 7.7 07/13/2017    RBC 4.43 07/13/2017    HGB 13.6 07/13/2017    HCT 39.0 07/13/2017    MCV 88 07/13/2017    MCH 30.7 07/13/2017    MCHC 34.9 07/13/2017    RDW 13.4 07/13/2017     07/13/2017       BMP RESULTS:  Lab Results   Component Value Date     07/13/2017    POTASSIUM 3.5 07/13/2017    CHLORIDE 101 07/13/2017    CO2 28 07/13/2017    ANIONGAP 7 07/13/2017    GLC 84 04/17/2017    BUN 16 04/17/2017    CR 0.74 07/13/2017    GFRESTIMATED 85 01/08/2018    GFRESTBLACK >90 01/08/2018    MAXIMILIANO 9.6 04/17/2017        A1C RESULTS:  Lab Results   Component Value Date    A1C 5.7 06/17/2010       INR RESULTS:  Lab Results   Component Value Date    INR 0.93 07/13/2017    INR 1.07 04/10/2017     Thank you for allowing me to participate in the care of your patient.    Sincerely,     RON Bolton CNP     [facility]  Care

## 2018-03-17 ENCOUNTER — MYC MEDICAL ADVICE (OUTPATIENT)
Dept: INTERNAL MEDICINE | Facility: CLINIC | Age: 63
End: 2018-03-17

## 2018-03-17 DIAGNOSIS — S61.451A CAT BITE OF HAND, RIGHT, INITIAL ENCOUNTER: ICD-10-CM
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
## 2018-03-19 NOTE — TELEPHONE ENCOUNTER
Pt asking for refill of losartan.  This med last listed as pt reported so I am unable to refill.  Pt due for hypertension follow up 4/18/18, last BP on 10/18/17 was 128/78.    Are you willing to refill until Martinez is back end of May or do 1 month only and have her come in earlier to see you or another provider?

## 2018-03-20 ENCOUNTER — MYC MEDICAL ADVICE (OUTPATIENT)
Dept: INTERNAL MEDICINE | Facility: CLINIC | Age: 63
End: 2018-03-20

## 2018-03-20 DIAGNOSIS — I10 ESSENTIAL HYPERTENSION WITH GOAL BLOOD PRESSURE LESS THAN 140/90: Chronic | ICD-10-CM

## 2018-03-20 RX ORDER — LOSARTAN POTASSIUM 100 MG/1
50 TABLET ORAL DAILY
Qty: 45 TABLET | Refills: 1 | Status: SHIPPED | OUTPATIENT
Start: 2018-03-20 | End: 2018-04-16

## 2018-03-21 RX ORDER — LOSARTAN POTASSIUM 100 MG/1
TABLET ORAL
Qty: 90 TABLET | Refills: 3 | OUTPATIENT
Start: 2018-03-21

## 2018-03-23 ENCOUNTER — TELEPHONE (OUTPATIENT)
Dept: CARDIOLOGY | Facility: CLINIC | Age: 63
End: 2018-03-23

## 2018-03-23 NOTE — TELEPHONE ENCOUNTER
Patient called advising that she received a letter that she needed an ECHO completed in June and was wondering the rationale. RN called patient and advised her that per KHURRAM Joanie Soriano's OV note on 1/11/18:    1.  Patent foramen ovale status post closure.  -Stop Plavix during the next week  -Creased aspirin to 325 mg daily  -No further need for SBE prophylaxis  -Echocardiogram in 4-6 months of Plavix to look for any thrombus formation  -Follow-up visit with me at that time      Patient acknowledged understanding and agreed with plan.

## 2018-04-16 ENCOUNTER — OFFICE VISIT (OUTPATIENT)
Dept: INTERNAL MEDICINE | Facility: CLINIC | Age: 63
End: 2018-04-16
Payer: COMMERCIAL

## 2018-04-16 VITALS
OXYGEN SATURATION: 98 % | WEIGHT: 188 LBS | DIASTOLIC BLOOD PRESSURE: 70 MMHG | HEART RATE: 73 BPM | RESPIRATION RATE: 16 BRPM | BODY MASS INDEX: 30.22 KG/M2 | SYSTOLIC BLOOD PRESSURE: 132 MMHG | TEMPERATURE: 97.9 F | HEIGHT: 66 IN

## 2018-04-16 DIAGNOSIS — Z86.73 HISTORY OF CVA (CEREBROVASCULAR ACCIDENT): ICD-10-CM

## 2018-04-16 DIAGNOSIS — R00.2 PALPITATIONS: ICD-10-CM

## 2018-04-16 DIAGNOSIS — I10 ESSENTIAL HYPERTENSION WITH GOAL BLOOD PRESSURE LESS THAN 140/90: Primary | ICD-10-CM

## 2018-04-16 DIAGNOSIS — Z98.84 S/P GASTRIC BYPASS: ICD-10-CM

## 2018-04-16 DIAGNOSIS — I48.0 PAROXYSMAL ATRIAL FIBRILLATION (H): ICD-10-CM

## 2018-04-16 DIAGNOSIS — M65.30 TRIGGER FINGER, ACQUIRED: ICD-10-CM

## 2018-04-16 LAB
CHOLEST SERPL-MCNC: 118 MG/DL
ERYTHROCYTE [DISTWIDTH] IN BLOOD BY AUTOMATED COUNT: 14.1 % (ref 10–15)
FOLATE SERPL-MCNC: 17.6 NG/ML
HCT VFR BLD AUTO: 40.6 % (ref 35–47)
HDLC SERPL-MCNC: 66 MG/DL
HGB BLD-MCNC: 13.1 G/DL (ref 11.7–15.7)
IRON SATN MFR SERPL: 34 % (ref 15–46)
IRON SERPL-MCNC: 98 UG/DL (ref 35–180)
LDLC SERPL CALC-MCNC: 43 MG/DL
MCH RBC QN AUTO: 29.4 PG (ref 26.5–33)
MCHC RBC AUTO-ENTMCNC: 32.3 G/DL (ref 31.5–36.5)
MCV RBC AUTO: 91 FL (ref 78–100)
NONHDLC SERPL-MCNC: 52 MG/DL
PLATELET # BLD AUTO: 274 10E9/L (ref 150–450)
PTH-INTACT SERPL-MCNC: 40 PG/ML (ref 18–80)
RBC # BLD AUTO: 4.45 10E12/L (ref 3.8–5.2)
TIBC SERPL-MCNC: 289 UG/DL (ref 240–430)
TRIGL SERPL-MCNC: 46 MG/DL
VIT B12 SERPL-MCNC: 1954 PG/ML (ref 193–986)
WBC # BLD AUTO: 6.6 10E9/L (ref 4–11)

## 2018-04-16 PROCEDURE — 84630 ASSAY OF ZINC: CPT | Mod: 90 | Performed by: INTERNAL MEDICINE

## 2018-04-16 PROCEDURE — 82306 VITAMIN D 25 HYDROXY: CPT | Performed by: INTERNAL MEDICINE

## 2018-04-16 PROCEDURE — 82043 UR ALBUMIN QUANTITATIVE: CPT | Performed by: INTERNAL MEDICINE

## 2018-04-16 PROCEDURE — 82728 ASSAY OF FERRITIN: CPT | Performed by: INTERNAL MEDICINE

## 2018-04-16 PROCEDURE — 82525 ASSAY OF COPPER: CPT | Mod: 90 | Performed by: INTERNAL MEDICINE

## 2018-04-16 PROCEDURE — 85027 COMPLETE CBC AUTOMATED: CPT | Performed by: INTERNAL MEDICINE

## 2018-04-16 PROCEDURE — 82607 VITAMIN B-12: CPT | Performed by: INTERNAL MEDICINE

## 2018-04-16 PROCEDURE — 99214 OFFICE O/P EST MOD 30 MIN: CPT | Performed by: INTERNAL MEDICINE

## 2018-04-16 PROCEDURE — 84443 ASSAY THYROID STIM HORMONE: CPT | Performed by: INTERNAL MEDICINE

## 2018-04-16 PROCEDURE — 80061 LIPID PANEL: CPT | Performed by: INTERNAL MEDICINE

## 2018-04-16 PROCEDURE — 83550 IRON BINDING TEST: CPT | Performed by: INTERNAL MEDICINE

## 2018-04-16 PROCEDURE — 36415 COLL VENOUS BLD VENIPUNCTURE: CPT | Performed by: INTERNAL MEDICINE

## 2018-04-16 PROCEDURE — 99000 SPECIMEN HANDLING OFFICE-LAB: CPT | Performed by: INTERNAL MEDICINE

## 2018-04-16 PROCEDURE — 83540 ASSAY OF IRON: CPT | Performed by: INTERNAL MEDICINE

## 2018-04-16 PROCEDURE — 83970 ASSAY OF PARATHORMONE: CPT | Performed by: INTERNAL MEDICINE

## 2018-04-16 PROCEDURE — 84425 ASSAY OF VITAMIN B-1: CPT | Mod: 90 | Performed by: INTERNAL MEDICINE

## 2018-04-16 PROCEDURE — 82746 ASSAY OF FOLIC ACID SERUM: CPT | Performed by: INTERNAL MEDICINE

## 2018-04-16 RX ORDER — ATENOLOL 50 MG/1
TABLET ORAL
Qty: 90 TABLET | Refills: 1 | Status: SHIPPED | OUTPATIENT
Start: 2018-04-16 | End: 2018-07-12

## 2018-04-16 RX ORDER — LOSARTAN POTASSIUM 50 MG/1
50 TABLET ORAL DAILY
Qty: 90 TABLET | Refills: 1 | Status: SHIPPED | OUTPATIENT
Start: 2018-04-16 | End: 2018-07-12

## 2018-04-16 RX ORDER — HYDROCHLOROTHIAZIDE 12.5 MG/1
12.5 TABLET ORAL DAILY
Qty: 90 TABLET | Refills: 1 | Status: SHIPPED | OUTPATIENT
Start: 2018-04-16 | End: 2018-07-12

## 2018-04-16 RX ORDER — ASPIRIN 325 MG
325 TABLET ORAL EVERY MORNING
COMMUNITY

## 2018-04-16 NOTE — NURSING NOTE
"Chief Complaint   Patient presents with     Hypertension       Initial /70  Pulse 73  Temp 97.9  F (36.6  C) (Oral)  Resp 16  Ht 5' 5.5\" (1.664 m)  Wt 188 lb (85.3 kg)  SpO2 98%  BMI 30.81 kg/m2 Estimated body mass index is 30.81 kg/(m^2) as calculated from the following:    Height as of this encounter: 5' 5.5\" (1.664 m).    Weight as of this encounter: 188 lb (85.3 kg).  Medication Reconciliation: complete    "

## 2018-04-16 NOTE — MR AVS SNAPSHOT
After Visit Summary   4/16/2018    Jose Coronado    MRN: [id number]           Patient Information     Date Of Birth          1955        Visit Information        Provider Department      4/16/2018 9:00 AM Mary Mathews MD [facility]        Today's Diagnoses     Essential hypertension with goal blood pressure less than 140/90    -  1    Palpitations        S/P gastric bypass        Paroxysmal atrial fibrillation (H)        Trigger finger, acquired          Care Instructions    Plan:  1. Labs today  2. Continue same meds, same doses for now   3. Hand surgeon referral          Follow-ups after your visit        Additional Services     ORTHO  REFERRAL       [facility] is referring you to the Orthopedic  Services at [facility].       The  Representative will assist you in the coordination of your Orthopedic and Musculoskeletal Care as prescribed by your physician.    The  Representative will call you within 1 business day to help schedule your appointment, or you may contact the  Representative at:    All areas ~ [phone]     Type of Referral : dr Villarreal has seen the patient in the past for the same finger       Timeframe requested: Routine    Coverage of these services is subject to the terms and limitations of your health insurance plan.  Please call member services at your health plan with any benefit or coverage questions.      If X-rays, CT or MRI's have been performed, please contact the facility where they were done to arrange for , prior to your scheduled appointment.  Please bring this referral request to your appointment and present it to your specialist.                  Your next 10 appointments already scheduled     Jun 12, 2018 12:45 PM CDT   Ech Complete with [id number]   [facility] CV Echocardiography (Cardiovascular Imaging at [facility]  [facility])    [address]  [address]  [place] [id number] [phone]           1. Please bring or wear a comfortable two-piece outfit. 2. You may eat, drink and take your normal medicines. 3. For any questions that cannot be answered, please contact the ordering physician            Jun 12, 2018  3:10 PM CDT   Return Visit with RON Bolton CNP   [facility] ([facility])    [address]  [place] [id number] [phone] OPT 2              Who to contact     If you have questions or need follow up information about today's clinic visit or your schedule please contact [facility] directly at [phone].  Normal or non-critical lab and imaging results will be communicated to you by MyChart, letter or phone within 4 business days after the clinic has received the results. If you do not hear from us within 7 days, please contact the clinic through [employer] or phone. If you have a critical or abnormal lab result, we will notify you by phone as soon as possible.  Submit refill requests through Mozy or call your pharmacy and they will forward the refill request to us. Please allow 3 business days for your refill to be completed.          Additional Information About Your Visit        Mozy Information     Mozy gives you secure access to your electronic health record. If you see a primary care provider, you can also send messages to your care team and make appointments. If you have questions, please call your primary care clinic.  If you do not have a primary care provider, please call [phone] and they will assist you.        Care EveryWhere ID     This is your Care EveryWhere ID. This could be used by other organizations to access your [place] medical records  [id number]        Your Vitals Were     Pulse Temperature Respirations Height Pulse Oximetry BMI (Body Mass Index)    73 97.9  F (36.6  C)  "(Oral) 16 5' 5.5\" (1.664 m) 98% 30.81 kg/m2       Blood Pressure from Last 3 Encounters:   04/16/18 132/70   01/11/18 102/55   01/08/18 103/61    Weight from Last 3 Encounters:   04/16/18 188 lb (85.3 kg)   01/11/18 192 lb (87.1 kg)   11/09/17 192 lb 14.4 oz (87.5 kg)              We Performed the Following     Albumin Random Urine Quantitative with Creat Ratio     CBC with platelets     Copper level     Ferritin     Folate     Iron and iron binding capacity     Lipid panel reflex to direct LDL Fasting     ORTHO  REFERRAL     Parathyroid Hormone Intact     TSH with free T4 reflex     Vitamin B1 whole blood     Vitamin B12     Vitamin D Deficiency     Zinc          Today's Medication Changes          These changes are accurate as of 4/16/18  9:40 AM.  If you have any questions, ask your nurse or doctor.               These medicines have changed or have updated prescriptions.        Dose/Directions    atenolol 50 MG tablet   Commonly known as:  TENORMIN   This may have changed:  See the new instructions.   Used for:  Palpitations, Essential hypertension with goal blood pressure less than 140/90   Changed by:  Mary Mathews MD        TAKE 1 TABLET (50 MG) BY MOUTH DAILY   Quantity:  90 tablet   Refills:  1       * hydrochlorothiazide 25 MG tablet   Commonly known as:  HYDRODIURIL   This may have changed:  Another medication with the same name was added. Make sure you understand how and when to take each.   Used for:  Essential hypertension with goal blood pressure less than 140/90   Changed by:  Mary Mathews MD        Dose:  12.5 mg   Take 0.5 tablets (12.5 mg) by mouth daily   Quantity:  45 tablet   Refills:  3       * hydrochlorothiazide 12.5 MG Tabs tablet   This may have changed:  You were already taking a medication with the same name, and this prescription was added. Make sure you understand how and when to take each.   Used for:  Essential hypertension with goal " blood pressure less than 140/90   Changed by:  Mary Mathews MD        Dose:  12.5 mg   Take 1 tablet (12.5 mg) by mouth daily   Quantity:  90 tablet   Refills:  1       * LOSARTAN POTASSIUM PO   This may have changed:  Another medication with the same name was changed. Make sure you understand how and when to take each.   Changed by:  Mary Mathews MD        Dose:  50 mg   Take 50 mg by mouth daily (0.5 x 100 mg tablet = 50 mg dose)   Refills:  0       * losartan 50 MG tablet   Commonly known as:  COZAAR   This may have changed:  medication strength   Used for:  Essential hypertension with goal blood pressure less than 140/90   Changed by:  Mary Mathews MD        Dose:  50 mg   Take 1 tablet (50 mg) by mouth daily   Quantity:  90 tablet   Refills:  1       * Notice:  This list has 4 medication(s) that are the same as other medications prescribed for you. Read the directions carefully, and ask your doctor or other care provider to review them with you.         Where to get your medicines      These medications were sent to [facility] #[id number] - [place], [place] - [address] [facility]  [address] [facility] [address]     Phone:  [phone]     atenolol 50 MG tablet    hydrochlorothiazide 12.5 MG Tabs tablet    losartan 50 MG tablet                Primary Care Provider Office Phone # Fax #    Lian Martinez [phone]       [address]  [place] [address]        Equal Access to Services     [facility] HospitalANTONIA AH: Hadii aad ku hadasho Soomaali, waaxda luqadaha, qaybta kaalmada adeegyada, waxay piedad haydarling dias . So [facility] [phone].    ATENCIÓN: Si habla español, tiene a tuttle disposición servicios gratuitos de asistencia lingüística. Llame al [phone].    We comply with applicable federal civil rights laws and [place] laws. We do not discriminate on the basis of race, color, national origin, age,  disability, sex, sexual orientation, or gender identity.            Thank you!     Thank you for choosing [facility]  for your care. Our goal is always to provide you with excellent care. Hearing back from our patients is one way we can continue to improve our services. Please take a few minutes to complete the written survey that you may receive in the mail after your visit with us. Thank you!             Your Updated Medication List - Protect others around you: Learn how to safely use, store and throw away your medicines at www.disposemymeds.org.          This list is accurate as of 4/16/18  9:40 AM.  Always use your most recent med list.                   Brand Name Dispense Instructions for use Diagnosis    acetaminophen 325 MG tablet    TYLENOL    100 tablet    Take 2 tablets (650 mg) by mouth every 4 hours as needed for mild pain    Generalized pain       * aspirin 325 MG tablet      Take 325 mg by mouth        * aspirin 81 MG tablet     30 tablet    Take 81 mg by mouth daily        atenolol 50 MG tablet    TENORMIN    90 tablet    TAKE 1 TABLET (50 MG) BY MOUTH DAILY    Palpitations, Essential hypertension with goal blood pressure less than 140/90       atorvastatin 40 MG tablet    LIPITOR    90 tablet    TAKE 1 TABLET (40 MG) BY MOUTH DAILY    Left temporal lobe infarction (H)       CALCIUM CITRATE + D PO      Take 2 tablets by mouth every morning        CLARITIN 10 MG tablet   Generic drug:  loratadine      Take 10 mg by mouth daily        clopidogrel 75 MG tablet    PLAVIX    90 tablet    Take 1 tablet (75 mg) by mouth daily Take 75 mg by mouth daily    Atrial fibrillation (H)       Cyanocobalamin 2500 MCG Tabs      Take 2,500 mcg by mouth twice a week Mon, Thur        Ferrous Sulfate 27 MG Tabs      Take 27 mg by mouth 2 times daily        FIBER COMPLETE PO      Take 1 capsule by mouth daily        * hydrochlorothiazide 25 MG tablet    HYDRODIURIL    45 tablet    Take 0.5 tablets (12.5  mg) by mouth daily    Essential hypertension with goal blood pressure less than 140/90       * hydrochlorothiazide 12.5 MG Tabs tablet     90 tablet    Take 1 tablet (12.5 mg) by mouth daily    Essential hypertension with goal blood pressure less than 140/90       IBUPROFEN PO      Take 200 mg by mouth        imipramine 25 MG tablet    TOFRANIL    30 tablet    Take 1 tablet (25 mg) by mouth daily    Bladder dysfunction       * LOSARTAN POTASSIUM PO      Take 50 mg by mouth daily (0.5 x 100 mg tablet = 50 mg dose)        * losartan 50 MG tablet    COZAAR    90 tablet    Take 1 tablet (50 mg) by mouth daily    Essential hypertension with goal blood pressure less than 140/90       topiramate 25 MG tablet    TOPAMAX    180 tablet    Take 1 tablet (25 mg) by mouth 2 times daily Prescribed by Dr Tello    Migraine       UNABLE TO FIND      CPAP machine every night        valACYclovir 1000 mg tablet    VALTREX    4 tablet    Take 2 tablets (2,000 mg) by mouth 2 times daily as needed    Cold sore       VITAMIN C PO      Take 250 mg by mouth 2 times daily (0.5 x 500 mg tablet = 250 mg dose)        VITAMIN D (CHOLECALCIFEROL) PO      Take 500 Units by mouth daily        ZADITOR 0.025 % Soln ophthalmic solution   Generic drug:  ketotifen     1 Bottle    Place 1 drop into both eyes every 12 hours as needed for itching        * Notice:  This list has 6 medication(s) that are the same as other medications prescribed for you. Read the directions carefully, and ask your doctor or other care provider to review them with you.

## 2018-04-16 NOTE — PROGRESS NOTES
Dr Spaulding's note      Patient's instructions / PLAN:                                                        Plan:  1. Labs today  2. Continue same meds, same doses for now   3. Hand surgeon referral      ASSESSMENT & PLAN:                                                      (I10) Essential hypertension with goal blood pressure less than 140/90  (primary encounter diagnosis)  Comment: Controlled    Plan: losartan (COZAAR) 50 MG tablet, atenolol         (TENORMIN) 50 MG tablet, hydrochlorothiazide         12.5 MG TABS tablet, Albumin Random Urine         Quantitative with Creat Ratio            (R00.2) Palpitations  Comment: Controlled    Plan: atenolol (TENORMIN) 50 MG tablet            (Z98.84) S/P gastric bypass  Comment:   Plan: CBC with platelets, Copper level, Ferritin,         Parathyroid Hormone Intact, Lipid panel reflex         to direct LDL Fasting, Folate, Iron and iron         binding capacity, Vitamin B1 whole blood,         Vitamin B12, Vitamin D Deficiency, Zinc, TSH         with free T4 reflex            (I48.0) Paroxysmal atrial fibrillation (H)  Comment:   Plan: TSH with free T4 reflex            (M65.30) Trigger finger, acquired  Comment:   Plan: ORTHO  REFERRAL            (Z86.73) History of CVA (cerebrovascular accident)  Comment: no neuro deficit   Plan: Continue same meds, same doses for now        Chief complaint:                                                      Follow up chronic medical problems      SUBJECTIVE:                                                    Jose Coronado is a 62 year old female who presents to clinic today for the following health issues:      Hyperlipidemia Follow-Up      Rate your low fat/cholesterol diet?: good / weight watcher     Taking statin?  Yes, no muscle aches from statin    Other lipid medications/supplements?:  none    Hypertension Follow-up      Outpatient blood pressures are not being checked.    Low Salt Diet: not monitoring  "salt      Amount of exercise or physical activity: 8,000 to 1200,00 a day     Problems taking medications regularly: No    Medication side effects: none    Diet: weight watchers       L trigger finger - surgery for thorn few years ago  OA both hands    Review of Systems:                                                      ROS: negative for fever, chills, cough, wheezes, chest pain, shortness of breath, vomiting, abdominal pain, leg swelling       OBJECTIVE:             Physical exam:  Blood pressure 132/70, pulse 73, temperature 97.9  F (36.6  C), temperature source Oral, resp. rate 16, height 5' 5.5\" (1.664 m), weight 188 lb (85.3 kg), SpO2 98 %, not currently breastfeeding.   NAD, appears comfortable  Skin: no rashes   HEENT: PERRLA, EOMI, pink conjunctiva, anicteric sclerae, bilateral tympanic membranes are clinically normal, oropharynx is normal color  Neck: supple, no JVD, No thyroidmegaly. Lymph nodes nonpalpable cervical and supraclavicular.  Chest: clear to auscultation bilaterally, good respiratory effort  Heart: S1 S2, RRR, no mgr appreciated  Abdomen: soft, not tender, no hepatosplenomegaly or masses appreciated, no abdominal bruit, present bowel sounds  Extremities: no edema,   Neurologic: A, Ox3, no focal signs appreciated  Hands: OA signs. Triggered finger L 3rd    PMHx: reviewed  Past Medical History:   Diagnosis Date     Anemia      CVA (cerebral vascular accident) (H) 2017    ?migraine, ?pfo--negative vasc w/u, neg hypercoag w/u     Diffuse cystic mastopathy     Fibrocystic breast disease     HTN, goal below 140/90      Hyperparathyroidism (H)      Infectious mononucleosis     Mono at age 16     Irregular heart beat     PAT no afib on 30day monitor     Labyrinthitis, unspecified      Migraine headache with aura      Osteopenia      Pain in joint, shoulder region     Secondary to a fall     PFO (patent foramen ovale)     s/p closure with amplazter device 7/13/17     S/P gastric bypass June, 2010     " Sleep apnea     she is on CPAP     Vitamin D deficiencies       PSHx: reviewed  Past Surgical History:   Procedure Laterality Date     C ANEURYSM, INTRACRAN, SIMPLE SURG  04/2017    coil of aneurysm right posterior paraophthalmic artery     C NONSPECIFIC PROCEDURE      S/P multiple breast biopies - all negative / benign     C NONSPECIFIC PROCEDURE      S/P T&A     C NONSPECIFIC PROCEDURE      [place] teeth extraction     C NONSPECIFIC PROCEDURE      S/P (? unreadable) ankle     COLONOSCOPY N/A 8/12/2015    Procedure: COLONOSCOPY;  Surgeon: Deandre Brooks MD;  Location:  GI     GASTRIC BYPASS  June 24, 2010     ORTHOPEDIC SURGERY Left 2010    wrist fracture     PARATHYROIDECTOMY  9/19/11        Meds: reviewed  Current Outpatient Prescriptions   Medication Sig Dispense Refill     aspirin 325 MG tablet Take 325 mg by mouth       IBUPROFEN PO Take 200 mg by mouth       imipramine (TOFRANIL) 25 MG tablet Take 1 tablet (25 mg) by mouth daily 30 tablet 0     loratadine (CLARITIN) 10 MG tablet Take 10 mg by mouth daily       hydrochlorothiazide (HYDRODIURIL) 25 MG tablet Take 0.5 tablets (12.5 mg) by mouth daily 45 tablet 3     atenolol (TENORMIN) 50 MG tablet TAKE 1 TABLET (50 MG) BY MOUTH DAILY 90 tablet 2     atorvastatin (LIPITOR) 40 MG tablet TAKE 1 TABLET (40 MG) BY MOUTH DAILY 90 tablet 2     valACYclovir (VALTREX) 1000 mg tablet Take 2 tablets (2,000 mg) by mouth 2 times daily as needed 4 tablet 5     acetaminophen (TYLENOL) 325 MG tablet Take 2 tablets (650 mg) by mouth every 4 hours as needed for mild pain 100 tablet      LOSARTAN POTASSIUM PO Take 50 mg by mouth daily (0.5 x 100 mg tablet = 50 mg dose)       FIBER COMPLETE PO Take 1 capsule by mouth daily        Ascorbic Acid (VITAMIN C PO) Take 250 mg by mouth 2 times daily (0.5 x 500 mg tablet = 250 mg dose)       topiramate (TOPAMAX) 25 MG tablet Take 1 tablet (25 mg) by mouth 2 times daily Prescribed by Dr Tello 180 tablet 3     Calcium Citrate-Vitamin  D (CALCIUM CITRATE + D PO) Take 2 tablets by mouth every morning        Ferrous Sulfate 27 MG TABS Take 27 mg by mouth 2 times daily       Cyanocobalamin 2500 MCG TABS Take 2,500 mcg by mouth twice a week Mon, Thur       ketotifen (ZADITOR) 0.025 % SOLN Place 1 drop into both eyes every 12 hours as needed for itching 1 Bottle      [DISCONTINUED] losartan (COZAAR) 100 MG tablet Take 0.5 tablets (50 mg) by mouth daily 45 tablet 1     clopidogrel (PLAVIX) 75 MG tablet Take 1 tablet (75 mg) by mouth daily Take 75 mg by mouth daily (Patient not taking: Reported on 4/16/2018) 90 tablet 3     UNABLE TO FIND CPAP machine every night       VITAMIN D, CHOLECALCIFEROL, PO Take 500 Units by mouth daily        aspirin 81 MG tablet Take 81 mg by mouth daily  30 tablet        Soc Hx: reviewed  Fam Hx: reviewed          Mary Spaulding MD  Internal Medicine

## 2018-04-17 LAB
DEPRECATED CALCIDIOL+CALCIFEROL SERPL-MC: 55 UG/L (ref 20–75)
FERRITIN SERPL-MCNC: 158 NG/ML (ref 8–252)

## 2018-04-18 ENCOUNTER — OFFICE VISIT (OUTPATIENT)
Dept: ORTHOPEDICS | Facility: CLINIC | Age: 63
End: 2018-04-18
Payer: COMMERCIAL

## 2018-04-18 VITALS
BODY MASS INDEX: 30.22 KG/M2 | WEIGHT: 188 LBS | HEIGHT: 66 IN | SYSTOLIC BLOOD PRESSURE: 126 MMHG | DIASTOLIC BLOOD PRESSURE: 72 MMHG

## 2018-04-18 DIAGNOSIS — M65.332 TRIGGER MIDDLE FINGER OF LEFT HAND: Primary | ICD-10-CM

## 2018-04-18 LAB
COPPER SERPL-MCNC: 114 UG/DL (ref 80–155)
CREAT UR-MCNC: 27 MG/DL
MICROALBUMIN UR-MCNC: <5 MG/L
MICROALBUMIN/CREAT UR: NORMAL MG/G CR (ref 0–25)
TSH SERPL DL<=0.005 MIU/L-ACNC: 0.97 MU/L (ref 0.4–4)
ZINC SERPL-MCNC: 76 UG/DL (ref 60–120)

## 2018-04-18 PROCEDURE — 99243 OFF/OP CNSLTJ NEW/EST LOW 30: CPT | Performed by: ORTHOPAEDIC SURGERY

## 2018-04-18 NOTE — PROGRESS NOTES
HISTORY OF PRESENT ILLNESS:    Jose Coronado is a 62 year old RHD female who is seen in consultation at the request of Dr. Mathews for left 3rd finger pain of about 1-2 years duration without specific injury.  About 2 years ago, she had a foreign body removed from the PIP joint on the dorsal aspect of the long finger.  All along, she was thinking that the current problem was somehow related to that issue.  Even though she had actual locking of the last year, for some reason, she has noted much improved episodes of locking with long finger.  She had so many things going on Natalya and she was not able to send it to the finger problem.  However now she wants to address that if he needs to be.  She is right-hand dominant.  She just recently noted prominence on the radial aspect of metacarpal head of the index finger more on the right than the left.    Present symptoms: catching, locking, painful, difficulty with making full fist, difficulty with gripping small items  Treatments tried to this point: PCP visit  Orthopedic PMH: hx of foreign body removal in same finger in 2014/15 from dorsal finger near PIP joint with infection    Past Medical History:   Diagnosis Date     Anemia      CVA (cerebral vascular accident) (H) 2017    ?migraine, ?pfo--negative vasc w/u, neg hypercoag w/u     Diffuse cystic mastopathy     Fibrocystic breast disease     HTN, goal below 140/90      Hyperparathyroidism (H)      Infectious mononucleosis     Mono at age 16     Irregular heart beat     PAT no afib on 30day monitor     Labyrinthitis, unspecified      Migraine headache with aura      Osteopenia      Pain in joint, shoulder region     Secondary to a fall     PFO (patent foramen ovale)     s/p closure with amplazter device 7/13/17     S/P gastric bypass June, 2010     Sleep apnea     she is on CPAP     Vitamin D deficiencies        Past Surgical History:   Procedure Laterality Date     C ANEURYSM, INTRACRAN, SIMPLE SURG  04/2017     coil of aneurysm right posterior paraophthalmic artery     C NONSPECIFIC PROCEDURE      S/P multiple breast biopies - all negative / benign     C NONSPECIFIC PROCEDURE      S/P T&A     C NONSPECIFIC PROCEDURE      [place] teeth extraction     C NONSPECIFIC PROCEDURE      S/P (? unreadable) ankle     COLONOSCOPY N/A 8/12/2015    Procedure: COLONOSCOPY;  Surgeon: Deandre Brooks MD;  Location: [facility]     GASTRIC BYPASS  June 24, 2010     ORTHOPEDIC SURGERY Left 2010    wrist fracture     PARATHYROIDECTOMY  9/19/11       Family History   Problem Relation Age of Onset     Breast Cancer Mother      Hypertension Mother      Arthritis Mother      Thyroid Disease Mother      hypo     Ulcerative Colitis Mother      Breast Cancer Maternal Aunt      Hypertension Paternal Grandmother      CEREBROVASCULAR DISEASE Maternal Grandmother      Family History Negative Maternal Grandfather      Family History Negative Paternal Grandfather      Family History Negative Son      Family History Negative Daughter      Family History Negative Daughter      Ulcerative Colitis Sister      Colon Cancer No family hx of        Social History     Social History     Marital status: Single     Spouse name: N/A     Number of children: N/A     Years of education: N/A     Occupational History     Not on file.     Social History Main Topics     Smoking status: Never Smoker     Smokeless tobacco: Never Used     Alcohol use 0.0 oz/week     0 Standard drinks or equivalent per week      Comment: 1 glass wine 4-5 days a week     Drug use: No     Sexual activity: Not Currently     Partners: Male     Other Topics Concern     Not on file     Social History Narrative       Current Outpatient Prescriptions   Medication Sig Dispense Refill     acetaminophen (TYLENOL) 325 MG tablet Take 2 tablets (650 mg) by mouth every 4 hours as needed for mild pain 100 tablet      Ascorbic Acid (VITAMIN C PO) Take 250 mg by mouth 2 times daily (0.5 x 500 mg tablet = 250 mg  dose)       aspirin 325 MG tablet Take 325 mg by mouth       aspirin 81 MG tablet Take 81 mg by mouth daily  30 tablet      atenolol (TENORMIN) 50 MG tablet TAKE 1 TABLET (50 MG) BY MOUTH DAILY 90 tablet 1     atorvastatin (LIPITOR) 40 MG tablet TAKE 1 TABLET (40 MG) BY MOUTH DAILY 90 tablet 2     Calcium Citrate-Vitamin D (CALCIUM CITRATE + D PO) Take 2 tablets by mouth every morning        clopidogrel (PLAVIX) 75 MG tablet Take 1 tablet (75 mg) by mouth daily Take 75 mg by mouth daily 90 tablet 3     Cyanocobalamin 2500 MCG TABS Take 2,500 mcg by mouth twice a week Mon, Thur       Ferrous Sulfate 27 MG TABS Take 27 mg by mouth 2 times daily       FIBER COMPLETE PO Take 1 capsule by mouth daily        hydrochlorothiazide (HYDRODIURIL) 25 MG tablet Take 0.5 tablets (12.5 mg) by mouth daily 45 tablet 3     hydrochlorothiazide 12.5 MG TABS tablet Take 1 tablet (12.5 mg) by mouth daily 90 tablet 1     IBUPROFEN PO Take 200 mg by mouth       imipramine (TOFRANIL) 25 MG tablet Take 1 tablet (25 mg) by mouth daily 30 tablet 0     ketotifen (ZADITOR) 0.025 % SOLN Place 1 drop into both eyes every 12 hours as needed for itching 1 Bottle      loratadine (CLARITIN) 10 MG tablet Take 10 mg by mouth daily       losartan (COZAAR) 50 MG tablet Take 1 tablet (50 mg) by mouth daily 90 tablet 1     LOSARTAN POTASSIUM PO Take 50 mg by mouth daily (0.5 x 100 mg tablet = 50 mg dose)       topiramate (TOPAMAX) 25 MG tablet Take 1 tablet (25 mg) by mouth 2 times daily Prescribed by Dr Tello 180 tablet 3     UNABLE TO FIND CPAP machine every night       valACYclovir (VALTREX) 1000 mg tablet Take 2 tablets (2,000 mg) by mouth 2 times daily as needed 4 tablet 5     VITAMIN D, CHOLECALCIFEROL, PO Take 500 Units by mouth daily          Allergies   Allergen Reactions     Contrast Dye Itching     Reaction of immediate burning and severe itching in Right ear after injection for CT.      Sulfa Drugs      hives       REVIEW OF  "SYSTEMS:  CONSTITUTIONAL:  NEGATIVE for fever, chills, change in weight  INTEGUMENTARY/SKIN:  NEGATIVE for worrisome rashes, moles or lesions  EYES:  NEGATIVE for vision changes or irritation  ENT/MOUTH:  NEGATIVE for ear, mouth and throat problems  RESP:  NEGATIVE for significant cough or SOB  BREAST:  NEGATIVE for masses, tenderness or discharge  CV:  NEGATIVE for chest pain or peripheral edema POSITIVE: high blood pressure, arrythmia  GI:  NEGATIVE for nausea, abdominal pain, heartburn, or change in bowel habits  :  Negative   MUSCULOSKELETAL:  See HPI above  NEURO:  NEGATIVE for weakness, dizziness or paresthesias  ENDOCRINE:  NEGATIVE for temperature intolerance, skin/hair changes  HEME/ALLERGY/IMMUNE:  NEGATIVE for bleeding problems  PSYCHIATRIC:  NEGATIVE for changes in mood or affect      PHYSICAL EXAM:  /72  Ht 5' 6\" (1.676 m)  Wt 188 lb (85.3 kg)  BMI 30.34 kg/m2  Body mass index is 30.34 kg/(m^2).   GENERAL APPEARANCE: healthy, alert and no distress   HEENT: No apparent thyroid megaly. Clear sclera with normal ocular movement  RESPIRATORY: No labored breathing  SKIN: no suspicious lesions or rashes  NEURO: Normal strength and tone, mentation intact and speech normal  VASCULAR: Good pulses, and capillary refill   LYMPH: no lymphadenopathy   PSYCH:  mentation appears normal and affect normal/bright    MUSCULOSKELETAL:  Gait normal  Diffuse bony prominences consistent with DJD changes of mild degree involving MCP joint of the index finger and DIP joints of the index and long fingers in particular, bilateral  No erythema or swelling noted  Gross sensation is intact throughout  Very subtle limitation of flexion of the long finger compared to the right  Tenderness along the A1 pulley region and proximal phalanx  No catching or locking is noted however at this point  No significant swelling in this region is noted  No palpable cyst is present in this area, especially at the MCP joint crease of the " long finger  Grossly intact  strength and pinch and strength       ASSESSMENT:  Chronic long finger flexor tendinitis with history of triggering, left  Diffuse mild hand DJD, bilateral      PLAN:  We had a long discussion about the nature of the problem related to trigger finger phenomenon.  She actually has improved over the last year or so.  We discussed the treatment options of immobilization with observation, cortisone injection and possible surgery.  Without significant issue of locking at this point, it is not clear whether she should consider any surgical intervention.  Symptomatic treatments without the underlying risk factor for diabetes would be reasonable at this point.  All the questions were answered.  She decided to continue with observation for the time being.  Follow-up as needed.          Imaging Interpretation:   None taken today      Umer Crow MD  Department of Orthopedic Surgery        Disclaimer: This note consists of symbols derived from keyboarding, dictation and/or voice recognition software. As a result, there may be errors in the script that have gone undetected. Please consider this when interpreting information found in this chart.

## 2018-04-18 NOTE — LETTER
4/18/2018         RE: Jose Coronado  [address] [facility] [id number]        Dear Colleague,    Thank you for referring your patient, Jose Coronado, to the [facility]TH[facility] SUR[facility]ERY. Please see a copy of my visit note below.    HISTORY OF PRESENT ILLNESS:    Jose Coronado is a 62 year old RHD female who is seen in consultation at the request of Dr. Mathews for left 3rd finger pain of about 1-2 years duration without specific injury.  About 2 years ago, she had a foreign body removed from the PIP joint on the dorsal aspect of the long finger.  All along, she was thinking that the current problem was somehow related to that issue.  Even though she had actual locking of the last year, for some reason, she has noted much improved episodes of locking with long finger.  She had so many things going on Natalya and she was not able to send it to the finger problem.  However now she wants to address that if he needs to be.  She is right-hand dominant.  She just recently noted prominence on the radial aspect of metacarpal head of the index finger more on the right than the left.    Present symptoms: catching, locking, painful, difficulty with making full fist, difficulty with gripping small items  Treatments tried to this point: PCP visit  Orthopedic PMH: hx of foreign body removal in same finger in 2014/15 from dorsal finger near PIP joint with infection    Past Medical History:   Diagnosis Date     Anemia      CVA (cerebral vascular accident) (H) 2017    ?migraine, ?pfo--negative vasc w/u, neg hypercoag w/u     Diffuse cystic mastopathy     Fibrocystic breast disease     HTN, goal below 140/90      Hyperparathyroidism (H)      Infectious mononucleosis     Mono at age 16     Irregular heart beat     PAT no afib on 30day monitor     Labyrinthitis, unspecified      Migraine headache with aura      Osteopenia      Pain in joint, shoulder region     Secondary to a fall     PFO (patent foramen  ovale)     s/p closure with amplazter device 7/13/17     S/P gastric bypass June, 2010     Sleep apnea     she is on CPAP     Vitamin D deficiencies        Past Surgical History:   Procedure Laterality Date     C ANEURYSM, INTRACRAN, SIMPLE SURG  04/2017    coil of aneurysm right posterior paraophthalmic artery     C NONSPECIFIC PROCEDURE      S/P multiple breast biopies - all negative / benign     C NONSPECIFIC PROCEDURE      S/P T&A     C NONSPECIFIC PROCEDURE      [place] teeth extraction     C NONSPECIFIC PROCEDURE      S/P (? unreadable) ankle     COLONOSCOPY N/A 8/12/2015    Procedure: COLONOSCOPY;  Surgeon: Deandre Brooks MD;  Location: [facility]     GASTRIC BYPASS  June 24, 2010     ORTHOPEDIC SURGERY Left 2010    wrist fracture     PARATHYROIDECTOMY  9/19/11       Family History   Problem Relation Age of Onset     Breast Cancer Mother      Hypertension Mother      Arthritis Mother      Thyroid Disease Mother      hypo     Ulcerative Colitis Mother      Breast Cancer Maternal Aunt      Hypertension Paternal Grandmother      CEREBROVASCULAR DISEASE Maternal Grandmother      Family History Negative Maternal Grandfather      Family History Negative Paternal Grandfather      Family History Negative Son      Family History Negative Daughter      Family History Negative Daughter      Ulcerative Colitis Sister      Colon Cancer No family hx of        Social History     Social History     Marital status: Single     Spouse name: N/A     Number of children: N/A     Years of education: N/A     Occupational History     Not on file.     Social History Main Topics     Smoking status: Never Smoker     Smokeless tobacco: Never Used     Alcohol use 0.0 oz/week     0 Standard drinks or equivalent per week      Comment: 1 glass wine 4-5 days a week     Drug use: No     Sexual activity: Not Currently     Partners: Male     Other Topics Concern     Not on file     Social History Narrative       Current Outpatient Prescriptions    Medication Sig Dispense Refill     acetaminophen (TYLENOL) 325 MG tablet Take 2 tablets (650 mg) by mouth every 4 hours as needed for mild pain 100 tablet      Ascorbic Acid (VITAMIN C PO) Take 250 mg by mouth 2 times daily (0.5 x 500 mg tablet = 250 mg dose)       aspirin 325 MG tablet Take 325 mg by mouth       aspirin 81 MG tablet Take 81 mg by mouth daily  30 tablet      atenolol (TENORMIN) 50 MG tablet TAKE 1 TABLET (50 MG) BY MOUTH DAILY 90 tablet 1     atorvastatin (LIPITOR) 40 MG tablet TAKE 1 TABLET (40 MG) BY MOUTH DAILY 90 tablet 2     Calcium Citrate-Vitamin D (CALCIUM CITRATE + D PO) Take 2 tablets by mouth every morning        clopidogrel (PLAVIX) 75 MG tablet Take 1 tablet (75 mg) by mouth daily Take 75 mg by mouth daily 90 tablet 3     Cyanocobalamin 2500 MCG TABS Take 2,500 mcg by mouth twice a week Mon, Thur       Ferrous Sulfate 27 MG TABS Take 27 mg by mouth 2 times daily       FIBER COMPLETE PO Take 1 capsule by mouth daily        hydrochlorothiazide (HYDRODIURIL) 25 MG tablet Take 0.5 tablets (12.5 mg) by mouth daily 45 tablet 3     hydrochlorothiazide 12.5 MG TABS tablet Take 1 tablet (12.5 mg) by mouth daily 90 tablet 1     IBUPROFEN PO Take 200 mg by mouth       imipramine (TOFRANIL) 25 MG tablet Take 1 tablet (25 mg) by mouth daily 30 tablet 0     ketotifen (ZADITOR) 0.025 % SOLN Place 1 drop into both eyes every 12 hours as needed for itching 1 Bottle      loratadine (CLARITIN) 10 MG tablet Take 10 mg by mouth daily       losartan (COZAAR) 50 MG tablet Take 1 tablet (50 mg) by mouth daily 90 tablet 1     LOSARTAN POTASSIUM PO Take 50 mg by mouth daily (0.5 x 100 mg tablet = 50 mg dose)       topiramate (TOPAMAX) 25 MG tablet Take 1 tablet (25 mg) by mouth 2 times daily Prescribed by Dr Tello 180 tablet 3     UNABLE TO FIND CPAP machine every night       valACYclovir (VALTREX) 1000 mg tablet Take 2 tablets (2,000 mg) by mouth 2 times daily as needed 4 tablet 5     VITAMIN D,  "CHOLECALCIFEROL, PO Take 500 Units by mouth daily          Allergies   Allergen Reactions     Contrast Dye Itching     Reaction of immediate burning and severe itching in Right ear after injection for CT.      Sulfa Drugs      hives       REVIEW OF SYSTEMS:  CONSTITUTIONAL:  NEGATIVE for fever, chills, change in weight  INTEGUMENTARY/SKIN:  NEGATIVE for worrisome rashes, moles or lesions  EYES:  NEGATIVE for vision changes or irritation  ENT/MOUTH:  NEGATIVE for ear, mouth and throat problems  RESP:  NEGATIVE for significant cough or SOB  BREAST:  NEGATIVE for masses, tenderness or discharge  CV:  NEGATIVE for chest pain or peripheral edema POSITIVE: high blood pressure, arrythmia  GI:  NEGATIVE for nausea, abdominal pain, heartburn, or change in bowel habits  :  Negative   MUSCULOSKELETAL:  See HPI above  NEURO:  NEGATIVE for weakness, dizziness or paresthesias  ENDOCRINE:  NEGATIVE for temperature intolerance, skin/hair changes  HEME/ALLERGY/IMMUNE:  NEGATIVE for bleeding problems  PSYCHIATRIC:  NEGATIVE for changes in mood or affect      PHYSICAL EXAM:  /72  Ht 5' 6\" (1.676 m)  Wt 188 lb (85.3 kg)  BMI 30.34 kg/m2  Body mass index is 30.34 kg/(m^2).   GENERAL APPEARANCE: healthy, alert and no distress   HEENT: No apparent thyroid megaly. Clear sclera with normal ocular movement  RESPIRATORY: No labored breathing  SKIN: no suspicious lesions or rashes  NEURO: Normal strength and tone, mentation intact and speech normal  VASCULAR: Good pulses, and capillary refill   LYMPH: no lymphadenopathy   PSYCH:  mentation appears normal and affect normal/bright    MUSCULOSKELETAL:  Gait normal  Diffuse bony prominences consistent with DJD changes of mild degree involving MCP joint of the index finger and DIP joints of the index and long fingers in particular, bilateral  No erythema or swelling noted  Gross sensation is intact throughout  Very subtle limitation of flexion of the long finger compared to the " right  Tenderness along the A1 pulley region and proximal phalanx  No catching or locking is noted however at this point  No significant swelling in this region is noted  No palpable cyst is present in this area, especially at the MCP joint crease of the long finger  Grossly intact  strength and pinch and strength       ASSESSMENT:  Chronic long finger flexor tendinitis with history of triggering, left  Diffuse mild hand DJD, bilateral      PLAN:  We had a long discussion about the nature of the problem related to trigger finger phenomenon.  She actually has improved over the last year or so.  We discussed the treatment options of immobilization with observation, cortisone injection and possible surgery.  Without significant issue of locking at this point, it is not clear whether she should consider any surgical intervention.  Symptomatic treatments without the underlying risk factor for diabetes would be reasonable at this point.  All the questions were answered.  She decided to continue with observation for the time being.  Follow-up as needed.          Imaging Interpretation:   None taken today      Umer Crow MD  Department of Orthopedic Surgery        Disclaimer: This note consists of symbols derived from keyboarding, dictation and/or voice recognition software. As a result, there may be errors in the script that have gone undetected. Please consider this when interpreting information found in this chart.      Again, thank you for allowing me to participate in the care of your patient.        Sincerely,        Umer Crow MD

## 2018-04-18 NOTE — MR AVS SNAPSHOT
After Visit Summary   4/18/2018    Jose Coronado    MRN: [id number]           Patient Information     Date Of Birth          1955        Visit Information        Provider Department      4/18/2018 9:40 AM Umer Crow MD [facility] ORTHOPEDIC SURGERY        Today's Diagnoses     Trigger middle finger of left hand    -  1       Follow-ups after your visit        Your next 10 appointments already scheduled     Jun 12, 2018 12:45 PM CDT   Ech Complete with [id number]   [facility] CV Echocardiography (Cardiovascular Imaging at [facility])    [address]  [address]  [facility] [id number] [phone]           1. Please bring or wear a comfortable two-piece outfit. 2. You may eat, drink and take your normal medicines. 3. For any questions that cannot be answered, please contact the ordering physician            Jun 12, 2018  3:10 PM CDT   Return Visit with [facility] ([facility])    [address]  [facility] [place] [id number] [phone] OPT 2              Who to contact     If you have questions or need follow up information about today's clinic visit or your schedule please contact [facility] ORTHOPEDIC SURGERY directly at [phone].  Normal or non-critical lab and imaging results will be communicated to you by [employer], letter or phone within 4 business days after the clinic has received the results. If you do not hear from us within 7 days, please contact the clinic through [employer] or phone. If you have a critical or abnormal lab result, we will notify you by phone as soon as possible.  Submit refill requests through [employer] or call your pharmacy and they will forward the refill request to us. Please allow 3 business days for your refill to be completed.          Additional Information About Your Visit        [employer] Information     [employer] gives you  "secure access to your electronic health record. If you see a primary care provider, you can also send messages to your care team and make appointments. If you have questions, please call your primary care clinic.  If you do not have a primary care provider, please call [phone] and they will assist you.        Care EveryWhere ID     This is your Care EveryWhere ID. This could be used by other organizations to access your [place] medical records  [id number]        Your Vitals Were     Height BMI (Body Mass Index)                5' 6\" (1.676 m) 30.34 kg/m2           Blood Pressure from Last 3 Encounters:   04/18/18 126/72   04/16/18 132/70   01/11/18 102/55    Weight from Last 3 Encounters:   04/18/18 188 lb (85.3 kg)   04/16/18 188 lb (85.3 kg)   01/11/18 192 lb (87.1 kg)              Today, you had the following     No orders found for display       Primary Care Provider Office Phone # Fax #    Lian Martinez [phone]       [address] [facility] [address]  [facility] [address]        Equal Access to Services     [facility]: Hadii aad ku hadasho Soomaali, waaxda luqadaha, qaybta kaalmada adeegyada, eve herbert haymaikn silvana dias . So [facility] [phone].    ATENCIÓN: Si habla español, tiene a tuttle disposición servicios gratuitos de asistencia lingüística. Llame al [phone].    We comply with applicable federal civil rights laws and [place] laws. We do not discriminate on the basis of race, color, national origin, age, disability, sex, sexual orientation, or gender identity.            Thank you!     Thank you for choosing [facility] ORTHOPEDIC SURGERY  for your care. Our goal is always to provide you with excellent care. Hearing back from our patients is one way we can continue to improve our services. Please take a few minutes to complete the written survey that you may receive in the mail after your visit with us. Thank you!             Your Updated Medication List - " Protect others around you: Learn how to safely use, store and throw away your medicines at www.disposemymeds.org.          This list is accurate as of 4/18/18 10:05 AM.  Always use your most recent med list.                   Brand Name Dispense Instructions for use Diagnosis    acetaminophen 325 MG tablet    TYLENOL    100 tablet    Take 2 tablets (650 mg) by mouth every 4 hours as needed for mild pain    Generalized pain       * aspirin 325 MG tablet      Take 325 mg by mouth        * aspirin 81 MG tablet     30 tablet    Take 81 mg by mouth daily        atenolol 50 MG tablet    TENORMIN    90 tablet    TAKE 1 TABLET (50 MG) BY MOUTH DAILY    Palpitations, Essential hypertension with goal blood pressure less than 140/90       atorvastatin 40 MG tablet    LIPITOR    90 tablet    TAKE 1 TABLET (40 MG) BY MOUTH DAILY    Left temporal lobe infarction (H)       CALCIUM CITRATE + D PO      Take 2 tablets by mouth every morning        CLARITIN 10 MG tablet   Generic drug:  loratadine      Take 10 mg by mouth daily        clopidogrel 75 MG tablet    PLAVIX    90 tablet    Take 1 tablet (75 mg) by mouth daily Take 75 mg by mouth daily    Atrial fibrillation (H)       Cyanocobalamin 2500 MCG Tabs      Take 2,500 mcg by mouth twice a week Mon, Thur        Ferrous Sulfate 27 MG Tabs      Take 27 mg by mouth 2 times daily        FIBER COMPLETE PO      Take 1 capsule by mouth daily        * hydrochlorothiazide 25 MG tablet    HYDRODIURIL    45 tablet    Take 0.5 tablets (12.5 mg) by mouth daily    Essential hypertension with goal blood pressure less than 140/90       * hydrochlorothiazide 12.5 MG Tabs tablet     90 tablet    Take 1 tablet (12.5 mg) by mouth daily    Essential hypertension with goal blood pressure less than 140/90       IBUPROFEN PO      Take 200 mg by mouth        imipramine 25 MG tablet    TOFRANIL    30 tablet    Take 1 tablet (25 mg) by mouth daily    Bladder dysfunction       * LOSARTAN POTASSIUM PO       Take 50 mg by mouth daily (0.5 x 100 mg tablet = 50 mg dose)        * losartan 50 MG tablet    COZAAR    90 tablet    Take 1 tablet (50 mg) by mouth daily    Essential hypertension with goal blood pressure less than 140/90       topiramate 25 MG tablet    TOPAMAX    180 tablet    Take 1 tablet (25 mg) by mouth 2 times daily Prescribed by Dr Tello    Migraine       UNABLE TO FIND      CPAP machine every night        valACYclovir 1000 mg tablet    VALTREX    4 tablet    Take 2 tablets (2,000 mg) by mouth 2 times daily as needed    Cold sore       VITAMIN C PO      Take 250 mg by mouth 2 times daily (0.5 x 500 mg tablet = 250 mg dose)        VITAMIN D (CHOLECALCIFEROL) PO      Take 500 Units by mouth daily        ZADITOR 0.025 % Soln ophthalmic solution   Generic drug:  ketotifen     1 Bottle    Place 1 drop into both eyes every 12 hours as needed for itching        * Notice:  This list has 6 medication(s) that are the same as other medications prescribed for you. Read the directions carefully, and ask your doctor or other care provider to review them with you.

## 2018-04-20 LAB — VIT B1 BLD-MCNC: 118 NMOL/L (ref 70–180)

## 2018-04-24 ENCOUNTER — TRANSFERRED RECORDS (OUTPATIENT)
Dept: HEALTH INFORMATION MANAGEMENT | Facility: CLINIC | Age: 63
End: 2018-04-24

## 2018-05-06 DIAGNOSIS — N31.9 BLADDER DYSFUNCTION: ICD-10-CM

## 2018-05-08 RX ORDER — IMIPRAMINE HCL 25 MG
TABLET ORAL
Qty: 30 TABLET | Refills: 1 | Status: SHIPPED | OUTPATIENT
Start: 2018-05-08 | End: 2018-07-10

## 2018-05-08 NOTE — TELEPHONE ENCOUNTER
"Pt sched for appt on 7/12          Requested Prescriptions   Pending Prescriptions Disp Refills     imipramine (TOFRANIL) 25 MG tablet [Pharmacy Med Name: IMIPRAMINE HCL 25 MG TABLET] 30 tablet 0     Sig: TAKE 1 TABLET (25 MG) BY MOUTH DAILY. KEEP APPT    Tricyclic Antidepressants Protocol Passed    5/6/2018 10:35 AM       Passed - Blood pressure under 140/90 in past 12 months    BP Readings from Last 3 Encounters:   04/18/18 126/72   04/16/18 132/70   01/11/18 102/55                Passed - Recent (12 mo) or future (30 days) visit within the authorizing provider's specialty     Patient had office visit in the last 12 months or has a visit in the next 30 days with authorizing provider or within the authorizing provider's specialty.  See \"Patient Info\" tab in inbasket, or \"Choose Columns\" in Meds & Orders section of the refill encounter.           Passed - Patient is age 18 or older       Passed - No active pregnancy on record       Passed - No positive pregnancy test in past 12 months          "

## 2018-05-27 DIAGNOSIS — I63.89 LEFT TEMPORAL LOBE INFARCTION (H): ICD-10-CM

## 2018-05-29 RX ORDER — ATORVASTATIN CALCIUM 40 MG/1
TABLET, FILM COATED ORAL
Qty: 90 TABLET | Refills: 0 | Status: SHIPPED | OUTPATIENT
Start: 2018-05-29 | End: 2018-07-12

## 2018-05-29 NOTE — TELEPHONE ENCOUNTER
"        Requested Prescriptions   Pending Prescriptions Disp Refills     atorvastatin (LIPITOR) 40 MG tablet [Pharmacy Med Name: ATORVASTATIN 40 MG TABLET] 90 tablet 2     Sig: TAKE 1 TABLET (40 MG) BY MOUTH DAILY    Statins Protocol Passed    5/27/2018  1:35 AM       Passed - LDL on file in past 12 months    Recent Labs   Lab Test  04/16/18   0940   LDL  43            Passed - No abnormal creatine kinase in past 12 months    No lab results found.            Passed - Recent (12 mo) or future (30 days) visit within the authorizing provider's specialty    Patient had office visit in the last 12 months or has a visit in the next 30 days with authorizing provider or within the authorizing provider's specialty.  See \"Patient Info\" tab in inbasket, or \"Choose Columns\" in Meds & Orders section of the refill encounter.           Passed - Patient is age 18 or older       Passed - No active pregnancy on record       Passed - No positive pregnancy test in past 12 months          "

## 2018-06-05 DIAGNOSIS — R00.2 PALPITATIONS: ICD-10-CM

## 2018-06-07 RX ORDER — ATENOLOL 25 MG/1
TABLET ORAL
Qty: 180 TABLET | Refills: 2 | Status: SHIPPED | OUTPATIENT
Start: 2018-06-07 | End: 2018-07-12

## 2018-06-07 NOTE — TELEPHONE ENCOUNTER
"Requested Prescriptions   Pending Prescriptions Disp Refills     atenolol (TENORMIN) 25 MG tablet [Pharmacy Med Name: ATENOLOL 25 MG TABLET] 180 tablet 2     Sig: TAKE 2 TABLETS (50 MG) BY MOUTH DAILY    Beta-Blockers Protocol Passed    6/5/2018  1:33 AM       Passed - Blood pressure under 140/90 in past 12 months    BP Readings from Last 3 Encounters:   04/18/18 126/72   04/16/18 132/70   01/11/18 102/55                Passed - Patient is age 6 or older       Passed - Recent (12 mo) or future (30 days) visit within the authorizing provider's specialty    Patient had office visit in the last 12 months or has a visit in the next 30 days with authorizing provider or within the authorizing provider's specialty.  See \"Patient Info\" tab in inbasket, or \"Choose Columns\" in Meds & Orders section of the refill encounter.            Prescription approved per [facility] Refill Protocol.    "

## 2018-06-12 ENCOUNTER — HOSPITAL ENCOUNTER (OUTPATIENT)
Dept: CARDIOLOGY | Facility: CLINIC | Age: 63
Discharge: HOME | End: 2018-06-12
Attending: NURSE PRACTITIONER | Admitting: NURSE PRACTITIONER
Payer: COMMERCIAL

## 2018-06-12 DIAGNOSIS — Q21.12 PFO (PATENT FORAMEN OVALE): ICD-10-CM

## 2018-06-12 PROCEDURE — 93306 TTE W/DOPPLER COMPLETE: CPT

## 2018-06-12 PROCEDURE — 93306 TTE W/DOPPLER COMPLETE: CPT | Mod: 26 | Performed by: INTERNAL MEDICINE

## 2018-07-10 ENCOUNTER — OFFICE VISIT (OUTPATIENT)
Dept: CARDIOLOGY | Facility: CLINIC | Age: 63
End: 2018-07-10
Attending: NURSE PRACTITIONER
Payer: COMMERCIAL

## 2018-07-10 VITALS
HEIGHT: 66 IN | WEIGHT: 192 LBS | HEART RATE: 69 BPM | BODY MASS INDEX: 30.86 KG/M2 | SYSTOLIC BLOOD PRESSURE: 115 MMHG | DIASTOLIC BLOOD PRESSURE: 73 MMHG

## 2018-07-10 DIAGNOSIS — Q21.12 PFO (PATENT FORAMEN OVALE): Primary | ICD-10-CM

## 2018-07-10 DIAGNOSIS — I10 ESSENTIAL HYPERTENSION WITH GOAL BLOOD PRESSURE LESS THAN 140/90: ICD-10-CM

## 2018-07-10 DIAGNOSIS — N31.9 BLADDER DYSFUNCTION: ICD-10-CM

## 2018-07-10 DIAGNOSIS — I48.0 PAROXYSMAL ATRIAL FIBRILLATION (H): ICD-10-CM

## 2018-07-10 PROCEDURE — 99213 OFFICE O/P EST LOW 20 MIN: CPT | Performed by: NURSE PRACTITIONER

## 2018-07-10 RX ORDER — HYDROCHLOROTHIAZIDE 12.5 MG/1
12.5 CAPSULE ORAL DAILY
COMMUNITY
End: 2018-07-12

## 2018-07-10 RX ORDER — IMIPRAMINE HCL 25 MG
TABLET ORAL
Qty: 90 TABLET | Refills: 1 | Status: SHIPPED | OUTPATIENT
Start: 2018-07-10 | End: 2018-12-05

## 2018-07-10 NOTE — PATIENT INSTRUCTIONS
I will message you and let you know when Dr. Church    See us next July and repeat an echocardiogram

## 2018-07-10 NOTE — MR AVS SNAPSHOT
After Visit Summary   7/10/2018    Jose Coronado    MRN: [id number]           Patient Information     Date Of Birth          1955        Visit Information        Provider Department      7/10/2018 10:50 AM Joanie Soriano APRN CNP [facility]        Today's Diagnoses     Essential hypertension with goal blood pressure less than 140/90    -  1    PFO (patent foramen ovale)        Paroxysmal atrial fibrillation (H)          Care Instructions    I will message you and let you know when Dr. Church    See us next July and repeat an echocardiogram          Follow-ups after your visit        Additional Services     Follow-Up with Cardiologist                 Your next 10 appointments already scheduled     Jul 12, 2018 10:20 AM CDT   MyChart Short with Mary Mathews MD   [facility] ([facility])    [address]  [facility] [id number] [phone]              Future tests that were ordered for you today     Open Future Orders        Priority Expected Expires Ordered    Follow-Up with Cardiologist Routine 7/10/2019 7/11/2019 7/10/2018    Echocardiogram Routine 7/10/2019 7/11/2019 7/10/2018            Who to contact     If you have questions or need follow up information about today's clinic visit or your schedule please contact [facility] directly at [phone].  Normal or non-critical lab and imaging results will be communicated to you by MyChart, letter or phone within 4 business days after the clinic has received the results. If you do not hear from us within 7 days, please contact the clinic through MyChart or phone. If you have a critical or abnormal lab result, we will notify you by phone as soon as possible.  Submit refill requests through [employer] or call your pharmacy and they will forward the refill request to us. Please allow 3 business days  "for your refill to be completed.          Additional Information About Your Visit        MyChart Information     [employer] gives you secure access to your electronic health record. If you see a primary care provider, you can also send messages to your care team and make appointments. If you have questions, please call your primary care clinic.  If you do not have a primary care provider, please call [phone] and they will assist you.        Care EveryWhere ID     This is your Care EveryWhere ID. This could be used by other organizations to access your [place] medical records  [id number]        Your Vitals Were     Pulse Height BMI (Body Mass Index)             69 1.676 m (5' 6\") 30.99 kg/m2          Blood Pressure from Last 3 Encounters:   07/10/18 115/73   04/18/18 126/72   04/16/18 132/70    Weight from Last 3 Encounters:   07/10/18 87.1 kg (192 lb)   04/18/18 85.3 kg (188 lb)   04/16/18 85.3 kg (188 lb)              We Performed the Following     Follow-Up with Cardiac Advanced Practice Provider          Today's Medication Changes          These changes are accurate as of 7/10/18 11:26 AM.  If you have any questions, ask your nurse or doctor.               These medicines have changed or have updated prescriptions.        Dose/Directions    topiramate 25 MG tablet   Commonly known as:  TOPAMAX   This may have changed:    - when to take this  - additional instructions   Used for:  Migraine        Dose:  25 mg   Take 1 tablet (25 mg) by mouth 2 times daily Prescribed by Dr Tello   Quantity:  180 tablet   Refills:  3                Primary Care Provider Office Phone # Fax #    Lian Martinez [phone]       [address] [facility] [address]  [facility] [address]        Equal Access to Services     [facility] : Enzo De La Rosa, eliezer pinto, eve zaidi. So [facility] [phone].    ATENCIÓN: Si harshilla español, tiene a tuttle " disposición servicios gratuitos de asistencia lingüística. Jen braswell [phone].    We comply with applicable federal civil rights laws and [place] laws. We do not discriminate on the basis of race, color, national origin, age, disability, sex, sexual orientation, or gender identity.            Thank you!     Thank you for choosing [facility] HE[facility]  for your care. Our goal is always to provide you with excellent care. Hearing back from our patients is one way we can continue to improve our services. Please take a few minutes to complete the written survey that you may receive in the mail after your visit with us. Thank you!             Your Updated Medication List - Protect others around you: Learn how to safely use, store and throw away your medicines at www.disposemymeds.org.          This list is accurate as of 7/10/18 11:26 AM.  Always use your most recent med list.                   Brand Name Dispense Instructions for use Diagnosis    acetaminophen 325 MG tablet    TYLENOL    100 tablet    Take 2 tablets (650 mg) by mouth every 4 hours as needed for mild pain    Generalized pain       aspirin 325 MG tablet      Take 325 mg by mouth        * atenolol 50 MG tablet    TENORMIN    90 tablet    TAKE 1 TABLET (50 MG) BY MOUTH DAILY    Palpitations, Essential hypertension with goal blood pressure less than 140/90       * atenolol 25 MG tablet    TENORMIN    180 tablet    TAKE 2 TABLETS (50 MG) BY MOUTH DAILY    Palpitations       atorvastatin 40 MG tablet    LIPITOR    90 tablet    TAKE 1 TABLET (40 MG) BY MOUTH DAILY    Left temporal lobe infarction (H)       CALCIUM CITRATE + D PO      Take 2 tablets by mouth every morning        CLARITIN 10 MG tablet   Generic drug:  loratadine      Take 10 mg by mouth daily        Cyanocobalamin 2500 MCG Tabs      Take 2,500 mcg by mouth twice a week Mon, Thur        Ferrous Sulfate 27 MG Tabs      Take 27 mg by mouth 2 times daily        FIBER  COMPLETE PO      Take 1 capsule by mouth daily        * hydrochlorothiazide 12.5 MG capsule    MICROZIDE     Take 12.5 mg by mouth daily        * hydrochlorothiazide 12.5 MG Tabs tablet     90 tablet    Take 1 tablet (12.5 mg) by mouth daily    Essential hypertension with goal blood pressure less than 140/90       IBUPROFEN PO      Take 200 mg by mouth every 6 hours as needed        imipramine 25 MG tablet    TOFRANIL    30 tablet    TAKE 1 TABLET (25 MG) BY MOUTH DAILY. KEEP APPT    Bladder dysfunction       * LOSARTAN POTASSIUM PO      Take 50 mg by mouth daily (0.5 x 100 mg tablet = 50 mg dose)        * losartan 50 MG tablet    COZAAR    90 tablet    Take 1 tablet (50 mg) by mouth daily    Essential hypertension with goal blood pressure less than 140/90       topiramate 25 MG tablet    TOPAMAX    180 tablet    Take 1 tablet (25 mg) by mouth 2 times daily Prescribed by Dr Elisha Penaloza       UNABLE TO FIND      CPAP machine every night        valACYclovir 1000 mg tablet    VALTREX    4 tablet    Take 2 tablets (2,000 mg) by mouth 2 times daily as needed    Cold sore       VITAMIN C PO      Take 250 mg by mouth 2 times daily (0.5 x 500 mg tablet = 250 mg dose)        VITAMIN D (CHOLECALCIFEROL) PO      Take 500 Units by mouth daily        ZADITOR 0.025 % Soln ophthalmic solution   Generic drug:  ketotifen     1 Bottle    Place 1 drop into both eyes every 12 hours as needed for itching        * Notice:  This list has 6 medication(s) that are the same as other medications prescribed for you. Read the directions carefully, and ask your doctor or other care provider to review them with you.

## 2018-07-10 NOTE — PROGRESS NOTES
History of Present Illness:     Jose Coronado is a 62 year old female followed here by Dr. Church.  She returns today to follow-up on an echocardiogram off of her Plavix post PFO closure.  She has a previous history of stroke.  She subsequently had a workup and was found to have an incidental cerebral aneurysm which was coiled by Dr. Grider. Hypercoagulable panel was unremarkable.  She was placed on warfarin initially as it was felt that this could have been paroxysmal atrial fibrillation as she has a history of palpitations on atenolol therapy.  As it turns out it was paroxysmal atrial tachycardia.  During the workup she was found to have an atrial septal defect/PFO. She underwent percutaneous closure of her patent foramen ovale on July 13, 2017 using an Amplatzer septal occluder device.  Her three-month echo with bubble study showed no ongoing shunt however her ejection fraction was low normal at 50-55% with no history of coronary artery disease in the past.    Cardiac MRI done in January of this year showed normal LV function at 61% with no evidence of ischemia.  She had trace tricuspid insufficiency.  Her echocardiogram recently shows LV function at 55-60% with 1-2+ tricuspid insufficiency per the reader.  I have had Dr. Church reviewed this and he feels it is trace to 1+ and the patient has been notified.  There is no evidence of thrombus.     She has a mildly dilated aorta at 39 mm however on her current echo of her aorta in the ascending position measures 3.6 cm. She has a history of hypertension.      She has been in weight watchers and has now lost a total of about 60 pounds over the past year and is maintaining this.  She hopes to lose 20-25 more pounds.         She has no cardiac complaints today.  She is overall doing very well.  Exam is unremarkable.    She remains on aspirin 325 mg daily.           Impression/Plan:      1.  Patent foramen ovale status successful closure.  -Echo in 1 year, this will  be 2 years from her closure and follow-up with Dr. Church at that time  -Continue aspirin to 325 mg daily  -No further need for SBE prophylaxis       2.  Mild cardiomyopathy improved to normal.     3.  History of CVA  -Continue risk factor modification  -Wear support hose on upcoming flights       4.  Dyslipidemia  -Treated to goal continue Lipitor  -LDL 43 HDL 66 triglyceride 46     5.  Hypertension  -Controlled       6. 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    7.  Ascending aorta are now normal in size.    -Continue good blood pressure control.        It has been a pleasure seeing Jose Coronado in follow up.     Joanie Soriano, MSN, APRN-BC, CNP  Cardiology    Orders Placed This Encounter   Procedures     Follow-Up with Cardiologist     Echocardiogram     Orders Placed This Encounter   Medications     hydrochlorothiazide (MICROZIDE) 12.5 MG capsule     Sig: Take 12.5 mg by mouth daily     Medications Discontinued During This Encounter   Medication Reason     clopidogrel (PLAVIX) 75 MG tablet Stopped by Patient     aspirin 81 MG tablet Stopped by Patient     hydrochlorothiazide (HYDRODIURIL) 25 MG tablet Stopped by Patient         Encounter Diagnoses   Name Primary?     PFO (patent foramen ovale)      Essential hypertension with goal blood pressure less than 140/90 Yes     Paroxysmal atrial fibrillation (H)        CURRENT MEDICATIONS:  Current Outpatient Prescriptions   Medication Sig Dispense Refill     acetaminophen (TYLENOL) 325 MG tablet Take 2 tablets (650 mg) by mouth every 4 hours as needed for mild pain 100 tablet      Ascorbic Acid (VITAMIN C PO) Take 250 mg by mouth 2 times daily (0.5 x 500 mg tablet = 250 mg dose)       aspirin 325 MG tablet Take 325 mg by mouth       atenolol (TENORMIN) 50 MG tablet TAKE 1 TABLET (50 MG) BY MOUTH DAILY 90 tablet 1     atorvastatin (LIPITOR) 40 MG tablet TAKE 1 TABLET  (40 MG) BY MOUTH DAILY 90 tablet 0     Calcium Citrate-Vitamin D (CALCIUM CITRATE + D PO) Take 2 tablets by mouth every morning        Cyanocobalamin 2500 MCG TABS Take 2,500 mcg by mouth twice a week Mon, Thur       Ferrous Sulfate 27 MG TABS Take 27 mg by mouth 2 times daily       FIBER COMPLETE PO Take 1 capsule by mouth daily        hydrochlorothiazide (MICROZIDE) 12.5 MG capsule Take 12.5 mg by mouth daily       hydrochlorothiazide 12.5 MG TABS tablet Take 1 tablet (12.5 mg) by mouth daily 90 tablet 1     IBUPROFEN PO Take 200 mg by mouth every 6 hours as needed        imipramine (TOFRANIL) 25 MG tablet TAKE 1 TABLET (25 MG) BY MOUTH DAILY. KEEP APPT 30 tablet 1     ketotifen (ZADITOR) 0.025 % SOLN Place 1 drop into both eyes every 12 hours as needed for itching 1 Bottle      loratadine (CLARITIN) 10 MG tablet Take 10 mg by mouth daily       losartan (COZAAR) 50 MG tablet Take 1 tablet (50 mg) by mouth daily 90 tablet 1     topiramate (TOPAMAX) 25 MG tablet Take 1 tablet (25 mg) by mouth 2 times daily Prescribed by Dr Tello (Patient taking differently: Take 25 mg by mouth daily Prescribed by Dr Tello) 180 tablet 3     UNABLE TO FIND CPAP machine every night       valACYclovir (VALTREX) 1000 mg tablet Take 2 tablets (2,000 mg) by mouth 2 times daily as needed 4 tablet 5     VITAMIN D, CHOLECALCIFEROL, PO Take 500 Units by mouth daily        atenolol (TENORMIN) 25 MG tablet TAKE 2 TABLETS (50 MG) BY MOUTH DAILY (Patient not taking: Reported on 7/10/2018) 180 tablet 2     LOSARTAN POTASSIUM PO Take 50 mg by mouth daily (0.5 x 100 mg tablet = 50 mg dose)       [DISCONTINUED] hydrochlorothiazide (HYDRODIURIL) 25 MG tablet Take 0.5 tablets (12.5 mg) by mouth daily (Patient not taking: Reported on 7/10/2018) 45 tablet 3       ALLERGIES     Allergies   Allergen Reactions     Contrast Dye Itching     Reaction of immediate burning and severe itching in Right ear after injection for CT.      Sulfa Drugs      hives        PAST MEDICAL HISTORY:  Past Medical History:   Diagnosis Date     Anemia      CVA (cerebral vascular accident) (H) 2017    ?migraine, ?pfo--negative vasc w/u, neg hypercoag w/u     Diffuse cystic mastopathy     Fibrocystic breast disease     HTN, goal below 140/90      Hyperparathyroidism (H)      Infectious mononucleosis     Mono at age 16     Irregular heart beat     PAT no afib on 30day monitor     Labyrinthitis, unspecified      Migraine headache with aura      Osteopenia      Pain in joint, shoulder region     Secondary to a fall     PFO (patent foramen ovale)     s/p closure with amplazter device 7/13/17     S/P gastric bypass June, 2010     Sleep apnea     she is on CPAP     Vitamin D deficiencies        PAST SURGICAL HISTORY:  Past Surgical History:   Procedure Laterality Date     C ANEURYSM, INTRACRAN, SIMPLE SURG  04/2017    coil of aneurysm right posterior paraophthalmic artery     C NONSPECIFIC PROCEDURE      S/P multiple breast biopies - all negative / benign     C NONSPECIFIC PROCEDURE      S/P T&A     C NONSPECIFIC PROCEDURE      [place] teeth extraction     C NONSPECIFIC PROCEDURE      S/P (? unreadable) ankle     COLONOSCOPY N/A 8/12/2015    Procedure: COLONOSCOPY;  Surgeon: Deandre Brooks MD;  Location: [facility]     GASTRIC BYPASS  June 24, 2010     ORTHOPEDIC SURGERY Left 2010    wrist fracture     PARATHYROIDECTOMY  9/19/11       FAMILY HISTORY:  Family History   Problem Relation Age of Onset     Breast Cancer Mother      Hypertension Mother      Arthritis Mother      Thyroid Disease Mother      hypo     Ulcerative Colitis Mother      Breast Cancer Maternal Aunt      Hypertension Paternal Grandmother      Cerebrovascular Disease Maternal Grandmother      Family History Negative Maternal Grandfather      Family History Negative Paternal Grandfather      Family History Negative Son      Family History Negative Daughter      Family History Negative Daughter      Ulcerative Colitis Sister   "    Colon Cancer No family hx of        SOCIAL HISTORY:  Social History     Social History     Marital status: Single     Spouse name: N/A     Number of children: N/A     Years of education: N/A     Social History Main Topics     Smoking status: Never Smoker     Smokeless tobacco: Never Used     Alcohol use 0.0 oz/week     0 Standard drinks or equivalent per week      Comment: 1 glass wine 4-5 days a week     Drug use: No     Sexual activity: Not Currently     Partners: Male     Other Topics Concern     None     Social History Narrative       Review of Systems:  Skin:  Negative       Eyes:  Positive for contacts;glasses    ENT:  Negative hearing loss (left ear only)    Respiratory:  Positive for sleep apnea;CPAP     Cardiovascular:  Negative Positive for;palpitations occ.  Gastroenterology: Negative   loose stool since starting iron; occ  Genitourinary:  not assessed      Musculoskeletal:  Negative      Neurologic:  Negative migraine headaches;stroke (hx of migraines)    Psychiatric:  Negative      Heme/Lymph/Imm:  Positive for allergies    Endocrine:  Negative        Physical Exam:  Vitals: /73  Pulse 69  Ht 1.676 m (5' 6\")  Wt 87.1 kg (192 lb)  BMI 30.99 kg/m2    Constitutional:  cooperative, alert and oriented, well developed, well nourished, in no acute distress        Skin:  warm and dry to the touch, no apparent skin lesions or masses noted     scattered ecchymosis    Head:  normocephalic        Eyes:  pupils equal and round        Lymph:      ENT:  no pallor or cyanosis        Neck:  JVP normal        Respiratory:  normal breath sounds, clear to auscultation, normal A-P diameter, normal symmetry, normal respiratory excursion, no use of accessory muscles         Cardiac: regular rhythm, normal S1/S2, no S3 or S4, apical impulse not displaced, no murmurs, gallops or rubs                pulses full and equal                                   rigth groin with moderate hemtoma  2cm by 6cm    GI:  abdomen " soft obese      Extremities and Muscular Skeletal:  no deformities, clubbing, cyanosis, erythema observed;no edema         healed mirella bite right wrist    Neurological:           Psych:         Recent Lab Results:  LIPID RESULTS:  Lab Results   Component Value Date    CHOL 118 04/16/2018    HDL 66 04/16/2018    LDL 43 04/16/2018    TRIG 46 04/16/2018    CHOLHDLRATIO 2.9 07/29/2015       LIVER ENZYME RESULTS:  Lab Results   Component Value Date    AST 23 02/10/2017    ALT 20 02/10/2017       CBC RESULTS:  Lab Results   Component Value Date    WBC 6.6 04/16/2018    RBC 4.45 04/16/2018    HGB 13.1 04/16/2018    HCT 40.6 04/16/2018    MCV 91 04/16/2018    MCH 29.4 04/16/2018    MCHC 32.3 04/16/2018    RDW 14.1 04/16/2018     04/16/2018       BMP RESULTS:  Lab Results   Component Value Date     07/13/2017    POTASSIUM 3.5 07/13/2017    CHLORIDE 101 07/13/2017    CO2 28 07/13/2017    ANIONGAP 7 07/13/2017    GLC 84 04/17/2017    BUN 16 04/17/2017    CR 0.74 07/13/2017    GFRESTIMATED 85 01/08/2018    GFRESTBLACK >90 01/08/2018    MAXIMILIANO 9.6 04/17/2017        A1C RESULTS:  Lab Results   Component Value Date    A1C 5.7 06/17/2010       INR RESULTS:  Lab Results   Component Value Date    INR 0.93 07/13/2017    INR 1.07 04/10/2017           CC  Joanie Soriano, APRN CNP  [address]  [place] [address]

## 2018-07-10 NOTE — TELEPHONE ENCOUNTER
"Requested Prescriptions   Pending Prescriptions Disp Refills     imipramine (TOFRANIL) 25 MG tablet [Pharmacy Med Name: IMIPRAMINE HCL 25 MG TABLET]  Last Written Prescription Date:  5/8/2018  Last Fill Quantity: 30,  # refills: 1   Last office visit: 4/16/2018 with prescribing provider:     Future Office Visit:   Next 5 appointments (look out 90 days)     Jul 10, 2018 10:50 AM CDT   Return Visit with RON Bolton CNP   [facility] ([facility]    [address] [place] [address] [address]  [facility] [address] [phone] OPT 2            Jul 12, 2018 10:20 AM CDT   MyChart Short with [facility]ws [facility]   [facility]    [address]  [facility] [id number] [phone]                30 tablet 1     Sig: TAKE 1 TABLET (25 MG) BY MOUTH DAILY. KEEP APPT    Tricyclic Antidepressants Protocol Passed    7/10/2018  1:31 AM       Passed - Blood pressure under 140/90 in past 12 months    BP Readings from Last 3 Encounters:   04/18/18 126/72   04/16/18 132/70   01/11/18 102/55                Passed - Recent (12 mo) or future (30 days) visit within the authorizing provider's specialty     Patient had office visit in the last 12 months or has a visit in the next 30 days with authorizing provider or within the authorizing provider's specialty.  See \"Patient Info\" tab in inbasket, or \"Choose Columns\" in Meds & Orders section of the refill encounter.           Passed - Patient is age 18 or older       Passed - No active pregnancy on record       Passed - No positive pregnancy test in past 12 months        "

## 2018-07-12 ENCOUNTER — OFFICE VISIT (OUTPATIENT)
Dept: INTERNAL MEDICINE | Facility: CLINIC | Age: 63
End: 2018-07-12
Payer: COMMERCIAL

## 2018-07-12 VITALS
HEART RATE: 72 BPM | HEIGHT: 66 IN | DIASTOLIC BLOOD PRESSURE: 74 MMHG | OXYGEN SATURATION: 97 % | RESPIRATION RATE: 17 BRPM | BODY MASS INDEX: 30.65 KG/M2 | TEMPERATURE: 98 F | WEIGHT: 190.7 LBS | SYSTOLIC BLOOD PRESSURE: 112 MMHG

## 2018-07-12 DIAGNOSIS — I63.89 LEFT TEMPORAL LOBE INFARCTION (H): ICD-10-CM

## 2018-07-12 DIAGNOSIS — R00.2 PALPITATIONS: ICD-10-CM

## 2018-07-12 DIAGNOSIS — I10 ESSENTIAL HYPERTENSION WITH GOAL BLOOD PRESSURE LESS THAN 140/90: ICD-10-CM

## 2018-07-12 DIAGNOSIS — Z98.84 S/P GASTRIC BYPASS: Primary | ICD-10-CM

## 2018-07-12 PROCEDURE — 99214 OFFICE O/P EST MOD 30 MIN: CPT | Performed by: INTERNAL MEDICINE

## 2018-07-12 RX ORDER — LOSARTAN POTASSIUM 50 MG/1
50 TABLET ORAL DAILY
Qty: 90 TABLET | Refills: 3 | Status: SHIPPED | OUTPATIENT
Start: 2018-07-12 | End: 2018-12-05

## 2018-07-12 RX ORDER — HYDROCHLOROTHIAZIDE 12.5 MG/1
12.5 TABLET ORAL DAILY
Qty: 90 TABLET | Refills: 3 | Status: SHIPPED | OUTPATIENT
Start: 2018-07-12 | End: 2018-12-05

## 2018-07-12 RX ORDER — ATENOLOL 50 MG/1
TABLET ORAL
Qty: 90 TABLET | Refills: 3 | Status: SHIPPED | OUTPATIENT
Start: 2018-07-12 | End: 2019-07-10

## 2018-07-12 RX ORDER — ATORVASTATIN CALCIUM 40 MG/1
TABLET, FILM COATED ORAL
Qty: 90 TABLET | Refills: 3 | Status: SHIPPED | OUTPATIENT
Start: 2018-07-12 | End: 2018-12-05

## 2018-07-12 NOTE — MR AVS SNAPSHOT
"              After Visit Summary   7/12/2018    Jose Coronado    MRN: [id number]           Patient Information     Date Of Birth          1955        Visit Information        Provider Department      7/12/2018 10:20 AM Mary Mathews MD [facility]        Today's Diagnoses     S/P gastric bypass    -  1    Palpitations        Essential hypertension with goal blood pressure less than 140/90        Left temporal lobe infarction (H)           Follow-ups after your visit        Who to contact     If you have questions or need follow up information about today's clinic visit or your schedule please contact [facility] directly at [phone].  Normal or non-critical lab and imaging results will be communicated to you by MyChart, letter or phone within 4 business days after the clinic has received the results. If you do not hear from us within 7 days, please contact the clinic through [employer] or phone. If you have a critical or abnormal lab result, we will notify you by phone as soon as possible.  Submit refill requests through [employer] or call your pharmacy and they will forward the refill request to us. Please allow 3 business days for your refill to be completed.          Additional Information About Your Visit        MyChart Information     [employer] gives you secure access to your electronic health record. If you see a primary care provider, you can also send messages to your care team and make appointments. If you have questions, please call your primary care clinic.  If you do not have a primary care provider, please call [phone] and they will assist you.        Care EveryWhere ID     This is your Care EveryWhere ID. This could be used by other organizations to access your [facility] medical records  [id number]        Your Vitals Were     Pulse Temperature Respirations Height Pulse Oximetry BMI (Body Mass Index)    72 98  F (36.7  C) (Oral) 17 5' 6\" (1.676 " m) 97% 30.78 kg/m2       Blood Pressure from Last 3 Encounters:   07/12/18 112/74   07/10/18 115/73   04/18/18 126/72    Weight from Last 3 Encounters:   07/12/18 190 lb 11.2 oz (86.5 kg)   07/10/18 192 lb (87.1 kg)   04/18/18 188 lb (85.3 kg)              Today, you had the following     No orders found for display         Today's Medication Changes          These changes are accurate as of 7/12/18 11:59 PM.  If you have any questions, ask your nurse or doctor.               These medicines have changed or have updated prescriptions.        Dose/Directions    topiramate 25 MG tablet   Commonly known as:  TOPAMAX   This may have changed:    - when to take this  - additional instructions   Used for:  Migraine        Dose:  25 mg   Take 1 tablet (25 mg) by mouth 2 times daily Prescribed by Dr Tello   Quantity:  180 tablet   Refills:  3            Where to get your medicines      These medications were sent to [id number] - [facility] [address]  [address] [facility] [address]     Phone:  [phone]     atenolol 50 MG tablet    atorvastatin 40 MG tablet    hydrochlorothiazide 12.5 MG Tabs tablet    losartan 50 MG tablet                Primary Care Provider Office Phone # Fax #    Lian Martinez [phone]       [address] [facility] [address]  [facility] [address]        Equal Access to Services     [facility] AH: Hadii ana ku hadjohno Somalissa, waaxda luqadaha, qaybta kaalmada adeegyada, eve herbert haydarling dias . So [facility] [phone].    ATENCIÓN: Si habla español, tiene a tuttle disposición servicios gratuitos de asistencia lingüística. Jen al [phone].    We comply with applicable federal civil rights laws and [place] laws. We do not discriminate on the basis of race, color, national origin, age, disability, sex, sexual orientation, or gender identity.            Thank you!     Thank you for choosing [facility]  for your care.  Our goal is always to provide you with excellent care. Hearing back from our patients is one way we can continue to improve our services. Please take a few minutes to complete the written survey that you may receive in the mail after your visit with us. Thank you!             Your Updated Medication List - Protect others around you: Learn how to safely use, store and throw away your medicines at www.disposemymeds.org.          This list is accurate as of 7/12/18 11:59 PM.  Always use your most recent med list.                   Brand Name Dispense Instructions for use Diagnosis    acetaminophen 325 MG tablet    TYLENOL    100 tablet    Take 2 tablets (650 mg) by mouth every 4 hours as needed for mild pain    Generalized pain       aspirin 325 MG tablet      Take 325 mg by mouth        atenolol 50 MG tablet    TENORMIN    90 tablet    TAKE 1 TABLET (50 MG) BY MOUTH DAILY    Palpitations, Essential hypertension with goal blood pressure less than 140/90       atorvastatin 40 MG tablet    LIPITOR    90 tablet    TAKE 1 TABLET (40 MG) BY MOUTH DAILY    Left temporal lobe infarction (H)       CALCIUM CITRATE + D PO      Take 2 tablets by mouth every morning        CLARITIN 10 MG tablet   Generic drug:  loratadine      Take 10 mg by mouth daily        Cyanocobalamin 2500 MCG Tabs      Take 2,500 mcg by mouth twice a week Mon, Thur        Ferrous Sulfate 27 MG Tabs      Take 27 mg by mouth 2 times daily        FIBER COMPLETE PO      Take 1 capsule by mouth daily        hydrochlorothiazide 12.5 MG Tabs tablet     90 tablet    Take 1 tablet (12.5 mg) by mouth daily    Essential hypertension with goal blood pressure less than 140/90       IBUPROFEN PO      Take 200 mg by mouth every 6 hours as needed        imipramine 25 MG tablet    TOFRANIL    90 tablet    TAKE 1 TABLET (25 MG) BY MOUTH DAILY. KEEP APPT    Bladder dysfunction       losartan 50 MG tablet    COZAAR    90 tablet    Take 1 tablet (50 mg) by mouth daily     Essential hypertension with goal blood pressure less than 140/90       topiramate 25 MG tablet    TOPAMAX    180 tablet    Take 1 tablet (25 mg) by mouth 2 times daily Prescribed by Dr Tello    Migraine       UNABLE TO FIND      CPAP machine every night        valACYclovir 1000 mg tablet    VALTREX    4 tablet    Take 2 tablets (2,000 mg) by mouth 2 times daily as needed    Cold sore       VITAMIN C PO      Take 250 mg by mouth 2 times daily (0.5 x 500 mg tablet = 250 mg dose)        VITAMIN D (CHOLECALCIFEROL) PO      Take 500 Units by mouth daily        ZADITOR 0.025 % Soln ophthalmic solution   Generic drug:  ketotifen     1 Bottle    Place 1 drop into both eyes every 12 hours as needed for itching

## 2018-07-12 NOTE — PROGRESS NOTES
"    ASSESSMENT & PLAN:                                                      (R00.2) Palpitations  Comment: Controlled    Plan: atenolol (TENORMIN) 50 MG tablet            (I10) Essential hypertension with goal blood pressure less than 140/90  Comment: Controlled    Plan: atenolol (TENORMIN) 50 MG tablet,         hydrochlorothiazide 12.5 MG TABS tablet,         losartan (COZAAR) 50 MG tablet            (I63.50) Left temporal lobe infarction (H)  Comment: subacute  Plan: atorvastatin (LIPITOR) 40 MG tablet          (Z98.84) S/P gastric bypass  (primary encounter diagnosis)  Comment:   Plan: Labs in April showed normal levels for PTH, vitamins.  She will continue the same supplements for now         Chief Complaint:                                                        Follow up chronic medical problems      SUBJECTIVE:                                                    History of present illness     1 year from the aneurysm, Doing great.  Had follow-up with cardiology and neurology.  No acute complaints      ROS:                                                      ROS: negative for fever, chills, cough, wheezes, chest pain, shortness of breath, vomiting, abdominal pain, leg swelling       OBJECTIVE:                                                    Physical Exam :    Blood pressure 112/74, pulse 72, temperature 98  F (36.7  C), temperature source Oral, resp. rate 17, height 5' 6\" (1.676 m), weight 190 lb 11.2 oz (86.5 kg), SpO2 97 %, not currently breastfeeding.   NAD, appears comfortable  Skin: no rashes   Neck: supple, no JVD, No thyroidmegaly. Lymph nodes nonpalpable cervical and supraclavicular.  Chest: clear to auscultation bilaterally, good respiratory effort  Heart: S1 S2, RRR, no mgr appreciated  Abdomen: soft, not tender, no hepatosplenomegaly or masses appreciated, no abdominal bruit, present bowel sounds  Extremities: no edema,  Neurologic: A, Ox3, no focal signs appreciated    PMHx: reviewed  Past " Medical History:   Diagnosis Date     Anemia      CVA (cerebral vascular accident) (H) 2017    ?migraine, ?pfo--negative vasc w/u, neg hypercoag w/u     Diffuse cystic mastopathy     Fibrocystic breast disease     HTN, goal below 140/90      Hyperparathyroidism (H)      Infectious mononucleosis     Mono at age 16     Irregular heart beat     PAT no afib on 30day monitor     Labyrinthitis, unspecified      Migraine headache with aura      Osteopenia      Pain in joint, shoulder region     Secondary to a fall     PFO (patent foramen ovale)     s/p closure with amplazter device 7/13/17     S/P gastric bypass June, 2010     Sleep apnea     she is on CPAP     Vitamin D deficiencies       PSHx: reviewed  Past Surgical History:   Procedure Laterality Date     C ANEURYSM, INTRACRAN, SIMPLE SURG  04/2017    coil of aneurysm right posterior paraophthalmic artery     C NONSPECIFIC PROCEDURE      S/P multiple breast biopies - all negative / benign     C NONSPECIFIC PROCEDURE      S/P T&A     C NONSPECIFIC PROCEDURE      [place] teeth extraction     C NONSPECIFIC PROCEDURE      S/P (? unreadable) ankle     COLONOSCOPY N/A 8/12/2015    Procedure: COLONOSCOPY;  Surgeon: Deandre Brooks MD;  Location:  GI     GASTRIC BYPASS  June 24, 2010     ORTHOPEDIC SURGERY Left 2010    wrist fracture     PARATHYROIDECTOMY  9/19/11        Meds: reviewed  Current Outpatient Prescriptions   Medication Sig Dispense Refill     acetaminophen (TYLENOL) 325 MG tablet Take 2 tablets (650 mg) by mouth every 4 hours as needed for mild pain 100 tablet      Ascorbic Acid (VITAMIN C PO) Take 250 mg by mouth 2 times daily (0.5 x 500 mg tablet = 250 mg dose)       aspirin 325 MG tablet Take 325 mg by mouth       atenolol (TENORMIN) 50 MG tablet TAKE 1 TABLET (50 MG) BY MOUTH DAILY 90 tablet 1     atorvastatin (LIPITOR) 40 MG tablet TAKE 1 TABLET (40 MG) BY MOUTH DAILY 90 tablet 0     Calcium Citrate-Vitamin D (CALCIUM CITRATE + D PO) Take 2 tablets by  mouth every morning        Cyanocobalamin 2500 MCG TABS Take 2,500 mcg by mouth twice a week Mon, Thur       Ferrous Sulfate 27 MG TABS Take 27 mg by mouth 2 times daily       FIBER COMPLETE PO Take 1 capsule by mouth daily        hydrochlorothiazide 12.5 MG TABS tablet Take 1 tablet (12.5 mg) by mouth daily 90 tablet 1     IBUPROFEN PO Take 200 mg by mouth every 6 hours as needed        imipramine (TOFRANIL) 25 MG tablet TAKE 1 TABLET (25 MG) BY MOUTH DAILY. KEEP APPT 90 tablet 1     ketotifen (ZADITOR) 0.025 % SOLN Place 1 drop into both eyes every 12 hours as needed for itching 1 Bottle      loratadine (CLARITIN) 10 MG tablet Take 10 mg by mouth daily       losartan (COZAAR) 50 MG tablet Take 1 tablet (50 mg) by mouth daily 90 tablet 1     topiramate (TOPAMAX) 25 MG tablet Take 1 tablet (25 mg) by mouth 2 times daily Prescribed by Dr Tello (Patient taking differently: Take 25 mg by mouth daily Prescribed by Dr Tello) 180 tablet 3     UNABLE TO FIND CPAP machine every night       valACYclovir (VALTREX) 1000 mg tablet Take 2 tablets (2,000 mg) by mouth 2 times daily as needed 4 tablet 5     VITAMIN D, CHOLECALCIFEROL, PO Take 500 Units by mouth daily        [DISCONTINUED] atenolol (TENORMIN) 25 MG tablet TAKE 2 TABLETS (50 MG) BY MOUTH DAILY (Patient not taking: Reported on 7/12/2018) 180 tablet 2     [DISCONTINUED] hydrochlorothiazide (MICROZIDE) 12.5 MG capsule Take 12.5 mg by mouth daily       [DISCONTINUED] LOSARTAN POTASSIUM PO Take 50 mg by mouth daily (0.5 x 100 mg tablet = 50 mg dose)         Soc Hx: reviewed  Fam Hx: reviewed          Mary Spaulding MD  Internal Medicine

## 2018-08-23 ENCOUNTER — MYC MEDICAL ADVICE (OUTPATIENT)
Dept: INTERNAL MEDICINE | Facility: CLINIC | Age: 63
End: 2018-08-23

## 2018-08-25 ENCOUNTER — MYC MEDICAL ADVICE (OUTPATIENT)
Dept: INTERNAL MEDICINE | Facility: CLINIC | Age: 63
End: 2018-08-25

## 2018-08-25 DIAGNOSIS — E78.5 HYPERLIPIDEMIA LDL GOAL <130: Primary | ICD-10-CM

## 2018-08-26 ENCOUNTER — MYC MEDICAL ADVICE (OUTPATIENT)
Dept: INTERNAL MEDICINE | Facility: CLINIC | Age: 63
End: 2018-08-26

## 2018-08-26 DIAGNOSIS — H26.9 CATARACT, UNSPECIFIED CATARACT TYPE, UNSPECIFIED LATERALITY: Primary | ICD-10-CM

## 2018-09-04 DIAGNOSIS — B00.1 COLD SORE: ICD-10-CM

## 2018-09-05 NOTE — TELEPHONE ENCOUNTER
"Requested Prescriptions   Pending Prescriptions Disp Refills     valACYclovir (VALTREX) 1000 mg tablet [Pharmacy Med Name: VALACYCLOVIR HCL 1 GRAM TABLET] 4 tablet 2    Last Written Prescription Date:  08/09/2017  Last Fill Quantity: 4,  # refills: 5   Last office visit: 7/12/2018 with prescribing provider:     Future Office Visit:   Sig: TAKE 2 TABLETS (2,000 MG) BY MOUTH 2 TIMES DAILY AS NEEDED    Antivirals for Herpes Protocol Passed    9/4/2018  8:57 AM       Passed - Patient is age 12 or older       Passed - Recent (12 mo) or future (30 days) visit within the authorizing provider's specialty    Patient had office visit in the last 12 months or has a visit in the next 30 days with authorizing provider or within the authorizing provider's specialty.  See \"Patient Info\" tab in inbasket, or \"Choose Columns\" in Meds & Orders section of the refill encounter.           Passed - Normal serum creatinine on file in past 12 months    Recent Labs   Lab Test  01/08/18   1134  07/13/17   0700   CR   --   0.74   CREAT  0.7   --              "

## 2018-09-06 RX ORDER — VALACYCLOVIR HYDROCHLORIDE 1 G/1
TABLET, FILM COATED ORAL
Qty: 4 TABLET | Refills: 9 | Status: SHIPPED | OUTPATIENT
Start: 2018-09-06 | End: 2019-12-08

## 2018-09-30 DIAGNOSIS — I10 ESSENTIAL HYPERTENSION WITH GOAL BLOOD PRESSURE LESS THAN 140/90: Chronic | ICD-10-CM

## 2018-10-01 RX ORDER — HYDROCHLOROTHIAZIDE 25 MG/1
TABLET ORAL
Start: 2018-10-01

## 2018-10-01 NOTE — TELEPHONE ENCOUNTER
"Requested Prescriptions   Pending Prescriptions Disp Refills     hydrochlorothiazide (HYDRODIURIL) 25 MG tablet [Pharmacy Med Name: HYDROCHLOROTHIAZIDE 25 MG TAB] 45 tablet 3    Last Written Prescription Date:  07/12/2018  Last Fill Quantity: 90,  # refills: 3   Last office visit: 7/12/2018 with prescribing provider:     Future Office Visit:   Next 5 appointments (look out 90 days)     Dec 05, 2018 10:20 AM ALE Olvera with Lian Martinez MD   [facility]    [address]  [facility] [address]   [phone]                Sig: TAKE 0.5 TABLETS (12.5 MG) BY MOUTH DAILY    Diuretics (Including Combos) Protocol Failed    9/30/2018  1:53 AM       Failed - Normal serum potassium on file in past 12 months    Recent Labs   Lab Test  07/13/17   0700   POTASSIUM  3.5                   Failed - Normal serum sodium on file in past 12 months    Recent Labs   Lab Test  07/13/17   0700   NA  136             Passed - Blood pressure under 140/90 in past 12 months    BP Readings from Last 3 Encounters:   07/12/18 112/74   07/10/18 115/73   04/18/18 126/72                Passed - Recent (12 mo) or future (30 days) visit within the authorizing provider's specialty    Patient had office visit in the last 12 months or has a visit in the next 30 days with authorizing provider or within the authorizing provider's specialty.  See \"Patient Info\" tab in inbasket, or \"Choose Columns\" in Meds & Orders section of the refill encounter.           Passed - Patient is age 18 or older       Passed - No active pregancy on record       Passed - Normal serum creatinine on file in past 12 months    Recent Labs   Lab Test  07/13/17   0700   CR  0.74             Passed - No positive pregnancy test in past 12 months        "

## 2018-10-02 DIAGNOSIS — I10 ESSENTIAL HYPERTENSION WITH GOAL BLOOD PRESSURE LESS THAN 140/90: Chronic | ICD-10-CM

## 2018-10-02 NOTE — TELEPHONE ENCOUNTER
"Requested Prescriptions   Pending Prescriptions Disp Refills     hydrochlorothiazide (HYDRODIURIL) 25 MG tablet [Pharmacy Med Name: HYDROCHLOROTHIAZIDE 25 MG TAB]  Last Written Prescription Date:  7/12/18   (**written for 12.5mg**)  Last Fill Quantity: 90,  # refills: 3   Last office visit: 7/12/2018 with prescribing provider:  Jasson   Future Office Visit:   Next 5 appointments (look out 90 days)     Dec 05, 2018 10:20 AM ALE Olvera with Lian Martinez MD   [facility]    [address]  [facility] [address]   [phone]                  45 tablet 3     Sig: TAKE 0.5 TABLETS (12.5 MG) BY MOUTH DAILY    Diuretics (Including Combos) Protocol Failed    10/2/2018 11:03 AM       Failed - Normal serum potassium on file in past 12 months    Recent Labs   Lab Test  07/13/17   0700   POTASSIUM  3.5                   Failed - Normal serum sodium on file in past 12 months    Recent Labs   Lab Test  07/13/17   0700   NA  136             Passed - Blood pressure under 140/90 in past 12 months    BP Readings from Last 3 Encounters:   07/12/18 112/74   07/10/18 115/73   04/18/18 126/72                Passed - Recent (12 mo) or future (30 days) visit within the authorizing provider's specialty    Patient had office visit in the last 12 months or has a visit in the next 30 days with authorizing provider or within the authorizing provider's specialty.  See \"Patient Info\" tab in inbasket, or \"Choose Columns\" in Meds & Orders section of the refill encounter.           Passed - Patient is age 18 or older       Passed - No active pregancy on record       Passed - Normal serum creatinine on file in past 12 months    Recent Labs   Lab Test  07/13/17   0700   CR  0.74             Passed - No positive pregnancy test in past 12 months          "

## 2018-10-03 RX ORDER — HYDROCHLOROTHIAZIDE 25 MG/1
TABLET ORAL
Qty: 45 TABLET | Refills: 3 | OUTPATIENT
Start: 2018-10-03

## 2018-10-12 DIAGNOSIS — I10 ESSENTIAL HYPERTENSION WITH GOAL BLOOD PRESSURE LESS THAN 140/90: ICD-10-CM

## 2018-10-12 NOTE — TELEPHONE ENCOUNTER
"Requested Prescriptions   Pending Prescriptions Disp Refills     hydrochlorothiazide 12.5 MG TABS tablet [Pharmacy Med Name: HYDROCHLOROTHIAZIDE 12.5 MG TB] 90 tablet 1    Last Written Prescription Date:  07/12/2018  Last Fill Quantity: 90,  # refills: 3   Last office visit: 7/12/2018 with prescribing provider:     Future Office Visit:   Next 5 appointments (look out 90 days)     Dec 05, 2018 10:20 AM ALE Olvera with Lian Martinez MD   [facility] ([facility])    [address]  [facility] [address]   [phone]                Sig: TAKE 1 TABLET (12.5 MG) BY MOUTH DAILY    Diuretics (Including Combos) Protocol Failed    10/12/2018  1:29 AM       Failed - Normal serum potassium on file in past 12 months    Recent Labs   Lab Test  07/13/17   0700   POTASSIUM  3.5                   Failed - Normal serum sodium on file in past 12 months    Recent Labs   Lab Test  07/13/17   0700   NA  136             Passed - Blood pressure under 140/90 in past 12 months    BP Readings from Last 3 Encounters:   07/12/18 112/74   07/10/18 115/73   04/18/18 126/72                Passed - Recent (12 mo) or future (30 days) visit within the authorizing provider's specialty    Patient had office visit in the last 12 months or has a visit in the next 30 days with authorizing provider or within the authorizing provider's specialty.  See \"Patient Info\" tab in inbasket, or \"Choose Columns\" in Meds & Orders section of the refill encounter.           Passed - Patient is age 18 or older       Passed - No active pregancy on record       Passed - Normal serum creatinine on file in past 12 months    Recent Labs   Lab Test  07/13/17   0700   CR  0.74             Passed - No positive pregnancy test in past 12 months        losartan (COZAAR) 50 MG tablet [Pharmacy Med Name: LOSARTAN POTASSIUM 50 MG TAB] 90 tablet 1    Last Written Prescription Date:  07/12/2018  Last Fill Quantity: 90,  # " "refills: 3   Last office visit: 7/12/2018 with prescribing provider:     Future Office Visit:   Next 5 appointments (look out 90 days)     Dec 05, 2018 10:20 AM ALE Olvera with Lian Martinez MD   [facility]    [address]  [facility] [address]   [phone]                Sig: TAKE 1 TABLET (50 MG) BY MOUTH DAILY    Angiotensin-II Receptors Failed    10/12/2018  1:29 AM       Failed - Normal serum potassium on file in past 12 months    Recent Labs   Lab Test  07/13/17   0700   POTASSIUM  3.5                   Passed - Blood pressure under 140/90 in past 12 months    BP Readings from Last 3 Encounters:   07/12/18 112/74   07/10/18 115/73   04/18/18 126/72                Passed - Recent (12 mo) or future (30 days) visit within the authorizing provider's specialty    Patient had office visit in the last 12 months or has a visit in the next 30 days with authorizing provider or within the authorizing provider's specialty.  See \"Patient Info\" tab in inbasket, or \"Choose Columns\" in Meds & Orders section of the refill encounter.           Passed - Patient is age 18 or older       Passed - No active pregnancy on record       Passed - Normal serum creatinine on file in past 12 months    Recent Labs   Lab Test  01/08/18   1134  07/13/17   0700   CR   --   0.74   CREAT  0.7   --             Passed - No positive pregnancy test in past 12 months        "

## 2018-10-15 RX ORDER — LOSARTAN POTASSIUM 50 MG/1
TABLET ORAL
Qty: 90 TABLET | Refills: 1 | Status: SHIPPED | OUTPATIENT
Start: 2018-10-15 | End: 2019-07-30

## 2018-10-15 RX ORDER — HYDROCHLOROTHIAZIDE 12.5 MG/1
TABLET ORAL
Qty: 90 TABLET | Refills: 1 | Status: SHIPPED | OUTPATIENT
Start: 2018-10-15 | End: 2019-07-30

## 2018-10-15 NOTE — TELEPHONE ENCOUNTER
Routing refill request to provider for review/approval because:  Labs not current:  K+ and Na

## 2018-10-24 DIAGNOSIS — I10 ESSENTIAL HYPERTENSION WITH GOAL BLOOD PRESSURE LESS THAN 140/90: ICD-10-CM

## 2018-10-24 DIAGNOSIS — E78.5 HYPERLIPIDEMIA LDL GOAL <130: ICD-10-CM

## 2018-10-24 PROCEDURE — 80061 LIPID PANEL: CPT | Performed by: INTERNAL MEDICINE

## 2018-10-24 PROCEDURE — 36415 COLL VENOUS BLD VENIPUNCTURE: CPT | Performed by: INTERNAL MEDICINE

## 2018-10-24 PROCEDURE — 80048 BASIC METABOLIC PNL TOTAL CA: CPT | Performed by: INTERNAL MEDICINE

## 2018-10-25 LAB
ANION GAP SERPL CALCULATED.3IONS-SCNC: 4 MMOL/L (ref 3–14)
BUN SERPL-MCNC: 18 MG/DL (ref 7–30)
CALCIUM SERPL-MCNC: 9.1 MG/DL (ref 8.5–10.1)
CHLORIDE SERPL-SCNC: 102 MMOL/L (ref 94–109)
CHOLEST SERPL-MCNC: 145 MG/DL
CO2 SERPL-SCNC: 32 MMOL/L (ref 20–32)
CREAT SERPL-MCNC: 0.72 MG/DL (ref 0.52–1.04)
GFR SERPL CREATININE-BSD FRML MDRD: 82 ML/MIN/1.7M2
GLUCOSE SERPL-MCNC: 90 MG/DL (ref 70–99)
HDLC SERPL-MCNC: 63 MG/DL
LDLC SERPL CALC-MCNC: 73 MG/DL
NONHDLC SERPL-MCNC: 82 MG/DL
POTASSIUM SERPL-SCNC: 4.4 MMOL/L (ref 3.4–5.3)
SODIUM SERPL-SCNC: 138 MMOL/L (ref 133–144)
TRIGL SERPL-MCNC: 47 MG/DL

## 2018-12-05 ENCOUNTER — OFFICE VISIT (OUTPATIENT)
Dept: INTERNAL MEDICINE | Facility: CLINIC | Age: 63
End: 2018-12-05
Payer: COMMERCIAL

## 2018-12-05 VITALS
DIASTOLIC BLOOD PRESSURE: 78 MMHG | TEMPERATURE: 98.3 F | OXYGEN SATURATION: 100 % | HEIGHT: 66 IN | BODY MASS INDEX: 31.15 KG/M2 | HEART RATE: 70 BPM | WEIGHT: 193.8 LBS | RESPIRATION RATE: 16 BRPM | SYSTOLIC BLOOD PRESSURE: 130 MMHG

## 2018-12-05 DIAGNOSIS — I63.89 LEFT TEMPORAL LOBE INFARCTION (H): ICD-10-CM

## 2018-12-05 DIAGNOSIS — E66.01 MORBID OBESITY DUE TO EXCESS CALORIES (H): ICD-10-CM

## 2018-12-05 DIAGNOSIS — Z12.31 VISIT FOR SCREENING MAMMOGRAM: ICD-10-CM

## 2018-12-05 DIAGNOSIS — N31.9 BLADDER DYSFUNCTION: ICD-10-CM

## 2018-12-05 DIAGNOSIS — E21.3 HYPERPARATHYROIDISM (H): ICD-10-CM

## 2018-12-05 DIAGNOSIS — I10 ESSENTIAL HYPERTENSION WITH GOAL BLOOD PRESSURE LESS THAN 140/90: Primary | ICD-10-CM

## 2018-12-05 DIAGNOSIS — Z23 NEED FOR PROPHYLACTIC VACCINATION AND INOCULATION AGAINST INFLUENZA: ICD-10-CM

## 2018-12-05 PROBLEM — I48.0 PAROXYSMAL ATRIAL FIBRILLATION (H): Status: RESOLVED | Noted: 2017-06-09 | Resolved: 2018-12-05

## 2018-12-05 PROCEDURE — 90471 IMMUNIZATION ADMIN: CPT | Performed by: INTERNAL MEDICINE

## 2018-12-05 PROCEDURE — 99214 OFFICE O/P EST MOD 30 MIN: CPT | Mod: 25 | Performed by: INTERNAL MEDICINE

## 2018-12-05 PROCEDURE — 90682 RIV4 VACC RECOMBINANT DNA IM: CPT | Performed by: INTERNAL MEDICINE

## 2018-12-05 RX ORDER — IMIPRAMINE HCL 25 MG
TABLET ORAL
Qty: 90 TABLET | Refills: 3 | Status: SHIPPED | OUTPATIENT
Start: 2018-12-05 | End: 2019-12-10

## 2018-12-05 RX ORDER — OMEGA-3 FATTY ACIDS/FISH OIL 300-1000MG
1 CAPSULE ORAL EVERY MORNING
Qty: 90 CAPSULE | COMMUNITY
Start: 2018-12-05 | End: 2022-06-09

## 2018-12-05 NOTE — NURSING NOTE
"/78 (BP Location: Right arm, Patient Position: Sitting, Cuff Size: Adult Large)  Pulse 70  Temp 98.3  F (36.8  C) (Oral)  Resp 16  Ht 5' 6\" (1.676 m)  Wt 193 lb 12.8 oz (87.9 kg)  SpO2 100%  Breastfeeding? No  BMI 31.28 kg/m2  Sagrario Qiuntanilla CMA    "

## 2018-12-05 NOTE — MR AVS SNAPSHOT
After Visit Summary   12/5/2018    Jose Coronado    MRN: [id number]           Patient Information     Date Of Birth          1955        Visit Information        Provider Department      12/5/2018 10:20 AM Lian Martinez MD [facility]        Today's Diagnoses     Essential hypertension with goal blood pressure less than 140/90    -  1    Left temporal lobe infarction        Morbid obesity due to excess calories (H)        Hyperparathyroidism (H)        Need for prophylactic vaccination and inoculation against influenza        Visit for screening mammogram        Bladder dysfunction           Follow-ups after your visit        Follow-up notes from your care team     Return in about 6 months (around 6/5/2019) for Physical Exam.      Future tests that were ordered for you today     Open Future Orders        Priority Expected Expires Ordered    *MA Screening Digital Bilateral Routine  12/5/2019 12/5/2018            Who to contact     If you have questions or need follow up information about today's clinic visit or your schedule please contact [facility] directly at [phone].  Normal or non-critical lab and imaging results will be communicated to you by [employer], letter or phone within 4 business days after the clinic has received the results. If you do not hear from us within 7 days, please contact the clinic through [email or web address] or phone. If you have a critical or abnormal lab result, we will notify you by phone as soon as possible.  Submit refill requests through [employer] or call your pharmacy and they will forward the refill request to us. Please allow 3 business days for your refill to be completed.          Additional Information About Your Visit        MyChart Information     [employer] gives you secure access to your electronic health record. If you see a primary care provider, you can also send messages to your care team and make appointments. If you have  "questions, please call your primary care clinic.  If you do not have a primary care provider, please call [phone] and they will assist you.        Care EveryWhere ID     This is your Care EveryWhere ID. This could be used by other organizations to access your [facility] medical records  [id number]        Your Vitals Were     Pulse Temperature Respirations Height Pulse Oximetry Breastfeeding?    70 98.3  F (36.8  C) (Oral) 16 5' 6\" (1.676 m) 100% No    BMI (Body Mass Index)                   31.28 kg/m2            Blood Pressure from Last 3 Encounters:   12/05/18 130/78   07/12/18 112/74   07/10/18 115/73    Weight from Last 3 Encounters:   12/05/18 193 lb 12.8 oz (87.9 kg)   07/12/18 190 lb 11.2 oz (86.5 kg)   07/10/18 192 lb (87.1 kg)              We Performed the Following     FLU VACCINE, (RIV4) RECOMBINANT HA  , IM (FluBlok, egg free) [31254]- >18 YRS (G recommended  50-64 YRS)     Vaccine Administration, Initial [20768]          Where to get your medicines      These medications were sent to [facility]/pharmacy #[id number] - [facility] [address]  [address] [employer] [facility] [address]     Phone:  [phone]     imipramine 25 MG tablet          Primary Care Provider Office Phone # Fax #    Lian Martinez [phone]       [address] [address]        Equal Access to Services     [facility] HospitalANTONIA AH: Hadii aad ku hadasho Soomaali, waaxda luqadaha, qaybta kaalmada adeegyada, eve frederick. So [facility] [phone].    ATENCIÓN: Si habla español, tiene a tuttle disposición servicios gratuitos de asistencia lingüística. Llame al [phone].    We comply with applicable federal civil rights laws and [place] laws. We do not discriminate on the basis of race, color, national origin, age, disability, sex, sexual orientation, or gender identity.            Thank you!     Thank you for choosing [facility]  for your care. Our " goal is always to provide you with excellent care. Hearing back from our patients is one way we can continue to improve our services. Please take a few minutes to complete the written survey that you may receive in the mail after your visit with us. Thank you!             Your Updated Medication List - Protect others around you: Learn how to safely use, store and throw away your medicines at www.disposemymeds.org.          This list is accurate as of 12/5/18 11:31 AM.  Always use your most recent med list.                   Brand Name Dispense Instructions for use Diagnosis    acetaminophen 325 MG tablet    TYLENOL    100 tablet    Take 2 tablets (650 mg) by mouth every 4 hours as needed for mild pain    Generalized pain       aspirin 325 MG tablet    ASA     Take 325 mg by mouth        atenolol 50 MG tablet    TENORMIN    90 tablet    TAKE 1 TABLET (50 MG) BY MOUTH DAILY    Palpitations, Essential hypertension with goal blood pressure less than 140/90       CALCIUM CITRATE + D PO      Take 2 tablets by mouth every morning        CLARITIN 10 MG tablet   Generic drug:  loratadine      Take 10 mg by mouth daily        Cyanocobalamin 2500 MCG Tabs      Take 2,500 mcg by mouth twice a week Mon, Thur        Ferrous Sulfate 27 MG Tabs      Take 27 mg by mouth 2 times daily        FIBER COMPLETE PO      Take 1 capsule by mouth daily        hydrochlorothiazide 12.5 MG tablet    HYDRODIURIL    90 tablet    TAKE 1 TABLET (12.5 MG) BY MOUTH DAILY    Essential hypertension with goal blood pressure less than 140/90       IBUPROFEN PO      Take 200 mg by mouth every 6 hours as needed        imipramine 25 MG tablet    TOFRANIL    90 tablet    TAKE 1 TABLET (25 MG) BY MOUTH DAILY. KEEP APPT    Bladder dysfunction       losartan 50 MG tablet    COZAAR    90 tablet    TAKE 1 TABLET (50 MG) BY MOUTH DAILY    Essential hypertension with goal blood pressure less than 140/90       omega 3 1000 MG Caps     90 capsule    Take 1 g by mouth  daily        UNABLE TO FIND      CPAP machine every night        valACYclovir 1000 mg tablet    VALTREX    4 tablet    TAKE 2 TABLETS (2,000 MG) BY MOUTH 2 TIMES DAILY AS NEEDED    Cold sore       VITAMIN C PO      Take 250 mg by mouth 2 times daily (0.5 x 500 mg tablet = 250 mg dose)        VITAMIN D (CHOLECALCIFEROL) PO      Take 500 Units by mouth daily        ZADITOR 0.025 % ophthalmic solution   Generic drug:  ketotifen     1 Bottle    Place 1 drop into both eyes every 12 hours as needed for itching

## 2018-12-05 NOTE — PROGRESS NOTES
"  SUBJECTIVE:   Jose Coronado is a 62 year old female who presents to clinic today for the following health issues:      Hyperlipidemia Follow-Up      Rate your low fat/cholesterol diet?: good    Taking statin?  Stopped because she was advised it probably wasn't necessary by her neurologist and Dr. Spaulding.     Other lipid medications/supplements?:  Fish oil/Omega 3, dose 1000mg without side effects    Hypertension Follow-up      Outpatient blood pressures are not being checked.    Low Salt Diet: She does Weight Watchers, so her diet is naturally low salt, but she doesn't actively avoid it.       Amount of exercise or physical activity: At least 10,000 steps per day, often anywhere from 12-14K    Problems taking medications regularly: No    Medication side effects: none    Diet: Weight Watchers    HPI:   Jose Coronado a 62 year old female here for above. She is still working on wt loss. She is down to a BMI of 31. Wants to know how far I think she should go. I recommended she get to a BMI of 29 which would be 180-185 lbs.     She has no problems with muscle cramps or weakness.     Problem list and histories reviewed & adjusted, as indicated.  Additional history: as documented    BP Readings from Last 3 Encounters:   12/05/18 130/78   07/12/18 112/74   07/10/18 115/73    Wt Readings from Last 3 Encounters:   12/05/18 193 lb 12.8 oz (87.9 kg)   07/12/18 190 lb 11.2 oz (86.5 kg)   07/10/18 192 lb (87.1 kg)                    Reviewed and updated as needed this visit by clinical staff  Tobacco  Med Hx  Surg Hx  Fam Hx  Soc Hx      Reviewed and updated as needed this visit by Provider         ROS:  Constitutional, HEENT, cardiovascular, pulmonary, GI, , musculoskeletal, neuro, skin, endocrine and psych systems are negative, except as otherwise noted.    OBJECTIVE:     /78 (BP Location: Right arm, Patient Position: Sitting, Cuff Size: Adult Large)  Pulse 70  Temp 98.3  F (36.8  C) (Oral)  Resp 16  Ht 5' 6\" " (1.676 m)  Wt 193 lb 12.8 oz (87.9 kg)  SpO2 100%  Breastfeeding? No  BMI 31.28 kg/m2  Body mass index is 31.28 kg/(m^2).  GENERAL: healthy, alert and no distress  RESP: lungs clear to auscultation - no rales, rhonchi or wheezes  CV: regular rate and rhythm, normal S1 S2, no S3 or S4, no murmur, click or rub, no peripheral edema and peripheral pulses strong  ABDOMEN: soft, nontender, no hepatosplenomegaly, no masses and bowel sounds normal  MS: no gross musculoskeletal defects noted, no edema  PSYCH: mentation appears normal, affect normal/bright      ASSESSMENT/PLAN:       1. Essential hypertension with goal blood pressure less than 140/90  under good control Continue current medications. Follow up in 6 months     2. Left temporal lobe infarction  Her neurologist does not want her on a statin with her LDL at 73, HDL 63 and triglycerides at 47 off any statin, Howevere she should stay on  every day.    3. Morbid obesity due to excess calories (H)  No longer fits this category, will take off problem list. Would like her to shoot for BMI of 29 which would be 180-185 lbs    4. Hyperparathyroidism (H)  Calcium in good range at 9.1, Follow up in 6 months     5. Need for prophylactic vaccination and inoculation against influenza     - FLU VACCINE, (RIV4) RECOMBINANT HA  , IM (FluBlok, egg free) [21080]- >18 YRS (FMG recommended  50-64 YRS)  - Vaccine Administration, Initial [67116]    6. Visit for screening mammogram  Due for  - *MA Screening Digital Bilateral; Future    7. Bladder dysfunction   refilled  - imipramine (TOFRANIL) 25 MG tablet; TAKE 1 TABLET (25 MG) BY MOUTH DAILY. KEEP APPT  Dispense: 90 tablet; Refill: 3    Follow up in 6 months     Lian Martinez MD  [facility]      Injectable Influenza Immunization Documentation    1.  Is the person to be vaccinated sick today?   No    2. Does the person to be vaccinated have an allergy to a component   of the vaccine?   No  Egg Allergy  Algorithm Link    3. Has the person to be vaccinated ever had a serious reaction   to influenza vaccine in the past?   No    4. Has the person to be vaccinated ever had Guillain-Barré syndrome?   No    Form completed by Sagrario Quintanilla CMA

## 2018-12-17 ENCOUNTER — MYC MEDICAL ADVICE (OUTPATIENT)
Dept: INTERNAL MEDICINE | Facility: CLINIC | Age: 63
End: 2018-12-17

## 2018-12-28 DIAGNOSIS — Z12.31 VISIT FOR SCREENING MAMMOGRAM: ICD-10-CM

## 2018-12-28 PROCEDURE — 77067 SCR MAMMO BI INCL CAD: CPT | Mod: TC

## 2019-01-04 ENCOUNTER — MYC MEDICAL ADVICE (OUTPATIENT)
Dept: INTERNAL MEDICINE | Facility: CLINIC | Age: 64
End: 2019-01-04

## 2019-01-04 DIAGNOSIS — G43.109 MIGRAINE WITH AURA AND WITHOUT STATUS MIGRAINOSUS, NOT INTRACTABLE: Primary | ICD-10-CM

## 2019-01-07 PROBLEM — G43.109 MIGRAINE WITH AURA AND WITHOUT STATUS MIGRAINOSUS, NOT INTRACTABLE: Status: ACTIVE | Noted: 2019-01-07

## 2019-01-07 RX ORDER — TOPIRAMATE 25 MG/1
25 TABLET, FILM COATED ORAL 2 TIMES DAILY
Qty: 180 TABLET | Refills: 3 | Status: SHIPPED | OUTPATIENT
Start: 2019-01-07 | End: 2020-01-07

## 2019-04-29 ENCOUNTER — APPOINTMENT (OUTPATIENT)
Dept: GENERAL RADIOLOGY | Facility: CLINIC | Age: 64
End: 2019-04-29
Attending: EMERGENCY MEDICINE
Payer: COMMERCIAL

## 2019-04-29 ENCOUNTER — HOSPITAL ENCOUNTER (EMERGENCY)
Facility: CLINIC | Age: 64
Discharge: HOME | End: 2019-04-29
Attending: EMERGENCY MEDICINE | Admitting: EMERGENCY MEDICINE
Payer: COMMERCIAL

## 2019-04-29 VITALS
HEART RATE: 64 BPM | DIASTOLIC BLOOD PRESSURE: 78 MMHG | SYSTOLIC BLOOD PRESSURE: 126 MMHG | OXYGEN SATURATION: 99 % | RESPIRATION RATE: 18 BRPM | WEIGHT: 195 LBS | HEIGHT: 66 IN | BODY MASS INDEX: 31.34 KG/M2 | TEMPERATURE: 97.7 F

## 2019-04-29 DIAGNOSIS — R07.89 ATYPICAL CHEST PAIN: ICD-10-CM

## 2019-04-29 LAB
ANION GAP SERPL CALCULATED.3IONS-SCNC: 6 MMOL/L (ref 3–14)
APTT PPP: 25 SEC (ref 22–37)
BASOPHILS # BLD AUTO: 0.1 10E9/L (ref 0–0.2)
BASOPHILS NFR BLD AUTO: 1.1 %
BUN SERPL-MCNC: 22 MG/DL (ref 7–30)
CALCIUM SERPL-MCNC: 8.5 MG/DL (ref 8.5–10.1)
CHLORIDE SERPL-SCNC: 106 MMOL/L (ref 94–109)
CO2 SERPL-SCNC: 28 MMOL/L (ref 20–32)
CREAT SERPL-MCNC: 0.7 MG/DL (ref 0.52–1.04)
DIFFERENTIAL METHOD BLD: NORMAL
EOSINOPHIL # BLD AUTO: 0.2 10E9/L (ref 0–0.7)
EOSINOPHIL NFR BLD AUTO: 3.5 %
ERYTHROCYTE [DISTWIDTH] IN BLOOD BY AUTOMATED COUNT: 13.4 % (ref 10–15)
GFR SERPL CREATININE-BSD FRML MDRD: >90 ML/MIN/{1.73_M2}
GLUCOSE SERPL-MCNC: 102 MG/DL (ref 70–99)
HCT VFR BLD AUTO: 42.3 % (ref 35–47)
HGB BLD-MCNC: 13.7 G/DL (ref 11.7–15.7)
IMM GRANULOCYTES # BLD: 0 10E9/L (ref 0–0.4)
IMM GRANULOCYTES NFR BLD: 0.2 %
INR PPP: 0.86 (ref 0.86–1.14)
INTERPRETATION ECG - MUSE: NORMAL
LYMPHOCYTES # BLD AUTO: 2.2 10E9/L (ref 0.8–5.3)
LYMPHOCYTES NFR BLD AUTO: 34.2 %
MCH RBC QN AUTO: 30 PG (ref 26.5–33)
MCHC RBC AUTO-ENTMCNC: 32.4 G/DL (ref 31.5–36.5)
MCV RBC AUTO: 93 FL (ref 78–100)
MONOCYTES # BLD AUTO: 0.6 10E9/L (ref 0–1.3)
MONOCYTES NFR BLD AUTO: 10.1 %
NEUTROPHILS # BLD AUTO: 3.2 10E9/L (ref 1.6–8.3)
NEUTROPHILS NFR BLD AUTO: 50.9 %
NRBC # BLD AUTO: 0 10*3/UL
NRBC BLD AUTO-RTO: 0 /100
PLATELET # BLD AUTO: 317 10E9/L (ref 150–450)
POTASSIUM SERPL-SCNC: 3.7 MMOL/L (ref 3.4–5.3)
RBC # BLD AUTO: 4.57 10E12/L (ref 3.8–5.2)
SODIUM SERPL-SCNC: 140 MMOL/L (ref 133–144)
TROPONIN I SERPL-MCNC: <0.015 UG/L (ref 0–0.04)
TROPONIN I SERPL-MCNC: <0.015 UG/L (ref 0–0.04)
WBC # BLD AUTO: 6.3 10E9/L (ref 4–11)

## 2019-04-29 PROCEDURE — 80048 BASIC METABOLIC PNL TOTAL CA: CPT | Performed by: EMERGENCY MEDICINE

## 2019-04-29 PROCEDURE — 85610 PROTHROMBIN TIME: CPT | Performed by: EMERGENCY MEDICINE

## 2019-04-29 PROCEDURE — 84484 ASSAY OF TROPONIN QUANT: CPT | Performed by: EMERGENCY MEDICINE

## 2019-04-29 PROCEDURE — 85025 COMPLETE CBC W/AUTO DIFF WBC: CPT | Performed by: EMERGENCY MEDICINE

## 2019-04-29 PROCEDURE — 71046 X-RAY EXAM CHEST 2 VIEWS: CPT

## 2019-04-29 PROCEDURE — 25000132 ZZH RX MED GY IP 250 OP 250 PS 637: Performed by: EMERGENCY MEDICINE

## 2019-04-29 PROCEDURE — 93005 ELECTROCARDIOGRAM TRACING: CPT

## 2019-04-29 PROCEDURE — 85730 THROMBOPLASTIN TIME PARTIAL: CPT | Performed by: EMERGENCY MEDICINE

## 2019-04-29 PROCEDURE — 99285 EMERGENCY DEPT VISIT HI MDM: CPT | Mod: 25

## 2019-04-29 RX ORDER — NITROGLYCERIN 0.4 MG/1
0.4 TABLET SUBLINGUAL EVERY 5 MIN PRN
Status: DISCONTINUED | OUTPATIENT
Start: 2019-04-29 | End: 2019-04-29 | Stop reason: HOSPADM

## 2019-04-29 RX ADMIN — NITROGLYCERIN 0.4 MG: 0.4 TABLET SUBLINGUAL at 09:06

## 2019-04-29 ASSESSMENT — ENCOUNTER SYMPTOMS
COUGH: 0
SHORTNESS OF BREATH: 0
NAUSEA: 0

## 2019-04-29 ASSESSMENT — MIFFLIN-ST. JEOR: SCORE: 1451.26

## 2019-04-29 NOTE — ED TRIAGE NOTES
Pt reports did not get a lot of sleep last night, woke around 0330 with left arm ache lasting 15 mins, and burning in mid sternal chest, mildly relieved with lying down. Became lightheaded/dizzy. Denies SOB or recent illness. Reports did have some wine last night. ABCs intact A&Ox4.

## 2019-04-29 NOTE — ED AVS SNAPSHOT
[facility] Emergency Department  [address]  [facility] [address]  Phone:  [phone]  Fax:  [phone]                                    Jose Coronado   MRN: [id number]    Department:  [facility] Emergency Department   Date of Visit:  4/29/2019           After Visit Summary Signature Page    I have received my discharge instructions, and my questions have been answered. I have discussed any challenges I see with this plan with the nurse or doctor.    ..........................................................................................................................................  Patient/Patient Representative Signature      ..........................................................................................................................................  Patient Representative Print Name and Relationship to Patient    ..................................................               ................................................  Date                                   Time    ..........................................................................................................................................  Reviewed by Signature/Title    ...................................................              ..............................................  Date                                               Time          22EPIC Rev 08/18

## 2019-04-29 NOTE — ED PROVIDER NOTES
History     Chief Complaint:  chest pain    HPI   Jose Coronado is a 63 year old female, with a history of stroke, hypertension, hyperparathyroidism, and migraine. who presents with chest pain and lightheadedness. The patient states that she had woken up this morning at 0330 with lightheadedness and left arm ache-like sensation. She notes that she went back to sleep, but at 0630 the symptoms were present again with mid-sternal chest burning sensation. She notes that she has a history acid reflux, but notes that it was a different sensation. The patient notes that her symptoms present intermittently, but has had no resolution, prompting her ED visit. Here, the patient states that her symptoms would resolve with moving around, but worsens when sitting and laying around. She denies any nausea, shortness of breath, or breathing difficulties, recent cold symptoms. No pleuritic symptoms.  Of note, the patient states that she did have a stroke in February 2017, but does not have any residual side effects from it. The patient states that she takes 325 mg of aspirin daily and did take it this morning. She notes that she has had a PFO closure in 2017, but denies any other cardiac history.       Allergies:  Contrast Dye  Sulfa Drugs      Medications:    Ascorbic acid  Aspirin  Atenolol  Hydrochlorothiazide  Imipramine  Zaditor  Cozaar  Topamax  Valtrex    Past Medical History:    Anemia  CVA  Diffuse cystic mastopathy  Hypertension  Hyperparathyroidism  Labyrinthitis  Osteoporosis  Migraine  Gastric bypass     Past Surgical History:  Coil aneurysm right posterior parophthalmia artery   Multiple breast biopsies   Tonsillectomy and adenoidectomy  [place] teeth extraction  Gastric bypass  Wrist fracture  Parathyroidectomy    Family History:    Breast cancer  Hypertension  Arthritis  Hypothyroidism  Ulcerative colitis    Social History:  Presents with her sister.   Positive for alcohol use.   Negative for tobacco use.   Marital  "Status:  Single [1]     Review of Systems   HENT: Negative for congestion.    Respiratory: Negative for cough and shortness of breath.    Cardiovascular: Positive for chest pain (\"burning\").   Gastrointestinal: Negative for nausea.   All other systems reviewed and are negative.      Physical Exam   First Vitals:  BP: 145/81  Heart Rate: 74  Temp: 97.7  F (36.5  C)  Resp: 16  Height: 167.6 cm (5' 6\")  Weight: 88.5 kg (195 lb)  SpO2: 99 %    Physical Exam  General: Well appearing, nontoxic. Resting comfortably  Head:  Scalp, face, and head appear normal  Eyes:  Pupils are equal, round, and reactive to light    Conjunctivae non-injected and sclerae white  ENT:    The external nose is normal    Pinnae are normal    The oropharynx is normal, mucous membranes moist    Uvula is in the midline  Neck:  Normal range of motion    There is no rigidity noted    Trachea is in the midline  CV:  Regular rate and rhythm     Normal S1/S2, no S3/S4    No murmur or rub. Radial pulses 2+ bilaterally   Resp:  Lungs are clear and equal bilaterally    There is no tachypnea    No increased work of breathing    No rales, wheezing, or rhonchi  GI:  Abdomen is soft, no rigidity or guarding    No distension, or mass    No tenderness or rebound tenderness   MS:  Normal muscular tone. Anterior chest non tender to palpation    Symmetric motor strength    No lower extremity edema. No calf swelling or tenderness.   Skin:  No rash or acute skin lesions noted  Neuro: Awake and alert    Speech is normal and fluent    Moves all extremities spontaneously  Psych:  Normal affect.  Appropriate interactions.      Emergency Department Course   ECG:  Indication: chest pain   Time: 0841  Vent. Rate 74 bpm. WV interval 204. QRS duration 98. QT/QTc 394/437. P-R-T axis 39 -23 69. Normal sinus rhythm. Septal infarct, age undetermined. Abnormal ECG. Read time: 0846. No significant changes to EKG dated 7/14/17.      Imaging:  Radiographic findings were communicated " with the patient who voiced understanding of the findings.  XR Chest 2 views:   No acute cardiopulmonary disease. As per radiology.     Laboratory:  CBC: WBC: 6.3, HGB: 13.7, PLT: 317  BMP: Glucose 102 (H) o/w WNL (Creatinine: 0.70)    INR: 0.86  0846 Troponin: <0.015  1048 Troponin: <0.015  PTT: 25    Interventions:  0906 Nitroglycerin, 0.4 mg, sublingual      Emergency Department Course:  Nursing notes and vitals reviewed. EKG was obtained, findings above.      0843  I performed an exam of the patient as documented above.     Medicine administered as documented above. Blood drawn. This was sent to the lab for further testing, results above.    The patient was sent for a chest XR while in the emergency department, findings above.     1009 I rechecked the patient and discussed the results of her workup thus far.     1122 I rechecked the patient.    Findings and plan explained to the Patient. Patient discharged home with instructions regarding supportive care, medications, and reasons to return. The importance of close follow-up was reviewed.     I personally reviewed the laboratory results with the Patient and answered all related questions prior to discharge.      Impression & Plan      Medical Decision Making:  Jose Coronado is a 63 year old female who presents for evaluation of chest pain. Initial laboratory and imaging tests have come back normal. There is no clinical, laboratory, or radiographic evidence of pulmonary embolism, aortic dissection, pneumonia, pneumothorax or cardiac ischemia.  Other etiologies of chest pain considered in this patient included chest wall source, esophageal spasm or GI source, pleuritis, referred pain, etc.  My suspicion of unstable angina at this point is very low and given risk/benefit ratio would not start lovenox or heparin. Given the nature and timing of the patient's symptoms, I recommended admission to the patient  to observation status to the Observation Unit for further  cardiac monitoring and possible cardiac stress testing. The patient felt improved and preferred outpatient management and follow up for her symptoms. I had a colleen discussion with the patient that she is not in the low risk category for chest pain based on HEART score. She understood the risks of discharge without a complete cardiac evaluation. Orders for outpatient stress test were placed in [facility]. Patient to follow up closely with PCP and obtain a stress test. Repeat troponin in the ED remained negative. She opted for discharge and she was discharged home in stable condition. She will follow up with her PCP or return to the ED for any worsening symptoms. An outpatient exercise stress echocardiogram as ordered. Return precautions were discussed with patient. The patient's questions were answered and the patient was agreeable with discharge.     Diagnosis:    ICD-10-CM    1. Atypical chest pain R07.89 Exercise Stress Echocardiogram       Disposition:  Discharge to home.     ILizzette, am serving as a scribe on 4/29/2019 at 8:43 AM to personally document services performed by Jules Garcia MD based on my observations and the provider's statements to me.      Lizzette Leon  4/29/2019   [facility] EMERGENCY DEPARTMENT       Jules Garcia MD  04/29/19 2030

## 2019-07-08 ENCOUNTER — OFFICE VISIT (OUTPATIENT)
Dept: URGENT CARE | Facility: URGENT CARE | Age: 64
End: 2019-07-08
Payer: COMMERCIAL

## 2019-07-08 VITALS
BODY MASS INDEX: 31.73 KG/M2 | WEIGHT: 196.6 LBS | DIASTOLIC BLOOD PRESSURE: 56 MMHG | TEMPERATURE: 98.3 F | SYSTOLIC BLOOD PRESSURE: 90 MMHG | OXYGEN SATURATION: 95 % | HEART RATE: 70 BPM

## 2019-07-08 DIAGNOSIS — N39.0 URINARY TRACT INFECTION WITHOUT HEMATURIA, SITE UNSPECIFIED: Primary | ICD-10-CM

## 2019-07-08 DIAGNOSIS — I10 ESSENTIAL HYPERTENSION WITH GOAL BLOOD PRESSURE LESS THAN 140/90: ICD-10-CM

## 2019-07-08 DIAGNOSIS — R30.0 DYSURIA: ICD-10-CM

## 2019-07-08 DIAGNOSIS — R82.90 NONSPECIFIC FINDING ON EXAMINATION OF URINE: ICD-10-CM

## 2019-07-08 LAB
ALBUMIN UR-MCNC: 100 MG/DL
APPEARANCE UR: CLEAR
BACTERIA #/AREA URNS HPF: ABNORMAL /HPF
BILIRUB UR QL STRIP: ABNORMAL
COLOR UR AUTO: YELLOW
GLUCOSE UR STRIP-MCNC: NEGATIVE MG/DL
HGB UR QL STRIP: ABNORMAL
KETONES UR STRIP-MCNC: ABNORMAL MG/DL
LEUKOCYTE ESTERASE UR QL STRIP: ABNORMAL
MUCOUS THREADS #/AREA URNS LPF: PRESENT /LPF
NITRATE UR QL: POSITIVE
NON-SQ EPI CELLS #/AREA URNS LPF: ABNORMAL /LPF
PH UR STRIP: 7 PH (ref 5–7)
RBC #/AREA URNS AUTO: ABNORMAL /HPF
SOURCE: ABNORMAL
SP GR UR STRIP: 1.01 (ref 1–1.03)
URNS CMNT MICRO: ABNORMAL
UROBILINOGEN UR STRIP-ACNC: 1 EU/DL (ref 0.2–1)
WBC #/AREA URNS AUTO: ABNORMAL /HPF

## 2019-07-08 PROCEDURE — 87086 URINE CULTURE/COLONY COUNT: CPT | Performed by: PHYSICIAN ASSISTANT

## 2019-07-08 PROCEDURE — 81001 URINALYSIS AUTO W/SCOPE: CPT | Performed by: PHYSICIAN ASSISTANT

## 2019-07-08 PROCEDURE — 99214 OFFICE O/P EST MOD 30 MIN: CPT | Performed by: PHYSICIAN ASSISTANT

## 2019-07-08 RX ORDER — CIPROFLOXACIN 500 MG/1
500 TABLET, FILM COATED ORAL 2 TIMES DAILY
Qty: 10 TABLET | Refills: 0 | Status: SHIPPED | OUTPATIENT
Start: 2019-07-08 | End: 2019-07-30

## 2019-07-08 NOTE — PROGRESS NOTES
SUBJECTIVE:   Jose Coronado is a 63 year old female who  presents today for a possible UTI. Symptoms of dysuria, frequency, burning, suprapubic pain and pressure and also felt little fatigued and like had fevers  have been going on for 4day(s).  Hematuria few spots on pad.  gradual onsetand moderate.  There is no history of, chills, nausea or vomiting.  No history of vaginal  discharge. This patient does  have a history of urinary tract infections. Patient denies long duration, rigors, flank pain, temperature > 101 degrees F., Vomiting, significant nausea or diarrhea, taking Coumadin and GFR less than 30 within the last year or vaginal discharge, vaginal odor and vaginal itching     Past Medical History:   Diagnosis Date     Anemia      CVA (cerebral vascular accident) (H) 2017    ?migraine, ?pfo--negative vasc w/u, neg hypercoag w/u     Diffuse cystic mastopathy     Fibrocystic breast disease     HTN, goal below 140/90      Hyperparathyroidism (H)      Infectious mononucleosis     Mono at age 16     Irregular heart beat     PAT no afib on 30day monitor     Labyrinthitis, unspecified      Migraine headache with aura      Osteopenia      Pain in joint, shoulder region     Secondary to a fall     PFO (patent foramen ovale)     s/p closure with amplazter device 7/13/17     S/P gastric bypass June, 2010     Sleep apnea     she is on CPAP     Vitamin D deficiencies      Current Outpatient Medications   Medication Sig Dispense Refill     acetaminophen (TYLENOL) 325 MG tablet Take 2 tablets (650 mg) by mouth every 4 hours as needed for mild pain 100 tablet      Ascorbic Acid (VITAMIN C PO) Take 250 mg by mouth 2 times daily (0.5 x 500 mg tablet = 250 mg dose)       aspirin 325 MG tablet Take 325 mg by mouth       atenolol (TENORMIN) 50 MG tablet TAKE 1 TABLET (50 MG) BY MOUTH DAILY 90 tablet 3     Calcium Citrate-Vitamin D (CALCIUM CITRATE + D PO) Take 2 tablets by mouth every morning        ciprofloxacin (CIPRO) 500 MG  tablet Take 1 tablet (500 mg) by mouth 2 times daily for 5 days 10 tablet 0     Cyanocobalamin 2500 MCG TABS Take 2,500 mcg by mouth twice a week Mon, Thur       Ferrous Sulfate 27 MG TABS Take 27 mg by mouth 2 times daily       FIBER COMPLETE PO Take 1 capsule by mouth daily        hydrochlorothiazide 12.5 MG TABS tablet TAKE 1 TABLET (12.5 MG) BY MOUTH DAILY 90 tablet 1     IBUPROFEN PO Take 200 mg by mouth every 6 hours as needed        imipramine (TOFRANIL) 25 MG tablet TAKE 1 TABLET (25 MG) BY MOUTH DAILY. KEEP APPT 90 tablet 3     loratadine (CLARITIN) 10 MG tablet Take 10 mg by mouth daily       losartan (COZAAR) 50 MG tablet TAKE 1 TABLET (50 MG) BY MOUTH DAILY 90 tablet 1     omega 3 1000 MG CAPS Take 1 g by mouth daily 90 capsule      topiramate (TOPAMAX) 25 MG tablet Take 1 tablet (25 mg) by mouth 2 times daily 180 tablet 3     UNABLE TO FIND CPAP machine every night       VITAMIN D, CHOLECALCIFEROL, PO Take 500 Units by mouth daily        ketotifen (ZADITOR) 0.025 % SOLN Place 1 drop into both eyes every 12 hours as needed for itching 1 Bottle      valACYclovir (VALTREX) 1000 mg tablet TAKE 2 TABLETS (2,000 MG) BY MOUTH 2 TIMES DAILY AS NEEDED (Patient not taking: Reported on 7/8/2019) 4 tablet 9     Social History     Tobacco Use     Smoking status: Never Smoker     Smokeless tobacco: Never Used   Substance Use Topics     Alcohol use: Yes     Alcohol/week: 0.0 oz     Comment: 1 glass wine 4-5 days a week       ROS:   Review of systems negative except as stated above.    OBJECTIVE:  BP 90/54 (BP Location: Right arm, Patient Position: Chair, Cuff Size: Adult Regular)   Pulse 70   Temp 98.3  F (36.8  C) (Oral)   Wt 89.2 kg (196 lb 9.6 oz)   SpO2 95%   BMI 31.73 kg/m    GENERAL APPEARANCE: healthy, alert and no distress  RESP: lungs clear to auscultation - no rales, rhonchi or wheezes  CV: regular rates and rhythm, normal S1 S2, no murmur noted  ABDOMEN:  soft, nontender, no HSM or masses and bowel  sounds normal  BACK: No CVA tenderness  SKIN: no suspicious lesions or rashes    Results for orders placed or performed in visit on 07/08/19   UA reflex to Microscopic and Culture   Result Value Ref Range    Color Urine Yellow     Appearance Urine Clear     Glucose Urine Negative NEG^Negative mg/dL    Bilirubin Urine Small (A) NEG^Negative    Ketones Urine Trace (A) NEG^Negative mg/dL    Specific Gravity Urine 1.015 1.003 - 1.035    Blood Urine Large (A) NEG^Negative    pH Urine 7.0 5.0 - 7.0 pH    Protein Albumin Urine 100 (A) NEG^Negative mg/dL    Urobilinogen Urine 1.0 0.2 - 1.0 EU/dL    Nitrite Urine Positive (A) NEG^Negative    Leukocyte Esterase Urine Small (A) NEG^Negative    Source Midstream Urine    Urine Microscopic   Result Value Ref Range    WBC Urine 5-10 (A) OTO5^0 - 5 /HPF    RBC Urine O - 2 OTO2^O - 2 /HPF    Squamous Epithelial /LPF Urine Moderate (A) FEW^Few /LPF    Bacteria Urine Many (A) NEG^Negative /HPF    Mucous Urine Present (A) NEG^Negative /LPF    Comment Urine Unconcentrated          ASSESSMENT:   Lower, uncomplicated urinary tract infection.    PLAN:  Culture pending.    Cipro as directed  Drink plenty of fluids.  Prevention and treatment of UTI's discussed.Signs and symptoms of pyelonephritis mentioned.  Follow up with primary care physician if not improving

## 2019-07-09 LAB
BACTERIA SPEC CULT: NORMAL
SPECIMEN SOURCE: NORMAL

## 2019-07-10 DIAGNOSIS — R00.2 PALPITATIONS: ICD-10-CM

## 2019-07-10 DIAGNOSIS — I10 ESSENTIAL HYPERTENSION WITH GOAL BLOOD PRESSURE LESS THAN 140/90: ICD-10-CM

## 2019-07-10 NOTE — TELEPHONE ENCOUNTER
"Requested Prescriptions   Pending Prescriptions Disp Refills     atenolol (TENORMIN) 50 MG tablet [Pharmacy Med Name: ATENOLOL 50 MG TABLET] 90 tablet 3     Sig: TAKE 1 TABLET BY MOUTH EVERY DAY   Last Written Prescription Date:  07/12/2018  Last Fill Quantity: 90,  # refills: 03   Last office visit: 12/5/2018 with prescribing provider:     Future Office Visit:   Next 5 appointments (look out 90 days)    Jul 30, 2019 10:20 AM CDT  Kailee Olvera with Lian Martinez MD  [facility] [address]  [facility] [address]  [phone]           Beta-Blockers Protocol Passed - 7/10/2019  8:33 AM        Passed - Blood pressure under 140/90 in past 12 months     BP Readings from Last 3 Encounters:   07/08/19 90/56   04/29/19 126/78   12/05/18 130/78                 Passed - Patient is age 6 or older        Passed - Recent (12 mo) or future (30 days) visit within the authorizing provider's specialty     Patient had office visit in the last 12 months or has a visit in the next 30 days with authorizing provider or within the authorizing provider's specialty.  See \"Patient Info\" tab in inbasket, or \"Choose Columns\" in Meds & Orders section of the refill encounter.              Passed - Medication is active on med list        "

## 2019-07-11 RX ORDER — ATENOLOL 50 MG/1
TABLET ORAL
Qty: 90 TABLET | Refills: 3 | Status: SHIPPED | OUTPATIENT
Start: 2019-07-11 | End: 2020-01-07

## 2019-07-11 NOTE — TELEPHONE ENCOUNTER
Routing refill request to provider for review/approval because:  Blood pressure low at urgent care visit 7-8-19.    Next 5 appointments (look out 90 days)    Jul 30, 2019 10:20 AM JAYLEENT  Kailee Olvera with Lian Martinez MD  [facility] [address]  [facility] [address]  [phone]        Please advise, thanks.

## 2019-07-30 ENCOUNTER — OFFICE VISIT (OUTPATIENT)
Dept: INTERNAL MEDICINE | Facility: CLINIC | Age: 64
End: 2019-07-30
Payer: COMMERCIAL

## 2019-07-30 VITALS
HEART RATE: 70 BPM | DIASTOLIC BLOOD PRESSURE: 80 MMHG | RESPIRATION RATE: 16 BRPM | OXYGEN SATURATION: 100 % | BODY MASS INDEX: 31.95 KG/M2 | WEIGHT: 198.8 LBS | HEIGHT: 66 IN | TEMPERATURE: 97.7 F | SYSTOLIC BLOOD PRESSURE: 138 MMHG

## 2019-07-30 DIAGNOSIS — I48.0 PAROXYSMAL ATRIAL FIBRILLATION (H): ICD-10-CM

## 2019-07-30 DIAGNOSIS — E21.3 HYPERPARATHYROIDISM (H): ICD-10-CM

## 2019-07-30 DIAGNOSIS — N39.46 URINARY INCONTINENCE, MIXED: ICD-10-CM

## 2019-07-30 DIAGNOSIS — I10 ESSENTIAL HYPERTENSION WITH GOAL BLOOD PRESSURE LESS THAN 140/90: Primary | ICD-10-CM

## 2019-07-30 PROCEDURE — 99214 OFFICE O/P EST MOD 30 MIN: CPT | Performed by: INTERNAL MEDICINE

## 2019-07-30 RX ORDER — HYDROCHLOROTHIAZIDE 12.5 MG/1
TABLET ORAL
Qty: 90 TABLET | Refills: 0 | Status: SHIPPED | OUTPATIENT
Start: 2019-07-30 | End: 2019-12-05

## 2019-07-30 RX ORDER — LOSARTAN POTASSIUM 50 MG/1
50 TABLET ORAL DAILY
Qty: 90 TABLET | Refills: 0 | Status: SHIPPED | OUTPATIENT
Start: 2019-07-30 | End: 2020-01-07

## 2019-07-30 ASSESSMENT — ANXIETY QUESTIONNAIRES
7. FEELING AFRAID AS IF SOMETHING AWFUL MIGHT HAPPEN: NOT AT ALL
5. BEING SO RESTLESS THAT IT IS HARD TO SIT STILL: NOT AT ALL
6. BECOMING EASILY ANNOYED OR IRRITABLE: SEVERAL DAYS
1. FEELING NERVOUS, ANXIOUS, OR ON EDGE: SEVERAL DAYS
IF YOU CHECKED OFF ANY PROBLEMS ON THIS QUESTIONNAIRE, HOW DIFFICULT HAVE THESE PROBLEMS MADE IT FOR YOU TO DO YOUR WORK, TAKE CARE OF THINGS AT HOME, OR GET ALONG WITH OTHER PEOPLE: NOT DIFFICULT AT ALL
3. WORRYING TOO MUCH ABOUT DIFFERENT THINGS: SEVERAL DAYS
GAD7 TOTAL SCORE: 3
2. NOT BEING ABLE TO STOP OR CONTROL WORRYING: NOT AT ALL

## 2019-07-30 ASSESSMENT — MIFFLIN-ST. JEOR: SCORE: 1468.5

## 2019-07-30 ASSESSMENT — PATIENT HEALTH QUESTIONNAIRE - PHQ9
SUM OF ALL RESPONSES TO PHQ QUESTIONS 1-9: 5
5. POOR APPETITE OR OVEREATING: NOT AT ALL

## 2019-07-30 NOTE — PROGRESS NOTES
"Subjective     Jose Coronado is a 63 year old female who presents to clinic today for the following health issues:    HPI     Follow up urinary incontinence.    ED/UC Followup:    Facility:  [place] [address]  Date of visit: 7-8-2019  Reason for visit: UTI  BP was  90/56       Hypertension Follow-up      Do you check your blood pressure regularly outside of the clinic? Yes     Are you following a low salt diet? Yes    Are your blood pressures ever more than 140 on the top number (systolic) OR more   than 90 on the bottom number (diastolic), for example 140/90? No    HPI:   Jose Coronado a 63 year old female here for follow-up of above. She was hospitalized in March with slurred speech confusion and some balance issues. Was thought to have CVA and treated with TPA only to find out later she had THC poisoning from eating 2 large brownies in her sons fridge, which she ate with a large glass of red wine, while babysitting their dog. There were no complications from TPA treatment and within 2 days she felt fine. She has had no problems since.    She has a long history of mixed urinary incontinence and is to the point now she would like to see Urology.    Reviewed and updated as needed this visit by Provider         Review of Systems   ROS COMP: Constitutional, HEENT, cardiovascular, pulmonary, GI, , musculoskeletal, neuro, skin, endocrine and psych systems are negative, except as otherwise noted.      Objective    /80 (BP Location: Left arm, Patient Position: Chair, Cuff Size: Adult Large)   Pulse 70   Temp 97.7  F (36.5  C) (Oral)   Resp 16   Ht 1.676 m (5' 6\")   Wt 90.2 kg (198 lb 12.8 oz)   SpO2 100%   Breastfeeding? No   BMI 32.09 kg/m    Body mass index is 32.09 kg/m .  Physical Exam   GENERAL: healthy, alert and no distress  NECK: no adenopathy, no asymmetry, masses, or scars and thyroid normal to palpation  RESP: lungs clear to auscultation - no rales, rhonchi or wheezes  CV: regular rate and rhythm, " "normal S1 S2, no S3 or S4, no murmur, click or rub, no peripheral edema and peripheral pulses strong  ABDOMEN: soft, nontender, no hepatosplenomegaly, no masses and bowel sounds normal  MS: no gross musculoskeletal defects noted, no edema  NEURO: Normal strength and tone, mentation intact and speech normal  PSYCH: mentation appears normal, affect normal/bright          Assessment & Plan     1. Essential hypertension with goal blood pressure less than 140/90  under reasonable control Continue current medications. Follow up in 6 months labs up to date  - hydrochlorothiazide (HYDRODIURIL) 12.5 MG tablet; TAKE 1 TABLET (12.5 MG) BY MOUTH DAILY  Dispense: 90 tablet; Refill: 0  - losartan (COZAAR) 50 MG tablet; Take 1 tablet (50 mg) by mouth daily  Dispense: 90 tablet; Refill: 0    2. Hyperparathyroidism (H)  Labs are up to date    3. Paroxysmal atrial fibrillation (H)  under good control     4. Urinary incontinence, mixed  Will refer to   - UROLOGY ADULT REFERRAL     BMI:   Estimated body mass index is 32.09 kg/m  as calculated from the following:    Height as of this encounter: 1.676 m (5' 6\").    Weight as of this encounter: 90.2 kg (198 lb 12.8 oz).   Weight management plan: Discussed healthy diet and exercise guidelines      Return in about 6 months (around 1/30/2020).    Lian Martinez MD  [facility]      "

## 2019-07-31 ASSESSMENT — ANXIETY QUESTIONNAIRES: GAD7 TOTAL SCORE: 3

## 2019-08-14 ENCOUNTER — TELEPHONE (OUTPATIENT)
Dept: INTERNAL MEDICINE | Facility: CLINIC | Age: 64
End: 2019-08-14

## 2019-08-14 ENCOUNTER — E-VISIT (OUTPATIENT)
Dept: INTERNAL MEDICINE | Facility: CLINIC | Age: 64
End: 2019-08-14
Payer: COMMERCIAL

## 2019-08-14 DIAGNOSIS — N39.0 URINARY TRACT INFECTION WITHOUT HEMATURIA, SITE UNSPECIFIED: Primary | ICD-10-CM

## 2019-08-14 PROCEDURE — 99444 ZZC PHYSICIAN ONLINE EVALUATION & MANAGEMENT SERVICE: CPT | Performed by: INTERNAL MEDICINE

## 2019-08-14 RX ORDER — CIPROFLOXACIN 500 MG/1
500 TABLET, FILM COATED ORAL 2 TIMES DAILY
Qty: 10 TABLET | Refills: 0 | Status: SHIPPED | OUTPATIENT
Start: 2019-08-14 | End: 2019-12-10

## 2019-08-14 NOTE — TELEPHONE ENCOUNTER
Patient calling requesting call back from nurse to discuss UTI symptoms. She stated that Dr. Martinez told her to call in for a prescription next time she started experiencing symptoms. Pt has e-visit set up but will be traveling out of town starting tomorrow. She would like this issue addressed ASAP. Pt can be reached at [phone]. OK to leave a detailed message. Please advise. Thanks.

## 2019-08-14 NOTE — TELEPHONE ENCOUNTER
Patient requested e-visit and has been treated with Cipro this afternoon.  BROOKLYNN Rosario R.N.

## 2019-08-22 ENCOUNTER — MYC MEDICAL ADVICE (OUTPATIENT)
Dept: INTERNAL MEDICINE | Facility: CLINIC | Age: 64
End: 2019-08-22

## 2019-08-22 DIAGNOSIS — N31.9 BLADDER DYSFUNCTION: ICD-10-CM

## 2019-09-08 NOTE — TELEPHONE ENCOUNTER
Atorvastatin     Last Written Prescription Date: 09/05/17  Last Fill Quantity: 90, # refills: 2  Last Office Visit with [facility] or [facility] prescribing provider: 07/27/17  Next 5 appointments (look out 90 days)     Oct 09, 2017 10:00 AM CDT   Return Visit with Luis Church MD   [facility] ([facility])    [address]  [facility] [id number] [phone]            Oct 18, 2017  9:40 AM CDT   MyChart Long with Lian Martinez MD   [facility]    [address]  [facility] [id number] [phone]                   Lab Results   Component Value Date    CHOL 116 07/27/2017     Lab Results   Component Value Date    HDL 60 07/27/2017     Lab Results   Component Value Date    LDL 45 07/27/2017     Lab Results   Component Value Date    TRIG 55 07/27/2017     Lab Results   Component Value Date    CHOLHDLRATIO 2.9 07/29/2015       Labs showing if normal/abnormal  Lab Results   Component Value Date    CHOL 116 07/27/2017    TRIG 55 07/27/2017    HDL 60 07/27/2017    LDL 45 07/27/2017    VLDL 16 07/29/2015    CHOLHDLRATIO 2.9 07/29/2015        Late entry. Weekend . Pt d/c'd home yesterday with home care orders for chest tube management, dressing changes, RR protocol. Call from [facility] late yesterday stating home care order received but pt not in service area as his home is in [place].    Case Management reviewed pt chart this am. Writer notified Susan Riedel, thoracic surgery NP that SN will not be out to pt's home today as new home care agency will have to found due to above. Per NP, pt's wife was shown how to drain chest tube and dressing changes and both pt and wife instructed on when to call MD and report to ED.     Writer faxed referral to [facility] ([phone]). Callback from Tami in intake dept stating referral received and all that is needed is progress note with homebound status faxed and then referral can be processed. Progress note faxed with homebound status/reasoning. [facility] will be able to have nurse out to pt's home tomorrow.    Notified NP of above. Message left on pt's voicemail with plan for home care. Will have floor CM follow up tomorrow to ensure pt set.    Elif Howe RN Case Manager  Pager [phone]  Voicemail [phone]

## 2019-09-10 ENCOUNTER — OFFICE VISIT (OUTPATIENT)
Dept: UROLOGY | Facility: CLINIC | Age: 64
End: 2019-09-10
Attending: INTERNAL MEDICINE
Payer: COMMERCIAL

## 2019-09-10 VITALS
DIASTOLIC BLOOD PRESSURE: 78 MMHG | HEIGHT: 66 IN | HEART RATE: 64 BPM | WEIGHT: 200 LBS | BODY MASS INDEX: 32.14 KG/M2 | OXYGEN SATURATION: 98 % | SYSTOLIC BLOOD PRESSURE: 124 MMHG

## 2019-09-10 DIAGNOSIS — R32 URINARY INCONTINENCE, UNSPECIFIED TYPE: Primary | ICD-10-CM

## 2019-09-10 LAB
ALBUMIN UR-MCNC: NEGATIVE MG/DL
APPEARANCE UR: CLEAR
BILIRUB UR QL STRIP: NEGATIVE
COLOR UR AUTO: YELLOW
GLUCOSE UR STRIP-MCNC: NEGATIVE MG/DL
HGB UR QL STRIP: ABNORMAL
KETONES UR STRIP-MCNC: NEGATIVE MG/DL
LEUKOCYTE ESTERASE UR QL STRIP: NEGATIVE
MUCOUS THREADS #/AREA URNS LPF: PRESENT /LPF
NITRATE UR QL: NEGATIVE
PH UR STRIP: 7 PH (ref 5–7)
RBC #/AREA URNS AUTO: <1 /HPF (ref 0–2)
RESIDUAL VOLUME (RV) (EXTERNAL): 16
SOURCE: ABNORMAL
SP GR UR STRIP: 1.02 (ref 1–1.03)
UROBILINOGEN UR STRIP-ACNC: 0.2 EU/DL (ref 0.2–1)
WBC #/AREA URNS AUTO: <1 /HPF (ref 0–5)

## 2019-09-10 PROCEDURE — 81001 URINALYSIS AUTO W/SCOPE: CPT | Performed by: PHYSICIAN ASSISTANT

## 2019-09-10 PROCEDURE — 99203 OFFICE O/P NEW LOW 30 MIN: CPT | Mod: 25 | Performed by: PHYSICIAN ASSISTANT

## 2019-09-10 PROCEDURE — 51798 US URINE CAPACITY MEASURE: CPT | Performed by: PHYSICIAN ASSISTANT

## 2019-09-10 ASSESSMENT — PAIN SCALES - GENERAL: PAINLEVEL: NO PAIN (1)

## 2019-09-10 ASSESSMENT — MIFFLIN-ST. JEOR: SCORE: 1473.94

## 2019-09-10 NOTE — LETTER
9/10/2019       RE: Jose Coronado  [address] [place] [address]     Dear Colleague,    Thank you for referring your patient, Jose Coronado, to the [facility] [place] at [facility]. Please see a copy of my visit note below.    CC: incontinence    HPI:  Jose Coronado is a pleasant 63 year old female who presents in consultation from Dr. Martinez for evaluation of the above.     Nedding to crede to urinate for many years (since her 40s).     Frequency since her 40s. Was on imprimine via GYN. Was on it for many years and recently wasn't workimg so stopped this. Now incontinent most of the day. Uses 2 large pads per day. Can leak large voluems with urgency. Some leakage with coughing.     Two recent UTIs. Symptoms included burning, urgency, frequency. Treated with Cipro and symptoms resolved. Now noticed that her bladder has sunken into her vagina.     Hx of CVA and on ASA 325mg.    PVR 16cc today.       Past Medical History:   Diagnosis Date     Anemia      CVA (cerebral vascular accident) (H) 2017    ?migraine, ?pfo--negative vasc w/u, neg hypercoag w/u     Diffuse cystic mastopathy     Fibrocystic breast disease     HTN, goal below 140/90      Hyperparathyroidism (H)      Infectious mononucleosis     Mono at age 16     Irregular heart beat     PAT no afib on 30day monitor     Labyrinthitis, unspecified      Migraine headache with aura      Osteopenia      Pain in joint, shoulder region     Secondary to a fall     Palpitations      PFO (patent foramen ovale)     s/p closure with amplazter device 7/13/17     S/P gastric bypass June, 2010     Sleep apnea     she is on CPAP     Vitamin D deficiencies        Past Surgical History:   Procedure Laterality Date     C ANEURYSM, INTRACRAN, SIMPLE SURG  04/2017    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 unreadable) ankle     COLONOSCOPY N/A 8/12/2015    Procedure: COLONOSCOPY;  Surgeon: Deandre Brooks MD;  Location:  GI     GASTRIC BYPASS  June 24, 2010     ORTHOPEDIC SURGERY Left 2010    wrist fracture     PARATHYROIDECTOMY  9/19/11       Social History     Socioeconomic History     Marital status: Single     Spouse name: Not on file     Number of children: Not on file     Years of education: Not on file     Highest education level: Not on file   Occupational History     Not on file   Social Needs     Financial resource strain: Not on file     Food insecurity:     Worry: Not on file     Inability: Not on file     Transportation needs:     Medical: Not on file     Non-medical: Not on file   Tobacco Use     Smoking status: Never Smoker     Smokeless tobacco: Never Used   Substance and Sexual Activity     Alcohol use: Yes     Alcohol/week: 0.0 oz     Comment: 1 glass wine 4-5 days a week     Drug use: No     Sexual activity: Not Currently     Partners: Male   Lifestyle     Physical activity:     Days per week: Not on file     Minutes per session: Not on file     Stress: Not on file   Relationships     Social connections:     Talks on phone: Not on file     Gets together: Not on file     Attends Druze service: Not on file     Active member of club or organization: Not on file     Attends meetings of clubs or organizations: Not on file     Relationship status: Not on file     Intimate partner violence:     Fear of current or ex partner: Not on file     Emotionally abused: Not on file     Physically abused: Not on file     Forced sexual activity: Not on file   Other Topics Concern     Parent/sibling w/ CABG, MI or angioplasty before 65F 55M? Not Asked   Social History Narrative     Not on file       Family History   Problem Relation Age of Onset     Breast Cancer Mother       "Hypertension Mother      Arthritis Mother      Thyroid Disease Mother         hypo     Ulcerative Colitis Mother      Breast Cancer Maternal Aunt      Hypertension Paternal Grandmother      Cerebrovascular Disease Maternal Grandmother      Family History Negative Maternal Grandfather      Family History Negative Paternal Grandfather      Family History Negative Son      Family History Negative Daughter      Family History Negative Daughter      Ulcerative Colitis Sister      Colon Cancer No family hx of        ROS:14 point ROS neg other than the symptoms noted above in the HPI.    Allergies   Allergen Reactions     Contrast Dye Itching     Reaction of immediate burning and severe itching in Right ear after injection for CT.      Sulfa Drugs      hives       Current Outpatient Medications   Medication     acetaminophen (TYLENOL) 325 MG tablet     Ascorbic Acid (VITAMIN C PO)     aspirin 325 MG tablet     atenolol (TENORMIN) 50 MG tablet     Calcium Citrate-Vitamin D (CALCIUM CITRATE + D PO)     Cyanocobalamin 2500 MCG TABS     Ferrous Sulfate 27 MG TABS     FIBER COMPLETE PO     hydrochlorothiazide (HYDRODIURIL) 12.5 MG tablet     loratadine (CLARITIN) 10 MG tablet     losartan (COZAAR) 50 MG tablet     omega 3 1000 MG CAPS     topiramate (TOPAMAX) 25 MG tablet     UNABLE TO FIND     valACYclovir (VALTREX) 1000 mg tablet     VITAMIN D, CHOLECALCIFEROL, PO     ciprofloxacin (CIPRO) 500 MG tablet     imipramine (TOFRANIL) 25 MG tablet     ketotifen (ZADITOR) 0.025 % SOLN     No current facility-administered medications for this visit.          PEx:   Blood pressure 124/78, pulse 64, height 1.676 m (5' 6\"), weight 90.7 kg (200 lb), SpO2 98 %, not currently breastfeeding.    PSYCH: NAD  EYES: EOMI  MOUTH: MMM  NECK: Supple, no notable adenopathy  RESP: Unlabored breathing  CARDIAC: No LE edema  SKIN: Warm, no rashes  ABD: soft, Nontender  NEURO: AAO x3    PELVIC:        Normal external genitalia and introitus, no " lesions, mild atrophic changes, cystocele noted.      Periurethral: nontender without visible or palpable masses, no urethral discharge or bleeding.     Leak was seen with CST.      Bimanual reveals no masses.      There was no myofascial tenderness      Rectal exam deferred.      A/P: Jose Coronado is a 63 year old female with incontinence, bladder prolapse  -Pelvic floor PT referral  -She is interested in being evaluated for surgical options if PT is not adequate. Will ask her to see Dr. Valdivia to discuss.     Olivia Vasquez PA-C  [facility]    30 minutes were spent with the patient today, > 50% in counseling and coordination of care                  Again, thank you for allowing me to participate in the care of your patient.      Sincerely,    Olivia Vasquez PA-C, FR[facility]

## 2019-09-10 NOTE — PROGRESS NOTES
CC: incontinence    HPI:  Jose Coronado is a pleasant 63 year old female who presents in consultation from Dr. Martinez for evaluation of the above.     Nedding to crede to urinate for many years (since her 40s).     Frequency since her 40s. Was on imprimine via GYN. Was on it for many years and recently wasn't workimg so stopped this. Now incontinent most of the day. Uses 2 large pads per day. Can leak large voluems with urgency. Some leakage with coughing.     Two recent UTIs. Symptoms included burning, urgency, frequency. Treated with Cipro and symptoms resolved. Now noticed that her bladder has sunken into her vagina.     Hx of CVA and on ASA 325mg.    PVR 16cc today.       Past Medical History:   Diagnosis Date     Anemia      CVA (cerebral vascular accident) (H) 2017    ?migraine, ?pfo--negative vasc w/u, neg hypercoag w/u     Diffuse cystic mastopathy     Fibrocystic breast disease     HTN, goal below 140/90      Hyperparathyroidism (H)      Infectious mononucleosis     Mono at age 16     Irregular heart beat     PAT no afib on 30day monitor     Labyrinthitis, unspecified      Migraine headache with aura      Osteopenia      Pain in joint, shoulder region     Secondary to a fall     Palpitations      PFO (patent foramen ovale)     s/p closure with amplazter device 7/13/17     S/P gastric bypass June, 2010     Sleep apnea     she is on CPAP     Vitamin D deficiencies        Past Surgical History:   Procedure Laterality Date     C ANEURYSM, INTRACRAN, SIMPLE SURG  04/2017    coil of aneurysm right posterior paraophthalmic artery     C NONSPECIFIC PROCEDURE      S/P multiple breast biopies - all negative / benign     C NONSPECIFIC PROCEDURE      S/P T&A     C NONSPECIFIC PROCEDURE      [place] teeth extraction     C NONSPECIFIC PROCEDURE      S/P (? unreadable) ankle     COLONOSCOPY N/A 8/12/2015    Procedure: COLONOSCOPY;  Surgeon: Deandre Brooks MD;  Location:  GI     GASTRIC BYPASS  June 24, 2010      ORTHOPEDIC SURGERY Left 2010    wrist fracture     PARATHYROIDECTOMY  9/19/11       Social History     Socioeconomic History     Marital status: Single     Spouse name: Not on file     Number of children: Not on file     Years of education: Not on file     Highest education level: Not on file   Occupational History     Not on file   Social Needs     Financial resource strain: Not on file     Food insecurity:     Worry: Not on file     Inability: Not on file     Transportation needs:     Medical: Not on file     Non-medical: Not on file   Tobacco Use     Smoking status: Never Smoker     Smokeless tobacco: Never Used   Substance and Sexual Activity     Alcohol use: Yes     Alcohol/week: 0.0 oz     Comment: 1 glass wine 4-5 days a week     Drug use: No     Sexual activity: Not Currently     Partners: Male   Lifestyle     Physical activity:     Days per week: Not on file     Minutes per session: Not on file     Stress: Not on file   Relationships     Social connections:     Talks on phone: Not on file     Gets together: Not on file     Attends Congregation service: Not on file     Active member of club or organization: Not on file     Attends meetings of clubs or organizations: Not on file     Relationship status: Not on file     Intimate partner violence:     Fear of current or ex partner: Not on file     Emotionally abused: Not on file     Physically abused: Not on file     Forced sexual activity: Not on file   Other Topics Concern     Parent/sibling w/ CABG, MI or angioplasty before 65F 55M? Not Asked   Social History Narrative     Not on file       Family History   Problem Relation Age of Onset     Breast Cancer Mother      Hypertension Mother      Arthritis Mother      Thyroid Disease Mother         hypo     Ulcerative Colitis Mother      Breast Cancer Maternal Aunt      Hypertension Paternal Grandmother      Cerebrovascular Disease Maternal Grandmother      Family History Negative Maternal Grandfather      Family  "History Negative Paternal Grandfather      Family History Negative Son      Family History Negative Daughter      Family History Negative Daughter      Ulcerative Colitis Sister      Colon Cancer No family hx of        ROS:14 point ROS neg other than the symptoms noted above in the HPI.    Allergies   Allergen Reactions     Contrast Dye Itching     Reaction of immediate burning and severe itching in Right ear after injection for CT.      Sulfa Drugs      hives       Current Outpatient Medications   Medication     acetaminophen (TYLENOL) 325 MG tablet     Ascorbic Acid (VITAMIN C PO)     aspirin 325 MG tablet     atenolol (TENORMIN) 50 MG tablet     Calcium Citrate-Vitamin D (CALCIUM CITRATE + D PO)     Cyanocobalamin 2500 MCG TABS     Ferrous Sulfate 27 MG TABS     FIBER COMPLETE PO     hydrochlorothiazide (HYDRODIURIL) 12.5 MG tablet     loratadine (CLARITIN) 10 MG tablet     losartan (COZAAR) 50 MG tablet     omega 3 1000 MG CAPS     topiramate (TOPAMAX) 25 MG tablet     UNABLE TO FIND     valACYclovir (VALTREX) 1000 mg tablet     VITAMIN D, CHOLECALCIFEROL, PO     ciprofloxacin (CIPRO) 500 MG tablet     imipramine (TOFRANIL) 25 MG tablet     ketotifen (ZADITOR) 0.025 % SOLN     No current facility-administered medications for this visit.          PEx:   Blood pressure 124/78, pulse 64, height 1.676 m (5' 6\"), weight 90.7 kg (200 lb), SpO2 98 %, not currently breastfeeding.    PSYCH: NAD  EYES: EOMI  MOUTH: MMM  NECK: Supple, no notable adenopathy  RESP: Unlabored breathing  CARDIAC: No LE edema  SKIN: Warm, no rashes  ABD: soft, Nontender  NEURO: AAO x3    PELVIC:        Normal external genitalia and introitus, no lesions, mild atrophic changes, cystocele noted.      Periurethral: nontender without visible or palpable masses, no urethral discharge or bleeding.     Leak was seen with CST.      Bimanual reveals no masses.      There was no myofascial tenderness      Rectal exam deferred.      A/P: Jose Coronado is " a 63 year old female with incontinence, bladder prolapse  -Pelvic floor PT referral  -She is interested in being evaluated for surgical options if PT is not adequate. Will ask her to see Dr. Valdivia to discuss.     Olivia Vasquez PA-C  [facility]    30 minutes were spent with the patient today, > 50% in counseling and coordination of care

## 2019-09-10 NOTE — PATIENT INSTRUCTIONS
Dr. Valdivia: [phone] (bladder prolapse)    Please see one of the dedicated pelvic floor physical therapists ([facility] [phone]).    Please be aware that coverage of these services is subject to the terms and limitations of your health insurance plan.  Call member services at your health plan with any benefit or coverage questions.      Please bring the following to your appointment:    *Your personal calendar for scheduling future appointments  *Comfortable clothing

## 2019-09-10 NOTE — NURSING NOTE
Chief Complaint   Patient presents with     Incontinence     Patient is here for urinary incontinence.      PVR today is 16 ml.   Mercy Mercedes, CMA

## 2019-09-16 ENCOUNTER — TELEPHONE (OUTPATIENT)
Dept: UROLOGY | Facility: CLINIC | Age: 64
End: 2019-09-16

## 2019-09-16 DIAGNOSIS — N81.10 FEMALE BLADDER PROLAPSE: Primary | ICD-10-CM

## 2019-09-16 NOTE — TELEPHONE ENCOUNTER
[facility] Call Center    Phone Message    May a detailed message be left on voicemail: yes    Reason for Call: Order(s): Other:   Reason for requested: Physical Therapy  Date needed: 10/2/2019  Provider name: Olivia Vasquez PA-C    Please add PT per office visit notes 09/10/2019       Action Taken: Message routed to:  [facility] ([facility]  Urology

## 2019-09-30 ENCOUNTER — HEALTH MAINTENANCE LETTER (OUTPATIENT)
Age: 64
End: 2019-09-30

## 2019-10-02 ENCOUNTER — THERAPY VISIT (OUTPATIENT)
Dept: PHYSICAL THERAPY | Facility: CLINIC | Age: 64
End: 2019-10-02
Payer: COMMERCIAL

## 2019-10-02 DIAGNOSIS — N81.10 FEMALE BLADDER PROLAPSE: ICD-10-CM

## 2019-10-02 PROCEDURE — 97112 NEUROMUSCULAR REEDUCATION: CPT | Mod: GP | Performed by: PHYSICAL THERAPIST

## 2019-10-02 PROCEDURE — 97162 PT EVAL MOD COMPLEX 30 MIN: CPT | Mod: GP | Performed by: PHYSICAL THERAPIST

## 2019-10-02 PROCEDURE — 97535 SELF CARE MNGMENT TRAINING: CPT | Mod: GP | Performed by: PHYSICAL THERAPIST

## 2019-10-02 PROCEDURE — 97110 THERAPEUTIC EXERCISES: CPT | Mod: GP | Performed by: PHYSICAL THERAPIST

## 2019-10-02 NOTE — PROGRESS NOTES
[facility] Initial Evaluation  Subjective:  Onset of urinary years ago but progressively worsened after birth of 3rd child. Developed prolapse into vaginal canal and had difficult emptying bladder (had to push down). Feels like incomplete emptying and has to push, slowed stream.  Urine leakage occurs occasional stress incontinence, bending over.  Wears 1 thick pad during day, 1 at night. Also 2 UTI's this year also. Consult with [facility] surgeon for possible surgical consult on  (Dr. Valdivia). , hx gastric bypass ,  CVA/brain aneurysm, heart defect surgery. Also some discomfort when bending over in lower abdomen/bladder. Voiding JIC to try to keep bladder empty. Voiding ~ every 3 hours daytime. Nighttime 0-1x. Also some stress incontinence only when full, also with stand up. Goal is to get stronger for better surgical outcomes, improved urine stream,leakage improved.     The history is provided by the patient. No  was used.   Type of problem:  Incontinence and pelvic dysfunction   Condition occurred with:  After pregnancy. This is a chronic condition    Patient reports pain:  N/a.                          Objective:  System                                 Pelvic Dysfunction Evaluation:    Bladder/Pelvic Problems:    Storage Problem:  Mixed incontinence  Emptying Problem:  Incomplete emptying        Flexibility:      Tightness not present at:  Adductors; Hamstrings or Piriformis    Abdominal Wall:  normal        Pelvic Clock Exam:    Ischiocavernosis pain:  -  Bulbocavernosis pain:  -  Transverse Perineal:  -  Levator ANI:  - and +/-      Reflex Testing:  normal    External Assessment:  External assessment pelvic: ~ grade 3-4 prolapse with bear down in supine, good excursion with kegel       Bearing Down/Coughing:  Cystocele      Muscle Contraction/Perineal Mobility:  Slight lift, no urogential triangle descent  Internal Assessment:  Internal assessment pelvic:  2+/5 strength.    Contraction/Grade:  Weak squeeze, 2 second hold (2)          SEMG Biofeedback:  NA                  Additional History:  Delivery History:  Vaginal delivery  Number of Pregnancies: 6  Number of Live Births: 3  Caffeine Consumption:  2/day                     General     ROS    Assessment/Plan:    Patient is a 63 year old female with pelvic complaints.    Patient has the following significant findings with corresponding treatment plan.                Diagnosis 1:  Pelvic dysfunction/prolapse  Pain -  manual therapy, splint/taping/bracing/orthotics, self management, education, directional preference exercise and home program  Decreased strength - therapeutic exercise and therapeutic activities  Decreased proprioception - neuro re-education and therapeutic activities  Inflammation - self management/home program  Impaired muscle performance - neuro re-education  Decreased function - therapeutic activities  Impaired posture - neuro re-education    Therapy Evaluation Codes:   1) History comprised of:   Personal factors that impact the plan of care:      Time since onset of symptoms.    Comorbidity factors that impact the plan of care are:      High blood pressure, Overweight, Stroke and gastric bypass.     Medications impacting care: High blood pressure.  2) Examination of Body Systems comprised of:   Body structures and functions that impact the plan of care:      Pelvis.   Activity limitations that impact the plan of care are:      Bending, Stress incontinence and Urge incontinence.  3) Clinical presentation characteristics are:   Evolving/Changing.  4) Decision-Making    Moderate complexity using standardized patient assessment instrument and/or measureable assessment of functional outcome.  Cumulative Therapy Evaluation is: Moderate complexity.    Previous and current functional limitations:  (See Goal Flow Sheet for this information)    Short term and Long term goals: (See Goal Flow Sheet for this  information)     Communication ability:  Patient appears to be able to clearly communicate and understand verbal and written communication and follow directions correctly.  Treatment Explanation - The following has been discussed with the patient:   RX ordered/plan of care  Anticipated outcomes  Possible risks and side effects  This patient would benefit from PT intervention to resume normal activities.   Rehab potential is good.    Frequency:  2 X a month, once daily  Duration:  for 2 months  Discharge Plan:  Achieve all LTG.  Independent in home treatment program.  Reach maximal therapeutic benefit.    Please refer to the daily flowsheet for treatment today, total treatment time and time spent performing 1:1 timed codes.

## 2019-10-03 PROBLEM — N81.10 FEMALE BLADDER PROLAPSE: Status: ACTIVE | Noted: 2019-10-03

## 2019-10-29 ENCOUNTER — PRE VISIT (OUTPATIENT)
Dept: UROLOGY | Facility: CLINIC | Age: 64
End: 2019-10-29

## 2019-10-29 NOTE — TELEPHONE ENCOUNTER
Reason for Visit: Consult for surgery    Diagnosis: unspecified urinary incontinence    Orders/Procedures/Records: in system    Contact Patient: n/a    Rooming Requirements: Recent PVR was 16 mL     Assess which type of incontinence patient is experiencing.   If urge or mixed incontinence- UA dip / PVR.  If stress incontinence- No urine.        Genoveva Acosta LPN  10/29/19  7:28 AM

## 2019-11-05 ENCOUNTER — TRANSFERRED RECORDS (OUTPATIENT)
Dept: HEALTH INFORMATION MANAGEMENT | Facility: CLINIC | Age: 64
End: 2019-11-05

## 2019-11-05 ASSESSMENT — ENCOUNTER SYMPTOMS
HEMATURIA: 0
FLANK PAIN: 0
DIFFICULTY URINATING: 0
DYSURIA: 0

## 2019-11-06 ENCOUNTER — OFFICE VISIT (OUTPATIENT)
Dept: UROLOGY | Facility: CLINIC | Age: 64
End: 2019-11-06

## 2019-11-06 VITALS
HEIGHT: 66 IN | SYSTOLIC BLOOD PRESSURE: 118 MMHG | HEART RATE: 98 BPM | DIASTOLIC BLOOD PRESSURE: 74 MMHG | BODY MASS INDEX: 32.14 KG/M2 | WEIGHT: 200 LBS

## 2019-11-06 DIAGNOSIS — N32.81 OVERACTIVE BLADDER: ICD-10-CM

## 2019-11-06 DIAGNOSIS — N81.10 FEMALE BLADDER PROLAPSE: ICD-10-CM

## 2019-11-06 DIAGNOSIS — N39.3 FEMALE STRESS INCONTINENCE: Primary | ICD-10-CM

## 2019-11-06 DIAGNOSIS — N81.11 MIDLINE CYSTOCELE: ICD-10-CM

## 2019-11-06 DIAGNOSIS — N81.6 RECTOCELE: ICD-10-CM

## 2019-11-06 RX ORDER — CEFAZOLIN SODIUM 1 G/50ML
1 INJECTION, SOLUTION INTRAVENOUS SEE ADMIN INSTRUCTIONS
Status: CANCELLED | OUTPATIENT
Start: 2019-11-06

## 2019-11-06 RX ORDER — CEFAZOLIN SODIUM 2 G/50ML
2 SOLUTION INTRAVENOUS
Status: CANCELLED | OUTPATIENT
Start: 2019-11-06

## 2019-11-06 ASSESSMENT — PAIN SCALES - GENERAL: PAINLEVEL: NO PAIN (0)

## 2019-11-06 ASSESSMENT — MIFFLIN-ST. JEOR: SCORE: 1473.94

## 2019-11-06 NOTE — LETTER
11/6/2019       RE: Jose Coronado  [address] [place] [address]     Dear Colleague,    Thank you for referring your patient, Jose Coronado, to the [facility] UROLOGY AND [facility] FOR PROST[facility] AND UROLOGIC CANCERS at [facility]. Please see a copy of my visit note below.    DATE: 11/06/19    CC: Bladder prolapse    HPI:  Jose Coronado is a 63 year old female asked to be seen in consultation by BONITA Escudero, for the above. This problem has been going on for about 6 months and has been getting worse and more bothersome.  She also has urinary incontinence that is worsening. Incontinence has been present for about 30 years. She wears an overnight pad all the time. She experiences incontinence when her bladder is full and she has the sensation she needs to urinate. If her bladder is full and she has cough or sneeze then she will have leakage. She goes urinates frequently to avoid accidents. She has difficulty controlling her stream. Incontinence volumes varies. She has variable number of episodes of incontinence during day and has been using 1 pads per day and 1 pad at night.  She is currently going to pelvic floor physical therapy and has been to one appointment thus far. No recent medications although she was on imipramine per GYN years ago but this was no longer effective. She does not have nocturia. Her pad is not typically wet in the morning, and she says she does not leak at night. She has had to Valsalva to urinate completely in that past but is currently waiting and using movements to completely empty. She denies any dysuria,  hematuria, hesitancy, intermittency. She had two recent back-to-back UTIs, one of which had associated hemturia. Treated successfully with Cipro both times. She has UTIs associated catheterization in past as well. No hx kidney stones.    The patient has no constipation. Occasional loose stools with associated fecal incontinence. She is not sexually  "active. She has hx of suspected embolic stroke in 2017 (now s/p closure of PFO) with minor language deficit. No other neurological or balance problems.     Obstetric Hx: She is . The weight of her largest baby was 8 lbs 15oz.  Babies were delivered vaginally.    Menopause around 47-46 yo, no HRT.    Past Medical History:   Diagnosis Date     Anemia      CVA (cerebral vascular accident) (H) 2017    ?migraine, ?pfo--negative vasc w/u, neg hypercoag w/u     Diffuse cystic mastopathy     Fibrocystic breast disease     HTN, goal below 140/90      Hyperparathyroidism (H)      Infectious mononucleosis     Mono at age 16     Irregular heart beat     PAT no afib on 30day monitor     Labyrinthitis, unspecified      Migraine headache with aura      Osteopenia      Pain in joint, shoulder region     Secondary to a fall     Palpitations      PFO (patent foramen ovale)     s/p closure with amplazter device 17     S/P gastric bypass      Sleep apnea     she is on CPAP     Vitamin D deficiencies        Past Surgical History:   Procedure Laterality Date     C ANEURYSM, INTRACRAN, SIMPLE SURG  2017    coil of aneurysm right posterior paraophthalmic artery     C NONSPECIFIC PROCEDURE      S/P multiple breast biopies - all negative / benign     C NONSPECIFIC PROCEDURE      S/P T&A     C NONSPECIFIC PROCEDURE      [place] teeth extraction     C NONSPECIFIC PROCEDURE      S/P (? unreadable) ankle     COLONOSCOPY N/A 2015    Procedure: COLONOSCOPY;  Surgeon: Deandre Brooks MD;  Location:  GI     GASTRIC BYPASS  2010     ORTHOPEDIC SURGERY Left 2010    wrist fracture     PARATHYROIDECTOMY  11   - Hx gastric bypass ()  - Heart defect surgery = PFO closure ()    Meds, Allergies, FHx and SHx reviewed per nurse's intake note.    ROS is negative on a 14 point scale except for what is mentioned in the HPI.    PEx:   Blood pressure 118/74, pulse 98, height 1.676 m (5' 6\"), weight 90.7 kg " "(200 lb), not currently breastfeeding.  5' 6\", Body mass index is 32.28 kg/m ., 200 lbs 0 oz  Gen appearance:  Well groomed  HEENT:  EOMI, AT NC  Psych:  Normal Affect  Neuro:  A/O X 3  Skin:  Warm to touch  Resp:  No increased respiratory effort  Vasc:  RRR on peripheral pulse  Lymph:  No LE edema  Abd:  Soft/NT, ND, no palpable masses, previous small lap scars from gastric bypass.  Musk:  Full ROM in extremities  :  Normal external genitalia and introitus, mild atrophic changes          Urethra WNL          Stress incontinence was not demonstrated          Grade III cystocele and grade III rectocele          Apical descent noted  Perineum WNL.    RU: 100 mL on bladder scan by nursing.    UA: UA RESULTS:  Recent Labs   Lab Test 09/10/19  1036 09/10/19  1013   COLOR  --  Yellow   APPEARANCE  --  Clear   URINEGLC  --  Negative   URINEBILI  --  Negative   URINEKETONE  --  Negative   SG  --  1.020   UBLD  --  Trace*   URINEPH  --  7.0   PROTEIN  --  Negative   UROBILINOGEN  --  0.2   NITRITE  --  Negative   LEUKEST  --  Negative   RBCU <1  --    WBCU <1  --        Office Visit on 09/10/2019   Component Date Value Ref Range Status     Residual Volume (RV) (External) 09/10/2019 16   Final     Color Urine 09/10/2019 Yellow   Final     Appearance Urine 09/10/2019 Clear   Final     Glucose Urine 09/10/2019 Negative  NEG^Negative mg/dL Final     Bilirubin Urine 09/10/2019 Negative  NEG^Negative Final     Ketones Urine 09/10/2019 Negative  NEG^Negative mg/dL Final     Specific Gravity Urine 09/10/2019 1.020  1.003 - 1.035 Final     Blood Urine 09/10/2019 Trace* NEG^Negative Final     pH Urine 09/10/2019 7.0  5.0 - 7.0 pH Final     Protein Albumin Urine 09/10/2019 Negative  NEG^Negative mg/dL Final     Urobilinogen Urine 09/10/2019 0.2  0.2 - 1.0 EU/dL Final     Nitrite Urine 09/10/2019 Negative  NEG^Negative Final     Leukocyte Esterase Urine 09/10/2019 Negative  NEG^Negative Final     Source 09/10/2019 Midstream Urine   " Final     WBC Urine 09/10/2019 <1  0 - 5 /HPF Final     RBC Urine 09/10/2019 <1  0 - 2 /HPF Final     Mucous Urine 09/10/2019 Present* NEG^Negative /LPF Final       ASSESSMENT and PLAN:  This is a 63 year old female with grade III cystocele and grade III rectocele as well as some stress incontinence in addition to overactive bladder.  Different management options were discussed with the patient including observation, pessary use, PT (which she is doing), and surgery.  We explained different types repair either transvaginal or robot assisted.  We discussed the use of mesh in surgery and the risks which include but are not limited to infection, bleeding, exposure of mesh, vaginal pain, injury to the bladder/urethra/rectum or any structures in the abdomen or pelvis, as well as risk of incontinence or urinary retention. There is also risk of need for catheterization and possible further surgery.   We discussed the FDA public health notification at length.  I explained that this may not help her incontinence. We also discussed risk of needing to open any time with robotic surgery.The pt. Verbalized understanding and had a chance to ask her questions which were all answered.    -schedule appt with Dr. Yancey to discuss hysterectomy  -schedule surgery for sacrocolpopexy and sling.    Marco A Kim MD  Urology, PGY-2    Patient was seen, evaluated and plan was formulated in conjunction with me and I agree with the above.  Carolyn Valdivia MD      Answers for HPI/ROS submitted by the patient on 11/5/2019   General Symptoms: No  Skin Symptoms: No  HENT Symptoms: No  EYE SYMPTOMS: No  HEART SYMPTOMS: No  LUNG SYMPTOMS: No  INTESTINAL SYMPTOMS: No  URINARY SYMPTOMS: Yes  GYNECOLOGIC SYMPTOMS: No  BREAST SYMPTOMS: No  SKELETAL SYMPTOMS: No  BLOOD SYMPTOMS: No  NERVOUS SYSTEM SYMPTOMS: No  MENTAL HEALTH SYMPTOMS: No  Trouble holding urine or incontinence: Yes  Pain or burning: No  Trouble starting or stopping: No  Increased  frequency of urination: No  Blood in urine: No  Decreased frequency of urination: No  Frequent nighttime urination: No  Flank pain: No  Difficulty emptying bladder: No      Again, thank you for allowing me to participate in the care of your patient.      Sincerely,    Carolyn Valdivia MD

## 2019-11-06 NOTE — PROGRESS NOTES
DATE: 19    CC: Bladder prolapse    HPI:  Jose Coronado is a 63 year old female asked to be seen in consultation by BONITA Escudero, for the above. This problem has been going on for about 6 months and has been getting worse and more bothersome.  She also has urinary incontinence that is worsening. Incontinence has been present for about 30 years. She wears an overnight pad all the time. She experiences incontinence when her bladder is full and she has the sensation she needs to urinate. If her bladder is full and she has cough or sneeze then she will have leakage. She goes urinates frequently to avoid accidents. She has difficulty controlling her stream. Incontinence volumes varies. She has variable number of episodes of incontinence during day and has been using 1 pads per day and 1 pad at night.  She is currently going to pelvic floor physical therapy and has been to one appointment thus far. No recent medications although she was on imipramine per GYN years ago but this was no longer effective. She does not have nocturia. Her pad is not typically wet in the morning, and she says she does not leak at night. She has had to Valsalva to urinate completely in that past but is currently waiting and using movements to completely empty. She denies any dysuria,  hematuria, hesitancy, intermittency. She had two recent back-to-back UTIs, one of which had associated hemturia. Treated successfully with Cipro both times. She has UTIs associated catheterization in past as well. No hx kidney stones.    The patient has no constipation. Occasional loose stools with associated fecal incontinence. She is not sexually active. She has hx of suspected embolic stroke in 2017 (now s/p closure of PFO) with minor language deficit. No other neurological or balance problems.     Obstetric Hx: She is . The weight of her largest baby was 8 lbs 15oz.  Babies were delivered vaginally.    Menopause around 47-49 yo, no HRT.    Past  "Medical History:   Diagnosis Date     Anemia      CVA (cerebral vascular accident) (H) 2017    ?migraine, ?pfo--negative vasc w/u, neg hypercoag w/u     Diffuse cystic mastopathy     Fibrocystic breast disease     HTN, goal below 140/90      Hyperparathyroidism (H)      Infectious mononucleosis     Mono at age 16     Irregular heart beat     PAT no afib on 30day monitor     Labyrinthitis, unspecified      Migraine headache with aura      Osteopenia      Pain in joint, shoulder region     Secondary to a fall     Palpitations      PFO (patent foramen ovale)     s/p closure with amplazter device 7/13/17     S/P gastric bypass June, 2010     Sleep apnea     she is on CPAP     Vitamin D deficiencies        Past Surgical History:   Procedure Laterality Date     C ANEURYSM, INTRACRAN, SIMPLE SURG  04/2017    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 unreadable) ankle     COLONOSCOPY N/A 8/12/2015    Procedure: COLONOSCOPY;  Surgeon: Deandre Brooks MD;  Location:  GI     GASTRIC BYPASS  June 24, 2010     ORTHOPEDIC SURGERY Left 2010    wrist fracture     PARATHYROIDECTOMY  9/19/11   - Hx gastric bypass (2010)  - Heart defect surgery = PFO closure (2017)    Meds, Allergies, FHx and SHx reviewed per nurse's intake note.    ROS is negative on a 14 point scale except for what is mentioned in the HPI.    PEx:   Blood pressure 118/74, pulse 98, height 1.676 m (5' 6\"), weight 90.7 kg (200 lb), not currently breastfeeding.  5' 6\", Body mass index is 32.28 kg/m ., 200 lbs 0 oz  Gen appearance:  Well groomed  HEENT:  EOMI, AT NC  Psych:  Normal Affect  Neuro:  A/O X 3  Skin:  Warm to touch  Resp:  No increased respiratory effort  Vasc:  RRR on peripheral pulse  Lymph:  No LE edema  Abd:  " Soft/NT, ND, no palpable masses, previous small lap scars from gastric bypass.  Musk:  Full ROM in extremities  :  Normal external genitalia and introitus, mild atrophic changes          Urethra WNL          Stress incontinence was not demonstrated          Grade III cystocele and grade III rectocele          Apical descent noted  Perineum WNL.    RU: 100 mL on bladder scan by nursing.    UA: UA RESULTS:  Recent Labs   Lab Test 09/10/19  1036 09/10/19  1013   COLOR  --  Yellow   APPEARANCE  --  Clear   URINEGLC  --  Negative   URINEBILI  --  Negative   URINEKETONE  --  Negative   SG  --  1.020   UBLD  --  Trace*   URINEPH  --  7.0   PROTEIN  --  Negative   UROBILINOGEN  --  0.2   NITRITE  --  Negative   LEUKEST  --  Negative   RBCU <1  --    WBCU <1  --        Office Visit on 09/10/2019   Component Date Value Ref Range Status     Residual Volume (RV) (External) 09/10/2019 16   Final     Color Urine 09/10/2019 Yellow   Final     Appearance Urine 09/10/2019 Clear   Final     Glucose Urine 09/10/2019 Negative  NEG^Negative mg/dL Final     Bilirubin Urine 09/10/2019 Negative  NEG^Negative Final     Ketones Urine 09/10/2019 Negative  NEG^Negative mg/dL Final     Specific Gravity Urine 09/10/2019 1.020  1.003 - 1.035 Final     Blood Urine 09/10/2019 Trace* NEG^Negative Final     pH Urine 09/10/2019 7.0  5.0 - 7.0 pH Final     Protein Albumin Urine 09/10/2019 Negative  NEG^Negative mg/dL Final     Urobilinogen Urine 09/10/2019 0.2  0.2 - 1.0 EU/dL Final     Nitrite Urine 09/10/2019 Negative  NEG^Negative Final     Leukocyte Esterase Urine 09/10/2019 Negative  NEG^Negative Final     Source 09/10/2019 Midstream Urine   Final     WBC Urine 09/10/2019 <1  0 - 5 /HPF Final     RBC Urine 09/10/2019 <1  0 - 2 /HPF Final     Mucous Urine 09/10/2019 Present* NEG^Negative /LPF Final       ASSESSMENT and PLAN:  This is a 63 year old female with grade III cystocele and grade III rectocele as well as some stress incontinence in  addition to overactive bladder.  Different management options were discussed with the patient including observation, pessary use, PT (which she is doing), and surgery.  We explained different types repair either transvaginal or robot assisted.  We discussed the use of mesh in surgery and the risks which include but are not limited to infection, bleeding, exposure of mesh, vaginal pain, injury to the bladder/urethra/rectum or any structures in the abdomen or pelvis, as well as risk of incontinence or urinary retention. There is also risk of need for catheterization and possible further surgery.   We discussed the FDA public health notification at length.  I explained that this may not help her incontinence. We also discussed risk of needing to open any time with robotic surgery.The pt. Verbalized understanding and had a chance to ask her questions which were all answered.    -schedule appt with Dr. Yancey to discuss hysterectomy  -schedule surgery for sacrocolpopexy and sling.    Marco A Kim MD  Urology, PGY-2    Patient was seen, evaluated and plan was formulated in conjunction with me and I agree with the above.  Carolyn Valdivia MD      Answers for HPI/ROS submitted by the patient on 11/5/2019   General Symptoms: No  Skin Symptoms: No  HENT Symptoms: No  EYE SYMPTOMS: No  HEART SYMPTOMS: No  LUNG SYMPTOMS: No  INTESTINAL SYMPTOMS: No  URINARY SYMPTOMS: Yes  GYNECOLOGIC SYMPTOMS: No  BREAST SYMPTOMS: No  SKELETAL SYMPTOMS: No  BLOOD SYMPTOMS: No  NERVOUS SYSTEM SYMPTOMS: No  MENTAL HEALTH SYMPTOMS: No  Trouble holding urine or incontinence: Yes  Pain or burning: No  Trouble starting or stopping: No  Increased frequency of urination: No  Blood in urine: No  Decreased frequency of urination: No  Frequent nighttime urination: No  Flank pain: No  Difficulty emptying bladder: No

## 2019-11-11 ENCOUNTER — THERAPY VISIT (OUTPATIENT)
Dept: PHYSICAL THERAPY | Facility: CLINIC | Age: 64
End: 2019-11-11
Payer: COMMERCIAL

## 2019-11-11 DIAGNOSIS — N81.10 FEMALE BLADDER PROLAPSE: ICD-10-CM

## 2019-11-11 PROCEDURE — 97110 THERAPEUTIC EXERCISES: CPT | Mod: GP | Performed by: PHYSICAL THERAPIST

## 2019-11-11 PROCEDURE — 97112 NEUROMUSCULAR REEDUCATION: CPT | Mod: GP | Performed by: PHYSICAL THERAPIST

## 2019-11-25 ENCOUNTER — THERAPY VISIT (OUTPATIENT)
Dept: PHYSICAL THERAPY | Facility: CLINIC | Age: 64
End: 2019-11-25
Payer: COMMERCIAL

## 2019-11-25 DIAGNOSIS — N81.10 FEMALE BLADDER PROLAPSE: ICD-10-CM

## 2019-11-25 PROCEDURE — 97110 THERAPEUTIC EXERCISES: CPT | Mod: GP | Performed by: PHYSICAL THERAPIST

## 2019-11-25 PROCEDURE — 97112 NEUROMUSCULAR REEDUCATION: CPT | Mod: GP | Performed by: PHYSICAL THERAPIST

## 2019-12-05 ENCOUNTER — TELEPHONE (OUTPATIENT)
Dept: UROLOGY | Facility: CLINIC | Age: 64
End: 2019-12-05

## 2019-12-05 ENCOUNTER — NURSE TRIAGE (OUTPATIENT)
Dept: NURSING | Facility: CLINIC | Age: 64
End: 2019-12-05

## 2019-12-05 ENCOUNTER — OFFICE VISIT (OUTPATIENT)
Dept: URGENT CARE | Facility: URGENT CARE | Age: 64
End: 2019-12-05
Payer: MEDICARE

## 2019-12-05 VITALS
DIASTOLIC BLOOD PRESSURE: 76 MMHG | TEMPERATURE: 98.2 F | RESPIRATION RATE: 16 BRPM | BODY MASS INDEX: 32.28 KG/M2 | OXYGEN SATURATION: 99 % | WEIGHT: 200 LBS | HEART RATE: 78 BPM | SYSTOLIC BLOOD PRESSURE: 122 MMHG

## 2019-12-05 DIAGNOSIS — I10 ESSENTIAL HYPERTENSION WITH GOAL BLOOD PRESSURE LESS THAN 140/90: ICD-10-CM

## 2019-12-05 DIAGNOSIS — J01.90 ACUTE SINUSITIS WITH COEXISTING CONDITION REQUIRING PROPHYLACTIC TREATMENT: Primary | ICD-10-CM

## 2019-12-05 PROCEDURE — 99214 OFFICE O/P EST MOD 30 MIN: CPT | Performed by: FAMILY MEDICINE

## 2019-12-05 RX ORDER — FLUTICASONE PROPIONATE 50 MCG
1 SPRAY, SUSPENSION (ML) NASAL DAILY
Qty: 16 G | Refills: 0 | Status: SHIPPED | OUTPATIENT
Start: 2019-12-05 | End: 2020-01-07

## 2019-12-05 NOTE — PROGRESS NOTES
"SUBJECTIVE:   Jose Coronado is a 63 year old female presenting with a chief complaint of sinus pressure, headache and jaw pain.  Had cold symptoms in October, was sick for 1 week and then started to develop sinus symptoms.  Developed sore throat 6 days ago and \"cold\" symptoms again, feeling more fatigued and headache.    Onset of symptoms was 1 week(s) ago.  Course of illness is worsening.    Severity moderate  Current and Associated symptoms: facial pain, headache, jaw pain  Treatment measures tried include Tylenol/Ibuprofen, Fluids and Rest.  Predisposing factors include HTN,.    Past Medical History:   Diagnosis Date     Anemia      CVA (cerebral vascular accident) (H) 2017    ?migraine, ?pfo--negative vasc w/u, neg hypercoag w/u     Diffuse cystic mastopathy     Fibrocystic breast disease     HTN, goal below 140/90      Hyperparathyroidism (H)      Infectious mononucleosis     Mono at age 16     Irregular heart beat     PAT no afib on 30day monitor     Labyrinthitis, unspecified      Migraine headache with aura      Osteopenia      Pain in joint, shoulder region     Secondary to a fall     Palpitations      PFO (patent foramen ovale)     s/p closure with amplazter device 7/13/17     S/P gastric bypass June, 2010     Sleep apnea     she is on CPAP     Vitamin D deficiencies      Current Outpatient Medications   Medication Sig Dispense Refill     acetaminophen (TYLENOL) 325 MG tablet Take 2 tablets (650 mg) by mouth every 4 hours as needed for mild pain 100 tablet      Ascorbic Acid (VITAMIN C PO) Take 250 mg by mouth 2 times daily (0.5 x 500 mg tablet = 250 mg dose)       aspirin 325 MG tablet Take 325 mg by mouth       atenolol (TENORMIN) 50 MG tablet TAKE 1 TABLET BY MOUTH EVERY DAY 90 tablet 3     Calcium Citrate-Vitamin D (CALCIUM CITRATE + D PO) Take 2 tablets by mouth every morning        Cyanocobalamin 2500 MCG TABS Take 2,500 mcg by mouth twice a week Mon, Thur       Ferrous Sulfate 27 MG TABS Take 27 mg " by mouth 2 times daily       FIBER COMPLETE PO Take 1 capsule by mouth daily        hydrochlorothiazide (HYDRODIURIL) 12.5 MG tablet TAKE 1 TABLET (12.5 MG) BY MOUTH DAILY 90 tablet 0     ketotifen (ZADITOR) 0.025 % SOLN Place 1 drop into both eyes every 12 hours as needed for itching 1 Bottle      loratadine (CLARITIN) 10 MG tablet Take 10 mg by mouth daily       losartan (COZAAR) 50 MG tablet Take 1 tablet (50 mg) by mouth daily 90 tablet 0     omega 3 1000 MG CAPS Take 1 g by mouth daily 90 capsule      topiramate (TOPAMAX) 25 MG tablet Take 1 tablet (25 mg) by mouth 2 times daily 180 tablet 3     UNABLE TO FIND CPAP machine every night       valACYclovir (VALTREX) 1000 mg tablet TAKE 2 TABLETS (2,000 MG) BY MOUTH 2 TIMES DAILY AS NEEDED 4 tablet 9     VITAMIN D, CHOLECALCIFEROL, PO Take 500 Units by mouth daily        ciprofloxacin (CIPRO) 500 MG tablet Take 1 tablet (500 mg) by mouth 2 times daily 10 tablet 0     imipramine (TOFRANIL) 25 MG tablet TAKE 1 TABLET (25 MG) BY MOUTH DAILY. KEEP APPT 90 tablet 3     Social History     Tobacco Use     Smoking status: Never Smoker     Smokeless tobacco: Never Used   Substance Use Topics     Alcohol use: Yes     Alcohol/week: 0.0 standard drinks     Comment: 1 glass wine 4-5 days a week       ROS:  Review of systems negative except as stated above.    OBJECTIVE:  /76   Pulse 78   Temp 98.2  F (36.8  C) (Tympanic)   Resp 16   Wt 90.7 kg (200 lb)   SpO2 99%   BMI 32.28 kg/m    GENERAL APPEARANCE: healthy, alert and no distress  EYES: EOMI,  PERRL, conjunctiva clear  HENT: ear canals and TM's normal.  Nose and mouth without ulcers, erythema or lesions.  Sinus tenderness in maxillary areas with percussion  NECK: supple, nontender, no lymphadenopathy  RESP: lungs clear to auscultation - no rales, rhonchi or wheezes  CV: regular rates and rhythm, normal S1 S2, no murmur noted  PSYCH: mentation appears normal and affect normal/bright    ASSESSMENT/PLAN:  (J01.90)  Acute sinusitis with coexisting condition requiring prophylactic treatment  (primary encounter diagnosis)  Plan: amoxicillin-clavulanate (AUGMENTIN) 875-125 MG         tablet, fluticasone (FLONASE) 50 MCG/ACT nasal         spray            Reassurance given, reviewed symptomatic treatment with tylenol, ibuprofen, plenty of fluids and rest.  RX Augmentin given for sinus infection due to worsening symptoms and co-morbid medical problems.  RX flonase given to help with congestion.  Okay to take claritin - 2 tablets to help with symptoms    Follow up with primary provider if no improvement of symptoms within 1 week    Ruddy Vergara MD

## 2019-12-05 NOTE — TELEPHONE ENCOUNTER
"Requested Prescriptions   Pending Prescriptions Disp Refills     hydrochlorothiazide (HYDRODIURIL) 12.5 MG tablet [Pharmacy Med Name:  Last Written Prescription Date:  7/30/2019  Last Fill Quantity: 90,  # refills: 0   Last office visit: 7/30/2019 with prescribing provider:     Future Office Visit:   HYDROCHLOROTHIAZIDE 12.5 MG TB] 90 tablet 0     Sig: TAKE 1 TABLET BY MOUTH EVERY DAY       Diuretics (Including Combos) Protocol Passed - 12/5/2019  2:42 AM        Passed - Blood pressure under 140/90 in past 12 months     BP Readings from Last 3 Encounters:   11/06/19 118/74   09/10/19 124/78   07/30/19 138/80                 Passed - Recent (12 mo) or future (30 days) visit within the authorizing provider's specialty     Patient has had an office visit with the authorizing provider or a provider within the authorizing providers department within the previous 12 mos or has a future within next 30 days. See \"Patient Info\" tab in inbasket, or \"Choose Columns\" in Meds & Orders section of the refill encounter.              Passed - Medication is active on med list        Passed - Patient is age 18 or older        Passed - No active pregancy on record        Passed - Normal serum creatinine on file in past 12 months     Recent Labs   Lab Test 04/29/19  0846   CR 0.70              Passed - Normal serum potassium on file in past 12 months     Recent Labs   Lab Test 04/29/19  0846   POTASSIUM 3.7                    Passed - Normal serum sodium on file in past 12 months     Recent Labs   Lab Test 04/29/19  0846                 Passed - No positive pregnancy test in past 12 months        "

## 2019-12-05 NOTE — TELEPHONE ENCOUNTER
[facility] Call Center    Phone Message    May a detailed message be left on voicemail: yes    Reason for Call: Pt has questions regarding surgery and would like a call back to discuss. Examples: Forms, process, size of incision, materials used, when can she travel again, downtime, etc. Please call.     Action Taken: Message routed to:  [facility] Clinic

## 2019-12-06 ENCOUNTER — DOCUMENTATION ONLY (OUTPATIENT)
Dept: CARE COORDINATION | Facility: CLINIC | Age: 64
End: 2019-12-06

## 2019-12-06 NOTE — TELEPHONE ENCOUNTER
"Patient had a question about her medication prescribed today.  FNA advised amoxicillin-clavulanate is Augmentin.  Caller verbalizes understanding.      Reason for Disposition    Caller has medication question only, adult not sick, and triager answers question    Additional Information    Negative: Drug overdose and nurse unable to answer question    Negative: Caller requesting information not related to medicine    Negative: Caller requesting a prescription for Strep throat and has a positive culture result    Negative: Rash while taking a medication or within 3 days of stopping it    Negative: Immunization reaction suspected    Negative: [1] Asthma and [2] having symptoms of asthma (cough, wheezing, etc)    Negative: MORE THAN A DOUBLE DOSE of a prescription or over-the-counter (OTC) drug    Negative: [1] DOUBLE DOSE (an extra dose or lesser amount) of over-the-counter (OTC) drug AND [2] any symptoms (e.g., dizziness, nausea, pain, sleepiness)    Negative: [1] DOUBLE DOSE (an extra dose or lesser amount) of prescription drug AND [2] any symptoms (e.g., dizziness, nausea, pain, sleepiness)    Negative: Took another person's prescription drug    Negative: [1] DOUBLE DOSE (an extra dose or lesser amount) of prescription drug AND [2] NO symptoms (Exception: a double dose of antibiotics)    Negative: Diabetes drug error or overdose (e.g., insulin or extra dose)    Negative: [1] Request for URGENT new prescription or refill of \"essential\" medication (i.e., likelihood of harm to patient if not taken) AND [2] triager unable to fill per unit policy    Negative: [1] Prescription not at pharmacy AND [2] was prescribed today by PCP    Negative: Pharmacy calling with prescription questions and triager unable to answer question    Negative: Caller has URGENT medication question about med that PCP prescribed and triager unable to answer question    Negative: Caller has NON-URGENT medication question about med that PCP prescribed " and triager unable to answer question    Negative: Caller requesting a NON-URGENT new prescription or refill and triager unable to refill per unit policy    Negative: Caller has medication question about med not prescribed by PCP and triager unable to answer question (e.g., compatibility with other med, storage)    Negative: [1] DOUBLE DOSE (an extra dose or lesser amount) of over-the-counter (OTC) drug AND [2] NO symptoms    Negative: [1] DOUBLE DOSE (an extra dose or lesser amount) of antibiotic drug AND [2] NO symptoms    Protocols used: MEDICATION QUESTION CALL-A-AH

## 2019-12-07 ASSESSMENT — ENCOUNTER SYMPTOMS
WEIGHT GAIN: 0
SPUTUM PRODUCTION: 1
HEMOPTYSIS: 0
DYSURIA: 0
NIGHT SWEATS: 0
ALTERED TEMPERATURE REGULATION: 0
FEVER: 0
TASTE DISTURBANCE: 0
DECREASED APPETITE: 1
HEMATURIA: 0
WHEEZING: 0
DYSPNEA ON EXERTION: 0
FLANK PAIN: 0
COUGH: 1
SHORTNESS OF BREATH: 0
DIFFICULTY URINATING: 0
INCREASED ENERGY: 1
POLYDIPSIA: 0
POLYPHAGIA: 0
SINUS CONGESTION: 1
COUGH DISTURBING SLEEP: 1
SORE THROAT: 1
NECK MASS: 0
SNORES LOUDLY: 1
TROUBLE SWALLOWING: 0
FATIGUE: 1
HOARSE VOICE: 1
SMELL DISTURBANCE: 0
SINUS PAIN: 1
POSTURAL DYSPNEA: 0
CHILLS: 0
HALLUCINATIONS: 0
WEIGHT LOSS: 0

## 2019-12-07 ASSESSMENT — ANXIETY QUESTIONNAIRES
GAD7 TOTAL SCORE: 0
7. FEELING AFRAID AS IF SOMETHING AWFUL MIGHT HAPPEN: NOT AT ALL
2. NOT BEING ABLE TO STOP OR CONTROL WORRYING: NOT AT ALL
7. FEELING AFRAID AS IF SOMETHING AWFUL MIGHT HAPPEN: NOT AT ALL
4. TROUBLE RELAXING: NOT AT ALL
1. FEELING NERVOUS, ANXIOUS, OR ON EDGE: NOT AT ALL
5. BEING SO RESTLESS THAT IT IS HARD TO SIT STILL: NOT AT ALL
GAD7 TOTAL SCORE: 0
3. WORRYING TOO MUCH ABOUT DIFFERENT THINGS: NOT AT ALL
6. BECOMING EASILY ANNOYED OR IRRITABLE: NOT AT ALL

## 2019-12-08 DIAGNOSIS — B00.1 COLD SORE: ICD-10-CM

## 2019-12-08 ASSESSMENT — ANXIETY QUESTIONNAIRES: GAD7 TOTAL SCORE: 0

## 2019-12-09 RX ORDER — HYDROCHLOROTHIAZIDE 12.5 MG/1
TABLET ORAL
Qty: 90 TABLET | Refills: 0 | Status: SHIPPED | OUTPATIENT
Start: 2019-12-09 | End: 2020-01-07

## 2019-12-09 NOTE — TELEPHONE ENCOUNTER
Medication is being filled for 1 time refill only due to:  pt is due for a f/u appt in Jan 2020 per OV dictation 7-30-19.     "[employer]t message sent.

## 2019-12-09 NOTE — TELEPHONE ENCOUNTER
"Requested Prescriptions   Pending Prescriptions Disp Refills     valACYclovir (VALTREX) 1000 mg tablet [Pharmacy Med Name: VALACYCLOVIR HCL 1 GRAM TABLET] 4 tablet 9     Sig: TAKE 2 TABLETS (2,000 MG) BY MOUTH 2 TIMES DAILY AS NEEDED       Antivirals for Herpes Protocol Passed - 12/8/2019  2:05 PM        Passed - Patient is age 12 or older        Passed - Recent (12 mo) or future (30 days) visit within the authorizing provider's specialty     Patient has had an office visit with the authorizing provider or a provider within the authorizing providers department within the previous 12 mos or has a future within next 30 days. See \"Patient Info\" tab in inbasket, or \"Choose Columns\" in Meds & Orders section of the refill encounter.              Passed - Medication is active on med list        Passed - Normal serum creatinine on file in past 12 months     Recent Labs   Lab Test 04/29/19  0846  01/08/18  1134   CR 0.70   < >  --    CREAT  --   --  0.7    < > = values in this interval not displayed.             Last Written Prescription Date:  9/6/18  Last Fill Quantity: 4,  # refills: 9   Last office visit: 7/30/2019 with prescribing provider:  7/30/19   Future Office Visit:   Next 5 appointments (look out 90 days)    Jan 07, 2020  7:20 AM CST  SHORT with Lian Martinez MD  [facility] ([facility] [address]  [facility] [address]  [phone]           "

## 2019-12-10 ENCOUNTER — OFFICE VISIT (OUTPATIENT)
Dept: OBGYN | Facility: CLINIC | Age: 64
End: 2019-12-10
Attending: OBSTETRICS & GYNECOLOGY
Payer: MEDICARE

## 2019-12-10 VITALS
BODY MASS INDEX: 32.8 KG/M2 | DIASTOLIC BLOOD PRESSURE: 76 MMHG | WEIGHT: 204.1 LBS | HEIGHT: 66 IN | HEART RATE: 66 BPM | SYSTOLIC BLOOD PRESSURE: 122 MMHG

## 2019-12-10 DIAGNOSIS — N84.1 CERVICAL POLYP: ICD-10-CM

## 2019-12-10 DIAGNOSIS — N81.11 MIDLINE CYSTOCELE: Primary | ICD-10-CM

## 2019-12-10 PROCEDURE — G0463 HOSPITAL OUTPT CLINIC VISIT: HCPCS | Mod: ZF

## 2019-12-10 PROCEDURE — 88305 TISSUE EXAM BY PATHOLOGIST: CPT | Performed by: OBSTETRICS & GYNECOLOGY

## 2019-12-10 PROCEDURE — 57500 BIOPSY OF CERVIX: CPT | Mod: ZF | Performed by: OBSTETRICS & GYNECOLOGY

## 2019-12-10 RX ORDER — VALACYCLOVIR HYDROCHLORIDE 1 G/1
TABLET, FILM COATED ORAL
Qty: 4 TABLET | Refills: 3 | Status: SHIPPED | OUTPATIENT
Start: 2019-12-10 | End: 2020-03-02

## 2019-12-10 ASSESSMENT — ANXIETY QUESTIONNAIRES
3. WORRYING TOO MUCH ABOUT DIFFERENT THINGS: NOT AT ALL
6. BECOMING EASILY ANNOYED OR IRRITABLE: NOT AT ALL
1. FEELING NERVOUS, ANXIOUS, OR ON EDGE: NOT AT ALL
5. BEING SO RESTLESS THAT IT IS HARD TO SIT STILL: NOT AT ALL
2. NOT BEING ABLE TO STOP OR CONTROL WORRYING: NOT AT ALL
7. FEELING AFRAID AS IF SOMETHING AWFUL MIGHT HAPPEN: NOT AT ALL
GAD7 TOTAL SCORE: 0

## 2019-12-10 ASSESSMENT — PATIENT HEALTH QUESTIONNAIRE - PHQ9
SUM OF ALL RESPONSES TO PHQ QUESTIONS 1-9: 5
5. POOR APPETITE OR OVEREATING: NOT AT ALL

## 2019-12-10 ASSESSMENT — MIFFLIN-ST. JEOR: SCORE: 1492.54

## 2019-12-10 NOTE — LETTER
12/10/2019     RE: Jose Coronado  [address] [place] [address]     Dear Colleague,    Thank you for referring your patient, Jose Coronado, to the [facility] at [facility]. Please see a copy of my visit note below.    Jeovany Morrissey, neurologist, follows for aneurysm and stroke    HPI:  Ms. Coronado is a   .  No LMP recorded. Patient is postmenopausal., who presents to discuss hysterectomy at the time of robotic assisted sacral colpopexy and mid urethral sling.  Patient has had many years of dealing with symptoms of stress urinary incontinence.  She has always managed with pads but it has become more bothersome. Then this year she has had 2 UTIs and noticed a vaginal bulge.  This prompted her to see Dr. Valdivia who recommended the above procedure.  She is here to discuss the hysterectomy portion with me.    Patients records are available and reviewed at today's visit.  She is followed by Dr. Morrissey, her neurologist, for recent treatment of a cerebral aneurysm and embolic stroke.  She has also had closure of a PFO since her stroke.    Past GYN history:  No history of STIs  Menopausal since age 46 or 47, never on HRT  No history of postmenopausal bleeding  No history of abnormal pap smears, last 2015 NIL/negative HPV  History of  x3, largest infant 8lbs 15oz  History of SAB x3 managed with D&C    Past Medical History:   Diagnosis Date     Anemia      CVA (cerebral vascular accident) (H) 2017    ?migraine, ?pfo--negative vasc w/u, neg hypercoag w/u     Diffuse cystic mastopathy     Fibrocystic breast disease     HTN, goal below 140/90      Hyperparathyroidism (H)      Infectious mononucleosis     Mono at age 16     Irregular heart beat     PAT no afib on 30day monitor     Labyrinthitis, unspecified      Migraine headache with aura      Osteopenia      Pain in joint, shoulder region     Secondary to a fall     Palpitations      PFO (patent foramen  ovale)     s/p closure with amplazter device 17     S/P gastric bypass      Sleep apnea     she is on CPAP     Vitamin D deficiencies        Past Surgical History:   Procedure Laterality Date     BIOPSY      Breast biopsies     BREAST SURGERY  1984    Above     C ANEURYSM, INTRACRAN, SIMPLE SURG  2017    coil of aneurysm right posterior paraophthalmic artery     C NONSPECIFIC PROCEDURE      S/P multiple breast biopies - all negative / benign     C NONSPECIFIC PROCEDURE      S/P T&A     C NONSPECIFIC PROCEDURE      [place] teeth extraction     C NONSPECIFIC PROCEDURE      S/P (? unreadable) ankle     CARDIAC SURGERY  2017    PFO closure     COLONOSCOPY N/A 2015    Procedure: COLONOSCOPY;  Surgeon: Deandre Brooks MD;  Location: [facility]     GASTRIC BYPASS  2010     ORTHOPEDIC SURGERY Left 2010    wrist fracture     PARATHYROIDECTOMY  10       Family History   Problem Relation Age of Onset     Breast Cancer Mother      Hypertension Mother      Arthritis Mother      Thyroid Disease Mother         Hypo     Ulcerative Colitis Mother      Cerebrovascular Disease Mother         Age 78 - Cerebral Hemorrhage,      Anxiety Disorder Mother      Depression Mother      Genetic Disorder Mother         Ulcerative colitis     Obesity Mother      Anesthesia Reaction Mother         Same     Breast Cancer Maternal Aunt      Hypertension Paternal Grandmother      Cerebrovascular Disease Maternal Grandmother         Age 72 -      Family History Negative Maternal Grandfather      Family History Negative Paternal Grandfather      Family History Negative Son      Family History Negative Daughter      Other Cancer Father         Skin - Non-melanoma varieties     Hypertension Father      Asthma Father      Family History Negative Daughter      Ulcerative Colitis Sister      Hypertension Sister      Hyperlipidemia Sister      Anxiety Disorder Sister      Depression Sister       Diabetes Sister      Obesity Sister      Asthma Sister      Anesthesia Reaction Sister         Debilitating headaches     Hypertension Brother      Anxiety Disorder Brother      Depression Brother      Asthma Nephew      Colon Cancer No family hx of      Coronary Artery Disease No family hx of      Mental Illness No family hx of      Substance Abuse No family hx of      Osteoporosis No family hx of        Allergies: Contrast dye and Sulfa drugs    Current Outpatient Medications   Medication Sig Dispense Refill     acetaminophen (TYLENOL) 325 MG tablet Take 2 tablets (650 mg) by mouth every 4 hours as needed for mild pain 100 tablet      amoxicillin-clavulanate (AUGMENTIN) 875-125 MG tablet Take 1 tablet by mouth 2 times daily for 10 days 20 tablet 0     Ascorbic Acid (VITAMIN C PO) Take 250 mg by mouth 2 times daily (0.5 x 500 mg tablet = 250 mg dose)       aspirin 325 MG tablet Take 325 mg by mouth       atenolol (TENORMIN) 50 MG tablet TAKE 1 TABLET BY MOUTH EVERY DAY 90 tablet 3     Calcium Citrate-Vitamin D (CALCIUM CITRATE + D PO) Take 2 tablets by mouth every morning        Cyanocobalamin 2500 MCG TABS Take 2,500 mcg by mouth twice a week Mon, Thur       Ferrous Sulfate 27 MG TABS Take 27 mg by mouth 2 times daily       FIBER COMPLETE PO Take 1 capsule by mouth daily        fluticasone (FLONASE) 50 MCG/ACT nasal spray Spray 1 spray into both nostrils daily 16 g 0     hydrochlorothiazide (HYDRODIURIL) 12.5 MG tablet TAKE 1 TABLET BY MOUTH EVERY DAY 90 tablet 0     ketotifen (ZADITOR) 0.025 % SOLN Place 1 drop into both eyes every 12 hours as needed for itching 1 Bottle      loratadine (CLARITIN) 10 MG tablet Take 10 mg by mouth daily       losartan (COZAAR) 50 MG tablet Take 1 tablet (50 mg) by mouth daily 90 tablet 0     omega 3 1000 MG CAPS Take 1 g by mouth daily 90 capsule      topiramate (TOPAMAX) 25 MG tablet Take 1 tablet (25 mg) by mouth 2 times daily 180 tablet 3     UNABLE TO FIND CPAP machine every  "night       valACYclovir (VALTREX) 1000 mg tablet TAKE 2 TABLETS (2,000 MG) BY MOUTH 2 TIMES DAILY AS NEEDED 4 tablet 3     VITAMIN D, CHOLECALCIFEROL, PO Take 500 Units by mouth daily          ROS:  C: NEGATIVE for fever, chills, change in weight  I: NEGATIVE for worrisome rashes, moles or lesions  E: NEGATIVE for vision changes or irritation  E/M: NEGATIVE for ear, mouth and throat problems  R: NEGATIVE for significant cough or SOB  CV: NEGATIVE for chest pain, palpitations or peripheral edema  GI: NEGATIVE for nausea, abdominal pain, heartburn, or change in bowel habits  : As per HPI  M: NEGATIVE for significant arthralgias or myalgia  N: NEGATIVE for stable minor language deficit, headaches, especially with positional changes  E: NEGATIVE for temperature intolerance, skin/hair changes  P: NEGATIVE for changes in mood or affect    EXAM:  Blood pressure 122/76, pulse 66, height 1.676 m (5' 6\"), weight 92.6 kg (204 lb 1.6 oz), not currently breastfeeding.   BMI= Body mass index is 32.94 kg/m .  General - pleasant female in no acute distress.  Lungs -non-labored respirations  Heart - regular rate and rhythm  Abdomen - soft, nontender, nondistended, no hepatosplenomegaly. Well healed laparoscopic incisions  Pelvic -   EG: normal adult female with estrogen loss atrophy changes, introitus is parous  BUS: within normal limits, Vagina: atrophy noted, grade 3 cystocele and rectocele with valsalva, no discharge  Cervix: small endocervical polyp at the external os, grasped and removed, no CMT   Uterus: firm, small, mobile and nontender   Adnexae: no masses or tenderness.  Rectovaginal - anus normal.  Musculoskeletal - no gross deformities.  Neurological - normal strength, sensation, and mental status.      ASSESSMENT/PLAN:  64 yo  with grade 3 cystocele and rectocele and stress urinary incontinence in setting of complex neurologic history.  Cervical polyp    - Polyp removed and sent to pathology  - reviewed plan for " management of pelvic prolapse and incontinence.  Reviewed that robotic surgery offers improved recovery time, less bleeding and less risk of DVT; however, requires trendelenburg positioning during surgery which can be prolonged (approximately 5 hours total) and given history of aneurysm will discuss with neurology if this should change our surgical approach.  Patient has travel plans in May and is hoping to have laparoscopic surgery in March or April.  - reviewed surgical plan, incisions and need to possibly morcellate in a bag. Discussed plan to stay over night for observation given neurologic history.  - Recommend PAC clinic visit prior to surgery and preop physical with PCP.    - Given normal exam and no postmenopausal bleeding will not complete endometrial biopsy.  - Plan supracervical hyst unless cervical polyp is abnormal and prophylactic BSO given her age (64 on 12/26/19).    Lily Yancey MD           Answers for HPI/ROS submitted by the patient on 12/7/2019   RALEIGH 7 TOTAL SCORE: 0  General Symptoms: Yes  Skin Symptoms: No  HENT Symptoms: Yes  EYE SYMPTOMS: No  HEART SYMPTOMS: No  LUNG SYMPTOMS: Yes  INTESTINAL SYMPTOMS: No  URINARY SYMPTOMS: Yes  GYNECOLOGIC SYMPTOMS: No  BREAST SYMPTOMS: No  SKELETAL SYMPTOMS: No  BLOOD SYMPTOMS: No  NERVOUS SYSTEM SYMPTOMS: No  MENTAL HEALTH SYMPTOMS: No  Fever: No  Loss of appetite: Yes  Weight loss: No  Weight gain: No  Fatigue: Yes  Night sweats: No  Chills: No  Increased stress: No  Excessive hunger: No  Excessive thirst: No  Feeling hot or cold when others believe the temperature is normal: No  Loss of height: No  Post-operative complications: No  Surgical site pain: No  Hallucinations: No  Change in or Loss of Energy: Yes  Hyperactivity: No  Confusion: No  Ear pain: Yes  Ear discharge: No  Hearing loss: No  Tinnitus: No  Nosebleeds: Yes  Congestion: Yes  Sinus pain: Yes  Trouble swallowing: No   Voice hoarseness: Yes  Mouth sores: No  Sore throat: Yes  Tooth pain:  Yes  Gum tenderness: No  Bleeding gums: No  Change in taste: No  Change in sense of smell: No  Dry mouth: Yes  Hearing aid used: No  Neck lump: No  Cough: Yes  Sputum or phlegm: Yes  Coughing up blood: No  Difficulty breating or shortness of breath: No  Snoring: Yes  Wheezing: No  Difficulty breathing on exertion: No  Nighttime Cough: Yes  Difficulty breathing when lying flat: No  Trouble holding urine or incontinence: Yes  Pain or burning: No  Trouble starting or stopping: Yes  Increased frequency of urination: No  Blood in urine: No  Decreased frequency of urination: No  Frequent nighttime urination: No  Flank pain: No  Difficulty emptying bladder: No    Again, thank you for allowing me to participate in the care of your patient.      Sincerely,    Lily Yancey MD

## 2019-12-10 NOTE — TELEPHONE ENCOUNTER
Discussed surgery details with the patient and she wants to have her surgery in March 2020. This patient is also concerned with going home the same day due to history of stroke and other comorbidies. I will have her get a PAC visit before surgery.  She is scheduled with Dr Yancey today.  Lita Goodman, RN   Care Coordinator Urology

## 2019-12-10 NOTE — PROGRESS NOTES
HPI:  Ms. Coronado is a   .  No LMP recorded. Patient is postmenopausal., who presents to discuss hysterectomy at the time of robotic assisted sacral colpopexy and mid urethral sling.  Patient has had many years of dealing with symptoms of stress urinary incontinence.  She has always managed with pads but it has become more bothersome. Then this year she has had 2 UTIs and noticed a vaginal bulge.  This prompted her to see Dr. Valdivia who recommended the above procedure.  She is here to discuss the hysterectomy portion with me.    Patients records are available and reviewed at today's visit.  She is followed by Dr. Morrissey, her neurologist, for recent treatment of a cerebral aneurysm and embolic stroke.  She has also had closure of a PFO since her stroke.    Past GYN history:  No history of STIs  Menopausal since age 46 or 47, never on HRT  No history of postmenopausal bleeding  No history of abnormal pap smears, last 2015 NIL/negative HPV  History of  x3, largest infant 8lbs 15oz  History of SAB x3 managed with D&C    Past Medical History:   Diagnosis Date     Anemia      CVA (cerebral vascular accident) (H)     ?migraine, ?pfo--negative vasc w/u, neg hypercoag w/u     Diffuse cystic mastopathy     Fibrocystic breast disease     HTN, goal below 140/90      Hyperparathyroidism (H)      Infectious mononucleosis     Mono at age 16     Irregular heart beat     PAT no afib on 30day monitor     Labyrinthitis, unspecified      Migraine headache with aura      Osteopenia      Pain in joint, shoulder region     Secondary to a fall     Palpitations      PFO (patent foramen ovale)     s/p closure with amplazter device 17     S/P gastric bypass      Sleep apnea     she is on CPAP     Vitamin D deficiencies        Past Surgical History:   Procedure Laterality Date     BIOPSY      Breast biopsies     BREAST SURGERY      Above     C ANEURYSM, INTRACRAN, SIMPLE SURG  2017    coil of aneurysm  right posterior paraophthalmic artery     C NONSPECIFIC PROCEDURE      S/P multiple breast biopies - all negative / benign     C NONSPECIFIC PROCEDURE      S/P T&A     C NONSPECIFIC PROCEDURE      [place] teeth extraction     C NONSPECIFIC PROCEDURE      S/P (? unreadable) ankle     CARDIAC SURGERY  2017    PFO closure     COLONOSCOPY N/A 2015    Procedure: COLONOSCOPY;  Surgeon: Deandre Brooks MD;  Location: [facility]     GASTRIC BYPASS  2010     ORTHOPEDIC SURGERY Left 2010    wrist fracture     PARATHYROIDECTOMY  10       Family History   Problem Relation Age of Onset     Breast Cancer Mother      Hypertension Mother      Arthritis Mother      Thyroid Disease Mother         Hypo     Ulcerative Colitis Mother      Cerebrovascular Disease Mother         Age 78 - Cerebral Hemorrhage,      Anxiety Disorder Mother      Depression Mother      Genetic Disorder Mother         Ulcerative colitis     Obesity Mother      Anesthesia Reaction Mother         Same     Breast Cancer Maternal Aunt      Hypertension Paternal Grandmother      Cerebrovascular Disease Maternal Grandmother         Age 72 -      Family History Negative Maternal Grandfather      Family History Negative Paternal Grandfather      Family History Negative Son      Family History Negative Daughter      Other Cancer Father         Skin - Non-melanoma varieties     Hypertension Father      Asthma Father      Family History Negative Daughter      Ulcerative Colitis Sister      Hypertension Sister      Hyperlipidemia Sister      Anxiety Disorder Sister      Depression Sister      Diabetes Sister      Obesity Sister      Asthma Sister      Anesthesia Reaction Sister         Debilitating headaches     Hypertension Brother      Anxiety Disorder Brother      Depression Brother      Asthma Nephew      Colon Cancer No family hx of      Coronary Artery Disease No family hx of      Mental Illness No family hx of      Substance  Abuse No family hx of      Osteoporosis No family hx of        Allergies: Contrast dye and Sulfa drugs    Current Outpatient Medications   Medication Sig Dispense Refill     acetaminophen (TYLENOL) 325 MG tablet Take 2 tablets (650 mg) by mouth every 4 hours as needed for mild pain 100 tablet      amoxicillin-clavulanate (AUGMENTIN) 875-125 MG tablet Take 1 tablet by mouth 2 times daily for 10 days 20 tablet 0     Ascorbic Acid (VITAMIN C PO) Take 250 mg by mouth 2 times daily (0.5 x 500 mg tablet = 250 mg dose)       aspirin 325 MG tablet Take 325 mg by mouth       atenolol (TENORMIN) 50 MG tablet TAKE 1 TABLET BY MOUTH EVERY DAY 90 tablet 3     Calcium Citrate-Vitamin D (CALCIUM CITRATE + D PO) Take 2 tablets by mouth every morning        Cyanocobalamin 2500 MCG TABS Take 2,500 mcg by mouth twice a week Mon, Thur       Ferrous Sulfate 27 MG TABS Take 27 mg by mouth 2 times daily       FIBER COMPLETE PO Take 1 capsule by mouth daily        fluticasone (FLONASE) 50 MCG/ACT nasal spray Spray 1 spray into both nostrils daily 16 g 0     hydrochlorothiazide (HYDRODIURIL) 12.5 MG tablet TAKE 1 TABLET BY MOUTH EVERY DAY 90 tablet 0     ketotifen (ZADITOR) 0.025 % SOLN Place 1 drop into both eyes every 12 hours as needed for itching 1 Bottle      loratadine (CLARITIN) 10 MG tablet Take 10 mg by mouth daily       losartan (COZAAR) 50 MG tablet Take 1 tablet (50 mg) by mouth daily 90 tablet 0     omega 3 1000 MG CAPS Take 1 g by mouth daily 90 capsule      topiramate (TOPAMAX) 25 MG tablet Take 1 tablet (25 mg) by mouth 2 times daily 180 tablet 3     UNABLE TO FIND CPAP machine every night       valACYclovir (VALTREX) 1000 mg tablet TAKE 2 TABLETS (2,000 MG) BY MOUTH 2 TIMES DAILY AS NEEDED 4 tablet 3     VITAMIN D, CHOLECALCIFEROL, PO Take 500 Units by mouth daily          ROS:  C: NEGATIVE for fever, chills, change in weight  I: NEGATIVE for worrisome rashes, moles or lesions  E: NEGATIVE for vision changes or  "irritation  E/M: NEGATIVE for ear, mouth and throat problems  R: NEGATIVE for significant cough or SOB  CV: NEGATIVE for chest pain, palpitations or peripheral edema  GI: NEGATIVE for nausea, abdominal pain, heartburn, or change in bowel habits  : As per HPI  M: NEGATIVE for significant arthralgias or myalgia  N: NEGATIVE for stable minor language deficit, headaches, especially with positional changes  E: NEGATIVE for temperature intolerance, skin/hair changes  P: NEGATIVE for changes in mood or affect    EXAM:  Blood pressure 122/76, pulse 66, height 1.676 m (5' 6\"), weight 92.6 kg (204 lb 1.6 oz), not currently breastfeeding.   BMI= Body mass index is 32.94 kg/m .  General - pleasant female in no acute distress.  Lungs -non-labored respirations  Heart - regular rate and rhythm  Abdomen - soft, nontender, nondistended, no hepatosplenomegaly. Well healed laparoscopic incisions  Pelvic -   EG: normal adult female with estrogen loss atrophy changes, introitus is parous  BUS: within normal limits, Vagina: atrophy noted, grade 3 cystocele and rectocele with valsalva, no discharge  Cervix: small endocervical polyp at the external os, grasped and removed, no CMT   Uterus: firm, small, mobile and nontender   Adnexae: no masses or tenderness.  Rectovaginal - anus normal.  Musculoskeletal - no gross deformities.  Neurological - normal strength, sensation, and mental status.      ASSESSMENT/PLAN:  64 yo  with grade 3 cystocele and rectocele and stress urinary incontinence in setting of complex neurologic history.  Cervical polyp    - Polyp removed and sent to pathology  - reviewed plan for management of pelvic prolapse and incontinence.  Reviewed that robotic surgery offers improved recovery time, less bleeding and less risk of DVT; however, requires trendelenburg positioning during surgery which can be prolonged (approximately 5 hours total) and given history of aneurysm will discuss with neurology if this should " change our surgical approach.  Patient has travel plans in May and is hoping to have laparoscopic surgery in March or April.  - reviewed surgical plan, incisions and need to possibly morcellate in a bag. Discussed plan to stay over night for observation given neurologic history.  - Recommend PAC clinic visit prior to surgery and preop physical with PCP.    - Given normal exam and no postmenopausal bleeding will not complete endometrial biopsy.  - Plan supracervical hyst unless cervical polyp is abnormal and prophylactic BSO given her age (64 on 12/26/19).    Lily Yancey MD           Answers for HPI/ROS submitted by the patient on 12/7/2019   RALEIGH 7 TOTAL SCORE: 0  General Symptoms: Yes  Skin Symptoms: No  HENT Symptoms: Yes  EYE SYMPTOMS: No  HEART SYMPTOMS: No  LUNG SYMPTOMS: Yes  INTESTINAL SYMPTOMS: No  URINARY SYMPTOMS: Yes  GYNECOLOGIC SYMPTOMS: No  BREAST SYMPTOMS: No  SKELETAL SYMPTOMS: No  BLOOD SYMPTOMS: No  NERVOUS SYSTEM SYMPTOMS: No  MENTAL HEALTH SYMPTOMS: No  Fever: No  Loss of appetite: Yes  Weight loss: No  Weight gain: No  Fatigue: Yes  Night sweats: No  Chills: No  Increased stress: No  Excessive hunger: No  Excessive thirst: No  Feeling hot or cold when others believe the temperature is normal: No  Loss of height: No  Post-operative complications: No  Surgical site pain: No  Hallucinations: No  Change in or Loss of Energy: Yes  Hyperactivity: No  Confusion: No  Ear pain: Yes  Ear discharge: No  Hearing loss: No  Tinnitus: No  Nosebleeds: Yes  Congestion: Yes  Sinus pain: Yes  Trouble swallowing: No   Voice hoarseness: Yes  Mouth sores: No  Sore throat: Yes  Tooth pain: Yes  Gum tenderness: No  Bleeding gums: No  Change in taste: No  Change in sense of smell: No  Dry mouth: Yes  Hearing aid used: No  Neck lump: No  Cough: Yes  Sputum or phlegm: Yes  Coughing up blood: No  Difficulty breating or shortness of breath: No  Snoring: Yes  Wheezing: No  Difficulty breathing on exertion: No  Nighttime  Cough: Yes  Difficulty breathing when lying flat: No  Trouble holding urine or incontinence: Yes  Pain or burning: No  Trouble starting or stopping: Yes  Increased frequency of urination: No  Blood in urine: No  Decreased frequency of urination: No  Frequent nighttime urination: No  Flank pain: No  Difficulty emptying bladder: No

## 2019-12-10 NOTE — NURSING NOTE
Chief Complaint   Patient presents with     Consult For     Surgery consult from Dr. Valdivia.       See SONY Jamil 12/10/2019

## 2019-12-10 NOTE — TELEPHONE ENCOUNTER
Prescription approved per [facility], [facility] or [facility] refill protocol.  Michelle SANTIAGO - Registered Nurse  [facility]  Acute and Diagnostic Services

## 2019-12-15 ENCOUNTER — HEALTH MAINTENANCE LETTER (OUTPATIENT)
Age: 64
End: 2019-12-15

## 2019-12-16 PROBLEM — N81.11 MIDLINE CYSTOCELE: Status: ACTIVE | Noted: 2019-12-16

## 2019-12-16 PROBLEM — N81.6 RECTOCELE: Status: ACTIVE | Noted: 2019-12-16

## 2019-12-17 ENCOUNTER — TELEPHONE (OUTPATIENT)
Dept: UROLOGY | Facility: CLINIC | Age: 64
End: 2019-12-17

## 2019-12-17 LAB — COPATH REPORT: NORMAL

## 2019-12-17 NOTE — TELEPHONE ENCOUNTER
----- Message from Adelita Mullins sent at 12/17/2019 10:29 AM CST -----  Regarding: FW: joint case with Naye Hernandez,  Please see below and block Dr Valdivia's clinic on 3/16/20 @ [place] to accommodate for this joint surgery to be done at the [facility].  The surgery starts at 7:30 am with Dr Valdivia's part starting by 10 to 10:30 am. Not sure if Dr Valdivia wants to see patient's after surgery or not. Thanks everyone.  Adelita  ----- Message -----  From: Carolyn Valdivia MD  Sent: 12/16/2019  12:52 PM CST  To: Adelita Mullins  Subject: RE: joint case with Naye                       sure  ----- Message -----  From: Adelita Mullins  Sent: 12/16/2019  11:53 AM CST  To: Carolyn Valdivia MD, Latanya Haskins, #  Subject: joint case with Naye Valdivia    The soonest date we can get for you and Dr Yancey to do this case is 4/28/20 due to Dr Yancey's schedule.  The patient has a vacation planned in May and wants the surgery sometime in March.  We can get the Robot and an OR room on Monday 3/16/20 @ [place].  At this time, you have 4 patients scheduled at [place].  Can we make this day work for the joint surgery?    Thanks  Adelita

## 2019-12-17 NOTE — TELEPHONE ENCOUNTER
Patient is scheduled for surgery with Dr. Valdivia and Dr Yancey      Spoke or left message with: Jose    Date of Surgery: 3/16/20    Location: [facility]    Informed patient they will need an adult  yes    Pre-op with surgeon (if applicable): n/a    H&P: Scheduled with PAC    Additional imaging/appointments: n/a    Surgery packet: to be given at clinic appt 1/15/20     Additional comments: n/a

## 2019-12-17 NOTE — TELEPHONE ENCOUNTER
Discussed surgery times for surgery.  Patient is very happy with the time   Lita Goodman, RN   Care Coordinator Urology

## 2019-12-17 NOTE — TELEPHONE ENCOUNTER
FUTURE VISIT INFORMATION      SURGERY INFORMATION:    Date: 3/16/20    Location: [facility]    Surgeon:  Lily Yancey    Anesthesia Type:  General    RECORDS REQUESTED FROM:       Primary Care Provider: Lian Martinez MD- [facility]    Pertinent Medical History: stroke, hypertension    Most recent EKG+ Tracin19    Most recent ECHO: 7/10/18    Most recent Cardiac Stress Test: 19

## 2019-12-30 DIAGNOSIS — Z12.31 VISIT FOR SCREENING MAMMOGRAM: ICD-10-CM

## 2019-12-30 PROCEDURE — 77067 SCR MAMMO BI INCL CAD: CPT | Mod: TC

## 2019-12-30 PROCEDURE — 77063 BREAST TOMOSYNTHESIS BI: CPT | Mod: TC

## 2020-01-05 DIAGNOSIS — G43.109 MIGRAINE WITH AURA AND WITHOUT STATUS MIGRAINOSUS, NOT INTRACTABLE: ICD-10-CM

## 2020-01-06 ENCOUNTER — TRANSFERRED RECORDS (OUTPATIENT)
Dept: HEALTH INFORMATION MANAGEMENT | Facility: CLINIC | Age: 65
End: 2020-01-06

## 2020-01-06 NOTE — TELEPHONE ENCOUNTER
"Requested Prescriptions   Pending Prescriptions Disp Refills     topiramate (TOPAMAX) 25 MG tablet [Pharmacy Med Name: TOPIRAMATE 25 MG TABLET] 180 tablet 0     Sig: TAKE 1 TABLET (25 MG) BY MOUTH 2 TIMES DAILY   Last Written Prescription Date:  01/07/2019  Last Fill Quantity: 180,  # refills: 03   Last office visit: 7/30/2019 with prescribing provider:     Future Office Visit:   Next 5 appointments (look out 90 days)    Jan 07, 2020  7:20 AM CST  SHORT with Lian Martinez MD  [facility] [address]  [facility] [address]  [phone]           Anti-Seizure Meds Protocol  Failed - 1/5/2020 11:00 AM        Failed - Review Authorizing provider's last note.      Refer to last progress notes: confirm request is for original authorizing provider (cannot be through other providers).          Failed - Normal ALT or AST on file in past 26 months     Recent Labs   Lab Test 02/10/17  0635   ALT 20     Recent Labs   Lab Test 02/10/17  0635   AST 23             Passed - Recent (12 mo) or future (30 days) visit within the authorizing provider's specialty     Patient has had an office visit with the authorizing provider or a provider within the authorizing providers department within the previous 12 mos or has a future within next 30 days. See \"Patient Info\" tab in inbasket, or \"Choose Columns\" in Meds & Orders section of the refill encounter.              Passed - Normal CBC on file in past 26 months     Recent Labs   Lab Test 04/29/19  0846   WBC 6.3   RBC 4.57   HGB 13.7   HCT 42.3                    Passed - Normal serum creatinine on file in past 26 months     Recent Labs   Lab Test 04/29/19  0846  01/08/18  1134   CR 0.70   < >  --    CREAT  --   --  0.7    < > = values in this interval not displayed.             Passed - Normal platelet count on file in past 26 months     Recent Labs   Lab Test 04/29/19  0846                  Passed - " Medication is active on med list        Passed - No active pregnancy on record        Passed - No positive pregnancy test in last 12 months

## 2020-01-07 ENCOUNTER — PRE VISIT (OUTPATIENT)
Dept: UROLOGY | Facility: CLINIC | Age: 65
End: 2020-01-07

## 2020-01-07 ENCOUNTER — OFFICE VISIT (OUTPATIENT)
Dept: INTERNAL MEDICINE | Facility: CLINIC | Age: 65
End: 2020-01-07
Payer: MEDICARE

## 2020-01-07 VITALS
RESPIRATION RATE: 16 BRPM | WEIGHT: 209 LBS | BODY MASS INDEX: 33.59 KG/M2 | SYSTOLIC BLOOD PRESSURE: 106 MMHG | OXYGEN SATURATION: 98 % | HEIGHT: 66 IN | HEART RATE: 60 BPM | DIASTOLIC BLOOD PRESSURE: 68 MMHG | TEMPERATURE: 98.9 F

## 2020-01-07 DIAGNOSIS — Z98.84 S/P GASTRIC BYPASS: ICD-10-CM

## 2020-01-07 DIAGNOSIS — I10 ESSENTIAL HYPERTENSION WITH GOAL BLOOD PRESSURE LESS THAN 140/90: ICD-10-CM

## 2020-01-07 DIAGNOSIS — R00.2 PALPITATIONS: ICD-10-CM

## 2020-01-07 DIAGNOSIS — K31.9 DISEASE OF STOMACH AND DUODENUM, UNSPECIFIED: ICD-10-CM

## 2020-01-07 DIAGNOSIS — E21.3 HYPERPARATHYROIDISM (H): ICD-10-CM

## 2020-01-07 DIAGNOSIS — I67.1 CEREBRAL ANEURYSM WITHOUT RUPTURE: ICD-10-CM

## 2020-01-07 DIAGNOSIS — K90.9 INTESTINAL MALABSORPTION, UNSPECIFIED TYPE: ICD-10-CM

## 2020-01-07 DIAGNOSIS — Z00.00 PREVENTATIVE HEALTH CARE: Primary | ICD-10-CM

## 2020-01-07 DIAGNOSIS — G43.109 MIGRAINE WITH AURA AND WITHOUT STATUS MIGRAINOSUS, NOT INTRACTABLE: ICD-10-CM

## 2020-01-07 DIAGNOSIS — J01.90 ACUTE SINUSITIS WITH COEXISTING CONDITION REQUIRING PROPHYLACTIC TREATMENT: ICD-10-CM

## 2020-01-07 PROBLEM — Z92.82 RECEIVED INTRAVENOUS TISSUE PLASMINOGEN ACTIVATOR (TPA) IN EMERGENCY DEPARTMENT: Status: ACTIVE | Noted: 2019-03-17

## 2020-01-07 PROBLEM — R47.89 WORD FINDING DIFFICULTY: Status: ACTIVE | Noted: 2019-03-17

## 2020-01-07 PROBLEM — T40.711A: Status: ACTIVE | Noted: 2019-03-17

## 2020-01-07 PROBLEM — R29.818 TRANSIENT NEUROLOGICAL SYMPTOMS: Status: ACTIVE | Noted: 2019-03-17

## 2020-01-07 PROBLEM — E55.9 VITAMIN D DEFICIENCY: Status: ACTIVE | Noted: 2019-03-17

## 2020-01-07 PROBLEM — M85.80 OSTEOPENIA: Status: ACTIVE | Noted: 2019-03-17

## 2020-01-07 LAB
ALBUMIN SERPL-MCNC: 3.6 G/DL (ref 3.4–5)
ALP SERPL-CCNC: 78 U/L (ref 40–150)
ALT SERPL W P-5'-P-CCNC: 22 U/L (ref 0–50)
ANION GAP SERPL CALCULATED.3IONS-SCNC: 5 MMOL/L (ref 3–14)
AST SERPL W P-5'-P-CCNC: 17 U/L (ref 0–45)
BILIRUB SERPL-MCNC: 0.3 MG/DL (ref 0.2–1.3)
BUN SERPL-MCNC: 15 MG/DL (ref 7–30)
CALCIUM SERPL-MCNC: 8.9 MG/DL (ref 8.5–10.1)
CHLORIDE SERPL-SCNC: 104 MMOL/L (ref 94–109)
CHOLEST SERPL-MCNC: 165 MG/DL
CO2 SERPL-SCNC: 28 MMOL/L (ref 20–32)
CREAT SERPL-MCNC: 0.72 MG/DL (ref 0.52–1.04)
ERYTHROCYTE [DISTWIDTH] IN BLOOD BY AUTOMATED COUNT: 14.3 % (ref 10–15)
FOLATE SERPL-MCNC: 24.3 NG/ML
GFR SERPL CREATININE-BSD FRML MDRD: 87 ML/MIN/{1.73_M2}
GLUCOSE SERPL-MCNC: 95 MG/DL (ref 70–99)
HCT VFR BLD AUTO: 40.1 % (ref 35–47)
HDLC SERPL-MCNC: 74 MG/DL
HGB BLD-MCNC: 12.8 G/DL (ref 11.7–15.7)
LDLC SERPL CALC-MCNC: 79 MG/DL
MCH RBC QN AUTO: 29.2 PG (ref 26.5–33)
MCHC RBC AUTO-ENTMCNC: 31.9 G/DL (ref 31.5–36.5)
MCV RBC AUTO: 92 FL (ref 78–100)
NONHDLC SERPL-MCNC: 91 MG/DL
PLATELET # BLD AUTO: 259 10E9/L (ref 150–450)
POTASSIUM SERPL-SCNC: 3.7 MMOL/L (ref 3.4–5.3)
PROT SERPL-MCNC: 6.6 G/DL (ref 6.8–8.8)
PTH-INTACT SERPL-MCNC: 41 PG/ML (ref 18–80)
RBC # BLD AUTO: 4.38 10E12/L (ref 3.8–5.2)
SODIUM SERPL-SCNC: 137 MMOL/L (ref 133–144)
TRIGL SERPL-MCNC: 61 MG/DL
TSH SERPL DL<=0.005 MIU/L-ACNC: 1.12 MU/L (ref 0.4–4)
VIT B12 SERPL-MCNC: 1956 PG/ML (ref 193–986)
WBC # BLD AUTO: 5.7 10E9/L (ref 4–11)

## 2020-01-07 PROCEDURE — 82728 ASSAY OF FERRITIN: CPT | Performed by: INTERNAL MEDICINE

## 2020-01-07 PROCEDURE — 80053 COMPREHEN METABOLIC PANEL: CPT | Performed by: INTERNAL MEDICINE

## 2020-01-07 PROCEDURE — G0403 EKG FOR INITIAL PREVENT EXAM: HCPCS | Performed by: INTERNAL MEDICINE

## 2020-01-07 PROCEDURE — 82607 VITAMIN B-12: CPT | Performed by: INTERNAL MEDICINE

## 2020-01-07 PROCEDURE — 84425 ASSAY OF VITAMIN B-1: CPT | Mod: 90 | Performed by: INTERNAL MEDICINE

## 2020-01-07 PROCEDURE — 83970 ASSAY OF PARATHORMONE: CPT | Performed by: INTERNAL MEDICINE

## 2020-01-07 PROCEDURE — 84630 ASSAY OF ZINC: CPT | Mod: 90 | Performed by: INTERNAL MEDICINE

## 2020-01-07 PROCEDURE — 85027 COMPLETE CBC AUTOMATED: CPT | Performed by: INTERNAL MEDICINE

## 2020-01-07 PROCEDURE — 99000 SPECIMEN HANDLING OFFICE-LAB: CPT | Performed by: INTERNAL MEDICINE

## 2020-01-07 PROCEDURE — G0402 INITIAL PREVENTIVE EXAM: HCPCS | Performed by: INTERNAL MEDICINE

## 2020-01-07 PROCEDURE — 80061 LIPID PANEL: CPT | Performed by: INTERNAL MEDICINE

## 2020-01-07 PROCEDURE — 83550 IRON BINDING TEST: CPT | Performed by: INTERNAL MEDICINE

## 2020-01-07 PROCEDURE — 84443 ASSAY THYROID STIM HORMONE: CPT | Performed by: INTERNAL MEDICINE

## 2020-01-07 PROCEDURE — 82746 ASSAY OF FOLIC ACID SERUM: CPT | Performed by: INTERNAL MEDICINE

## 2020-01-07 PROCEDURE — 36415 COLL VENOUS BLD VENIPUNCTURE: CPT | Performed by: INTERNAL MEDICINE

## 2020-01-07 PROCEDURE — 82306 VITAMIN D 25 HYDROXY: CPT | Performed by: INTERNAL MEDICINE

## 2020-01-07 PROCEDURE — 83540 ASSAY OF IRON: CPT | Performed by: INTERNAL MEDICINE

## 2020-01-07 RX ORDER — HYDROCHLOROTHIAZIDE 12.5 MG/1
TABLET ORAL
Qty: 90 TABLET | Refills: 3 | Status: SHIPPED | OUTPATIENT
Start: 2020-01-07 | End: 2021-01-11

## 2020-01-07 RX ORDER — ATENOLOL 50 MG/1
TABLET ORAL
Qty: 90 TABLET | Refills: 3 | Status: SHIPPED | OUTPATIENT
Start: 2020-01-07 | End: 2021-01-11

## 2020-01-07 RX ORDER — FLUTICASONE PROPIONATE 50 MCG
1 SPRAY, SUSPENSION (ML) NASAL DAILY
Qty: 16 G | Refills: 3 | Status: SHIPPED | OUTPATIENT
Start: 2020-01-07 | End: 2020-03-02

## 2020-01-07 RX ORDER — LOSARTAN POTASSIUM 50 MG/1
50 TABLET ORAL DAILY
Qty: 90 TABLET | Refills: 3 | Status: SHIPPED | OUTPATIENT
Start: 2020-01-07 | End: 2021-01-11

## 2020-01-07 RX ORDER — IMIPRAMINE HCL 25 MG
25 TABLET ORAL AT BEDTIME
COMMUNITY
Start: 2020-01-07 | End: 2020-01-07

## 2020-01-07 RX ORDER — TOPIRAMATE 25 MG/1
25 TABLET, FILM COATED ORAL 2 TIMES DAILY
Qty: 180 TABLET | Refills: 3 | Status: SHIPPED | OUTPATIENT
Start: 2020-01-07 | End: 2020-12-20

## 2020-01-07 RX ORDER — IMIPRAMINE HCL 25 MG
25 TABLET ORAL AT BEDTIME
Qty: 90 TABLET | Refills: 3 | Status: SHIPPED | OUTPATIENT
Start: 2020-01-07 | End: 2020-12-20

## 2020-01-07 RX ORDER — TOPIRAMATE 25 MG/1
25 TABLET, FILM COATED ORAL 2 TIMES DAILY
Qty: 180 TABLET | Refills: 0 | OUTPATIENT
Start: 2020-01-07 | End: 2021-01-06

## 2020-01-07 ASSESSMENT — PATIENT HEALTH QUESTIONNAIRE - PHQ9: SUM OF ALL RESPONSES TO PHQ QUESTIONS 1-9: 4

## 2020-01-07 ASSESSMENT — MIFFLIN-ST. JEOR: SCORE: 1509.77

## 2020-01-07 NOTE — PROGRESS NOTES
"SUBJECTIVE:   Jose Coronado is a 64 year old female who presents for Preventive Visit.    Fasting.  Are you in the first 12 months of your Medicare Part B coverage?  Yes,  Visual Acuity:  Right Eye: 20/25   Left Eye: 20/20  Both Eyes: 20/20 pt was wearing glasses.    Physical Health:    In general, how would you rate your overall physical health? good    Outside of work, how many days during the week do you exercise? none active    Outside of work, approximately how many minutes a day do you exercise?not applicable    If you drink alcohol do you typically have >3 drinks per day or >7 drinks per week? No    Do you usually eat at least 4 servings of fruit and vegetables a day, include whole grains & fiber and avoid regularly eating high fat or \"junk\" foods? Yes    Do you have any problems taking medications regularly?  No    Do you have any side effects from medications? none    Needs assistance for the following daily activities: no assistance needed    Which of the following safety concerns are present in your home?  none identified     Hearing impairment: No    In the past 6 months, have you been bothered by leaking of urine? yes    Mental Health:    In general, how would you rate your overall mental or emotional health? excellent  PHQ-2 Score:      Do you feel safe in your environment? Yes    Have you ever done Advance Care Planning? (For example, a Health Directive, POLST, or a discussion with a medical provider or your loved ones about your wishes): Yes, advance care planning is on file.    Fall risk:  Fallen 2 or more times in the past year?: No  Any fall with injury in the past year?: No    Cognitive Screenin) Repeat 3 items (Leader, Season, Table)    2) Clock draw: NORMAL  3) 3 item recall: Recalls 3 objects  Results: 3 items recalled: COGNITIVE IMPAIRMENT LESS LIKELY    Mini-CogTM Copyright ANDREW Bass. Licensed by the author for use in [facility]; reprinted with permission (tez@.[facility]). " All rights reserved.      Do you have sleep apnea, excessive snoring or daytime drowsiness?: no      Reviewed and updated as needed this visit by clinical staff  Tobacco  Allergies  Meds  Med Hx  Surg Hx  Fam Hx  Soc Hx        Reviewed and updated as needed this visit by Provider        Social History     Tobacco Use     Smoking status: Never Smoker     Smokeless tobacco: Never Used   Substance Use Topics     Alcohol use: Yes     Alcohol/week: 0.0 standard drinks     Comment: Occasional, wine                           Current providers sharing in care for this patient include:   Patient Care Team:  Lian Martinez MD as PCP - General (Internal Medicine)  Lian Martinez MD as Assigned PCP  Olivia Vasquez PA-C as Physician Assistant (Physician Assistant - Medical)  Carolyn Valdivia MD as MD (Urology)  Lita Goodman, RN as Specialty Care Coordinator (Urology)  Lian Martinez MD as Referring Physician (Internal Medicine)    The following health maintenance items are reviewed in Epic and correct as of today:  Health Maintenance   Topic Date Due     HIV SCREENING  12[birth year]26/1970     ZOSTER IMMUNIZATION (1 of 2) 12/26/2004     OP ANNUAL INR REFERRAL  02/13/2018     FALL RISK ASSESSMENT  12/26/2019     PHQ-2  01/01/2020     MEDICARE ANNUAL WELLNESS VISIT  12/26/2019     PNEUMOCOCCAL IMMUNIZATION 65+ LOW/MEDIUM RISK (1 of 2 - PCV13) 12/26/2019     DTAP/TDAP/TD IMMUNIZATION (3 - Td) 10/04/2020     MAMMO SCREENING  12/30/2020     ADVANCE CARE PLANNING  07/24/2022     LIPID  10/24/2023     COLONOSCOPY  08/12/2025     DEXA  Completed     HEPATITIS C SCREENING  Completed     INFLUENZA VACCINE  Completed     IPV IMMUNIZATION  Aged Out     MENINGITIS IMMUNIZATION  Aged Out     BP Readings from Last 3 Encounters:   01/07/20 106/68   12/10/19 122/76   12/05/19 122/76    Wt Readings from Last 3 Encounters:   01/07/20 94.8 kg (209 lb)   12/10/19 92.6 kg (204 lb 1.6 oz)   12/05/19 90.7 kg (200 lb)                 "      ROS:  CONSTITUTIONAL: NEGATIVE for fever, chills, change in weight  INTEGUMENTARY/SKIN: NEGATIVE for worrisome rashes, moles or lesions  EYES: NEGATIVE for vision changes or irritation  ENT/MOUTH: NEGATIVE for ear, mouth and throat problems  RESP: NEGATIVE for significant cough or SOB  BREAST: NEGATIVE for masses, tenderness or discharge  CV: NEGATIVE for chest pain, palpitations or peripheral edema  GI: NEGATIVE for nausea, abdominal pain, heartburn, or change in bowel habits  : NEGATIVE for frequency, dysuria, or hematuria  MUSCULOSKELETAL: NEGATIVE for significant arthralgias or myalgia  NEURO: NEGATIVE for weakness, dizziness or paresthesias  ENDOCRINE: NEGATIVE for temperature intolerance, skin/hair changes  HEME: NEGATIVE for bleeding problems  PSYCHIATRIC: NEGATIVE for changes in mood or affect    OBJECTIVE:   /68 (BP Location: Right arm, Patient Position: Chair, Cuff Size: Adult Large)   Pulse 60   Temp 98.9  F (37.2  C) (Oral)   Resp 16   Ht 5' 6\" (1.676 m)   Wt 209 lb (94.8 kg)   SpO2 98%   Breastfeeding No   BMI 33.73 kg/m   Estimated body mass index is 33.73 kg/m  as calculated from the following:    Height as of this encounter: 5' 6\" (1.676 m).    Weight as of this encounter: 209 lb (94.8 kg).  EXAM:   GENERAL: healthy, alert and no distress  EYES: Eyes grossly normal to inspection, PERRL and conjunctivae and sclerae normal  HENT: ear canals and TM's normal, nose and mouth without ulcers or lesions  NECK: no adenopathy, no asymmetry, masses, or scars and thyroid normal to palpation  RESP: lungs clear to auscultation - no rales, rhonchi or wheezes  CV: regular rate and rhythm, normal S1 S2, no S3 or S4, no murmur, click or rub, no peripheral edema and peripheral pulses strong  ABDOMEN: soft, nontender, no hepatosplenomegaly, no masses and bowel sounds normal  MS: no gross musculoskeletal defects noted, no edema  SKIN: no suspicious lesions or rashes  NEURO: Normal strength and tone, " "mentation intact and speech normal  PSYCH: mentation appears normal, affect normal/bright      ASSESSMENT / PLAN:   1. Preventative health care     - EKG 12-lead complete w/read - Clinics    2. Hyperparathyroidism (H)   am not sure she truly has this but is on her problem list will update labs and se where things stand  - Comprehensive metabolic panel  - Vitamin D Deficiency  - Parathyroid Hormone Intact    3. S/P gastric bypass   with major pending surgery will update labs  - Vitamin D Deficiency  - Ferritin  - Iron and iron binding capacity  - Zinc  - Folate  - Vitamin B12  - Vitamin B1 whole blood    4. Essential hypertension with goal blood pressure less than 140/90  under good control Continue current medications.   - Comprehensive metabolic panel  - TSH with free T4 reflex  - Lipid Profile (Chol, Trig, HDL, LDL calc)  - CBC with platelets    5. Cerebral aneurysm without rupture  She has follow up with Neurosurgery already scheduled    6. Disease of stomach and duodenum, unspecified      - Ferritin  - Iron and iron binding capacity    7. Intestinal malabsorption, unspecified type      - Zinc    COUNSELING:  Reviewed preventive health counseling, as reflected in patient instructions       Regular exercise       Healthy diet/nutrition    Estimated body mass index is 33.73 kg/m  as calculated from the following:    Height as of this encounter: 5' 6\" (1.676 m).    Weight as of this encounter: 209 lb (94.8 kg).    Weight management plan: Discussed healthy diet and exercise guidelines     reports that she has never smoked. She has never used smokeless tobacco.      Appropriate preventive services were discussed with this patient, including applicable screening as appropriate for cardiovascular disease, diabetes, osteopenia/osteoporosis, and glaucoma.  As appropriate for age/gender, discussed screening for colorectal cancer, prostate cancer, breast cancer, and cervical cancer. Checklist reviewing preventive services " available has been given to the patient.    Reviewed patients plan of care and provided an AVS. The Basic Care Plan (routine screening as documented in Health Maintenance) for Jose meets the Care Plan requirement. This Care Plan has been established and reviewed with the Patient.    Counseling Resources:  ATP IV Guidelines  Pooled Cohorts Equation Calculator  Breast Cancer Risk Calculator  FRAX Risk Assessment  ICSI Preventive Guidelines  Dietary Guidelines for Americans, 2010  [facility]'s MyPlate  ASA Prophylaxis  Lung CA Screening    Lian Martinez MD  [facility]

## 2020-01-07 NOTE — TELEPHONE ENCOUNTER
Reason for Visit: discuss surgery (sacrocolpopexy and sling)    Diagnosis: midline cystocele    Orders/Procedures/Records: in system    Contact Patient: n/a    Rooming Requirements: ask patient      Genoveva Acosta LPN  01/07/20  12:41 PM

## 2020-01-08 LAB
DEPRECATED CALCIDIOL+CALCIFEROL SERPL-MC: 54 UG/L (ref 20–75)
FERRITIN SERPL-MCNC: 95 NG/ML (ref 8–252)
IRON SATN MFR SERPL: 23 % (ref 15–46)
IRON SERPL-MCNC: 80 UG/DL (ref 35–180)
TIBC SERPL-MCNC: 351 UG/DL (ref 240–430)
ZINC SERPL-MCNC: 72.7 UG/DL (ref 60–120)

## 2020-01-09 LAB — VIT B1 BLD-MCNC: 137 NMOL/L (ref 70–180)

## 2020-01-15 ENCOUNTER — OFFICE VISIT (OUTPATIENT)
Dept: UROLOGY | Facility: CLINIC | Age: 65
End: 2020-01-15
Payer: MEDICARE

## 2020-01-15 VITALS — HEART RATE: 65 BPM | SYSTOLIC BLOOD PRESSURE: 118 MMHG | DIASTOLIC BLOOD PRESSURE: 78 MMHG

## 2020-01-15 DIAGNOSIS — N81.6 RECTOCELE: ICD-10-CM

## 2020-01-15 DIAGNOSIS — N81.11 MIDLINE CYSTOCELE: Primary | ICD-10-CM

## 2020-01-15 ASSESSMENT — PAIN SCALES - GENERAL: PAINLEVEL: NO PAIN (0)

## 2020-01-15 NOTE — NURSING NOTE
Chief Complaint   Patient presents with     Follow Up     discuss surgery       Blood pressure 118/78, pulse 65, not currently breastfeeding. There is no height or weight on file to calculate BMI.    Patient Active Problem List   Diagnosis     Family history of malignant neoplasm of breast     Female stress incontinence     Sleep apnea     Osteopenia     S/P gastric bypass     Vitamin D deficiency     Hyperparathyroidism (H)     Hirsutism     ACP (advance care planning)     Overweight, BMI > 35     Iron deficiency anemia     Lump or mass in breast     PFO (patent foramen ovale)     Essential hypertension with goal blood pressure less than 140/90     Left temporal lobe infarction (H)     Stroke (H)     Long-term (current) use of anticoagulants [Z79.01]     Cerebral aneurysm without rupture     ASD (atrial septal defect)     Migraine with aura and without status migrainosus, not intractable     Female bladder prolapse     Midline cystocele     Rectocele     Word finding difficulty     Transient neurological symptoms     Received intravenous tissue plasminogen activator (tPA) in emergency department     Accidental marijuana poisoning       Allergies   Allergen Reactions     Contrast Dye Itching     Reaction of immediate burning and severe itching in Right ear after injection for CT.      Sulfa Drugs      hives       Current Outpatient Medications   Medication Sig Dispense Refill     acetaminophen (TYLENOL) 325 MG tablet Take 2 tablets (650 mg) by mouth every 4 hours as needed for mild pain 100 tablet      Ascorbic Acid (VITAMIN C PO) Take 250 mg by mouth 2 times daily (0.5 x 500 mg tablet = 250 mg dose)       aspirin 325 MG tablet Take 325 mg by mouth       atenolol (TENORMIN) 50 MG tablet TAKE 1 TABLET BY MOUTH EVERY DAY 90 tablet 3     Calcium Citrate-Vitamin D (CALCIUM CITRATE + D PO) Take 2 tablets by mouth every morning        Cyanocobalamin 2500 MCG TABS Take 2,500 mcg by mouth twice a week Mon, Thur        Ferrous Sulfate 27 MG TABS Take 27 mg by mouth 2 times daily       FIBER COMPLETE PO Take 1 capsule by mouth daily        fluticasone (FLONASE) 50 MCG/ACT nasal spray Spray 1 spray into both nostrils daily 16 g 3     hydrochlorothiazide (HYDRODIURIL) 12.5 MG tablet TAKE 1 TABLET BY MOUTH EVERY DAY 90 tablet 3     imipramine (TOFRANIL) 25 MG tablet Take 1 tablet (25 mg) by mouth At Bedtime 90 tablet 3     ketotifen (ZADITOR) 0.025 % SOLN Place 1 drop into both eyes every 12 hours as needed for itching 1 Bottle      loratadine (CLARITIN) 10 MG tablet Take 10 mg by mouth daily       losartan (COZAAR) 50 MG tablet Take 1 tablet (50 mg) by mouth daily 90 tablet 3     omega 3 1000 MG CAPS Take 1 g by mouth daily 90 capsule      topiramate (TOPAMAX) 25 MG tablet Take 1 tablet (25 mg) by mouth 2 times daily 180 tablet 3     UNABLE TO FIND CPAP machine every night       valACYclovir (VALTREX) 1000 mg tablet TAKE 2 TABLETS (2,000 MG) BY MOUTH 2 TIMES DAILY AS NEEDED 4 tablet 3     VITAMIN D, CHOLECALCIFEROL, PO Take 500 Units by mouth daily          Social History     Tobacco Use     Smoking status: Never Smoker     Smokeless tobacco: Never Used   Substance Use Topics     Alcohol use: Yes     Alcohol/week: 0.0 standard drinks     Comment: Occasional, wine     Drug use: No       Genoveva Acosta LPN  1/15/2020  1:49 PM

## 2020-01-15 NOTE — PROGRESS NOTES
CC: Bladder prolapse    HPI:  Jose Coronado is a 63 year old female with PMH significant for suspected embolic stroke in 2017 (now s/p closure of PFO) and urologic hx of grade III cystocele, grade III rectocele, stress urinary incontinence, and overactive bladder. She is currently scheduled for robotic assisted ROBBY-BSO (Dr. Yancey) and sacrocolpexy and sling (Dr. Valdivia) on 3/16/20. She presents today with questions prior to her surgery. She had her MRA due to hx of ischemic stroke and is planning to review these results with her neurologist on 1/21/20. Pre-op physical scheduled for 3/2/20.     Past Medical History        Past Medical History:   Diagnosis Date     Anemia       CVA (cerebral vascular accident) (H) 2017     ?migraine, ?pfo--negative vasc w/u, neg hypercoag w/u     Diffuse cystic mastopathy       Fibrocystic breast disease     HTN, goal below 140/90       Hyperparathyroidism (H)       Infectious mononucleosis       Mono at age 16     Irregular heart beat       PAT no afib on 30day monitor     Labyrinthitis, unspecified       Migraine headache with aura       Osteopenia       Pain in joint, shoulder region       Secondary to a fall     Palpitations       PFO (patent foramen ovale)       s/p closure with amplazter device 7/13/17     S/P gastric bypass June, 2010     Sleep apnea       she is on CPAP     Vitamin D deficiencies             Past Surgical History         Past Surgical History:   Procedure Laterality Date     C ANEURYSM, INTRACRAN, SIMPLE SURG   04/2017     coil of aneurysm right posterior paraophthalmic artery     C NONSPECIFIC PROCEDURE         S/P multiple breast biopies - all negative / benign     C NONSPECIFIC PROCEDURE         S/P T&A     C NONSPECIFIC PROCEDURE         [place] teeth extraction     C NONSPECIFIC PROCEDURE         S/P (? unreadable) ankle     COLONOSCOPY N/A 8/12/2015     Procedure: COLONOSCOPY;  Surgeon: Deandre Brooks MD;  Location:  GI     GASTRIC BYPASS   June  "24, 2010     ORTHOPEDIC SURGERY Left 2010     wrist fracture     PARATHYROIDECTOMY   9/19/11      - Hx gastric bypass (2010)  - Heart defect surgery = PFO closure (2017)     Meds, Allergies, FHx and SHx reviewed per nurse's intake note.     ROS is negative on a 10 point scale except for what is mentioned in patient inventory below and the HPI above.     PEx:   /78 (BP Location: Right arm, Patient Position: Sitting, Cuff Size: Adult Regular)   Pulse 65   5' 6\", Body mass index is 32.28 kg/m ., 200 lbs 0 oz  Gen appearance:  Well groomed  HEENT:  EOMI, AT NC  Psych:  Normal Affect  Neuro:  A/O X 3  Skin:  Warm to touch  Resp:  No increased respiratory effort  Vasc: Warm, well perfused  Lymph:  No LE edema  Abd:  Soft/NT, ND, no palpable masses  Musk:  Full ROM in extremities    ASSESSMENT and PLAN:  This is a 63 year old female with grade III cystocele and grade III rectocele as well as some stress incontinence in addition to overactive bladder.  She has elected for surgical repair. Currently scheduled for robotic assisted ROBBY-BSO (Dr. Yancey) and sacrocolpexy and sling (Dr. Valdivia) on 3/16/20. She presents today with questions prior to her surgery. Her questions were answered in a satisfactory manner. We reviewed the use of mesh in surgery. We discussed the FDA public health notification once again at length.     -Will proceed with surgery as scheduled (3/16/20) pending medical clearance     Marco A Kim MD  Urology, PGY-2    Patient was seen, evaluated and plan was formulated in conjunction with me and I agree with the above.  Carolyn Valdivia MD    Over 25 min spent with patient,  >50% in discussion and coordination of care.        "

## 2020-01-15 NOTE — LETTER
1/15/2020       RE: Jose Coronado  [address] [place] [address]     Dear Colleague,    Thank you for referring your patient, Jose Coronado, to the [facility] at [facility]. Please see a copy of my visit note below.    CC: Bladder prolapse    HPI:  Jose Coronado is a 63 year old female with PMH significant for suspected embolic stroke in 2017 (now s/p closure of PFO) and urologic hx of grade III cystocele, grade III rectocele, stress urinary incontinence, and overactive bladder. She is currently scheduled for robotic assisted ROBBY-BSO (Dr. Yancey) and sacrocolpexy and sling (Dr. Valdivia) on 3/16/20. She presents today with questions prior to her surgery. She had her MRA due to hx of ischemic stroke and is planning to review these results with her neurologist on 1/21/20. Pre-op physical scheduled for 3/2/20.     Past Medical History        Past Medical History:   Diagnosis Date     Anemia       CVA (cerebral vascular accident) (H) 2017     ?migraine, ?pfo--negative vasc w/u, neg hypercoag w/u     Diffuse cystic mastopathy       Fibrocystic breast disease     HTN, goal below 140/90       Hyperparathyroidism (H)       Infectious mononucleosis       Mono at age 16     Irregular heart beat       PAT no afib on 30day monitor     Labyrinthitis, unspecified       Migraine headache with aura       Osteopenia       Pain in joint, shoulder region       Secondary to a fall     Palpitations       PFO (patent foramen ovale)       s/p closure with amplazter device 7/13/17     S/P gastric bypass June, 2010     Sleep apnea       she is on CPAP     Vitamin D deficiencies             Past Surgical History         Past Surgical History:   Procedure Laterality Date     C ANEURYSM, INTRACRAN, SIMPLE SURG   04/2017     coil of aneurysm right posterior paraophthalmic artery     C NONSPECIFIC PROCEDURE         S/P multiple breast biopies - all negative /  "benign     C NONSPECIFIC PROCEDURE         S/P T&A     C NONSPECIFIC PROCEDURE         [place] teeth extraction     C NONSPECIFIC PROCEDURE         S/P (? unreadable) ankle     COLONOSCOPY N/A 8/12/2015     Procedure: COLONOSCOPY;  Surgeon: Deandre Brooks MD;  Location: [facility]     GASTRIC BYPASS   June 24, 2010     ORTHOPEDIC SURGERY Left 2010     wrist fracture     PARATHYROIDECTOMY   9/19/11      - Hx gastric bypass (2010)  - Heart defect surgery = PFO closure (2017)     Meds, Allergies, FHx and SHx reviewed per nurse's intake note.     ROS is negative on a 10 point scale except for what is mentioned in patient inventory below and the HPI above.     PEx:   /78 (BP Location: Right arm, Patient Position: Sitting, Cuff Size: Adult Regular)   Pulse 65   5' 6\", Body mass index is 32.28 kg/m ., 200 lbs 0 oz  Gen appearance:  Well groomed  HEENT:  EOMI, AT NC  Psych:  Normal Affect  Neuro:  A/O X 3  Skin:  Warm to touch  Resp:  No increased respiratory effort  Vasc: Warm, well perfused  Lymph:  No LE edema  Abd:  Soft/NT, ND, no palpable masses  Musk:  Full ROM in extremities    ASSESSMENT and PLAN:  This is a 63 year old female with grade III cystocele and grade III rectocele as well as some stress incontinence in addition to overactive bladder.   She has elected for surgical repair. Currently scheduled for robotic assisted ROBBY-BSO (Dr. Yancey) and sacrocolpexy and sling (Dr. Valdivia) on 3/16/20. She presents today with questions prior to her surgery. Her questions were answered in a satisfactory manner. We reviewed the use of mesh in surgery. We discussed the FDA public health notification once again at length.     -Will proceed with surgery as scheduled (3/16/20) pending medical clearance     Marco A Kim MD  Urology, PGY-2    Patient was seen, evaluated and plan was formulated in conjunction with me and I agree with the above.  Carolyn Valdivia MD    Over 25 min spent with patient,  >50% in discussion " and coordination of care.          Again, thank you for allowing me to participate in the care of your patient.      Sincerely,    Carolyn Valdivia MD

## 2020-01-22 ENCOUNTER — MYC MEDICAL ADVICE (OUTPATIENT)
Dept: INTERNAL MEDICINE | Facility: CLINIC | Age: 65
End: 2020-01-22

## 2020-03-02 ENCOUNTER — PRE VISIT (OUTPATIENT)
Dept: SURGERY | Facility: CLINIC | Age: 65
End: 2020-03-02

## 2020-03-02 ENCOUNTER — ANESTHESIA EVENT (OUTPATIENT)
Dept: SURGERY | Facility: CLINIC | Age: 65
End: 2020-03-02
Payer: MEDICARE

## 2020-03-02 ENCOUNTER — OFFICE VISIT (OUTPATIENT)
Dept: SURGERY | Facility: CLINIC | Age: 65
End: 2020-03-02
Payer: MEDICARE

## 2020-03-02 VITALS
RESPIRATION RATE: 16 BRPM | SYSTOLIC BLOOD PRESSURE: 101 MMHG | DIASTOLIC BLOOD PRESSURE: 68 MMHG | HEART RATE: 60 BPM | BODY MASS INDEX: 33.49 KG/M2 | WEIGHT: 201 LBS | OXYGEN SATURATION: 97 % | HEIGHT: 65 IN | TEMPERATURE: 97.7 F

## 2020-03-02 DIAGNOSIS — Z87.440 PERSONAL HISTORY OF URINARY TRACT INFECTION: ICD-10-CM

## 2020-03-02 DIAGNOSIS — N81.11 MIDLINE CYSTOCELE: ICD-10-CM

## 2020-03-02 DIAGNOSIS — Z01.818 PREOP EXAMINATION: ICD-10-CM

## 2020-03-02 DIAGNOSIS — Z01.818 PREOP EXAMINATION: Primary | ICD-10-CM

## 2020-03-02 DIAGNOSIS — R39.81 FUNCTIONAL URINARY INCONTINENCE: ICD-10-CM

## 2020-03-02 DIAGNOSIS — Z87.440 PERSONAL HISTORY OF URINARY TRACT INFECTION: Primary | ICD-10-CM

## 2020-03-02 PROCEDURE — 87086 URINE CULTURE/COLONY COUNT: CPT | Performed by: UROLOGY

## 2020-03-02 ASSESSMENT — LIFESTYLE VARIABLES: TOBACCO_USE: 0

## 2020-03-02 ASSESSMENT — PAIN SCALES - GENERAL: PAINLEVEL: NO PAIN (0)

## 2020-03-02 ASSESSMENT — MIFFLIN-ST. JEOR: SCORE: 1463.22

## 2020-03-02 ASSESSMENT — ENCOUNTER SYMPTOMS: SEIZURES: 0

## 2020-03-02 NOTE — ANESTHESIA PREPROCEDURE EVALUATION
Anesthesia Pre-Procedure Evaluation    Patient: Joes Coronado   MRN:     [id number] Gender:   female   Age:    64 year old :      1955        Preoperative Diagnosis: Midline cystocele [N81.11]  Rectocele [N81.6]  Female stress incontinence [N39.3]   Procedure(s):  DAVINCI HYSTERECTOMY SUPRACERVICAL, SALPINGO-OOPHORECTOMY INCLUDING BILATERAL WITH EXAM UNDER ANESTHESIA  SACROCOLPOPEXY, ROBOT-ASSISTED, LAPAROSCOPIC, WITH INSERTION OF MIDURETHRAL SLING AND CYSTOSCOPY     LABS:  CBC:   Lab Results   Component Value Date    WBC 5.7 2020    WBC 6.3 2019    HGB 12.8 2020    HGB 13.7 2019    HCT 40.1 2020    HCT 42.3 2019     2020     2019     BMP:   Lab Results   Component Value Date     2020     2019    POTASSIUM 3.7 2020    POTASSIUM 3.7 2019    CHLORIDE 104 2020    CHLORIDE 106 2019    CO2 28 2020    CO2 28 2019    BUN 15 2020    BUN 22 2019    CR 0.72 2020    CR 0.70 2019    GLC 95 2020     (H) 2019     COAGS:   Lab Results   Component Value Date    PTT 25 2019    INR 0.86 2019     POC:   Lab Results   Component Value Date     (H) 2010     OTHER:   Lab Results   Component Value Date    A1C 5.7 2010    MAXIMILIANO 8.9 2020    PHOS 3.5 2011    MAG 2.1 2015    ALBUMIN 3.6 2020    PROTTOTAL 6.6 (L) 2020    ALT 22 2020    AST 17 2020    ALKPHOS 78 2020    BILITOTAL 0.3 2020    TSH 1.12 2020    T4 0.90 2012    T3 108 2011    SED 8 2017        Preop Vitals    BP Readings from Last 3 Encounters:   03/02/20 101/68   01/15/20 118/78   20 106/68    Pulse Readings from Last 3 Encounters:   20 60   01/15/20 65   20 60      Resp Readings from Last 3 Encounters:   20 16   20 16   19 16    SpO2 Readings from Last 3 Encounters:  "  03/02/20 97%   01/07/20 98%   12/05/19 99%      Temp Readings from Last 1 Encounters:   03/02/20 97.7  F (36.5  C) (Oral)    Ht Readings from Last 1 Encounters:   03/02/20 1.66 m (5' 5.35\")      Wt Readings from Last 1 Encounters:   03/02/20 91.2 kg (201 lb)    Estimated body mass index is 33.09 kg/m  as calculated from the following:    Height as of this encounter: 1.66 m (5' 5.35\").    Weight as of this encounter: 91.2 kg (201 lb).     LDA:  Peripheral IV 02/09/17 Right Upper forearm (Active)   Number of days: 1117       Peripheral IV 04/10/17 Right Hand (Active)   Number of days: 1057       Right Groin Interventional Procedure Access (Active)   Number of days: 963        Past Medical History:   Diagnosis Date     Anemia      CVA (cerebral vascular accident) (H) 2017    ?migraine, ?pfo--negative vasc w/u, neg hypercoag w/u     Diffuse cystic mastopathy     Fibrocystic breast disease     HTN, goal below 140/90      Hyperparathyroidism (H)      Infectious mononucleosis     Mono at age 16     Irregular heart beat     PAT no afib on 30day monitor     Labyrinthitis, unspecified      Migraine headache with aura      Osteopenia      Pain in joint, shoulder region     Secondary to a fall     Palpitations      PFO (patent foramen ovale)     s/p closure with amplazter device 7/13/17     S/P gastric bypass June, 2010     Sleep apnea     she is on CPAP     Vitamin D deficiencies       Past Surgical History:   Procedure Laterality Date     BIOPSY  1984    Breast biopsies     BREAST SURGERY  1984    Above     C ANEURYSM, INTRACRAN, SIMPLE SURG  04/2017    coil of aneurysm right posterior paraophthalmic artery     C NONSPECIFIC PROCEDURE      S/P multiple breast biopies - all negative / benign     C NONSPECIFIC PROCEDURE      S/P T&A     C NONSPECIFIC PROCEDURE      [place] teeth extraction     C NONSPECIFIC PROCEDURE      S/P (? unreadable) ankle     CARDIAC SURGERY  7/13/2017    PFO closure     COLONOSCOPY N/A 8/12/2015    " Procedure: COLONOSCOPY;  Surgeon: Deandre Brooks MD;  Location: [facility]     GASTRIC BYPASS  June 24, 2010     ORTHOPEDIC SURGERY Left 2010    wrist fracture     PARATHYROIDECTOMY  9/19/11      Allergies   Allergen Reactions     Contrast Dye Itching     Reaction of immediate burning and severe itching in Right ear after injection for CT.      Sulfa Drugs      hives        Anesthesia Evaluation     . Pt has had prior anesthetic. Type: MAC and General    No history of anesthetic complications          ROS/MED HX    ENT/Pulmonary:     (+)sleep apnea, uses CPAP , . .   (-) tobacco use and asthma   Neurologic: Comment: Taking Topamax bid for migraines. Headaches triggered by bright light or flashing lights.    S/P aneurysm coiling 2017    (+)migraines, CVA date: 2017 with deficits- mild word finding difficulties,    (-) seizures and Neuropathy   Cardiovascular: Comment: S/P PFO device closure 2017    (+) hypertension----. : . . . :. . Previous cardiac testing Echodate:2018results:Stress Testdate:2018 results:ECG reviewed date:1/7/20 results: date: results:          METS/Exercise Tolerance: Comment: Walks dogs daily, gardens in warmer weather. Able to clean home, vacuum. 4 - Raking leaves, gardening   Hematologic:     (+) History of blood clots pt is anticoagulated, Anemia, -     (-) History of Transfusion   Musculoskeletal: Comment: Hx 2 herniated lumbar discs, improved w/ PT.        GI/Hepatic:        (-) GERD and liver disease   Renal/Genitourinary:  - ROS Renal section negative       Endo:     (+) Obesity, .   (-) Type II DM   Psychiatric:  - neg psychiatric ROS       Infectious Disease:  - neg infectious disease ROS       Malignancy:      - no malignancy   Other:    (+) No chance of pregnancy no H/O Chronic Pain,                       PHYSICAL EXAM:   Mental Status/Neuro: A/A/O; Age Appropriate   Airway: Facies: Feasible  Mallampati: III  Mouth/Opening: Full  TM distance: > 6 cm  Neck ROM: Full   Respiratory:  Auscultation: CTAB     Resp. Rate: Normal     Resp. Effort: Normal      CV: Rhythm: Regular  Rate: Age appropriate  Heart: Normal Sounds  Edema: None   Comments:      Dental: Normal Dentition                Assessment:   ASA SCORE: 3    H&P: History and physical reviewed and following examination; no interval change.   Smoking Status:  Non-Smoker/Unknown   NPO Status: NPO Appropriate     Plan:   Anes. Type:  General   Pre-Medication: None   Induction:  IV (Standard)   Airway: ETT; Oral   Access/Monitoring: PIV; 2nd PIV; A-Line (Ultrasound for arterial line)   Maintenance: Balanced     Postop Plan:   Postop Pain: Opioids  Postop Sedation/Airway: Not planned  Disposition: Inpatient/Admit     PONV Management:   Adult Risk Factors: Female, Non-Smoker, Postop Opioids   Prevention: Ondansetron, Dexamethasone     CONSENT: Direct conversation   Plan and risks discussed with: Patient   Blood Products: Consented (ALL Blood Products)       Comments for Plan/Consent:  Video laryngoscope, ultrasound, OG tube. Discussed risks of general anesthesia, including sore throat/hoarse voice, abrasions/damage to lips/tongue/teeth, nausea, rare complications (including medication reactions, cardiac, pulmonary).                PAC Discussion and Assessment    ASA Classification: 3  Case is suitable for: [place]  Anesthetic techniques and relevant risks discussed: GA  Invasive monitoring and risk discussed: No  Types:   Possibility and Risk of blood transfusion discussed: No  NPO instructions given:   Additional anesthetic preparation and risks discussed:   Needs early admission to pre-op area:   Other:     PAC Resident/NP Anesthesia Assessment:  Jose Coronado is a 64 year old female scheduled to undergo DAVINCI HYSTERECTOMY SUPRACERVICAL, SALPINGO-OOPHORECTOMY INCLUDING BILATERAL WITH EXAM UNDER ANESTHESIA and SACROCOLPOPEXY, ROBOT-ASSISTED, LAPAROSCOPIC, WITH INSERTION OF MIDURETHRAL SLING AND CYSTOSCOPY with Lily Yancey MD &  on  Carolyn Valdivia MD at [facility] for treatment of Midline cystocele, Rectocele, Female stress incontinence.    Pt has had prior anesthetic. Type: MAC and General    No history of anesthetic complications    She has the following specific operative considerations:   # VTE risk: 0.5%  # Risk of PONV score = 3.  If > 2, anti-emetic intervention recommended.  # Anesthesia considerations:  Refer to PAC assessment in anesthesia records    # Increased risk of postoperative nausea/vomiting: Recommend use of antiemetic agents in the perioperative period.      CARDIAC: METS 4,  Walks dogs daily, gardens inwarmer weather. Able to clean home, vacuum.     # RCRI : High risk surgery.  0.9% risk of major adverse cardiac event.     #  S/P PFO device closure in 2017     #  Echo 2018:  EF 55-60%, normal     #  Stress test 2018:  No ischemia     #  EKG 1/7/20: sinus bradycardia 55 bpm, Anterior infarct -age undetermined (noted in 2007)     #  HTN, will take atenolol and hold hydrochlorothiazide & losartan on DOS    PULMONARY:     # [place] w/ CPAP    # Never smoked    # No asthma or inhaler use    GI: no GERD     # s/p gastric bypass     ENDO: BMI 34    # Hyperparathyroidism    # No DM    HEME:   # On  mg, will stop 7d prior    ORTHO: full ROM of neck, no TMJ    NEURO/PSYCH:     # Hx CVA in 2017. Underwent coiling of aneurysm and device closure of PFO afterwards. Has mild word finding difficulties.    # Migraines, well controlled w/ Topamax. Triggered by flashing lights, bright lights.       Patient was discussed with Dr Kendrick.      Patient is optimized and is acceptable candidate for the proposed procedure. No further diagnostic evaluation is needed.        Reviewed and Signed by PAC Mid-Level Provider/Resident  Mid-Level Provider/Resident: Mee Tobias PA-C  Date: 3/2/20  Time: 9337    Attending Anesthesiologist Anesthesia Assessment:  I have examined the patient and reviewed the medical  record.  I have discussed the patient with the KHURRAM and concur with her assessment which was subsequently closed in 2017  The patient is scheduled for robotic assisted laparoscopic hysterectomy and anterior posterior oblique reconstruction.  The patient has a history of CVA in 2017 with residual word search, mild.  Ultimately the source of the CVA was felt to be a PFO which was closed in 2017.  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  This 3.5 mm aneurysm was coiled in 2017.  She continues to follow with neuro vascular specialist who are watching with yearly angiograms.  She was seen only 1 month ago and felt to be fine for surgery by her neurologist at that time.  She denies any cardiac symptoms, remains active, and had an MRI stress test in 2018 that demonstrated no ischemia and preserved ejection fraction.  She was sent to the PAC clinic to be evaluated for increased risk having surgery in steep Trendelenburg position with known history of cerebral aneurysm.  Several studies have demonstrated no increased risk of intracerebral hemorrhage in the steep Trendelenburg position.  The case was discussed with our neuro anesthesia team and they also did not feel the patient was at increased risk from being placed in the steep Trendelenburg position.  Final plan per attending anesthesiologist the day of surgery.  Consider arterial line placement to monitor for hypertension given known history of cerebral aneurysm.  No further testing necessary per protocol.      Reviewed and Signed by PAC Anesthesiologist  Anesthesiologist: James Kendrick MD  Date: 3/2/2020  Time:   Pass/Fail:   Disposition:     PAC Pharmacist Assessment:        Pharmacist:   Date:   Time:    Mee Tobias PA-C

## 2020-03-02 NOTE — H&P
Pre-Operative H & P     CC:  Preoperative exam to assess for increased cardiopulmonary risk while undergoing surgery and anesthesia.    Date of Encounter: 3/2/2020  Primary Care Physician:  Lian Martinez  Reason for Visit: Midline cystocele, Rectocele, Female stress incontinence    HPI  Jose Coronado is a 65 y/o female who presents for pre-operative H&P in preparation for DAVINCI HYSTERECTOMY SUPRACERVICAL, SALPINGO-OOPHORECTOMY INCLUDING BILATERAL WITH EXAM UNDER ANESTHESIA and SACROCOLPOPEXY, ROBOT-ASSISTED, LAPAROSCOPIC, WITH INSERTION OF MIDURETHRAL SLING AND CYSTOSCOPY with Lily Yancey MD &  on Carolyn Valdivia MD at [facility] for treatment of Midline cystocele, Rectocele, Female stress incontinence.    Ms. Coronado has a urologic hx of grade III cystocele, grade III rectocele, stress urinary incontinence, and overactive bladder. She has had many years of dealing with symptoms of stress urinary incontinence.  She has always managed with pads but it has become more bothersome. Then this year she has had 2 UTIs and noticed a vaginal bulge. She has been counseled on the above procedure.     PMH is also significant for HTN, CVA in 2017, s/p PFO device closure in 2017, s/p coil of cerebral artery aneurysm, MITCHELL w/ CPAP, hyperparathyroidism, iron deficiency anemia, s/p gastric bypass. She is anticoagulated with  mg.    History was obtained from patient & chart review.     Past Medical History  Past Medical History:   Diagnosis Date     Anemia      CVA (cerebral vascular accident) (H) 2017    ?migraine, ?pfo--negative vasc w/u, neg hypercoag w/u     Diffuse cystic mastopathy     Fibrocystic breast disease     HTN, goal below 140/90      Hyperparathyroidism (H)      Infectious mononucleosis     Mono at age 16     Irregular heart beat     PAT no afib on 30day monitor     Labyrinthitis, unspecified      Migraine headache with aura      Osteopenia      Pain in joint,  shoulder region     Secondary to a fall     Palpitations      PFO (patent foramen ovale)     s/p closure with amplazter device 7/13/17     S/P gastric bypass June, 2010     Sleep apnea     she is on CPAP     Vitamin D deficiencies        Past Surgical History  Past Surgical History:   Procedure Laterality Date     BIOPSY  1984    Breast biopsies     BREAST SURGERY  1984    Above     C ANEURYSM, INTRACRAN, SIMPLE SURG  04/2017    coil of aneurysm right posterior paraophthalmic artery     C NONSPECIFIC PROCEDURE      S/P multiple breast biopies - all negative / benign     C NONSPECIFIC PROCEDURE      S/P T&A     C NONSPECIFIC PROCEDURE      [place] teeth extraction     C NONSPECIFIC PROCEDURE      S/P (? unreadable) ankle     CARDIAC SURGERY  7/13/2017    PFO closure     COLONOSCOPY N/A 8/12/2015    Procedure: COLONOSCOPY;  Surgeon: Deandre Brooks MD;  Location:  GI     GASTRIC BYPASS  June 24, 2010     ORTHOPEDIC SURGERY Left 2010    wrist fracture     PARATHYROIDECTOMY  9/19/11       Hx of Blood transfusions/reactions: no     Hx of abnormal bleeding or anti-platelet use: on  mg    Menstrual history: No LMP recorded. Patient is postmenopausal.:      Steroid use in the last year: no    Personal or FH with difficulty with Anesthesia:  no    Prior to Admission Medications  Current Outpatient Medications   Medication Sig Dispense Refill     Ascorbic Acid (VITAMIN C PO) Take 250 mg by mouth every morning (0.5 x 500 mg tablet = 250 mg dose)        aspirin 325 MG tablet Take 325 mg by mouth every morning        atenolol (TENORMIN) 50 MG tablet TAKE 1 TABLET BY MOUTH EVERY DAY (Patient taking differently: Take 50 mg by mouth every morning TAKE 1 TABLET BY MOUTH EVERY DAY) 90 tablet 3     Calcium Citrate-Vitamin D (CALCIUM CITRATE + D PO) Take 2 tablets by mouth every morning        Cyanocobalamin 2500 MCG TABS Take 2,500 mcg by mouth once a week Mon       Ferrous Sulfate 27 MG TABS Take 27 mg by mouth every  morning        FIBER COMPLETE PO Take 1 capsule by mouth every morning        hydrochlorothiazide (HYDRODIURIL) 12.5 MG tablet TAKE 1 TABLET BY MOUTH EVERY DAY (Patient taking differently: Take 12.5 mg by mouth every morning TAKE 1 TABLET BY MOUTH EVERY DAY) 90 tablet 3     imipramine (TOFRANIL) 25 MG tablet Take 1 tablet (25 mg) by mouth At Bedtime (Patient taking differently: Take 25 mg by mouth every morning ) 90 tablet 3     loratadine (CLARITIN) 10 MG tablet Take 10 mg by mouth every morning        losartan (COZAAR) 50 MG tablet Take 1 tablet (50 mg) by mouth daily (Patient taking differently: Take 50 mg by mouth every morning ) 90 tablet 3     omega 3 1000 MG CAPS Take 1 g by mouth every morning  90 capsule      topiramate (TOPAMAX) 25 MG tablet Take 1 tablet (25 mg) by mouth 2 times daily 180 tablet 3     UNABLE TO FIND CPAP machine every night       VITAMIN D, CHOLECALCIFEROL, PO Take 500 Units by mouth every morning          Allergies  Allergies   Allergen Reactions     Contrast Dye Itching     Reaction of immediate burning and severe itching in Right ear after injection for CT.      Sulfa Drugs      hives       Social History  Social History     Socioeconomic History     Marital status: Single     Spouse name: Not on file     Number of children: Not on file     Years of education: Not on file     Highest education level: Not on file   Occupational History     Not on file   Social Needs     Financial resource strain: Not on file     Food insecurity:     Worry: Not on file     Inability: Not on file     Transportation needs:     Medical: Not on file     Non-medical: Not on file   Tobacco Use     Smoking status: Never Smoker     Smokeless tobacco: Never Used   Substance and Sexual Activity     Alcohol use: Yes     Alcohol/week: 0.0 standard drinks     Comment: Occasional, wine     Drug use: No     Sexual activity: Not Currently     Partners: Male     Birth control/protection: None   Lifestyle     Physical  activity:     Days per week: Not on file     Minutes per session: Not on file     Stress: Not on file   Relationships     Social connections:     Talks on phone: Not on file     Gets together: Not on file     Attends Worship service: Not on file     Active member of club or organization: Not on file     Attends meetings of clubs or organizations: Not on file     Relationship status: Not on file     Intimate partner violence:     Fear of current or ex partner: Not on file     Emotionally abused: Not on file     Physically abused: Not on file     Forced sexual activity: Not on file   Other Topics Concern     Parent/sibling w/ CABG, MI or angioplasty before 65F 55M? Not Asked   Social History Narrative     Not on file       Family History  Family History   Problem Relation Age of Onset     Breast Cancer Mother      Hypertension Mother      Arthritis Mother      Thyroid Disease Mother         Hypo     Ulcerative Colitis Mother      Cerebrovascular Disease Mother         Age 78 - Cerebral Hemorrhage,      Anxiety Disorder Mother      Depression Mother      Genetic Disorder Mother         Ulcerative colitis     Obesity Mother      Anesthesia Reaction Mother         headaches, N/V     Breast Cancer Maternal Aunt      Hypertension Paternal Grandmother      Cerebrovascular Disease Maternal Grandmother         Age 72 -      Family History Negative Maternal Grandfather      Family History Negative Paternal Grandfather      Family History Negative Son      Family History Negative Daughter      Other Cancer Father         Skin - Non-melanoma varieties     Hypertension Father      Asthma Father      Family History Negative Daughter      Ulcerative Colitis Sister      Hypertension Sister      Hyperlipidemia Sister      Anxiety Disorder Sister      Depression Sister      Diabetes Sister      Obesity Sister      Asthma Sister      Anesthesia Reaction Sister         Debilitating headaches     Hypertension Brother   "    Anxiety Disorder Brother      Depression Brother      Asthma Nephew      Colon Cancer No family hx of      Coronary Artery Disease No family hx of      Mental Illness No family hx of      Substance Abuse No family hx of      Osteoporosis No family hx of      Deep Vein Thrombosis No family hx of          Preop Vitals    BP Readings from Last 3 Encounters:   03/02/20 101/68   01/15/20 118/78   01/07/20 106/68    Pulse Readings from Last 3 Encounters:   03/02/20 60   01/15/20 65   01/07/20 60      Resp Readings from Last 3 Encounters:   03/02/20 16   01/07/20 16   12/05/19 16    SpO2 Readings from Last 3 Encounters:   03/02/20 97%   01/07/20 98%   12/05/19 99%      Temp Readings from Last 1 Encounters:   03/02/20 97.7  F (36.5  C) (Oral)    Ht Readings from Last 1 Encounters:   03/02/20 1.66 m (5' 5.35\")      Wt Readings from Last 1 Encounters:   03/02/20 91.2 kg (201 lb)    Estimated body mass index is 33.09 kg/m  as calculated from the following:    Height as of this encounter: 1.66 m (5' 5.35\").    Weight as of this encounter: 91.2 kg (201 lb).         ROS/MED HX  The complete review of systems is negative other than noted in the HPI or here.  Patient denies recent illness, fever and respiratory infection during past month.  Pt denies steroid use during past year.    ENT/Pulmonary:     (+)sleep apnea, uses CPAP , . .   (-) tobacco use and asthma   Neurologic: Comment: Taking Topamax bid for migraines. Headaches triggered by bright light or flashing lights.    (+)migraines, CVA date: 2017 with deficits- mild word finding difficulties,    S/P aneurysm coiling 2017  (-) seizures and Neuropathy   Cardiovascular: Comment: S/P PFO device closure 2017    (+) hypertension----. : . . . :. . Previous cardiac testing Echodate:2018results:Stress Testdate:2018 results:ECG reviewed date:1/7/20 results: date: results:          METS/Exercise Tolerance: Comment: Walks dogs daily, gardens inwarmer weather. Able to clean home, " "vacuum. 4 - Raking leaves, gardening   Hematologic:     (+) History of blood clots pt is anticoagulated, Anemia, -     (-) History of Transfusion   Musculoskeletal: Comment: Hx 2 herniated lumbar discs, improved w/ PT.        GI/Hepatic:        (-) GERD and liver disease   Renal/Genitourinary:  - ROS Renal section negative       Endo:     (+) Obesity, .   (-) Type II DM   Psychiatric:  - neg psychiatric ROS       Infectious Disease:  - neg infectious disease ROS       Malignancy:      - no malignancy   Other:    (+) No chance of pregnancy no H/O Chronic Pain,                 PHYSICAL EXAM:   Mental Status/Neuro: A/A/O; Age Appropriate   Airway: Facies: Feasible  Mallampati: III  Mouth/Opening: Full  TM distance: > 6 cm  Neck ROM: Full   Respiratory: Auscultation: CTAB     Resp. Rate: Normal     Resp. Effort: Normal      CV: Rhythm: Regular  Rate: Age appropriate  Heart: Normal Sounds  Edema: None   Comments:      Dental: Normal Dentition            Temp: 97.7  F (36.5  C) Temp src: Oral BP: 101/68 Pulse: 60   Resp: 16 SpO2: 97 %         201 lbs 0 oz  5' 5.354\"   Body mass index is 33.09 kg/m .    Physical Exam  Constitutional: Awake, alert, cooperative, no apparent distress, and appears stated age.  Eyes: Pupils equal, round and reactive to light, extra ocular muscles intact, sclera clear, conjunctiva normal.  HENT: Normocephalic, oral pharynx with moist mucus membranes, good dentition. No goiter appreciated. No removable dental hardware.  Respiratory: Clear to auscultation bilaterally, no crackles or wheezing. No SOB when supine.  Cardiovascular: Regular rate and rhythm, normal S1 and S2, and no murmur noted.  Carotids +2, no bruits. Trace non-pitting edema. Palpable pulses to radial, DP and PT arteries.   GI: Normal bowel sounds, soft, non-distended, non-tender, no masses palpated, no hepatomegaly.    Lymph/Hematologic: No cervical lymphadenopathy and no supraclavicular lymphadenopathy.  Genitourinary:  " deferred  Skin: Warm and dry.  No rashes.   Musculoskeletal: Full ROM of neck. There is no redness, warmth, or swelling of the joints. Gross motor strength is normal.    Neurologic: Awake, alert, oriented to name, place and time. Cranial nerves II-XII are grossly intact. Gait is normal. Ambulates from chair to exam table, seats self, lies supine and sits back up w/o assistance.  Neuropsychiatric: Calm, cooperative. Normal affect. Pleasant. Answers questions appropriately, follows commands w/o difficulty.    PRIOR LABS/DIAGNOSTIC STUDIES:  All labs and imaging personally reviewed    EKG 1/7/20  Sinus  Bradycardia  - occasional PAC     # PACs = 1.  -Anterior infarct -age undetermined.   ABNORMAL  Ventricular rate 55 bpm    ECHOCARDIOGRAM 2018  Interpretation Summary   The left ventricle is normal in size with borderline concentric left  ventricular hypertrophy.  The visual LV ejection fraction is estimated at 55-60% with Grade I or early  diastolic dysfunction and the diastolic Doppler findings (E/E' ratio and/or  other parameters) suggest left ventricular filling pressures are  indeterminate.  The right ventricular systolic function is also normal.  There is mild (1+) mitral regurgitation.  The aortic valve is trileaflet with trace to mild aortic regurgitation.  There is mild to moderate (1-2+) tricuspid regurgitation. Right ventricular  systolic pressure could not be approximated due to inadequate tricuspid  regurgitation but the inferior vena cava is not dilated.  An atrial septal closure device is again present and there is no atrial shunt  seen.   Compared to the prior study dated 10-4-2017, there have been no changes.    MR CARDICAC W/ CONTRAST STRESS 2018  1. The LV is normal in cavity size and wall thickness. The global systolic function is normal. The LVEF is  61%. There are no regional wall motion abnormalities.   2. The RV is normal in cavity size. The global systolic function is normal. The RVEF is 56%.     3. Both atria are normal in size. The patient is status post PFO closure. There is no evidence of  significant intracardiac shunting by phase contrast analysis.    4. There is no significant valvular disease.    5. Late gadolinium enhancement imaging shows no MI, fibrosis or infiltrative disease.    6. Regadenoson stress perfusion imaging shows no ischemia.    LABS:  CBC:   Lab Results   Component Value Date    WBC 5.7 01/07/2020    WBC 6.3 04/29/2019    HGB 12.8 01/07/2020    HGB 13.7 04/29/2019    HCT 40.1 01/07/2020    HCT 42.3 04/29/2019     01/07/2020     04/29/2019     BMP:   Lab Results   Component Value Date     01/07/2020     04/29/2019    POTASSIUM 3.7 01/07/2020    POTASSIUM 3.7 04/29/2019    CHLORIDE 104 01/07/2020    CHLORIDE 106 04/29/2019    CO2 28 01/07/2020    CO2 28 04/29/2019    BUN 15 01/07/2020    BUN 22 04/29/2019    CR 0.72 01/07/2020    CR 0.70 04/29/2019    GLC 95 01/07/2020     (H) 04/29/2019     COAGS:   Lab Results   Component Value Date    PTT 25 04/29/2019    INR 0.86 04/29/2019     POC:   Lab Results   Component Value Date     (H) 06/24/2010     OTHER:   Lab Results   Component Value Date    A1C 5.7 06/17/2010    MAXIMILIANO 8.9 01/07/2020    PHOS 3.5 05/17/2011    MAG 2.1 11/16/2015    ALBUMIN 3.6 01/07/2020    PROTTOTAL 6.6 (L) 01/07/2020    ALT 22 01/07/2020    AST 17 01/07/2020    ALKPHOS 78 01/07/2020    BILITOTAL 0.3 01/07/2020    TSH 1.12 01/07/2020    T4 0.90 09/14/2012    T3 108 05/17/2011    SED 8 02/28/2017        Labs today: T&S    Outside records reviewed from: Care Everywhere    ASSESSMENT and PLAN  Jose Coronado is a 64 year old female scheduled to undergo DAVINCI HYSTERECTOMY SUPRACERVICAL, SALPINGO-OOPHORECTOMY INCLUDING BILATERAL WITH EXAM UNDER ANESTHESIA and SACROCOLPOPEXY, ROBOT-ASSISTED, LAPAROSCOPIC, WITH INSERTION OF MIDURETHRAL SLING AND CYSTOSCOPY with Lily Yancey MD &  on Carolyn Valdivia MD at [facility]  [facility] for treatment of Midline cystocele, Rectocele, Female stress incontinence.    Pt has had prior anesthetic. Type: MAC and General    No history of anesthetic complications    She has the following specific operative considerations:   # VTE risk: 0.5%  # Risk of PONV score = 3.  If > 2, anti-emetic intervention recommended.  # Anesthesia considerations:  Refer to PAC assessment in anesthesia records    # Increased risk of postoperative nausea/vomiting: Recommend use of antiemetic agents in the perioperative period.      CARDIAC: METS 4,  Walks dogs daily, gardens inwarmer weather. Able to clean home, vacuum.     # RCRI : High risk surgery.  0.9% risk of major adverse cardiac event.     #  S/P PFO device closure in 2017     #  Echo 2018:  EF 55-60%, normal     #  Stress test 2018:  No ischemia     #  EKG 1/7/20: sinus bradycardia 55 bpm, Anterior infarct -age undetermined (noted in 2007)     #  HTN, will take atenolol and hold hydrochlorothiazide & losartan on DOS    PULMONARY:     # MITCHELL w/ CPAP    # Never smoked    # No asthma or inhaler use    GI: no GERD     # s/p gastric bypass     ENDO: BMI 34    # Hyperparathyroidism    # No DM    HEME:   # On  mg, will stop 7d prior    ORTHO: full ROM of neck, no TMJ    NEURO/PSYCH:     # Hx CVA in 2017. Underwent coiling of aneurysm and device closure of PFO afterwards. Has mild word finding difficulties.    # Migraines, well controlled w/ Topamax. Triggered by flashing lights, bright lights.       Patient was discussed with Dr Kendrick.      She was sent to the PAC clinic to be evaluated for increased risk having surgery in steep Trendelenburg position with known history of cerebral aneurysm.  Several studies have demonstrated no increased risk of intracerebral hemorrhage in the steep Trendelenburg position.  The case was discussed with our neuro anesthesia team and they also did not feel the patient was at increased risk from being placed in the  steep Trendelenburg position.  Final plan per attending anesthesiologist the day of surgery.  Consider arterial line placement to monitor for hypertension given known history of cerebral aneurysm.    Patient is optimized and is acceptable candidate for the proposed procedure. No further diagnostic evaluation is needed.    Arrival time, NPO, shower and medication instructions provided by nursing staff today.  Preparing For Your Surgery handout given.    Mee Tobias PA-C  Preoperative Assessment Center  [facility]  Clinic and Surgery Center  Phone: [phone]  Fax: [phone]

## 2020-03-02 NOTE — PATIENT INSTRUCTIONS
Preparing for Your Surgery      Name:  Jose Coronado   MRN:  [id number]   :  1955   Today's Date:  3/2/2020     Arriving for surgery:  Surgery date:  3/16/2020  Arrival time:  5:30AM  Please come to:     [facility] [address]  [address] [place]  [address]    - parking is available in front of [facility] from 5:15AM to 8:00PM. If you prefer, park your car in the Green Lot.    -Proceed to the 3rd floor, check in at the Adult Surgery Waiting Lounge. [phone]    If an escort is needed stop at the Information Desk in the lobby. Inform the information person that you are here for surgery. An escort to the Adult Surgery Waiting Lounge will be provided.        What can I eat or drink?  -  You may have solid food or milk products until 8 hours prior to your surgery. 3/15/2020, 11:30PM  -  You may have water, apple juice or 7up/Sprite until 2 hours prior to your surgery. 3/16/2020, 5;30AM    Which medicines can I take?  Hold Aspirin, Multivitamins and supplements one week prior to surgery.  Hold Ibuprofen for 24 hours and/or Naproxen for 48 hours prior to surgery.   -  Do NOT take these medications in the morning, the day of surgery:    Hydrochlorothiazide   Losartan(Cozaar)    -  Please take these medications the day of surgery:    Atenolol    Imipramine(Tofranil)    Loratadine(Claritin)   Topiramate(Topamax)    How do I prepare myself?  -  Take two showers: one the night before surgery; and one the morning of surgery.         Use Scrubcare or Hibiclens to wash from neck down, leave soap on your skin for up to one minute.  Do not get soap in your eyes or ears.  You may use your own shampoo and conditioner; no other hair products.   -  Do NOT use lotion, powder, deodorant, or antiperspirant the day of your surgery.  -  Do NOT wear any makeup, fingernail polish or jewelry.  - Do not bring your own medications to the hospital, except for inhalers and eye    drops.  -  Bring your ID and insurance card.    -If you are scheduled to go home the Same Day as surgery you must have a responsible adult as a  and to stay with you overnight the first 24 hours after surgery.     Questions or Concerns:  -If you are scheduled on the East or West campus and have questions or concerns regarding the day of surgery, please call Preadmission Nursing at [phone].     -If you have health changes between today and your surgery please call your surgeon. For questions after surgery please call your surgeons office.           AFTER YOUR SURGERY  Breathing exercises   Breathing exercises help you recover faster. Take deep breaths and let the air out slowly. This will:     Help you wake up after surgery.    Help prevent complications like pneumonia.  Preventing complications will help you go home sooner.   We may give you a breathing device (incentive spirometer) to encourage you to breathe deeply.   Nausea and vomiting   You may feel sick to your stomach after surgery; if so, let your nurse know.    Pain control:  After surgery, you may have pain. Our goal is to help you manage your pain. Pain medicine will help you feel comfortable enough to do activities that will help you heal.  These activities may include breathing exercises, walking and physical therapy.   To help your health care team treat your pain we will ask: 1) If you have pain  2) where it is located 3) describe your pain in your words  Methods of pain control include medications given by mouth, vein or by nerve block for some surgeries.  Sequential Compression Device (SCD):  You may need to wear SCD S (also called pneumo boots)on your legs or feet. These are wraps connected to a machine that pumps in air and releases it. The repeated pumping helps prevent blood clots from forming.

## 2020-03-03 LAB
BACTERIA SPEC CULT: NORMAL
Lab: NORMAL
SPECIMEN SOURCE: NORMAL

## 2020-03-10 PROBLEM — N81.10 FEMALE BLADDER PROLAPSE: Status: RESOLVED | Noted: 2019-10-03 | Resolved: 2020-03-10

## 2020-03-10 NOTE — PROGRESS NOTES
"Discharge Note        Jose failed to follow up and current status is unknown.  Please see information below for last relevant information on current status.  Patient seen for 3 visits.    SUBJECTIVE  Subjective changes noted by patient:  PT 15 min late. Doing HEP ~3-4 x week. Difficulty to stop urine when urine starts leakage but able to control on way to bathroom. Working pretty well. Able to delay bathroom trips for 2+ hours. Able to empty pretty well, although does have to shift around a bit on toilet to empty fully.   .  Current pain level is  .     Previous pain level was   .   Changes in function:  Yes (See Goal flowsheet attached for changes in current functional level)  Adverse reaction to treatment or activity: None    OBJECTIVE  Changes noted in objective findings: Discussed timed voiding 2-4 hours and to avoid too much \"jicing\". Progressed to roll in/outs sitting.      ASSESSMENT/PLAN  Diagnosis: prolapse/incontinence   Updated problem list and treatment plan:     STG/LTGs have been met or progress has been made towards goals:  Yes, please see goal flowsheet for most current information  Assessment of Progress: current status is unknown.    Last current status: Pt is progressing well   Self Management Plans:  HEP  I have re-evaluated this patient and find that the nature, scope, duration and intensity of the therapy is appropriate for the medical condition of the patient.  Jose continues to require the following intervention to meet STG and LTG's:  HEP.    Recommendations:  Discharge with current home program.  Patient to follow up with MD as needed.    Please refer to the daily flowsheet for treatment today, total treatment time and time spent performing 1:1 timed codes.        "

## 2020-03-16 ENCOUNTER — HOSPITAL ENCOUNTER (OUTPATIENT)
Facility: CLINIC | Age: 65
Discharge: HOME | End: 2020-03-17
Attending: OBSTETRICS & GYNECOLOGY | Admitting: OBSTETRICS & GYNECOLOGY
Payer: MEDICARE

## 2020-03-16 ENCOUNTER — ANESTHESIA (OUTPATIENT)
Dept: SURGERY | Facility: CLINIC | Age: 65
End: 2020-03-16
Payer: MEDICARE

## 2020-03-16 ENCOUNTER — ANCILLARY PROCEDURE (OUTPATIENT)
Dept: ULTRASOUND IMAGING | Facility: CLINIC | Age: 65
End: 2020-03-16
Attending: ANESTHESIOLOGY
Payer: MEDICARE

## 2020-03-16 DIAGNOSIS — N81.6 RECTOCELE: ICD-10-CM

## 2020-03-16 DIAGNOSIS — N39.3 FEMALE STRESS INCONTINENCE: ICD-10-CM

## 2020-03-16 DIAGNOSIS — N81.11 MIDLINE CYSTOCELE: ICD-10-CM

## 2020-03-16 DIAGNOSIS — Z90.710 S/P HYSTERECTOMY: Primary | ICD-10-CM

## 2020-03-16 PROBLEM — N81.10 VAGINAL PROLAPSE: Status: ACTIVE | Noted: 2020-03-16

## 2020-03-16 LAB
ABO + RH BLD: NORMAL
ABO + RH BLD: NORMAL
BLD GP AB SCN SERPL QL: NORMAL
BLOOD BANK CMNT PATIENT-IMP: NORMAL
BLOOD BANK CMNT PATIENT-IMP: NORMAL
CREAT SERPL-MCNC: 0.75 MG/DL (ref 0.52–1.04)
ERYTHROCYTE [DISTWIDTH] IN BLOOD BY AUTOMATED COUNT: 13.9 % (ref 10–15)
GFR SERPL CREATININE-BSD FRML MDRD: 84 ML/MIN/{1.73_M2}
GLUCOSE SERPL-MCNC: 104 MG/DL (ref 70–99)
HCT VFR BLD AUTO: 42.9 % (ref 35–47)
HGB BLD-MCNC: 14.2 G/DL (ref 11.7–15.7)
MCH RBC QN AUTO: 29.9 PG (ref 26.5–33)
MCHC RBC AUTO-ENTMCNC: 33.1 G/DL (ref 31.5–36.5)
MCV RBC AUTO: 90 FL (ref 78–100)
PLATELET # BLD AUTO: 274 10E9/L (ref 150–450)
POTASSIUM SERPL-SCNC: 4.4 MMOL/L (ref 3.4–5.3)
RBC # BLD AUTO: 4.75 10E12/L (ref 3.8–5.2)
SPECIMEN EXP DATE BLD: NORMAL
WBC # BLD AUTO: 7.1 10E9/L (ref 4–11)

## 2020-03-16 PROCEDURE — 25000125 ZZHC RX 250: Performed by: UROLOGY

## 2020-03-16 PROCEDURE — 25000566 ZZH SEVOFLURANE, EA 15 MIN: Performed by: OBSTETRICS & GYNECOLOGY

## 2020-03-16 PROCEDURE — 71000015 ZZH RECOVERY PHASE 1 LEVEL 2 EA ADDTL HR: Performed by: OBSTETRICS & GYNECOLOGY

## 2020-03-16 PROCEDURE — 25000125 ZZHC RX 250: Performed by: OBSTETRICS & GYNECOLOGY

## 2020-03-16 PROCEDURE — 25000132 ZZH RX MED GY IP 250 OP 250 PS 637: Mod: GY | Performed by: OBSTETRICS & GYNECOLOGY

## 2020-03-16 PROCEDURE — 36415 COLL VENOUS BLD VENIPUNCTURE: CPT | Performed by: OBSTETRICS & GYNECOLOGY

## 2020-03-16 PROCEDURE — 71000014 ZZH RECOVERY PHASE 1 LEVEL 2 FIRST HR: Performed by: OBSTETRICS & GYNECOLOGY

## 2020-03-16 PROCEDURE — 88305 TISSUE EXAM BY PATHOLOGIST: CPT | Performed by: OBSTETRICS & GYNECOLOGY

## 2020-03-16 PROCEDURE — 36000088 ZZH SURGERY LEVEL 8 EA 15 ADDTL MIN - UMMC: Performed by: OBSTETRICS & GYNECOLOGY

## 2020-03-16 PROCEDURE — 25000125 ZZHC RX 250: Performed by: NURSE ANESTHETIST, CERTIFIED REGISTERED

## 2020-03-16 PROCEDURE — 25800030 ZZH RX IP 258 OP 636: Performed by: ANESTHESIOLOGY

## 2020-03-16 PROCEDURE — 25000128 H RX IP 250 OP 636: Performed by: ANESTHESIOLOGY

## 2020-03-16 PROCEDURE — 25000125 ZZHC RX 250: Performed by: ANESTHESIOLOGY

## 2020-03-16 PROCEDURE — 25800030 ZZH RX IP 258 OP 636: Performed by: STUDENT IN AN ORGANIZED HEALTH CARE EDUCATION/TRAINING PROGRAM

## 2020-03-16 PROCEDURE — C1771 REP DEV, URINARY, W/SLING: HCPCS | Performed by: OBSTETRICS & GYNECOLOGY

## 2020-03-16 PROCEDURE — 84132 ASSAY OF SERUM POTASSIUM: CPT | Performed by: OBSTETRICS & GYNECOLOGY

## 2020-03-16 PROCEDURE — 88307 TISSUE EXAM BY PATHOLOGIST: CPT | Performed by: OBSTETRICS & GYNECOLOGY

## 2020-03-16 PROCEDURE — 27210794 ZZH OR GENERAL SUPPLY STERILE: Performed by: OBSTETRICS & GYNECOLOGY

## 2020-03-16 PROCEDURE — 25000128 H RX IP 250 OP 636: Performed by: OBSTETRICS & GYNECOLOGY

## 2020-03-16 PROCEDURE — 40000170 ZZH STATISTIC PRE-PROCEDURE ASSESSMENT II: Performed by: OBSTETRICS & GYNECOLOGY

## 2020-03-16 PROCEDURE — 85027 COMPLETE CBC AUTOMATED: CPT | Performed by: OBSTETRICS & GYNECOLOGY

## 2020-03-16 PROCEDURE — 82947 ASSAY GLUCOSE BLOOD QUANT: CPT | Performed by: OBSTETRICS & GYNECOLOGY

## 2020-03-16 PROCEDURE — 82565 ASSAY OF CREATININE: CPT | Performed by: OBSTETRICS & GYNECOLOGY

## 2020-03-16 PROCEDURE — C1781 MESH (IMPLANTABLE): HCPCS | Performed by: OBSTETRICS & GYNECOLOGY

## 2020-03-16 PROCEDURE — 25000128 H RX IP 250 OP 636: Performed by: NURSE ANESTHETIST, CERTIFIED REGISTERED

## 2020-03-16 PROCEDURE — 37000008 ZZH ANESTHESIA TECHNICAL FEE, 1ST 30 MIN: Performed by: OBSTETRICS & GYNECOLOGY

## 2020-03-16 PROCEDURE — 25000132 ZZH RX MED GY IP 250 OP 250 PS 637: Mod: GY | Performed by: STUDENT IN AN ORGANIZED HEALTH CARE EDUCATION/TRAINING PROGRAM

## 2020-03-16 PROCEDURE — 37000009 ZZH ANESTHESIA TECHNICAL FEE, EACH ADDTL 15 MIN: Performed by: OBSTETRICS & GYNECOLOGY

## 2020-03-16 PROCEDURE — 25800030 ZZH RX IP 258 OP 636: Performed by: NURSE ANESTHETIST, CERTIFIED REGISTERED

## 2020-03-16 PROCEDURE — 36000086 ZZH SURGERY LEVEL 8 1ST 30 MIN UMMC: Performed by: OBSTETRICS & GYNECOLOGY

## 2020-03-16 DEVICE — MESH SLING Y SHAPE RESTORELLE 24X4CM 501420: Type: IMPLANTABLE DEVICE | Site: PELVIS | Status: FUNCTIONAL

## 2020-03-16 DEVICE — SLING SOLYX TRANSVAGINAL MESH M0068507000: Type: IMPLANTABLE DEVICE | Site: VAGINA | Status: FUNCTIONAL

## 2020-03-16 RX ORDER — ACETAMINOPHEN 325 MG/1
975 TABLET ORAL ONCE
Status: COMPLETED | OUTPATIENT
Start: 2020-03-16 | End: 2020-03-16

## 2020-03-16 RX ORDER — FENTANYL CITRATE 50 UG/ML
INJECTION, SOLUTION INTRAMUSCULAR; INTRAVENOUS PRN
Status: DISCONTINUED | OUTPATIENT
Start: 2020-03-16 | End: 2020-03-16

## 2020-03-16 RX ORDER — DEXAMETHASONE SODIUM PHOSPHATE 10 MG/ML
INJECTION, SOLUTION INTRAMUSCULAR; INTRAVENOUS PRN
Status: DISCONTINUED | OUTPATIENT
Start: 2020-03-16 | End: 2020-03-16

## 2020-03-16 RX ORDER — LORATADINE 10 MG/1
10 TABLET ORAL EVERY MORNING
Status: DISCONTINUED | OUTPATIENT
Start: 2020-03-17 | End: 2020-03-17 | Stop reason: HOSPADM

## 2020-03-16 RX ORDER — OXYCODONE HYDROCHLORIDE 5 MG/1
5 TABLET ORAL EVERY 4 HOURS PRN
Status: DISCONTINUED | OUTPATIENT
Start: 2020-03-16 | End: 2020-03-17

## 2020-03-16 RX ORDER — HYDROMORPHONE HYDROCHLORIDE 1 MG/ML
.3-.5 INJECTION, SOLUTION INTRAMUSCULAR; INTRAVENOUS; SUBCUTANEOUS EVERY 5 MIN PRN
Status: DISCONTINUED | OUTPATIENT
Start: 2020-03-16 | End: 2020-03-16 | Stop reason: HOSPADM

## 2020-03-16 RX ORDER — ONDANSETRON 2 MG/ML
4 INJECTION INTRAMUSCULAR; INTRAVENOUS EVERY 30 MIN PRN
Status: DISCONTINUED | OUTPATIENT
Start: 2020-03-16 | End: 2020-03-16 | Stop reason: HOSPADM

## 2020-03-16 RX ORDER — PHENAZOPYRIDINE HYDROCHLORIDE 200 MG/1
200 TABLET, FILM COATED ORAL ONCE
Status: COMPLETED | OUTPATIENT
Start: 2020-03-16 | End: 2020-03-16

## 2020-03-16 RX ORDER — SODIUM CHLORIDE 9 MG/ML
INJECTION, SOLUTION INTRAVENOUS CONTINUOUS
Status: DISCONTINUED | OUTPATIENT
Start: 2020-03-16 | End: 2020-03-17 | Stop reason: HOSPADM

## 2020-03-16 RX ORDER — ONDANSETRON 2 MG/ML
4 INJECTION INTRAMUSCULAR; INTRAVENOUS EVERY 6 HOURS PRN
Status: DISCONTINUED | OUTPATIENT
Start: 2020-03-16 | End: 2020-03-17 | Stop reason: HOSPADM

## 2020-03-16 RX ORDER — ESMOLOL HYDROCHLORIDE 10 MG/ML
INJECTION INTRAVENOUS PRN
Status: DISCONTINUED | OUTPATIENT
Start: 2020-03-16 | End: 2020-03-16

## 2020-03-16 RX ORDER — NALOXONE HYDROCHLORIDE 0.4 MG/ML
.1-.4 INJECTION, SOLUTION INTRAMUSCULAR; INTRAVENOUS; SUBCUTANEOUS
Status: DISCONTINUED | OUTPATIENT
Start: 2020-03-16 | End: 2020-03-17 | Stop reason: HOSPADM

## 2020-03-16 RX ORDER — BUPIVACAINE HYDROCHLORIDE AND EPINEPHRINE 5; 5 MG/ML; UG/ML
INJECTION, SOLUTION PERINEURAL PRN
Status: DISCONTINUED | OUTPATIENT
Start: 2020-03-16 | End: 2020-03-16 | Stop reason: HOSPADM

## 2020-03-16 RX ORDER — ONDANSETRON 4 MG/1
4 TABLET, ORALLY DISINTEGRATING ORAL EVERY 6 HOURS PRN
Status: DISCONTINUED | OUTPATIENT
Start: 2020-03-16 | End: 2020-03-17 | Stop reason: HOSPADM

## 2020-03-16 RX ORDER — HYDROMORPHONE HCL/0.9% NACL/PF 0.2MG/0.2
0.2 SYRINGE (ML) INTRAVENOUS
Status: DISCONTINUED | OUTPATIENT
Start: 2020-03-16 | End: 2020-03-17 | Stop reason: HOSPADM

## 2020-03-16 RX ORDER — IMIPRAMINE HCL 25 MG
25 TABLET ORAL EVERY MORNING
Status: DISCONTINUED | OUTPATIENT
Start: 2020-03-17 | End: 2020-03-17 | Stop reason: HOSPADM

## 2020-03-16 RX ORDER — LIDOCAINE 40 MG/G
CREAM TOPICAL
Status: DISCONTINUED | OUTPATIENT
Start: 2020-03-16 | End: 2020-03-16 | Stop reason: HOSPADM

## 2020-03-16 RX ORDER — OXYCODONE HYDROCHLORIDE 5 MG/1
5-10 TABLET ORAL
Status: DISCONTINUED | OUTPATIENT
Start: 2020-03-16 | End: 2020-03-17 | Stop reason: HOSPADM

## 2020-03-16 RX ORDER — DEXAMETHASONE SODIUM PHOSPHATE 4 MG/ML
INJECTION, SOLUTION INTRA-ARTICULAR; INTRALESIONAL; INTRAMUSCULAR; INTRAVENOUS; SOFT TISSUE PRN
Status: DISCONTINUED | OUTPATIENT
Start: 2020-03-16 | End: 2020-03-16

## 2020-03-16 RX ORDER — LIDOCAINE HYDROCHLORIDE 20 MG/ML
INJECTION, SOLUTION INFILTRATION; PERINEURAL PRN
Status: DISCONTINUED | OUTPATIENT
Start: 2020-03-16 | End: 2020-03-16

## 2020-03-16 RX ORDER — ACETAMINOPHEN 325 MG/1
975 TABLET ORAL ONCE
Status: DISCONTINUED | OUTPATIENT
Start: 2020-03-16 | End: 2020-03-16 | Stop reason: HOSPADM

## 2020-03-16 RX ORDER — ONDANSETRON 4 MG/1
4 TABLET, ORALLY DISINTEGRATING ORAL EVERY 30 MIN PRN
Status: DISCONTINUED | OUTPATIENT
Start: 2020-03-16 | End: 2020-03-16 | Stop reason: HOSPADM

## 2020-03-16 RX ORDER — HYDROCHLOROTHIAZIDE 12.5 MG/1
12.5 TABLET ORAL EVERY MORNING
Status: DISCONTINUED | OUTPATIENT
Start: 2020-03-17 | End: 2020-03-17 | Stop reason: HOSPADM

## 2020-03-16 RX ORDER — ASPIRIN 325 MG
325 TABLET ORAL EVERY MORNING
Status: DISCONTINUED | OUTPATIENT
Start: 2020-03-17 | End: 2020-03-16

## 2020-03-16 RX ORDER — BUPIVACAINE HYDROCHLORIDE 2.5 MG/ML
INJECTION, SOLUTION EPIDURAL; INFILTRATION; INTRACAUDAL PRN
Status: DISCONTINUED | OUTPATIENT
Start: 2020-03-16 | End: 2020-03-16

## 2020-03-16 RX ORDER — TOPIRAMATE 25 MG/1
25 TABLET, FILM COATED ORAL 2 TIMES DAILY
Status: DISCONTINUED | OUTPATIENT
Start: 2020-03-16 | End: 2020-03-17 | Stop reason: HOSPADM

## 2020-03-16 RX ORDER — ATENOLOL 25 MG/1
50 TABLET ORAL EVERY MORNING
Status: DISCONTINUED | OUTPATIENT
Start: 2020-03-17 | End: 2020-03-17 | Stop reason: HOSPADM

## 2020-03-16 RX ORDER — NALOXONE HYDROCHLORIDE 0.4 MG/ML
.1-.4 INJECTION, SOLUTION INTRAMUSCULAR; INTRAVENOUS; SUBCUTANEOUS
Status: DISCONTINUED | OUTPATIENT
Start: 2020-03-16 | End: 2020-03-16

## 2020-03-16 RX ORDER — SODIUM CHLORIDE, SODIUM LACTATE, POTASSIUM CHLORIDE, CALCIUM CHLORIDE 600; 310; 30; 20 MG/100ML; MG/100ML; MG/100ML; MG/100ML
INJECTION, SOLUTION INTRAVENOUS CONTINUOUS
Status: DISCONTINUED | OUTPATIENT
Start: 2020-03-16 | End: 2020-03-16 | Stop reason: HOSPADM

## 2020-03-16 RX ORDER — FENTANYL CITRATE 50 UG/ML
25-50 INJECTION, SOLUTION INTRAMUSCULAR; INTRAVENOUS
Status: DISCONTINUED | OUTPATIENT
Start: 2020-03-16 | End: 2020-03-16 | Stop reason: HOSPADM

## 2020-03-16 RX ORDER — CEFAZOLIN SODIUM 2 G/100ML
2 INJECTION, SOLUTION INTRAVENOUS
Status: COMPLETED | OUTPATIENT
Start: 2020-03-16 | End: 2020-03-16

## 2020-03-16 RX ORDER — CEFAZOLIN SODIUM 1 G/3ML
INJECTION, POWDER, FOR SOLUTION INTRAMUSCULAR; INTRAVENOUS PRN
Status: DISCONTINUED | OUTPATIENT
Start: 2020-03-16 | End: 2020-03-16

## 2020-03-16 RX ORDER — AMOXICILLIN 250 MG
1 CAPSULE ORAL 2 TIMES DAILY
Status: DISCONTINUED | OUTPATIENT
Start: 2020-03-16 | End: 2020-03-17 | Stop reason: HOSPADM

## 2020-03-16 RX ORDER — PROCHLORPERAZINE MALEATE 5 MG
5 TABLET ORAL EVERY 6 HOURS PRN
Status: DISCONTINUED | OUTPATIENT
Start: 2020-03-16 | End: 2020-03-17 | Stop reason: HOSPADM

## 2020-03-16 RX ORDER — LOSARTAN POTASSIUM 50 MG/1
50 TABLET ORAL EVERY MORNING
Status: DISCONTINUED | OUTPATIENT
Start: 2020-03-17 | End: 2020-03-17 | Stop reason: HOSPADM

## 2020-03-16 RX ORDER — CEFAZOLIN SODIUM 1 G/3ML
1 INJECTION, POWDER, FOR SOLUTION INTRAMUSCULAR; INTRAVENOUS SEE ADMIN INSTRUCTIONS
Status: DISCONTINUED | OUTPATIENT
Start: 2020-03-16 | End: 2020-03-16 | Stop reason: HOSPADM

## 2020-03-16 RX ORDER — ONDANSETRON 2 MG/ML
INJECTION INTRAMUSCULAR; INTRAVENOUS PRN
Status: DISCONTINUED | OUTPATIENT
Start: 2020-03-16 | End: 2020-03-16

## 2020-03-16 RX ORDER — PROPOFOL 10 MG/ML
INJECTION, EMULSION INTRAVENOUS PRN
Status: DISCONTINUED | OUTPATIENT
Start: 2020-03-16 | End: 2020-03-16

## 2020-03-16 RX ORDER — ASPIRIN 325 MG
325 TABLET ORAL DAILY
Status: DISCONTINUED | OUTPATIENT
Start: 2020-03-17 | End: 2020-03-17 | Stop reason: HOSPADM

## 2020-03-16 RX ORDER — SODIUM CHLORIDE, SODIUM LACTATE, POTASSIUM CHLORIDE, CALCIUM CHLORIDE 600; 310; 30; 20 MG/100ML; MG/100ML; MG/100ML; MG/100ML
INJECTION, SOLUTION INTRAVENOUS CONTINUOUS PRN
Status: DISCONTINUED | OUTPATIENT
Start: 2020-03-16 | End: 2020-03-16

## 2020-03-16 RX ORDER — EPHEDRINE SULFATE 50 MG/ML
INJECTION, SOLUTION INTRAMUSCULAR; INTRAVENOUS; SUBCUTANEOUS PRN
Status: DISCONTINUED | OUTPATIENT
Start: 2020-03-16 | End: 2020-03-16

## 2020-03-16 RX ORDER — ACETAMINOPHEN 325 MG/1
975 TABLET ORAL EVERY 8 HOURS
Status: DISCONTINUED | OUTPATIENT
Start: 2020-03-16 | End: 2020-03-17 | Stop reason: HOSPADM

## 2020-03-16 RX ORDER — CEFAZOLIN SODIUM 2 G/100ML
2 INJECTION, SOLUTION INTRAVENOUS
Status: DISCONTINUED | OUTPATIENT
Start: 2020-03-16 | End: 2020-03-16 | Stop reason: HOSPADM

## 2020-03-16 RX ORDER — LIDOCAINE 40 MG/G
CREAM TOPICAL
Status: DISCONTINUED | OUTPATIENT
Start: 2020-03-16 | End: 2020-03-17 | Stop reason: HOSPADM

## 2020-03-16 RX ADMIN — Medication 10 MG: at 08:30

## 2020-03-16 RX ADMIN — ACETAMINOPHEN 975 MG: 325 TABLET, FILM COATED ORAL at 17:57

## 2020-03-16 RX ADMIN — PHENAZOPYRIDINE HYDROCHLORIDE 200 MG: 200 TABLET, FILM COATED ORAL at 06:37

## 2020-03-16 RX ADMIN — FENTANYL CITRATE 50 MCG: 50 INJECTION, SOLUTION INTRAMUSCULAR; INTRAVENOUS at 11:25

## 2020-03-16 RX ADMIN — DEXAMETHASONE SODIUM PHOSPHATE 6 MG: 4 INJECTION, SOLUTION INTRAMUSCULAR; INTRAVENOUS at 08:53

## 2020-03-16 RX ADMIN — ONDANSETRON 4 MG: 2 INJECTION INTRAMUSCULAR; INTRAVENOUS at 13:14

## 2020-03-16 RX ADMIN — ESMOLOL HYDROCHLORIDE 70 MG: 10 INJECTION, SOLUTION INTRAVENOUS at 08:15

## 2020-03-16 RX ADMIN — FENTANYL CITRATE 50 MCG: 50 INJECTION, SOLUTION INTRAMUSCULAR; INTRAVENOUS at 09:01

## 2020-03-16 RX ADMIN — SODIUM CHLORIDE: 9 INJECTION, SOLUTION INTRAVENOUS at 17:58

## 2020-03-16 RX ADMIN — ROCURONIUM BROMIDE 20 MG: 10 INJECTION INTRAVENOUS at 08:55

## 2020-03-16 RX ADMIN — ROCURONIUM BROMIDE 10 MG: 10 INJECTION INTRAVENOUS at 12:20

## 2020-03-16 RX ADMIN — MIDAZOLAM 2 MG: 1 INJECTION INTRAMUSCULAR; INTRAVENOUS at 08:03

## 2020-03-16 RX ADMIN — OXYCODONE HYDROCHLORIDE 5 MG: 5 TABLET ORAL at 20:07

## 2020-03-16 RX ADMIN — Medication 5 MG: at 08:35

## 2020-03-16 RX ADMIN — FENTANYL CITRATE 50 MCG: 50 INJECTION, SOLUTION INTRAMUSCULAR; INTRAVENOUS at 13:10

## 2020-03-16 RX ADMIN — ROCURONIUM BROMIDE 20 MG: 10 INJECTION INTRAVENOUS at 10:40

## 2020-03-16 RX ADMIN — ROCURONIUM BROMIDE 10 MG: 10 INJECTION INTRAVENOUS at 09:50

## 2020-03-16 RX ADMIN — Medication 10 MG: at 08:24

## 2020-03-16 RX ADMIN — PHENYLEPHRINE HYDROCHLORIDE 0.3 MCG/KG/MIN: 10 INJECTION INTRAVENOUS at 08:40

## 2020-03-16 RX ADMIN — SODIUM CHLORIDE, POTASSIUM CHLORIDE, SODIUM LACTATE AND CALCIUM CHLORIDE: 600; 310; 30; 20 INJECTION, SOLUTION INTRAVENOUS at 12:15

## 2020-03-16 RX ADMIN — SUGAMMADEX 200 MG: 100 INJECTION, SOLUTION INTRAVENOUS at 13:30

## 2020-03-16 RX ADMIN — CEFAZOLIN 1 G: 1 INJECTION, POWDER, FOR SOLUTION INTRAMUSCULAR; INTRAVENOUS at 10:35

## 2020-03-16 RX ADMIN — TOPIRAMATE 25 MG: 25 TABLET, FILM COATED ORAL at 20:07

## 2020-03-16 RX ADMIN — SODIUM CHLORIDE, POTASSIUM CHLORIDE, SODIUM LACTATE AND CALCIUM CHLORIDE: 600; 310; 30; 20 INJECTION, SOLUTION INTRAVENOUS at 08:30

## 2020-03-16 RX ADMIN — FENTANYL CITRATE 50 MCG: 50 INJECTION, SOLUTION INTRAMUSCULAR; INTRAVENOUS at 08:57

## 2020-03-16 RX ADMIN — FENTANYL CITRATE 50 MCG: 50 INJECTION, SOLUTION INTRAMUSCULAR; INTRAVENOUS at 11:12

## 2020-03-16 RX ADMIN — DEXAMETHASONE SODIUM PHOSPHATE 4 MG: 10 INJECTION, SOLUTION INTRAMUSCULAR; INTRAVENOUS at 13:55

## 2020-03-16 RX ADMIN — SODIUM CHLORIDE, POTASSIUM CHLORIDE, SODIUM LACTATE AND CALCIUM CHLORIDE: 600; 310; 30; 20 INJECTION, SOLUTION INTRAVENOUS at 08:03

## 2020-03-16 RX ADMIN — CEFAZOLIN 2 G: 10 INJECTION, POWDER, FOR SOLUTION INTRAVENOUS at 08:35

## 2020-03-16 RX ADMIN — BUPIVACAINE HYDROCHLORIDE 40 ML: 2.5 INJECTION, SOLUTION EPIDURAL; INFILTRATION; INTRACAUDAL at 13:55

## 2020-03-16 RX ADMIN — PROPOFOL 150 MG: 10 INJECTION, EMULSION INTRAVENOUS at 08:12

## 2020-03-16 RX ADMIN — OXYCODONE HYDROCHLORIDE 5 MG: 5 TABLET ORAL at 22:55

## 2020-03-16 RX ADMIN — FENTANYL CITRATE 50 MCG: 50 INJECTION, SOLUTION INTRAMUSCULAR; INTRAVENOUS at 12:20

## 2020-03-16 RX ADMIN — CEFAZOLIN 1 G: 1 INJECTION, POWDER, FOR SOLUTION INTRAMUSCULAR; INTRAVENOUS at 12:35

## 2020-03-16 RX ADMIN — FENTANYL CITRATE 100 MCG: 50 INJECTION, SOLUTION INTRAMUSCULAR; INTRAVENOUS at 09:07

## 2020-03-16 RX ADMIN — FENTANYL CITRATE 50 MCG: 50 INJECTION, SOLUTION INTRAMUSCULAR; INTRAVENOUS at 09:59

## 2020-03-16 RX ADMIN — FENTANYL CITRATE 50 MCG: 50 INJECTION, SOLUTION INTRAMUSCULAR; INTRAVENOUS at 10:34

## 2020-03-16 RX ADMIN — SENNOSIDES AND DOCUSATE SODIUM 1 TABLET: 8.6; 5 TABLET ORAL at 20:07

## 2020-03-16 RX ADMIN — DEXMEDETOMIDINE HYDROCHLORIDE 40 MCG: 100 INJECTION, SOLUTION INTRAVENOUS at 13:55

## 2020-03-16 RX ADMIN — Medication 5 MG: at 11:41

## 2020-03-16 RX ADMIN — ACETAMINOPHEN 975 MG: 325 TABLET, FILM COATED ORAL at 06:36

## 2020-03-16 RX ADMIN — ROCURONIUM BROMIDE 20 MG: 10 INJECTION INTRAVENOUS at 11:35

## 2020-03-16 RX ADMIN — PROPOFOL 50 MG: 10 INJECTION, EMULSION INTRAVENOUS at 13:32

## 2020-03-16 RX ADMIN — LIDOCAINE HYDROCHLORIDE 60 MG: 20 INJECTION, SOLUTION INFILTRATION; PERINEURAL at 08:11

## 2020-03-16 RX ADMIN — ROCURONIUM BROMIDE 50 MG: 10 INJECTION INTRAVENOUS at 08:13

## 2020-03-16 ASSESSMENT — PAIN DESCRIPTION - DESCRIPTORS
DESCRIPTORS: ACHING

## 2020-03-16 ASSESSMENT — ACTIVITIES OF DAILY LIVING (ADL)
FALL_HISTORY_WITHIN_LAST_SIX_MONTHS: NO
DRESS: 0-->INDEPENDENT
SWALLOWING: 0-->SWALLOWS FOODS/LIQUIDS WITHOUT DIFFICULTY
AMBULATION: 0-->INDEPENDENT
TRANSFERRING: 0-->INDEPENDENT
RETIRED_COMMUNICATION: 0-->UNDERSTANDS/COMMUNICATES WITHOUT DIFFICULTY
TOILETING: 0-->INDEPENDENT
BATHING: 0-->INDEPENDENT
RETIRED_EATING: 0-->INDEPENDENT
COGNITION: 0 - NO COGNITION ISSUES REPORTED

## 2020-03-16 ASSESSMENT — MIFFLIN-ST. JEOR: SCORE: 1456.49

## 2020-03-16 NOTE — ANESTHESIA POSTPROCEDURE EVALUATION
Anesthesia POST Procedure Evaluation    Patient: Jose Coronado   MRN:     [id number] Gender:   female   Age:    64 year old :      1955        Preoperative Diagnosis: Midline cystocele [N81.11]  Rectocele [N81.6]  Female stress incontinence [N39.3]   Procedure(s):  DAVINCI HYSTERECTOMY SUPRACERVICAL, SALPINGO-OOPHORECTOMY INCLUDING BILATERAL WITH EXAM UNDER ANESTHESIA  SACROCOLPOPEXY, ROBOT-ASSISTED, LAPAROSCOPIC, WITH INSERTION OF MIDURETHRAL SLING AND CYSTOSCOPY   Postop Comments: No value filed.     Anesthesia Type: General       Disposition: Admission   Postop Pain Control: Uneventful            Sign Out: Well controlled pain   PONV: No   Neuro/Psych: Uneventful            Sign Out: Acceptable/Baseline neuro status   Airway/Respiratory: Uneventful            Sign Out: Acceptable/Baseline resp. status   CV/Hemodynamics: Uneventful            Sign Out: Acceptable CV status   Other NRE: NONE   DID A NON-ROUTINE EVENT OCCUR? No         Last Anesthesia Record Vitals:  CRNA VITALS  3/16/2020 1313 - 3/16/2020 1413      3/16/2020             NIBP:  106/55    Pulse:  81          Last PACU Vitals:  Vitals Value Taken Time   /60 3/16/2020  2:15 PM   Temp 36.8  C (98.2  F) 3/16/2020  1:46 PM   Pulse 69 3/16/2020  2:15 PM   Resp 14 3/16/2020  2:25 PM   SpO2 100 % 3/16/2020  2:25 PM   Temp src     NIBP 106/55 3/16/2020  1:46 PM   Pulse 81 3/16/2020  1:46 PM   SpO2     Resp     Temp     Ht Rate     Temp 2     Vitals shown include unvalidated device data.      Electronically Signed By: Nancy Calloway MD, 2020, 2:26 PM

## 2020-03-16 NOTE — ANESTHESIA PROCEDURE NOTES
Peripheral Nerve Block Procedure Note    Staff:     Anesthesiologist:  Amaury Arnett MD  Location: OR AFTER induction  Procedure Start/Stop TImes:      3/16/2020 1:32 PM    patient identified, IV checked, site marked, risks and benefits discussed, informed consent, monitors and equipment checked, pre-op evaluation, at physician/surgeon's request and post-op pain management      Correct Patient: Yes      Correct Position: Yes      Correct Site: Yes      Correct Procedure: Yes      Correct Laterality:  Yes    Site Marked:  Yes  Procedure details:     Procedure:  Quadratus lumborum    ASA:  2    Diagnosis:  Abd surgery    Laterality:  Bilateral    Position:  Supine    Sterile Prep: chloraprep      Needle:  Short bevel    Needle gauge:  21    Needle length (inches):  4    Ultrasound: Yes      Ultrasound used to identify targeted nerve, plexus, or vascular structure and placed a needle adjacent to it      Permanent Image entered into patiient's record      Blood Aspirated: No      Bleeding at site: No      Bolus via:  Needle    Infusion Method:  Single Shot    Complications:  None

## 2020-03-16 NOTE — OP NOTE
DATE OF SERVICE: 3/16/2020  PREOPERATIVE DIAGNOSIS:         Vaginal prolapse  POSTOPERATIVE DIAGNOSIS:      Same    PROCEDURES PERFORMED:   1. Robotic sacrocolpopexy  2. Transobturator mid-urethral sling (Solyx)  3. Cystoscopy    STAFF SURGEON: Carolyn Valdivia MD    RESIDENT(S): Bryce Hirsch MD    ANESTHESIA: GETA  ESTIMATED BLOOD LOSS: 10 mL.   IV FLUIDS: see dictated anesthesia record  COMPLICATIONS: None.   SPECIMEN:    none     SIGNIFICANT FINDINGS:   Uterovaginal prolapse on bimanual exam pre-operatively; resolved vaginal prolapse post-operatively. Cystoscopy with brisk bilateral efflux and no foreign bodies, tumors, or other urothelial abnormalities.     BRIEF OPERATIVE INDICATIONS: Ms. Coronado is a 64 year old female with history of urinary stress incontinence, cystocele and rectocele who elects to pursue surgical intervention after her ailments were not helped by more conservative measures and after a thorough discussion of the risks and benefits associated with surgery.     PROCEDURE IN DETAIL:   After Dr. Yancey performed supracervical hysterectomy, we were called in to perform the Urology portion of the procedure. The urterine positioning device was placed to assist in manipulation of the uterus. We began dissection of the vagina free anteriorly of the bladder approximately 2/3 down the anterior vaginal wall. The right ureter was identified & noted to be away from the operative field.  We then dissected the rectum away from the posterior vaginal wall approximately 2/3 of the way down the vagina. Once this was performed, a piece of Y-shaped polypropylene mesh was then trimmed to appropriate length and the mesh suturing was started at the posterior vaginal side. 2-0 Stratafix suture was used to place appropriate sutures to secure the mesh in running fashion posteriorly. The same approach was done anteriorly with a anterior arm of mesh.  At this point the sacral promontory identified and the anterior  longitudinal ligament was exposed. The proximal arm of the Y-mesh was secured to the sacral promontory with 2 gortex sutures.  The peritoneal defect was then closed with 3-0 Stratafix in running fashion.     At this time a cystoscopy was performed. There was brisk blue efflux noted from both ureteral orifices. There were no foreign bodies or any other abnormalities noted. At this time the cystoscope was removed and the catheter placed.  As appropriate hemostasis was obtained the robot was undocked and the ports were removed. At this time the ports were removed.  The 12 mm extraction port was closed with 2-0 vicryl figure of 8 suture. Subsequently, the skin was then closed using 4-0 Monocryl and dermabond for the remaining port sites.     At this point, we directed our attention to the mid urethral sling. An Allis clamp was used to grasp the vaginal mucosa approximately 1 cm from the urethral meatus at the mid urethra. A 25-gauge needle, we injected 10 mL of 1% lidocaine with epinephrine to hydrodissect the tissues. We then made a 1-cm incision through the vaginal mucosa with a #15 blade. We then dissected this laterally out passed the fornices of the vagina in the pubocervical fascia, out laterally to the pelvic side wall, first on the right-hand side and then on the left hand side. We then placed the Solyx sling on the trocar and placed this first through the obturator membrane on the left side and then deployed it and then placed it on the right hand side and deployed it. The sling lay flat.  The string was used to place it under appropriate tension.  We then placed a 20-Latvian cystoscope through the urethra and into the bladder, noting bilateral ureteral orifices. The floor of the bladder was intact as well as the bladder neck and the lateral edges, no mesh was seen. The urethra was without tape. We then withdrew the cystoscope with a full bladder and pressed on the suprapubic region and did not note any  incontinence from the urethral meatus. At this time the string was cut and the wound was irrigated.  A running 2-0 Vicryl was then used to reapproximate the mucosa of the vagina. The patient was then returned to the supine position and a TAP block was performed per anesthesia.     A premarin coated vaginal packing was placed at the end of the case and the 16Fr lugo catheter was left as well. The patient tolerated the procedure well without complication. She was awoken and taken to the PACU for further cares.        POSTOP PLAN:  - Admission to Ob/Gyn service for overnight observation  - Will plan for trial of void and vaginal packing removal prior to discharge tomorrow    Patient was seen, evaluated and plan was formulated in conjunction with me and I agree with the above.  I was present for the entire procedure.  Carolyn Valdivia MD

## 2020-03-16 NOTE — OP NOTE
Gynecology Operative Report       Name: Jose Coronado  MRN:[id number]     DATE OF PROCEDURE: 3/16/2020    PREOPERATIVE DIAGNOSES:   - Grade 3 cystocele and rectocele  - urinary incontinence  - recurrent urinary tract infections  - complex neurologic history (CVA/aneurysm, s/p coil & PFO closure)    POSTOPERATIVE DIAGNOSES:   Same     PROCEDURES:   Robotic assisted laparoscopic supracervical hysterectomy, bilateral salpingoopherectomy     SURGEON: Lily Yancey MD    ASSISTANTS: Bere Rahman MD PGY-3    ANESTHESIA: General anesthesia     ESTIMATED BLOOD LOSS: 25 ml    IV FLUIDS: 1300 ml crystalloid    UOP: 400 ml     DRAINS: lugo    FINDINGS:   On bimanual exam, normal external genitalia, uterus small, mobile, no adnexal masses/fullness, normal cervix and vagina. On LSC, no injury on entry. Normal abdominal survey. The ureters were identified bilaterally through the peritoneum. Normal tubes and ovaries. Uterus normal. Normal bowel and omentum. Hemostasis at the end of the case.     SPECIMENS:   1. Uterus, bilateral tubes and ovaries    COMPLICATIONS: None.     CONDITION: Stable to PACU.     INDICATIONS: Jose Coronado is a 64 year old with grade 3 cystocele and rectocele with recent recurrent urinary tract infections. Surgical management with robotic-assisted supracervical hysterectomy, bilateral salpingoopherectomy by our team, and sacrocolpopexy and midurethral sling was planned by Dr. Valdivia. Risks, benefits and alternatives were discussed and informed consent was signed.     PROCEDURE:     Consent was reviewed with the patient in the preoperative setting and confirmed. The patient received prophylactic antibiotics. General anesthetic was obtained in the usual manner without noted difficulties. The patient was then positioned into dorsal lithotomy with Prakash stirrups and an exam under anesthesia was performed with findings as described above. She was then prepped and draped.      Timeout was performed and a  speculum was placed in the vagina. The anterior lip of the cervix was grasped and the uterus sounded to 8cm. The cervix was serially dilated, and the robotic vaginal manipulator was inserted.  Attention was then turned to the upper abdomen. An 8 mm incision was made 2 cm above the umbilicus. The umbilicus was then elevated and the Veress needle introduced. Intraperitoneal placement was confirmed with a water drop test. The abdomen was insufflated with an opening pressure of 2 mmHg. The Veress needle was removed and the 8mm robotic port was placed in the umbilicus. Another 8mm port was placed under direct visualization on the left side, 3 cm superior and medial to the ASIS. An 8mm port was placed mirroring that on the left side. An 8 mm port was placed between the umbilicus and the lateral port on the right side. An 8 mm port was then placed on the right side 3 cm superior and medial to the ASIS. A 12 mm assistant port was placed in between the lateral and midline ports on the left side, just superior to the level of the umbilicus.  At this point, the patient was placed into steep Trendelenburg. The pelvis was inspected. The da Bam was docked onto the ports, all appropriate instruments were inserted.      Attention was turned to the right adnexa. The round ligament was cauterized with bipolar cautery and divided with monopolar. The posterior leaf of the broad ligament was opened, and a defect was made under the IP ligament with the monopolar cautery. The IP ligament was then cauterized with the bipolar forceps and divided with monopolar. The posterior leaf peritoneum was then carried down toward the uterosacral ligaments. The vesicouterine peritoneum was opened and the bladder was taken down. The uterine artery was skeletonized and then cauterized with the bipolar cautery. It was dropped laterally. The same procedure was done on the left side. At this point, the amputation of the uterus at the level of the internal  os was outlined, the vaginal manipulator was removed, and the uterus was amputated. The specimen was placed in to a 10 mm Endocatch bag. The 12 mm incision was extended, and the specimen was removed and sent to pathology.      All surgical pedicles and the cervical stump were closely expected, and hemostasis was noted.      At this point, the procedure was turned over to Urology for completion of their portion of the case.    Dr. Yancey was present for the entire case.     Bere Rahman MD  11:05 AM    I was present and scrubbed throughout the procedure,  I agree with the note above  Lily Yancey MD

## 2020-03-16 NOTE — ANESTHESIA CARE TRANSFER NOTE
Patient: Jose Coronado    Procedure(s):  DAVINCI HYSTERECTOMY SUPRACERVICAL, SALPINGO-OOPHORECTOMY INCLUDING BILATERAL WITH EXAM UNDER ANESTHESIA  SACROCOLPOPEXY, ROBOT-ASSISTED, LAPAROSCOPIC, WITH INSERTION OF MIDURETHRAL SLING AND CYSTOSCOPY    Diagnosis: Midline cystocele [N81.11]  Rectocele [N81.6]  Female stress incontinence [N39.3]  Diagnosis Additional Information: No value filed.    Anesthesia Type:   General     Note:  Airway :Face Mask  Patient transferred to:PACU  Comments: Awake, comfortable, stable vital signs, report to pacu, rnHandoff Report: Identifed the Patient, Identified the Reponsible Provider, Reviewed the pertinent medical history, Discussed the surgical course, Reviewed Intra-OP anesthesia mangement and issues during anesthesia, Set expectations for post-procedure period and Allowed opportunity for questions and acknowledgement of understanding      Vitals: (Last set prior to Anesthesia Care Transfer)    CRNA VITALS  3/16/2020 1313 - 3/16/2020 1350      3/16/2020             Pulse:  83    ART BP:  108/56    ART Mean:  75    SpO2:  99 %    Resp Rate (observed):  (!) 1                Electronically Signed By: RON Arellano CRNA  March 16, 2020  1:50 PM

## 2020-03-16 NOTE — OR NURSING
PACU to Inpatient Nursing Handoff    Patient Jose Coronado is a 64 year old female who speaks English.   Procedure Procedure(s):  DAVINCI HYSTERECTOMY SUPRACERVICAL, SALPINGO-OOPHORECTOMY INCLUDING BILATERAL WITH EXAM UNDER ANESTHESIA  SACROCOLPOPEXY, ROBOT-ASSISTED, LAPAROSCOPIC, WITH INSERTION OF MIDURETHRAL SLING AND CYSTOSCOPY   Surgeon(s) Primary: Lily Yancey MD  Resident - Assisting: Meredith Quiroga MD; Bere Rahman MD     Allergies   Allergen Reactions     Contrast Dye Itching     Reaction of immediate burning and severe itching in Right ear and back of throat after injection for CT.      Sulfa Drugs      hives       Isolation  [unfilled]     Past Medical History   has a past medical history of Anemia, CVA (cerebral vascular accident) (H) (2017), Diffuse cystic mastopathy, HTN, goal below 140/90, Hyperparathyroidism (H), Infectious mononucleosis, Irregular heart beat, Labyrinthitis, unspecified, Migraine headache with aura, Osteopenia, Pain in joint, shoulder region, Palpitations, PFO (patent foramen ovale), S/P gastric bypass (June, 2010), Sleep apnea, and Vitamin D deficiencies.    Anesthesia General   Dermatome Level     Preop Meds acetaminophen (Tylenol) - time given: 0636  pyridium 200 mg - time given: 0637   Nerve block Transversus abdominus plane (TAP).  Location:bilateral. Med:bupivacaine. Time given: 1314   Intraop Meds dexmedetomidine (Precedex): 0 mcg total  fentanyl (Sublimaze): 500 mcg total  ondansetron (Zofran): last given at 1314   Local Meds Yes   Antibiotics cefazolin (Ancef) - last given at 1235     Pain Patient Currently in Pain: denies   PACU meds  Not applicable   PCA / epidural No   Capnography     Telemetry     Inpatient Telemetry Monitor Ordered? No        Labs Glucose Lab Results   Component Value Date     03/16/2020       Hgb Lab Results   Component Value Date    HGB 14.2 03/16/2020       INR Lab Results   Component Value Date    INR 0.86 04/29/2019      PACU  Imaging Not applicable     Wound/Incision Incision/Surgical Site 03/16/20 Mid Abdomen (Active)   Incision Assessment WDL 03/16/20 1350   Closure Liquid bandage 03/16/20 1350   Incision Drainage Amount None 03/16/20 1350   Number of days: 0       Incision/Surgical Site 03/16/20 Left;Lateral (Active)   Incision Assessment WDL 03/16/20 1350   Closure Liquid bandage 03/16/20 1350   Incision Drainage Amount None 03/16/20 1350   Number of days: 0       Incision/Surgical Site 03/16/20 Left (Active)   Incision Assessment WDL 03/16/20 1350   Closure Liquid bandage 03/16/20 1350   Incision Drainage Amount None 03/16/20 1350   Number of days: 0       Incision/Surgical Site 03/16/20 Lateral;Right (Active)   Incision Assessment WD 03/16/20 1350   Closure Liquid bandage 03/16/20 1350   Incision Drainage Amount None 03/16/20 1350   Number of days: 0       Incision/Surgical Site 03/16/20 Right (Active)   Incision Assessment WDL 03/16/20 1350   Closure Liquid bandage 03/16/20 1350   Incision Drainage Amount None 03/16/20 1350   Number of days: 0       Incision/Surgical Site 03/16/20 Inner Vagina (Active)   Incision Assessment UTV 03/16/20 1350   Closure Other (Comment) 03/16/20 1325   Incision Drainage Amount None 03/16/20 1350   Number of days: 0       Packing 03/16/20 Vagina Qty Placed: 1 (Active)   Drainage Amount none 03/16/20 1350   Number of days: 0      CMS        Equipment Not applicable   Other LDA Right Groin Interventional Procedure Access (Active)   Number of days: 977        IV Access Peripheral IV 02/09/17 Right Upper forearm (Active)   Number of days: 1131       Peripheral IV 04/10/17 Right Hand (Active)   Number of days: 1071       Peripheral IV 03/16/20 Right Hand (Active)   Site Assessment [facility] 03/16/20 1350   Line Status Infusing 03/16/20 1350   Phlebitis Scale 0-->no symptoms 03/16/20 1350   Infiltration Scale 0 03/16/20 1350   Infiltration Site Treatment Method  None 03/16/20 1350   Extravasation? No 03/16/20  1350   Number of days: 0       Peripheral IV 03/16/20 Left Hand (Active)   Site Assessment WDL 03/16/20 1350   Line Status Saline locked 03/16/20 1350   Number of days: 0       Arterial Line 03/16/20 (Active)   Site Assessment [facility] 03/16/20 1350   Line Status Pulsatile blood flow 03/16/20 1350   Number of days: 0       Right Groin Interventional Procedure Access (Active)   Number of days: 977      Blood Products Not applicable EBL 35   mL   Intake/Output Date 03/16/20 0700 - 03/17/20 0659   Shift 5799-5528 0135-5928 9374-6367 24 Hour Total   INTAKE   I.V. 1800   1800   Shift Total(mL/kg) 1800(19.89)   1800(19.89)   OUTPUT   Urine 425   425   Blood 35   35   Shift Total(mL/kg) 460(5.08)   460(5.08)   Weight (kg) 90.5 90.5 90.5 90.5      Drains / Grewal Urethral Catheter Double-lumen 16 fr (Active)   Collection Container Standard 03/16/20 1350   Number of days: 0       Right Groin Interventional Procedure Access (Active)   Number of days: 977      Time of void PreOp Void Prior to Procedure: 0600 (03/16/20 0640)    PostOp      Diapered? No   Bladder Scan     PO    tolerating sips     Vitals    B/P: 110/59  T: 97.5  F (36.4  C)    Temp src: Oral  P:  Pulse: 71 (03/16/20 1445)    Heart Rate: 77 (03/16/20 1445)     R: 12  O2:  SpO2: 94 %    O2 Device: None (Room air) (03/16/20 1445)    Oxygen Delivery: 6 LPM (03/16/20 1430)         Family/support present daughter went home   Patient belongings     Patient transported on cart   DC meds/scripts (obs/outpt) Not applicable   Inpatient Pain Meds Released? Yes       Special needs/considerations None   Tasks needing completion None       Ruba Chan, RN  ASCOM [id number]

## 2020-03-16 NOTE — BRIEF OP NOTE
[facility], [place]    Brief Operative Note    Pre-operative diagnosis: Midline cystocele [N81.11]  Rectocele [N81.6]  Female stress incontinence [N39.3]  Post-operative diagnosis Same as pre-operative diagnosis    Procedure: Procedure(s):  DAVINCI HYSTERECTOMY SUPRACERVICAL, SALPINGO-OOPHORECTOMY INCLUDING BILATERAL WITH EXAM UNDER ANESTHESIA  SACROCOLPOPEXY, ROBOT-ASSISTED, LAPAROSCOPIC, WITH INSERTION OF MIDURETHRAL SLING AND CYSTOSCOPY  Surgeon: Surgeon(s) and Role:  Panel 1:     * Lily Yancey MD - Primary     * Bere Rahman MD - Resident - Assisting     * Meredith Quiroga MD - Resident - Assisting  Panel 2:     * Carolyn Valdivia MD - Primary     * Bryce Hirsch MD - Resident - Assisting  Anesthesia: General   Estimated blood loss: Less than 10 ml  Drains: 16 fr Grewal catheter, 10 ml in the balloon  Specimens:   ID Type Source Tests Collected by Time Destination   A :  Tissue Uterus with Bilateral Ovaries and Fallopian Tubes SURGICAL PATHOLOGY EXAM Lily Yancey MD 3/16/2020 10:46 AM    B : endocervical polyp Tissue Cervix SURGICAL PATHOLOGY EXAM Carolyn Valdivia MD 3/16/2020 11:20 AM      Findings:   cystocele and rectocele, unremarkable sacrocolpopexy, midurethral transoburator sling placement, cystoscopy.  Complications: None.  Implants:   Implant Name Type Inv. Item Serial No.  Lot No. LRB No. Used Action   MESH SLING Y SHAPE RESTORELLE [id number] Mesh MESH SLING Y SHAPE RESTORELLE [id number]  COLOPLAST [id number] N/A 1 Implanted   SLING SOLYX TRANSVAGINAL MESH [id number] Mesh SLING SOLYX TRANSVAGINAL MESH [id number]  [employer] [id number] N/A 1 Implanted

## 2020-03-16 NOTE — DISCHARGE SUMMARY
Gynecology Discharge Summary    Jose Coronado    YOB: 1955  MRN# [id number]   Age: 64 year old         Date of Admission:  3/16/2020  Date of Discharge:  3/17/2020  Admitting Physician:  Lily Yancey MD  Discharge Physician:  Lily Wang MD  Discharging Service:  Gynecology     Primary Provider: Lian Martinez          Admission Diagnoses:   - Grade 3 cystocele/rectocele  - Stress urinary incontinence  - recurrent urinary tract infections  - hypertension  - obstructive sleep apnea  - h/o CVA/annel aneurysm, s/p aneurysm coil, closure of PFO in 2017  - history of gastric bypass  - history of iron deficiency anemia          Discharge Diagnosis:     - same, s/p procedure below         Discharge Disposition:     Discharged to home           Procedures:   Da Bam-assisted laparoscopic supracervical hysterectomy, bilateral salpingo-oophorectomy  Robotic sacrocolpopexy, transobturator mid-urethral sling, cystoscopy  TAP Blocks          Medications Prior to Admission:     Medications Prior to Admission   Medication Sig Dispense Refill Last Dose     Ascorbic Acid (VITAMIN C PO) Take 250 mg by mouth every morning (0.5 x 500 mg tablet = 250 mg dose)    Past Week at Unknown time     aspirin 325 MG tablet Take 325 mg by mouth every morning    Past Week at Unknown time     atenolol (TENORMIN) 50 MG tablet TAKE 1 TABLET BY MOUTH EVERY DAY (Patient taking differently: Take 50 mg by mouth every morning TAKE 1 TABLET BY MOUTH EVERY DAY) 90 tablet 3 3/16/2020 at 0400     Calcium Citrate-Vitamin D (CALCIUM CITRATE + D PO) Take 2 tablets by mouth every morning    Past Week at Unknown time     Cyanocobalamin 2500 MCG TABS Take 2,500 mcg by mouth once a week Mon   Past Week at Unknown time     Ferrous Sulfate 27 MG TABS Take 27 mg by mouth every morning    Past Week at Unknown time     FIBER COMPLETE PO Take 1 capsule by mouth every morning    Past Week at Unknown time     hydrochlorothiazide (HYDRODIURIL) 12.5  MG tablet TAKE 1 TABLET BY MOUTH EVERY DAY (Patient taking differently: Take 12.5 mg by mouth every morning TAKE 1 TABLET BY MOUTH EVERY DAY) 90 tablet 3 3/15/2020 at 0800     imipramine (TOFRANIL) 25 MG tablet Take 1 tablet (25 mg) by mouth At Bedtime (Patient taking differently: Take 25 mg by mouth every morning ) 90 tablet 3 3/16/2020 at 0400     loratadine (CLARITIN) 10 MG tablet Take 10 mg by mouth every morning    3/16/2020 at 0400     losartan (COZAAR) 50 MG tablet Take 1 tablet (50 mg) by mouth daily (Patient taking differently: Take 50 mg by mouth every morning ) 90 tablet 3 3/15/2020 at 0800     omega 3 1000 MG CAPS Take 1 g by mouth every morning  90 capsule  Past Week at Unknown time     topiramate (TOPAMAX) 25 MG tablet Take 1 tablet (25 mg) by mouth 2 times daily 180 tablet 3 3/16/2020 at 0400     VITAMIN D, CHOLECALCIFEROL, PO Take 500 Units by mouth every morning    Past Week at Unknown time     UNABLE TO FIND CPAP machine every night                Discharge Medications:        Review of your medicines      START taking      Dose / Directions   acetaminophen 325 MG tablet  Commonly known as:  TYLENOL  Used for:  S/P hysterectomy      Dose:  650 mg  Take 2 tablets (650 mg) by mouth every 6 hours as needed for mild pain Start after Delivery.  Quantity:  100 tablet  Refills:  0     oxyCODONE 5 MG tablet  Commonly known as:  ROXICODONE  Used for:  S/P hysterectomy      Dose:  5 mg  Take 1 tablet (5 mg) by mouth every 6 hours as needed for pain  Quantity:  12 tablet  Refills:  0     senna-docusate 8.6-50 MG tablet  Commonly known as:  SENOKOT-S/PERICOLACE  Used for:  S/P hysterectomy      Dose:  1 tablet  Take 1 tablet by mouth daily Start after delivery.  Quantity:  100 tablet  Refills:  0        CONTINUE these medicines which may have CHANGED, or have new prescriptions. If we are uncertain of the size of tablets/capsules you have at home, strength may be listed as something that might have changed.       Dose / Directions   atenolol 50 MG tablet  Commonly known as:  TENORMIN  This may have changed:      how much to take    how to take this    when to take this  Used for:  Palpitations, Essential hypertension with goal blood pressure less than 140/90      TAKE 1 TABLET BY MOUTH EVERY DAY  Quantity:  90 tablet  Refills:  3     hydrochlorothiazide 12.5 MG tablet  Commonly known as:  HYDRODIURIL  This may have changed:      how much to take    how to take this    when to take this  Used for:  Essential hypertension with goal blood pressure less than 140/90      TAKE 1 TABLET BY MOUTH EVERY DAY  Quantity:  90 tablet  Refills:  3     imipramine 25 MG tablet  Commonly known as:  TOFRANIL  This may have changed:  when to take this  Used for:  Migraine with aura and without status migrainosus, not intractable      Dose:  25 mg  Take 1 tablet (25 mg) by mouth At Bedtime  Quantity:  90 tablet  Refills:  3     losartan 50 MG tablet  Commonly known as:  COZAAR  This may have changed:  when to take this  Used for:  Essential hypertension with goal blood pressure less than 140/90      Dose:  50 mg  Take 1 tablet (50 mg) by mouth daily  Quantity:  90 tablet  Refills:  3        CONTINUE these medicines which have NOT CHANGED      Dose / Directions   aspirin 325 MG tablet  Commonly known as:  ASA      Dose:  325 mg  Take 325 mg by mouth every morning  Refills:  0     CALCIUM CITRATE + D PO      Dose:  2 tablet  Take 2 tablets by mouth every morning  Refills:  0     Claritin 10 MG tablet  Generic drug:  loratadine      Dose:  10 mg  Take 10 mg by mouth every morning  Refills:  0     Cyanocobalamin 2500 MCG Tabs      Dose:  2,500 mcg  Take 2,500 mcg by mouth once a week Mon  Refills:  0     Ferrous Sulfate 27 MG Tabs      Dose:  27 mg  Take 27 mg by mouth every morning  Refills:  0     FIBER COMPLETE PO      Dose:  1 capsule  Take 1 capsule by mouth every morning  Refills:  0     omega 3 1000 MG Caps      Dose:  1 g  Take 1 g by mouth  every morning  Quantity:  90 capsule  Refills:  0     topiramate 25 MG tablet  Commonly known as:  TOPAMAX  Used for:  Migraine with aura and without status migrainosus, not intractable      Dose:  25 mg  Take 1 tablet (25 mg) by mouth 2 times daily  Quantity:  180 tablet  Refills:  3     UNABLE TO FIND      CPAP machine every night  Refills:  0     VITAMIN C PO      Dose:  250 mg  Take 250 mg by mouth every morning (0.5 x 500 mg tablet = 250 mg dose)  Refills:  0     VITAMIN D (CHOLECALCIFEROL) PO      Dose:  500 Units  Take 500 Units by mouth every morning  Refills:  0           Where to get your medicines      These medications were sent to [facility] [place], [place] - [address]  [address] [address]    Phone:  [phone]     acetaminophen 325 MG tablet    senna-docusate 8.6-50 MG tablet     Some of these will need a paper prescription and others can be bought over the counter. Ask your nurse if you have questions.    Bring a paper prescription for each of these medications    oxyCODONE 5 MG tablet               Consultations:    Urology       Brief History of Illness:   Jose Coronado is a 64 year old with grade 3 cystocele and rectocele with recent recurrent urinary tract infections. Surgical management with robotic-assisted supracervical hysterectomy, bilateral salpingoopherectomy by our team, and sacrocolpopexy and midurethral sling was planned by Dr. Valdivia. Risks, benefits and alternatives were discussed and informed consent was signed.           Hospital Course:     She underwent the above listed procedures which were uncomplicated and had a total EBL of 35 ml.     Operative findings:  On bimanual exam, normal external genitalia, uterus small, mobile, no adnexal masses/fullness, normal cervix and vagina. On LSC, no injury on entry. Normal abdominal survey. The ureters were identified bilaterally through the peritoneum. Normal tubes and ovaries. Uterus normal.  Normal bowel and omentum. Hemostasis at the end of the case.     Uterovaginal prolapse on bimanual exam pre-operatively; resolved vaginal prolapse post-operatively. Cystoscopy with brisk bilateral efflux and no foreign bodies, tumors, or other urothelial abnormalities.     Please operative reports for more details.    The patient's postoperative course was uncomplicated. At time of discharge on POD#1, patient was ambulating without lightheadedness/dizziness, pain was well controlled on PO meds, she was tolerating a regular diet without n/v, and she was voiding on her own.              Discharge Instructions and Follow-Up:     Discharge diet: Regular   Discharge activity: Lifting restricted to 15 pounds  No lifting, driving, or strenuous exercise for 6 week(s)  No heavy lifting for 6 week(s)  No heavy lifting, pushing, pulling for 6 week(s)  No driving or operating machinery while on narcotic analgesics  Pelvic rest: abstain from intercourse and do not use tampons for 6 week(s)   Discharge follow-up: Follow up with Dr. Yancey in 2 weeks   Other instructions: none        Poonam Gipson MD  PGY-3 OB/GYN  [phone] (OB G3 Pager) / [phone] (OB G2 Pager)    I saw and evaluated the patient. I agree with the   findings and the plan of care as documented in the resident's note.  MD Alex

## 2020-03-17 VITALS
BODY MASS INDEX: 33.24 KG/M2 | TEMPERATURE: 96.3 F | DIASTOLIC BLOOD PRESSURE: 51 MMHG | RESPIRATION RATE: 13 BRPM | WEIGHT: 199.52 LBS | SYSTOLIC BLOOD PRESSURE: 113 MMHG | HEIGHT: 65 IN | OXYGEN SATURATION: 99 % | HEART RATE: 69 BPM

## 2020-03-17 LAB
ANION GAP SERPL CALCULATED.3IONS-SCNC: 3 MMOL/L (ref 3–14)
BUN SERPL-MCNC: 11 MG/DL (ref 7–30)
CALCIUM SERPL-MCNC: 8.4 MG/DL (ref 8.5–10.1)
CHLORIDE SERPL-SCNC: 109 MMOL/L (ref 94–109)
CO2 SERPL-SCNC: 29 MMOL/L (ref 20–32)
CREAT SERPL-MCNC: 0.66 MG/DL (ref 0.52–1.04)
ERYTHROCYTE [DISTWIDTH] IN BLOOD BY AUTOMATED COUNT: 13.8 % (ref 10–15)
GFR SERPL CREATININE-BSD FRML MDRD: >90 ML/MIN/{1.73_M2}
GLUCOSE SERPL-MCNC: 108 MG/DL (ref 70–99)
HCT VFR BLD AUTO: 36.2 % (ref 35–47)
HGB BLD-MCNC: 11.4 G/DL (ref 11.7–15.7)
MCH RBC QN AUTO: 29.3 PG (ref 26.5–33)
MCHC RBC AUTO-ENTMCNC: 31.5 G/DL (ref 31.5–36.5)
MCV RBC AUTO: 93 FL (ref 78–100)
PLATELET # BLD AUTO: 239 10E9/L (ref 150–450)
POTASSIUM SERPL-SCNC: 4.7 MMOL/L (ref 3.4–5.3)
RBC # BLD AUTO: 3.89 10E12/L (ref 3.8–5.2)
SODIUM SERPL-SCNC: 141 MMOL/L (ref 133–144)
WBC # BLD AUTO: 13.5 10E9/L (ref 4–11)

## 2020-03-17 PROCEDURE — 25000132 ZZH RX MED GY IP 250 OP 250 PS 637: Mod: GY | Performed by: STUDENT IN AN ORGANIZED HEALTH CARE EDUCATION/TRAINING PROGRAM

## 2020-03-17 PROCEDURE — 85027 COMPLETE CBC AUTOMATED: CPT | Performed by: STUDENT IN AN ORGANIZED HEALTH CARE EDUCATION/TRAINING PROGRAM

## 2020-03-17 PROCEDURE — 80048 BASIC METABOLIC PNL TOTAL CA: CPT | Performed by: STUDENT IN AN ORGANIZED HEALTH CARE EDUCATION/TRAINING PROGRAM

## 2020-03-17 PROCEDURE — 25800030 ZZH RX IP 258 OP 636: Performed by: STUDENT IN AN ORGANIZED HEALTH CARE EDUCATION/TRAINING PROGRAM

## 2020-03-17 PROCEDURE — 36415 COLL VENOUS BLD VENIPUNCTURE: CPT | Performed by: STUDENT IN AN ORGANIZED HEALTH CARE EDUCATION/TRAINING PROGRAM

## 2020-03-17 RX ORDER — OXYCODONE HYDROCHLORIDE 5 MG/1
5 TABLET ORAL EVERY 6 HOURS PRN
Qty: 12 TABLET | Refills: 0 | Status: SHIPPED | OUTPATIENT
Start: 2020-03-17 | End: 2020-03-20

## 2020-03-17 RX ORDER — CIPROFLOXACIN 500 MG/1
500 TABLET, FILM COATED ORAL ONCE
Status: COMPLETED | OUTPATIENT
Start: 2020-03-17 | End: 2020-03-17

## 2020-03-17 RX ORDER — AMOXICILLIN 250 MG
1 CAPSULE ORAL DAILY
Qty: 100 TABLET | Refills: 0 | Status: SHIPPED | OUTPATIENT
Start: 2020-03-17 | End: 2020-12-10

## 2020-03-17 RX ORDER — ACETAMINOPHEN 325 MG/1
650 TABLET ORAL EVERY 6 HOURS PRN
Qty: 100 TABLET | Refills: 0 | Status: SHIPPED | OUTPATIENT
Start: 2020-03-17 | End: 2022-07-22

## 2020-03-17 RX ADMIN — ASPIRIN 325 MG ORAL TABLET 325 MG: 325 PILL ORAL at 09:28

## 2020-03-17 RX ADMIN — OXYCODONE HYDROCHLORIDE 5 MG: 5 TABLET ORAL at 02:10

## 2020-03-17 RX ADMIN — ACETAMINOPHEN 975 MG: 325 TABLET, FILM COATED ORAL at 09:28

## 2020-03-17 RX ADMIN — ATENOLOL 50 MG: 25 TABLET ORAL at 09:29

## 2020-03-17 RX ADMIN — OXYCODONE HYDROCHLORIDE 5 MG: 5 TABLET ORAL at 09:28

## 2020-03-17 RX ADMIN — HYDROCHLOROTHIAZIDE 12.5 MG: 12.5 TABLET ORAL at 09:28

## 2020-03-17 RX ADMIN — CIPROFLOXACIN HYDROCHLORIDE 500 MG: 500 TABLET, FILM COATED ORAL at 09:36

## 2020-03-17 RX ADMIN — LOSARTAN POTASSIUM 50 MG: 50 TABLET, FILM COATED ORAL at 09:28

## 2020-03-17 RX ADMIN — TOPIRAMATE 25 MG: 25 TABLET, FILM COATED ORAL at 09:28

## 2020-03-17 RX ADMIN — OXYCODONE HYDROCHLORIDE 5 MG: 5 TABLET ORAL at 06:29

## 2020-03-17 RX ADMIN — SODIUM CHLORIDE: 9 INJECTION, SOLUTION INTRAVENOUS at 03:01

## 2020-03-17 RX ADMIN — OXYCODONE HYDROCHLORIDE 5 MG: 5 TABLET ORAL at 13:51

## 2020-03-17 RX ADMIN — IMIPRAMINE HYDROCHLORIDE 25 MG: 25 TABLET, FILM COATED ORAL at 09:28

## 2020-03-17 RX ADMIN — LORATADINE 10 MG: 10 TABLET ORAL at 09:28

## 2020-03-17 RX ADMIN — SENNOSIDES AND DOCUSATE SODIUM 1 TABLET: 8.6; 5 TABLET ORAL at 09:28

## 2020-03-17 NOTE — PROGRESS NOTES
Postoperative check performed via phone with RN.     Patient doing well postoperatively. Vitally stable. Pain well controlled. Ambulating in hallway. Tolerating PO. UOP adequate with lugo catheter in place. Vaginal packing remains in place. Patient's anti-HTN medications and ASA scheduled to resume on POD#1.     Will round on patient in a.m.     Alicia Myhre, DO  Obstetrics and Gynecology, PGY-3  March 16, 2020 , 11:33 PM

## 2020-03-17 NOTE — PLAN OF CARE
VSS. Lung sounds clear, maintaining sats on room air. Grewal removed and trial void performed. Pt able to void spontaneously. Vaginal packing removed per orders. Bloody drainage on perineal pad noted. Last BM 3/16. Up independently, steady on her feet- ambulated in halls. All abdominal incisions well approximated and open to air. Abdominal binder in place for comfort. Pain managed with PRN oxycodone and scheduled Tylenol. Tolerating regular diet well. IV removed for discharge. Pt able to make all needs known, call light in reach.     Pt. discharged at 1430 to home via transport by sister and left with personal belongings. Pt. received complete discharge paperwork and medications as filled by discharge pharmacy. Pt. was given times of last dose for all discharge medications in writing on discharge medication sheets. Discharge teaching included medication, pain management, activity restrictions, dressing changes, and signs and symptoms of infection. Dressing supplies sent home. Pt. to follow up with GYN on April 30th. Pt. had a question about oral antibiotics and a vaginal cream previously discussed with her by urology. Urology was paged and stated these medications would not be ordered. Pt had no further questions at the time of discharge and no unmet needs were identified.

## 2020-03-17 NOTE — PROGRESS NOTES
GYN Progress Note    Discussed with RN, who is comfortable with active trial of void and vaginal packing removal.    Instill 300mL in bladder, have pt void right away into hat.  Measure void.  Please page GYN with results.    Poonam Gipson MD  PGY4 OBGYN    Gynecology Team  To reach the RESIDENT PHYSICIAN responsible for this patient at any time, use the following pagers:  -Day (630 AM - 6 PM), page [phone]   -Night (6PM-6:30 AM), page [phone]   -Monday Afternoons (1PM-5PM), please call the attending directly by dialing their job code pager by first dialing [phone], and then entering [phone] when prompted. These pagers do not receive text messages  -Weekend, page [phone]

## 2020-03-17 NOTE — PROGRESS NOTES
"Urology  Progress Note    No acute events overnight  Tolerated coffee and small amount food last night, without nausea/vomiting  Left side incision pain  Walked yesterday without issue    Exam  BP 90/53 (BP Location: Left arm)   Pulse 69   Temp 96.8  F (36  C) (Oral)   Resp 16   Ht 1.66 m (5' 5.35\")   Wt 90.5 kg (199 lb 8.3 oz)   SpO2 95%   BMI 32.84 kg/m    No acute distress  Non-labored breathing on RA  Abdomen soft, non-distended, appropriately tender around incisions, incisions c/d/i with dermabond  Lower extremities non-edematous bilaterally  Grewal clear urine in appearance    /~1000 ml in bag    Labs  WBC   Date Value Ref Range Status   03/17/2020 13.5 (H) 4.0 - 11.0 10e9/L Final   03/16/2020 7.1 4.0 - 11.0 10e9/L Final   01/07/2020 5.7 4.0 - 11.0 10e9/L Final      Hemoglobin   Date Value Ref Range Status   03/17/2020 11.4 (L) 11.7 - 15.7 g/dL Final   03/16/2020 14.2 11.7 - 15.7 g/dL Final   01/07/2020 12.8 11.7 - 15.7 g/dL Final      Platelet Count   Date Value Ref Range Status   03/17/2020 239 150 - 450 10e9/L Final      Creatinine   Date Value Ref Range Status   03/17/2020 PENDING 0.52 - 1.04 mg/dL Incomplete   03/16/2020 0.75 0.52 - 1.04 mg/dL Final   01/07/2020 0.72 0.52 - 1.04 mg/dL Final      Potassium   Date Value Ref Range Status   03/17/2020 4.7 3.4 - 5.3 mmol/L Final      Drains/Lines/Tubes and Anticipated Removal:  Grewal catheter, to be removed prior to discharge    Held Home Medications:  none    Assessment/Plan  64 year old y/o female POD#1 s/p robotic sacrocolpopexy, midurethral transobturator sling    - Abdominal binder  - Please perform trial of void and remove vaginal packing prior to discharge (patient care order in place)  - OK to discharge home from Urology standpoint upon Grewal and vaginal packing removal  - Rest of cares per Ob/Gyn    Will discuss with Dr. Skip Hirsch MD, PGY-4  Urology Resident    Patient was seen, evaluated and plan was formulated in conjunction " with me and I agree with the above.  I answered patient's questions and she should be able to go home today.  Carolyn Valdivia MD       Contacting the Urology Team     Please use the following job codes to reach the Urology Team. Note that you must use an in house phone and that job codes cannot receive text pages.     On weekdays, dial [phone] (or star-star-star [phone] on the new [employer] telephones) then [phone] to reach the Adult Urology resident or PA on call    On weekdays, dial [phone] (or star-star-star [phone] on the new [employer] telephones) then [phone] to reach the Pediatric Urology resident    On weeknights and weekends, dial [phone] (or star-star-star [phone] on the new [employer] telephones) then [phone] to reach the Urology resident on call (for both Adult and Pediatrics)

## 2020-03-17 NOTE — PLAN OF CARE
Observation goals :  ~ Adequate pain control using oral analgesics.   ~ Tolerates oral intake.   ~ Cleared for discharge per provider.   ~ Return to baseline mental status.   ~ Hypercapnia, hypoventilation or hypoxia resolved for at least 2 hours without supplemental oxygen.   ~ Deficits in sensation, mobility or coordination have resolved if spinal or regional anesthesia was used.   ~ Nurse to Notify Provider when Outpatient in a Bed discharge goals have been met and patient is ready for discharge.      Pt is observation status.outpation observation brochure given to pt.  Discharge  goals reviewed with pt.  Monitored pt with observation goals every 1-2 hrs.  16:30 has not met all discharge  goals yet  18:00 has not met all discharge  goals yet  20:00 has not met all discharge  goals yet  22:00 has not met all discharge  goals yet    .Pt  arrived on unit from PACU by cart at 16:30.  Alert and oriented x4.  VSS and capno WNL. Sats 100% on 2L oxygen via NC.  Denies n/v, reflux, Cp or SOB.  Lungs clear  and BS hypoactive. No flatus yet. LBM 3/15/20  Abdominal lap sites  Open to air  And CDI.   Grewal patent with clear orange  Color urine and adequate ouput.   Vaginal packing intact.  Moderate vaginal bleeding .  Sarahi-pad changed x1.  Tolerates  Regular diet well.  IVF infusing @100 ml/hr.  Instructed how to use IS with couging.   Abdominal binder provided.  PCD's  on for DVT ppx.  Up with SBA  And ambulated hallway x1.  No postural dizziness.  Abdominal Pain well managed with current regimens : scheduled Tylenol 975mg  q 8hr.  Oxycodone 5mg x 3hrs  CPAP on for sleep at night. Off oxygen with CPAP

## 2020-03-17 NOTE — PLAN OF CARE
VS: Temp: 96.8  F (36  C) Temp src: Oral BP: 90/53 Pulse: 69 Heart Rate: 68 Resp: 16 SpO2: 95 % O2 Device: BiPAP/CPAP Oxygen Delivery: 2 LPM    O2: CPAP on. Stable. Pulse ox on.  Denies SOB   Output: Lugo draining. Pt complained of having the urge to urinate a couple times tonight.  Had to readjust lugo to have it drain more.  Seemed to be getting kinked.  Pt felt better once lugo was adjusted.     Last BM: 3/16/2020   Activity: Ax1 with walker.    Skin: Intact ex incision lap sites on abd   Pain: Mild shoulder and abdomen pain managed with 5mg oxycodone   CMS: intact   Dressing: Vaginal packing. Sarahi pad changed X1 per shift with small amt of drainage.   Diet: Reg. No nausea    LDA: R PIV infusing 100 ml/hr. Lugo.   Equipment: Iv pole/pump, capno, walker    Plan: Discharge home today?    Additional Info: BP soft 90/53. Pt asymptomatic.        OBS goal:    Patient able to ambulate as they were prior to admission or with assist devices provided by therapies during their stay.     2300-pt sleeping, not applicable   0300- pt sleeping, not applicable  0645- pt awake. VSS. Planning to walk this AM after breakfast

## 2020-03-17 NOTE — PROGRESS NOTES
"Gynecology Progress Note  POD#1 s/p DA supracervical hysterectomy, BSO, sacrocolpopexy, TOT, cysto    Subjective:  Patient is resting comfortably in bed, sitting upright.  Complains of feeling like she \"was hit by a truck\". States she is tolerating pain with the current medication regiment.  Grewal catheter and vaginal packing in place. Not passing flatus/BM .  Ambulating without dizziness or difficulty but having difficulty transitioning from bed/chair to standing.  She denies any nausea/vomiting, chest pain, shortness of breath, or other systemic complaints.    Objective:  Vitals:    20 2130 20 2230 20 0206 20 0640   BP: 107/53 108/45 90/53 111/55   BP Location:   Left arm Left arm   Pulse:       Resp:   16 13   Temp:   96.8  F (36  C) 96.3  F (35.7  C)   TempSrc:   Oral Oral   SpO2: 95% 97% 95% 99%   Weight:       Height:         General:  A&Ox3. NAD. Resting in bed  CV: RRR.  Pulm: CTAB. Normal respiratory effort.  Abd: Soft, mildly-distended. Appropriately tender. Incisions CDI.   Ext: Trace edema    Hemoglobin   Date Value Ref Range Status   2020 11.4 (L) 11.7 - 15.7 g/dL Final   2020 14.2 11.7 - 15.7 g/dL Final     I/O last 3 completed shifts:  In: 3450 [P.O.:850; I.V.:2600]  Out: 2360 [Urine:2325; Blood:35]  Assessment/Plan:  Jose Coronado is a 64 year old  female who is POD#1 s/p DA supracervical hysterectomy, BSO, sacrocolpopexy, TOT, cysto performed with urology. Patient is doing well post-operatively.    Routine post-operative goals: Encourage ambulation and spirometry.  FEN: Regular diet  Pain: continue oral medications  Heme: Hgb: 14.2>EBL 35> 11.4.  If <10 will discharge home with PO iron.  CV: HTN-atenolol, hydrochlorothiazide, losartan (ord'd for POD#1)  PULM: MITCHELL, home CPAP  GI: s/p gastric bypass. Scheduled stool softeners, antiemetics prn. Tolerating PO.   : Grewal and vaginal packing in place. To be removed by Urology on POD#1.  Neuro: CVA s/p PFO " closure, aneurysm coil. Resume ASA today, POD#1  Endo: hyperparathyroidism, s/p parathyroidectomy  PPX: SCDs while in bed, encourage ambulation   Dispo: Discharge to home on POD#1    Alicia Myhre, DO  Obstetrics and Gynecology, PGY-3  March 17, 2020 , 8:04 AM  I saw and evaluated the patient. I agree with the   findings and the plan of care as documented in the resident's note. Per RN pack rmeoved and pt voiding well.  discharge home today.  MD Alex

## 2020-03-18 ENCOUNTER — TELEPHONE (OUTPATIENT)
Dept: OBGYN | Facility: CLINIC | Age: 65
End: 2020-03-18

## 2020-03-18 ENCOUNTER — TELEPHONE (OUTPATIENT)
Dept: UROLOGY | Facility: CLINIC | Age: 65
End: 2020-03-18

## 2020-03-18 DIAGNOSIS — N95.2 ATROPHIC VAGINITIS: Primary | ICD-10-CM

## 2020-03-18 DIAGNOSIS — N89.8 VAGINAL DRYNESS: Primary | ICD-10-CM

## 2020-03-18 LAB — COPATH REPORT: NORMAL

## 2020-03-18 RX ORDER — ESTRADIOL 0.1 MG/G
1 CREAM VAGINAL
Qty: 42.5 G | Refills: 3 | Status: SHIPPED | OUTPATIENT
Start: 2020-03-19 | End: 2020-03-21

## 2020-03-18 NOTE — TELEPHONE ENCOUNTER
Spoke with Jose who reports her WBC from before surgery to after almost doubled and she is wondering if this is normal.  She was also told she would get a vaginal cream and this was never done.    Spoke with Dr. Ogden who advised increased WBC after surgery is normal. OK to send estrace vaginal cream 1g twice weekly. One year supply.    Spoke again with Jose and gave her this information. She agreed with plan and had no further questions.

## 2020-03-18 NOTE — TELEPHONE ENCOUNTER
[facility] Call Center    Phone Message    May a detailed message be left on voicemail: yes     Reason for Call: Medication Question or concern regarding medication   Prescription Clarification  Name of Medication: Unknown Pt Believes She Is Missing A Cream That Was To Be Sent To Pharmacy  Prescribing Provider: Dr. Valdivia    Pharmacy: [id number] - [place], [place] - [address] AT [facility] [facility]    What on the order needs clarification? Pt Believes She Is Missing A Cream Medication. Please Advise.           Action Taken: Message routed to:  [facility] ([facility]): Uro    Travel Screening: Not Applicable

## 2020-03-21 ENCOUNTER — TELEPHONE (OUTPATIENT)
Dept: OBGYN | Facility: CLINIC | Age: 65
End: 2020-03-21

## 2020-03-21 DIAGNOSIS — N89.8 VAGINAL DRYNESS: ICD-10-CM

## 2020-03-21 RX ORDER — ESTRADIOL 0.1 MG/G
1 CREAM VAGINAL
Qty: 42.5 G | Refills: 3 | Status: SHIPPED | OUTPATIENT
Start: 2020-03-23 | End: 2022-06-09

## 2020-03-21 NOTE — TELEPHONE ENCOUNTER
Left voicemail with patient checking in after surgery.  Pathology report was benign.    Lily Yancey MD

## 2020-03-23 ENCOUNTER — TELEPHONE (OUTPATIENT)
Dept: UROLOGY | Facility: CLINIC | Age: 65
End: 2020-03-23

## 2020-03-23 NOTE — TELEPHONE ENCOUNTER
Spoke with patient to change their appointment to a Telephone Visit with Dr. Valdivia at their previously scheduled time (4/1/2020 at 10:15 AM).      Stella Jha EMT

## 2020-03-30 ENCOUNTER — PRE VISIT (OUTPATIENT)
Dept: UROLOGY | Facility: CLINIC | Age: 65
End: 2020-03-30

## 2020-03-30 NOTE — TELEPHONE ENCOUNTER
Reason for Visit: post operative check up S/P robotic sacrocolpopexy, transobturator mid-urethral sling (Solyx), and cystoscopy    Diagnosis: vaginal prolapse    Orders/Procedures/Records: in system    Contact Patient: telephone visit    Rooming Requirements:  Telephone visit      Genoveva Acosta LPN  03/30/20  4:10 PM

## 2020-03-31 ENCOUNTER — TELEPHONE (OUTPATIENT)
Dept: UROLOGY | Facility: CLINIC | Age: 65
End: 2020-03-31

## 2020-03-31 NOTE — TELEPHONE ENCOUNTER
LVM for patient to call clinic back to discus converting appointment to video virtual visit and discuss the AM Touchpoint neva.  Genoveva Acosta LPN

## 2020-04-01 ENCOUNTER — VIRTUAL VISIT (OUTPATIENT)
Dept: UROLOGY | Facility: CLINIC | Age: 65
End: 2020-04-01
Payer: MEDICARE

## 2020-04-01 DIAGNOSIS — N81.6 RECTOCELE: ICD-10-CM

## 2020-04-01 DIAGNOSIS — N39.3 STRESS INCONTINENCE: ICD-10-CM

## 2020-04-01 DIAGNOSIS — N81.11 MIDLINE CYSTOCELE: Primary | ICD-10-CM

## 2020-04-01 DIAGNOSIS — N32.81 OVERACTIVE BLADDER: ICD-10-CM

## 2020-04-01 ASSESSMENT — PAIN SCALES - GENERAL: PAINLEVEL: NO PAIN (0)

## 2020-04-01 NOTE — PROGRESS NOTES
"Jose Coronado is a 64 year old female who is being evaluated via a billable video visit.      The patient has been notified of following:     \"This video visit will be conducted via a call between you and your physician/provider. We have found that certain health care needs can be provided without the need for an in-person physical exam.  This service lets us provide the care you need with a video conversation.  If a prescription is necessary we can send it directly to your pharmacy.  If lab work is needed we can place an order for that and you can then stop by our lab to have the test done at a later time.    If during the course of the call the physician/provider feels a video visit is not appropriate, you will not be charged for this service.\"     Patient has given verbal consent for Video visit?yes    Patient would like the video invitation sent by: Send to e-mail at: [email or web address]      Reason for Video visit: 2 week post op for robotic sacrocolpopexy in conjunction with hysterectomy on 3/16/20    Clinical Data: Ms. Jose Coronado  is a 64 year old female with a history of the above.  She states that she is doing well.  No stress incontinence, but might have some continued overactive bladder.     She reports that her incisions are healing well.    She continues to have some numbness in the back of her leg and buttocks that she feels is secondary to the block and is improving slowly.  It doesn't bother her but she wanted me to be aware.    She also has a deep muscle bruise in her right hand.    A/P s/p Robotic sacrocolpopexy and midurethral sling along with hysterectomy doing well  -Continue with restrictions  -continue with estrogen cream twice per week.  -f/u in 4 weeks for next exam and sx's check.    Thank you for allowing me to participate in the care of  Ms.Jan [facility] Coronado and I will keep you updated on her progress.    Carolyn Valdivia MD         "

## 2020-04-01 NOTE — PATIENT INSTRUCTIONS
-Continue with restrictions  -continue with estrogen cream twice per week.  -f/u in 4 weeks for next exam and sx's check.

## 2020-04-09 ENCOUNTER — PRE VISIT (OUTPATIENT)
Dept: UROLOGY | Facility: CLINIC | Age: 65
End: 2020-04-09

## 2020-04-09 NOTE — TELEPHONE ENCOUNTER
Reason for Visit: post operative check up S/P robotic sacrocolpopexy, transobturator mid-urethral sling (Solyx), and cystoscopy     Diagnosis: vaginal prolapse     Orders/Procedures/Records: in system     Contact Patient: telephone visit     Rooming Requirements:  Telephone visit      Genoveva Acosta LPN  04/09/20  4:45 PM

## 2020-04-21 ENCOUNTER — TELEPHONE (OUTPATIENT)
Dept: UROLOGY | Facility: CLINIC | Age: 65
End: 2020-04-21

## 2020-04-21 NOTE — TELEPHONE ENCOUNTER
Contacted patient and converted their appointment to a Video visit like their last post op appointment. Patient understands how to use the neva and how virtual visits go and has no questions. Patient stated understanding and agreement with plan.  Genoveva Acosta LPN

## 2020-04-29 ENCOUNTER — VIRTUAL VISIT (OUTPATIENT)
Dept: UROLOGY | Facility: CLINIC | Age: 65
End: 2020-04-29
Payer: MEDICARE

## 2020-04-29 DIAGNOSIS — N36.42 INTRINSIC SPHINCTER DEFICIENCY: Primary | ICD-10-CM

## 2020-04-29 ASSESSMENT — PAIN SCALES - GENERAL: PAINLEVEL: NO PAIN (0)

## 2020-04-29 NOTE — PROGRESS NOTES
"Jose Coronado is a 64 year old female who is being evaluated via a billable video visit.      The patient has been notified of following:     \"This video visit will be conducted via a call between you and your physician/provider. We have found that certain health care needs can be provided without the need for an in-person physical exam.  This service lets us provide the care you need with a video conversation.  If a prescription is necessary we can send it directly to your pharmacy.  If lab work is needed we can place an order for that and you can then stop by our lab to have the test done at a later time.    Video visits are billed at different rates depending on your insurance coverage.  Please reach out to your insurance provider with any questions.    If during the course of the call the physician/provider feels a video visit is not appropriate, you will not be charged for this service.\"    Patient has given verbal consent for Video visit? Yes    How would you like to obtain your AVS? Garohart    Patient would like the video invitation sent by: Send to e-mail at: [email or web address]    Will anyone else be joining your video visit? No        Video-Visit Details    Type of service:  Video Visit    Video Start Time: 10:20 AM  Video End Time: 10:35 AM    Originating Location (pt. Location): Home    Distant Location (provider location):  [facility] UROLOGY AND [facility]     Mode of Communication:  Video Conference via [facility]      Carolyn Valdivia MD      "

## 2020-04-29 NOTE — PROGRESS NOTES
Reason for Video visit:6 week post op for robotic sacrocolpopexy in conjunction with hysterectomy on 3/16/20    Clinical Data: Ms. Jose Coronado  is a 64 year old female with a history of the above.  She states that she is doing well.  No stress incontinence, but might have some continued overactive bladder. Her bowels are regular with stool softener.    She reports that her incisions are healing well.    She continues to have some numbness in the back of her leg and buttocks that she feels is secondary to the block and is improving slowly.  It doesn't bother her but she wanted me to be aware.  This continues to improve but then in the last few weeks it has stopped improving.    She also had a deep muscle bruise in her right hand which has resolved.    A/P s/p Robotic sacrocolpopexy and midurethral sling along with hysterectomy doing well except some continued superficial numbness in the buttocks area.  She is not at all bothered by this and is willing to watch this for several months.  We discussed her incontinence which seems to occur sometimes after getting up from a sitting position.  The sling has helped.  We discussed other options for that including pelvic floor PT, continued estrogen cream, and bulking for possible intrinsic sphincter deficiency.  She would like to just try the estrogen cream and PT for now.  -discontinue with restrictions  -continue with estrogen cream twice per week indefinitely.  -referral to PT  -f/u with me in 6 months.  -if the symptoms of numbness continues then consider an anesthesiology or neurology consult.    Thank you for allowing me to participate in the care of  Ms.Jan MERVIN Coronado and I will keep you updated on her progress.    Carolyn Valdivia MD

## 2020-04-29 NOTE — PATIENT INSTRUCTIONS
-discontinue with restrictions  -continue with estrogen cream twice per week indefinitely.  -referral to PT  -f/u with me in 6 months.  -if the symptoms of numbness continues then consider an anesthesiology or neurology consult.    It was a pleasure meeting with you today.  Thank you for allowing me and my team the privilege of caring for you today.  YOU are the reason we are here, and I truly hope we provided you with the excellent service you deserve.  Please let us know if there is anything else we can do for you so that we can be sure you are leaving completely satisfied with your care experience.

## 2020-05-19 ENCOUNTER — VIRTUAL VISIT (OUTPATIENT)
Dept: OBGYN | Facility: CLINIC | Age: 65
End: 2020-05-19
Attending: OBSTETRICS & GYNECOLOGY
Payer: MEDICARE

## 2020-05-19 DIAGNOSIS — Z90.710 HISTORY OF ROBOT-ASSISTED LAPAROSCOPIC HYSTERECTOMY: Primary | ICD-10-CM

## 2020-05-19 NOTE — PROGRESS NOTES
"The patient has been notified of the following:      \"We have found that certain health care needs can be provided without the need for a face to face visit.  This service lets us provide the care you need with a phone conversation.       I will have full access to your [facility] medical record during this entire phone call.   I will be taking notes for your medical record.      Since this is like an office visit, we will bill your insurance company for this service.       There are potential benefits and risks of telephone visits (e.g. limits to patient confidentiality) that differ from in-person visits.?  Confidentiality still applies for telephone services, and nobody will record the visit.  It is important to be in a quiet, private space that is free of distractions (including cell phone or other devices) during the visit.??      If during the course of the call I believe a telephone visit is not appropriate, you will not be charged for this service\"     Consent has been obtained for this service by care team member: Yes     Jose Coronado is 64 year old yo s/p Davinci assisted laparoscopic supracervical hysterectomy and BSO on 3/16/20 for pelvic prolapse and incontinence.  She has been doing well. She continues to note small patches of numbness (about 6 inch diameter) on bilateral anterior thigh.  She feels this is slowly improving.  She had vaginal spotting on 2 days early on, but this has resolved.  She denies pain, she no longer requires any pain medication and never needed oxycodoen.  Bowel function has returned to normal, although it was a slow process.  She continues to have incontinence and is working with Dr. Valdivia.  She is doing PT exercises and using the vaginal estrogen cream.     ROS: 10 point ROS neg other than the symptoms noted above in the HPI.    Past Medical History:   Diagnosis Date     Anemia      CVA (cerebral vascular accident) (H) 2017    ?migraine, ?pfo--negative vasc w/u, neg hypercoag " w/u     Diffuse cystic mastopathy     Fibrocystic breast disease     HTN, goal below 140/90      Hyperparathyroidism (H)      Infectious mononucleosis     Mono at age 16     Irregular heart beat     PAT no afib on 30day monitor     Labyrinthitis, unspecified      Migraine headache with aura      Osteopenia      Pain in joint, shoulder region     Secondary to a fall     Palpitations      PFO (patent foramen ovale)     s/p closure with amplazter device 7/13/17     S/P gastric bypass June, 2010     Sleep apnea     she is on CPAP     Vitamin D deficiencies        Past Surgical History:   Procedure Laterality Date     BIOPSY  1984    Breast biopsies     BREAST SURGERY  1984    Above     C ANEURYSM, INTRACRAN, SIMPLE SURG  04/2017    coil of aneurysm right posterior paraophthalmic artery     C NONSPECIFIC PROCEDURE      S/P multiple breast biopies - all negative / benign     C NONSPECIFIC PROCEDURE      S/P T&A     C NONSPECIFIC PROCEDURE      [place] teeth extraction     C NONSPECIFIC PROCEDURE      S/P (? unreadable) ankle     CARDIAC SURGERY  7/13/2017    PFO closure     COLONOSCOPY N/A 8/12/2015    Procedure: COLONOSCOPY;  Surgeon: Deandre Brooks MD;  Location:  GI     DAVINCI HYSTERECTOMY SUPRACERVICAL, SALPINGO-OOPHORECTOMY INCLUDING BILATERAL N/A 3/16/2020    Procedure: DAVINCI HYSTERECTOMY SUPRACERVICAL, SALPINGO-OOPHORECTOMY INCLUDING BILATERAL WITH EXAM UNDER ANESTHESIA;  Surgeon: Lily Yancey MD;  Location: [facility] OR     DAVINCI SACROCOLPOPEXY, MIDURETHRAL SLING, CYSTOSCOPY N/A 3/16/2020    Procedure: SACROCOLPOPEXY, ROBOT-ASSISTED, LAPAROSCOPIC, WITH INSERTION OF MIDURETHRAL SLING AND CYSTOSCOPY;  Surgeon: Carolyn Valdivia MD;  Location: [facility] OR     GASTRIC BYPASS  June 24, 2010     ORTHOPEDIC SURGERY Left 2010    wrist fracture     PARATHYROIDECTOMY  9/19/11       There were no vitals taken for this visit. as this was a telephone visit  Gen'l: sounds well    Pathology:  A. UTERUS, BILATERAL FALLOPIAN  TUBES AND OVARIES, ROBOTIC-ASSISTED LAPAROSCOPIC SUPRA-CERVICAL HYSTERECTOMY AND BILATERAL SALPINGO-OOPHORECTOMY:   - Inactive endometrium   - Leiomyomas   - Unremarkable endocervix   - Right paratubal cyst   - Bilateral fallopian tubes with no significant histologic abnormality   - Bilateral ovaries with atrophic changes   - Negative for malignancy     B. ENDOCERVICAL POLYP, POLYPECTOMY:   - Benign endocervical polyp     Assessment/Plan:  9 weeks s/p Davinci assisted laparoscopic supercervical hysterectomy, bilateral salpingo-oophorectomy and sacral colpopexy and sling procedure done by urology.  She is doing well.    - Reviewed benign pathology  - Return to clinic for exam, if any concerns.  - Continue follow up with urology.  Lily Yancey MD       Total time 19 minutes

## 2020-08-31 ENCOUNTER — MYC MEDICAL ADVICE (OUTPATIENT)
Dept: INTERNAL MEDICINE | Facility: CLINIC | Age: 65
End: 2020-08-31

## 2020-09-01 NOTE — TELEPHONE ENCOUNTER
Please review MyChart message from patient and advise.     Per chart review, patient previously taking Atorvastatin 40 mg.

## 2020-10-22 ENCOUNTER — TELEPHONE (OUTPATIENT)
Dept: UROLOGY | Facility: CLINIC | Age: 65
End: 2020-10-22

## 2020-10-22 NOTE — TELEPHONE ENCOUNTER
Spoke to patient. ok'd to convert appointment to virtual, but also want to schedule an in clinic visit to be exam. Patient haven't been exam since her procedure and want to get it check. She want to know when she can come in to get exam.

## 2020-10-23 ENCOUNTER — PRE VISIT (OUTPATIENT)
Dept: UROLOGY | Facility: CLINIC | Age: 65
End: 2020-10-23

## 2020-10-23 NOTE — TELEPHONE ENCOUNTER
Reason for Visit: 6 month follow up    Diagnosis: Intrinsic sphincter deficiency    Orders/Procedures/Records: in system    Contact Patient: n/a    Rooming Requirements: magen Acosta LPN  10/23/20  10:38 AM

## 2020-10-28 ENCOUNTER — VIRTUAL VISIT (OUTPATIENT)
Dept: UROLOGY | Facility: CLINIC | Age: 65
End: 2020-10-28
Payer: MEDICARE

## 2020-10-28 DIAGNOSIS — N81.11 MIDLINE CYSTOCELE: ICD-10-CM

## 2020-10-28 DIAGNOSIS — N32.81 OVERACTIVE BLADDER: Primary | ICD-10-CM

## 2020-10-28 PROCEDURE — 99213 OFFICE O/P EST LOW 20 MIN: CPT | Mod: 95 | Performed by: UROLOGY

## 2020-10-28 NOTE — LETTER
"10/28/2020       RE: Jose Coronado  [address] [place] [address]     Dear Colleague,    Thank you for referring your patient, Jose Coronado, to the [facility] UROLOGY CLINIC [place] at [facility]. Please see a copy of my visit note below.    Video Visit Technology for this patient: Jena Video Visit- Patient was left in waiting room    Jose Coronado is a 64 year old female who is being evaluated via a billable video visit.      The patient has been notified of following:     \"This video visit will be conducted via a call between you and your physician/provider. We have found that certain health care needs can be provided without the need for an in-person physical exam.  This service lets us provide the care you need with a video conversation.  If a prescription is necessary we can send it directly to your pharmacy.  If lab work is needed we can place an order for that and you can then stop by our lab to have the test done at a later time.    Video visits are billed at different rates depending on your insurance coverage.  Please reach out to your insurance provider with any questions.    If during the course of the call the physician/provider feels a video visit is not appropriate, you will not be charged for this service.\"    Patient has given verbal consent for Video visit? Yes  How would you like to obtain your AVS? MyChart  If you are dropped from the video visit, the video invite should be resent to: Send to e-mail at: [email or web address]  Will anyone else be joining your video visit? No        Video-Visit Details    Type of service:  Video Visit    Video Start Time: 10:48 AM  Video End Time: 11:01 AM    Originating Location (pt. Location): Home    Distant Location (provider location):  [facility] UROLOGY [facility]     Platform used for Video Visit: Jena Valdivia MD          Reason for Video visit:  F/u on urinary symptoms. "  6 week post op for robotic sacrocolpopexy in conjunction with hysterectomy on 3/16/20    Clinical Data: Ms. Jose Coronado  is a 64 year old female with a history of robotic sacrocolpopexy in conjuntion with hysterectomy on 3/16/20.  She feels like she is doing well but feels like she has a little bulging since Sept of 2020.  The urinary symptoms of overactivity are improved since before the surgery, but continues to have some urge incontinence.  She wears a light pad during the day as opposed to a much larger heavy pad.  the above.  She states that she is doing well.  She continues to use the estrogen cream.  While putting it in is when she felt this.    She reports that her incisions are healing well.    She continues to have some numbness at the skin level in the crease of the thigh but improving.      She also had a deep muscle bruise in her right hand which has resolved.    A/P s/p Robotic sacrocolpopexy and midurethral sling along with hysterectomy doing well except some continued superficial numbness in the front of the legs and some mild bulge in the vagina.  .  She is not at all bothered by this and is willing to watch this for several months.  We discussed other options for that including pelvic floor PT, continued estrogen cream, and bulking for possible intrinsic sphincter deficiency or medication for her leakage but she would just like to observe for now.  She would like to just try the estrogen cream and pads for now.  -f/u as scheduled in Dec for exam  -continue with estrogen cream twice per week   -if the symptoms of numbness continues then consider an anesthesiology or neurology consult.    Thank you for allowing me to participate in the care of  Ms.Jan MERVIN Coronado and I will keep you updated on her progress.    Carolyn Valdivia MD

## 2020-10-28 NOTE — PROGRESS NOTES
"Video Visit Technology for this patient: Jena Video Visit- Patient was left in waiting room    Jose Coronado is a 64 year old female who is being evaluated via a billable video visit.      The patient has been notified of following:     \"This video visit will be conducted via a call between you and your physician/provider. We have found that certain health care needs can be provided without the need for an in-person physical exam.  This service lets us provide the care you need with a video conversation.  If a prescription is necessary we can send it directly to your pharmacy.  If lab work is needed we can place an order for that and you can then stop by our lab to have the test done at a later time.    Video visits are billed at different rates depending on your insurance coverage.  Please reach out to your insurance provider with any questions.    If during the course of the call the physician/provider feels a video visit is not appropriate, you will not be charged for this service.\"    Patient has given verbal consent for Video visit? Yes  How would you like to obtain your AVS? MyChart  If you are dropped from the video visit, the video invite should be resent to: Send to e-mail at: [email or web address]  Will anyone else be joining your video visit? No        Video-Visit Details    Type of service:  Video Visit    Video Start Time: 10:48 AM  Video End Time: 11:01 AM    Originating Location (pt. Location): Home    Distant Location (provider location):  [facility] UROLOGY [facility]     Platform used for Video Visit: Jena Valdivia MD        "

## 2020-10-28 NOTE — PROGRESS NOTES
Reason for Video visit:  F/u on urinary symptoms.  6 week post op for robotic sacrocolpopexy in conjunction with hysterectomy on 3/16/20    Clinical Data: Ms. Jose Coronado  is a 64 year old female with a history of robotic sacrocolpopexy in conjuntion with hysterectomy on 3/16/20.  She feels like she is doing well but feels like she has a little bulging since Sept of 2020.  The urinary symptoms of overactivity are improved since before the surgery, but continues to have some urge incontinence.  She wears a light pad during the day as opposed to a much larger heavy pad.  the above.  She states that she is doing well.  She continues to use the estrogen cream.  While putting it in is when she felt this.    She reports that her incisions are healing well.    She continues to have some numbness at the skin level in the crease of the thigh but improving.      She also had a deep muscle bruise in her right hand which has resolved.    A/P s/p Robotic sacrocolpopexy and midurethral sling along with hysterectomy doing well except some continued superficial numbness in the front of the legs and some mild bulge in the vagina.  .  She is not at all bothered by this and is willing to watch this for several months.  We discussed other options for that including pelvic floor PT, continued estrogen cream, and bulking for possible intrinsic sphincter deficiency or medication for her leakage but she would just like to observe for now.  She would like to just try the estrogen cream and pads for now.  -f/u as scheduled in Dec for exam  -continue with estrogen cream twice per week   -if the symptoms of numbness continues then consider an anesthesiology or neurology consult.    Thank you for allowing me to participate in the care of  Ms.Jan MERVIN Coronado and I will keep you updated on her progress.    Carolyn Valdivia MD

## 2020-10-28 NOTE — PATIENT INSTRUCTIONS
-f/u as scheduled in Dec for exam  -continue with estrogen cream twice per week   -if the symptoms of numbness continues then consider an anesthesiology or neurology consult.
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
## 2020-10-29 ENCOUNTER — OFFICE VISIT (OUTPATIENT)
Dept: URGENT CARE | Facility: URGENT CARE | Age: 65
End: 2020-10-29
Payer: MEDICARE

## 2020-10-29 VITALS
TEMPERATURE: 97.8 F | HEART RATE: 80 BPM | DIASTOLIC BLOOD PRESSURE: 78 MMHG | OXYGEN SATURATION: 98 % | SYSTOLIC BLOOD PRESSURE: 128 MMHG | RESPIRATION RATE: 20 BRPM

## 2020-10-29 DIAGNOSIS — S01.01XA LACERATION OF SCALP WITHOUT FOREIGN BODY, INITIAL ENCOUNTER: Primary | ICD-10-CM

## 2020-10-29 DIAGNOSIS — Z23 NEED FOR TDAP VACCINATION: ICD-10-CM

## 2020-10-29 PROCEDURE — 90715 TDAP VACCINE 7 YRS/> IM: CPT | Performed by: FAMILY MEDICINE

## 2020-10-29 PROCEDURE — 90471 IMMUNIZATION ADMIN: CPT | Performed by: FAMILY MEDICINE

## 2020-10-29 PROCEDURE — 12002 RPR S/N/AX/GEN/TRNK2.6-7.5CM: CPT | Performed by: FAMILY MEDICINE
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
## 2020-10-29 NOTE — PATIENT INSTRUCTIONS
Staple removal in 7 days  Keep wound clean and dry for 24 hours  Topical antibiotic ointment to wound for 5-7 days  Okay for tylenol for discomfort    Brain rest        Patient Education     Scalp Laceration, Stitches or Staples  A laceration is a cut through the skin. A scalp laceration may require stitches or staples. It may also be closed with a hair positioning technique such as braiding. There are a lot of blood vessels in the scalp. Because of this, a lot of bleeding is common with scalp cuts. You may need a tetanus shot if you are not up to date on your tetanus vaccine.   Home care  These guidelines will help you care for your laceration at home:     During the first 2 days you may carefully rinse your hair in the shower to remove blood and glass or dirt particles. After 2 days you may shower and shampoo your hair normally. Don't scrub repaired area or let water run on it for a long time.    Have someone help you clean your wound every day:  ? In the shower, wash the area with soap and water. Use a wet cotton swab to loosen and remove any blood or crust that forms.  ? After cleaning, keep the wound clean and dry. Talk with your doctor about applying antibiotic ointment to the wound. Apply a fresh bandage.    Don't put your head underwater until the stitches or staples have been removed. This means no swimming.    Your doctor may prescribe an antibiotic cream or ointment to prevent infection. Don't stop taking this medicine until you have finished the prescribed course or your doctor tells you to stop.    Your doctor may prescribe medicines for pain. If no pain medicines were prescribed, you can use over-the-counter pain medicines. Follow instructions for taking these medicines. Talk with your doctor before using these medicines if you have chronic liver or kidney disease. Also talk with your doctor if you have ever had a stomach ulcer or gastrointestinal bleeding.  Follow-up care  Follow up with your  healthcare provider, or as advised. Check the wound daily for the signs of infection listed below. Stitches or staples are usually removed from the scalp in about 7 to 10 days.   Call 911  Call 911 if this occurs:     Bleeding can't be controlled by direct pressure  When to seek medical advice  Call your healthcare provider right away if any of these occur:     Signs of infection, including increasing pain in the wound, redness, swelling, or pus coming from the wound    Fever of 100.4 F (38 C) or higher, or as directed by your healthcare provider    Stitches or staples come apart or fall out before 7 days    Wound edges re-open  Mariann last reviewed this educational content on 8/1/2019 2000-2020 [employer]. [address] [place] [address], [place] [address]. All rights reserved. This information is not intended as a substitute for professional medical care. Always follow your healthcare professional's instructions.           Patient Education     Head Injury with Sleep Monitoring (Adult)    You have a head injury. It does not appear serious at this time. But symptoms of a more serious problem, such as mild brain injury (concussion), or bruising or bleeding in the brain, may appear later. For this reason, you and someone caring for you will need to watch for the symptoms listed below. Once at home, also be sure to follow any care instructions you re given.  Home care  Watch for the following symptoms  Someone must stay with you for the next 24 hours (or longer, if directed). If you fall asleep, this person should wake you up every 2 hours, or as directed, to check your symptoms. This is called sleep monitoring. Symptoms to watch for include:    Headache    Nausea or vomiting    Dizziness    Sensitivity to light or noise    Unusual sleepiness or grogginess    Trouble falling asleep    Personality changes    Vision changes    Memory loss    Confusion    Trouble walking or clumsiness    Loss of consciousness  (even for a short time)    Inability to be awakened    Stiff neck    Weakness or numbness in any part of the body    Seizures    Bruising behind the ears or around the eyes  If you develop any of these symptoms, seek emergency medical care right away. If none of these symptoms are noted during the first 24 hours, keep watching for symptoms for the next day or so. Ask your provider if someone should stay with you during this time.   General care    If you were prescribed medicines for pain, use them as directed. Don t use other pain medicines without checking with your provider first.    To help reduce swelling and pain, apply a cold source to the injured area for up to 20 minutes at a time. Do this as often as directed. Use a cold pack or bag of ice wrapped in a thin towel. Never apply a cold source directly to the skin.    If you have cuts or scrapes as a result of your injury, care for them as directed.    For the next 24 hours (or longer, if instructed):  ? Don t drink alcohol or use sedatives or other medicines that make you sleepy.  ? Don t drive or operate machinery.  ? Don t do anything strenuous, such as heavy lifting or straining.  ? Limit tasks that require concentration. This includes reading, using a smartphone or computer, watching TV, and playing video games.  ? Don t return to sports or other activity that could result in another head injury.  Follow-up care  Follow up with your healthcare provider, or as directed. If imaging tests were done, they will be reviewed by a doctor. You will be told the results and any new findings that may affect your care.  When to seek medical advice  Call your healthcare provider right away if any of these occur:    Pain doesn t get better or worsens    New or increased swelling or bruising    Fever of 100.4 F (38 C) or higher, or as directed by your provider    Redness, warmth, bleeding, or drainage from the injured area    Any depression or bony abnormality in the  injured area    Fluid drainage or bleeding from the nose or ears    Bruising behind the ears or around the eyes    Lethargy or excessive sleepiness  [employer] last reviewed this educational content on 4/1/2018 2000-2020 [employer]. [address] [place] [address], [place] [address]. All rights reserved. This information is not intended as a substitute for professional medical care. Always follow your healthcare professional's instructions.

## 2020-11-05 ENCOUNTER — ALLIED HEALTH/NURSE VISIT (OUTPATIENT)
Dept: PEDIATRICS | Facility: CLINIC | Age: 65
End: 2020-11-05
Payer: MEDICARE

## 2020-11-05 DIAGNOSIS — Z48.02 ENCOUNTER FOR REMOVAL OF SUTURES: Primary | ICD-10-CM

## 2020-11-05 PROCEDURE — 99207 PR NO CHARGE NURSE ONLY: CPT

## 2020-11-05 NOTE — PROGRESS NOTES
Jose JEFFRIES Cliff presents to the clinic for removal of staples and sutures,staples, steri strips. The patient has had sutures and staples in place for 8 days. There has been no patient reported signs or symptoms of infection or drainage. 6  staples and sutures,staples, staple, steri strips are seen and located on the top of scalp. Tetanus status is up to date. All staples and sutures,staples, steri strips were easily removed today. Routine wound care discussed by the RN or provider. The patient will follow up as needed.    Thank you  Jelani Cheek RN on 11/5/2020 at 9:35 AM

## 2020-11-27 ENCOUNTER — NURSE TRIAGE (OUTPATIENT)
Dept: NURSING | Facility: CLINIC | Age: 65
End: 2020-11-27

## 2020-11-27 NOTE — TELEPHONE ENCOUNTER
Severe stomach pain and cramping overnight - started around midnight. Felt a little weird in her stomach - but it has worsened. Series of non-stop severe cramping throughout entire abdomen, leg cramps, nausea, vomiting. Temp currently 95.6 oral which is normal. Has vomited around 6 times. Generalized abdominal pain. No diarrhea. 8-9/10.     Per protocol advised ED evaluation. Will go to [facility] ED.    Jennifer Nur RN on 11/27/2020 at 1:03 PM    Reason for Disposition    SEVERE abdominal pain (e.g., excruciating)    Additional Information    Negative: Passed out (i.e., fainted, collapsed and was not responding)    Negative: Shock suspected (e.g., cold/pale/clammy skin, too weak to stand, low BP, rapid pulse)    Negative: Sounds like a life-threatening emergency to the triager    Negative: Chest pain    Negative: Pain is mainly in upper abdomen (if needed ask: 'is it mainly above the belly button?')    Negative: Abdominal pain and pregnant > 20 weeks    Negative: Abdominal pain and pregnant < 20 weeks    Protocols used: ABDOMINAL PAIN - FEMALE-A-OH

## 2020-11-30 ENCOUNTER — PRE VISIT (OUTPATIENT)
Dept: UROLOGY | Facility: CLINIC | Age: 65
End: 2020-11-30

## 2020-11-30 NOTE — TELEPHONE ENCOUNTER
Reason for Visit: post operative check up S/P robotic sacrocolpopexy, transobturator mid-urethral sling (Solyx), and cystoscopy     Diagnosis: vaginal prolapse     Orders/Procedures/Records: in system     Rooming Requirements: UA / PVR / undress for exam      Genoveva Acosta LPN  11/30/20  12:38 PM

## 2020-12-09 ENCOUNTER — OFFICE VISIT (OUTPATIENT)
Dept: URGENT CARE | Facility: URGENT CARE | Age: 65
End: 2020-12-09
Payer: MEDICARE

## 2020-12-09 VITALS
OXYGEN SATURATION: 98 % | HEART RATE: 80 BPM | RESPIRATION RATE: 20 BRPM | TEMPERATURE: 98.2 F | DIASTOLIC BLOOD PRESSURE: 80 MMHG | SYSTOLIC BLOOD PRESSURE: 130 MMHG

## 2020-12-09 DIAGNOSIS — R10.13 DYSPEPSIA: Primary | ICD-10-CM

## 2020-12-09 PROCEDURE — 99214 OFFICE O/P EST MOD 30 MIN: CPT | Performed by: PHYSICIAN ASSISTANT

## 2020-12-09 RX ORDER — ONDANSETRON 4 MG/1
4 TABLET, ORALLY DISINTEGRATING ORAL EVERY 8 HOURS PRN
Qty: 30 TABLET | Refills: 0 | Status: SHIPPED | OUTPATIENT
Start: 2020-12-09 | End: 2021-02-08

## 2020-12-09 NOTE — PROGRESS NOTES
S:  Pt presents with a 3+ week history of upper abdomen pain.  She states it started with an episode of nausea/vomiting around thanksgiving.  She felt she was dehydrated the first day but after that vomiting resolved.  She has had continued pain upper abdomen without radiation to the back shoulder or lower abdomen. She feels bloated and belching a lot as well as nausea.  No fevers.  Notes that her  Temp has been lower than normal actually.  No uri sx. NO diarrhea.  Passing gas from below as well.  Hx of gastric bypass.  Drinks wine occasionally.  No tobacco. No NSAIDs.    Pain is worse on an empty stomach at times. Not necessarily worse after eating and drinking.  Tolerating both solids and liquids well but moderate nausea.  Has not tried any OTC medications.  NO dark tarry stools or blood per rectum. No hx of diverticulitis.      Past Medical History:   Diagnosis Date     Anemia      CVA (cerebral vascular accident) (H) 2017    ?migraine, ?pfo--negative vasc w/u, neg hypercoag w/u     Diffuse cystic mastopathy     Fibrocystic breast disease     HTN, goal below 140/90      Hyperparathyroidism (H)      Infectious mononucleosis     Mono at age 16     Irregular heart beat     PAT no afib on 30day monitor     Labyrinthitis, unspecified      Migraine headache with aura      Osteopenia      Pain in joint, shoulder region     Secondary to a fall     Palpitations      PFO (patent foramen ovale)     s/p closure with amplazter device 7/13/17     S/P gastric bypass June, 2010     Sleep apnea     she is on CPAP     Vitamin D deficiencies      Current Outpatient Medications   Medication     acetaminophen (TYLENOL) 325 MG tablet     Ascorbic Acid (VITAMIN C PO)     aspirin 325 MG tablet     atenolol (TENORMIN) 50 MG tablet     Calcium Citrate-Vitamin D (CALCIUM CITRATE + D PO)     COMPOUNDED NON-CONTROLLED SUBSTANCE (CMPD RX) - PHARMACY TO MIX COMPOUNDED MEDICATION     Cyanocobalamin 2500 MCG TABS     estradiol (ESTRACE) 0.1  "MG/GM vaginal cream     Ferrous Sulfate 27 MG TABS     FIBER COMPLETE PO     hydrochlorothiazide (HYDRODIURIL) 12.5 MG tablet     loratadine (CLARITIN) 10 MG tablet     losartan (COZAAR) 50 MG tablet     omega 3 1000 MG CAPS     omeprazole (PRILOSEC) 20 MG DR capsule     ondansetron (ZOFRAN-ODT) 4 MG ODT tab     topiramate (TOPAMAX) 25 MG tablet     UNABLE TO FIND     VITAMIN D, CHOLECALCIFEROL, PO     imipramine (TOFRANIL) 25 MG tablet     senna-docusate (SENOKOT-S/PERICOLACE) 8.6-50 MG tablet     No current facility-administered medications for this visit.      O: Vital signs:  Temp: 98.2  F (36.8  C) Temp src: Tympanic BP: 130/80 Pulse: 80   Resp: 20 SpO2: 98 %       Weight: (n/a)  Estimated body mass index is 32.84 kg/m  as calculated from the following:    Height as of 3/16/20: 1.66 m (5' 5.35\").    Weight as of 3/16/20: 90.5 kg (199 lb 8.3 oz).    nad appears well  Lungs CTA  Heart RRR  Abdomen:  BS active, soft, non-distended, non-tender to light palpation.  Mild tenderness to deep palpation epigastric area. No rebound or peritoneal signs. No masses or hsm.   MM moist, skin turgor good.      A/P:   Encounter Diagnosis   Name Primary?     Dyspepsia Yes     Discussed small frequent meals, avoid supine at night.  Trial of prilosec and zofran for sx.  May try tums, malox or Mylanta as well in short term.    She has a follow up with her pcp next week already.  If worsening or not improving should consider further work up to ensure she is not having any cholelithiasis although has not had any R sided pain.  Pt should return sooner for any problems with dark tarry stools, blood in stool, hematemesis, persistent vomiting.  Pt agreeable with plan.          "

## 2020-12-09 NOTE — PATIENT INSTRUCTIONS
Patient Education     Epigastric Pain (Uncertain Cause)  Epigastric pain is pain in the upper abdomen. It can be a sign of disease. Common causes include:    Acid reflux (stomach acid flowing up into the esophagus)    Gastritis (irritation of the stomach lining) Most often this is from aspirin or NSAID medicines such as ibuprofen, bacteria called H. pylori, or frequent alcohol use.    Peptic ulcer disease    Inflammation of the pancreas    Gallstone    Infection in the gallbladder  Pain may be dull or burning. It may spread upward to the chest or to the back. There may be other symptoms such as belching, bloating, cramps or hunger pains. There may be weight loss or poor appetite, nausea or vomiting.  Since the cause of your pain is not certain yet,you may need more tests. Sometimes the doctor will treat you for the most likely condition to see if there is improvement before doing more tests.  Home care  Medicines    Antacids help neutralize the normal acids in your stomach.If you don t like the liquid, you can try a chewable one. You may find one works better than another for you. Overuse can cause diarrhea or constipation.    Acid blockers (H2 blockers) decrease acid production. Examples are cimetidine, famotidine, and ranitidine.    Acid inhibitors (PPIs) decrease acid production in a different way than the blockers. You may find they work better, but can take a little longer to take effect.  Examples are omeprazole, lansoprazole, pantoprazole, rabeprazole, and esomeprazole. Many of these are available over-the-counter or available as generics.    Take an antacid 30 to 60 minutes after eating and at bedtime, but not at the same time as an acid blocker.    Try not to take NSAIDs such as ibuprofen. Aspirin may also cause problems, but if taking it for your heart or other medical reasons, talk to your doctor before stopping it; you don't want to cause a worse problem, like a heart attack or stroke.  Diet    If  certain foods seem to cause your pain, try not to eat them. Certain foods can worsen symptoms of gastritis. Limit or avoid fatty, fried, and spicy foods, as well as coffee, chocolate, mint, and foods with high acid content such as tomatoes and citrus fruit and juices (orange, grapefruit, lemon).    Eat slowly and chew food well before swallowing. Symptoms of gastritis can be worsened by certain foods.    Don't drink alcohol. It can irritate the stomach.    Don't consume caffeine, or use tobacco. These can delay healing and worsen your problem.    Try eating smaller meals with snacks in between.    Keep an empty stomach for 2 to 3 hours before lying down.    Prop the head of the bed up if you have overnight symptoms. This helps acid clear from your esophagus.    Follow-up care  Follow up with your healthcare provider or as advised.  When to seek medical advice  Call your healthcare provider right away if any of the following occur:    Stomach pain worsens or moves to the right lower part of the abdomen    Chest pain appears, or if it worsens or spreads to the chest, back, neck, shoulder, or arm    Frequent vomiting (can t keep down liquids)    Blood in the stool or vomit (red or black color)    Feeling weak or dizzy, fainting, or having trouble breathing    Fever of 100.4 F (38 C) or higher, or as directed by your healthcare provider    Abdominal swelling  Mariann last reviewed this educational content on 3/1/2018    6201-1035 [employer]. [address] [place] [address], [place] [address]. All rights reserved. This information is not intended as a substitute for professional medical care. Always follow your healthcare professional's instructions.

## 2020-12-10 ENCOUNTER — TELEPHONE (OUTPATIENT)
Dept: INTERNAL MEDICINE | Facility: CLINIC | Age: 65
End: 2020-12-10

## 2020-12-10 NOTE — TELEPHONE ENCOUNTER
Prior Authorization Approval    Authorization Effective Date: 9/11/2020  Authorization Expiration Date: 12/10/2021  Medication: ONDANSETRON- APPROVED   Approved Dose/Quantity:   Reference #:     Insurance Company: [facility] [employer] - Phone [phone] Fax [phone]  Expected CoPay:       CoPay Card Available:      Foundation Assistance Needed:    Which Pharmacy is filling the prescription (Not needed for infusion/clinic administered): [facility] #[id number] - [place], [place] - [address] AT [facility]  Pharmacy Notified: Yes  Patient Notified:  **Instructed pharmacy to notify patient when script is ready to /ship.**

## 2020-12-10 NOTE — TELEPHONE ENCOUNTER
Prior Authorization Retail Medication Request    Medication/Dose: ONDANSETRON ODT 4 MG TAB  ICD code (if different than what is on RX):    Previously Tried and Failed:    Rationale:      Insurance Name:  MEDICARE  Insurance ID:  [id number]       Pharmacy Information (if different than what is on RX)  [facility]  Phone:  [phone]

## 2020-12-10 NOTE — TELEPHONE ENCOUNTER
Central Prior Authorization Team  Phone: [phone]    PA Initiation    Medication: ONDANSETRON   Insurance Company: [facility] [employer] - Phone [phone] Fax [phone]  Pharmacy Filling the Rx: [facility]ACY #[id number] - [place], [place] - [address] AT [facility]  Filling Pharmacy Phone: [phone]  Filling Pharmacy Fax:    Start Date: 12/10/2020

## 2020-12-17 DIAGNOSIS — G43.109 MIGRAINE WITH AURA AND WITHOUT STATUS MIGRAINOSUS, NOT INTRACTABLE: ICD-10-CM

## 2020-12-18 NOTE — TELEPHONE ENCOUNTER
Routing refill request to provider for review/approval because:  Labs not current:  Needs normal CBC per RN protocol     Radha Rojo RN

## 2020-12-20 RX ORDER — IMIPRAMINE HCL 25 MG
TABLET ORAL
Qty: 90 TABLET | Refills: 3 | Status: SHIPPED | OUTPATIENT
Start: 2020-12-20 | End: 2021-12-20

## 2020-12-20 RX ORDER — TOPIRAMATE 25 MG/1
TABLET, FILM COATED ORAL
Qty: 180 TABLET | Refills: 3 | Status: SHIPPED | OUTPATIENT
Start: 2020-12-20 | End: 2021-12-20

## 2021-01-08 ASSESSMENT — ENCOUNTER SYMPTOMS
NERVOUS/ANXIOUS: 0
FREQUENCY: 0
HEADACHES: 0
HEMATURIA: 0
EYE PAIN: 0
CONSTIPATION: 0
PALPITATIONS: 0
COUGH: 0
JOINT SWELLING: 0
HEMATOCHEZIA: 0
MYALGIAS: 0
NAUSEA: 0
HEARTBURN: 0
CHILLS: 0
DIZZINESS: 0
WEAKNESS: 0
BREAST MASS: 0
SORE THROAT: 0
ARTHRALGIAS: 0
FEVER: 0
SHORTNESS OF BREATH: 0
DYSURIA: 0
ABDOMINAL PAIN: 0
PARESTHESIAS: 0
DIARRHEA: 0

## 2021-01-08 ASSESSMENT — ACTIVITIES OF DAILY LIVING (ADL): CURRENT_FUNCTION: NO ASSISTANCE NEEDED

## 2021-01-11 ENCOUNTER — OFFICE VISIT (OUTPATIENT)
Dept: INTERNAL MEDICINE | Facility: CLINIC | Age: 66
End: 2021-01-11
Payer: MEDICARE

## 2021-01-11 VITALS
RESPIRATION RATE: 16 BRPM | HEIGHT: 66 IN | SYSTOLIC BLOOD PRESSURE: 110 MMHG | BODY MASS INDEX: 32.95 KG/M2 | TEMPERATURE: 98.4 F | DIASTOLIC BLOOD PRESSURE: 63 MMHG | OXYGEN SATURATION: 98 % | HEART RATE: 60 BPM | WEIGHT: 205 LBS

## 2021-01-11 DIAGNOSIS — I10 ESSENTIAL HYPERTENSION WITH GOAL BLOOD PRESSURE LESS THAN 140/90: ICD-10-CM

## 2021-01-11 DIAGNOSIS — Z12.31 ENCOUNTER FOR SCREENING MAMMOGRAM FOR MALIGNANT NEOPLASM OF BREAST: ICD-10-CM

## 2021-01-11 DIAGNOSIS — E21.3 HYPERPARATHYROIDISM (H): ICD-10-CM

## 2021-01-11 DIAGNOSIS — Z00.00 PREVENTATIVE HEALTH CARE: Primary | ICD-10-CM

## 2021-01-11 DIAGNOSIS — R00.2 PALPITATIONS: ICD-10-CM

## 2021-01-11 DIAGNOSIS — I48.0 PAROXYSMAL ATRIAL FIBRILLATION (H): ICD-10-CM

## 2021-01-11 LAB
BASOPHILS # BLD AUTO: 0 10E9/L (ref 0–0.2)
BASOPHILS NFR BLD AUTO: 0.5 %
DIFFERENTIAL METHOD BLD: NORMAL
EOSINOPHIL # BLD AUTO: 0.2 10E9/L (ref 0–0.7)
EOSINOPHIL NFR BLD AUTO: 3.5 %
ERYTHROCYTE [DISTWIDTH] IN BLOOD BY AUTOMATED COUNT: 13.7 % (ref 10–15)
HCT VFR BLD AUTO: 40.7 % (ref 35–47)
HGB BLD-MCNC: 13.1 G/DL (ref 11.7–15.7)
LYMPHOCYTES # BLD AUTO: 1.7 10E9/L (ref 0.8–5.3)
LYMPHOCYTES NFR BLD AUTO: 29.7 %
MCH RBC QN AUTO: 29.6 PG (ref 26.5–33)
MCHC RBC AUTO-ENTMCNC: 32.2 G/DL (ref 31.5–36.5)
MCV RBC AUTO: 92 FL (ref 78–100)
MONOCYTES # BLD AUTO: 0.7 10E9/L (ref 0–1.3)
MONOCYTES NFR BLD AUTO: 11.4 %
NEUTROPHILS # BLD AUTO: 3.1 10E9/L (ref 1.6–8.3)
NEUTROPHILS NFR BLD AUTO: 54.9 %
PLATELET # BLD AUTO: 239 10E9/L (ref 150–450)
RBC # BLD AUTO: 4.43 10E12/L (ref 3.8–5.2)
WBC # BLD AUTO: 5.7 10E9/L (ref 4–11)

## 2021-01-11 PROCEDURE — G0438 PPPS, INITIAL VISIT: HCPCS | Performed by: INTERNAL MEDICINE

## 2021-01-11 PROCEDURE — 80061 LIPID PANEL: CPT | Performed by: INTERNAL MEDICINE

## 2021-01-11 PROCEDURE — 84443 ASSAY THYROID STIM HORMONE: CPT | Performed by: INTERNAL MEDICINE

## 2021-01-11 PROCEDURE — 80053 COMPREHEN METABOLIC PANEL: CPT | Performed by: INTERNAL MEDICINE

## 2021-01-11 PROCEDURE — 36415 COLL VENOUS BLD VENIPUNCTURE: CPT | Performed by: INTERNAL MEDICINE

## 2021-01-11 PROCEDURE — 85025 COMPLETE CBC W/AUTO DIFF WBC: CPT | Performed by: INTERNAL MEDICINE

## 2021-01-11 RX ORDER — LOSARTAN POTASSIUM 50 MG/1
50 TABLET ORAL DAILY
Qty: 90 TABLET | Refills: 3 | Status: SHIPPED | OUTPATIENT
Start: 2021-01-11 | End: 2022-01-06

## 2021-01-11 RX ORDER — ATENOLOL 50 MG/1
TABLET ORAL
Qty: 90 TABLET | Refills: 3 | Status: SHIPPED | OUTPATIENT
Start: 2021-01-11 | End: 2022-01-06

## 2021-01-11 RX ORDER — HYDROCHLOROTHIAZIDE 12.5 MG/1
TABLET ORAL
Qty: 90 TABLET | Refills: 3 | Status: SHIPPED | OUTPATIENT
Start: 2021-01-11 | End: 2022-01-06

## 2021-01-11 ASSESSMENT — ACTIVITIES OF DAILY LIVING (ADL): CURRENT_FUNCTION: NO ASSISTANCE NEEDED

## 2021-01-11 ASSESSMENT — ENCOUNTER SYMPTOMS
ARTHRALGIAS: 0
HEARTBURN: 0
DIZZINESS: 0
COUGH: 0
CHILLS: 0
DYSURIA: 0
MYALGIAS: 0
PALPITATIONS: 0
DIARRHEA: 0
HEADACHES: 0
HEMATURIA: 0
HEMATOCHEZIA: 0
PARESTHESIAS: 0
FREQUENCY: 0
JOINT SWELLING: 0
EYE PAIN: 0
WEAKNESS: 0
ABDOMINAL PAIN: 0
NERVOUS/ANXIOUS: 0
NAUSEA: 0
CONSTIPATION: 0
SORE THROAT: 0
BREAST MASS: 0
SHORTNESS OF BREATH: 0
FEVER: 0

## 2021-01-11 ASSESSMENT — MIFFLIN-ST. JEOR: SCORE: 1486.62

## 2021-01-11 NOTE — PROGRESS NOTES
"SUBJECTIVE:   Jose Coronado is a 65 year old female who presents for Preventive Visit.    Fasting. FYIs: Cataracts- no surgery yet. Recently treated for gastritis. Hysterectomy 3-2020. Seeing urology. Follow  up  aneurysm head & discuss statins.  Right hip problems.     Patient has been advised of split billing requirements and indicates understanding: Yes   Are you in the first 12 months of your Medicare coverage?  No    Healthy Habits:     In general, how would you rate your overall health?  Good    Frequency of exercise:  2-3 days/week    Duration of exercise:  Other    Do you usually eat at least 4 servings of fruit and vegetables a day, include whole grains    & fiber and avoid regularly eating high fat or \"junk\" foods?  No    Taking medications regularly:  Yes    Medication side effects:  None    Ability to successfully perform activities of daily living:  No assistance needed    Home Safety:  No safety concerns identified    Hearing Impairment:  No hearing concerns    In the past 6 months, have you been bothered by leaking of urine? Yes    In general, how would you rate your overall mental or emotional health?  Good      PHQ-2 Total Score: 0    Additional concerns today:  Yes    Do you feel safe in your environment? Yes    Have you ever done Advance Care Planning? (For example, a Health Directive, POLST, or a discussion with a medical provider or your loved ones about your wishes): Yes, advance care planning is on file.      Fall risk     Cognitive Screening   1) Repeat 3 items (Leader, Season, Table)    2) Clock draw: NORMAL  3) 3 item recall: Recalls 3 objects  Results: 3 items recalled: COGNITIVE IMPAIRMENT LESS LIKELY    Mini-CogTM Copyright ANDREW Bass. Licensed by the author for use in [facility]; reprinted with permission (tez@.[facility]). All rights reserved.      Do you have sleep apnea, excessive snoring or daytime drowsiness?: yes, uses C-pap  Reviewed and updated as needed this visit by " clinical staff                 Reviewed and updated as needed this visit by Provider                Social History     Tobacco Use     Smoking status: Never Smoker     Smokeless tobacco: Never Used   Substance Use Topics     Alcohol use: Yes     Alcohol/week: 0.0 standard drinks     Comment: Occasional, wine     If you drink alcohol do you typically have >3 drinks per day or >7 drinks per week? No    Alcohol Use 1/8/2021   Prescreen: >3 drinks/day or >7 drinks/week? No   Prescreen: >3 drinks/day or >7 drinks/week? -     Current providers sharing in care for this patient include:   Patient Care Team:  Lian Martinez MD as PCP - General (Internal Medicine)  Lian Martinez MD as Assigned PCP  Olivia Vasquez PA-C as Physician Assistant (Physician Assistant - Medical)  Carolyn Valdivia MD as MD (Urology)  Lita Goodman RN as Specialty Care Coordinator (Urology)  Lian Martinez MD as Referring Physician (Internal Medicine)  Lily Yancey MD as Assigned OBGYN Provider  Carolyn Valdivia MD as Assigned Surgical Provider    The following health maintenance items are reviewed in Epic and correct as of today:  Health Maintenance   Topic Date Due     ZOSTER IMMUNIZATION (1 of 2) 12/26/2004     Pneumococcal Vaccine: 65+ Years (1 of 1 - PPSV23) 12/26/2019     FALL RISK ASSESSMENT  01/07/2021     MAMMO SCREENING  12/30/2020     MEDICARE ANNUAL WELLNESS VISIT  01/07/2021     LIPID  01/07/2025     ADVANCE CARE PLANNING  01/07/2025     COLORECTAL CANCER SCREENING  08/12/2025     DTAP/TDAP/TD IMMUNIZATION (4 - Td) 10/29/2030     DEXA  11/21/2031     HEPATITIS C SCREENING  Completed     PHQ-2  Completed     INFLUENZA VACCINE  Completed     Pneumococcal Vaccine: Pediatrics (0 to 5 Years) and At-Risk Patients (6 to 64 Years)  Aged Out     IPV IMMUNIZATION  Aged Out     MENINGITIS IMMUNIZATION  Aged Out     HEPATITIS B IMMUNIZATION  Aged Out     BP Readings from Last 3 Encounters:   01/11/21 110/63   12/09/20  "130/80   10/29/20 128/78    Wt Readings from Last 3 Encounters:   01/11/21 93 kg (205 lb)   03/16/20 90.5 kg (199 lb 8.3 oz)   03/02/20 91.2 kg (201 lb)                      Review of Systems   Constitutional: Negative for chills and fever.   HENT: Negative for congestion, ear pain, hearing loss and sore throat.    Eyes: Negative for pain and visual disturbance.   Respiratory: Negative for cough and shortness of breath.    Cardiovascular: Negative for chest pain, palpitations and peripheral edema.   Gastrointestinal: Negative for abdominal pain, constipation, diarrhea, heartburn, hematochezia and nausea.   Breasts:  Negative for tenderness, breast mass and discharge.   Genitourinary: Negative for dysuria, frequency, genital sores, hematuria, pelvic pain, urgency, vaginal bleeding and vaginal discharge.   Musculoskeletal: Negative for arthralgias, joint swelling and myalgias.   Skin: Negative for rash.   Neurological: Negative for dizziness, weakness, headaches and paresthesias.   Psychiatric/Behavioral: Negative for mood changes. The patient is not nervous/anxious.          OBJECTIVE:   There were no vitals taken for this visit. Estimated body mass index is 32.84 kg/m  as calculated from the following:    Height as of 3/16/20: 1.66 m (5' 5.35\").    Weight as of 3/16/20: 90.5 kg (199 lb 8.3 oz).  Physical Exam  GENERAL: healthy, alert and no distress  EYES: Eyes grossly normal to inspection, PERRL and conjunctivae and sclerae normal  NECK: no adenopathy, no asymmetry, masses, or scars and thyroid normal to palpation  RESP: lungs clear to auscultation - no rales, rhonchi or wheezes  BREAST: normal without masses, tenderness or nipple discharge and no palpable axillary masses or adenopathy  CV: regular rate and rhythm, normal S1 S2, no S3 or S4, no murmur, click or rub, no peripheral edema and peripheral pulses strong  ABDOMEN: soft, nontender, no hepatosplenomegaly, no masses and bowel sounds normal  MS: no gross " "musculoskeletal defects noted, no edema  SKIN: no suspicious lesions or rashes  NEURO: Normal strength and tone, mentation intact and speech normal  PSYCH: mentation appears normal, affect normal/bright        ASSESSMENT / PLAN:   1. Preventative health care     - CBC with platelets and differential  - TSH with free T4 reflex  - Comprehensive metabolic panel (BMP + Alb, Alk Phos, ALT, AST, Total. Bili, TP)  - Lipid Profile (Chol, Trig, HDL, LDL calc)  - *MA Screening Digital Bilateral; Future    2. Palpitations     - atenolol (TENORMIN) 50 MG tablet; TAKE 1 TABLET BY MOUTH EVERY DAY  Dispense: 90 tablet; Refill: 3    3. Essential hypertension with goal blood pressure less than 140/90  under good control   - atenolol (TENORMIN) 50 MG tablet; TAKE 1 TABLET BY MOUTH EVERY DAY  Dispense: 90 tablet; Refill: 3  - hydrochlorothiazide (HYDRODIURIL) 12.5 MG tablet; TAKE 1 TABLET BY MOUTH EVERY DAY  Dispense: 90 tablet; Refill: 3  - losartan (COZAAR) 50 MG tablet; Take 1 tablet (50 mg) by mouth daily  Dispense: 90 tablet; Refill: 3    4. Hyperparathyroidism (H)  Will update calcium    5. Encounter for screening mammogram for malignant neoplasm of breast      - *MA Screening Digital Bilateral; Future    6. Paroxysmal atrial fibrillation (H)  under good control       Patient has been advised of split billing requirements and indicates understanding: Yes  COUNSELING:  Reviewed preventive health counseling, as reflected in patient instructions       Regular exercise       Healthy diet/nutrition    Estimated body mass index is 32.84 kg/m  as calculated from the following:    Height as of 3/16/20: 1.66 m (5' 5.35\").    Weight as of 3/16/20: 90.5 kg (199 lb 8.3 oz).    Weight management plan: Discussed healthy diet and exercise guidelines    She reports that she has never smoked. She has never used smokeless tobacco.      Appropriate preventive services were discussed with this patient, including applicable screening as appropriate " for cardiovascular disease, diabetes, osteopenia/osteoporosis, and glaucoma.  As appropriate for age/gender, discussed screening for colorectal cancer, prostate cancer, breast cancer, and cervical cancer. Checklist reviewing preventive services available has been given to the patient.    Reviewed patients plan of care and provided an AVS. The Basic Care Plan (routine screening as documented in Health Maintenance) for Jose meets the Care Plan requirement. This Care Plan has been established and reviewed with the Patient.    Counseling Resources:  ATP IV Guidelines  Pooled Cohorts Equation Calculator  Breast Cancer Risk Calculator  Breast Cancer: Medication to Reduce Risk  FRAX Risk Assessment  ICSI Preventive Guidelines  Dietary Guidelines for Americans, 2010  USDA's MyPlate  ASA Prophylaxis  Lung CA Screening    Lian Martinez MD  [facility]    Identified Health Risks:

## 2021-01-12 LAB
ALBUMIN SERPL-MCNC: 3.5 G/DL (ref 3.4–5)
ALP SERPL-CCNC: 77 U/L (ref 40–150)
ALT SERPL W P-5'-P-CCNC: 24 U/L (ref 0–50)
ANION GAP SERPL CALCULATED.3IONS-SCNC: 4 MMOL/L (ref 3–14)
AST SERPL W P-5'-P-CCNC: 22 U/L (ref 0–45)
BILIRUB SERPL-MCNC: 0.4 MG/DL (ref 0.2–1.3)
BUN SERPL-MCNC: 25 MG/DL (ref 7–30)
CALCIUM SERPL-MCNC: 9.1 MG/DL (ref 8.5–10.1)
CHLORIDE SERPL-SCNC: 107 MMOL/L (ref 94–109)
CHOLEST SERPL-MCNC: 177 MG/DL
CO2 SERPL-SCNC: 28 MMOL/L (ref 20–32)
CREAT SERPL-MCNC: 0.92 MG/DL (ref 0.52–1.04)
GFR SERPL CREATININE-BSD FRML MDRD: 64 ML/MIN/{1.73_M2}
GLUCOSE SERPL-MCNC: 89 MG/DL (ref 70–99)
HDLC SERPL-MCNC: 73 MG/DL
LDLC SERPL CALC-MCNC: 95 MG/DL
NONHDLC SERPL-MCNC: 104 MG/DL
POTASSIUM SERPL-SCNC: 4.8 MMOL/L (ref 3.4–5.3)
PROT SERPL-MCNC: 6.4 G/DL (ref 6.8–8.8)
SODIUM SERPL-SCNC: 139 MMOL/L (ref 133–144)
TRIGL SERPL-MCNC: 43 MG/DL
TSH SERPL DL<=0.005 MIU/L-ACNC: 0.63 MU/L (ref 0.4–4)

## 2021-01-15 DIAGNOSIS — Z00.00 PREVENTATIVE HEALTH CARE: ICD-10-CM

## 2021-01-15 DIAGNOSIS — Z12.31 ENCOUNTER FOR SCREENING MAMMOGRAM FOR MALIGNANT NEOPLASM OF BREAST: ICD-10-CM

## 2021-01-15 PROCEDURE — 77063 BREAST TOMOSYNTHESIS BI: CPT | Mod: TC | Performed by: RADIOLOGY

## 2021-01-15 PROCEDURE — 77067 SCR MAMMO BI INCL CAD: CPT | Mod: TC | Performed by: RADIOLOGY

## 2021-01-29 ENCOUNTER — PRE VISIT (OUTPATIENT)
Dept: UROLOGY | Facility: CLINIC | Age: 66
End: 2021-01-29

## 2021-01-29 ENCOUNTER — MYC MEDICAL ADVICE (OUTPATIENT)
Dept: INTERNAL MEDICINE | Facility: CLINIC | Age: 66
End: 2021-01-29

## 2021-01-29 DIAGNOSIS — M25.551 HIP PAIN, RIGHT: Primary | ICD-10-CM

## 2021-01-29 NOTE — TELEPHONE ENCOUNTER
Reason for Visit: post operative check up S/P robotic sacrocolpopexy, transobturator mid-urethral sling (Solyx), and cystoscopy     Diagnosis: vaginal prolapse     Orders/Procedures/Records: in system     Rooming Requirements: UA / PVR / undress for exam      Genoveva Acosta LPN  01/29/21  10:24 AM

## 2021-01-29 NOTE — TELEPHONE ENCOUNTER
Please see patient's mychart message below.  Asking for an ortho referral.    Please advise, thanks.

## 2021-02-02 ENCOUNTER — MYC MEDICAL ADVICE (OUTPATIENT)
Dept: INTERNAL MEDICINE | Facility: CLINIC | Age: 66
End: 2021-02-02

## 2021-02-03 ENCOUNTER — TRANSFERRED RECORDS (OUTPATIENT)
Dept: HEALTH INFORMATION MANAGEMENT | Facility: CLINIC | Age: 66
End: 2021-02-03

## 2021-02-08 ENCOUNTER — ANCILLARY PROCEDURE (OUTPATIENT)
Dept: GENERAL RADIOLOGY | Facility: CLINIC | Age: 66
End: 2021-02-08
Attending: ORTHOPAEDIC SURGERY
Payer: MEDICARE

## 2021-02-08 ENCOUNTER — OFFICE VISIT (OUTPATIENT)
Dept: ORTHOPEDICS | Facility: CLINIC | Age: 66
End: 2021-02-08
Attending: INTERNAL MEDICINE
Payer: MEDICARE

## 2021-02-08 VITALS
SYSTOLIC BLOOD PRESSURE: 128 MMHG | HEIGHT: 66 IN | DIASTOLIC BLOOD PRESSURE: 72 MMHG | WEIGHT: 207.2 LBS | BODY MASS INDEX: 33.3 KG/M2

## 2021-02-08 DIAGNOSIS — M25.551 HIP PAIN, RIGHT: ICD-10-CM

## 2021-02-08 DIAGNOSIS — M16.0 PRIMARY OSTEOARTHRITIS OF BOTH HIPS: Primary | ICD-10-CM

## 2021-02-08 DIAGNOSIS — M70.61 GREATER TROCHANTERIC BURSITIS OF RIGHT HIP: ICD-10-CM

## 2021-02-08 DIAGNOSIS — M47.816 SPONDYLOSIS OF LUMBAR REGION WITHOUT MYELOPATHY OR RADICULOPATHY: ICD-10-CM

## 2021-02-08 PROCEDURE — 99213 OFFICE O/P EST LOW 20 MIN: CPT | Performed by: ORTHOPAEDIC SURGERY

## 2021-02-08 PROCEDURE — 73502 X-RAY EXAM HIP UNI 2-3 VIEWS: CPT | Mod: RT | Performed by: ORTHOPAEDIC SURGERY

## 2021-02-08 ASSESSMENT — MIFFLIN-ST. JEOR: SCORE: 1496.6

## 2021-02-08 NOTE — PROGRESS NOTES
HISTORY OF PRESENT ILLNESS:    Jose Coronado is a 65 year old female who is seen in consultation at the request of Dr. Martinez for right hip pain. She reports chronic right lateral hip pain ( 2 years), and NEW right groin pain that occurs in short bouts of time. First flare of pain in the groin September 2020, with 2 recent flare-ups over the last 2 months. She reports flares occur ~ 1 week.     Present symptoms: right lateral and anterior groin pain. She reports difficulties sleeping on the right side. When the groin pain is prominent, she has difficulties walking and ambulating. She stated it felt like she had a waddle. Difficulties with push-off during gait with leaning forward. She reports recent aching of the right thigh. When flare up occurs she has limp with ambulating.   Current pain level:  0/10, mild discomfort with palpation of the hip.   Treatments tried to this point: ice, heat   Orthopedic PMH: low back arthritis, hand arthritis       Past Medical History:   Diagnosis Date     Anemia      CVA (cerebral vascular accident) (H) 2017    ?migraine, ?pfo--negative vasc w/u, neg hypercoag w/u     Diffuse cystic mastopathy     Fibrocystic breast disease     HTN, goal below 140/90      Hyperparathyroidism (H)      Infectious mononucleosis     Mono at age 16     Irregular heart beat     PAT no afib on 30day monitor     Labyrinthitis, unspecified      Migraine headache with aura      Osteopenia      Pain in joint, shoulder region     Secondary to a fall     Palpitations      PFO (patent foramen ovale)     s/p closure with amplazter device 7/13/17     S/P gastric bypass June, 2010     Sleep apnea     she is on CPAP     Vitamin D deficiencies        Past Surgical History:   Procedure Laterality Date     BIOPSY  1984    Breast biopsies     BREAST SURGERY  1984    Above     C ANEURYSM, INTRACRAN, SIMPLE SURG  04/2017    coil of aneurysm right posterior paraophthalmic artery     CARDIAC SURGERY  7/13/2017    PFO closure      COLONOSCOPY N/A 2015    Procedure: COLONOSCOPY;  Surgeon: Deandre Brooks MD;  Location: [facility]     DAVINCI HYSTERECTOMY SUPRACERVICAL, SALPINGO-OOPHORECTOMY INCLUDING BILATERAL N/A 3/16/2020    Procedure: DAVINCI HYSTERECTOMY SUPRACERVICAL, SALPINGO-OOPHORECTOMY INCLUDING BILATERAL WITH EXAM UNDER ANESTHESIA;  Surgeon: Lily Yancey MD;  Location: [facility]     DAVINCI SACROCOLPOPEXY, MIDURETHRAL SLING, CYSTOSCOPY N/A 3/16/2020    Procedure: SACROCOLPOPEXY, ROBOT-ASSISTED, LAPAROSCOPIC, WITH INSERTION OF MIDURETHRAL SLING AND CYSTOSCOPY;  Surgeon: Carolyn Valdivia MD;  Location: [facility] OR     GASTRIC BYPASS  2010     ORTHOPEDIC SURGERY Left 2010    wrist fracture     PARATHYROIDECTOMY  10     ZZC NONSPECIFIC PROCEDURE      S/P multiple breast biopies - all negative / benign     ZZC NONSPECIFIC PROCEDURE      S/P T&A     ZZC NONSPECIFIC PROCEDURE      [place] teeth extraction     ZZC NONSPECIFIC PROCEDURE      S/P (? unreadable) ankle       Family History   Problem Relation Age of Onset     Breast Cancer Mother      Hypertension Mother      Arthritis Mother      Thyroid Disease Mother         Hypo     Ulcerative Colitis Mother      Cerebrovascular Disease Mother         Age 78 - Cerebral Hemorrhage,      Anxiety Disorder Mother      Depression Mother      Genetic Disorder Mother         Ulcerative colitis     Obesity Mother      Anesthesia Reaction Mother         headaches, N/V     Breast Cancer Maternal Aunt      Hypertension Paternal Grandmother      Cerebrovascular Disease Maternal Grandmother         Age 72 -      Family History Negative Maternal Grandfather      Family History Negative Paternal Grandfather      Family History Negative Son      Family History Negative Daughter      Other Cancer Father         Skin - Non-melanoma varieties     Hypertension Father      Asthma Father      Family History Negative Daughter      Ulcerative Colitis Sister      Hypertension Sister       Hyperlipidemia Sister      Anxiety Disorder Sister      Depression Sister      Diabetes Sister      Obesity Sister      Asthma Sister      Anesthesia Reaction Sister         Debilitating headaches     Hypertension Brother      Anxiety Disorder Brother      Depression Brother      Asthma Nephew      Colon Cancer No family hx of      Coronary Artery Disease No family hx of      Mental Illness No family hx of      Substance Abuse No family hx of      Osteoporosis No family hx of      Deep Vein Thrombosis No family hx of        Social History     Socioeconomic History     Marital status:      Spouse name: Not on file     Number of children: Not on file     Years of education: Not on file     Highest education level: Not on file   Occupational History     Not on file   Social Needs     Financial resource strain: Not on file     Food insecurity     Worry: Not on file     Inability: Not on file     Transportation needs     Medical: Not on file     Non-medical: Not on file   Tobacco Use     Smoking status: Never Smoker     Smokeless tobacco: Never Used   Substance and Sexual Activity     Alcohol use: Yes     Alcohol/week: 0.0 standard drinks     Comment: Occasional, wine     Drug use: No     Sexual activity: Not Currently     Partners: Male     Birth control/protection: None   Lifestyle     Physical activity     Days per week: Not on file     Minutes per session: Not on file     Stress: Not on file   Relationships     Social connections     Talks on phone: Not on file     Gets together: Not on file     Attends Faith service: Not on file     Active member of club or organization: Not on file     Attends meetings of clubs or organizations: Not on file     Relationship status: Not on file     Intimate partner violence     Fear of current or ex partner: Not on file     Emotionally abused: Not on file     Physically abused: Not on file     Forced sexual activity: Not on file   Other Topics Concern      Parent/sibling w/ CABG, MI or angioplasty before 65F 55M? Not Asked   Social History Narrative     Not on file       Current Outpatient Medications   Medication Sig Dispense Refill     acetaminophen (TYLENOL) 325 MG tablet Take 2 tablets (650 mg) by mouth every 6 hours as needed for mild pain Start after Delivery. 100 tablet 0     Ascorbic Acid (VITAMIN C PO) Take 250 mg by mouth every morning (0.5 x 500 mg tablet = 250 mg dose)        aspirin 325 MG tablet Take 325 mg by mouth every morning        atenolol (TENORMIN) 50 MG tablet TAKE 1 TABLET BY MOUTH EVERY DAY 90 tablet 3     Calcium Citrate-Vitamin D (CALCIUM CITRATE + D PO) Take 2 tablets by mouth every morning        COMPOUNDED NON-CONTROLLED SUBSTANCE (CMPD RX) - PHARMACY TO MIX COMPOUNDED MEDICATION Estriol 1 mg/g in HRT base, apply small amount to finger and apply to inside vagina daily for 2 weeks then twice weekly 30 g 11     Cyanocobalamin 2500 MCG TABS Take 2,500 mcg by mouth once a week Mon       estradiol (ESTRACE) 0.1 MG/GM vaginal cream Place 1 g vaginally twice a week PACO per insurance 42.5 g 3     Ferrous Sulfate 27 MG TABS Take 27 mg by mouth every morning        FIBER COMPLETE PO Take 1 capsule by mouth every morning        hydrochlorothiazide (HYDRODIURIL) 12.5 MG tablet TAKE 1 TABLET BY MOUTH EVERY DAY 90 tablet 3     imipramine (TOFRANIL) 25 MG tablet TAKE 1 TABLET BY MOUTH AT BEDTIME 90 tablet 3     loratadine (CLARITIN) 10 MG tablet Take 10 mg by mouth every morning        losartan (COZAAR) 50 MG tablet Take 1 tablet (50 mg) by mouth daily 90 tablet 3     omega 3 1000 MG CAPS Take 1 g by mouth every morning  90 capsule      topiramate (TOPAMAX) 25 MG tablet TAKE 1 TABLET BY MOUTH TWICE A  tablet 3     UNABLE TO FIND CPAP machine every night       VITAMIN D, CHOLECALCIFEROL, PO Take 500 Units by mouth every morning          Allergies   Allergen Reactions     Contrast Dye Itching     Reaction of immediate burning and severe itching in  "Right ear and back of throat after injection for CT.      Sulfa Drugs      hives       REVIEW OF SYSTEMS:  CONSTITUTIONAL:  NEGATIVE for fever, chills, change in weight  INTEGUMENTARY/SKIN:  NEGATIVE for worrisome rashes, moles or lesions  EYES:  NEGATIVE for vision changes or irritation  ENT/MOUTH:  NEGATIVE for ear, mouth and throat problems  RESP:  NEGATIVE for significant cough or SOB  BREAST:  NEGATIVE for masses, tenderness or discharge  CV:  Hypertension, irregular heartbeats  GI:  reflux  :  Negative   MUSCULOSKELETAL:  See HPI above  NEURO:  NEGATIVE for weakness, dizziness or paresthesias  ENDOCRINE:  NEGATIVE for temperature intolerance, skin/hair changes  HEME/ALLERGY/IMMUNE:  NEGATIVE for bleeding problems  PSYCHIATRIC:  NEGATIVE for changes in mood or affect      PHYSICAL EXAM:  /72 (BP Location: Right arm, Patient Position: Left side, Cuff Size: Adult Large)   Ht 1.676 m (5' 6\")   Wt 94 kg (207 lb 3.2 oz)   BMI 33.44 kg/m    Body mass index is 33.44 kg/m .   GENERAL APPEARANCE: healthy, alert and no distress   HEENT: No apparent thyroid megaly. Clear sclera with normal ocular movement  RESPIRATORY: No labored breathing  SKIN: no suspicious lesions or rashes  NEURO: Normal strength and tone, mentation intact and speech normal  VASCULAR: Good pulses, and capillary refill   LYMPH: no lymphadenopathy   PSYCH:  mentation appears normal and affect normal/bright    MUSCULOSKELETAL:  Not in acute distress  No difficulty of getting up from sitting  Able to walk without any limp  Symmetrical full range of motion  No particular pain with internal and external rotation of both hips  Localized tenderness in the greater trochanteric region, right hip  Presence of mild bilateral patellofemoral crepitus but no pain with range of motion  Straight leg raising is negative   femoral stretch test is negative  Individual motor strength is full  Sensation is intact  Circulation is intact       "   ASSESSMENT:  Chronic right hip trochanteric bursitis  Significant lumbar DJD  Possible lumbar radiculopathy, currently not symptomatic  Mild bilateral hip DJD involving inferior medial aspect    PLAN:    The x-ray images of the right hip and lower lumbar spine including the pelvis x-ray were visualized.  Findings are thoroughly explained.  It appears that she might have had symptoms related to hip DJD as well as lumbar radiculopathy although they are not currently symptomatic.  The main finding was that of greater trochanter bursitis.  She does have some bony irregularity at the greater trochanter of both hips in fact.    No particular intervention was felt to be necessary other than icing and stretching along with a further observation and over-the-counter medications although she cannot take ibuprofen.  Specific IT band stretching exercises were demonstrated.  We may try intra-articular cortisone injection.  The possibility of iliopsoas tendinitis was also explained.    All the questions were answered.  Follow-up as needed.      Imaging Interpretation: Lower lumbar DJD and mild bilateral hip DJD along with irregularities at the greater trochanters.  Otherwise no acute findings.      Umer Crow MD  Department of Orthopedic Surgery        Disclaimer: This note consists of symbols derived from keyboarding, dictation and/or voice recognition software. As a result, there may be errors in the script that have gone undetected. Please consider this when interpreting information found in this chart.

## 2021-02-08 NOTE — LETTER
2/8/2021         RE: Jose Coronado  [address] [place] [address]        Dear Colleague,    Thank you for referring your patient, Jose Coronado, to the [facility] [place]. Please see a copy of my visit note below.    HISTORY OF PRESENT ILLNESS:    Jose Coronado is a 65 year old female who is seen in consultation at the request of Dr. Martinez for right hip pain. She reports chronic right lateral hip pain ( 2 years), and NEW right groin pain that occurs in short bouts of time. First flare of pain in the groin September 2020, with 2 recent flare-ups over the last 2 months. She reports flares occur ~ 1 week.     Present symptoms: right lateral and anterior groin pain. She reports difficulties sleeping on the right side. When the groin pain is prominent, she has difficulties walking and ambulating. She stated it felt like she had a waddle. Difficulties with push-off during gait with leaning forward. She reports recent aching of the right thigh. When flare up occurs she has limp with ambulating.   Current pain level:  0/10, mild discomfort with palpation of the hip.   Treatments tried to this point: ice, heat   Orthopedic PMH: low back arthritis, hand arthritis       Past Medical History:   Diagnosis Date     Anemia      CVA (cerebral vascular accident) (H) 2017    ?migraine, ?pfo--negative vasc w/u, neg hypercoag w/u     Diffuse cystic mastopathy     Fibrocystic breast disease     HTN, goal below 140/90      Hyperparathyroidism (H)      Infectious mononucleosis     Mono at age 16     Irregular heart beat     PAT no afib on 30day monitor     Labyrinthitis, unspecified      Migraine headache with aura      Osteopenia      Pain in joint, shoulder region     Secondary to a fall     Palpitations      PFO (patent foramen ovale)     s/p closure with amplazter device 7/13/17     S/P gastric bypass June, 2010     Sleep apnea     she is on CPAP     Vitamin D deficiencies        Past Surgical  History:   Procedure Laterality Date     BIOPSY      Breast biopsies     BREAST SURGERY  1984    Above     C ANEURYSM, INTRACRAN, SIMPLE SURG  2017    coil of aneurysm right posterior paraophthalmic artery     CARDIAC SURGERY  2017    PFO closure     COLONOSCOPY N/A 2015    Procedure: COLONOSCOPY;  Surgeon: Deandre Brooks MD;  Location: [facility]     DAVINCI HYSTERECTOMY SUPRACERVICAL, SALPINGO-OOPHORECTOMY INCLUDING BILATERAL N/A 3/16/2020    Procedure: DAVINCI HYSTERECTOMY SUPRACERVICAL, SALPINGO-OOPHORECTOMY INCLUDING BILATERAL WITH EXAM UNDER ANESTHESIA;  Surgeon: Lily Yancey MD;  Location: [facility]     DAVINCI SACROCOLPOPEXY, MIDURETHRAL SLING, CYSTOSCOPY N/A 3/16/2020    Procedure: SACROCOLPOPEXY, ROBOT-ASSISTED, LAPAROSCOPIC, WITH INSERTION OF MIDURETHRAL SLING AND CYSTOSCOPY;  Surgeon: Carolyn Valdivia MD;  Location: [facility] OR     GASTRIC BYPASS  2010     ORTHOPEDIC SURGERY Left 2010    wrist fracture     PARATHYROIDECTOMY  10     ZZC NONSPECIFIC PROCEDURE      S/P multiple breast biopies - all negative / benign     ZZC NONSPECIFIC PROCEDURE      S/P T&A     ZZC NONSPECIFIC PROCEDURE      Telferner teeth extraction     ZZC NONSPECIFIC PROCEDURE      S/P (? unreadable) ankle       Family History   Problem Relation Age of Onset     Breast Cancer Mother      Hypertension Mother      Arthritis Mother      Thyroid Disease Mother         Hypo     Ulcerative Colitis Mother      Cerebrovascular Disease Mother         Age 78 - Cerebral Hemorrhage,      Anxiety Disorder Mother      Depression Mother      Genetic Disorder Mother         Ulcerative colitis     Obesity Mother      Anesthesia Reaction Mother         headaches, N/V     Breast Cancer Maternal Aunt      Hypertension Paternal Grandmother      Cerebrovascular Disease Maternal Grandmother         Age 72 -      Family History Negative Maternal Grandfather      Family History Negative Paternal Grandfather      Family  History Negative Son      Family History Negative Daughter      Other Cancer Father         Skin - Non-melanoma varieties     Hypertension Father      Asthma Father      Family History Negative Daughter      Ulcerative Colitis Sister      Hypertension Sister      Hyperlipidemia Sister      Anxiety Disorder Sister      Depression Sister      Diabetes Sister      Obesity Sister      Asthma Sister      Anesthesia Reaction Sister         Debilitating headaches     Hypertension Brother      Anxiety Disorder Brother      Depression Brother      Asthma Nephew      Colon Cancer No family hx of      Coronary Artery Disease No family hx of      Mental Illness No family hx of      Substance Abuse No family hx of      Osteoporosis No family hx of      Deep Vein Thrombosis No family hx of        Social History     Socioeconomic History     Marital status:      Spouse name: Not on file     Number of children: Not on file     Years of education: Not on file     Highest education level: Not on file   Occupational History     Not on file   Social Needs     Financial resource strain: Not on file     Food insecurity     Worry: Not on file     Inability: Not on file     Transportation needs     Medical: Not on file     Non-medical: Not on file   Tobacco Use     Smoking status: Never Smoker     Smokeless tobacco: Never Used   Substance and Sexual Activity     Alcohol use: Yes     Alcohol/week: 0.0 standard drinks     Comment: Occasional, wine     Drug use: No     Sexual activity: Not Currently     Partners: Male     Birth control/protection: None   Lifestyle     Physical activity     Days per week: Not on file     Minutes per session: Not on file     Stress: Not on file   Relationships     Social connections     Talks on phone: Not on file     Gets together: Not on file     Attends Sikh service: Not on file     Active member of club or organization: Not on file     Attends meetings of clubs or organizations: Not on file      Relationship status: Not on file     Intimate partner violence     Fear of current or ex partner: Not on file     Emotionally abused: Not on file     Physically abused: Not on file     Forced sexual activity: Not on file   Other Topics Concern     Parent/sibling w/ CABG, MI or angioplasty before 65F 55M? Not Asked   Social History Narrative     Not on file       Current Outpatient Medications   Medication Sig Dispense Refill     acetaminophen (TYLENOL) 325 MG tablet Take 2 tablets (650 mg) by mouth every 6 hours as needed for mild pain Start after Delivery. 100 tablet 0     Ascorbic Acid (VITAMIN C PO) Take 250 mg by mouth every morning (0.5 x 500 mg tablet = 250 mg dose)        aspirin 325 MG tablet Take 325 mg by mouth every morning        atenolol (TENORMIN) 50 MG tablet TAKE 1 TABLET BY MOUTH EVERY DAY 90 tablet 3     Calcium Citrate-Vitamin D (CALCIUM CITRATE + D PO) Take 2 tablets by mouth every morning        COMPOUNDED NON-CONTROLLED SUBSTANCE (CMPD RX) - PHARMACY TO MIX COMPOUNDED MEDICATION Estriol 1 mg/g in HRT base, apply small amount to finger and apply to inside vagina daily for 2 weeks then twice weekly 30 g 11     Cyanocobalamin 2500 MCG TABS Take 2,500 mcg by mouth once a week Mon       estradiol (ESTRACE) 0.1 MG/GM vaginal cream Place 1 g vaginally twice a week PACO per insurance 42.5 g 3     Ferrous Sulfate 27 MG TABS Take 27 mg by mouth every morning        FIBER COMPLETE PO Take 1 capsule by mouth every morning        hydrochlorothiazide (HYDRODIURIL) 12.5 MG tablet TAKE 1 TABLET BY MOUTH EVERY DAY 90 tablet 3     imipramine (TOFRANIL) 25 MG tablet TAKE 1 TABLET BY MOUTH AT BEDTIME 90 tablet 3     loratadine (CLARITIN) 10 MG tablet Take 10 mg by mouth every morning        losartan (COZAAR) 50 MG tablet Take 1 tablet (50 mg) by mouth daily 90 tablet 3     omega 3 1000 MG CAPS Take 1 g by mouth every morning  90 capsule      topiramate (TOPAMAX) 25 MG tablet TAKE 1 TABLET BY MOUTH TWICE A   "tablet 3     UNABLE TO FIND CPAP machine every night       VITAMIN D, CHOLECALCIFEROL, PO Take 500 Units by mouth every morning          Allergies   Allergen Reactions     Contrast Dye Itching     Reaction of immediate burning and severe itching in Right ear and back of throat after injection for CT.      Sulfa Drugs      hives       REVIEW OF SYSTEMS:  CONSTITUTIONAL:  NEGATIVE for fever, chills, change in weight  INTEGUMENTARY/SKIN:  NEGATIVE for worrisome rashes, moles or lesions  EYES:  NEGATIVE for vision changes or irritation  ENT/MOUTH:  NEGATIVE for ear, mouth and throat problems  RESP:  NEGATIVE for significant cough or SOB  BREAST:  NEGATIVE for masses, tenderness or discharge  CV:  Hypertension, irregular heartbeats  GI:  reflux  :  Negative   MUSCULOSKELETAL:  See HPI above  NEURO:  NEGATIVE for weakness, dizziness or paresthesias  ENDOCRINE:  NEGATIVE for temperature intolerance, skin/hair changes  HEME/ALLERGY/IMMUNE:  NEGATIVE for bleeding problems  PSYCHIATRIC:  NEGATIVE for changes in mood or affect      PHYSICAL EXAM:  /72 (BP Location: Right arm, Patient Position: Left side, Cuff Size: Adult Large)   Ht 1.676 m (5' 6\")   Wt 94 kg (207 lb 3.2 oz)   BMI 33.44 kg/m    Body mass index is 33.44 kg/m .   GENERAL APPEARANCE: healthy, alert and no distress   HEENT: No apparent thyroid megaly. Clear sclera with normal ocular movement  RESPIRATORY: No labored breathing  SKIN: no suspicious lesions or rashes  NEURO: Normal strength and tone, mentation intact and speech normal  VASCULAR: Good pulses, and capillary refill   LYMPH: no lymphadenopathy   PSYCH:  mentation appears normal and affect normal/bright    MUSCULOSKELETAL:  Not in acute distress  No difficulty of getting up from sitting  Able to walk without any limp  Symmetrical full range of motion  No particular pain with internal and external rotation of both hips  Localized tenderness in the greater trochanteric region, right hip  Presence " of mild bilateral patellofemoral crepitus but no pain with range of motion  Straight leg raising is negative   femoral stretch test is negative  Individual motor strength is full  Sensation is intact  Circulation is intact         ASSESSMENT:  Chronic right hip trochanteric bursitis  Significant lumbar DJD  Possible lumbar radiculopathy, currently not symptomatic  Mild bilateral hip DJD involving inferior medial aspect    PLAN:    The x-ray images of the right hip and lower lumbar spine including the pelvis x-ray were visualized.  Findings are thoroughly explained.  It appears that she might have had symptoms related to hip DJD as well as lumbar radiculopathy although they are not currently symptomatic.  The main finding was that of greater trochanter bursitis.  She does have some bony irregularity at the greater trochanter of both hips in fact.    No particular intervention was felt to be necessary other than icing and stretching along with a further observation and over-the-counter medications although she cannot take ibuprofen.  Specific IT band stretching exercises were demonstrated.  We may try intra-articular cortisone injection.  The possibility of iliopsoas tendinitis was also explained.    All the questions were answered.  Follow-up as needed.      Imaging Interpretation: Lower lumbar DJD and mild bilateral hip DJD along with irregularities at the greater trochanters.  Otherwise no acute findings.      Umer Crow MD  Department of Orthopedic Surgery        Disclaimer: This note consists of symbols derived from keyboarding, dictation and/or voice recognition software. As a result, there may be errors in the script that have gone undetected. Please consider this when interpreting information found in this chart.        Again, thank you for allowing me to participate in the care of your patient.        Sincerely,        Umer Crow MD

## 2021-02-08 NOTE — PATIENT INSTRUCTIONS
IT band stretching exercises along with the exercises for the spine.  We will consider intra-articular cortisone injection if the groin pain persists  Follow-up as needed

## 2021-02-10 ENCOUNTER — OFFICE VISIT (OUTPATIENT)
Dept: UROLOGY | Facility: CLINIC | Age: 66
End: 2021-02-10
Payer: MEDICARE

## 2021-02-10 VITALS
SYSTOLIC BLOOD PRESSURE: 116 MMHG | DIASTOLIC BLOOD PRESSURE: 70 MMHG | WEIGHT: 206 LBS | HEART RATE: 58 BPM | HEIGHT: 65 IN | BODY MASS INDEX: 34.32 KG/M2

## 2021-02-10 DIAGNOSIS — R39.15 URINARY URGENCY: Primary | ICD-10-CM

## 2021-02-10 DIAGNOSIS — N81.11 MIDLINE CYSTOCELE: ICD-10-CM

## 2021-02-10 DIAGNOSIS — N95.2 ATROPHIC VAGINITIS: ICD-10-CM

## 2021-02-10 DIAGNOSIS — N39.41 URGE INCONTINENCE: ICD-10-CM

## 2021-02-10 DIAGNOSIS — N81.6 RECTOCELE: ICD-10-CM

## 2021-02-10 PROCEDURE — 51798 US URINE CAPACITY MEASURE: CPT | Performed by: UROLOGY

## 2021-02-10 PROCEDURE — 99214 OFFICE O/P EST MOD 30 MIN: CPT | Mod: 25 | Performed by: UROLOGY

## 2021-02-10 RX ORDER — MIRABEGRON 25 MG/1
25 TABLET, FILM COATED, EXTENDED RELEASE ORAL DAILY
Qty: 30 TABLET | Refills: 11 | Status: SHIPPED | OUTPATIENT
Start: 2021-02-10 | End: 2021-05-10

## 2021-02-10 ASSESSMENT — PAIN SCALES - GENERAL: PAINLEVEL: NO PAIN (0)

## 2021-02-10 ASSESSMENT — MIFFLIN-ST. JEOR: SCORE: 1475.29

## 2021-02-10 NOTE — PATIENT INSTRUCTIONS
-try miralax and titrate appropriately.  -stay well hydrated  -if continues to have problems or worsening  -continue with estrogen cream twice per week   -given her hx of constipation, she would like to avoid anticholinergic.  Will try myrbetriq at 25 mg daily  -she will monitor her BM and if any increase we can stop and consider further options of PTNS or SNS  -f/u virtually in 8 weeks.

## 2021-02-10 NOTE — PROGRESS NOTES
Reason for visit:  F/u on urinary symptoms.       Clinical Data: Ms. Jose Coronado  is a 64 year old female with a history of robotic sacrocolpopexy in conjuntion with hysterectomy on 3/16/20.  She feels like she is doing well but feels like she has a little bulging since Sept of 2020.  The urinary symptoms of overactivity are improved since before the surgery, but continues to have some urge incontinence.  She continues to use the estrogen cream.  She does have some constipation and uses stool softeners.    She continues to have some numbness at the skin level in the crease of the thigh but improving significantly.      On exam today:  She has some minimal cystocele and perhaps a grade 2 rectocele but excellent support apically.  No mesh extrusion.    A/P 64 y/o female s/p Robotic sacrocolpopexy and midurethral sling along with hysterectomy doing well except for concern that she was having recurrence of her symptoms.  Explained that she likely has some distal prolapse especially posteriorly but that she was well supported apically.  We discussed options of observation vs. Further w/u with defecography, but she would like to observe for now.  We discussed better bowel routine with miralax that she will try.  Regarding the urgency and urge incontinence she has been doing PT exercises but does not see a big improvement.  She would like to try a medication.  -try miralax and titrate appropriately.  -stay well hydrated  -if continues to have problems or worsening  -continue with estrogen cream twice per week   -given her hx of constipation, she would like to avoid anticholinergic.  Will try myrbetriq at 25 mg daily  -she will monitor her BM and if any increase we can stop and consider further options of PTNS or SNS  -f/u virtually in 8 weeks.    Thank you for allowing me to participate in the care of  Ms.Jan MERVIN Coronado and I will keep you updated on her progress.    Carolyn Valdivia MD     30 minutes spent on the date  of the encounter doing chart review, history and exam, documentation and further activities as noted above

## 2021-02-10 NOTE — LETTER
2/10/2021       RE: Jose Coronado  [address] [place] [address]     Dear Colleague,    Thank you for referring your patient, Jose Coronado, to the [facility] [place] at [facility]. Please see a copy of my visit note below.    Reason for visit:  F/u on urinary symptoms.       Clinical Data: Ms. Jose Coronado  is a 64 year old female with a history of robotic sacrocolpopexy in conjuntion with hysterectomy on 3/16/20.  She feels like she is doing well but feels like she has a little bulging since Sept of 2020.  The urinary symptoms of overactivity are improved since before the surgery, but continues to have some urge incontinence.  She continues to use the estrogen cream.  She does have some constipation and uses stool softeners.    She continues to have some numbness at the skin level in the crease of the thigh but improving significantly.      On exam today:  She has some minimal cystocele and perhaps a grade 2 rectocele but excellent support apically.  No mesh extrusion.    A/P 66 y/o female s/p Robotic sacrocolpopexy and midurethral sling along with hysterectomy doing well except for concern that she was having recurrence of her symptoms.  Explained that she likely has some distal prolapse especially posteriorly but that she was well supported apically.  We discussed options of observation vs. Further w/u with defecography, but she would like to observe for now.  We discussed better bowel routine with miralax that she will try.  Regarding the urgency and urge incontinence she has been doing PT exercises but does not see a big improvement.  She would like to try a medication.  -try miralax and titrate appropriately.  -stay well hydrated  -if continues to have problems or worsening  -continue with estrogen cream twice per week   -given her hx of constipation, she would like to avoid anticholinergic.  Will try myrbetriq at 25 mg  daily  -she will monitor her BM and if any increase we can stop and consider further options of PTNS or SNS  -f/u virtually in 8 weeks.    Thank you for allowing me to participate in the care of  Ms.Jose Coronado and I will keep you updated on her progress.    Carolyn Valdivia MD     30 minutes spent on the date of the encounter doing chart review, history and exam, documentation and further activities as noted above

## 2021-02-10 NOTE — NURSING NOTE
Chief Complaint   Patient presents with     RECHECK     post operative check up S/P robotic sacrocolpopexy, transobturator mid-urethral sling (Solyx), and cystoscopy       Radha Davies MA

## 2021-02-11 ENCOUNTER — TELEPHONE (OUTPATIENT)
Dept: UROLOGY | Facility: CLINIC | Age: 66
End: 2021-02-11

## 2021-02-11 DIAGNOSIS — N39.41 URGE INCONTINENCE: ICD-10-CM

## 2021-02-11 DIAGNOSIS — R39.15 URINARY URGENCY: Primary | ICD-10-CM

## 2021-02-11 NOTE — TELEPHONE ENCOUNTER
Patient was notified that we will send a PA for her Myrbetriq prescription to the PA team. Patient agreed with the plan.       Stacie Walls MA

## 2021-02-11 NOTE — TELEPHONE ENCOUNTER
[facility] Call Center    Phone Message    May a detailed message be left on voicemail: yes     Reason for Call: Medication Question or concern regarding medication   Prescription Clarification  Name of Medication: Mirabegron ER 25 MG Oral Tablet Extended Release 24 Hour (Myrbetriq)    Prescribing Provider: Dr. Valdivia   Pharmacy: [id number] - [place], [place] - [address] AT [address] [facility]   What on the order needs clarification? Per Pt Jose called to discuss Myrbetriq Px, stated when she went to pick it up at pharmacy her insurance only covered $50 of it, and her portion was $450 so she did not pick it up.  discussed possible Px discounts with her, encouraged her to check with insurance for coverage. Per Pt would like to discuss alternatives with Dr. Valdivia just in case she is not able to afford the script on an ongoing basis. Please call Pt to discuss.    Action Taken: Message routed to:  [facility]    Travel Screening: Not Applicable

## 2021-02-11 NOTE — TELEPHONE ENCOUNTER
Prior Authorization Retail Medication Request    Medication/Dose: Myrbetriq 25 mg  ICD code (if different than what is on RX):  Urinary urgency:R39.15  Urge incontinence:N39.41  Previously Tried and Failed:  None-due to constipation  Rationale:  To help with her urinary symptoms    Insurance Name:  Medicare  Insurance ID:  [id number]    Secondary Insurance name:[facility].  Secondary Insurance ID:[id number]      Pharmacy Information (if different than what is on RX)  Name:  [facility] pharmacy  Phone: [phone]

## 2021-02-15 NOTE — TELEPHONE ENCOUNTER
Central Prior Authorization Team   Phone: [phone]      PA Initiation    Medication: Myrbetriq 25 mg-PA initiated  Insurance Company: [employer] - Phone [phone] Fax [phone]  Pharmacy Filling the Rx: [id number] - [place], [place] - [address] AT [facility]  Filling Pharmacy Phone: [phone]  Filling Pharmacy Fax:    Start Date: 2/15/2021

## 2021-02-16 ENCOUNTER — TELEPHONE (OUTPATIENT)
Dept: UROLOGY | Facility: CLINIC | Age: 66
End: 2021-02-16

## 2021-02-16 RX ORDER — FESOTERODINE FUMARATE 8 MG/1
8 TABLET, FILM COATED, EXTENDED RELEASE ORAL DAILY
Qty: 30 TABLET | Refills: 11 | Status: SHIPPED | OUTPATIENT
Start: 2021-02-16 | End: 2022-01-05

## 2021-02-16 NOTE — TELEPHONE ENCOUNTER
Central Prior Authorization Team   Phone: [phone]      PA Initiation    Medication: Myrbetriq ER 25 mg-PA initiated-Secondary Ins  Insurance Company: [employer] - Phone [phone] Fax [phone]  Pharmacy Filling the Rx: [id number] - [place], [place] - [address] AT [address] [facility]  Filling Pharmacy Phone: [phone]  Filling Pharmacy Fax:    Start Date: 2/16/2021

## 2021-02-16 NOTE — TELEPHONE ENCOUNTER
Hi Dr. Valdivia,      Patient states that her insurance company states that Toviaz and Oxybutynin chloride would be covered as a tier 3. She would like to know which medication you suggest that her try to to help with her urinary symptoms. Please advise.      Thanks, Stacie Walls MA

## 2021-02-16 NOTE — TELEPHONE ENCOUNTER
Patient was notified that she would like for her to try Toviaz 8 mg daily. Patient agreed with the plan. Toviaz mg prescription was sent to [facility] pharmacy.      Stacie Walls MA

## 2021-02-16 NOTE — TELEPHONE ENCOUNTER
Prior Authorization Not Needed per Insurance-This is a refill too soon rejection. It does not need a PA    Medication: Myrbetriq 25 mg-PA Not Needed  Insurance Company: [employer] - Phone [phone] Fax [phone]  Expected CoPay:      Pharmacy Filling the Rx: [id number] - [place], [place] - [address] AT [facility]  Pharmacy Notified: Yes  Patient Notified: No

## 2021-02-16 NOTE — TELEPHONE ENCOUNTER
PRIOR AUTHORIZATION DENIED    Medication: Myrbetriq ER 25 mg-PA initiated-Secondary Ins-denied    Denial Date: 2/16/2021    Denial Rational: This plan is a supplement and covers OTC and co-pays on formularly medication. This is non-formulary and is simply not covered by her supplement. She should call her insurance if she has questions about this.

## 2021-02-16 NOTE — TELEPHONE ENCOUNTER
Pt is wondering if theres an alternative medication, as mybretriq is 460$ a month, please call pt thank you

## 2021-03-23 ENCOUNTER — HOSPITAL ENCOUNTER (EMERGENCY)
Facility: CLINIC | Age: 66
Discharge: HOME | End: 2021-03-24
Attending: EMERGENCY MEDICINE | Admitting: EMERGENCY MEDICINE
Payer: MEDICARE

## 2021-03-23 ENCOUNTER — APPOINTMENT (OUTPATIENT)
Dept: GENERAL RADIOLOGY | Facility: CLINIC | Age: 66
End: 2021-03-23
Attending: EMERGENCY MEDICINE
Payer: MEDICARE

## 2021-03-23 DIAGNOSIS — R20.2 PARESTHESIAS: ICD-10-CM

## 2021-03-23 DIAGNOSIS — R07.89 OTHER CHEST PAIN: ICD-10-CM

## 2021-03-23 LAB
ALBUMIN UR-MCNC: NEGATIVE MG/DL
AMORPH CRY #/AREA URNS HPF: ABNORMAL /HPF
ANION GAP SERPL CALCULATED.3IONS-SCNC: 6 MMOL/L (ref 3–14)
APPEARANCE UR: ABNORMAL
BACTERIA #/AREA URNS HPF: ABNORMAL /HPF
BASOPHILS # BLD AUTO: 0 10E9/L (ref 0–0.2)
BASOPHILS NFR BLD AUTO: 0.5 %
BILIRUB UR QL STRIP: NEGATIVE
BUN SERPL-MCNC: 19 MG/DL (ref 7–30)
CALCIUM SERPL-MCNC: 9 MG/DL (ref 8.5–10.1)
CHLORIDE SERPL-SCNC: 101 MMOL/L (ref 94–109)
CO2 SERPL-SCNC: 25 MMOL/L (ref 20–32)
COLOR UR AUTO: ABNORMAL
CREAT SERPL-MCNC: 0.85 MG/DL (ref 0.52–1.04)
DIFFERENTIAL METHOD BLD: NORMAL
EOSINOPHIL # BLD AUTO: 0.2 10E9/L (ref 0–0.7)
EOSINOPHIL NFR BLD AUTO: 2.9 %
ERYTHROCYTE [DISTWIDTH] IN BLOOD BY AUTOMATED COUNT: 12.6 % (ref 10–15)
ETHANOL SERPL-MCNC: <0.01 G/DL
GFR SERPL CREATININE-BSD FRML MDRD: 71 ML/MIN/{1.73_M2}
GLUCOSE SERPL-MCNC: 131 MG/DL (ref 70–99)
GLUCOSE UR STRIP-MCNC: NEGATIVE MG/DL
HCT VFR BLD AUTO: 39.4 % (ref 35–47)
HGB BLD-MCNC: 13.1 G/DL (ref 11.7–15.7)
HGB UR QL STRIP: ABNORMAL
IMM GRANULOCYTES # BLD: 0 10E9/L (ref 0–0.4)
IMM GRANULOCYTES NFR BLD: 0.4 %
INR PPP: 0.87 (ref 0.86–1.14)
KETONES UR STRIP-MCNC: NEGATIVE MG/DL
LACTATE BLD-SCNC: 0.7 MMOL/L (ref 0.7–2)
LEUKOCYTE ESTERASE UR QL STRIP: ABNORMAL
LYMPHOCYTES # BLD AUTO: 1.6 10E9/L (ref 0.8–5.3)
LYMPHOCYTES NFR BLD AUTO: 20.2 %
MCH RBC QN AUTO: 30.1 PG (ref 26.5–33)
MCHC RBC AUTO-ENTMCNC: 33.2 G/DL (ref 31.5–36.5)
MCV RBC AUTO: 91 FL (ref 78–100)
MONOCYTES # BLD AUTO: 0.7 10E9/L (ref 0–1.3)
MONOCYTES NFR BLD AUTO: 8.6 %
MUCOUS THREADS #/AREA URNS LPF: PRESENT /LPF
NEUTROPHILS # BLD AUTO: 5.3 10E9/L (ref 1.6–8.3)
NEUTROPHILS NFR BLD AUTO: 67.4 %
NITRATE UR QL: NEGATIVE
NRBC # BLD AUTO: 0 10*3/UL
NRBC BLD AUTO-RTO: 0 /100
PH UR STRIP: 7 PH (ref 5–7)
PLATELET # BLD AUTO: 242 10E9/L (ref 150–450)
POTASSIUM SERPL-SCNC: 3.4 MMOL/L (ref 3.4–5.3)
RBC # BLD AUTO: 4.35 10E12/L (ref 3.8–5.2)
RBC #/AREA URNS AUTO: 4 /HPF (ref 0–2)
SODIUM SERPL-SCNC: 132 MMOL/L (ref 133–144)
SOURCE: ABNORMAL
SP GR UR STRIP: 1.01 (ref 1–1.03)
SQUAMOUS #/AREA URNS AUTO: 2 /HPF (ref 0–1)
TROPONIN I SERPL-MCNC: <0.015 UG/L (ref 0–0.04)
TSH SERPL DL<=0.005 MIU/L-ACNC: 2.4 MU/L (ref 0.4–4)
UROBILINOGEN UR STRIP-MCNC: 2 MG/DL (ref 0–2)
WBC # BLD AUTO: 7.9 10E9/L (ref 4–11)
WBC #/AREA URNS AUTO: 3 /HPF (ref 0–5)

## 2021-03-23 PROCEDURE — 258N000003 HC RX IP 258 OP 636: Performed by: EMERGENCY MEDICINE

## 2021-03-23 PROCEDURE — 82077 ASSAY SPEC XCP UR&BREATH IA: CPT | Performed by: EMERGENCY MEDICINE

## 2021-03-23 PROCEDURE — 85025 COMPLETE CBC W/AUTO DIFF WBC: CPT | Performed by: EMERGENCY MEDICINE

## 2021-03-23 PROCEDURE — 83605 ASSAY OF LACTIC ACID: CPT | Performed by: EMERGENCY MEDICINE

## 2021-03-23 PROCEDURE — 250N000009 HC RX 250: Performed by: EMERGENCY MEDICINE

## 2021-03-23 PROCEDURE — 96361 HYDRATE IV INFUSION ADD-ON: CPT

## 2021-03-23 PROCEDURE — 85610 PROTHROMBIN TIME: CPT | Performed by: EMERGENCY MEDICINE

## 2021-03-23 PROCEDURE — 71046 X-RAY EXAM CHEST 2 VIEWS: CPT

## 2021-03-23 PROCEDURE — 99285 EMERGENCY DEPT VISIT HI MDM: CPT | Mod: 25

## 2021-03-23 PROCEDURE — 84443 ASSAY THYROID STIM HORMONE: CPT | Performed by: EMERGENCY MEDICINE

## 2021-03-23 PROCEDURE — 93005 ELECTROCARDIOGRAM TRACING: CPT

## 2021-03-23 PROCEDURE — 80048 BASIC METABOLIC PNL TOTAL CA: CPT | Performed by: EMERGENCY MEDICINE

## 2021-03-23 PROCEDURE — 81001 URINALYSIS AUTO W/SCOPE: CPT | Performed by: EMERGENCY MEDICINE

## 2021-03-23 PROCEDURE — 84484 ASSAY OF TROPONIN QUANT: CPT | Performed by: EMERGENCY MEDICINE

## 2021-03-23 PROCEDURE — 250N000013 HC RX MED GY IP 250 OP 250 PS 637: Mod: GY | Performed by: EMERGENCY MEDICINE

## 2021-03-23 PROCEDURE — 96360 HYDRATION IV INFUSION INIT: CPT

## 2021-03-23 RX ORDER — SODIUM CHLORIDE 9 MG/ML
INJECTION, SOLUTION INTRAVENOUS CONTINUOUS
Status: DISCONTINUED | OUTPATIENT
Start: 2021-03-23 | End: 2021-03-24 | Stop reason: HOSPADM

## 2021-03-23 RX ORDER — LIDOCAINE 40 MG/G
CREAM TOPICAL
Status: DISCONTINUED | OUTPATIENT
Start: 2021-03-23 | End: 2021-03-24 | Stop reason: HOSPADM

## 2021-03-23 RX ADMIN — SODIUM CHLORIDE 1000 ML: 9 INJECTION, SOLUTION INTRAVENOUS at 22:57

## 2021-03-23 RX ADMIN — LIDOCAINE HYDROCHLORIDE 30 ML: 20 SOLUTION ORAL; TOPICAL at 22:30

## 2021-03-23 ASSESSMENT — ENCOUNTER SYMPTOMS
LIGHT-HEADEDNESS: 1
WEAKNESS: 0
CHEST TIGHTNESS: 0
NUMBNESS: 1
CONFUSION: 0

## 2021-03-24 ENCOUNTER — APPOINTMENT (OUTPATIENT)
Dept: MRI IMAGING | Facility: CLINIC | Age: 66
End: 2021-03-24
Attending: EMERGENCY MEDICINE
Payer: MEDICARE

## 2021-03-24 VITALS
DIASTOLIC BLOOD PRESSURE: 76 MMHG | OXYGEN SATURATION: 96 % | RESPIRATION RATE: 20 BRPM | SYSTOLIC BLOOD PRESSURE: 130 MMHG | HEART RATE: 68 BPM | TEMPERATURE: 97.4 F

## 2021-03-24 LAB — INTERPRETATION ECG - MUSE: NORMAL

## 2021-03-24 PROCEDURE — 255N000002 HC RX 255 OP 636: Performed by: EMERGENCY MEDICINE

## 2021-03-24 PROCEDURE — G1004 CDSM NDSC: HCPCS

## 2021-03-24 PROCEDURE — A9585 GADOBUTROL INJECTION: HCPCS | Performed by: EMERGENCY MEDICINE

## 2021-03-24 PROCEDURE — 70544 MR ANGIOGRAPHY HEAD W/O DYE: CPT | Mod: MG

## 2021-03-24 PROCEDURE — 70553 MRI BRAIN STEM W/O & W/DYE: CPT | Mod: ME

## 2021-03-24 PROCEDURE — 70549 MR ANGIOGRAPH NECK W/O&W/DYE: CPT | Mod: MG

## 2021-03-24 RX ORDER — GADOBUTROL 604.72 MG/ML
10 INJECTION INTRAVENOUS ONCE
Status: COMPLETED | OUTPATIENT
Start: 2021-03-24 | End: 2021-03-24

## 2021-03-24 RX ADMIN — GADOBUTROL 10 ML: 604.72 INJECTION INTRAVENOUS at 01:49

## 2021-03-24 NOTE — ED PROVIDER NOTES
"History     Chief Complaint:  Numbness and Chest Pain       The history is provided by the patient.     Jose Coronado is a 65 year old female s/p PFO repair in 2017 and s/p coil aneurysm repair posterior parophthalmia in 2017 with a history of small left temporal lobe CVA, hypertension, and palpitations who presents for evaluation of hand numbness and chest pain. The patient was sitting at home on her iPad approximately two hours ago when she began to have spontaneous bilateral hand numbness. She also felt a \"burning\" sensation in her chest, primarily to her sternal region. She had no chest pain or shortness of breath at that time and had resolution of her hand numbness. She additionally has been having lightheadedness and a general \"weird\" feeling of her head. This has persisted since onset and given this as well as her chest burning sensation, she decided to present.     Here, she denies chest pressure, chest tightness, vision changes, balance issues, or extremity weakness. She has not had any recent travel, known COVID exposure, recent falls, or antibiotics. Her current symptoms feel different than when she had her stroke as she had a marked right sided deficit at that time with confusion. She started taking Toviaz two weeks ago.           Review of Systems   Eyes: Negative for visual disturbance.   Respiratory: Negative for chest tightness.         Negative for chest pressure   Neurological: Positive for light-headedness and numbness (bilateral hands). Negative for weakness.        Negative for balance issues   Psychiatric/Behavioral: Negative for confusion.         Allergies:  Contrast dye  Sulfa drugs     Medications:   Aspirin 325 mg  Ferrous sulfate  Toviaz  Hydrochlorothiazide  Tofranil   Losartan  Loratadine  Mirabegron  Topiramate     Medical History:   Anemia  CVA  Diffuse cystic mastopathy  Hypertension   Hyperparathyroidism   PAT  Migraines  Osteopenia  Palpitations   PFO  Sleep apnea  Vitamin D " deficiency   Hirsutism  Midline cystocele  Rectocele  Urinary incontinence     Surgical History:  Breast biopsies - multiple  Aneurysm, coil, right posterior paraophthalmic artery  PFO closure  Colonoscopy   Hysterectomy with BSO   Sacrocoplopexy, midurethral sling, cystoscopy   Gastric bypass  Wrist fracture procedure  Parathyroidectomy   Tonsillectomy & Adenoidectomy  [place] teeth extraction     Family History:   Mother -  Breast cancer, hypertension, arthritis, hypothyroidism, ulcerative colitis, cerebral hemorrhage, anxiety, depression, obesity   Father -  skin cancer, hypertension   Sister(s) -  Ulcerative colitis, hypertension, hyperlipidemia, anxiety disorder, diabetes, asthma, obesity   Brother - hypertension, anxiety disorder, depression     Social History:  The patient was unaccompanied to the ED.  PCP: Lian Martinez        Physical Exam     Patient Vitals for the past 24 hrs:   BP Temp Temp src Pulse Resp SpO2   03/23/21 2131 (!) 164/103 97.4  F (36.3  C) Temporal 75 20 99 %          Physical Exam    General: The patient is alert, in no respiratory distress.    HENT: Mucous membranes moist.    Cardiovascular: Regular rate and rhythm. Good pulses in all four extremities. Normal capillary refill and skin turgor.     Respiratory: Lungs are clear. No nasal flaring. No retractions. No wheezing, no crackles.    Gastrointestinal: Abdomen soft. No guarding, no rebound. No palpable hernias.     Musculoskeletal: No gross deformity.     Skin: No rashes or petechiae.     Neurologic: The patient is alert and oriented x3. GCS 15. No testable cranial nerve deficit. Follows commands with clear and appropriate speech. Gives appropriate answers. Good strength in all extremities. No gross neurologic deficit. Gross sensation intact. Pupils are round and reactive. No meningismus.     Lymphatic: No cervical adenopathy. No lower extremity swelling.    Psychiatric: The patient is non-tearful.     Emergency Department Course      Imaging:    Chest XR, PA & LAT:   IMPRESSION: Prior ASD repair suggested. Otherwise negative chest.  Reading per radiology.     MR Brain w/o & w contrast:  IMPRESSION:   HEAD MRI:   1.  No recent infarct, intracranial mass, abnormal enhancement or evidence of intracranial hemorrhage.   2.  Mild presumed chronic small vessel ischemic changes.   Reading per radiology.     MRA Brain ([place]) wo contrast:   IMPRESSION:   HEAD MRA:   1.  Signal dropout in the right carotid siphon presumably relates to adjacent endovascular coils in this area.   2.  The MRA of the brain is otherwise unremarkable.   Reading per radiology.     MRA Neck ([place]) wo & w contrast:  IMPRESSION:   NECK MRA:   1.  Normal neck MRA.   Reading per radiology.    Laboratory:    CBC: WBC 7.9, HGB 13.1,   BMP:  (L), Glucose 131 (H), o/w WNL (Creatinine 0.85)   Lactic Acid (Resulted 2306): 0.7  Alcohol ethyl: <0.01  INR: 0.87   Troponin (Collected 2258): <0.015  TSH with free T4 reflex: 2.40     UA with Microscopic: Blood Trace (A), Leukocyte Esterase Small (A), RBC/HPF 4 (H), Bacteria Many (A), Squamous Epithelial 2 (H), Mucous Present (A), Amorphous Crystals Few (A), o/w WNL     Emergency Department Course:    Reviewed:  2158 I reviewed the patient's nursing notes, vitals, past medical records, Care Everywhere.     Assessments:  2201 I performed an exam of the patient, as documented above.   0259 Patient rechecked and updated following imaging and laboratory results and consultations. We discussed the plan for discharge and she is in agreement with this plan.     Consults:   2257 I spoke with Dr. Del Valle of the neurology service regarding patient's presentation, findings, and plan of care.   0254 I again spoke with Dr. Del Valle of neurology following the patient's imaging results.    Interventions:  2230 GI cocktail 30 mL PO  2257 Normal Saline 1000 mL IV    Disposition:  The patient was discharged to home.     Impression &  Plan     Medical Decision Making:  The patient has had a complicated past medical history including stroke and repaired PFO with an ophthalmic artery aneurysm has been coiled.  Her symptoms are not similar to her previous stroke per the patient.  She said that she had numbness and a flushed feeling in her hands felt like she might drop her iPad but was on both sides.  There was a feeling of disorientation but she was still able to do her usual tasks.  She did complain of some chest burning and I ordered screening troponin which was negative as well as EKG.  I discussed the case with Dr. Sherman stroke neurology he did not feel was likely a stroke after her MRI return.  The patient is otherwise stable her blood pressure improved and she was in the stress she should double both with her primary care doctor as well as her neurologist.  I discussed potential causes for this such as arrhythmia reflux she said when she had talked to her doctor the plan was for her to do an outpatient stress test which I feel is reasonable.        Diagnosis:     ICD-10-CM    1. Other chest pain  R07.89    2. Paresthesias  R20.2         Scribe Disclosure:  I, Mary Carmen Curry, am serving as a scribe at 10:00 PM on 3/23/2021 to document services personally performed by Bubba Oliveros MD based on my observations and the provider's statements to me.      Bubba Oliveros MD  03/24/21 0534

## 2021-03-24 NOTE — ED TRIAGE NOTES
"Working on ipad and all of a sudden, hands went numb, had a hot flash and a weird sensation \"burning\" in chest and dizziness. Happened 3 times within the past hour. First episode at 8:30. Denies SOB. Hx of brain aneurysm, stroke in 2017. Hx PFO closure.   "

## 2021-04-01 ENCOUNTER — PRE VISIT (OUTPATIENT)
Dept: UROLOGY | Facility: CLINIC | Age: 66
End: 2021-04-01

## 2021-04-01 NOTE — TELEPHONE ENCOUNTER
Reason for Visit: Follow up    Diagnosis: prolapse    Orders/Procedures/Records: in system    Contact Patient: n/a    Rooming Requirements: normal      Genoveva Acosta LPN  04/01/21  9:23 AM

## 2021-04-07 ENCOUNTER — VIRTUAL VISIT (OUTPATIENT)
Dept: UROLOGY | Facility: CLINIC | Age: 66
End: 2021-04-07
Payer: MEDICARE

## 2021-04-07 DIAGNOSIS — N81.6 RECTOCELE: Primary | ICD-10-CM

## 2021-04-07 DIAGNOSIS — R39.15 URINARY URGENCY: ICD-10-CM

## 2021-04-07 DIAGNOSIS — N39.41 URGE INCONTINENCE: ICD-10-CM

## 2021-04-07 DIAGNOSIS — N95.2 ATROPHIC VAGINITIS: ICD-10-CM

## 2021-04-07 DIAGNOSIS — N81.11 MIDLINE CYSTOCELE: ICD-10-CM

## 2021-04-07 PROCEDURE — 99214 OFFICE O/P EST MOD 30 MIN: CPT | Mod: 95 | Performed by: UROLOGY

## 2021-04-07 NOTE — PROGRESS NOTES
Video Visit Technology for this patient: [employer] Video Visit- Patient was left in waiting room    Jose is a 65 year old who is being evaluated via a billable video visit.      How would you like to obtain your AVS? MyChart  If the video visit is dropped, the invitation should be resent by: Text to cell phone: [phone]  Will anyone else be joining your video visit? No      Video Start Time: 9:10 AM  Video-Visit Details    Type of service:  Video Visit    Video End Time: 9:26 AM    Originating Location (pt. Location): Home    Distant Location (provider location):  [facility] UROLOGY [facility]     Platform used for Video Visit: Mitralign

## 2021-04-07 NOTE — PROGRESS NOTES
Reason for visit:  F/u on urinary symptoms.       Clinical Data: Ms. Jose Coronado  is a 64 year old female with a history of robotic sacrocolpopexy in conjuntion with hysterectomy on 3/16/20.  She feels like she is doing well but feels like she has a little bulging since Sept of 2020.  The urinary symptoms of overactivity are improved since before the surgery, but continues to have some urge incontinence.  She continues to use the estrogen cream.  She does have some constipation and uses stool softeners.    She was started on Toviaz and that seems to be helping significantly. She continues to have a little leakage, but not as been before, she just wears on pad.  And it is related to urge, not stress maneuvers.    GENERAL: Healthy, alert and no distress  EYES: Eyes grossly normal to inspection.  No discharge or erythema, or obvious scleral/conjunctival abnormalities.  RESP: No audible wheeze, cough, or visible cyanosis.  No visible retractions or increased work of breathing.    SKIN: Visible skin clear. No significant rash, abnormal pigmentation or lesions.  NEURO: Cranial nerves grossly intact.  Mentation and speech appropriate for age.  PSYCH: Mentation appears normal, affect normal/bright, judgement and insight intact, normal speech and appearance well-groomed.    A/P 66 y/o female s/p Robotic sacrocolpopexy and midurethral sling along with hysterectomy doing well except for a little urgency and associated urge incontinence.  The small rectocele and cystocele are well controlled.  She is doing well with Miralax in terms of her constipation.  With regard to her urgency she has improved with the Toviaz but has slight continued incontinence.  We discussed further options of other medication, PTNS, and SNS.  She would like information on the SNS and would like to continue as is for now.  -continue miralax and titrate appropriately.  -stay well hydrated  -continue with estrogen cream twice per week for the atrophic  vaginitis  -she will thinking about trying to use the mirbetriq again and will check about costs  -she will monitor her BM and if any increase we can stop and consider further options of PTNS or SNS  -email pt. Information on PTNS and SNS both medtronic and [employer]  -f/u 6 months virtually.    Thank you for allowing me to participate in the care of  Ms.Jan MERVIN Coronado and I will keep you updated on her progress.    Carolyn Valdivia MD     30 minutes spent on the date of the encounter doing chart review, history and exam, documentation and further activities as noted above

## 2021-04-07 NOTE — PATIENT INSTRUCTIONS
-continue miralax and titrate appropriately.  -stay well hydrated  -continue with estrogen cream twice per week   -she will thinking about trying to use the mirbetriq again and will check about costs  -she will monitor her BM and if any increase we can stop and consider further options of PTNS or SNS  -email pt. Information on PTNS and SNS both medtronic and [employer]  -f/u 6 months virtually.

## 2021-04-07 NOTE — NURSING NOTE
Chief Complaint   Patient presents with     Follow Up     prolapse       Patient Active Problem List   Diagnosis     Family history of malignant neoplasm of breast     Female stress incontinence     Sleep apnea     Osteopenia     S/P gastric bypass     Vitamin D deficiency     Hyperparathyroidism (H)     Hirsutism     ACP (advance care planning)     Overweight, BMI > 35     Iron deficiency anemia     Lump or mass in breast     PFO (patent foramen ovale)     Essential hypertension with goal blood pressure less than 140/90     Left temporal lobe infarction (H)     Stroke (H)     Long-term (current) use of anticoagulants [Z79.01]     Cerebral aneurysm without rupture     ASD (atrial septal defect)     Migraine with aura and without status migrainosus, not intractable     Midline cystocele     Rectocele     Word finding difficulty     Transient neurological symptoms     Received intravenous tissue plasminogen activator (tPA) in emergency department     Accidental marijuana poisoning     Vaginal prolapse     Atrophic vaginitis     Urge incontinence     Urinary urgency       Allergies   Allergen Reactions     Contrast Dye Itching     Reaction of immediate burning and severe itching in Right ear and back of throat after injection for CT.      Sulfa Drugs      hives       Current Outpatient Medications   Medication Sig Dispense Refill     acetaminophen (TYLENOL) 325 MG tablet Take 2 tablets (650 mg) by mouth every 6 hours as needed for mild pain Start after Delivery. 100 tablet 0     Ascorbic Acid (VITAMIN C PO) Take 250 mg by mouth every morning (0.5 x 500 mg tablet = 250 mg dose)        aspirin 325 MG tablet Take 325 mg by mouth every morning        atenolol (TENORMIN) 50 MG tablet TAKE 1 TABLET BY MOUTH EVERY DAY 90 tablet 3     Calcium Citrate-Vitamin D (CALCIUM CITRATE + D PO) Take 2 tablets by mouth every morning        COMPOUNDED NON-CONTROLLED SUBSTANCE (CMPD RX) - PHARMACY TO MIX COMPOUNDED MEDICATION Estriol 1  mg/g in HRT base, apply small amount to finger and apply to inside vagina daily for 2 weeks then twice weekly 30 g 11     Cyanocobalamin 2500 MCG TABS Take 2,500 mcg by mouth once a week Mon       estradiol (ESTRACE) 0.1 MG/GM vaginal cream Place 1 g vaginally twice a week PACO per insurance 42.5 g 3     Ferrous Sulfate 27 MG TABS Take 27 mg by mouth every morning        Fesoterodine Fumarate (TOVIAZ) 8 MG TB24 Take 1 tablet (8 mg) by mouth daily 30 tablet 11     FIBER COMPLETE PO Take 1 capsule by mouth every morning        hydrochlorothiazide (HYDRODIURIL) 12.5 MG tablet TAKE 1 TABLET BY MOUTH EVERY DAY 90 tablet 3     imipramine (TOFRANIL) 25 MG tablet TAKE 1 TABLET BY MOUTH AT BEDTIME 90 tablet 3     loratadine (CLARITIN) 10 MG tablet Take 10 mg by mouth every morning        losartan (COZAAR) 50 MG tablet Take 1 tablet (50 mg) by mouth daily 90 tablet 3     mirabegron (MYRBETRIQ) 25 MG 24 hr tablet Take 1 tablet (25 mg) by mouth daily 30 tablet 11     omega 3 1000 MG CAPS Take 1 g by mouth every morning  90 capsule      topiramate (TOPAMAX) 25 MG tablet TAKE 1 TABLET BY MOUTH TWICE A  tablet 3     UNABLE TO FIND CPAP machine every night       valACYclovir (VALTREX) 1000 mg tablet TAKE 2 TABLETS (2,000 MG) BY MOUTH 2 TIMES DAILY AS NEEDED 4 tablet 2     VITAMIN D, CHOLECALCIFEROL, PO Take 500 Units by mouth every morning          Social History     Tobacco Use     Smoking status: Never Smoker     Smokeless tobacco: Never Used   Substance Use Topics     Alcohol use: Yes     Alcohol/week: 0.0 standard drinks     Comment: Occasional, wine     Drug use: No       Genoveva Acosta LPN  4/7/2021  8:53 AM

## 2021-04-07 NOTE — LETTER
4/7/2021       RE: Jose Coronado  [address] [place] [address]     Dear Colleague,    Thank you for referring your patient, Jose Coronado, to the [facility]OLOG[facility] [place] at [facility]. Please see a copy of my visit note below.    Video Visit Technology for this patient: [employer] Video Visit- Patient was left in waiting room    Jose is a 65 year old who is being evaluated via a billable video visit.      How would you like to obtain your AVS? MyChart  If the video visit is dropped, the invitation should be resent by: Text to cell phone: [phone]  Will anyone else be joining your video visit? No      Video Start Time: 9:10 AM  Video-Visit Details    Type of service:  Video Visit    Video End Time: 9:26 AM    Originating Location (pt. Location): Home    Distant Location (provider location):  [facility] UROLOGY [facility]     Platform used for Video Visit: Doximity          Reason for visit:  F/u on urinary symptoms.       Clinical Data: Ms. Jose Coronado  is a 64 year old female with a history of robotic sacrocolpopexy in conjuntion with hysterectomy on 3/16/20.  She feels like she is doing well but feels like she has a little bulging since Sept of 2020.  The urinary symptoms of overactivity are improved since before the surgery, but continues to have some urge incontinence.  She continues to use the estrogen cream.  She does have some constipation and uses stool softeners.    She was started on Toviaz and that seems to be helping significantly. She continues to have a little leakage, but not as been before, she just wears on pad.  And it is related to urge, not stress maneuvers.    GENERAL: Healthy, alert and no distress  EYES: Eyes grossly normal to inspection.  No discharge or erythema, or obvious scleral/conjunctival abnormalities.  RESP: No audible wheeze, cough, or visible cyanosis.  No visible retractions or increased  work of breathing.    SKIN: Visible skin clear. No significant rash, abnormal pigmentation or lesions.  NEURO: Cranial nerves grossly intact.  Mentation and speech appropriate for age.  PSYCH: Mentation appears normal, affect normal/bright, judgement and insight intact, normal speech and appearance well-groomed.    A/P 66 y/o female s/p Robotic sacrocolpopexy and midurethral sling along with hysterectomy doing well except for a little urgency and associated urge incontinence.  The small rectocele and cystocele are well controlled.  She is doing well with Miralax in terms of her constipation.  With regard to her urgency she has improved with the Toviaz but has slight continued incontinence.  We discussed further options of other medication, PTNS, and SNS.  She would like information on the SNS and would like to continue as is for now.  -continue miralax and titrate appropriately.  -stay well hydrated  -continue with estrogen cream twice per week for the atrophic vaginitis  -she will thinking about trying to use the mirbetriq again and will check about costs  -she will monitor her BM and if any increase we can stop and consider further options of PTNS or SNS  -email pt. Information on PTNS and SNS both [employer]tronic and [employer]  -f/u 6 months virtually.    Thank you for allowing me to participate in the care of  Ms.Jose Coronado and I will keep you updated on her progress.    Carolyn Valdivia MD     30 minutes spent on the date of the encounter doing chart review, history and exam, documentation and further activities as noted above

## 2021-04-08 ENCOUNTER — TELEPHONE (OUTPATIENT)
Dept: UROLOGY | Facility: CLINIC | Age: 66
End: 2021-04-08

## 2021-05-10 ENCOUNTER — ANCILLARY PROCEDURE (OUTPATIENT)
Dept: GENERAL RADIOLOGY | Facility: CLINIC | Age: 66
End: 2021-05-10
Attending: PHYSICIAN ASSISTANT
Payer: MEDICARE

## 2021-05-10 ENCOUNTER — OFFICE VISIT (OUTPATIENT)
Dept: URGENT CARE | Facility: URGENT CARE | Age: 66
End: 2021-05-10
Payer: MEDICARE

## 2021-05-10 VITALS
BODY MASS INDEX: 34.28 KG/M2 | SYSTOLIC BLOOD PRESSURE: 116 MMHG | TEMPERATURE: 98.5 F | HEART RATE: 83 BPM | WEIGHT: 206 LBS | DIASTOLIC BLOOD PRESSURE: 68 MMHG | OXYGEN SATURATION: 97 %

## 2021-05-10 DIAGNOSIS — S99.911A ANKLE INJURY, RIGHT, INITIAL ENCOUNTER: Primary | ICD-10-CM

## 2021-05-10 DIAGNOSIS — S99.911A ANKLE INJURY, RIGHT, INITIAL ENCOUNTER: ICD-10-CM

## 2021-05-10 PROCEDURE — 99214 OFFICE O/P EST MOD 30 MIN: CPT | Performed by: PHYSICIAN ASSISTANT

## 2021-05-10 PROCEDURE — 73610 X-RAY EXAM OF ANKLE: CPT | Mod: RT | Performed by: RADIOLOGY

## 2021-05-10 NOTE — PROGRESS NOTES
SUBJECTIVE  Jose Coronado is a 65 year old female who presents today with right ankle pain that occurred 2 day(s) ago.    The mechanism of injury includes: inversion strain.   Patient was hiking at [place] and step was deeper then she thought and came down with her full weight on her right ankle and rolled it inwards.  felt or heard a pop.  She does have hx of ankle issues on the right repeatedly.  States that not very painful just sitting but hurts to walk on and if pushed on certain spot on the outside of her ankle.  Has been very swollen and been icing and using some Ibuprofen. Notices that more swollen after she walks on it but seems to improve if lying down or having it elevated.  Denies bruising, numbness or tingling.  She is otherwise in normal state of health and doing well with no other concerns    Past Medical History:   Diagnosis Date     Anemia      CVA (cerebral vascular accident) (H) 2017    ?migraine, ?pfo--negative vasc w/u, neg hypercoag w/u     Diffuse cystic mastopathy     Fibrocystic breast disease     HTN, goal below 140/90      Hyperparathyroidism (H)      Infectious mononucleosis     Mono at age 16     Irregular heart beat     PAT no afib on 30day monitor     Labyrinthitis, unspecified      Migraine headache with aura      Osteopenia      Pain in joint, shoulder region     Secondary to a fall     Palpitations      PFO (patent foramen ovale)     s/p closure with amplazter device 7/13/17     S/P gastric bypass June, 2010     Sleep apnea     she is on CPAP     Vitamin D deficiencies      Current Outpatient Medications   Medication Sig Dispense Refill     acetaminophen (TYLENOL) 325 MG tablet Take 2 tablets (650 mg) by mouth every 6 hours as needed for mild pain Start after Delivery. 100 tablet 0     Ascorbic Acid (VITAMIN C PO) Take 250 mg by mouth every morning (0.5 x 500 mg tablet = 250 mg dose)        aspirin 325 MG tablet Take 325 mg by mouth every morning        atenolol (TENORMIN)  50 MG tablet TAKE 1 TABLET BY MOUTH EVERY DAY 90 tablet 3     Calcium Citrate-Vitamin D (CALCIUM CITRATE + D PO) Take 2 tablets by mouth every morning        COMPOUNDED NON-CONTROLLED SUBSTANCE (CMPD RX) - PHARMACY TO MIX COMPOUNDED MEDICATION Estriol 1 mg/g in HRT base, apply small amount to finger and apply to inside vagina daily for 2 weeks then twice weekly 30 g 11     Cyanocobalamin 2500 MCG TABS Take 2,500 mcg by mouth once a week Mon       estradiol (ESTRACE) 0.1 MG/GM vaginal cream Place 1 g vaginally twice a week PACO per insurance 42.5 g 3     Ferrous Sulfate 27 MG TABS Take 27 mg by mouth every morning        Fesoterodine Fumarate (TOVIAZ) 8 MG TB24 Take 1 tablet (8 mg) by mouth daily 30 tablet 11     FIBER COMPLETE PO Take 1 capsule by mouth every morning        hydrochlorothiazide (HYDRODIURIL) 12.5 MG tablet TAKE 1 TABLET BY MOUTH EVERY DAY 90 tablet 3     imipramine (TOFRANIL) 25 MG tablet TAKE 1 TABLET BY MOUTH AT BEDTIME 90 tablet 3     loratadine (CLARITIN) 10 MG tablet Take 10 mg by mouth every morning        losartan (COZAAR) 50 MG tablet Take 1 tablet (50 mg) by mouth daily 90 tablet 3     omega 3 1000 MG CAPS Take 1 g by mouth every morning  90 capsule      topiramate (TOPAMAX) 25 MG tablet TAKE 1 TABLET BY MOUTH TWICE A  tablet 3     UNABLE TO FIND CPAP machine every night       valACYclovir (VALTREX) 1000 mg tablet TAKE 2 TABLETS (2,000 MG) BY MOUTH 2 TIMES DAILY AS NEEDED 4 tablet 2     VITAMIN D, CHOLECALCIFEROL, PO Take 500 Units by mouth every morning        Social History     Tobacco Use     Smoking status: Never Smoker     Smokeless tobacco: Never Used   Substance Use Topics     Alcohol use: Yes     Alcohol/week: 0.0 standard drinks     Comment: Occasional, wine       ROS:  Review of systems negative except as stated above.    OBJECTIVE:  /68   Pulse 83   Temp 98.5  F (36.9  C) (Tympanic)   Wt 93.4 kg (206 lb)   SpO2 97%   BMI 34.28 kg/m    EXAM: Patient appears  alert,no apparent distress.  Ankle Exam: right    Inspection: generalized swelling of ankle and into foot.  No bruising noted or obvious deformity noted    Palpation: mild tenderness just superior to lateral malleolus. No other pain noted in joint space or foot     Both doralis pedis and posterior tibial pulses intact     Special Maneuvers: negative drawer test.  Mild pain with inversion.  No pain to push or pull against resistance. No laxity in joint   Neuro:Normal strength and tone, sensory exam grossly normal    X-Ray:   No acute fracture noted per my read.  Chronic changes noted.  Radiology report pending       ASSESSMENTInversion ankle strain    PLAN  Active range of motion exercises encouraged  Ice, elevate, weight bear as tolerated  Ibuprofen 600-800mg po tid prn  Cam walking boot given for supportive.    Activity as tolerated and to Follow-up with PCP as needed or Ortho if not improved in 2 weeks.

## 2021-10-21 ENCOUNTER — PRE VISIT (OUTPATIENT)
Dept: UROLOGY | Facility: CLINIC | Age: 66
End: 2021-10-21

## 2021-10-21 NOTE — TELEPHONE ENCOUNTER
Reason for Visit: 6 month Follow-up    Diagnosis: Rectocele, Midline cystocele, Atrophic vaginitis, Urge incontinence, Urinary urgency    Orders/Procedures/Records: in system    Contact Patient: n/a    Rooming Requirements: Jose [place]  10/21/21  4:37 PM

## 2021-10-24 ENCOUNTER — HEALTH MAINTENANCE LETTER (OUTPATIENT)
Age: 66
End: 2021-10-24

## 2021-10-27 ENCOUNTER — VIRTUAL VISIT (OUTPATIENT)
Dept: UROLOGY | Facility: CLINIC | Age: 66
End: 2021-10-27
Attending: UROLOGY
Payer: MEDICARE

## 2021-10-27 DIAGNOSIS — R39.15 URINARY URGENCY: Primary | ICD-10-CM

## 2021-10-27 DIAGNOSIS — N39.41 URGE INCONTINENCE: ICD-10-CM

## 2021-10-27 DIAGNOSIS — N95.2 ATROPHIC VAGINITIS: ICD-10-CM

## 2021-10-27 PROCEDURE — 99214 OFFICE O/P EST MOD 30 MIN: CPT | Mod: 95 | Performed by: UROLOGY

## 2021-10-27 NOTE — PROGRESS NOTES
Reason for visit:  F/u on urinary symptoms.       Clinical Data: Ms. Jose Coronado  is a 64 year old female with a history of robotic sacrocolpopexy in conjuntion with hysterectomy on 3/16/20.  She feels like she is doing well but feels like she has a little bulging since Sept of 2020.  The urinary symptoms of overactivity are improved since before the surgery, but continues to have some urge incontinence.  She continues to use the estrogen cream.  She does have some constipation and uses stool softeners.    She was started on Toviaz and that seems to be helping significantly. She continues to have just a little leakage, but not as it been before, she just wears on a panty liner.  And it is related to urge, not stress maneuvers.    A/P 66 y/o female s/p Robotic sacrocolpopexy and midurethral sling along with hysterectomy doing well except for a little urgency and associated urge incontinence that is mostly well controlled.  The small rectocele and cystocele are well controlled.  She is doing well with Miralax in terms of her constipation.  We discussed further options of other medication, PTNS, and SNS.  She would like information on the SNS and would like to continue as is for now.  -continue miralax and titrate appropriately.  -stay well hydrated  -continue with estrogen cream twice per week for the atrophic vaginitis, may be renewed by her primary  -continue Tovias, also can be renewed by primary.  -she though a little about the Myrbetriq but it is very costly.      Thank you for allowing me to participate in the care of  Ms.Jan MERVIN Coronado and I will keep you updated on her progress.    Carolyn Valdivia MD     34 minutes spent on the date of the encounter doing chart review, history and exam, documentation and further activities as noted above

## 2021-10-27 NOTE — LETTER
10/27/2021       RE: Jose Coronado  [address] [place] [address]     Dear Colleague,    Thank you for referring your patient, Jose Coronado, to the [facility] UROLOGY CLINIC [place] at [facility]. Please see a copy of my visit note below.    Jose is a 65 year old who is being evaluated via a billable video visit.      How would you like to obtain your AVS? MyChart  If the video visit is dropped, the invitation should be resent by: Send to e-mail at: [email or web address]  Will anyone else be joining your video visit? No      Video Start Time: 2:41 PM  Video-Visit Details    Type of service:  Video Visit    Video End Time:2:54 PM    Originating Location (pt. Location): Home    Distant Location (provider location):  [facility] UROLOGY [facility]     Platform used for Video Visit: eoSemi    Reason for visit:  F/u on urinary symptoms.       Clinical Data: Ms. Jose Coronado  is a 64 year old female with a history of robotic sacrocolpopexy in conjuntion with hysterectomy on 3/16/20.  She feels like she is doing well but feels like she has a little bulging since Sept of 2020.  The urinary symptoms of overactivity are improved since before the surgery, but continues to have some urge incontinence.  She continues to use the estrogen cream.  She does have some constipation and uses stool softeners.    She was started on Toviaz and that seems to be helping significantly. She continues to have just a little leakage, but not as it been before, she just wears on a panty liner.  And it is related to urge, not stress maneuvers.    A/P 66 y/o female s/p Robotic sacrocolpopexy and midurethral sling along with hysterectomy doing well except for a little urgency and associated urge incontinence that is mostly well controlled.  The small rectocele and cystocele are well controlled.  She is doing well with Miralax in terms of her constipation.  We discussed  further options of other medication, PTNS, and SNS.  She would like information on the SNS and would like to continue as is for now.  -continue miralax and titrate appropriately.  -stay well hydrated  -continue with estrogen cream twice per week for the atrophic vaginitis, may be renewed by her primary  -continue Tovias, also can be renewed by primary.  -she though a little about the Myrbetriq but it is very costly.      Thank you for allowing me to participate in the care of  Ms.Jose Coronado and I will keep you updated on her progress.    Carolyn Valdivia MD     34 minutes spent on the date of the encounter doing chart review, history and exam, documentation and further activities as noted above

## 2021-10-27 NOTE — PATIENT INSTRUCTIONS
-continue miralax and titrate appropriately.  -stay well hydrated  -continue with estrogen cream twice per week for the atrophic vaginitis, may be renewed by her primary  -continue Tovias, also can be renewed by primary.  -she though a little about the Myrbetriq but it is very costly.

## 2021-10-27 NOTE — PROGRESS NOTES
Jose is a 65 year old who is being evaluated via a billable video visit.      How would you like to obtain your AVS? [employer]  If the video visit is dropped, the invitation should be resent by: Send to e-mail at: [email or web address]  Will anyone else be joining your video visit? No      Video Start Time: 2:41 PM  Video-Visit Details    Type of service:  Video Visit    Video End Time:2:54 PM    Originating Location (pt. Location): Home    Distant Location (provider location):  [facility] UROLOGY CLINIC [place]     Platform used for Video Visit: [employer]

## 2021-10-31 ENCOUNTER — MYC MEDICAL ADVICE (OUTPATIENT)
Dept: INTERNAL MEDICINE | Facility: CLINIC | Age: 66
End: 2021-10-31

## 2021-11-02 ENCOUNTER — IMMUNIZATION (OUTPATIENT)
Dept: PEDIATRICS | Facility: CLINIC | Age: 66
End: 2021-11-02
Payer: MEDICARE

## 2021-11-02 DIAGNOSIS — Z23 HIGH PRIORITY FOR 2019-NCOV VACCINE: ICD-10-CM

## 2021-11-02 DIAGNOSIS — Z23 NEED FOR PROPHYLACTIC VACCINATION AND INOCULATION AGAINST INFLUENZA: ICD-10-CM

## 2021-11-02 PROCEDURE — 90662 IIV NO PRSV INCREASED AG IM: CPT

## 2021-11-02 PROCEDURE — 0004A COVID-19,PF,PFIZER (12+ YRS): CPT

## 2021-11-02 PROCEDURE — 91300 COVID-19,PF,PFIZER (12+ YRS): CPT

## 2021-11-02 PROCEDURE — G0008 ADMIN INFLUENZA VIRUS VAC: HCPCS

## 2021-11-02 PROCEDURE — 99207 PR NO CHARGE LOS: CPT

## 2021-11-19 ENCOUNTER — TELEPHONE (OUTPATIENT)
Dept: URGENT CARE | Facility: URGENT CARE | Age: 66
End: 2021-11-19

## 2021-11-19 ENCOUNTER — OFFICE VISIT (OUTPATIENT)
Dept: URGENT CARE | Facility: URGENT CARE | Age: 66
End: 2021-11-19
Payer: MEDICARE

## 2021-11-19 VITALS
SYSTOLIC BLOOD PRESSURE: 117 MMHG | OXYGEN SATURATION: 99 % | TEMPERATURE: 98.3 F | HEART RATE: 69 BPM | DIASTOLIC BLOOD PRESSURE: 69 MMHG

## 2021-11-19 DIAGNOSIS — J01.90 ACUTE SINUSITIS, RECURRENCE NOT SPECIFIED, UNSPECIFIED LOCATION: Primary | ICD-10-CM

## 2021-11-19 LAB — SARS-COV-2 RNA RESP QL NAA+PROBE: POSITIVE

## 2021-11-19 PROCEDURE — U0005 INFEC AGEN DETEC AMPLI PROBE: HCPCS | Performed by: PHYSICIAN ASSISTANT

## 2021-11-19 PROCEDURE — U0003 INFECTIOUS AGENT DETECTION BY NUCLEIC ACID (DNA OR RNA); SEVERE ACUTE RESPIRATORY SYNDROME CORONAVIRUS 2 (SARS-COV-2) (CORONAVIRUS DISEASE [COVID-19]), AMPLIFIED PROBE TECHNIQUE, MAKING USE OF HIGH THROUGHPUT TECHNOLOGIES AS DESCRIBED BY CMS-2020-01-R: HCPCS | Performed by: PHYSICIAN ASSISTANT

## 2021-11-19 PROCEDURE — 99214 OFFICE O/P EST MOD 30 MIN: CPT | Performed by: PHYSICIAN ASSISTANT

## 2021-11-19 NOTE — PROGRESS NOTES
Assessment & Plan     1. Acute sinusitis, recurrence not specified, unspecified location  Patient with sinus congestion and pressure for one week. Now with pain on the left side. Symptoms and exam are consistent with sinusitis. Will treat with medication as below. Encouraged supportive cares.   COVID19 test is pending.  - Symptomatic COVID-19 Virus (Coronavirus) by PCR Nose  - amoxicillin-clavulanate (AUGMENTIN) 875-125 MG tablet; Take 1 tablet by mouth 2 times daily for 10 days  Dispense: 20 tablet; Refill: 0      Return in about 1 week (around 11/26/2021), or if symptoms worsen or fail to improve.    Diagnosis and treatment plan was reviewed with patient and/or family.   We went over any labs or imaging. Discussed worsening symptoms or little to no relief despite treatment plan to follow-up with PCP or return to clinic.  Patient verbalizes understanding. All questions were addressed and answered.     Krystal Chin PA-C  [facility]    CHIEF COMPLAINT:   Chief Complaint   Patient presents with     Urgent Care     x6 days congestion plugged ears burning on L side of sinus with drainage down throat HA teeth hurt fatigued, no ST slight cough but more drainage      Subjective     Jose is a 65 year old female who presents to clinic today for evaluation of congestion and left sided facial pain and burning for the past week. Initially, symptoms were consistent with her regular colds of congestion and runny nose, but over the past several days she has developed L sided facial pain. Taking OTC Tylenol to help with symptoms. Endorses having a slight cough. Denies fever, chills, chest pain or hemoptysis. UTD on COVID19 vaccine.       Past Medical History:   Diagnosis Date     Anemia      CVA (cerebral vascular accident) (H) 2017    ?migraine, ?pfo--negative vasc w/u, neg hypercoag w/u     Diffuse cystic mastopathy     Fibrocystic breast disease     HTN, goal below 140/90      Hyperparathyroidism  (H)      Infectious mononucleosis     Mono at age 16     Irregular heart beat     PAT no afib on 30day monitor     Labyrinthitis, unspecified      Migraine headache with aura      Osteopenia      Pain in joint, shoulder region     Secondary to a fall     Palpitations      PFO (patent foramen ovale)     s/p closure with amplazter device 7/13/17     S/P gastric bypass June, 2010     Sleep apnea     she is on CPAP     Vitamin D deficiencies      Past Surgical History:   Procedure Laterality Date     BIOPSY  1984    Breast biopsies     BREAST SURGERY  1984    Above     C ANEURYSM, INTRACRAN, SIMPLE SURG  04/2017    coil of aneurysm right posterior paraophthalmic artery     CARDIAC SURGERY  7/13/2017    PFO closure     COLONOSCOPY N/A 8/12/2015    Procedure: COLONOSCOPY;  Surgeon: Deandre Brooks MD;  Location: [facility]     DAVINCI HYSTERECTOMY SUPRACERVICAL, SALPINGO-OOPHORECTOMY INCLUDING BILATERAL N/A 3/16/2020    Procedure: DAVINCI HYSTERECTOMY SUPRACERVICAL, SALPINGO-OOPHORECTOMY INCLUDING BILATERAL WITH EXAM UNDER ANESTHESIA;  Surgeon: Lily Yancey MD;  Location: [facility] OR     DAVINCI SACROCOLPOPEXY, MIDURETHRAL SLING, CYSTOSCOPY N/A 3/16/2020    Procedure: SACROCOLPOPEXY, ROBOT-ASSISTED, LAPAROSCOPIC, WITH INSERTION OF MIDURETHRAL SLING AND CYSTOSCOPY;  Surgeon: Carolyn Valdivia MD;  Location: [facility] OR     GASTRIC BYPASS  June 24, 2010     ORTHOPEDIC SURGERY Left 2010    wrist fracture     PARATHYROIDECTOMY  9/19/11     [facility] NONSPECIFIC PROCEDURE      S/P multiple breast biopies - all negative / benign     Z NONSPECIFIC PROCEDURE      S/P T&A     Z NONSPECIFIC PROCEDURE      [place] teeth extraction     [facility] NONSPECIFIC PROCEDURE      S/P (? unreadable) ankle     Social History     Tobacco Use     Smoking status: Never Smoker     Smokeless tobacco: Never Used   Substance Use Topics     Alcohol use: Yes     Alcohol/week: 0.0 standard drinks     Comment: Occasional, wine     Current Outpatient Medications    Medication     acetaminophen (TYLENOL) 325 MG tablet     amoxicillin-clavulanate (AUGMENTIN) 875-125 MG tablet     Ascorbic Acid (VITAMIN C PO)     aspirin 325 MG tablet     atenolol (TENORMIN) 50 MG tablet     Calcium Citrate-Vitamin D (CALCIUM CITRATE + D PO)     COMPOUNDED NON-CONTROLLED SUBSTANCE (CMPD RX) - PHARMACY TO MIX COMPOUNDED MEDICATION     Cyanocobalamin 2500 MCG TABS     estradiol (ESTRACE) 0.1 MG/GM vaginal cream     Ferrous Sulfate 27 MG TABS     Fesoterodine Fumarate (TOVIAZ) 8 MG TB24     FIBER COMPLETE PO     hydrochlorothiazide (HYDRODIURIL) 12.5 MG tablet     imipramine (TOFRANIL) 25 MG tablet     loratadine (CLARITIN) 10 MG tablet     losartan (COZAAR) 50 MG tablet     omega 3 1000 MG CAPS     topiramate (TOPAMAX) 25 MG tablet     UNABLE TO FIND     valACYclovir (VALTREX) 1000 mg tablet     VITAMIN D, CHOLECALCIFEROL, PO     No current facility-administered medications for this visit.     Allergies   Allergen Reactions     Contrast Dye Itching     Reaction of immediate burning and severe itching in Right ear and back of throat after injection for CT.      Sulfa Drugs      hives       10 point ROS of systems were all negative except for pertinent positives noted in my HPI.      Exam:   /69   Pulse 69   Temp 98.3  F (36.8  C)   SpO2 99%   Constitutional: healthy, alert and no distress  Head: Normocephalic, atraumatic.  Eyes: conjunctiva clear, no drainage  ENT: TMs clear and shiny konstantin, nasal mucosa pink and moist, throat without tonsillar hypertrophy or erythema. Left sided facial pain  Neck: neck is supple, no cervical lymphadenopathy or nuchal rigidity  Cardiovascular: RRR  Respiratory: CTA bilaterally, no rhonchi or rales  Skin: no rashes  Neurologic: Speech clear, gait normal. Moves all extremities.

## 2021-11-20 NOTE — TELEPHONE ENCOUNTER
"-Coronavirus (COVID-19) Notification    Caller Name (Patient, parent, daughter/son, grandparent, etc)  Patient states was contacted by the urgent care Dr of the results  No docum    Reason for call  Notify of Positive Coronavirus (COVID-19) lab results, assess symptoms,  review [employer] recommendations    Lab Result    Lab test:  2019-nCoV rRt-PCR or SARS-CoV-2 PCR    Oropharyngeal AND/OR nasopharyngeal swabs is POSITIVE for 2019-nCoV RNA/SARS-COV-2 PCR (COVID-19 virus)    RN Recommendations/Instructions per  [email or web address] [place] Coronavirus COVID-19 recommendations    Brief introduction script  Introduce self then review script:  \"I am calling on behalf of [employer].  We were notified that your Coronavirus test (COVID-19) for was POSITIVE for the virus.  I have some information to relay to you but first I wanted to mention that the [place] Dept of Health will be contacting you shortly [it's possible MD already called Patient] to talk to you more about how you are feeling and other people you have had contact with who might now also have the virus.  Also, [employer] [place] is Partnering with the [facility] for Covid-19 research, you may be contacted directly by research staff.\"    Assessment (Inquire about Patient's current symptoms)   Assessment   Current Symptoms at time of phone call: (if no symptoms, document No symptoms] Sinus pain \" burning oain\"   Symptoms onset (if applicable) 6 days ago     If at time of call, Patients symptoms hare worsened, the Patient should contact 911 or have someone drive them to Emergency Dept promptly:      If Patient calling 911, inform 911 personal that you have tested positive for the Coronavirus (COVID-19).  Place mask on and await 911 to arrive.    If Emergency Dept, If possible, please have another adult drive you to the Emergency Dept but you need to wear mask when in contact with other people.      Monoclonal Antibody Administration    You may be eligible to " "receive a new treatment with a monoclonal antibody for preventing hospitalization in patients at high risk for complications from COVID-19.   This medication is still experimental and available on a limited basis; it is given through an IV and must be given at an infusion center. Please note that not all people who are eligible will receive the medication since it is in limited supply.     Are you interested in being considered for this medication?  No.   Does the patient fit the criteria: Patient declined    If patient qualifies based on above criteria:  \"You will be contacted if you are selected to receive this treatment in the next 1-2 business days.   This is time sensitive and if you are not selected in the next 1-2 business days, you will not receive the medication.  If you do not receive a call to schedule, you have not been selected.\"      Review information with Patient    Your result was positive. This means you have COVID-19 (coronavirus).  We have sent you a letter that reviews the information that I'll be reviewing with you now.    How can I protect others?    If you have symptoms: stay home and away from others (self-isolate) until:    You've had no fever--and no medicine that reduces fever--for 1 full day (24 hours). And       Your other symptoms have gotten better. For example, your cough or breathing has improved. And     At least 10 days have passed since your symptoms started. (If you've been told by a doctor that you have a weak immune system, wait 20 days.)     If you don't have symptoms: Stay home and away from others (self-isolate) until at least 10 days have passed since your first positive COVID-19 test. (Date test collected)    During this time:    Stay in your own room, including for meals. Use your own bathroom if you can.    Stay away from others in your home. No hugging, kissing or shaking hands. No visitors.     Don't go to work, school or anywhere else.     Clean  high touch  surfaces " often (doorknobs, counters, handles, etc.). Use a household cleaning spray or wipes. You'll find a full list on the EPA website at www.epa.gov/pesticide-registration/list-n-disinfectants-use-against-sars-cov-2.     Cover your mouth and nose with a mask, tissue or other face covering to avoid spreading germs.    Wash your hands and face often with soap and water.    Make a list of people you have been in close contact with recently, even if either of you wore a face covering.   ; Start your list from 2 days before you became ill or had a positive test.  ; Include anyone that was within 6 feet of you for a cumulative total of 15 minutes or more in 24 hours. (Example: if you sat next to Senthil for 5 minutes in the morning and 10 minutes in the afternoon, then you were in close contact for 15 minutes total that day. Senthil would be added to your list.)    A public health worker will call or text you. It is important that you answer. They will ask you questions about possible exposures to COVID-19, such as people you have been in direct contact with and places you have visited.    Tell the people on your list that you have COVID-19; they should stay away from others for 14 days starting from the last time they were in contact with you (unless you are told something different from a public health worker).     Caregivers in these groups are at risk for severe illness due to COVID-19:  o People 65 years and older  o People who live in a nursing home or long-term care facility  o People with chronic disease (lung, heart, cancer, diabetes, kidney, liver, immunologic)  o People who have a weakened immune system, including those who:  - Are in cancer treatment  - Take medicine that weakens the immune system, such as corticosteroids  - Had a bone marrow or organ transplant  - Have an immune deficiency  - Have poorly controlled HIV or AIDS  - Are obese (body mass index of 40 or higher)  - Smoke regularly    Caregivers should wear  gloves while washing dishes, handling laundry and cleaning bedrooms and bathrooms.    Wash and dry laundry with special caution. Don't shake dirty laundry, and use the warmest water setting you can.    If you have a weakened immune system, ask your doctor about other actions you should take.    For more tips, go to www.cdc.gov/coronavirus/2019-ncov/downloads/10Things.pdf.    You should not go back to work until you meet the guidelines above for ending your home isolation. You don't need to be retested for COVID-19 before going back to work--studies show that you won't spread the virus if it's been at least 10 days since your symptoms started (or 20 days, if you have a weak immune system).    Employers: This document serves as formal notice of your employee's medical guidelines for going back to work. They must meet the above guidelines before going back to work in person.    How can I take care of myself?    1. Get lots of rest. Drink extra fluids (unless a doctor has told you not to).    2. Take Tylenol (acetaminophen) for fever or pain. If you have liver or kidney problems, ask your family doctor if it's okay to take Tylenol.     Take either:     650 mg (two 325 mg pills) every 4 to 6 hours, or     1,000 mg (two 500 mg pills) every 8 hours as needed.     Note: Don't take more than 3,000 mg in one day. Acetaminophen is found in many medicines (both prescribed and over-the-counter medicines). Read all labels to be sure you don't take too much.    For children, check the Tylenol bottle for the right dose (based on their age or weight).    3. If you have other health problems (like cancer, heart failure, an organ transplant or severe kidney disease): Call your specialty clinic if you don't feel better in the next 2 days.    4. Know when to call 911: Emergency warning signs include:    Trouble breathing or shortness of breath    Pain or pressure in the chest that doesn't go away    Feeling confused like you haven't  felt before, or not being able to wake up    Bluish-colored lips or face    5. Sign up for [employer]. We know it's scary to hear that you have COVID-19. We want to track your symptoms to make sure you're okay over the next 2 weeks. Please look for an email from [employer]--this is a free, online program that we'll use to keep in touch. To sign up, follow the link in the email. Learn more at [email or web address].    Where can I get more information?    [facility] Mobile: [email or web address]    Coronavirus Basics: www.[email or web address].[email or web address]./diseases/coronavirus/basics.html    What to Do If You're Sick: www.cdc.gov/coronavirus/2019-ncov/about/steps-when-sick.html    Ending Home Isolation: www.cdc.gov/coronavirus/2019-ncov/hcp/disposition-in-home-patients.html     Caring for Someone with COVID-19: www.cdc.gov/coronavirus/2019-ncov/if-you-are-sick/care-for-someone.html     [facility] clinical trials (COVID-19 research studies): clinicalaffairs.[facility]-clinical-trials     A Positive COVID-19 letter will be sent via [employer] or the mail. (Exception, no letters sent to Presurgerical/Preprocedure Patients)    Syeda Vargas LPN

## 2021-12-16 ENCOUNTER — OFFICE VISIT (OUTPATIENT)
Dept: URGENT CARE | Facility: URGENT CARE | Age: 66
End: 2021-12-16
Payer: MEDICARE

## 2021-12-16 VITALS
RESPIRATION RATE: 20 BRPM | OXYGEN SATURATION: 98 % | TEMPERATURE: 98 F | HEART RATE: 78 BPM | SYSTOLIC BLOOD PRESSURE: 142 MMHG | DIASTOLIC BLOOD PRESSURE: 82 MMHG

## 2021-12-16 DIAGNOSIS — N76.0 BACTERIAL VAGINOSIS: ICD-10-CM

## 2021-12-16 DIAGNOSIS — N39.0 ACUTE UTI: Primary | ICD-10-CM

## 2021-12-16 DIAGNOSIS — B96.89 BACTERIAL VAGINOSIS: ICD-10-CM

## 2021-12-16 DIAGNOSIS — N89.8 VAGINAL ITCHING: ICD-10-CM

## 2021-12-16 DIAGNOSIS — R35.0 URINARY FREQUENCY: ICD-10-CM

## 2021-12-16 LAB
ALBUMIN UR-MCNC: NEGATIVE MG/DL
APPEARANCE UR: CLEAR
BACTERIA #/AREA URNS HPF: ABNORMAL /HPF
BILIRUB UR QL STRIP: NEGATIVE
CLUE CELLS: PRESENT
COLOR UR AUTO: YELLOW
GLUCOSE UR STRIP-MCNC: NEGATIVE MG/DL
HGB UR QL STRIP: ABNORMAL
KETONES UR STRIP-MCNC: NEGATIVE MG/DL
LEUKOCYTE ESTERASE UR QL STRIP: ABNORMAL
NITRATE UR QL: POSITIVE
PH UR STRIP: 7 [PH] (ref 5–7)
RBC #/AREA URNS AUTO: ABNORMAL /HPF
SP GR UR STRIP: 1.02 (ref 1–1.03)
SQUAMOUS #/AREA URNS AUTO: ABNORMAL /LPF
TRICHOMONAS, WET PREP: ABNORMAL
UROBILINOGEN UR STRIP-ACNC: 2 E.U./DL
WBC #/AREA URNS AUTO: ABNORMAL /HPF
WBC'S/HIGH POWER FIELD, WET PREP: ABNORMAL
YEAST, WET PREP: ABNORMAL

## 2021-12-16 PROCEDURE — 99213 OFFICE O/P EST LOW 20 MIN: CPT | Performed by: PHYSICIAN ASSISTANT

## 2021-12-16 PROCEDURE — 87086 URINE CULTURE/COLONY COUNT: CPT | Performed by: PHYSICIAN ASSISTANT

## 2021-12-16 PROCEDURE — 87210 SMEAR WET MOUNT SALINE/INK: CPT | Performed by: PHYSICIAN ASSISTANT

## 2021-12-16 PROCEDURE — 81001 URINALYSIS AUTO W/SCOPE: CPT | Performed by: PHYSICIAN ASSISTANT

## 2021-12-16 PROCEDURE — 87186 SC STD MICRODIL/AGAR DIL: CPT | Performed by: PHYSICIAN ASSISTANT

## 2021-12-16 RX ORDER — NITROFURANTOIN 25; 75 MG/1; MG/1
100 CAPSULE ORAL 2 TIMES DAILY
Qty: 10 CAPSULE | Refills: 0 | Status: SHIPPED | OUTPATIENT
Start: 2021-12-16 | End: 2021-12-21

## 2021-12-16 RX ORDER — METRONIDAZOLE 7.5 MG/G
1 GEL VAGINAL DAILY
Qty: 70 G | Refills: 0 | Status: SHIPPED | OUTPATIENT
Start: 2021-12-16 | End: 2022-02-14

## 2021-12-16 RX ORDER — GRANULES FOR ORAL 3 G/1
3 POWDER ORAL ONCE
Qty: 1 PACKET | Refills: 0 | Status: SHIPPED | OUTPATIENT
Start: 2021-12-16 | End: 2021-12-16

## 2021-12-16 NOTE — PATIENT INSTRUCTIONS
1. Increase fluid intake  2. Complete antibiotic regimen as prescribed. You will be notified if the treatment plan needs to be changed based on the urine culture results.   3. For the discomfort: You may take an over the counter medication called pyridium (AZO) up three times daily for up to 2 days.  4. Follow up if you have a fever of 100.4 F (38 C) or higher, no improvement after three days of treatment, trouble urinating because of pain,new or increased discharge from the vagina, rash or joint pain, Increased back or abdominal pain.    1. You currently have a vaginal infection called bacterial vaginosis, BV for short.   2. This is not a sexually transmitted infection and it cannot be transmitted to your partner.  3. BV typically occurs due to a change in pH balance of the vagina. The following things may cause BV to occur: douching, new soaps or lubricants, or oral sex. Sometimes we can't prevent BV because it can happen for no reason at all.   4. Sometimes BV is asymptomatic, but classically it causes thin or creamy odorous vaginal discharge. It can also cause some low abdominal discomfort.   5. Today we will treat this infection with an antibiotic called Metrogel.   6. Follow up if you continue to have symptoms after you have completed your treatment.

## 2021-12-16 NOTE — PROGRESS NOTES
Assessment/Plan:    Urinalysis consistent with UTI; patient afebrile and no s/sx of pyelonephritis. Will prescribe antibiotic. Culture pending.   Wet prep with clue cells; Rx Metrogel to treat BV.    See patient instructions below.    At the end of the encounter, I discussed results, diagnosis, medications. Discussed red flags for immediate return to clinic/ER, as well as indications for follow up if no improvement. Patient understood and agreed to plan. Patient was stable for discharge.      ICD-10-CM    1. Acute UTI  N39.0 nitroFURantoin macrocrystal-monohydrate (MACROBID) 100 MG capsule     DISCONTINUED: fosfomycin (MONUROL) 3 g Packet   2. Urinary frequency  R35.0 UA reflex to Microscopic and Culture     Urine Microscopic Exam     Urine Culture   3. Vaginal itching  N89.8 Wet prep - Clinic Collect   4. Bacterial vaginosis  N76.0 metroNIDAZOLE (METROGEL) 0.75 % vaginal gel    B96.89          Return in about 3 days (around 12/19/2021) for Follow up w/ primary care provider if not better.    Eri De La Garza, WES, FRANCISCA  [facility] CARE ITZ    -----------------------------------------------------------------------------------------------------------------------------------------------------------------------------------------------------------------------------------------  HPI:  oJse Coronado is a 65 year old female who presents for evaluation of dysuria onset 5 days ago. She has also had vaginal itching & irritation for 2 weeks. She has tried Monistat x 2 without improvement. Pt has hx of urge incontinence, atrophic vaginitis, rectocele, cystocele, and vaginal prolapse. She uses vaginal estrogen cream and takes Toviaz. She has a history of robotic sacrocolpopexy, midurethral sling, in conjunction with hysterectomy on 3/16/20, and is followed by urology. Patient reports no blood in urine, vaginal discharge, fever/chills, genital sores, abdominal pain, flank pain, nausea/vomiting, or any other  symptoms.    Past Medical History:   Diagnosis Date     Anemia      CVA (cerebral vascular accident) (H) 2017    ?migraine, ?pfo--negative vasc w/u, neg hypercoag w/u     Diffuse cystic mastopathy     Fibrocystic breast disease     HTN, goal below 140/90      Hyperparathyroidism (H)      Infectious mononucleosis     Mono at age 16     Irregular heart beat     PAT no afib on 30day monitor     Labyrinthitis, unspecified      Migraine headache with aura      Osteopenia      Pain in joint, shoulder region     Secondary to a fall     Palpitations      PFO (patent foramen ovale)     s/p closure with amplazter device 7/13/17     S/P gastric bypass June, 2010     Sleep apnea     she is on CPAP     Vitamin D deficiencies        Vitals:    12/16/21 1311   BP: (!) 142/82   Pulse: 78   Resp: 20   Temp: 98  F (36.7  C)   SpO2: 98%       Physical Exam  Vitals and nursing note reviewed.   Constitutional:       Appearance: Normal appearance.   Pulmonary:      Effort: Pulmonary effort is normal.   Abdominal:      Palpations: Abdomen is soft.      Tenderness: There is no abdominal tenderness. There is no right CVA tenderness or left CVA tenderness.   Genitourinary:     Comments:  exam declined  Neurological:      Mental Status: She is alert.         Labs/Imaging:  Results for orders placed or performed in visit on 12/16/21 (from the past 24 hour(s))   UA reflex to Microscopic and Culture    Specimen: Urine, Clean Catch   Result Value Ref Range    Color Urine Yellow Colorless, Straw, Light Yellow, Yellow    Appearance Urine Clear Clear    Glucose Urine Negative Negative mg/dL    Bilirubin Urine Negative Negative    Ketones Urine Negative Negative mg/dL    Specific Gravity Urine 1.020 1.003 - 1.035    Blood Urine Trace (A) Negative    pH Urine 7.0 5.0 - 7.0    Protein Albumin Urine Negative Negative mg/dL    Urobilinogen Urine 2.0 (A) 0.2, 1.0 E.U./dL    Nitrite Urine Positive (A) Negative    Leukocyte Esterase Urine Small (A)  Negative   Urine Microscopic Exam   Result Value Ref Range    Bacteria Urine Many (A) None Seen /HPF    RBC Urine 0-2 0-2 /HPF /HPF    WBC Urine 0-5 0-5 /HPF /HPF    Squamous Epithelials Urine Moderate (A) None Seen /LPF   Wet prep - Clinic Collect    Specimen: Vagina; Swab   Result Value Ref Range    Trichomonas Absent Absent    Yeast Absent Absent    Clue Cells Present (A) Absent    WBCs/high power field 1+ (A) None         Patient Instructions   1. Increase fluid intake  2. Complete antibiotic regimen as prescribed. You will be notified if the treatment plan needs to be changed based on the urine culture results.   3. For the discomfort: You may take an over the counter medication called pyridium (AZO) up three times daily for up to 2 days.  4. Follow up if you have a fever of 100.4 F (38 C) or higher, no improvement after three days of treatment, trouble urinating because of pain,new or increased discharge from the vagina, rash or joint pain, Increased back or abdominal pain.    1. You currently have a vaginal infection called bacterial vaginosis, BV for short.   2. This is not a sexually transmitted infection and it cannot be transmitted to your partner.  3. BV typically occurs due to a change in pH balance of the vagina. The following things may cause BV to occur: douching, new soaps or lubricants, or oral sex. Sometimes we can't prevent BV because it can happen for no reason at all.   4. Sometimes BV is asymptomatic, but classically it causes thin or creamy odorous vaginal discharge. It can also cause some low abdominal discomfort.   5. Today we will treat this infection with an antibiotic called Metrogel.   6. Follow up if you continue to have symptoms after you have completed your treatment.

## 2021-12-18 LAB
BACTERIA UR CULT: ABNORMAL
BACTERIA UR CULT: ABNORMAL

## 2021-12-19 ENCOUNTER — NURSE TRIAGE (OUTPATIENT)
Dept: NURSING | Facility: CLINIC | Age: 66
End: 2021-12-19
Payer: MEDICARE

## 2021-12-19 ENCOUNTER — TELEPHONE (OUTPATIENT)
Dept: URGENT CARE | Facility: URGENT CARE | Age: 66
End: 2021-12-19
Payer: MEDICARE

## 2021-12-19 DIAGNOSIS — N39.0 ACUTE UTI: Primary | ICD-10-CM

## 2021-12-19 DIAGNOSIS — G43.109 MIGRAINE WITH AURA AND WITHOUT STATUS MIGRAINOSUS, NOT INTRACTABLE: ICD-10-CM

## 2021-12-19 RX ORDER — CEFDINIR 300 MG/1
300 CAPSULE ORAL 2 TIMES DAILY
Qty: 10 CAPSULE | Refills: 0 | Status: SHIPPED | OUTPATIENT
Start: 2021-12-19 | End: 2021-12-24

## 2021-12-19 NOTE — TELEPHONE ENCOUNTER
Patient received a call this morning to say her urine culture came back and needs a different antibiotic.  Patient said the [facility] never received the prescription for  cefdinir (OMNICEF) 300 MG capsule.  RN called the pharmacy, who said the prescription is filled and ready for patient to .  Patient notified.    Janay Kwan RN  [facility]

## 2021-12-19 NOTE — TELEPHONE ENCOUNTER
Pt would like the medication called into the [facility] on [address]. Pt verbalized understanding of lab results and had no further questions.    OSIRIS Washington  9:21 AM 12/19/2021

## 2021-12-19 NOTE — TELEPHONE ENCOUNTER
I just e-prescribed the Rx for Cefdinir to the [facility] in [place] on [address] to [facility].      Geovanny Wilson MD

## 2021-12-19 NOTE — TELEPHONE ENCOUNTER
SUBJECTIVE:  The patient's December 16, 2021, Urine culture grew out two different bacteria :  Klebsiella pneumoniae and Escherichia coli.  Patient is currently on the antibiotic Nitrofurantoin (Macrobid).  According to the antibiotic susceptibility profile, The E coli is susceptible to the Nitrofurantoin; however, the Klebsiella pneumoniae is resistant to this antibiotic.      PLAN:  Please notify patient of this result.  Once notified, I plan on e-prescribing the following antibiotic prescription to the patient's pharmacy:    Cefdinir 300 mg capsules.  Take one capsule by mouth twice a day for 5 days.     (According to the lab results, both bacteria in the urine should be susceptible to the antibiotics similar to Cefdinir.)     Geovanny Wilson MD

## 2021-12-20 RX ORDER — IMIPRAMINE HCL 25 MG
TABLET ORAL
Qty: 90 TABLET | Refills: 3 | Status: SHIPPED | OUTPATIENT
Start: 2021-12-20 | End: 2023-02-23

## 2021-12-20 RX ORDER — TOPIRAMATE 25 MG/1
TABLET, FILM COATED ORAL
Qty: 180 TABLET | Refills: 0 | Status: SHIPPED | OUTPATIENT
Start: 2021-12-20 | End: 2022-03-16

## 2021-12-20 NOTE — TELEPHONE ENCOUNTER
Routing refill request to provider for review/approval because:  Labs out of range:  bp  Kim VAN RN, BSN

## 2021-12-29 ENCOUNTER — MYC MEDICAL ADVICE (OUTPATIENT)
Dept: INTERNAL MEDICINE | Facility: CLINIC | Age: 66
End: 2021-12-29

## 2021-12-29 ENCOUNTER — E-VISIT (OUTPATIENT)
Dept: URGENT CARE | Facility: CLINIC | Age: 66
End: 2021-12-29
Payer: MEDICARE

## 2021-12-29 DIAGNOSIS — N39.0 URINARY TRACT INFECTION WITHOUT HEMATURIA, SITE UNSPECIFIED: Primary | ICD-10-CM

## 2021-12-29 DIAGNOSIS — N39.0 ACUTE UTI (URINARY TRACT INFECTION): Primary | ICD-10-CM

## 2021-12-29 PROCEDURE — 99207 PR NO BILLABLE SERVICE THIS VISIT: CPT | Performed by: PHYSICIAN ASSISTANT

## 2021-12-29 RX ORDER — CIPROFLOXACIN 500 MG/1
500 TABLET, FILM COATED ORAL 2 TIMES DAILY
Qty: 14 TABLET | Refills: 0 | Status: SHIPPED | OUTPATIENT
Start: 2021-12-29 | End: 2022-02-14

## 2021-12-29 RX ORDER — CEFDINIR 300 MG/1
300 CAPSULE ORAL 2 TIMES DAILY
Qty: 14 CAPSULE | Refills: 0 | Status: SHIPPED | OUTPATIENT
Start: 2021-12-29 | End: 2022-01-05

## 2021-12-29 NOTE — PATIENT INSTRUCTIONS
Dear Jose Coronado    After reviewing your responses, I've been able to diagnose you with a urinary tract infection, which is a common infection of the bladder with bacteria.  This is not a sexually transmitted infection, though urinating immediately after intercourse can help prevent infections.  Drinking lots of fluids is also helpful to clear your current infection and prevent the next one.      I have sent a prescription for antibiotics to your pharmacy to treat this infection.    It is important that you take all of your prescribed medication even if your symptoms are improving after a few doses.  Taking all of your medicine helps prevent the symptoms from returning.     If your symptoms worsen, you develop pain in your back or stomach, develop fevers, or are not improving in 5 days, please contact your primary care provider for an appointment or visit any of our convenient Walk-in or Urgent Care Centers to be seen, which can be found on our website here.    Thanks again for choosing us as your health care partner,    Harmeet Tripathi PA-C    Urinary Tract Infections in Women  Urinary tract infections (UTIs) are most often caused by bacteria. These bacteria enter the urinary tract. The bacteria may come from inside the body. Or they may travel from the skin outside the rectum or vagina into the urethra. Female anatomy makes it easy for bacteria from the bowel to enter a woman s urinary tract, which is the most common source of UTI. This means women develop UTIs more often than men. Pain in or around the urinary tract is a common UTI symptom. But the only way to know for sure if you have a UTI for the healthcare provider to test your urine. The two tests that may be done are the urinalysis and urine culture.     Types of UTIs    Cystitis. A bladder infection (cystitis) is the most common UTI in women. You may have urgent or frequent need to pee. You may also have pain, burning when you pee, and bloody  urine.    Urethritis. This is an inflamed urethra, which is the tube that carries urine from the bladder to outside the body. You may have lower stomach or back pain. You may also have urgent or frequent need to pee.    Pyelonephritis. This is a kidney infection. If not treated, it can be serious and damage your kidneys. In severe cases, you may need to stay in the hospital. You may have a fever and lower back pain.    Medicines to treat a UTI  Most UTIs are treated with antibiotics. These kill the bacteria. The length of time you need to take them depends on the type of infection. It may be as short as 3 days. If you have repeated UTIs, you may need a low-dose antibiotic for several months. Take antibiotics exactly as directed. Don t stop taking them until all of the medicine is gone. If you stop taking the antibiotic too soon, the infection may not go away. You may also develop a resistance to the antibiotic. This can make it much harder to treat.   Lifestyle changes to treat and prevent UTIs   The lifestyle changes below will help get rid of your UTI. They may also help prevent future UTIs.     Drink plenty of fluids. This includes water, juice, or other caffeine-free drinks. Fluids help flush bacteria out of your body.    Empty your bladder. Always empty your bladder when you feel the urge to pee. And always pee before going to sleep. Urine that stays in your bladder can lead to infection. Try to pee before and after sex as well.    Practice good personal hygiene. Wipe yourself from front to back after using the toilet. This helps keep bacteria from getting into the urethra.    Use condoms during sex. These help prevent UTIs caused by sexually transmitted bacteria. Also don't use spermicides during sex. These can increase the risk for UTIs. Choose other forms of birth control instead. For women who tend to get UTIs after sex, a low-dose of a preventive antibiotic may be used. Be sure to discuss this option with  your healthcare provider.    Follow up with your healthcare provider as directed. He or she may test to make sure the infection has cleared. If needed, more treatment may be started.  Mariann last reviewed this educational content on 7/1/2019 2000-2021 [employer]. All rights reserved. This information is not intended as a substitute for professional medical care. Always follow your healthcare professional's instructions.

## 2021-12-29 NOTE — TELEPHONE ENCOUNTER
"Patient calling again to inquire if she should continue cefdinir that was prescribed again for UTI or if should come in for another urine sample. Patient was seen for UC regarding UTI 12/16/21 and prescribed macrobid. Urine culture result showed resistance. Patient was contacted 12/19/21 and prescribed cefdinir instead. Patient has completed cefdinir prescription and UTI symptoms are \"back with a vengeance\".  Symptoms include urinary frequency, pain/burning with urination, bladder spasms towards end of urination. Please advise.     Mat SOTO RN        "

## 2022-01-03 DIAGNOSIS — N39.41 URGE INCONTINENCE: Primary | ICD-10-CM

## 2022-01-03 DIAGNOSIS — R39.15 URINARY URGENCY: ICD-10-CM

## 2022-01-04 DIAGNOSIS — I10 ESSENTIAL HYPERTENSION WITH GOAL BLOOD PRESSURE LESS THAN 140/90: ICD-10-CM

## 2022-01-04 DIAGNOSIS — R00.2 PALPITATIONS: ICD-10-CM

## 2022-01-05 RX ORDER — FESOTERODINE FUMARATE 8 MG/1
1 TABLET, FILM COATED, EXTENDED RELEASE ORAL DAILY
Qty: 90 TABLET | Refills: 3 | Status: SHIPPED | OUTPATIENT
Start: 2022-01-05 | End: 2023-01-10

## 2022-01-06 RX ORDER — HYDROCHLOROTHIAZIDE 12.5 MG/1
TABLET ORAL
Qty: 90 TABLET | Refills: 0 | Status: SHIPPED | OUTPATIENT
Start: 2022-01-06 | End: 2022-04-05

## 2022-01-06 RX ORDER — ATENOLOL 50 MG/1
TABLET ORAL
Qty: 90 TABLET | Refills: 0 | Status: SHIPPED | OUTPATIENT
Start: 2022-01-06 | End: 2022-04-05

## 2022-01-06 RX ORDER — LOSARTAN POTASSIUM 50 MG/1
TABLET ORAL
Qty: 90 TABLET | Refills: 0 | Status: SHIPPED | OUTPATIENT
Start: 2022-01-06 | End: 2022-04-05

## 2022-01-06 NOTE — TELEPHONE ENCOUNTER
Pending Prescriptions:                       Disp   Refills    atenolol (TENORMIN) 50 MG tablet [Pharmacy*90 tab*3        Sig: TAKE 1 TABLET BY MOUTH EVERY DAY    losartan (COZAAR) 50 MG tablet [Pharmacy M*90 tab*3        Sig: TAKE 1 TABLET BY MOUTH EVERY DAY    hydrochlorothiazide (HYDRODIURIL) 12.5 MG *90 tab*3        Sig: TAKE 1 TABLET BY MOUTH EVERY DAY    Routing refill request to provider for review/approval because:  BP Readings from Last 3 Encounters:   12/16/21 (!) 142/82   11/19/21 117/69   05/10/21 116/68

## 2022-01-17 ENCOUNTER — ANCILLARY PROCEDURE (OUTPATIENT)
Dept: MAMMOGRAPHY | Facility: CLINIC | Age: 67
End: 2022-01-17
Attending: INTERNAL MEDICINE
Payer: MEDICARE

## 2022-01-17 DIAGNOSIS — Z12.31 VISIT FOR SCREENING MAMMOGRAM: ICD-10-CM

## 2022-01-17 PROCEDURE — 77063 BREAST TOMOSYNTHESIS BI: CPT | Mod: TC | Performed by: RADIOLOGY

## 2022-01-17 PROCEDURE — 77067 SCR MAMMO BI INCL CAD: CPT | Mod: TC | Performed by: RADIOLOGY

## 2022-01-25 ENCOUNTER — MYC MEDICAL ADVICE (OUTPATIENT)
Dept: INTERNAL MEDICINE | Facility: CLINIC | Age: 67
End: 2022-01-25
Payer: MEDICARE

## 2022-01-25 DIAGNOSIS — N30.00 ACUTE CYSTITIS WITHOUT HEMATURIA: Primary | ICD-10-CM

## 2022-01-25 NOTE — TELEPHONE ENCOUNTER
Please see patient's Hub[employer] message and advise. Patient is out of state in [place] and not due back until 2/1/2022. Patient has concerns about a UTI. Do you want patient to see a provider in [place]/urgent care there so that UA/UC can be tested? Do you want patient to submit an e-visit?   Or do you want to send in a medication?     Patient also wondering if she needs to see urology when she's back in [place]. Urology referral?     Routing to provider and covering providers.     Delmi VAN RN   [facility]

## 2022-01-26 RX ORDER — NITROFURANTOIN 25; 75 MG/1; MG/1
100 CAPSULE ORAL 2 TIMES DAILY
Qty: 14 CAPSULE | Refills: 0 | Status: SHIPPED | OUTPATIENT
Start: 2022-01-26 | End: 2022-02-14

## 2022-01-26 NOTE — TELEPHONE ENCOUNTER
Recommend she see urgent care given she has had recurrent UTIs, some of them have not been tested.

## 2022-01-26 NOTE — TELEPHONE ENCOUNTER
Pharmacy updated.     Dr. Martinez, do you want to send in an prescription or do you want patient to go to urgent care to have urine tested? Patient's last MyChart message says she will still try to go to an urgent care to get urine tested, when she returns from her boat trip.    Delmi VAN RN   [facility]

## 2022-02-13 ENCOUNTER — HEALTH MAINTENANCE LETTER (OUTPATIENT)
Age: 67
End: 2022-02-13

## 2022-02-14 ENCOUNTER — OFFICE VISIT (OUTPATIENT)
Dept: INTERNAL MEDICINE | Facility: CLINIC | Age: 67
End: 2022-02-14
Payer: MEDICARE

## 2022-02-14 VITALS
HEART RATE: 87 BPM | DIASTOLIC BLOOD PRESSURE: 60 MMHG | TEMPERATURE: 97.6 F | RESPIRATION RATE: 18 BRPM | OXYGEN SATURATION: 99 % | HEIGHT: 66 IN | SYSTOLIC BLOOD PRESSURE: 104 MMHG | BODY MASS INDEX: 32.35 KG/M2 | WEIGHT: 201.3 LBS

## 2022-02-14 DIAGNOSIS — I10 ESSENTIAL HYPERTENSION WITH GOAL BLOOD PRESSURE LESS THAN 140/90: ICD-10-CM

## 2022-02-14 DIAGNOSIS — R30.0 DYSURIA: ICD-10-CM

## 2022-02-14 DIAGNOSIS — I48.0 PAROXYSMAL ATRIAL FIBRILLATION (H): ICD-10-CM

## 2022-02-14 DIAGNOSIS — Z00.00 PREVENTATIVE HEALTH CARE: Primary | ICD-10-CM

## 2022-02-14 DIAGNOSIS — E21.3 HYPERPARATHYROIDISM (H): ICD-10-CM

## 2022-02-14 LAB
ALBUMIN SERPL-MCNC: 3.3 G/DL (ref 3.4–5)
ALBUMIN UR-MCNC: NEGATIVE MG/DL
ALP SERPL-CCNC: 93 U/L (ref 40–150)
ALT SERPL W P-5'-P-CCNC: 23 U/L (ref 0–50)
ANION GAP SERPL CALCULATED.3IONS-SCNC: 3 MMOL/L (ref 3–14)
APPEARANCE UR: CLEAR
AST SERPL W P-5'-P-CCNC: 18 U/L (ref 0–45)
BACTERIA #/AREA URNS HPF: ABNORMAL /HPF
BASOPHILS # BLD AUTO: 0.1 10E3/UL (ref 0–0.2)
BASOPHILS NFR BLD AUTO: 1 %
BILIRUB SERPL-MCNC: 0.6 MG/DL (ref 0.2–1.3)
BILIRUB UR QL STRIP: NEGATIVE
BUN SERPL-MCNC: 18 MG/DL (ref 7–30)
CALCIUM SERPL-MCNC: 9.4 MG/DL (ref 8.5–10.1)
CHLORIDE BLD-SCNC: 106 MMOL/L (ref 94–109)
CHOLEST SERPL-MCNC: 138 MG/DL
CO2 SERPL-SCNC: 30 MMOL/L (ref 20–32)
COLOR UR AUTO: YELLOW
CREAT SERPL-MCNC: 0.76 MG/DL (ref 0.52–1.04)
EOSINOPHIL # BLD AUTO: 0.2 10E3/UL (ref 0–0.7)
EOSINOPHIL NFR BLD AUTO: 4 %
ERYTHROCYTE [DISTWIDTH] IN BLOOD BY AUTOMATED COUNT: 14 % (ref 10–15)
FASTING STATUS PATIENT QL REPORTED: YES
GFR SERPL CREATININE-BSD FRML MDRD: 85 ML/MIN/1.73M2
GLUCOSE BLD-MCNC: 95 MG/DL (ref 70–99)
GLUCOSE UR STRIP-MCNC: NEGATIVE MG/DL
HCT VFR BLD AUTO: 40.9 % (ref 35–47)
HDLC SERPL-MCNC: 71 MG/DL
HGB BLD-MCNC: 13.3 G/DL (ref 11.7–15.7)
HGB UR QL STRIP: NEGATIVE
HYALINE CASTS #/AREA URNS LPF: ABNORMAL /LPF
KETONES UR STRIP-MCNC: NEGATIVE MG/DL
LDLC SERPL CALC-MCNC: 54 MG/DL
LEUKOCYTE ESTERASE UR QL STRIP: ABNORMAL
LYMPHOCYTES # BLD AUTO: 1.6 10E3/UL (ref 0.8–5.3)
LYMPHOCYTES NFR BLD AUTO: 29 %
MCH RBC QN AUTO: 29.7 PG (ref 26.5–33)
MCHC RBC AUTO-ENTMCNC: 32.5 G/DL (ref 31.5–36.5)
MCV RBC AUTO: 91 FL (ref 78–100)
MONOCYTES # BLD AUTO: 0.5 10E3/UL (ref 0–1.3)
MONOCYTES NFR BLD AUTO: 10 %
MUCOUS THREADS #/AREA URNS LPF: PRESENT /LPF
NEUTROPHILS # BLD AUTO: 3.1 10E3/UL (ref 1.6–8.3)
NEUTROPHILS NFR BLD AUTO: 56 %
NITRATE UR QL: NEGATIVE
NONHDLC SERPL-MCNC: 67 MG/DL
PH UR STRIP: 6 [PH] (ref 5–7)
PLATELET # BLD AUTO: 275 10E3/UL (ref 150–450)
POTASSIUM BLD-SCNC: 4.3 MMOL/L (ref 3.4–5.3)
PROT SERPL-MCNC: 6.4 G/DL (ref 6.8–8.8)
RBC # BLD AUTO: 4.48 10E6/UL (ref 3.8–5.2)
RBC #/AREA URNS AUTO: ABNORMAL /HPF
SODIUM SERPL-SCNC: 139 MMOL/L (ref 133–144)
SP GR UR STRIP: 1.01 (ref 1–1.03)
SQUAMOUS #/AREA URNS AUTO: ABNORMAL /LPF
TRIGL SERPL-MCNC: 64 MG/DL
TSH SERPL DL<=0.005 MIU/L-ACNC: 1.13 MU/L (ref 0.4–4)
UROBILINOGEN UR STRIP-ACNC: 0.2 E.U./DL
WBC # BLD AUTO: 5.5 10E3/UL (ref 4–11)
WBC #/AREA URNS AUTO: ABNORMAL /HPF

## 2022-02-14 PROCEDURE — 84443 ASSAY THYROID STIM HORMONE: CPT | Performed by: INTERNAL MEDICINE

## 2022-02-14 PROCEDURE — 80053 COMPREHEN METABOLIC PANEL: CPT | Performed by: INTERNAL MEDICINE

## 2022-02-14 PROCEDURE — 80061 LIPID PANEL: CPT | Performed by: INTERNAL MEDICINE

## 2022-02-14 PROCEDURE — 36415 COLL VENOUS BLD VENIPUNCTURE: CPT | Performed by: INTERNAL MEDICINE

## 2022-02-14 PROCEDURE — 85025 COMPLETE CBC W/AUTO DIFF WBC: CPT | Performed by: INTERNAL MEDICINE

## 2022-02-14 PROCEDURE — 81001 URINALYSIS AUTO W/SCOPE: CPT | Performed by: INTERNAL MEDICINE

## 2022-02-14 PROCEDURE — G0439 PPPS, SUBSEQ VISIT: HCPCS | Performed by: INTERNAL MEDICINE

## 2022-02-14 PROCEDURE — 82306 VITAMIN D 25 HYDROXY: CPT | Performed by: INTERNAL MEDICINE

## 2022-02-14 ASSESSMENT — MIFFLIN-ST. JEOR: SCORE: 1456.9

## 2022-02-14 NOTE — PROGRESS NOTES
SUBJECTIVE:   Jose Coronado is a 66 year old female who presents for Preventive Visit.      Patient has been advised of split billing requirements and indicates understanding: Yes  Are you in the first 12 months of your Medicare coverage?  No    HPI  Do you feel safe in your environment? Yes    Have you ever done Advance Care Planning? (For example, a Health Directive, POLST, or a discussion with a medical provider or your loved ones about your wishes): Yes, advance care planning is on file.       Fall risk  Fallen 2 or more times in the past year?: No  Any fall with injury in the past year?: No    Cognitive Screening   1) Repeat 3 items (Leader, Season, Table)    2) Clock draw: NORMAL  3) 3 item recall: Recalls 3 objects  Results: 3 items recalled: COGNITIVE IMPAIRMENT LESS LIKELY    Mini-CogTM Copyright S Shelia. Licensed by the author for use in [facility]; reprinted with permission ([email or web address]@[email or web address]). All rights reserved.      Do you have sleep apnea, excessive snoring or daytime drowsiness?: yes    Reviewed and updated as needed this visit by clinical staff  Tobacco  Allergies  Meds  Problems  Med Hx  Surg Hx  Fam Hx  Soc Hx         Reviewed and updated as needed this visit by Provider               Social History     Tobacco Use     Smoking status: Never Smoker     Smokeless tobacco: Never Used   Substance Use Topics     Alcohol use: Yes     Alcohol/week: 0.0 standard drinks     Comment: Social     If you drink alcohol do you typically have >3 drinks per day or >7 drinks per week? No    No flowsheet data found.            Current providers sharing in care for this patient include:   Patient Care Team:  Lian Martinez MD as PCP - General (Internal Medicine)  Lian Martinez MD as Assigned PCP  Olivia Vasquez PA-C as Physician Assistant (Physician Assistant - Medical)  Carolyn Valdivia MD as MD (Urology)  Lita Goodman, RN as Specialty Care Coordinator (Urology)  Lian Martinez  MD Zoila as Referring Physician (Internal Medicine)  Carolyn Valdivia MD as Assigned Surgical Provider  Umer Crow MD as Assigned Musculoskeletal Provider  Lily Yancey MD as MD (OB/Gyn)    The following health maintenance items are reviewed in Epic and correct as of today:  Health Maintenance Due   Topic Date Due     ANNUAL REVIEW OF HM ORDERS  Never done     ZOSTER IMMUNIZATION (1 of 2) Never done     Pneumococcal Vaccine: 65+ Years (1 of 1 - PPSV23) Never done     PHQ-2  01/01/2022     FALL RISK ASSESSMENT  01/11/2022     BP Readings from Last 3 Encounters:   02/14/22 104/60   12/16/21 (!) 142/82   11/19/21 117/69    Wt Readings from Last 3 Encounters:   02/14/22 91.3 kg (201 lb 4.8 oz)   05/10/21 93.4 kg (206 lb)   02/10/21 93.4 kg (206 lb)                 Any new diagnosis of family breast, ovarian, or bowel cancer? No    FHS-7:   Breast CA Risk Assessment (FHS-7) 1/17/2022   Did any of your first-degree relatives have breast or ovarian cancer? Yes   Did any of your relatives have bilateral breast cancer? No   Did any man in your family have breast cancer? No   Did any woman in your family have breast and ovarian cancer? No   Did any woman in your family have breast cancer before age 50 y? No   Do you have 2 or more relatives with breast and/or ovarian cancer? No   Do you have 2 or more relatives with breast and/or bowel cancer? No       Mammogram Screening: Recommended mammography every 1-2 years with patient discussion and risk factor consideration  Pertinent mammograms are reviewed under the imaging tab.    Review of Systems  CONSTITUTIONAL: NEGATIVE for fever, chills, change in weight  INTEGUMENTARY/SKIN: NEGATIVE for worrisome rashes, moles or lesions  EYES: NEGATIVE for vision changes or irritation  ENT/MOUTH: NEGATIVE for ear, mouth and throat problems  RESP: NEGATIVE for significant cough or SOB  BREAST: NEGATIVE for masses, tenderness or discharge  CV: NEGATIVE for chest pain,  "palpitations or peripheral edema  GI: NEGATIVE for nausea, abdominal pain, heartburn, or change in bowel habits   female: she has intermittent dysuria, can be hard for her to call us and get an order for a UA/UC   MUSCULOSKELETAL:DJD hips hands low back  NEURO: NEGATIVE for weakness, dizziness or paresthesias  ENDOCRINE: NEGATIVE for temperature intolerance, skin/hair changes  HEME: NEGATIVE for bleeding problems  PSYCHIATRIC: NEGATIVE for changes in mood or affect    OBJECTIVE:   /60 (BP Location: Right arm, Patient Position: Sitting, Cuff Size: Adult Large)   Pulse 87   Temp 97.6  F (36.4  C) (Tympanic)   Resp 18   Ht 1.664 m (5' 5.5\")   Wt 91.3 kg (201 lb 4.8 oz)   SpO2 99%   BMI 32.99 kg/m   Estimated body mass index is 32.99 kg/m  as calculated from the following:    Height as of this encounter: 1.664 m (5' 5.5\").    Weight as of this encounter: 91.3 kg (201 lb 4.8 oz).  Physical Exam  GENERAL: healthy, alert and no distress  EYES: Eyes grossly normal to inspection, PERRL and conjunctivae and sclerae normal  HENT: ear canals and TM's normal, nose and mouth without ulcers or lesions  NECK: no adenopathy, no asymmetry, masses, or scars and thyroid normal to palpation  RESP: lungs clear to auscultation - no rales, rhonchi or wheezes  CV: regular rate and rhythm, normal S1 S2, no S3 or S4, no murmur, click or rub, no peripheral edema and peripheral pulses strong  ABDOMEN: soft, nontender, no hepatosplenomegaly, no masses and bowel sounds normal  MS: no gross musculoskeletal defects noted, no edema  SKIN: no suspicious lesions or rashes  NEURO: Normal strength and tone, mentation intact and speech normal  PSYCH: mentation appears normal, affect normal/bright      ASSESSMENT / PLAN:   (Z00.00) Preventative health care  (primary encounter diagnosis)  Comment:    Plan: CBC with platelets and differential, TSH with         free T4 reflex, Lipid Profile (Chol, Trig, HDL,        LDL calc), Comprehensive " "metabolic panel (BMP +        Alb, Alk Phos, ALT, AST, Total. Bili, TP)             (E21.3) Hyperparathyroidism (H)  Comment:    Plan: Vitamin D Deficiency             (I48.0) Paroxysmal atrial fibrillation (H)  Comment:    Plan: TSH with free T4 reflex             (I10) Essential hypertension with goal blood pressure less than 140/90  Comment:  under good control   Plan:      (R30.0) Dysuria  Comment: will order standing UA/UC   Plan: UA Macro with Reflex to Micro and Culture - lab        collect               Patient has been advised of split billing requirements and indicates understanding: Yes    COUNSELING:  Reviewed preventive health counseling, as reflected in patient instructions       Regular exercise       Healthy diet/nutrition    Estimated body mass index is 32.99 kg/m  as calculated from the following:    Height as of this encounter: 1.664 m (5' 5.5\").    Weight as of this encounter: 91.3 kg (201 lb 4.8 oz).    Weight management plan: Discussed healthy diet and exercise guidelines    She reports that she has never smoked. She has never used smokeless tobacco.      Appropriate preventive services were discussed with this patient, including applicable screening as appropriate for cardiovascular disease, diabetes, osteopenia/osteoporosis, and glaucoma.  As appropriate for age/gender, discussed screening for colorectal cancer, prostate cancer, breast cancer, and cervical cancer. Checklist reviewing preventive services available has been given to the patient.    Reviewed patients plan of care and provided an AVS. The Basic Care Plan (routine screening as documented in Health Maintenance) for Jose meets the Care Plan requirement. This Care Plan has been established and reviewed with the Patient.    Counseling Resources:  ATP IV Guidelines  Pooled Cohorts Equation Calculator  Breast Cancer Risk Calculator  Breast Cancer: Medication to Reduce Risk  FRAX Risk Assessment  ICSI Preventive Guidelines  Dietary " Guidelines for Americans, 2010  USDA's MyPlate  ASA Prophylaxis  Lung CA Screening    Lian Martinez MD  [facility]    Identified Health Risks:

## 2022-02-15 ENCOUNTER — MYC MEDICAL ADVICE (OUTPATIENT)
Dept: INTERNAL MEDICINE | Facility: CLINIC | Age: 67
End: 2022-02-15
Payer: MEDICARE

## 2022-02-15 DIAGNOSIS — R30.0 DYSURIA: Primary | ICD-10-CM

## 2022-02-15 LAB — DEPRECATED CALCIDIOL+CALCIFEROL SERPL-MC: 54 UG/L (ref 20–75)

## 2022-02-15 NOTE — TELEPHONE ENCOUNTER
Please review MyChart message and advise regarding urine microscopic results.     Mary Reno RN  [facility]

## 2022-03-01 ENCOUNTER — LAB (OUTPATIENT)
Dept: LAB | Facility: CLINIC | Age: 67
End: 2022-03-01
Payer: MEDICARE

## 2022-03-01 ENCOUNTER — MYC MEDICAL ADVICE (OUTPATIENT)
Dept: INTERNAL MEDICINE | Facility: CLINIC | Age: 67
End: 2022-03-01

## 2022-03-01 DIAGNOSIS — N30.00 ACUTE CYSTITIS WITHOUT HEMATURIA: Primary | ICD-10-CM

## 2022-03-01 DIAGNOSIS — R30.0 DYSURIA: ICD-10-CM

## 2022-03-01 LAB
BACTERIA #/AREA URNS HPF: ABNORMAL /HPF
RBC #/AREA URNS AUTO: ABNORMAL /HPF
SQUAMOUS #/AREA URNS AUTO: ABNORMAL /LPF
WBC #/AREA URNS AUTO: >100 /HPF

## 2022-03-01 PROCEDURE — 87186 SC STD MICRODIL/AGAR DIL: CPT

## 2022-03-01 PROCEDURE — 81015 MICROSCOPIC EXAM OF URINE: CPT

## 2022-03-01 PROCEDURE — 87086 URINE CULTURE/COLONY COUNT: CPT

## 2022-03-02 LAB — BACTERIA UR CULT: ABNORMAL

## 2022-03-02 RX ORDER — CIPROFLOXACIN 500 MG/1
500 TABLET, FILM COATED ORAL 2 TIMES DAILY
Qty: 14 TABLET | Refills: 0 | Status: SHIPPED | OUTPATIENT
Start: 2022-03-02 | End: 2022-05-18

## 2022-03-11 ENCOUNTER — MYC MEDICAL ADVICE (OUTPATIENT)
Dept: INTERNAL MEDICINE | Facility: CLINIC | Age: 67
End: 2022-03-11
Payer: MEDICARE

## 2022-03-11 DIAGNOSIS — R30.0 DYSURIA: Primary | ICD-10-CM

## 2022-03-11 NOTE — TELEPHONE ENCOUNTER
Please review Q [employer] message and advise.     Mary Reno RN  [facility]

## 2022-03-14 RX ORDER — CIPROFLOXACIN 500 MG/1
500 TABLET, FILM COATED ORAL 2 TIMES DAILY
Qty: 14 TABLET | Refills: 0 | Status: SHIPPED | OUTPATIENT
Start: 2022-03-14 | End: 2022-05-18

## 2022-04-06 ENCOUNTER — MYC MEDICAL ADVICE (OUTPATIENT)
Dept: INTERNAL MEDICINE | Facility: CLINIC | Age: 67
End: 2022-04-06
Payer: MEDICARE

## 2022-04-06 NOTE — TELEPHONE ENCOUNTER
[email or web address] message sent to covering provider-Dr. Spaulding as FYI. Patient has standing order from Dr. Martinez for /UC.     Mary Reno RN  [facility]

## 2022-04-08 ENCOUNTER — LAB (OUTPATIENT)
Dept: LAB | Facility: CLINIC | Age: 67
End: 2022-04-08
Payer: MEDICARE

## 2022-04-08 DIAGNOSIS — R30.0 DYSURIA: ICD-10-CM

## 2022-04-08 LAB
ALBUMIN UR-MCNC: NEGATIVE MG/DL
APPEARANCE UR: ABNORMAL
BACTERIA #/AREA URNS HPF: ABNORMAL /HPF
BILIRUB UR QL STRIP: NEGATIVE
COLOR UR AUTO: YELLOW
GLUCOSE UR STRIP-MCNC: NEGATIVE MG/DL
HGB UR QL STRIP: ABNORMAL
KETONES UR STRIP-MCNC: NEGATIVE MG/DL
LEUKOCYTE ESTERASE UR QL STRIP: ABNORMAL
NITRATE UR QL: POSITIVE
PH UR STRIP: 6 [PH] (ref 5–7)
RBC #/AREA URNS AUTO: ABNORMAL /HPF
SP GR UR STRIP: 1.01 (ref 1–1.03)
SQUAMOUS #/AREA URNS AUTO: ABNORMAL /LPF
UROBILINOGEN UR STRIP-ACNC: 1 E.U./DL
WBC #/AREA URNS AUTO: ABNORMAL /HPF
WBC CLUMPS #/AREA URNS HPF: PRESENT /HPF

## 2022-04-08 PROCEDURE — 81001 URINALYSIS AUTO W/SCOPE: CPT

## 2022-04-08 PROCEDURE — 87186 SC STD MICRODIL/AGAR DIL: CPT

## 2022-04-08 PROCEDURE — 87086 URINE CULTURE/COLONY COUNT: CPT

## 2022-04-09 LAB — BACTERIA UR CULT: ABNORMAL

## 2022-04-17 ENCOUNTER — MYC MEDICAL ADVICE (OUTPATIENT)
Dept: INTERNAL MEDICINE | Facility: CLINIC | Age: 67
End: 2022-04-17
Payer: MEDICARE

## 2022-04-17 DIAGNOSIS — R31.9 URINARY TRACT INFECTION WITH HEMATURIA, SITE UNSPECIFIED: Primary | ICD-10-CM

## 2022-04-17 DIAGNOSIS — N39.0 URINARY TRACT INFECTION WITH HEMATURIA, SITE UNSPECIFIED: Primary | ICD-10-CM

## 2022-04-17 DIAGNOSIS — N39.0 FREQUENT UTI: ICD-10-CM

## 2022-04-18 ENCOUNTER — LAB (OUTPATIENT)
Dept: LAB | Facility: CLINIC | Age: 67
End: 2022-04-18
Payer: MEDICARE

## 2022-04-18 DIAGNOSIS — R30.0 DYSURIA: ICD-10-CM

## 2022-04-18 LAB
ALBUMIN UR-MCNC: NEGATIVE MG/DL
APPEARANCE UR: ABNORMAL
BACTERIA #/AREA URNS HPF: ABNORMAL /HPF
BILIRUB UR QL STRIP: NEGATIVE
CAOX CRY #/AREA URNS HPF: ABNORMAL /HPF
COLOR UR AUTO: YELLOW
GLUCOSE UR STRIP-MCNC: NEGATIVE MG/DL
HGB UR QL STRIP: ABNORMAL
KETONES UR STRIP-MCNC: NEGATIVE MG/DL
LEUKOCYTE ESTERASE UR QL STRIP: ABNORMAL
NITRATE UR QL: POSITIVE
PH UR STRIP: 6 [PH] (ref 5–7)
RBC #/AREA URNS AUTO: ABNORMAL /HPF
SP GR UR STRIP: 1.01 (ref 1–1.03)
SQUAMOUS #/AREA URNS AUTO: ABNORMAL /LPF
UROBILINOGEN UR STRIP-ACNC: 0.2 E.U./DL
WBC #/AREA URNS AUTO: ABNORMAL /HPF
WBC CLUMPS #/AREA URNS HPF: PRESENT /HPF

## 2022-04-18 PROCEDURE — 81001 URINALYSIS AUTO W/SCOPE: CPT

## 2022-04-18 PROCEDURE — 87086 URINE CULTURE/COLONY COUNT: CPT

## 2022-04-18 PROCEDURE — 87186 SC STD MICRODIL/AGAR DIL: CPT

## 2022-04-18 NOTE — TELEPHONE ENCOUNTER
[employer] message forwarded to covering provider, Dr. Spaulding.     Mary Reno, RN  [facility]

## 2022-04-19 RX ORDER — NITROFURANTOIN 25; 75 MG/1; MG/1
100 CAPSULE ORAL 2 TIMES DAILY
Qty: 14 CAPSULE | Refills: 0 | Status: SHIPPED | OUTPATIENT
Start: 2022-04-19 | End: 2022-05-18

## 2022-04-19 NOTE — TELEPHONE ENCOUNTER
Patient checking on results of UA, urine culture pending.   Covering provider, Dr. Spaulding is out of the clinic today, routed to her partner to review.     Mary Reno RN  [facility]

## 2022-04-19 NOTE — TELEPHONE ENCOUNTER
I reviewed your UA results-has UTI, urine culture pending, patient recently completed Cipro, will fax Macrobid 1 tablet twice daily for 7 days pending urine culture,, please inform patient

## 2022-04-21 LAB — BACTERIA UR CULT: ABNORMAL

## 2022-04-21 NOTE — TELEPHONE ENCOUNTER
Per most recent message from patient below, she has been feeling worse rather than better.  Reports she has been taking Macrobid x 4 days.    Next step?    Different antibiotic?    Please advise, thanks.

## 2022-04-21 NOTE — TELEPHONE ENCOUNTER
Please see patient's MyChart response and advise. Urine culture from 4/18/2022 has resulted- preliminary results.    Patient currently taking nitroFURantoin macrocrystal-monohydrate (MACROBID) 100 MG capsule.        Patient reports symptoms have worsened.     Routing to Dr. Martinez's covering provider for C-E, Dr. Spaulding.     Thank you!    Delmi VAN RN   [facility]

## 2022-04-22 NOTE — TELEPHONE ENCOUNTER
Patient asks if she can get Urology clinic referral to see a specialist for recurrent UTIs. Routed to covering provider - Dr. Spaulding, to review and advise if appointment needed to discuss referral.     Mary Reno RN  [facility]

## 2022-04-25 NOTE — TELEPHONE ENCOUNTER
[employer] message sent with referral information.     Delmi VAN RN   [facility]

## 2022-05-04 ENCOUNTER — VIRTUAL VISIT (OUTPATIENT)
Dept: UROLOGY | Facility: CLINIC | Age: 67
End: 2022-05-04
Payer: MEDICARE

## 2022-05-04 VITALS — WEIGHT: 200 LBS | BODY MASS INDEX: 32.14 KG/M2 | HEIGHT: 66 IN

## 2022-05-04 DIAGNOSIS — Z87.440 HISTORY OF UTI: Primary | ICD-10-CM

## 2022-05-04 PROCEDURE — 99214 OFFICE O/P EST MOD 30 MIN: CPT | Mod: 95 | Performed by: PHYSICIAN ASSISTANT

## 2022-05-04 RX ORDER — METHENAMINE HIPPURATE 1000 MG/1
1 TABLET ORAL 2 TIMES DAILY
Qty: 180 TABLET | Refills: 0 | Status: SHIPPED | OUTPATIENT
Start: 2022-05-04 | End: 2022-07-29

## 2022-05-04 ASSESSMENT — PAIN SCALES - GENERAL: PAINLEVEL: NO PAIN (0)

## 2022-05-04 NOTE — LETTER
5/4/2022       RE: Jose Coronado  [address] [place] [address]     Dear Colleague,    Thank you for referring your patient, Jose Coronado, to the [facility] UROLOGY [facility]INIC [place] at [facility]. Please see a copy of my visit note below.    *PT WILL MEET YOU IN MYCHART*    PT DENIES SX OF A UTI    Jose is a 66 year old who is being evaluated via a billable video visit.      How would you like to obtain your AVS? MyChart  If the video visit is dropped, the invitation should be resent by: Text to cell phone: [phone]  Will anyone else be joining your video visit? No      Video Start Time: 12:38 PM  Video-Visit Details    Type of service:  Video Visit    Video End Time:1:06 PM    Originating Location (pt. Location): Home    Distant Location (provider location):  [facility] UROLOGY CL[facility]IC [place]     Platform used for Video Visit: Jena    CC: Recurrent UTIs    HPI: It was my pleasure to meet Ms. Jose Coronado, a pleasant 66 year old year old female seen in consultation today at the request of Dr. Martinez for recurrent UTIs. Had been a prior pt in our practice due to mixed incontinence and prolapse.     She is not immunosuppressed.  Today she complains of a recent history of frequent urinary tract infections, and states has had 4 in the last 6 months (Culture proven and worsening resistance).  These are typically symptomatic.  Typical symptoms include:  Frequency, urgency, burning, bladder pain. She denies gross hematuria, flank pain, fevers, chills.  She has no history of stones.  She has never been hospitalized for UTIs.       With infection, Ms. Jose Coronado typically goes to  where cultures are performed (see EMR, e coli, Kleb).  Is usually given an antibiotic and reports that symptoms do resolve appropriately with therapy. Therapies that have been tried include: Macrobid, Cipro.     -Inciting events include: Patient does not hold urine beyond the  urge to void  -Daily Fluid intake: water with minimal caffeine.    -Bowels: Reg, has rectocele    On Toviaz and s/p robotic hyst, sacrocolpopext, midurethral sling in 2020 with Dr. Valdivia. She is thrilled with her urinary control.     Past Medical History:   Diagnosis Date     Anemia      Arthritis 2015    Hands, back, hips. Various dates first noted.     CVA (cerebral vascular accident) (H) 2017    ?migraine, ?pfo--negative vasc w/u, neg hypercoag w/u     Diffuse cystic mastopathy     Fibrocystic breast disease     HTN, goal below 140/90      Hyperparathyroidism (H)      Infectious mononucleosis     Mono at age 16     Irregular heart beat     PAT no afib on 30day monitor     Labyrinthitis, unspecified      Migraine headache with aura      Osteopenia      Pain in joint, shoulder region     Secondary to a fall     Palpitations      PFO (patent foramen ovale)     s/p closure with amplazter device 7/13/17     S/P gastric bypass June, 2010     Sleep apnea     she is on CPAP     Sleep apnea      Vitamin D deficiencies        Past Surgical History:   Procedure Laterality Date     BIOPSY  1984    Breast biopsies     BREAST SURGERY  1984    Above     CARDIAC SURGERY  7/13/2017    PFO closure     COLONOSCOPY N/A 8/12/2015    Procedure: COLONOSCOPY;  Surgeon: Deandre Brooks MD;  Location:  GI     DAVINCI HYSTERECTOMY SUPRACERVICAL, SALPINGO-OOPHORECTOMY INCLUDING BILATERAL N/A 3/16/2020    Procedure: DAVINCI HYSTERECTOMY SUPRACERVICAL, SALPINGO-OOPHORECTOMY INCLUDING BILATERAL WITH EXAM UNDER ANESTHESIA;  Surgeon: Lily Yancey MD;  Location: [facility] OR     DAVINCI SACROCOLPOPEXY, MIDURETHRAL SLING, CYSTOSCOPY N/A 3/16/2020    Procedure: SACROCOLPOPEXY, ROBOT-ASSISTED, LAPAROSCOPIC, WITH INSERTION OF MIDURETHRAL SLING AND CYSTOSCOPY;  Surgeon: Carolyn Valdivia MD;  Location: [facility] OR     GASTRIC BYPASS  June 24, 2010     GENITOURINARY SURGERY  3/16/2020    Cystocele/rectocele repair     GYN SURGERY  3/16/2020     Hysterectomy     ORTHOPEDIC SURGERY Left 2010    wrist fracture     PARATHYROIDECTOMY  9/19/11     [facility] ANEURYSM, INTRACRAN, SIMPLE SURG  04/2017    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 unreadable) ankle       FAMILY HISTORY: Denies family history of urologic cancer.     Social History     Socioeconomic History     Marital status:      Spouse name: Not on file     Number of children: Not on file     Years of education: Not on file     Highest education level: Not on file   Occupational History     Not on file   Tobacco Use     Smoking status: Never Smoker     Smokeless tobacco: Never Used   Vaping Use     Vaping Use: Never used   Substance and Sexual Activity     Alcohol use: Yes     Alcohol/week: 0.0 standard drinks     Comment: Social     Drug use: No     Sexual activity: Not Currently     Partners: Male     Birth control/protection: Pill   Other Topics Concern     Parent/sibling w/ CABG, MI or angioplasty before 65F 55M? No   Social History Narrative     Not on file     Social Determinants of Health     Financial Resource Strain: Not on file   Food Insecurity: Not on file   Transportation Needs: Not on file   Physical Activity: Not on file   Stress: Not on file   Social Connections: Not on file   Intimate Partner Violence: Not on file   Housing Stability: Not on file      reports that she has never smoked. She has never used smokeless tobacco.    Current Outpatient Medications   Medication Sig Dispense Refill     acetaminophen (TYLENOL) 325 MG tablet Take 2 tablets (650 mg) by mouth every 6 hours as needed for mild pain Start after Delivery. 100 tablet 0     Ascorbic Acid (VITAMIN C PO) Take 250 mg by mouth every morning (0.5 x 500 mg tablet = 250 mg dose)        aspirin 325 MG tablet Take 325 mg by mouth every morning        atenolol (TENORMIN) 50 MG tablet TAKE 1 TABLET BY MOUTH EVERY DAY 90 tablet 3     Calcium Citrate-Vitamin D (CALCIUM CITRATE + D PO) Take 2 tablets by mouth every morning        COMPOUNDED NON-CONTROLLED SUBSTANCE (CMPD RX) - PHARMACY TO MIX COMPOUNDED MEDICATION Estriol 1 mg/g in HRT base, apply small amount to finger and apply to inside vagina daily for 2 weeks then twice weekly 30 g 11     Cyanocobalamin 2500 MCG TABS Take 2,500 mcg by mouth once a week Mon       estradiol (ESTRACE) 0.1 MG/GM vaginal cream Place 1 g vaginally twice a week PACO per insurance 42.5 g 3     Ferrous Sulfate 27 MG TABS Take 27 mg by mouth every morning        Fesoterodine Fumarate (TOVIAZ) 8 MG TB24 Take 1 tablet (8 mg) by mouth daily 90 tablet 3     FIBER COMPLETE PO Take 1 capsule by mouth every morning        hydrochlorothiazide (HYDRODIURIL) 12.5 MG tablet TAKE 1 TABLET BY MOUTH EVERY DAY 90 tablet 3     imipramine (TOFRANIL) 25 MG tablet TAKE 1 TABLET BY MOUTH AT BEDTIME 90 tablet 3     loratadine (CLARITIN) 10 MG tablet Take 10 mg by mouth every morning        losartan (COZAAR) 50 MG tablet TAKE 1 TABLET BY MOUTH EVERY DAY 90 tablet 3     omega 3 1000 MG CAPS Take 1 g by mouth every morning  90 capsule      topiramate (TOPAMAX) 25 MG tablet TAKE 1 TABLET BY MOUTH TWICE A  tablet 0     UNABLE TO FIND CPAP machine every night       valACYclovir (VALTREX) 1000 mg tablet TAKE 2 TABLETS (2,000 MG) BY MOUTH 2 TIMES DAILY AS NEEDED 4 tablet 2     VITAMIN D, CHOLECALCIFEROL, PO Take 500 Units by mouth every morning        ciprofloxacin (CIPRO) 500 MG tablet Take 1 tablet (500 mg) by mouth 2 times daily 14 tablet 0     ciprofloxacin (CIPRO) 500 MG tablet Take 1 tablet (500 mg) by mouth 2 times daily 14 tablet 0     nitroFURantoin macrocrystal-monohydrate (MACROBID) 100 MG capsule Take 1 capsule (100 mg) by mouth 2 times daily 14 capsule 0       ALLERGIES: Contrast dye  "and Sulfa drugs    GENERAL PHYSICAL EXAM:   Vitals: Ht 1.676 m (5' 6\")   Wt 90.7 kg (200 lb)   BMI 32.28 kg/m    PSYCH: NAD  EYES: EOMI  NEURO: AAO x3     ASSESSMENT:   Recurrent UTIs    PLAN:   -UA/UC if symptoms  -Cysto with Dr. Valdivia and CT (allergy to contrast) prior.  -Hydrate.  - Lifestyle and hygiene modifications were reviewed today in clinic, including wiping front to back, wearing cotton breathable underwear, voiding before and after intercourse to flush the urethra, minimizing baths and opting for showers, and appropriate perineal hygiene. Push fluids to keep the urine dilute  -Estrace cream 3 x per week q HS to continue  -Consider Cysto and renal US if not improving with the above.   - Discussed Hiprex 1000mg BID + Vitamin C 1000mg BID   - Cran tab    FRANCISCA Escudero [facility] Urology    33 minutes spent on the date of the encounter doing chart review, review of outside records, review of test results, interpretation of tests, patient visit and documentation       "

## 2022-05-04 NOTE — PROGRESS NOTES
*PT WILL MEET YOU IN [place]*    PT DENIES SX OF A UTI    Jose is a 66 year old who is being evaluated via a billable video visit.      How would you like to obtain your AVS? MyChart  If the video visit is dropped, the invitation should be resent by: Text to cell phone: [phone]  Will anyone else be joining your video visit? No      Video Start Time: 12:38 PM  Video-Visit Details    Type of service:  Video Visit    Video End Time:1:06 PM    Originating Location (pt. Location): Home    Distant Location (provider location):  [facility]OLOG[facility] [place]     Platform used for Video Visit: [email or web address]    CC: Recurrent UTIs    HPI: It was my pleasure to meet Ms. Jose Coronado, a pleasant 66 year old year old female seen in consultation today at the request of Dr. Martinez for recurrent UTIs. Had been a prior pt in our practice due to mixed incontinence and prolapse.     She is not immunosuppressed.  Today she complains of a recent history of frequent urinary tract infections, and states has had 4 in the last 6 months (Culture proven and worsening resistance).  These are typically symptomatic.  Typical symptoms include: significant bladder/urethral spasms just before, during and after urination, bladder pain and painful to walk and sit. She denies gross hematuria, flank pain, fevers, chills.  She has no history of stones.  She has never been hospitalized for UTIs.       With infection, Ms. Jose Coronado typically goes to  where cultures are performed (see EMR, e coli, Kleb).  Is usually given an antibiotic and reports that symptoms do resolve appropriately with therapy. Therapies that have been tried include: Macrobid, Cipro.     -Inciting events include: Patient does not hold urine beyond the urge to void  -Daily Fluid intake: water with minimal caffeine.    -Bowels: Reg, has rectocele    On Toviaz and s/p robotic hyst, sacrocolpopext, midurethral sling in 2020 with Dr. Valdivia. She is thrilled with her urinary  control.     Past Medical History:   Diagnosis Date     Anemia      Arthritis 2015    Hands, back, hips. Various dates first noted.     CVA (cerebral vascular accident) (H) 2017    ?migraine, ?pfo--negative vasc w/u, neg hypercoag w/u     Diffuse cystic mastopathy     Fibrocystic breast disease     HTN, goal below 140/90      Hyperparathyroidism (H)      Infectious mononucleosis     Mono at age 16     Irregular heart beat     PAT no afib on 30day monitor     Labyrinthitis, unspecified      Migraine headache with aura      Osteopenia      Pain in joint, shoulder region     Secondary to a fall     Palpitations      PFO (patent foramen ovale)     s/p closure with amplazter device 7/13/17     S/P gastric bypass June, 2010     Sleep apnea     she is on CPAP     Sleep apnea      Vitamin D deficiencies        Past Surgical History:   Procedure Laterality Date     BIOPSY  1984    Breast biopsies     BREAST SURGERY  1984    Above     CARDIAC SURGERY  7/13/2017    PFO closure     COLONOSCOPY N/A 8/12/2015    Procedure: COLONOSCOPY;  Surgeon: Deandre Brooks MD;  Location: [facility] GI     DAVINCI HYSTERECTOMY SUPRACERVICAL, SALPINGO-OOPHORECTOMY INCLUDING BILATERAL N/A 3/16/2020    Procedure: DAVINCI HYSTERECTOMY SUPRACERVICAL, SALPINGO-OOPHORECTOMY INCLUDING BILATERAL WITH EXAM UNDER ANESTHESIA;  Surgeon: Lily Yancey MD;  Location: [facility] OR     DAVINCI SACROCOLPOPEXY, MIDURETHRAL SLING, CYSTOSCOPY N/A 3/16/2020    Procedure: SACROCOLPOPEXY, ROBOT-ASSISTED, LAPAROSCOPIC, WITH INSERTION OF MIDURETHRAL SLING AND CYSTOSCOPY;  Surgeon: Carolyn Valdivia MD;  Location: [facility] OR     GASTRIC BYPASS  June 24, 2010     GENITOURINARY SURGERY  3/16/2020    Cystocele/rectocele repair     GYN SURGERY  3/16/2020    Hysterectomy     ORTHOPEDIC SURGERY Left 2010    wrist fracture     PARATHYROIDECTOMY  9/19/11     [facility] ANEURYSM, INTRACRAN, SIMPLE SURG  04/2017    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 unreadable) ankle       FAMILY HISTORY: Denies family history of urologic cancer.     Social History     Socioeconomic History     Marital status:      Spouse name: Not on file     Number of children: Not on file     Years of education: Not on file     Highest education level: Not on file   Occupational History     Not on file   Tobacco Use     Smoking status: Never Smoker     Smokeless tobacco: Never Used   Vaping Use     Vaping Use: Never used   Substance and Sexual Activity     Alcohol use: Yes     Alcohol/week: 0.0 standard drinks     Comment: Social     Drug use: No     Sexual activity: Not Currently     Partners: Male     Birth control/protection: Pill   Other Topics Concern     Parent/sibling w/ CABG, MI or angioplasty before 65F 55M? No   Social History Narrative     Not on file     Social Determinants of Health     Financial Resource Strain: Not on file   Food Insecurity: Not on file   Transportation Needs: Not on file   Physical Activity: Not on file   Stress: Not on file   Social Connections: Not on file   Intimate Partner Violence: Not on file   Housing Stability: Not on file      reports that she has never smoked. She has never used smokeless tobacco.    Current Outpatient Medications   Medication Sig Dispense Refill     acetaminophen (TYLENOL) 325 MG tablet Take 2 tablets (650 mg) by mouth every 6 hours as needed for mild pain Start after Delivery. 100 tablet 0     Ascorbic Acid (VITAMIN C PO) Take 250 mg by mouth every morning (0.5 x 500 mg tablet = 250 mg dose)       aspirin 325 MG tablet Take 325 mg by mouth every morning        atenolol (TENORMIN) 50 MG tablet TAKE 1 TABLET BY MOUTH EVERY DAY 90 tablet 3     Calcium Citrate-Vitamin D (CALCIUM CITRATE + D PO) Take 2 tablets by mouth  "every morning        COMPOUNDED NON-CONTROLLED SUBSTANCE (CMPD RX) - PHARMACY TO MIX COMPOUNDED MEDICATION Estriol 1 mg/g in HRT base, apply small amount to finger and apply to inside vagina daily for 2 weeks then twice weekly 30 g 11     Cyanocobalamin 2500 MCG TABS Take 2,500 mcg by mouth once a week Mon       estradiol (ESTRACE) 0.1 MG/GM vaginal cream Place 1 g vaginally twice a week PACO per insurance 42.5 g 3     Ferrous Sulfate 27 MG TABS Take 27 mg by mouth every morning        Fesoterodine Fumarate (TOVIAZ) 8 MG TB24 Take 1 tablet (8 mg) by mouth daily 90 tablet 3     FIBER COMPLETE PO Take 1 capsule by mouth every morning        hydrochlorothiazide (HYDRODIURIL) 12.5 MG tablet TAKE 1 TABLET BY MOUTH EVERY DAY 90 tablet 3     imipramine (TOFRANIL) 25 MG tablet TAKE 1 TABLET BY MOUTH AT BEDTIME 90 tablet 3     loratadine (CLARITIN) 10 MG tablet Take 10 mg by mouth every morning        losartan (COZAAR) 50 MG tablet TAKE 1 TABLET BY MOUTH EVERY DAY 90 tablet 3     omega 3 1000 MG CAPS Take 1 g by mouth every morning  90 capsule      topiramate (TOPAMAX) 25 MG tablet TAKE 1 TABLET BY MOUTH TWICE A  tablet 0     UNABLE TO FIND CPAP machine every night       valACYclovir (VALTREX) 1000 mg tablet TAKE 2 TABLETS (2,000 MG) BY MOUTH 2 TIMES DAILY AS NEEDED 4 tablet 2     VITAMIN D, CHOLECALCIFEROL, PO Take 500 Units by mouth every morning        ciprofloxacin (CIPRO) 500 MG tablet Take 1 tablet (500 mg) by mouth 2 times daily 14 tablet 0     ciprofloxacin (CIPRO) 500 MG tablet Take 1 tablet (500 mg) by mouth 2 times daily 14 tablet 0     nitroFURantoin macrocrystal-monohydrate (MACROBID) 100 MG capsule Take 1 capsule (100 mg) by mouth 2 times daily 14 capsule 0       ALLERGIES: Contrast dye and Sulfa drugs    GENERAL PHYSICAL EXAM:   Vitals: Ht 1.676 m (5' 6\")   Wt 90.7 kg (200 lb)   BMI 32.28 kg/m    PSYCH: NAD  EYES: EOMI  NEURO: AAO x3     ASSESSMENT:   Recurrent UTIs    PLAN:   -UA/UC if " symptoms  -Cysto with Dr. Valdivia and CT (allergy to contrast) prior.  -Hydrate.  - Lifestyle and hygiene modifications were reviewed today in clinic, including wiping front to back, wearing cotton breathable underwear, voiding before and after intercourse to flush the urethra, minimizing baths and opting for showers, and appropriate perineal hygiene. Push fluids to keep the urine dilute  -Estrace cream 3 x per week q HS to continue  -Consider Cysto and renal US if not improving with the above.   - Discussed Hiprex 1000mg BID + Vitamin C 1000mg BID   - Cran tab    Olivia Vasquez PA-C  [facility] Urology    33 minutes spent on the date of the encounter doing chart review, review of outside records, review of test results, interpretation of tests, patient visit and documentation

## 2022-05-04 NOTE — PATIENT INSTRUCTIONS
-Vitamin C 1,000mg twice a day.  -Cranberry tablet once a day  -methenamine twice day     CT scan    CYSTOSCOPY    What is a Cystoscopy?  This is a procedure done to check for problems inside the bladder.  Problems may include polyps (growths), tumors, inflammation (swelling and redness) and other concerns.    The Urologist inserts a thin tube (called a cystoscope) into the bladder.  The tube is about the size of a pencil.  We will give you numbing medicine to reduce the pain or discomfort you may feel.    The Urologist will be able to see inside the bladder by filling the bladder with water.  The water makes it easier to see any problems that may be present. You will have a sense to need to urinate and this is normal.       How should I get ready for the exam?  Nothing to do to prepare. You may eat normally the day of the exam. There is no sedation, so you may drive yourself to and from if you can drive.       Please tell your doctor if:  You have a history of urinary tract infections.  You know that you have a tumor in your bladder.  You have bleeding problems.  You have any allergies.  You are or may be pregnant.      What happens after the exam?  You may go back to your normal diet and activity as you feel ready.    For the next two days after the exam, you may notice:  Some blood in your urine.  Some burning when you urinate (use the toilet).  An urge to urinate more often.  Bladder spasms.    These are normal after the procedure. They should go away on their own after a day or two.      You can help to relieve the above listed symptoms by:  Drinking 6 to 8 large glasses of water each day (includes drinks at meals).  This will help clear the urine.  Take warm baths to relieve pain and bladder spasms.  Do not add anything to the bath water.  You may take Tylenol (acetaminophen) per label instructions for discomfort.

## 2022-05-09 ENCOUNTER — HOSPITAL ENCOUNTER (OUTPATIENT)
Dept: CT IMAGING | Facility: CLINIC | Age: 67
Discharge: HOME | End: 2022-05-09
Attending: PHYSICIAN ASSISTANT | Admitting: PHYSICIAN ASSISTANT
Payer: MEDICARE

## 2022-05-09 DIAGNOSIS — Z87.440 HISTORY OF UTI: ICD-10-CM

## 2022-05-09 PROCEDURE — G1004 CDSM NDSC: HCPCS

## 2022-05-11 DIAGNOSIS — N95.2 ATROPHIC VAGINITIS: ICD-10-CM

## 2022-05-11 NOTE — TELEPHONE ENCOUNTER
COMPOUNDED NON-CONTROLLED SUBSTANCE (CMPD RX) - PHARMACY TO MIX COMPOUNDED MEDICATION  Last Written Prescription Date:   3/23/2020  Last Fill Quantity: 42.5,   # refills: 3  Last Office Visit :  5/4/2022  Future Office visit:   5/18/2022    Routing refill request to provider for review/approval because:  Drug not on the [facility], P or  Health refill protocol or controlled substance      Radha Luis RN  Central Triage Red Flags/Med Refills

## 2022-05-13 ENCOUNTER — MYC MEDICAL ADVICE (OUTPATIENT)
Dept: INTERNAL MEDICINE | Facility: CLINIC | Age: 67
End: 2022-05-13
Payer: MEDICARE

## 2022-05-13 ENCOUNTER — PRE VISIT (OUTPATIENT)
Dept: UROLOGY | Facility: CLINIC | Age: 67
End: 2022-05-13
Payer: MEDICARE

## 2022-05-13 NOTE — TELEPHONE ENCOUNTER
Reason for Visit: Cystoscopy    Diagnosis: History of UTI    Orders/Procedures/Records: in system    Contact Patient: n/a    Rooming Requirements: UA dip prior to getting ready for cystoscopy. If positive for Leuks and/or Nitrites, will not do cystoscopy. If positive, send urine for official UA / UC.      Carla Palmer  05/13/22  11:45 AM

## 2022-05-16 ENCOUNTER — TELEPHONE (OUTPATIENT)
Dept: UROLOGY | Facility: CLINIC | Age: 67
End: 2022-05-16
Payer: MEDICARE

## 2022-05-16 NOTE — TELEPHONE ENCOUNTER
[facility] Call Center    Phone Message    May a detailed message be left on voicemail: yes     Reason for Call: Other: pt called and stated she has questions regarding her Cysto on Wednesday, please reach out to pt to further discuss, Thanks!     Action Taken: Message routed to:  [facility] Uro    Travel Screening: Not Applicable

## 2022-05-17 NOTE — TELEPHONE ENCOUNTER
I reviewed the CT. There is no emergency. She can wait for Aug when dr Martinez is back   OR  I can see her   -- I see virtual visit opening next Tuesday  -- or in office 3-4 weeks ( same day, or 7:30) is ok

## 2022-05-18 ENCOUNTER — OFFICE VISIT (OUTPATIENT)
Dept: UROLOGY | Facility: CLINIC | Age: 67
End: 2022-05-18
Payer: MEDICARE

## 2022-05-18 VITALS — SYSTOLIC BLOOD PRESSURE: 128 MMHG | HEART RATE: 76 BPM | DIASTOLIC BLOOD PRESSURE: 82 MMHG

## 2022-05-18 DIAGNOSIS — N30.00 ACUTE CYSTITIS WITHOUT HEMATURIA: ICD-10-CM

## 2022-05-18 DIAGNOSIS — N39.0 FREQUENT UTI: Primary | ICD-10-CM

## 2022-05-18 DIAGNOSIS — N95.2 ATROPHIC VAGINITIS: ICD-10-CM

## 2022-05-18 DIAGNOSIS — N39.0 RECURRENT UTI: ICD-10-CM

## 2022-05-18 LAB
ALBUMIN UR-MCNC: NEGATIVE MG/DL
APPEARANCE UR: CLEAR
BILIRUB UR QL STRIP: NEGATIVE
COLOR UR AUTO: YELLOW
GLUCOSE UR STRIP-MCNC: NEGATIVE MG/DL
HGB UR QL STRIP: ABNORMAL
KETONES UR STRIP-MCNC: NEGATIVE MG/DL
LEUKOCYTE ESTERASE UR QL STRIP: ABNORMAL
NITRATE UR QL: POSITIVE
PH UR STRIP: 6 [PH] (ref 5–8)
SP GR UR STRIP: 1.01 (ref 1–1.03)
UROBILINOGEN UR STRIP-ACNC: 1 E.U./DL

## 2022-05-18 PROCEDURE — 81003 URINALYSIS AUTO W/O SCOPE: CPT | Performed by: PATHOLOGY

## 2022-05-18 PROCEDURE — 52000 CYSTOURETHROSCOPY: CPT | Performed by: UROLOGY

## 2022-05-18 PROCEDURE — 87186 SC STD MICRODIL/AGAR DIL: CPT | Performed by: UROLOGY

## 2022-05-18 RX ORDER — LIDOCAINE HYDROCHLORIDE 20 MG/ML
JELLY TOPICAL ONCE
Status: COMPLETED | OUTPATIENT
Start: 2022-05-18 | End: 2022-05-18

## 2022-05-18 RX ORDER — NITROFURANTOIN 25; 75 MG/1; MG/1
100 CAPSULE ORAL 2 TIMES DAILY
Qty: 14 CAPSULE | Refills: 0 | Status: SHIPPED | OUTPATIENT
Start: 2022-05-18 | End: 2022-06-09

## 2022-05-18 RX ORDER — NITROFURANTOIN MACROCRYSTAL 100 MG
100 CAPSULE ORAL AT BEDTIME
Qty: 30 CAPSULE | Refills: 11 | Status: SHIPPED | OUTPATIENT
Start: 2022-05-18 | End: 2022-07-22

## 2022-05-18 RX ADMIN — LIDOCAINE HYDROCHLORIDE: 20 JELLY TOPICAL at 08:14

## 2022-05-18 NOTE — NURSING NOTE
Chief Complaint   Patient presents with     Cystoscopy     Shantal       Blood pressure 128/82, pulse 76, not currently breastfeeding. There is no height or weight on file to calculate BMI.    Patient Active Problem List   Diagnosis     Family history of malignant neoplasm of breast     Female stress incontinence     Sleep apnea     Osteopenia     S/P gastric bypass     Vitamin D deficiency     Hyperparathyroidism (H)     Hirsutism     ACP (advance care planning)     Class 1 obesity in adult     Iron deficiency anemia     Lump or mass in breast     PFO (patent foramen ovale)     Essential hypertension with goal blood pressure less than 140/90     Left temporal lobe infarction (H)     Stroke (H)     Long-term (current) use of anticoagulants [Z79.01]     Cerebral aneurysm without rupture     ASD (atrial septal defect)     Migraine with aura and without status migrainosus, not intractable     Midline cystocele     Rectocele     Word finding difficulty     Transient neurological symptoms     Received intravenous tissue plasminogen activator (tPA) in emergency department     Accidental marijuana poisoning     Vaginal prolapse     Atrophic vaginitis     Urge incontinence     Urinary urgency       Allergies   Allergen Reactions     Contrast Dye Itching     Reaction of immediate burning and severe itching in Right ear and back of throat after injection for CT.      Sulfa Drugs      hives       Current Outpatient Medications   Medication Sig Dispense Refill     acetaminophen (TYLENOL) 325 MG tablet Take 2 tablets (650 mg) by mouth every 6 hours as needed for mild pain Start after Delivery. 100 tablet 0     Ascorbic Acid (VITAMIN C PO) Take 250 mg by mouth every morning (0.5 x 500 mg tablet = 250 mg dose)       aspirin 325 MG tablet Take 325 mg by mouth every morning        atenolol (TENORMIN) 50 MG tablet TAKE 1 TABLET BY MOUTH EVERY DAY 90 tablet 3     Calcium Citrate-Vitamin D (CALCIUM CITRATE + D PO) Take 2 tablets by mouth  every morning        COMPOUNDED NON-CONTROLLED SUBSTANCE (CMPD RX) - PHARMACY TO MIX COMPOUNDED MEDICATION Estriol 1 mg/g in HRT base, apply small amount to finger and apply to inside vagina daily for 2 weeks then twice weekly 30 g 11     Cyanocobalamin 2500 MCG TABS Take 2,500 mcg by mouth once a week Mon       Ferrous Sulfate 27 MG TABS Take 27 mg by mouth every morning        Fesoterodine Fumarate (TOVIAZ) 8 MG TB24 Take 1 tablet (8 mg) by mouth daily 90 tablet 3     FIBER COMPLETE PO Take 1 capsule by mouth every morning        hydrochlorothiazide (HYDRODIURIL) 12.5 MG tablet TAKE 1 TABLET BY MOUTH EVERY DAY 90 tablet 3     imipramine (TOFRANIL) 25 MG tablet TAKE 1 TABLET BY MOUTH AT BEDTIME 90 tablet 3     loratadine (CLARITIN) 10 MG tablet Take 10 mg by mouth every morning        losartan (COZAAR) 50 MG tablet TAKE 1 TABLET BY MOUTH EVERY DAY 90 tablet 3     methenamine hippurate (HIPREX) 1 g tablet Take 1 tablet (1 g) by mouth 2 times daily 180 tablet 0     topiramate (TOPAMAX) 25 MG tablet TAKE 1 TABLET BY MOUTH TWICE A  tablet 0     UNABLE TO FIND CPAP machine every night       valACYclovir (VALTREX) 1000 mg tablet TAKE 2 TABLETS (2,000 MG) BY MOUTH 2 TIMES DAILY AS NEEDED 4 tablet 2     VITAMIN D, CHOLECALCIFEROL, PO Take 500 Units by mouth every morning        ciprofloxacin (CIPRO) 500 MG tablet Take 1 tablet (500 mg) by mouth 2 times daily 14 tablet 0     ciprofloxacin (CIPRO) 500 MG tablet Take 1 tablet (500 mg) by mouth 2 times daily 14 tablet 0     estradiol (ESTRACE) 0.1 MG/GM vaginal cream Place 1 g vaginally twice a week PACO per insurance 42.5 g 3     nitroFURantoin macrocrystal-monohydrate (MACROBID) 100 MG capsule Take 1 capsule (100 mg) by mouth 2 times daily 14 capsule 0     omega 3 1000 MG CAPS Take 1 g by mouth every morning  90 capsule        Social History     Tobacco Use     Smoking status: Never Smoker     Smokeless tobacco: Never Used   Vaping Use     Vaping Use: Never used    Substance Use Topics     Alcohol use: Yes     Alcohol/week: 0.0 standard drinks     Comment: Social     Drug use: No       Tanya Carmichael CMA  2022  8:14 AM     Invasive Procedure Safety Checklist:    Procedure: Cystoscopy    Action: Complete sections and checkboxes as appropriate.    Pre-procedure:  1. Patient ID Verified with 2 identifiers (Florinda and  or MRN) : YES    2. Procedure and site verified with patient/designee (when able) : YES    3. Accurate consent documentation in medical record : YES    4. H&P (or appropriate assessment) documented in medical record : YES  H&P must be up to 30 days prior to procedure an updated within 24 hours of                 Procedure as applicable.     5. Relevant diagnostic and radiology test results appropriately labeled and displayed as applicable : YES    6. Blood products, implants, devices, and/or special equipment available for the procedure as applicable : YES    7. Procedure site(s) marked with provider initials [Exclusions: None] : NO    8. Marking not required. Reason : Yes  Procedure does not require site marking    Time Out:     Time-Out performed immediately prior to starting procedure, including verbal and active participation of all team members addressing: YES    1. Correct patient identity.  2. Confirmed that the correct side and site are marked.  3. An accurate procedure to be done.  4. Agreement on the procedure to be done.  5. Correct patient position.  6. Relevant images and results are properly labeled and appropriately displayed.  7. The need to administer antibiotics or fluids for irrigation purposes during the procedure as applicable.  8. Safety precautions based on patient history or medication use.    During Procedure: Verification of correct person, site, and procedure occurs any time the responsibility for care of the patient is transferred to another member of the care team.      The following medication was given:     MEDICATION:   Uro-jet  ROUTE: Urethral  SITE: Urethra  DOSE: 10 mL 2% lidocaine  LOT #: [id number]  : [employer]  EXPIRATION DATE: 09/23  NDC#: [address]-82   Was there drug waste? No    Prior to administration, verified patient identity using patient's name and date of birth.  Due to administration, patient instructed to remain in clinic for 15 minutes  afterwards, and to report any adverse reaction to me immediately.      Drug Amount Wasted:  None.  Vial/Syringe: Single dose vial    Tanya Carmichael CMA  May 18, 2022

## 2022-05-18 NOTE — PROGRESS NOTES
Reason for visit:  F/u on recurrent uti's.      Clinical Data: Ms. Jose Coronado  is a 66 year old female with a history of robotic sacrocolpopexy in conjuntion with hysterectomy on 3/16/20.  She feels like she is doing well but feels like she has a little bulging since Sept of 2020.  The urinary symptoms of overactivity are improved since before the surgery, but continues to have some urge incontinence.  She continues to use the estrogen cream.  She does have some constipation and uses stool softeners.    She was started on Toviaz and that seems to be helping significantly. She continues to have just a little leakage, but not as it been before, she just wears on a panty liner.  And it is related to urge, not stress maneuvers.    However more recently she has been getting a lot of urinary tract infections.   In fact her UA today shows nitrites and small LE upon review. She present for cystoscopy.    Cystoscopy procedure:  Pt. Was consented and placed in the lithotomy position.  She was cleaned and preparred in the usual fashion.  Lidocain gel was inserted into the urethra and given time to take effect.  A 16 fr flexible cystoscope was then inserted through the urethra and into the bladder.  The urethra was wnl.  The bladder was with 1+ trabeculation.  No tumors, diverticulae, or stones.  But she has debris in the bladder.  Bilateral u/o's were effluxing clear urine.  The cystoscope was then withdrawn.  The pt. Tolerated the procedure well.      A/P 65 y/o female s/p Robotic sacrocolpopexy and midurethral sling along with hysterectomy doing well except for a little urgency and associated urge incontinence that is mostly well controlled, however she has some recurrent uti's that are very bothersome.   We discussed suppressive therapy and she would like to do that.  -continue miralax and titrate appropriately.  -stay well hydrated  -continue with estrogen cream twice per week for the atrophic vaginitis   -continue  Tovias, also can be renewed by primary.  -start macrobid for treatment for acute cystitis.  -start macrodantin for prophylaxis afterwards      Thank you for allowing me to participate in the care of  Ms.Jose JEFFRIES Cliff and I will keep you updated on her progress.    Carolyn Valdivia MD

## 2022-05-18 NOTE — PATIENT INSTRUCTIONS
"Take medication as directed.    Follow up with Dr. Validvia in 3 months - this can be a video visit.    It was a pleasure meeting with you today.  Thank you for allowing me and my team the privilege of caring for you today.  YOU are the reason we are here, and I truly hope we provided you with the excellent service you deserve.  Please let us know if there is anything else we can do for you so that we can be sure you are leaving completely satisfied with your care experience.        AFTER YOUR CYSTOSCOPY        You have just completed a cystoscopy, or \"cysto\", which allowed your physician to learn more about your bladder (or to remove a stent placed after surgery). We suggest that you continue to avoid caffeine, fruit juice, and alcohol for the next 24 hours, however, you are encouraged to return to your normal activities.         A few things that are considered normal after your cystoscopy:     * Small amount of bleeding (or spotting) that clears within the next 24 hours     * Slight burning sensation with urination     * Sensation to of needing to avoid more frequently     * The feeling of \"air\" in your urine     * Mild discomfort that is relieved with Tylenol        Please contact our office promptly if you:     * Develop a fever above 101 degrees     * Are unable to urinate     * Develop bright red blood that does not stop     * Severe pain or swelling         Please contact our office with any concerns or questions @[email or web address].    -continue miralax and titrate appropriately.  -stay well hydrated  -continue with estrogen cream twice per week for the atrophic vaginitis   -continue Tovias, also can be renewed by primary.  -start macrobid for treatment for acute cystitis.  -start macrodantin for prophylaxis afterwards  "

## 2022-05-18 NOTE — LETTER
5/18/2022       RE: Jose Coronado  [address] [place] [address]     Dear Colleague,    Thank you for referring your patient, Jose Coronado, to the [facility] [place] at [facility]. Please see a copy of my visit note below.    Reason for visit:  F/u on recurrent uti's.      Clinical Data: Ms. Jose Coronado  is a 66 year old female with a history of robotic sacrocolpopexy in conjuntion with hysterectomy on 3/16/20.  She feels like she is doing well but feels like she has a little bulging since Sept of 2020.  The urinary symptoms of overactivity are improved since before the surgery, but continues to have some urge incontinence.  She continues to use the estrogen cream.  She does have some constipation and uses stool softeners.    She was started on Toviaz and that seems to be helping significantly. She continues to have just a little leakage, but not as it been before, she just wears on a panty liner.  And it is related to urge, not stress maneuvers.    However more recently she has been getting a lot of urinary tract infections.   In fact her UA today shows nitrites and small LE upon review. She present for cystoscopy.    Cystoscopy procedure:  Pt. Was consented and placed in the lithotomy position.  She was cleaned and preparred in the usual fashion.  Lidocain gel was inserted into the urethra and given time to take effect.  A 16 fr flexible cystoscope was then inserted through the urethra and into the bladder.  The urethra was wnl.  The bladder was with 1+ trabeculation.  No tumors, diverticulae, or stones.  But she has debris in the bladder.  Bilateral u/o's were effluxing clear urine.  The cystoscope was then withdrawn.  The pt. Tolerated the procedure well.      A/P 65 y/o female s/p Robotic sacrocolpopexy and midurethral sling along with hysterectomy doing well except for a little urgency and associated urge incontinence that is  mostly well controlled, however she has some recurrent uti's that are very bothersome.   We discussed suppressive therapy and she would like to do that.  -continue miralax and titrate appropriately.  -stay well hydrated  -continue with estrogen cream twice per week for the atrophic vaginitis   -continue Tovias, also can be renewed by primary.  -start macrobid for treatment for acute cystitis.  -start macrodantin for prophylaxis afterwards      Thank you for allowing me to participate in the care of  Ms.Jose Coronado and I will keep you updated on her progress.    Carolyn Valdivia MD

## 2022-05-18 NOTE — TELEPHONE ENCOUNTER
Patient informed of provider's message below.  States she will think about it and decide if she wants to wait for Dr. Martinez to return or schedule an appointment with Dr. Spaulding.

## 2022-05-21 LAB — BACTERIA UR CULT: ABNORMAL

## 2022-05-26 DIAGNOSIS — Z87.440 HISTORY OF UTI: Primary | ICD-10-CM

## 2022-06-02 ENCOUNTER — MYC MEDICAL ADVICE (OUTPATIENT)
Dept: UROLOGY | Facility: CLINIC | Age: 67
End: 2022-06-02
Payer: MEDICARE

## 2022-06-02 DIAGNOSIS — N39.0 RECURRENT UTI: Primary | ICD-10-CM

## 2022-06-06 ENCOUNTER — LAB (OUTPATIENT)
Dept: LAB | Facility: CLINIC | Age: 67
End: 2022-06-06
Payer: MEDICARE

## 2022-06-06 DIAGNOSIS — N39.0 FREQUENT UTI: ICD-10-CM

## 2022-06-06 LAB
ALBUMIN UR-MCNC: NEGATIVE MG/DL
APPEARANCE UR: CLEAR
BACTERIA #/AREA URNS HPF: ABNORMAL /HPF
BILIRUB UR QL STRIP: NEGATIVE
CAOX CRY #/AREA URNS HPF: ABNORMAL /HPF
COLOR UR AUTO: YELLOW
GLUCOSE UR STRIP-MCNC: NEGATIVE MG/DL
HGB UR QL STRIP: NEGATIVE
KETONES UR STRIP-MCNC: ABNORMAL MG/DL
LEUKOCYTE ESTERASE UR QL STRIP: NEGATIVE
MUCOUS THREADS #/AREA URNS LPF: PRESENT /LPF
NITRATE UR QL: NEGATIVE
PH UR STRIP: 6.5 [PH] (ref 5–7)
RBC #/AREA URNS AUTO: ABNORMAL /HPF
SP GR UR STRIP: 1.01 (ref 1–1.03)
SQUAMOUS #/AREA URNS AUTO: ABNORMAL /LPF
UROBILINOGEN UR STRIP-ACNC: 0.2 E.U./DL
WBC #/AREA URNS AUTO: ABNORMAL /HPF

## 2022-06-06 PROCEDURE — 87086 URINE CULTURE/COLONY COUNT: CPT

## 2022-06-06 PROCEDURE — 81001 URINALYSIS AUTO W/SCOPE: CPT

## 2022-06-08 LAB — BACTERIA UR CULT: NO GROWTH

## 2022-06-09 ENCOUNTER — TELEPHONE (OUTPATIENT)
Dept: INTERNAL MEDICINE | Facility: CLINIC | Age: 67
End: 2022-06-09

## 2022-06-09 ENCOUNTER — OFFICE VISIT (OUTPATIENT)
Dept: URGENT CARE | Facility: URGENT CARE | Age: 67
End: 2022-06-09
Payer: MEDICARE

## 2022-06-09 VITALS
HEART RATE: 76 BPM | TEMPERATURE: 98.3 F | DIASTOLIC BLOOD PRESSURE: 50 MMHG | OXYGEN SATURATION: 100 % | SYSTOLIC BLOOD PRESSURE: 100 MMHG | RESPIRATION RATE: 20 BRPM

## 2022-06-09 DIAGNOSIS — B37.31 YEAST INFECTION OF THE VAGINA: Primary | ICD-10-CM

## 2022-06-09 LAB
CLUE CELLS: ABNORMAL
TRICHOMONAS, WET PREP: ABNORMAL
WBC'S/HIGH POWER FIELD, WET PREP: ABNORMAL
YEAST, WET PREP: PRESENT

## 2022-06-09 PROCEDURE — 99213 OFFICE O/P EST LOW 20 MIN: CPT | Performed by: PHYSICIAN ASSISTANT

## 2022-06-09 PROCEDURE — 87210 SMEAR WET MOUNT SALINE/INK: CPT | Performed by: PHYSICIAN ASSISTANT

## 2022-06-09 RX ORDER — MICONAZOLE NITRATE 20 MG/G
CREAM TOPICAL 2 TIMES DAILY
Qty: 42 G | Refills: 0 | Status: SHIPPED | OUTPATIENT
Start: 2022-06-09 | End: 2022-06-16

## 2022-06-09 NOTE — TELEPHONE ENCOUNTER
Patient called stating that she was seen in UC today and was diagnosed with a yeast infection. Patient discussed with provider treatment options and opted for a vaginal suppository for 3 days and a cream to help with the itching. When patient went to her pharmacy to  her prescription the pharmacist told her that they did not receive a prescription.     If the prescription is available over the counter patient wonders what the medication is and how to obtain it.     Millie Flannery RN on 6/9/2022 at 2:21 PM

## 2022-06-09 NOTE — PROGRESS NOTES
Assessment & Plan     1. Yeast infection of the vagina  Seen on wet prep, likely from recent Augmentin usage  Will treat with topical therapy    - Wet prep - Clinic Collect  - Cranberry 450 MG TABS; Take by mouth 2 times daily  - miconazole (MICATIN) 2 % external cream; Apply topically 2 times daily for 7 days For itching  Dispense: 42 g; Refill: 0  - miconazole (MICONAZOLE 3) 200 MG vaginal suppository; Place 1 suppository (200 mg) vaginally At Bedtime for 3 days  Dispense: 3 suppository; Refill: 0          Return in about 1 week (around 6/16/2022), or if symptoms worsen or fail to improve.    Diagnosis and treatment plan was reviewed with patient and/or family.   We went over any labs or imaging. Discussed worsening symptoms or little to no relief despite treatment plan to follow-up with PCP or return to clinic.  Patient verbalizes understanding. All questions were addressed and answered.     Krystal Chin PA-C  [facility]    CHIEF COMPLAINT:   Chief Complaint   Patient presents with     Vaginal Itching     1 week, irritation, discharge, just finish augmentin for UTI     Subjective     Jose is a 66 year old female who presents to clinic today for evaluation of vaginal itching and discomfort for the past one week. Has had chronic UTIs, recently treated with Augmentin and takes Macrobid daily. Denies having fever, chills, flank pain, nausea, vomiting, hematuria or weakness.     Past Medical History:   Diagnosis Date     Anemia      Arthritis 2015    Hands, back, hips. Various dates first noted.     CVA (cerebral vascular accident) (H) 2017    ?migraine, ?pfo--negative vasc w/u, neg hypercoag w/u     Diffuse cystic mastopathy     Fibrocystic breast disease     HTN, goal below 140/90      Hyperparathyroidism (H)      Infectious mononucleosis     Mono at age 16     Irregular heart beat     PAT no afib on 30day monitor     Labyrinthitis, unspecified      Migraine headache with aura       Osteopenia      Pain in joint, shoulder region     Secondary to a fall     Palpitations      PFO (patent foramen ovale)     s/p closure with amplazter device 7/13/17     S/P gastric bypass June, 2010     Sleep apnea     she is on CPAP     Sleep apnea      Vitamin D deficiencies      Past Surgical History:   Procedure Laterality Date     BIOPSY  1984    Breast biopsies     BREAST SURGERY  1984    Above     CARDIAC SURGERY  7/13/2017    PFO closure     COLONOSCOPY N/A 8/12/2015    Procedure: COLONOSCOPY;  Surgeon: Deandre Brooks MD;  Location: [facility]     DAVINCI HYSTERECTOMY SUPRACERVICAL, SALPINGO-OOPHORECTOMY INCLUDING BILATERAL N/A 3/16/2020    Procedure: DAVINCI HYSTERECTOMY SUPRACERVICAL, SALPINGO-OOPHORECTOMY INCLUDING BILATERAL WITH EXAM UNDER ANESTHESIA;  Surgeon: Lily Yancey MD;  Location: [facility] OR     DAVINCI SACROCOLPOPEXY, MIDURETHRAL SLING, CYSTOSCOPY N/A 3/16/2020    Procedure: SACROCOLPOPEXY, ROBOT-ASSISTED, LAPAROSCOPIC, WITH INSERTION OF MIDURETHRAL SLING AND CYSTOSCOPY;  Surgeon: Carolyn Valdivia MD;  Location: [facility] OR     GASTRIC BYPASS  June 24, 2010     GENITOURINARY SURGERY  3/16/2020    Cystocele/rectocele repair     GYN SURGERY  3/16/2020    Hysterectomy     ORTHOPEDIC SURGERY Left 2010    wrist fracture     PARATHYROIDECTOMY  9/19/11     [facility] ANEURYSM, INTRACRAN, SIMPLE SURG  04/2017    coil of aneurysm right posterior paraophthalmic artery     [facility] NONSPECIFIC PROCEDURE      S/P multiple breast biopies - all negative / benign     [facility] NONSPECIFIC PROCEDURE      S/P T&A     Z NONSPECIFIC PROCEDURE      [place]le teeth extraction     Z NONSPECIFIC PROCEDURE      S/P (? unreadable) ankle     Social History     Tobacco Use     Smoking status: Never Smoker     Smokeless tobacco: Never Used   Substance Use Topics     Alcohol use: Yes     Alcohol/week: 0.0 standard drinks     Comment: Social     Current Outpatient Medications   Medication     Ascorbic Acid (VITAMIN C PO)     aspirin 325 MG  tablet     atenolol (TENORMIN) 50 MG tablet     Calcium Citrate-Vitamin D (CALCIUM CITRATE + D PO)     COMPOUNDED NON-CONTROLLED SUBSTANCE (CMPD RX) - PHARMACY TO MIX COMPOUNDED MEDICATION     Cranberry 450 MG TABS     Cyanocobalamin 2500 MCG TABS     Ferrous Sulfate 27 MG TABS     Fesoterodine Fumarate (TOVIAZ) 8 MG TB24     FIBER COMPLETE PO     hydrochlorothiazide (HYDRODIURIL) 12.5 MG tablet     imipramine (TOFRANIL) 25 MG tablet     loratadine (CLARITIN) 10 MG tablet     losartan (COZAAR) 50 MG tablet     methenamine hippurate (HIPREX) 1 g tablet     miconazole (MICATIN) 2 % external cream     miconazole (MICONAZOLE 3) 200 MG vaginal suppository     nitroFURantoin macrocrystal (MACRODANTIN) 100 MG capsule     topiramate (TOPAMAX) 25 MG tablet     UNABLE TO FIND     valACYclovir (VALTREX) 1000 mg tablet     VITAMIN D, CHOLECALCIFEROL, PO     acetaminophen (TYLENOL) 325 MG tablet     No current facility-administered medications for this visit.     Allergies   Allergen Reactions     Contrast Dye Itching     Reaction of immediate burning and severe itching in Right ear and back of throat after injection for CT.      Sulfa Drugs      hives       10 point ROS of systems were all negative except for pertinent positives noted in my HPI.      Exam:   /50 (BP Location: Right arm, Patient Position: Sitting, Cuff Size: Adult Large)   Pulse 76   Temp 98.3  F (36.8  C) (Tympanic)   Resp 20   SpO2 100%   Constitutional: healthy, alert and no distress  Cardiovascular: RRR  Respiratory: CTA bilaterally, no rhonchi or rales  Gastrointestinal: soft and nontender  Back: No CVA tenderness B/L  Skin: no rashes      Results for orders placed or performed in visit on 06/09/22   Wet prep - Clinic Collect     Status: Abnormal    Specimen: Vagina; Swab   Result Value Ref Range    Trichomonas Absent Absent    Yeast Present (A) Absent    Clue Cells Absent Absent    WBCs/high power field 2+ (A) None

## 2022-06-09 NOTE — TELEPHONE ENCOUNTER
Upon medication chart review saw miconazole vaginal suppositories and external cream was ordered and successfully sent to pharmacy. Called patient and requested she go  her prescription. Patient stated the pharmacy called her and confirmed that they receive her prescription and were in the process of filling it.     Millie Flannery RN on 6/9/2022 at 2:51 PM

## 2022-06-13 ENCOUNTER — TRANSFERRED RECORDS (OUTPATIENT)
Dept: HEALTH INFORMATION MANAGEMENT | Facility: CLINIC | Age: 67
End: 2022-06-13
Payer: MEDICARE

## 2022-07-12 NOTE — TELEPHONE ENCOUNTER
RECORDS RECEIVED FROM: Internal   DATE RECEIVED:  07.22.2022   NOTES (Gather within 2 years) STATUS DETAILS   OFFICE NOTE from referring provider   Internal 06.02.2022 Carolyn Valdivia MD   OFFICE NOTE from other specialist Internal  05.04.2022 Olivia Vasquez PA-C     12.16.2021 Eri De La Garza PA-C     DISCHARGE SUMMARY from hospital     DISCHARGE REPORT from the ER     LABS (any labs) Internal    MEDICATION LIST Internal    IMAGING  (NEED IMAGES AND REPORTS)     Osteomyelitis: Foot imaging      Liver Abscess: Abdominal imaging     Other (anything related to diagnoses Internal 05.09.2022 CT ABDOMEN/PELVIS WITHOUT CONTRAST

## 2022-07-22 ENCOUNTER — VIRTUAL VISIT (OUTPATIENT)
Dept: INFECTIOUS DISEASES | Facility: CLINIC | Age: 67
End: 2022-07-22
Attending: UROLOGY
Payer: MEDICARE

## 2022-07-22 ENCOUNTER — MYC MEDICAL ADVICE (OUTPATIENT)
Dept: UROLOGY | Facility: CLINIC | Age: 67
End: 2022-07-22

## 2022-07-22 ENCOUNTER — PRE VISIT (OUTPATIENT)
Dept: INFECTIOUS DISEASES | Facility: CLINIC | Age: 67
End: 2022-07-22

## 2022-07-22 DIAGNOSIS — Z23 NEED FOR VACCINATION: ICD-10-CM

## 2022-07-22 DIAGNOSIS — N95.2 ATROPHIC VAGINITIS: ICD-10-CM

## 2022-07-22 DIAGNOSIS — N81.10 VAGINAL PROLAPSE: ICD-10-CM

## 2022-07-22 DIAGNOSIS — N39.0 RECURRENT UTI: Primary | ICD-10-CM

## 2022-07-22 PROCEDURE — 99204 OFFICE O/P NEW MOD 45 MIN: CPT | Mod: 95 | Performed by: STUDENT IN AN ORGANIZED HEALTH CARE EDUCATION/TRAINING PROGRAM

## 2022-07-22 PROCEDURE — G0463 HOSPITAL OUTPT CLINIC VISIT: HCPCS | Mod: PN,RTG | Performed by: STUDENT IN AN ORGANIZED HEALTH CARE EDUCATION/TRAINING PROGRAM

## 2022-07-22 NOTE — LETTER
7/22/2022       RE: Jose Coronado  [address] [place] [address]     Dear Colleague,    Thank you for referring your patient, Jose Coronado, to the [facility] [place] at [facility]. Please see a copy of my visit note below.    Jose is a 66 year old who is being evaluated via a billable video visit.      How would you like to obtain your AVS? MyChart  If the video visit is dropped, the invitation should be resent by: Text to cell phone: [phone]  Will anyone else be joining your video visit? No        Video-Visit Details    Video Start Time: 3.13 pm     Type of service:  Video Visit    Video End Time:3:52 PM    Originating Location (pt. Location): Home    Distant Location (provider location):  [facility] [place]     Platform used for Video Visit: Sounday     Total time including chart review, care-coordination and documentation time on the date of encounter - 45 mins            [facility]  Infectious Disease Consultation note  Today's Date: 07/22/2022    Recommendations:  Continue hiprex, vitamin C, cranberry, vaginal estrogen cream   Increase water intake to 100 ounces a day in the summer   Continue hygiene etiquettes  Standing order for UA/UC if UTI symptoms develop. Will order abxs based on cx results  Discussed how resistance organisms develop.   Recommend prevnar followed by pneumovax and two dose shingrix     RTC 3 months     Thank you for involving Infectious Disease in the care of this patient.     Alexandra Whitten  , [facility]  Pager - [phone]    Assessment:  Jose Coronado is a 66 year old with PMH of HTN, sleep apnea on CPAP, parathyroid tumors   , minor stroke in 2017 (no residual), brain anerysm repair, PFO repair, Grade 4 prolapse with urinary incontinence s/p rectocele, cystocele repair, hysterectomy March 2020     Recurrent  UTIs: after stroke in 2017 but only once in 6 months, every 2 months after prolapse repair in March 2020. Has Slight urge and stress incontinence- toviaz has helped.   Persistent E.coli UTI since 3/2022, resolved after abx therapy in May 2022.      -------------------------------------------------------------------------------------------------------------------    Reason for consult / Chief complaint:   Consulted by Dr. Valdivia for recurrent UTI    History of presenting illness:  Jose Coronado is a 66 year old with PMH of HTN, sleep apnea on CPAP, parathyroid tumors   , minor stroke in 2017 (no residual), brain anerysm repair, PFO repair, Grade 4 prolapse with urinary incontinence s/p rectocele, cystocele repair, hysterectomy March 2020.    After 2017 (?stroke) developed UTI once every 6 months, UTIs increased to every 2 months after the prolapse repair in 2020 but I didn't find UA/UC (during that time frame) till 12/2021. She reports all were done at [place]. Perhaps she had clinic UA done with no cultures. UA/UC indicate persistent E.coli UTI since 3/2022 and progressively more resistant. She was treated with cipro, macrobid and most recently augmentin (although I to augmentin). She reports no infection symptoms since May 18, 2022 after she took the augmentin.     She is very concerned about the resistant profile of the organisms and had many questions regarding that.      She reports increase in yeast infections after taking a strong abx.     On Hiprex, vitamin C, cranberry - started in May 2022  On estrogen cream twice a week since 2020  Has Slight urge and stress incontinence- toviaz has helped     5/18/22 cystoscopy normal     CT 5/2020   1. Air within the urinary bladder may be related to recent  instrumentation.  2. Diffuse fatty infiltration of the liver.  3. Cholelithiasis.  4. Several low density lesions in the liver are too small to  characterize, but could represent cysts or hemangiomas. Consider  liver  MRI for further characterization.      UA   Latest Reference Range & Units 12/16/21 13:46 12/16/21 14:41 02/14/22 09:21 03/01/22 11:42 04/08/22 10:50 04/18/22 09:30 05/18/22 08:18 06/06/22 10:45 06/09/22 12:32   Color Urine Colorless, Straw, Light Yellow, Yellow  Yellow  Yellow  Yellow Yellow  Yellow    COLOR, URINE POCT Colorless, Straw, Light Yellow, Yellow        Yellow     Appearance Urine Clear  Clear  Clear  Cloudy ! Cloudy !  Clear    CLARITY, URINE POCT Clear        Clear     Glucose Urine Negative mg/dL Negative  Negative  Negative Negative  Negative    GLUCOSE, URINE POCT Negative mg/dL       Negative     Bilirubin Urine Negative  Negative  Negative  Negative Negative  Negative    BILIRUBIN, URINE POCT Negative        Negative     Ketones Urine Negative mg/dL Negative  Negative  Negative Negative  Trace !    KETONES, URINE POCT Negative mg/dL mg/dL       Negative     Specific Gravity Urine 1.003 - 1.035  1.020  1.015  1.015 1.010  1.015    SPECIFIC GRAVITY POCT 1.005 - 1.030        1.015     pH Urine 5.0 - 7.0  7.0  6.0  6.0 6.0  6.5    PH, URINE POCT 5.0 - 8.0        6.0     Protein Albumin Urine Negative mg/dL Negative  Negative  Negative Negative  Negative    PROTEIN, URINE POCT Negative mg/dL       Negative     Urobilinogen Urine 0.2, 1.0 E.U./dL 2.0 !  0.2  1.0 0.2  0.2    UROBILINOGEN, URINE POCT 0.2, 1.0 E.U./dL       1.0     Nitrite Urine Negative  Positive !  Negative  Positive ! Positive !  Negative    NITRITES POCT Negative        Positive !     Blood Urine Negative  Trace !  Negative  Moderate ! Moderate !  Negative    BLOOD, URINE POCT Negative        Trace !     Leukocyte Esterase Urine Negative  Small !  Trace !  Large ! Large !  Negative    LEUK ESTERASE, POCT Negative        Small !     WBC Urine 0-5 /HPF /HPF 0-5  0-5 >100 ! 10-25 !  !  0-5    RBC Urine 0-2 /HPF /HPF 0-2  0-2 10-25 ! 2-5 ! 5-10 !  0-2    Bacteria Urine None Seen /HPF Many !  Moderate ! Many ! Many ! Many !   Few !    WBC Clumps None Seen /HPF     Present ! Present !      Clue cells Absent   Present !       Absent   Squamous Epithelial /LPF Urine None Seen /LPF Moderate !  Few ! Few ! Few ! Few !  Few !    Mucus Urine None Seen /LPF   Present !     Present !    Hyaline Casts None Seen /LPF   0-2 !         Calcium Oxalate None Seen /HPF      Moderate !  Few !        Urine cxs   5/2022 , 4/2022, 3/2022 - >100k E.coli   12/2021 - >100k Klebsiella pneumoniae, 50-100K E.coli     5/2022   Escherichia coli     IDA     Ampicillin >=32.0 ug/mL Resistant     Ampicillin/ Sulbactam 16.0 ug/mL Intermediate     Cefazolin >=64.0 ug/mL Resistant 1     Cefepime 0.25 ug/mL Susceptible     Cefoxitin >=64.0 ug/mL Resistant     Ceftazidime >32 ug/mL Resistant     Ceftriaxone 16 ug/mL Resistant     Ciprofloxacin >=4.0 ug/mL Resistant     Gentamicin >=16.0 ug/mL Resistant     Levofloxacin >=8.0 ug/mL Resistant     Nitrofurantoin 256.0 ug/mL Resistant     Piperacillin/Tazobactam <=4.0 ug/mL Susceptible     Tobramycin 8.0 ug/mL Intermediate     Trimethoprim/Sulfamethoxazole >16/304 ug/mL Resistant                 Social Hx:  Social History     Tobacco Use     Smoking status: Never Smoker     Smokeless tobacco: Never Used   Vaping Use     Vaping Use: Never used   Substance Use Topics     Alcohol use: Yes     Alcohol/week: 0.0 standard drinks     Comment: Social     Drug use: No         Immunizations:  Immunization History   Administered Date(s) Administered     COVID-19,PF,Pfizer (12+ Yrs) 03/02/2021, 03/26/2021, 11/02/2021     FLU 6-35 months 09/20/2011     Influenza (IIV3) PF 10/16/2015, 10/20/2016     Influenza Quad, Recombinant, pf(RIV4) (Flublok) 12/05/2018, 11/05/2019     Influenza Vaccine IM > 6 months Valent IIV4 (Alfuria,Fluzone) 10/18/2017     Influenza, Quad, High Dose, Pf, 65yr+ (Fluzone HD) 09/21/2020, 11/02/2021     TD (ADULT, 7+) 06/28/2000     TDAP Vaccine (Adacel) 10/04/2010     TDAP Vaccine (Boostrix) 10/04/2010     Tdap  (Adacel,Boostrix) 10/29/2020       Allergies:   Allergies   Allergen Reactions     Contrast Dye Itching     Reaction of immediate burning and severe itching in Right ear and back of throat after injection for CT.      Sulfa Drugs      hives         Medications:  Current Outpatient Medications   Medication Sig Dispense Refill     Ascorbic Acid (VITAMIN C PO) Take 250 mg by mouth every morning (0.5 x 500 mg tablet = 250 mg dose)       aspirin 325 MG tablet Take 325 mg by mouth every morning        atenolol (TENORMIN) 50 MG tablet TAKE 1 TABLET BY MOUTH EVERY DAY 90 tablet 3     Calcium Citrate-Vitamin D (CALCIUM CITRATE + D PO) Take 2 tablets by mouth every morning        COMPOUNDED NON-CONTROLLED SUBSTANCE (CMPD RX) - PHARMACY TO MIX COMPOUNDED MEDICATION Estriol 1 mg/g in HRT base, apply small amount to finger and apply to inside vagina daily for 2 weeks then twice weekly 30 g 11     Cranberry 450 MG TABS Take by mouth 2 times daily       Cyanocobalamin 2500 MCG TABS Take 2,500 mcg by mouth once a week Mon       Ferrous Sulfate 27 MG TABS Take 27 mg by mouth every morning        Fesoterodine Fumarate (TOVIAZ) 8 MG TB24 Take 1 tablet (8 mg) by mouth daily 90 tablet 3     FIBER COMPLETE PO Take 1 capsule by mouth every morning        hydrochlorothiazide (HYDRODIURIL) 12.5 MG tablet TAKE 1 TABLET BY MOUTH EVERY DAY 90 tablet 3     imipramine (TOFRANIL) 25 MG tablet TAKE 1 TABLET BY MOUTH AT BEDTIME 90 tablet 3     loratadine (CLARITIN) 10 MG tablet Take 10 mg by mouth every morning        losartan (COZAAR) 50 MG tablet TAKE 1 TABLET BY MOUTH EVERY DAY 90 tablet 3     methenamine hippurate (HIPREX) 1 g tablet Take 1 tablet (1 g) by mouth 2 times daily 180 tablet 0     topiramate (TOPAMAX) 25 MG tablet TAKE 1 TABLET BY MOUTH TWICE A  tablet 0     UNABLE TO FIND CPAP machine every night       VITAMIN D, CHOLECALCIFEROL, PO Take 500 Units by mouth every morning        acetaminophen (TYLENOL) 325 MG tablet Take 2  tablets (650 mg) by mouth every 6 hours as needed for mild pain Start after Delivery. (Patient not taking: Reported on 2022) 100 tablet 0     nitroFURantoin macrocrystal (MACRODANTIN) 100 MG capsule Take 1 capsule (100 mg) by mouth At Bedtime 30 capsule 11     valACYclovir (VALTREX) 1000 mg tablet TAKE 2 TABLETS (2,000 MG) BY MOUTH 2 TIMES DAILY AS NEEDED (Patient not taking: Reported on 2022) 4 tablet 2         Past Medical Hx:  Past Medical History:   Diagnosis Date     Anemia      Arthritis     Hands, back, hips. Various dates first noted.     CVA (cerebral vascular accident) (H) 2017    ?migraine, ?pfo--negative vasc w/u, neg hypercoag w/u     Diffuse cystic mastopathy     Fibrocystic breast disease     HTN, goal below 140/90      Hyperparathyroidism (H)      Infectious mononucleosis     Mono at age 16     Irregular heart beat     PAT no afib on 30day monitor     Labyrinthitis, unspecified      Migraine headache with aura      Osteopenia      Pain in joint, shoulder region     Secondary to a fall     Palpitations      PFO (patent foramen ovale)     s/p closure with amplazter device 17     S/P gastric bypass      Sleep apnea     she is on CPAP     Sleep apnea      Vitamin D deficiencies          Family History:  Family History   Problem Relation Age of Onset     Breast Cancer Mother      Hypertension Mother      Arthritis Mother      Thyroid Disease Mother         Hypo     Ulcerative Colitis Mother      Cerebrovascular Disease Mother         Age 78 - Cerebral Hemorrhage,      Anxiety Disorder Mother      Depression Mother      Genetic Disorder Mother         Ulcerative colitis     Obesity Mother      Anesthesia Reaction Mother         headaches, N/V     Breast Cancer Maternal Aunt      Hypertension Paternal Grandmother      Cerebrovascular Disease Maternal Grandmother         Age 72 -      Family History Negative Maternal Grandfather      Family History Negative  Paternal Grandfather      Family History Negative Son      Family History Negative Daughter      Other Cancer Father         Skin - Non-melanoma varieties     Hypertension Father      Asthma Father      Family History Negative Daughter      Ulcerative Colitis Sister      Hypertension Sister      Hyperlipidemia Sister      Anxiety Disorder Sister      Depression Sister      Diabetes Sister      Obesity Sister      Asthma Sister      Anesthesia Reaction Sister         Debilitating headaches     Hypertension Brother      Anxiety Disorder Brother      Depression Brother      Asthma Nephew      Hypertension Sister         Congestive Heart Failure     Anxiety Disorder Sister      Genetic Disorder Sister         Ulcerative colitis     Obesity Sister      Genetic Disorder Niece         Ulcerative colitis     Colon Cancer No family hx of      Coronary Artery Disease No family hx of      Mental Illness No family hx of      Substance Abuse No family hx of      Osteoporosis No family hx of      Deep Vein Thrombosis No family hx of          Examination:  Unable to examine due to virtual visit       Laboratory:  Hematology:  Recent Labs   Lab Test 02/14/22  0846 03/23/21 2258 01/11/21  1118 03/17/20  0554 03/16/20  0634 01/07/20  0831 04/29/19  0846 04/05/17  0916 02/09/17  1545   WBC 5.5 7.9 5.7 13.5* 7.1 5.7 6.3   < > 8.8   ANEU  --  5.3 3.1  --   --   --  3.2  --  5.6   ALYM  --  1.6 1.7  --   --   --  2.2  --  2.1   CHANTELLE  --  0.7 0.7  --   --   --  0.6  --  1.0   AEOS  --  0.2 0.2  --   --   --  0.2  --  0.1   HGB 13.3 13.1 13.1 11.4* 14.2 12.8 13.7   < > 13.8   HCT 40.9 39.4 40.7 36.2 42.9 40.1 42.3   < > 41.6    242 239 239 274 259 317   < > 322    < > = values in this interval not displayed.       Chemistry:  Recent Labs   Lab Test 02/14/22  0846 03/23/21 2258 01/11/21  1118 03/17/20  0554 03/16/20  0634 01/07/20  0831 04/29/19  0846 08/10/16  0937 11/16/15  1459 09/16/15  1043 07/29/15  0845    132* 139 141   --  137 140   < > 140   < > 140   POTASSIUM 4.3 3.4 4.8 4.7 4.4 3.7 3.7   < > 4.2   < > 3.9   CHLORIDE 106 101 107 109  --  104 106   < > 105   < > 105   CO2 30 25 28 29  --  28 28   < > 30   < > 26   ANIONGAP 3 6 4 3  --  5 6   < > 5   < > 9   BUN 18 19 25 11  --  15 22   < > 13   < > 13   CR 0.76 0.85 0.92 0.66 0.75 0.72 0.70   < > 0.85   < > 0.73   GFRESTIMATED 85 71 64 >90 84 87 >90   < > 68   < > 81   GLC 95 131* 89 108* 104* 95 102*   < > 90   < > 91   MAXIMILIANO 9.4 9.0 9.1 8.4*  --  8.9 8.5   < > 9.2   < > 8.4*   MAG  --   --   --   --   --   --   --   --  2.1  --  2.0   LACT  --  0.7  --   --   --   --   --   --   --   --   --     < > = values in this interval not displayed.       Liver Function Studies:  Recent Labs   Lab Test 02/14/22  0846 01/11/21  1118 01/07/20  0831 02/10/17  0635 08/10/16  0937 07/29/15  0845   BILITOTAL 0.6 0.4 0.3 0.3 0.4 0.3   ALKPHOS 93 77 78 53 73 68   ALBUMIN 3.3* 3.5 3.6 3.2* 3.6 3.4   AST 18 22 17 23 17 15   ALT 23 24 22 20 20 20

## 2022-07-22 NOTE — PROGRESS NOTES
Jose is a 66 year old who is being evaluated via a billable video visit.      How would you like to obtain your AVS? MyChart  If the video visit is dropped, the invitation should be resent by: Text to cell phone: [phone]  Will anyone else be joining your video visit? No        Video-Visit Details    Video Start Time: 3.13 pm     Type of service:  Video Visit    Video End Time:3:52 PM    Originating Location (pt. Location): Home    Distant Location (provider location):  [facility] [place]     Platform used for Video Visit: HOLLR     Total time including chart review, care-coordination and documentation time on the date of encounter - 45 mins            [facility]  Infectious Disease Consultation note  Today's Date: 07/22/2022    Recommendations:  Continue hiprex, vitamin C, cranberry, vaginal estrogen cream   Increase water intake to 100 ounces a day in the summer   Continue hygiene etiquettes  Standing order for UA/UC if UTI symptoms develop. Will order abxs based on cx results  Discussed how resistance organisms develop.   Recommend prevnar followed by pneumovax and two dose shingrix     RTC 3 months     Thank you for involving Infectious Disease in the care of this patient.     Alexandra Whitten  , [facility]  Pager - [phone]    Assessment:  Jose Coronado is a 66 year old with PMH of HTN, sleep apnea on CPAP, parathyroid tumors   , minor stroke in 2017 (no residual), brain anerysm repair, PFO repair, Grade 4 prolapse with urinary incontinence s/p rectocele, cystocele repair, hysterectomy March 2020     Recurrent UTIs: after stroke in 2017 but only once in 6 months, every 2 months after prolapse repair in March 2020. Has Slight urge and stress incontinence- toviaz has helped.   Persistent E.coli UTI since 3/2022, resolved after abx therapy in May 2022.       -------------------------------------------------------------------------------------------------------------------    Reason for consult / Chief complaint:   Consulted by Dr. Valdivia for recurrent UTI    History of presenting illness:  Jose Coronado is a 66 year old with PMH of HTN, sleep apnea on CPAP, parathyroid tumors   , minor stroke in 2017 (no residual), brain anerysm repair, PFO repair, Grade 4 prolapse with urinary incontinence s/p rectocele, cystocele repair, hysterectomy March 2020.    After 2017 (?stroke) developed UTI once every 6 months, UTIs increased to every 2 months after the prolapse repair in 2020 but I didn't find UA/UC (during that time frame) till 12/2021. She reports all were done at [place]. Perhaps she had clinic UA done with no cultures. UA/UC indicate persistent E.coli UTI since 3/2022 and progressively more resistant. She was treated with cipro, macrobid and most recently augmentin (although I to augmentin). She reports no infection symptoms since May 18, 2022 after she took the augmentin.     She is very concerned about the resistant profile of the organisms and had many questions regarding that.      She reports increase in yeast infections after taking a strong abx.     On Hiprex, vitamin C, cranberry - started in May 2022  On estrogen cream twice a week since 2020  Has Slight urge and stress incontinence- toviaz has helped     5/18/22 cystoscopy normal     CT 5/2020   1. Air within the urinary bladder may be related to recent  instrumentation.  2. Diffuse fatty infiltration of the liver.  3. Cholelithiasis.  4. Several low density lesions in the liver are too small to  characterize, but could represent cysts or hemangiomas. Consider liver  MRI for further characterization.      UA   Latest Reference Range & Units 12/16/21 13:46 12/16/21 14:41 02/14/22 09:21 03/01/22 11:42 04/08/22 10:50 04/18/22 09:30 05/18/22 08:18 06/06/22 10:45 06/09/22 12:32   Color Urine Colorless, Straw,  Light Yellow, Yellow  Yellow  Yellow  Yellow Yellow  Yellow    COLOR, URINE POCT Colorless, Straw, Light Yellow, Yellow        Yellow     Appearance Urine Clear  Clear  Clear  Cloudy ! Cloudy !  Clear    CLARITY, URINE POCT Clear        Clear     Glucose Urine Negative mg/dL Negative  Negative  Negative Negative  Negative    GLUCOSE, URINE POCT Negative mg/dL       Negative     Bilirubin Urine Negative  Negative  Negative  Negative Negative  Negative    BILIRUBIN, URINE POCT Negative        Negative     Ketones Urine Negative mg/dL Negative  Negative  Negative Negative  Trace !    KETONES, URINE POCT Negative mg/dL mg/dL       Negative     Specific Gravity Urine 1.003 - 1.035  1.020  1.015  1.015 1.010  1.015    SPECIFIC GRAVITY POCT 1.005 - 1.030        1.015     pH Urine 5.0 - 7.0  7.0  6.0  6.0 6.0  6.5    PH, URINE POCT 5.0 - 8.0        6.0     Protein Albumin Urine Negative mg/dL Negative  Negative  Negative Negative  Negative    PROTEIN, URINE POCT Negative mg/dL       Negative     Urobilinogen Urine 0.2, 1.0 E.U./dL 2.0 !  0.2  1.0 0.2  0.2    UROBILINOGEN, URINE POCT 0.2, 1.0 E.U./dL       1.0     Nitrite Urine Negative  Positive !  Negative  Positive ! Positive !  Negative    NITRITES POCT Negative        Positive !     Blood Urine Negative  Trace !  Negative  Moderate ! Moderate !  Negative    BLOOD, URINE POCT Negative        Trace !     Leukocyte Esterase Urine Negative  Small !  Trace !  Large ! Large !  Negative    LEUK ESTERASE, POCT Negative        Small !     WBC Urine 0-5 /HPF /HPF 0-5  0-5 >100 ! 10-25 !  !  0-5    RBC Urine 0-2 /HPF /HPF 0-2  0-2 10-25 ! 2-5 ! 5-10 !  0-2    Bacteria Urine None Seen /HPF Many !  Moderate ! Many ! Many ! Many !  Few !    WBC Clumps None Seen /HPF     Present ! Present !      Clue cells Absent   Present !       Absent   Squamous Epithelial /LPF Urine None Seen /LPF Moderate !  Few ! Few ! Few ! Few !  Few !    Mucus Urine None Seen /LPF   Present !      Present !    Hyaline Casts None Seen /LPF   0-2 !         Calcium Oxalate None Seen /HPF      Moderate !  Few !        Urine cxs   5/2022 , 4/2022, 3/2022 - >100k E.coli   12/2021 - >100k Klebsiella pneumoniae, 50-100K E.coli     5/2022   Escherichia coli     IDA     Ampicillin >=32.0 ug/mL Resistant     Ampicillin/ Sulbactam 16.0 ug/mL Intermediate     Cefazolin >=64.0 ug/mL Resistant 1     Cefepime 0.25 ug/mL Susceptible     Cefoxitin >=64.0 ug/mL Resistant     Ceftazidime >32 ug/mL Resistant     Ceftriaxone 16 ug/mL Resistant     Ciprofloxacin >=4.0 ug/mL Resistant     Gentamicin >=16.0 ug/mL Resistant     Levofloxacin >=8.0 ug/mL Resistant     Nitrofurantoin 256.0 ug/mL Resistant     Piperacillin/Tazobactam <=4.0 ug/mL Susceptible     Tobramycin 8.0 ug/mL Intermediate     Trimethoprim/Sulfamethoxazole >16/304 ug/mL Resistant                 Social Hx:  Social History     Tobacco Use     Smoking status: Never Smoker     Smokeless tobacco: Never Used   Vaping Use     Vaping Use: Never used   Substance Use Topics     Alcohol use: Yes     Alcohol/week: 0.0 standard drinks     Comment: Social     Drug use: No         Immunizations:  Immunization History   Administered Date(s) Administered     COVID-19,PF,Pfizer (12+ Yrs) 03/02/2021, 03/26/2021, 11/02/2021     FLU 6-35 months 09/20/2011     Influenza (IIV3) PF 10/16/2015, 10/20/2016     Influenza Quad, Recombinant, pf(RIV4) (Flublok) 12/05/2018, 11/05/2019     Influenza Vaccine IM > 6 months Valent IIV4 (Alfuria,Fluzone) 10/18/2017     Influenza, Quad, High Dose, Pf, 65yr+ (Fluzone HD) 09/21/2020, 11/02/2021     TD (ADULT, 7+) 06/28/2000     TDAP Vaccine (Adacel) 10/04/2010     TDAP Vaccine (Boostrix) 10/04/2010     Tdap (Adacel,Boostrix) 10/29/2020       Allergies:   Allergies   Allergen Reactions     Contrast Dye Itching     Reaction of immediate burning and severe itching in Right ear and back of throat after injection for CT.      Sulfa Drugs      hives          Medications:  Current Outpatient Medications   Medication Sig Dispense Refill     Ascorbic Acid (VITAMIN C PO) Take 250 mg by mouth every morning (0.5 x 500 mg tablet = 250 mg dose)       aspirin 325 MG tablet Take 325 mg by mouth every morning        atenolol (TENORMIN) 50 MG tablet TAKE 1 TABLET BY MOUTH EVERY DAY 90 tablet 3     Calcium Citrate-Vitamin D (CALCIUM CITRATE + D PO) Take 2 tablets by mouth every morning        COMPOUNDED NON-CONTROLLED SUBSTANCE (CMPD RX) - PHARMACY TO MIX COMPOUNDED MEDICATION Estriol 1 mg/g in HRT base, apply small amount to finger and apply to inside vagina daily for 2 weeks then twice weekly 30 g 11     Cranberry 450 MG TABS Take by mouth 2 times daily       Cyanocobalamin 2500 MCG TABS Take 2,500 mcg by mouth once a week Mon       Ferrous Sulfate 27 MG TABS Take 27 mg by mouth every morning        Fesoterodine Fumarate (TOVIAZ) 8 MG TB24 Take 1 tablet (8 mg) by mouth daily 90 tablet 3     FIBER COMPLETE PO Take 1 capsule by mouth every morning        hydrochlorothiazide (HYDRODIURIL) 12.5 MG tablet TAKE 1 TABLET BY MOUTH EVERY DAY 90 tablet 3     imipramine (TOFRANIL) 25 MG tablet TAKE 1 TABLET BY MOUTH AT BEDTIME 90 tablet 3     loratadine (CLARITIN) 10 MG tablet Take 10 mg by mouth every morning        losartan (COZAAR) 50 MG tablet TAKE 1 TABLET BY MOUTH EVERY DAY 90 tablet 3     methenamine hippurate (HIPREX) 1 g tablet Take 1 tablet (1 g) by mouth 2 times daily 180 tablet 0     topiramate (TOPAMAX) 25 MG tablet TAKE 1 TABLET BY MOUTH TWICE A  tablet 0     UNABLE TO FIND CPAP machine every night       VITAMIN D, CHOLECALCIFEROL, PO Take 500 Units by mouth every morning        acetaminophen (TYLENOL) 325 MG tablet Take 2 tablets (650 mg) by mouth every 6 hours as needed for mild pain Start after Delivery. (Patient not taking: Reported on 7/22/2022) 100 tablet 0     nitroFURantoin macrocrystal (MACRODANTIN) 100 MG capsule Take 1 capsule (100 mg) by mouth At  Bedtime 30 capsule 11     valACYclovir (VALTREX) 1000 mg tablet TAKE 2 TABLETS (2,000 MG) BY MOUTH 2 TIMES DAILY AS NEEDED (Patient not taking: Reported on 2022) 4 tablet 2         Past Medical Hx:  Past Medical History:   Diagnosis Date     Anemia      Arthritis     Hands, back, hips. Various dates first noted.     CVA (cerebral vascular accident) (H) 2017    ?migraine, ?pfo--negative vasc w/u, neg hypercoag w/u     Diffuse cystic mastopathy     Fibrocystic breast disease     HTN, goal below 140/90      Hyperparathyroidism (H)      Infectious mononucleosis     Mono at age 16     Irregular heart beat     PAT no afib on 30day monitor     Labyrinthitis, unspecified      Migraine headache with aura      Osteopenia      Pain in joint, shoulder region     Secondary to a fall     Palpitations      PFO (patent foramen ovale)     s/p closure with amplazter device 17     S/P gastric bypass      Sleep apnea     she is on CPAP     Sleep apnea      Vitamin D deficiencies          Family History:  Family History   Problem Relation Age of Onset     Breast Cancer Mother      Hypertension Mother      Arthritis Mother      Thyroid Disease Mother         Hypo     Ulcerative Colitis Mother      Cerebrovascular Disease Mother         Age 78 - Cerebral Hemorrhage,      Anxiety Disorder Mother      Depression Mother      Genetic Disorder Mother         Ulcerative colitis     Obesity Mother      Anesthesia Reaction Mother         headaches, N/V     Breast Cancer Maternal Aunt      Hypertension Paternal Grandmother      Cerebrovascular Disease Maternal Grandmother         Age 72 -      Family History Negative Maternal Grandfather      Family History Negative Paternal Grandfather      Family History Negative Son      Family History Negative Daughter      Other Cancer Father         Skin - Non-melanoma varieties     Hypertension Father      Asthma Father      Family History Negative Daughter       Ulcerative Colitis Sister      Hypertension Sister      Hyperlipidemia Sister      Anxiety Disorder Sister      Depression Sister      Diabetes Sister      Obesity Sister      Asthma Sister      Anesthesia Reaction Sister         Debilitating headaches     Hypertension Brother      Anxiety Disorder Brother      Depression Brother      Asthma Nephew      Hypertension Sister         Congestive Heart Failure     Anxiety Disorder Sister      Genetic Disorder Sister         Ulcerative colitis     Obesity Sister      Genetic Disorder Niece         Ulcerative colitis     Colon Cancer No family hx of      Coronary Artery Disease No family hx of      Mental Illness No family hx of      Substance Abuse No family hx of      Osteoporosis No family hx of      Deep Vein Thrombosis No family hx of          Examination:  Unable to examine due to virtual visit       Laboratory:  Hematology:  Recent Labs   Lab Test 02/14/22  0846 03/23/21  2258 01/11/21  1118 03/17/20  0554 03/16/20  0634 01/07/20  0831 04/29/19  0846 04/05/17  0916 02/09/17  1545   WBC 5.5 7.9 5.7 13.5* 7.1 5.7 6.3   < > 8.8   ANEU  --  5.3 3.1  --   --   --  3.2  --  5.6   ALYM  --  1.6 1.7  --   --   --  2.2  --  2.1   CHANTELLE  --  0.7 0.7  --   --   --  0.6  --  1.0   AEOS  --  0.2 0.2  --   --   --  0.2  --  0.1   HGB 13.3 13.1 13.1 11.4* 14.2 12.8 13.7   < > 13.8   HCT 40.9 39.4 40.7 36.2 42.9 40.1 42.3   < > 41.6    242 239 239 274 259 317   < > 322    < > = values in this interval not displayed.       Chemistry:  Recent Labs   Lab Test 02/14/22  0846 03/23/21  2258 01/11/21  1118 03/17/20  0554 03/16/20  0634 01/07/20  0831 04/29/19  0846 08/10/16  0937 11/16/15  1459 09/16/15  1043 07/29/15  0845    132* 139 141  --  137 140   < > 140   < > 140   POTASSIUM 4.3 3.4 4.8 4.7 4.4 3.7 3.7   < > 4.2   < > 3.9   CHLORIDE 106 101 107 109  --  104 106   < > 105   < > 105   CO2 30 25 28 29  --  28 28   < > 30   < > 26   ANIONGAP 3 6 4 3  --  5 6   < > 5   <  > 9   BUN 18 19 25 11  --  15 22   < > 13   < > 13   CR 0.76 0.85 0.92 0.66 0.75 0.72 0.70   < > 0.85   < > 0.73   GFRESTIMATED 85 71 64 >90 84 87 >90   < > 68   < > 81   GLC 95 131* 89 108* 104* 95 102*   < > 90   < > 91   MAXIMILIANO 9.4 9.0 9.1 8.4*  --  8.9 8.5   < > 9.2   < > 8.4*   MAG  --   --   --   --   --   --   --   --  2.1  --  2.0   LACT  --  0.7  --   --   --   --   --   --   --   --   --     < > = values in this interval not displayed.       Liver Function Studies:  Recent Labs   Lab Test 02/14/22  0846 01/11/21  1118 01/07/20  0831 02/10/17  0635 08/10/16  0937 07/29/15  0845   BILITOTAL 0.6 0.4 0.3 0.3 0.4 0.3   ALKPHOS 93 77 78 53 73 68   ALBUMIN 3.3* 3.5 3.6 3.2* 3.6 3.4   AST 18 22 17 23 17 15   ALT 23 24 22 20 20 20

## 2022-07-25 ENCOUNTER — TELEPHONE (OUTPATIENT)
Dept: INFECTIOUS DISEASES | Facility: CLINIC | Age: 67
End: 2022-07-25

## 2022-07-25 NOTE — TELEPHONE ENCOUNTER
Pt will call back this afternoon 07/25, pt should schedule a return apt for 3 months with Dr. Whitten

## 2022-07-29 DIAGNOSIS — Z87.440 HISTORY OF UTI: ICD-10-CM

## 2022-07-29 DIAGNOSIS — G43.109 MIGRAINE WITH AURA AND WITHOUT STATUS MIGRAINOSUS, NOT INTRACTABLE: ICD-10-CM

## 2022-08-01 RX ORDER — TOPIRAMATE 25 MG/1
TABLET, FILM COATED ORAL
Qty: 180 TABLET | Refills: 0 | OUTPATIENT
Start: 2022-08-01

## 2022-08-01 RX ORDER — METHENAMINE HIPPURATE 1000 MG/1
1 TABLET ORAL 2 TIMES DAILY
Qty: 180 TABLET | Refills: 0 | OUTPATIENT
Start: 2022-08-01

## 2022-08-24 ENCOUNTER — OFFICE VISIT (OUTPATIENT)
Dept: INTERNAL MEDICINE | Facility: CLINIC | Age: 67
End: 2022-08-24
Payer: MEDICARE

## 2022-08-24 VITALS
SYSTOLIC BLOOD PRESSURE: 127 MMHG | OXYGEN SATURATION: 100 % | BODY MASS INDEX: 30.42 KG/M2 | RESPIRATION RATE: 18 BRPM | HEART RATE: 88 BPM | TEMPERATURE: 97.9 F | HEIGHT: 66 IN | DIASTOLIC BLOOD PRESSURE: 77 MMHG | WEIGHT: 189.3 LBS

## 2022-08-24 DIAGNOSIS — Z98.84 S/P GASTRIC BYPASS: ICD-10-CM

## 2022-08-24 DIAGNOSIS — K76.89 LIVER CYST: Primary | ICD-10-CM

## 2022-08-24 DIAGNOSIS — R19.7 DIARRHEA, UNSPECIFIED TYPE: ICD-10-CM

## 2022-08-24 DIAGNOSIS — R10.30 LOWER ABDOMINAL PAIN, UNSPECIFIED: ICD-10-CM

## 2022-08-24 DIAGNOSIS — R10.13 EPIGASTRIC PAIN: ICD-10-CM

## 2022-08-24 LAB — INR PPP: 0.92 (ref 0.85–1.15)

## 2022-08-24 PROCEDURE — 99214 OFFICE O/P EST MOD 30 MIN: CPT | Performed by: INTERNAL MEDICINE

## 2022-08-24 PROCEDURE — 85610 PROTHROMBIN TIME: CPT | Performed by: INTERNAL MEDICINE

## 2022-08-24 PROCEDURE — 36415 COLL VENOUS BLD VENIPUNCTURE: CPT | Performed by: INTERNAL MEDICINE

## 2022-08-24 NOTE — PROGRESS NOTES
"  Assessment & Plan     Diarrhea, unspecified type  Will check stool studies if normal gets a colonoscopy with biopsies to rule out colitis but this could all be due to rapid transit from her prior gastric bypass. Topamax could also be causing her diarrhea. She is on Hiprex but that was started after the diarrhea so less likely to be an issue.   - Enteric Bacteria and Virus Panel by [facility] Stool; Future  - Clostridium difficile Toxin B PCR; Future  - Fecal Lactoferrin; Future  - Ova and Parasite Exam Routine; Future  - Cryptosporidium/Giardia Immunoassay; Future  - INR; Future  - Enteric Bacteria and Virus Panel by [facility] St[facility]  - Clostridium difficile Toxin B PCR  - Fecal Lactoferrin  - Ova and Parasite Exam Routine  - Cryptosporidium/Giardia Immunoassay  - INR    Lower abdominal pain, unspecified      - Enteric Bacteria and Virus Panel by [facility] St[facility]; Future  - Enteric Bacteria and Virus Panel by [facility] Stool    Liver cyst  Will check MRI  - MR Liver wo & w Contrast; Future    S/P gastric bypass  as above.   - INR; Future  - INR    Epigastric pain      - INR; Future  - INR         BMI:   Estimated body mass index is 31.02 kg/m  as calculated from the following:    Height as of this encounter: 1.664 m (5' 5.5\").    Weight as of this encounter: 85.9 kg (189 lb 4.8 oz).       No follow-ups on file.    Lian Martinez MD  [facility] VICKEY Garcia is a 66 year old, presenting for the following health issues:  Results (Would like to discuss 05/09/2022 abdominal CT report and findings.)      History of Present Illness       Reason for visit:  Abdominal issues? Review May CT scan results, MRI required? Liver? Gall bladder?  Symptom onset:  More than a month  Symptoms include:  Episodes of abdominal pain, yellow pasty stool, diarrhea,  Symptom intensity:  Moderate  Symptom progression:  Staying the same  Had these symptoms before:  No    She eats 2-3 servings of fruits and vegetables daily.She " "consumes 0 sweetened beverage(s) daily.She exercises with enough effort to increase her heart rate 10 to 19 minutes per day.  She exercises with enough effort to increase her heart rate 4 days per week.   She is taking medications regularly.       Hypertension Follow-up      Do you check your blood pressure regularly outside of the clinic? Yes     Are you following a low salt diet? No    Are your blood pressures ever more than 140 on the top number (systolic) OR more   than 90 on the bottom number (diastolic), for example 140/90? No    Starting a little less than a year ago she developed intermittent lower abdominal pain with diarrhea (stools that were soft pudding in texture and most often a pale yellow color. Also about this time she developed chronic lower abdominal pain with recurrent UTI and was eventually found through Urology and ID to have a chronic refractory UTI. She is dealing with this and is making progress.     Appetite is good. Wt is stable. For 6 months. Denies any fever chills or night sweats. Although had a fever for about a week when her symptoms first started.     In the work up for her bladder CT showed small liver cysts multiple, gallstones and a fatty liver.    She stopped all ETOH after that. Had been having 1-6 drinks a day every day. Amount depending on what was going on and who she was with. Since she stopped all ETOH nothing has changed. She occasionally gets GERD but nothing severe. She still has her chronic lower abdominal pain which she assumes is from the bladder. The intermittent abdominal pain has resolved in the last 2-3 months.     She had a gastric bypass over 20 years ago.     Review of Systems   Constitutional, HEENT, cardiovascular, pulmonary, GI, , musculoskeletal, neuro, skin, endocrine and psych systems are negative, except as otherwise noted.      Objective    /77   Pulse 88   Temp 97.9  F (36.6  C) (Tympanic)   Resp 18   Ht 1.664 m (5' 5.5\")   Wt 85.9 kg (189 " lb 4.8 oz)   SpO2 100%   BMI 31.02 kg/m    Body mass index is 31.02 kg/m .  Physical Exam   GENERAL: healthy, alert and no distress  NECK: no adenopathy, no asymmetry, masses, or scars and thyroid normal to palpation  RESP: lungs clear to auscultation - no rales, rhonchi or wheezes  CV: regular rate and rhythm, normal S1 S2, no S3 or S4, no murmur, click or rub, no peripheral edema and peripheral pulses strong  ABDOMEN: soft, nontender, no hepatosplenomegaly, no masses and bowel sounds normal  MS: no gross musculoskeletal defects noted, no edema  NEURO: Normal strength and tone, mentation intact and speech normal  PSYCH: mentation appears normal, affect normal/bright              .  ..

## 2022-08-29 PROCEDURE — 87177 OVA AND PARASITES SMEARS: CPT | Performed by: INTERNAL MEDICINE

## 2022-08-29 PROCEDURE — 87506 IADNA-DNA/RNA PROBE TQ 6-11: CPT | Performed by: INTERNAL MEDICINE

## 2022-08-29 PROCEDURE — 87493 C DIFF AMPLIFIED PROBE: CPT | Mod: 59 | Performed by: INTERNAL MEDICINE

## 2022-08-29 PROCEDURE — 87328 CRYPTOSPORIDIUM AG IA: CPT | Performed by: INTERNAL MEDICINE

## 2022-08-29 PROCEDURE — 83630 LACTOFERRIN FECAL (QUAL): CPT | Performed by: INTERNAL MEDICINE

## 2022-08-29 PROCEDURE — 87329 GIARDIA AG IA: CPT | Mod: 59 | Performed by: INTERNAL MEDICINE

## 2022-08-29 PROCEDURE — 87209 SMEAR COMPLEX STAIN: CPT | Performed by: INTERNAL MEDICINE

## 2022-08-30 LAB
C COLI+JEJUNI+LARI FUSA STL QL NAA+PROBE: NOT DETECTED
C DIFF TOX B STL QL: NEGATIVE
EC STX1 GENE STL QL NAA+PROBE: NOT DETECTED
EC STX2 GENE STL QL NAA+PROBE: NOT DETECTED
LACTOFERRIN STL QL IA: POSITIVE
NOROV GI+II ORF1-ORF2 JNC STL QL NAA+PR: NOT DETECTED
RVA NSP5 STL QL NAA+PROBE: NOT DETECTED
SALMONELLA SP RPOD STL QL NAA+PROBE: NOT DETECTED
SHIGELLA SP+EIEC IPAH STL QL NAA+PROBE: NOT DETECTED
V CHOL+PARA RFBL+TRKH+TNAA STL QL NAA+PR: NOT DETECTED
Y ENTERO RECN STL QL NAA+PROBE: NOT DETECTED

## 2022-08-31 LAB
C PARVUM AG STL QL IA: NEGATIVE
G LAMBLIA AG STL QL IA: NEGATIVE
O+P STL MICRO: NEGATIVE

## 2022-09-09 ENCOUNTER — HOSPITAL ENCOUNTER (OUTPATIENT)
Dept: MRI IMAGING | Facility: CLINIC | Age: 67
Discharge: HOME | End: 2022-09-09
Attending: INTERNAL MEDICINE | Admitting: INTERNAL MEDICINE
Payer: MEDICARE

## 2022-09-09 DIAGNOSIS — K76.89 LIVER CYST: ICD-10-CM

## 2022-09-09 PROCEDURE — 255N000002 HC RX 255 OP 636: Performed by: INTERNAL MEDICINE

## 2022-09-09 PROCEDURE — A9585 GADOBUTROL INJECTION: HCPCS | Performed by: INTERNAL MEDICINE

## 2022-09-09 PROCEDURE — G1010 CDSM STANSON: HCPCS

## 2022-09-09 RX ORDER — GADOBUTROL 604.72 MG/ML
10 INJECTION INTRAVENOUS ONCE
Status: COMPLETED | OUTPATIENT
Start: 2022-09-09 | End: 2022-09-09

## 2022-09-09 RX ADMIN — GADOBUTROL 10 ML: 604.72 INJECTION INTRAVENOUS at 11:24

## 2022-09-13 ENCOUNTER — TELEPHONE (OUTPATIENT)
Dept: INTERNAL MEDICINE | Facility: CLINIC | Age: 67
End: 2022-09-13

## 2022-09-13 DIAGNOSIS — R19.7 DIARRHEA, UNSPECIFIED TYPE: Primary | ICD-10-CM

## 2022-09-13 NOTE — TELEPHONE ENCOUNTER
Please call her and let her know referral for colonoscopy placed and give her the number to call.

## 2022-09-15 ENCOUNTER — TELEPHONE (OUTPATIENT)
Dept: GASTROENTEROLOGY | Facility: CLINIC | Age: 67
End: 2022-09-15

## 2022-09-15 NOTE — TELEPHONE ENCOUNTER
Screening Questions  BlueKIND OF PREP RedLOCATION [review exclusion criteria] GreenSEDATION TYPE      1.  YES Are you active on mychart?       2.   Ordering/Referring Provider:      3. MEDICARE AND BCBS  What insurance is in the chart?    4.  YES, CONSENT Do you have a legal guardian or medical Power of ?              Are you able to give consent for your medical care?                (Sedation review/consideration needed)      5.   29.0  BMI  [BMI OVER 40-EXTENDED PREP]  If greater than 40 review exclusion criteria [PAC APPT IF @ [facility]]       6.   NO Have you had a positive covid test in the last 90 days?      7.   Respiratory Screening :  [If yes to any of the following HOSPITAL setting only]                     NO Do you use daily home oxygen?   YES Do you have mod to severe Obstructive Sleep Apnea?  [OKAY @ [facility] SH PH RI]                  NO Do you have Pulmonary Hypertension?                   NO Do you have UNCONTROLLED asthma?         8.   NO Have you had a heart or lung transplant?       9.   NO Are you currently on dialysis?   [ If yes, G-PREP & HOSPITAL setting only]      10.   NO  Do you have chronic kidney disease?  [ If yes, G-PREP ]     11.  NO Have you had a stroke or Transient ischemic attack (TIA - aka  mini stroke ) within 6 months?   (If yes, please review exclusion criteria)     12.   NO In the past 6 months, have you had any heart related issues including cardiomyopathy or heart attack?            If yes, did it require cardiac stenting or other implantable device?       13.   NO Do you have any implantable devices in your body (pacemaker, defib, LVAD)?  (If yes, please review exclusion criteria)     14.   NO Do you take nitroglycerin?             If yes, how often? n  (if yes, HOSPITAL setting ONLY)     15.   YES, ASPIRIN Are you currently taking any blood thinners?           [IF YES, INFORM PATIENT TO FOLLOW UP W/ ORDERING PROVIDER FOR BRIDGING INSTRUCTIONS]      16.    NO   Do you have a diagnosis of diabetes?  [ If yes, G-PREP ]     17.   [FEMALES] NO Are you currently pregnant?     If yes, how many weeks?      18.    NO  Are you taking any prescription pain medications on a routine schedule?    [ If yes, EXTENDED PREP.] [If yes, MAC]    22.  Do you take the medication Phentermine?     Yes-> Hold for 7 days before procedure.  Please consult your prescribing provider if you have questions about holding this medication.     No-> Continue to next question.     19.   NO Do you have any chemical dependencies such as alcohol, street drugs, or methadone?   [If yes, MAC]     20.   NO Do you have any history of post-traumatic stress syndrome, severe anxiety or history of psychosis?   [If yes, MAC]     21.   YES Do you transfer independently?       22.   NO  On a regular basis do you go 3-5 days between bowel movements?  [ If yes, EXTENDED PREP.]     23.    Preferred LOCAL Pharmacy for Pre Prescription       [id number] - [place], [place] - [address] AT [address] [facility]            Scheduling Details       Jose : Caller   (Please ask for phone number if not scheduled by patient)    Lower Endoscopy [Colonoscopy] : Type of Procedure Scheduled   G-PREP Which Colonoscopy Prep was Sent?      YANELY Surgeon    10/06/2022 Date of Procedure   [facility] Location    CS Sedation Type     Conscious Sedation- Needs  for 6 hours after the procedure  MAC/General-Needs  for 24 hours after procedure     NO Pre-op Required at [facility], [place], [place] and OR for MAC sedation   (advise patient they will need a pre-op prior to procedure -)       YES Informed patient they will need an adult    Cannot take any type of public or medical transportation alone     YES Pre-Procedure Covid test to be completed at [facility] or Externally  [[facility] PCR Testing Required]     YES  Confirmed Nurse will call to complete assessment     Additional comments:  NA

## 2022-09-27 RX ORDER — BISACODYL 5 MG
TABLET, DELAYED RELEASE (ENTERIC COATED) ORAL
Qty: 4 TABLET | Refills: 0 | Status: SHIPPED | OUTPATIENT
Start: 2022-09-27 | End: 2022-10-20

## 2022-10-02 DIAGNOSIS — R39.15 URINARY URGENCY: ICD-10-CM

## 2022-10-02 DIAGNOSIS — N39.41 URGE INCONTINENCE: ICD-10-CM

## 2022-10-05 ENCOUNTER — TELEPHONE (OUTPATIENT)
Dept: UROLOGY | Facility: CLINIC | Age: 67
End: 2022-10-05

## 2022-10-05 NOTE — TELEPHONE ENCOUNTER
Central Prior Authorization Team   Phone: [phone]      PA Initiation    Medication: Fesoterodine Fumarate (TOVIAZ) 8 MG TB24  Insurance Company: [employer] - Phone [phone] Fax [phone]  Pharmacy Filling the Rx: [id number] - [place], [place] - [address] AT [facility]  Filling Pharmacy Phone: [phone]  Filling Pharmacy Fax:    Start Date: 10/5/2022

## 2022-10-05 NOTE — TELEPHONE ENCOUNTER
Prior Authorization Approval    Authorization Effective Date: 10/2/2022  Authorization Expiration Date:    Medication: Fesoterodine Fumarate (TOVIAZ) 8 MG TB24-APPROVED  Approved Dose/Quantity:   Reference #:     Insurance Company: [employer] - Phone [phone] Fax [phone]  Expected CoPay:       CoPay Card Available:      Foundation Assistance Needed:    Which Pharmacy is filling the prescription (Not needed for infusion/clinic administered): [id number] - [place], [place] - [address] AT [facility]  Pharmacy Notified: Yes  Patient Notified: No

## 2022-10-05 NOTE — TELEPHONE ENCOUNTER
Prior Authorization Retail Medication Request     Medication/Dose: TOVIAZ ER 8 MG TABLET  ICD code (if different than what is on RX):  .  Previously Tried and Failed:  .  Rationale:  .     Insurance Name:  Medicare  Insurance ID:  [id number]        Pharmacy Information (if different than what is on RX)  Name:  .  Phone:  .

## 2022-10-05 NOTE — TELEPHONE ENCOUNTER
Prior Authorization Retail Medication Request    Medication/Dose: TOVIAZ ER 8 MG TABLET  ICD code (if different than what is on RX):  .  Previously Tried and Failed:  .  Rationale:  .    Insurance Name:  Medicare  Insurance ID:  [id number]      Pharmacy Information (if different than what is on RX)  Name:  .  Phone:  .

## 2022-10-05 NOTE — TELEPHONE ENCOUNTER
TOVIAZ ER 8 MG TABLET  Needing a prior Authorization for Pt care    Radha Luis RN  Central Triage Red Flags/Med Refills

## 2022-10-06 ENCOUNTER — HOSPITAL ENCOUNTER (OUTPATIENT)
Facility: CLINIC | Age: 67
Discharge: HOME | End: 2022-10-06
Attending: STUDENT IN AN ORGANIZED HEALTH CARE EDUCATION/TRAINING PROGRAM | Admitting: STUDENT IN AN ORGANIZED HEALTH CARE EDUCATION/TRAINING PROGRAM
Payer: MEDICARE

## 2022-10-06 VITALS
SYSTOLIC BLOOD PRESSURE: 112 MMHG | OXYGEN SATURATION: 94 % | HEART RATE: 62 BPM | DIASTOLIC BLOOD PRESSURE: 78 MMHG | RESPIRATION RATE: 12 BRPM

## 2022-10-06 DIAGNOSIS — Z12.11 SPECIAL SCREENING FOR MALIGNANT NEOPLASMS, COLON: Primary | ICD-10-CM

## 2022-10-06 LAB — COLONOSCOPY: NORMAL

## 2022-10-06 PROCEDURE — 88305 TISSUE EXAM BY PATHOLOGIST: CPT | Mod: TC | Performed by: STUDENT IN AN ORGANIZED HEALTH CARE EDUCATION/TRAINING PROGRAM

## 2022-10-06 PROCEDURE — 45380 COLONOSCOPY AND BIOPSY: CPT | Performed by: STUDENT IN AN ORGANIZED HEALTH CARE EDUCATION/TRAINING PROGRAM

## 2022-10-06 PROCEDURE — 250N000011 HC RX IP 250 OP 636: Performed by: STUDENT IN AN ORGANIZED HEALTH CARE EDUCATION/TRAINING PROGRAM

## 2022-10-06 PROCEDURE — G0500 MOD SEDAT ENDO SERVICE >5YRS: HCPCS | Performed by: STUDENT IN AN ORGANIZED HEALTH CARE EDUCATION/TRAINING PROGRAM

## 2022-10-06 PROCEDURE — 99153 MOD SED SAME PHYS/QHP EA: CPT | Performed by: STUDENT IN AN ORGANIZED HEALTH CARE EDUCATION/TRAINING PROGRAM

## 2022-10-06 RX ORDER — ATROPINE SULFATE 0.1 MG/ML
1 INJECTION INTRAVENOUS
Status: DISCONTINUED | OUTPATIENT
Start: 2022-10-06 | End: 2022-10-06 | Stop reason: HOSPADM

## 2022-10-06 RX ORDER — FESOTERODINE FUMARATE 8 MG/1
TABLET, FILM COATED, EXTENDED RELEASE ORAL
Refills: 0 | OUTPATIENT
Start: 2022-10-06

## 2022-10-06 RX ORDER — DIPHENHYDRAMINE HYDROCHLORIDE 50 MG/ML
25-50 INJECTION INTRAMUSCULAR; INTRAVENOUS
Status: DISCONTINUED | OUTPATIENT
Start: 2022-10-06 | End: 2022-10-06 | Stop reason: HOSPADM

## 2022-10-06 RX ORDER — FLUMAZENIL 0.1 MG/ML
0.2 INJECTION, SOLUTION INTRAVENOUS
Status: DISCONTINUED | OUTPATIENT
Start: 2022-10-06 | End: 2022-10-06 | Stop reason: HOSPADM

## 2022-10-06 RX ORDER — LIDOCAINE 40 MG/G
CREAM TOPICAL
Status: DISCONTINUED | OUTPATIENT
Start: 2022-10-06 | End: 2022-10-06 | Stop reason: HOSPADM

## 2022-10-06 RX ORDER — FENTANYL CITRATE 0.05 MG/ML
50-100 INJECTION, SOLUTION INTRAMUSCULAR; INTRAVENOUS EVERY 5 MIN PRN
Status: DISCONTINUED | OUTPATIENT
Start: 2022-10-06 | End: 2022-10-06 | Stop reason: HOSPADM

## 2022-10-06 RX ORDER — NALOXONE HYDROCHLORIDE 0.4 MG/ML
0.2 INJECTION, SOLUTION INTRAMUSCULAR; INTRAVENOUS; SUBCUTANEOUS
Status: DISCONTINUED | OUTPATIENT
Start: 2022-10-06 | End: 2022-10-06 | Stop reason: HOSPADM

## 2022-10-06 RX ORDER — ONDANSETRON 2 MG/ML
4 INJECTION INTRAMUSCULAR; INTRAVENOUS EVERY 6 HOURS PRN
Status: DISCONTINUED | OUTPATIENT
Start: 2022-10-06 | End: 2022-10-06 | Stop reason: HOSPADM

## 2022-10-06 RX ORDER — PROCHLORPERAZINE MALEATE 5 MG
5 TABLET ORAL EVERY 6 HOURS PRN
Status: DISCONTINUED | OUTPATIENT
Start: 2022-10-06 | End: 2022-10-06 | Stop reason: HOSPADM

## 2022-10-06 RX ORDER — EPINEPHRINE 1 MG/ML
0.1 INJECTION, SOLUTION INTRAMUSCULAR; SUBCUTANEOUS
Status: DISCONTINUED | OUTPATIENT
Start: 2022-10-06 | End: 2022-10-06 | Stop reason: HOSPADM

## 2022-10-06 RX ORDER — NALOXONE HYDROCHLORIDE 0.4 MG/ML
0.4 INJECTION, SOLUTION INTRAMUSCULAR; INTRAVENOUS; SUBCUTANEOUS
Status: DISCONTINUED | OUTPATIENT
Start: 2022-10-06 | End: 2022-10-06 | Stop reason: HOSPADM

## 2022-10-06 RX ORDER — SIMETHICONE 40MG/0.6ML
133 SUSPENSION, DROPS(FINAL DOSAGE FORM)(ML) ORAL
Status: DISCONTINUED | OUTPATIENT
Start: 2022-10-06 | End: 2022-10-06 | Stop reason: HOSPADM

## 2022-10-06 RX ORDER — ONDANSETRON 4 MG/1
4 TABLET, ORALLY DISINTEGRATING ORAL EVERY 6 HOURS PRN
Status: DISCONTINUED | OUTPATIENT
Start: 2022-10-06 | End: 2022-10-06 | Stop reason: HOSPADM

## 2022-10-06 RX ORDER — ONDANSETRON 2 MG/ML
4 INJECTION INTRAMUSCULAR; INTRAVENOUS
Status: DISCONTINUED | OUTPATIENT
Start: 2022-10-06 | End: 2022-10-06 | Stop reason: HOSPADM

## 2022-10-06 RX ADMIN — MIDAZOLAM HYDROCHLORIDE 2 MG: 1 INJECTION, SOLUTION INTRAMUSCULAR; INTRAVENOUS at 08:53

## 2022-10-06 RX ADMIN — FENTANYL CITRATE 100 MCG: 50 INJECTION INTRAMUSCULAR; INTRAVENOUS at 08:53

## 2022-10-06 ASSESSMENT — ACTIVITIES OF DAILY LIVING (ADL): ADLS_ACUITY_SCORE: 35

## 2022-10-06 NOTE — H&P
[facility] Anesthesia Pre-op History and Physical    Jose Coronado MRN# [id number]   Age: 66 year old YOB: 1955      Date of Surgery: 10/06/22   [facility]      Date of Exam 10/6/2022 Facility (Same day)       Primary care provider: Lian Martinez         Chief Complaint and/or Reason for Procedure:   #diagnostic colonoscopy  #chronic diarrhea           Active problem list:     Patient Active Problem List    Diagnosis Date Noted     Atrophic vaginitis 02/10/2021     Priority: Medium     Urge incontinence 02/10/2021     Priority: Medium     Urinary urgency 02/10/2021     Priority: Medium     Midline cystocele 12/16/2019     Priority: Medium     Added automatically from request for surgery [id number]       Rectocele 12/16/2019     Priority: Medium     Added automatically from request for surgery [id number]       Osteopenia 03/17/2019     Priority: Medium     Vitamin D deficiency 03/17/2019     Priority: Medium     (Problem list name updated by automated process. Provider to review and confirm.)    (Problem list name updated by automated process. Provider to review and confirm.)       Hyperparathyroidism (H) 03/17/2019     Priority: Medium     Word finding difficulty 03/17/2019     Priority: Medium     Received intravenous tissue plasminogen activator (tPA) in emergency department 03/17/2019     Priority: Medium     Accidental marijuana poisoning 03/17/2019     Priority: Medium     Migraine with aura and without status migrainosus, not intractable 01/07/2019     Priority: Medium     ASD (atrial septal defect) 07/13/2017     Priority: Medium     Cerebral aneurysm without rupture 04/10/2017     Priority: Medium     Left temporal lobe infarction (H) 02/09/2017     Priority: Medium     Stroke (H) 02/09/2017     Priority: Medium     Essential hypertension with goal blood pressure less than 140/90 10/31/2016     Priority: Medium     PFO (patent foramen ovale) 02/15/2016      Priority: Medium     s/p closure with amplazter device 7/13/17       Lump or mass in breast 11/09/2015     Priority: Medium     Class 1 obesity in adult 10/16/2015     Priority: Medium     Iron deficiency anemia 10/16/2015     Priority: Medium     ACP (advance care planning) 08/23/2012     Priority: Medium     Discussed Advance Directive planning with patient; information given to patient to review.       S/P gastric bypass 06/01/2010     Priority: Medium     Sleep apnea 08/28/2009     Priority: Medium     Female stress incontinence 06/09/2008     Priority: Medium     (Problem list name updated by automated process. Provider to review and confirm.)       Family history of malignant neoplasm of breast 10/02/2003     Priority: Medium            Medications (include herbals and vitamins):     Current Outpatient Medications   Medication Instructions     Ascorbic Acid (VITAMIN C PO) 250 mg, Oral, EVERY MORNING, (0.5 x 500 mg tablet = 250 mg dose)      aspirin (ASA) 325 mg, Oral, EVERY MORNING     atenolol (TENORMIN) 50 MG tablet TAKE 1 TABLET BY MOUTH EVERY DAY     bisacodyl (DULCOLAX) 5 MG EC tablet Take 2 tablets at 3 pm the day before your procedure. If your procedure is before 11 am, take 2 additional tablets at 8 pm. If your procedure is after 11 am, take 2 additional tablets at 6 am. For additional instructions refer to your colonoscopy prep instructions.     Calcium Citrate-Vitamin D (CALCIUM CITRATE + D PO) 2 tablets, Oral, EVERY MORNING     COMPOUNDED NON-CONTROLLED SUBSTANCE (CMPD RX) - PHARMACY TO MIX COMPOUNDED MEDICATION Estriol 1 mg/g in HRT base, apply small amount to finger and apply to inside vagina daily for 2 weeks then twice weekly     Cranberry 450 MG TABS Oral, 2 TIMES DAILY     Cyanocobalamin 2,500 mcg, Oral, WEEKLY, Mon     Ferrous Sulfate 27 mg, Oral, EVERY MORNING     FIBER COMPLETE PO 1 capsule, Oral, EVERY MORNING     hydrochlorothiazide (HYDRODIURIL) 12.5 MG tablet TAKE 1 TABLET BY MOUTH  EVERY DAY     imipramine (TOFRANIL) 25 MG tablet TAKE 1 TABLET BY MOUTH AT BEDTIME     loratadine (CLARITIN) 10 mg, Oral, EVERY MORNING     losartan (COZAAR) 50 MG tablet TAKE 1 TABLET BY MOUTH EVERY DAY     methenamine hippurate (HIPREX) 1 g, Oral, 2 TIMES DAILY     polyethylene glycol (GOLYTELY) 236 g suspension The night before the exam at 6 pm drink an 8-ounce glass every 15 minutes until the jug is half empty. If you arrive before 11 AM: Drink the other half of the Golytely jug at 11 PM night before procedure. If you arrive after 11 AM: Drink the other half of the Golytely jug at 6 AM day of procedure. For additional instructions refer to your colonoscopy prep instructions.     topiramate (TOPAMAX) 25 MG tablet TAKE 1 TABLET BY MOUTH TWICE A DAY     Toviaz 8 mg, Oral, DAILY     UNABLE TO FIND CPAP machine every night      VITAMIN D, CHOLECALCIFEROL,  Units, Oral, EVERY MORNING                Allergies:      Allergies   Allergen Reactions     Contrast Dye Itching     Reaction of immediate burning and severe itching in Right ear and back of throat after injection for CT.      Sulfa Drugs      hives               Physical Exam:   All vitals have been reviewed    /78   Pulse 63   Resp 10   SpO2 99%   Airway assessment:   Mallampatti classification: Class II (visualization of the soft palate, fauces, and uvula)}     Lungs:   No increased work of breathing, good air exchange, clear to auscultation bilaterally     Cardiovascular:   regular rate and rhythm             Lab / Radiology Results:   COVID-19 negative        Anesthetic risk and/or ASA classification:   Class II      Freddie Gonzalez MD

## 2022-10-07 DIAGNOSIS — G43.109 MIGRAINE WITH AURA AND WITHOUT STATUS MIGRAINOSUS, NOT INTRACTABLE: ICD-10-CM

## 2022-10-10 NOTE — TELEPHONE ENCOUNTER
Routing refill request to provider for review/approval because:  Medication not addressed in last visit note  Kim VAN RN, BSN

## 2022-10-11 LAB
PATH REPORT.COMMENTS IMP SPEC: NORMAL
PATH REPORT.FINAL DX SPEC: NORMAL
PATH REPORT.GROSS SPEC: NORMAL
PATH REPORT.MICROSCOPIC SPEC OTHER STN: NORMAL
PATH REPORT.RELEVANT HX SPEC: NORMAL
PHOTO IMAGE: NORMAL

## 2022-10-11 PROCEDURE — 88305 TISSUE EXAM BY PATHOLOGIST: CPT | Mod: 26

## 2022-10-11 RX ORDER — TOPIRAMATE 25 MG/1
TABLET, FILM COATED ORAL
Qty: 180 TABLET | Refills: 0 | Status: SHIPPED | OUTPATIENT
Start: 2022-10-11 | End: 2023-01-10

## 2022-10-12 DIAGNOSIS — R19.7 DIARRHEA, UNSPECIFIED TYPE: Primary | ICD-10-CM

## 2022-10-12 NOTE — RESULT ENCOUNTER NOTE
The pathology from your colonoscopy did not show evidence of microscopic colitis. Recommend gastroenterology follow-up for further evaluation of chronic diarrhea.

## 2022-10-15 ENCOUNTER — HEALTH MAINTENANCE LETTER (OUTPATIENT)
Age: 67
End: 2022-10-15

## 2022-10-20 ENCOUNTER — VIRTUAL VISIT (OUTPATIENT)
Dept: INFECTIOUS DISEASES | Facility: CLINIC | Age: 67
End: 2022-10-20
Attending: STUDENT IN AN ORGANIZED HEALTH CARE EDUCATION/TRAINING PROGRAM
Payer: MEDICARE

## 2022-10-20 DIAGNOSIS — N81.10 VAGINAL PROLAPSE: ICD-10-CM

## 2022-10-20 DIAGNOSIS — Z23 NEED FOR VACCINATION: ICD-10-CM

## 2022-10-20 DIAGNOSIS — N95.2 ATROPHIC VAGINITIS: ICD-10-CM

## 2022-10-20 DIAGNOSIS — N39.0 RECURRENT UTI: Primary | ICD-10-CM

## 2022-10-20 PROCEDURE — 99213 OFFICE O/P EST LOW 20 MIN: CPT | Mod: 95 | Performed by: STUDENT IN AN ORGANIZED HEALTH CARE EDUCATION/TRAINING PROGRAM

## 2022-10-20 NOTE — NURSING NOTE
Patient denies any changes since echeck-in regarding medication and allergies and states all information entered during echeck-in remains accurate.  Taylor Myhre,CHETF

## 2022-10-20 NOTE — LETTER
10/20/2022       RE: Joes Coronado  [address] [place] [address]     Dear Colleague,    Thank you for referring your patient, Jose Coronado, to the [facility] INFECTIOUS DISEASE CLINIC [place] at [facility]. Please see a copy of my visit note below.    Jose is a 66 year old who is being evaluated via a billable video visit.      How would you like to obtain your AVS? MyChart  If the video visit is dropped, the invitation should be resent by: Send to e-mail at: [email or web address]  Will anyone else be joining your video visit? No        Video-Visit Details    Video Start Time: 2:08 PM    Type of service:  Video Visit    Video End Time:2.23 pm     Originating Location (pt. Location): Home        Distant Location (provider location):  Off-site    Platform used for Video Visit: [employer]     Total time including chart review, care-coordination and documentation time on the date of encounter - 20 mins            [facility]  Infectious Disease Consultation note  Today's Date: 10/20/2022    Recommendations:  Continue vitamin C, cranberry  Continue Increase water intake to 100 ounces a day in the summer   Continue hygiene etiquettes  Can try stopping hiprex when she runs out, if she does ok for 3 months, can try stopping vaginal estrogen cream and see how she does.   Standing order for UA/UC if UTI symptoms develop. Will order abxs based on cx results  Discussed how resistance organisms develop   Due for Flu, shingles, covid, pneumonia vaccine - getting it locally     6 months follow up     Thank you for involving Infectious Disease in the care of this patient.     Alexandra Whitten  , [facility]  Pager - [phone]    Assessment:  Jose Coronado is a 66 year old with PMH of HTN, sleep apnea on CPAP, parathyroid tumors, minor stroke in 2017 (no residual), brain anerysm repair, PFO repair, Grade 4 prolapse with urinary  incontinence s/p rectocele, cystocele repair, hysterectomy March 2020     Recurrent UTIs: after stroke in 2017 but only once in 6 months, every 2 months after prolapse repair in March 2020. Has Slight urge and stress incontinence- toviaz has helped.   Persistent E.coli UTI since 3/2022, resolved after abx therapy in May 2022.    No UTIs since May 2022.     -------------------------------------------------------------------------------------------------------------------  Interval events:   Last seen 7/2022  No UTIs in the interim   2 episodes of twinges like bladder spasms, drank more water and it resolved   On Hiprex, vitamin C, cranberry, vaginal estrogen cream three times a week  Has improved hygiene   Changes pads for occasional incontinence frequently   Uses special wipes, cleans better after BM     Reason for consult / Chief complaint:   Consulted by Dr. Valdivia for recurrent UTI    History of presenting illness:  Jose Coronado is a 66 year old with PMH of HTN, sleep apnea on CPAP, parathyroid tumors   , minor stroke in 2017 (no residual), brain anerysm repair, PFO repair, Grade 4 prolapse with urinary incontinence s/p rectocele, cystocele repair, hysterectomy March 2020.    After 2017 (?stroke) developed UTI once every 6 months, UTIs increased to every 2 months after the prolapse repair in 2020 but I didn't find UA/UC (during that time frame) till 12/2021. She reports all were done at [facility]. Perhaps she had clinic UA done with no cultures. UA/UC indicate persistent E.coli UTI since 3/2022 and progressively more resistant. She was treated with cipro, macrobid and most recently augmentin (although I to augmentin). She reports no infection symptoms since May 18, 2022 after she took the augmentin.     She is very concerned about the resistant profile of the organisms and had many questions regarding that.      She reports increase in yeast infections after taking a strong abx.     On Hiprex, vitamin C, cranberry  - started in May 2022  On estrogen cream twice a week since 2020  Has Slight urge and stress incontinence- toviaz has helped     5/18/22 cystoscopy normal     CT 5/2020   1. Air within the urinary bladder may be related to recent  instrumentation.  2. Diffuse fatty infiltration of the liver.  3. Cholelithiasis.  4. Several low density lesions in the liver are too small to  characterize, but could represent cysts or hemangiomas. Consider liver  MRI for further characterization.      UA   Latest Reference Range & Units 12/16/21 13:46 12/16/21 14:41 02/14/22 09:21 03/01/22 11:42 04/08/22 10:50 04/18/22 09:30 05/18/22 08:18 06/06/22 10:45 06/09/22 12:32   Color Urine Colorless, Straw, Light Yellow, Yellow  Yellow  Yellow  Yellow Yellow  Yellow    COLOR, URINE POCT Colorless, Straw, Light Yellow, Yellow        Yellow     Appearance Urine Clear  Clear  Clear  Cloudy ! Cloudy !  Clear    CLARITY, URINE POCT Clear        Clear     Glucose Urine Negative mg/dL Negative  Negative  Negative Negative  Negative    GLUCOSE, URINE POCT Negative mg/dL       Negative     Bilirubin Urine Negative  Negative  Negative  Negative Negative  Negative    BILIRUBIN, URINE POCT Negative        Negative     Ketones Urine Negative mg/dL Negative  Negative  Negative Negative  Trace !    KETONES, URINE POCT Negative mg/dL mg/dL       Negative     Specific Gravity Urine 1.003 - 1.035  1.020  1.015  1.015 1.010  1.015    SPECIFIC GRAVITY POCT 1.005 - 1.030        1.015     pH Urine 5.0 - 7.0  7.0  6.0  6.0 6.0  6.5    PH, URINE POCT 5.0 - 8.0        6.0     Protein Albumin Urine Negative mg/dL Negative  Negative  Negative Negative  Negative    PROTEIN, URINE POCT Negative mg/dL       Negative     Urobilinogen Urine 0.2, 1.0 E.U./dL 2.0 !  0.2  1.0 0.2  0.2    UROBILINOGEN, URINE POCT 0.2, 1.0 E.U./dL       1.0     Nitrite Urine Negative  Positive !  Negative  Positive ! Positive !  Negative    NITRITES POCT Negative        Positive !     Blood  Urine Negative  Trace !  Negative  Moderate ! Moderate !  Negative    BLOOD, URINE POCT Negative        Trace !     Leukocyte Esterase Urine Negative  Small !  Trace !  Large ! Large !  Negative    LEUK ESTERASE, POCT Negative        Small !     WBC Urine 0-5 /HPF /HPF 0-5  0-5 >100 ! 10-25 !  !  0-5    RBC Urine 0-2 /HPF /HPF 0-2  0-2 10-25 ! 2-5 ! 5-10 !  0-2    Bacteria Urine None Seen /HPF Many !  Moderate ! Many ! Many ! Many !  Few !    WBC Clumps None Seen /HPF     Present ! Present !      Clue cells Absent   Present !       Absent   Squamous Epithelial /LPF Urine None Seen /LPF Moderate !  Few ! Few ! Few ! Few !  Few !    Mucus Urine None Seen /LPF   Present !     Present !    Hyaline Casts None Seen /LPF   0-2 !         Calcium Oxalate None Seen /HPF      Moderate !  Few !        Urine cxs   5/2022 , 4/2022, 3/2022 - >100k E.coli   12/2021 - >100k Klebsiella pneumoniae, 50-100K E.coli     5/2022   Escherichia coli     IDA     Ampicillin >=32.0 ug/mL Resistant     Ampicillin/ Sulbactam 16.0 ug/mL Intermediate     Cefazolin >=64.0 ug/mL Resistant 1     Cefepime 0.25 ug/mL Susceptible     Cefoxitin >=64.0 ug/mL Resistant     Ceftazidime >32 ug/mL Resistant     Ceftriaxone 16 ug/mL Resistant     Ciprofloxacin >=4.0 ug/mL Resistant     Gentamicin >=16.0 ug/mL Resistant     Levofloxacin >=8.0 ug/mL Resistant     Nitrofurantoin 256.0 ug/mL Resistant     Piperacillin/Tazobactam <=4.0 ug/mL Susceptible     Tobramycin 8.0 ug/mL Intermediate     Trimethoprim/Sulfamethoxazole >16/304 ug/mL Resistant                 Social Hx:  Social History     Tobacco Use     Smoking status: Never     Smokeless tobacco: Never   Vaping Use     Vaping Use: Never used   Substance Use Topics     Alcohol use: Yes     Alcohol/week: 0.0 standard drinks     Comment: Social     Drug use: No         Immunizations:  Immunization History   Administered Date(s) Administered     COVID-19,PF,Pfizer (12+ Yrs) 03/02/2021, 03/26/2021,  11/02/2021     COVID-19,PF,Pfizer 12+ Yrs (2022 and After) 06/10/2022     FLU 6-35 months 09/20/2011     Influenza (IIV3) PF 10/16/2015, 10/20/2016     Influenza Quad, Recombinant, pf(RIV4) (Flublok) 12/05/2018, 11/05/2019     Influenza Vaccine IM > 6 months Valent IIV4 (Alfuria,Fluzone) 10/18/2017     Influenza, Quad, High Dose, Pf, 65yr+ (Fluzone HD) 09/21/2020, 11/02/2021     TD (ADULT, 7+) 06/28/2000     TDAP Vaccine (Adacel) 10/04/2010     TDAP Vaccine (Boostrix) 10/04/2010     Tdap (Adacel,Boostrix) 10/29/2020       Allergies:   Allergies   Allergen Reactions     Contrast Dye Itching     Reaction of immediate burning and severe itching in Right ear and back of throat after injection for CT.      Sulfa Drugs      hives         Medications:  Current Outpatient Medications   Medication Sig Dispense Refill     Ascorbic Acid (VITAMIN C PO) Take 250 mg by mouth every morning (0.5 x 500 mg tablet = 250 mg dose)       aspirin 325 MG tablet Take 325 mg by mouth every morning        atenolol (TENORMIN) 50 MG tablet TAKE 1 TABLET BY MOUTH EVERY DAY 90 tablet 3     Calcium Citrate-Vitamin D (CALCIUM CITRATE + D PO) Take 2 tablets by mouth every morning        COMPOUNDED NON-CONTROLLED SUBSTANCE (CMPD RX) - PHARMACY TO MIX COMPOUNDED MEDICATION Estriol 1 mg/g in HRT base, apply small amount to finger and apply to inside vagina daily for 2 weeks then twice weekly 30 g 11     Cranberry 450 MG TABS Take by mouth 2 times daily       Cyanocobalamin 2500 MCG TABS Take 2,500 mcg by mouth once a week Mon       Ferrous Sulfate 27 MG TABS Take 27 mg by mouth every morning        Fesoterodine Fumarate (TOVIAZ) 8 MG TB24 Take 1 tablet (8 mg) by mouth daily 90 tablet 3     FIBER COMPLETE PO Take 1 capsule by mouth every morning        hydrochlorothiazide (HYDRODIURIL) 12.5 MG tablet TAKE 1 TABLET BY MOUTH EVERY DAY 90 tablet 3     imipramine (TOFRANIL) 25 MG tablet TAKE 1 TABLET BY MOUTH AT BEDTIME 90 tablet 3     loratadine (CLARITIN)  10 MG tablet Take 10 mg by mouth every morning        losartan (COZAAR) 50 MG tablet TAKE 1 TABLET BY MOUTH EVERY DAY 90 tablet 3     methenamine hippurate (HIPREX) 1 g tablet Take 1 tablet (1 g) by mouth 2 times daily 180 tablet 0     topiramate (TOPAMAX) 25 MG tablet TAKE 1 TABLET BY MOUTH TWICE A  tablet 0     UNABLE TO FIND CPAP machine every night       VITAMIN D, CHOLECALCIFEROL, PO Take 500 Units by mouth every morning        bisacodyl (DULCOLAX) 5 MG EC tablet Take 2 tablets at 3 pm the day before your procedure. If your procedure is before 11 am, take 2 additional tablets at 8 pm. If your procedure is after 11 am, take 2 additional tablets at 6 am. For additional instructions refer to your colonoscopy prep instructions. 4 tablet 0     polyethylene glycol (GOLYTELY) 236 g suspension The night before the exam at 6 pm drink an 8-ounce glass every 15 minutes until the jug is half empty. If you arrive before 11 AM: Drink the other half of the Golytely jug at 11 PM night before procedure. If you arrive after 11 AM: Drink the other half of the Golytely jug at 6 AM day of procedure. For additional instructions refer to your colonoscopy prep instructions. 4000 mL 0         Past Medical Hx:  Past Medical History:   Diagnosis Date     Anemia      Arthritis 2015    Hands, back, hips. Various dates first noted.     CVA (cerebral vascular accident) (H) 2017    ?migraine, ?pfo--negative vasc w/u, neg hypercoag w/u     Diffuse cystic mastopathy     Fibrocystic breast disease     HTN, goal below 140/90      Hyperparathyroidism (H)      Infectious mononucleosis     Mono at age 16     Irregular heart beat     PAT no afib on 30day monitor     Labyrinthitis, unspecified      Migraine headache with aura      Osteopenia      Pain in joint, shoulder region     Secondary to a fall     Palpitations      PFO (patent foramen ovale)     s/p closure with amplazter device 7/13/17     S/P gastric bypass June, 2010     Sleep apnea      she is on CPAP     Sleep apnea      Vitamin D deficiencies          Family History:  Family History   Problem Relation Age of Onset     Breast Cancer Mother      Hypertension Mother      Arthritis Mother      Thyroid Disease Mother         Hypo     Ulcerative Colitis Mother      Cerebrovascular Disease Mother         Age 78 - Cerebral Hemorrhage,      Anxiety Disorder Mother      Depression Mother      Genetic Disorder Mother         Ulcerative colitis     Obesity Mother      Anesthesia Reaction Mother         headaches, N/V     Breast Cancer Maternal Aunt      Hypertension Paternal Grandmother      Cerebrovascular Disease Maternal Grandmother         Age 72 -      Family History Negative Maternal Grandfather      Family History Negative Paternal Grandfather      Family History Negative Son      Family History Negative Daughter      Other Cancer Father         Skin - Non-melanoma varieties     Hypertension Father      Asthma Father      Family History Negative Daughter      Ulcerative Colitis Sister      Hypertension Sister      Hyperlipidemia Sister      Anxiety Disorder Sister      Depression Sister      Diabetes Sister      Obesity Sister      Asthma Sister      Anesthesia Reaction Sister         Debilitating headaches     Hypertension Brother      Anxiety Disorder Brother      Depression Brother      Asthma Nephew      Hypertension Sister         Congestive Heart Failure     Anxiety Disorder Sister      Genetic Disorder Sister         Ulcerative colitis     Obesity Sister      Genetic Disorder Niece         Ulcerative colitis     Colon Cancer No family hx of      Coronary Artery Disease No family hx of      Mental Illness No family hx of      Substance Abuse No family hx of      Osteoporosis No family hx of      Deep Vein Thrombosis No family hx of          Examination:  Unable to examine due to virtual visit       Laboratory:  Hematology:  Recent Labs   Lab Test 22  0846 21  2254  01/11/21  1118 03/17/20  0554 03/16/20  0634 01/07/20  0831 04/29/19  0846 04/05/17  0916 02/09/17  1545   WBC 5.5 7.9 5.7 13.5* 7.1 5.7 6.3   < > 8.8   ANEU  --  5.3 3.1  --   --   --  3.2  --  5.6   ALYM  --  1.6 1.7  --   --   --  2.2  --  2.1   CHANTELLE  --  0.7 0.7  --   --   --  0.6  --  1.0   AEOS  --  0.2 0.2  --   --   --  0.2  --  0.1   HGB 13.3 13.1 13.1 11.4* 14.2 12.8 13.7   < > 13.8   HCT 40.9 39.4 40.7 36.2 42.9 40.1 42.3   < > 41.6    242 239 239 274 259 317   < > 322    < > = values in this interval not displayed.       Chemistry:  Recent Labs   Lab Test 02/14/22  0846 03/23/21  2258 01/11/21  1118 03/17/20  0554 03/16/20  0634 01/07/20  0831 04/29/19  0846 08/10/16  0937 11/16/15  1459 09/16/15  1043 07/29/15  0845    132* 139 141  --  137 140   < > 140   < > 140   POTASSIUM 4.3 3.4 4.8 4.7 4.4 3.7 3.7   < > 4.2   < > 3.9   CHLORIDE 106 101 107 109  --  104 106   < > 105   < > 105   CO2 30 25 28 29  --  28 28   < > 30   < > 26   ANIONGAP 3 6 4 3  --  5 6   < > 5   < > 9   BUN 18 19 25 11  --  15 22   < > 13   < > 13   CR 0.76 0.85 0.92 0.66 0.75 0.72 0.70   < > 0.85   < > 0.73   GFRESTIMATED 85 71 64 >90 84 87 >90   < > 68   < > 81   GLC 95 131* 89 108* 104* 95 102*   < > 90   < > 91   MAXIMILIANO 9.4 9.0 9.1 8.4*  --  8.9 8.5   < > 9.2   < > 8.4*   MAG  --   --   --   --   --   --   --   --  2.1  --  2.0   LACT  --  0.7  --   --   --   --   --   --   --   --   --     < > = values in this interval not displayed.       Liver Function Studies:  Recent Labs   Lab Test 02/14/22  0846 01/11/21  1118 01/07/20  0831 02/10/17  0635 08/10/16  0937 07/29/15  0845   BILITOTAL 0.6 0.4 0.3 0.3 0.4 0.3   ALKPHOS 93 77 78 53 73 68   ALBUMIN 3.3* 3.5 3.6 3.2* 3.6 3.4   AST 18 22 17 23 17 15   ALT 23 24 22 20 20 20       Alexandra Whitten MD

## 2022-10-20 NOTE — TELEPHONE ENCOUNTER
REFERRAL INFORMATION:    Referring Provider:  Dr. Lian Martinez     Referring Clinic:  [facility]     Reason for Visit/Diagnosis: Diarrhea     FUTURE VISIT INFORMATION:    Appointment Date: 11/10/2022    Appointment Time: 7 AM      NOTES STATUS DETAILS   OFFICE NOTE from Referring Provider Internal 8/24/2022 Office visit with Dr. Martinez    OFFICE NOTE from Other Specialist N/A    HOSPITAL DISCHARGE SUMMARY/  ED VISITS N/A    OPERATIVE REPORT N/A    MEDICATION LIST Internal         ENDOSCOPY  N/A    COLONOSCOPY Internal 10/6/2022, 8/12/15   ERCP N/A    EUS N/A    STOOL TESTING Internal 8/29/2022   PERTINENT LABS Internal    PATHOLOGY REPORTS (RELATED) Internal 10/6/2022   IMAGING (CT, MRI, EGD, MRCP, Small Bowel Follow Through/SBT, MR/CT Enterography) Internal CT Abdomen Pelvis: 5/9/2022

## 2022-10-20 NOTE — PROGRESS NOTES
Jose is a 66 year old who is being evaluated via a billable video visit.      How would you like to obtain your AVS? MyChart  If the video visit is dropped, the invitation should be resent by: Send to e-mail at: [email or web address]  Will anyone else be joining your video visit? No        Video-Visit Details    Video Start Time: 2:08 PM    Type of service:  Video Visit    Video End Time:2.23 pm     Originating Location (pt. Location): Home        Distant Location (provider location):  Off-site    Platform used for Video Visit: Addepar     Total time including chart review, care-coordination and documentation time on the date of encounter - 20 mins            [facility]  Infectious Disease Consultation note  Today's Date: 10/20/2022    Recommendations:  Continue vitamin C, cranberry  Continue Increase water intake to 100 ounces a day in the summer   Continue hygiene etiquettes  Can try stopping hiprex when she runs out, if she does ok for 3 months, can try stopping vaginal estrogen cream and see how she does.   Standing order for UA/UC if UTI symptoms develop. Will order abxs based on cx results  Discussed how resistance organisms develop   Due for Flu, shingles, covid, pneumonia vaccine - getting it locally     6 months follow up     Thank you for involving Infectious Disease in the care of this patient.     Alexandra Whitten  , [facility]  Pager - [phone]    Assessment:  Jose Coronado is a 66 year old with PMH of HTN, sleep apnea on CPAP, parathyroid tumors, minor stroke in 2017 (no residual), brain anerysm repair, PFO repair, Grade 4 prolapse with urinary incontinence s/p rectocele, cystocele repair, hysterectomy March 2020     Recurrent UTIs: after stroke in 2017 but only once in 6 months, every 2 months after prolapse repair in March 2020. Has Slight urge and stress incontinence- toviaz has helped.   Persistent E.coli UTI since 3/2022, resolved after abx therapy in  May 2022.    No UTIs since May 2022.     -------------------------------------------------------------------------------------------------------------------  Interval events:   Last seen 7/2022  No UTIs in the interim   2 episodes of twinges like bladder spasms, drank more water and it resolved   On Hiprex, vitamin C, cranberry, vaginal estrogen cream three times a week  Has improved hygiene   Changes pads for occasional incontinence frequently   Uses special wipes, cleans better after BM     Reason for consult / Chief complaint:   Consulted by Dr. Valdivia for recurrent UTI    History of presenting illness:  Jose Coronado is a 66 year old with PMH of HTN, sleep apnea on CPAP, parathyroid tumors   , minor stroke in 2017 (no residual), brain anerysm repair, PFO repair, Grade 4 prolapse with urinary incontinence s/p rectocele, cystocele repair, hysterectomy March 2020.    After 2017 (?stroke) developed UTI once every 6 months, UTIs increased to every 2 months after the prolapse repair in 2020 but I didn't find UA/UC (during that time frame) till 12/2021. She reports all were done at [facility]. Perhaps she had clinic UA done with no cultures. UA/UC indicate persistent E.coli UTI since 3/2022 and progressively more resistant. She was treated with cipro, macrobid and most recently augmentin (although I to augmentin). She reports no infection symptoms since May 18, 2022 after she took the augmentin.     She is very concerned about the resistant profile of the organisms and had many questions regarding that.      She reports increase in yeast infections after taking a strong abx.     On Hiprex, vitamin C, cranberry - started in May 2022  On estrogen cream twice a week since 2020  Has Slight urge and stress incontinence- toviaz has helped     5/18/22 cystoscopy normal     CT 5/2020   1. Air within the urinary bladder may be related to recent  instrumentation.  2. Diffuse fatty infiltration of the liver.  3. Cholelithiasis.  4.  Several low density lesions in the liver are too small to  characterize, but could represent cysts or hemangiomas. Consider liver  MRI for further characterization.      UA   Latest Reference Range & Units 12/16/21 13:46 12/16/21 14:41 02/14/22 09:21 03/01/22 11:42 04/08/22 10:50 04/18/22 09:30 05/18/22 08:18 06/06/22 10:45 06/09/22 12:32   Color Urine Colorless, Straw, Light Yellow, Yellow  Yellow  Yellow  Yellow Yellow  Yellow    COLOR, URINE POCT Colorless, Straw, Light Yellow, Yellow        Yellow     Appearance Urine Clear  Clear  Clear  Cloudy ! Cloudy !  Clear    CLARITY, URINE POCT Clear        Clear     Glucose Urine Negative mg/dL Negative  Negative  Negative Negative  Negative    GLUCOSE, URINE POCT Negative mg/dL       Negative     Bilirubin Urine Negative  Negative  Negative  Negative Negative  Negative    BILIRUBIN, URINE POCT Negative        Negative     Ketones Urine Negative mg/dL Negative  Negative  Negative Negative  Trace !    KETONES, URINE POCT Negative mg/dL mg/dL       Negative     Specific Gravity Urine 1.003 - 1.035  1.020  1.015  1.015 1.010  1.015    SPECIFIC GRAVITY POCT 1.005 - 1.030        1.015     pH Urine 5.0 - 7.0  7.0  6.0  6.0 6.0  6.5    PH, URINE POCT 5.0 - 8.0        6.0     Protein Albumin Urine Negative mg/dL Negative  Negative  Negative Negative  Negative    PROTEIN, URINE POCT Negative mg/dL       Negative     Urobilinogen Urine 0.2, 1.0 E.U./dL 2.0 !  0.2  1.0 0.2  0.2    UROBILINOGEN, URINE POCT 0.2, 1.0 E.U./dL       1.0     Nitrite Urine Negative  Positive !  Negative  Positive ! Positive !  Negative    NITRITES POCT Negative        Positive !     Blood Urine Negative  Trace !  Negative  Moderate ! Moderate !  Negative    BLOOD, URINE POCT Negative        Trace !     Leukocyte Esterase Urine Negative  Small !  Trace !  Large ! Large !  Negative    LEUK ESTERASE, POCT Negative        Small !     WBC Urine 0-5 /HPF /HPF 0-5  0-5 >100 ! 10-25 !  !  0-5    RBC Urine  0-2 /HPF /HPF 0-2  0-2 10-25 ! 2-5 ! 5-10 !  0-2    Bacteria Urine None Seen /HPF Many !  Moderate ! Many ! Many ! Many !  Few !    WBC Clumps None Seen /HPF     Present ! Present !      Clue cells Absent   Present !       Absent   Squamous Epithelial /LPF Urine None Seen /LPF Moderate !  Few ! Few ! Few ! Few !  Few !    Mucus Urine None Seen /LPF   Present !     Present !    Hyaline Casts None Seen /LPF   0-2 !         Calcium Oxalate None Seen /HPF      Moderate !  Few !        Urine cxs   5/2022 , 4/2022, 3/2022 - >100k E.coli   12/2021 - >100k Klebsiella pneumoniae, 50-100K E.coli     5/2022   Escherichia coli     IDA     Ampicillin >=32.0 ug/mL Resistant     Ampicillin/ Sulbactam 16.0 ug/mL Intermediate     Cefazolin >=64.0 ug/mL Resistant 1     Cefepime 0.25 ug/mL Susceptible     Cefoxitin >=64.0 ug/mL Resistant     Ceftazidime >32 ug/mL Resistant     Ceftriaxone 16 ug/mL Resistant     Ciprofloxacin >=4.0 ug/mL Resistant     Gentamicin >=16.0 ug/mL Resistant     Levofloxacin >=8.0 ug/mL Resistant     Nitrofurantoin 256.0 ug/mL Resistant     Piperacillin/Tazobactam <=4.0 ug/mL Susceptible     Tobramycin 8.0 ug/mL Intermediate     Trimethoprim/Sulfamethoxazole >16/304 ug/mL Resistant                 Social Hx:  Social History     Tobacco Use     Smoking status: Never     Smokeless tobacco: Never   Vaping Use     Vaping Use: Never used   Substance Use Topics     Alcohol use: Yes     Alcohol/week: 0.0 standard drinks     Comment: Social     Drug use: No         Immunizations:  Immunization History   Administered Date(s) Administered     COVID-19,PF,Pfizer (12+ Yrs) 03/02/2021, 03/26/2021, 11/02/2021     COVID-19,PF,Pfizer 12+ Yrs (2022 and After) 06/10/2022     FLU 6-35 months 09/20/2011     Influenza (IIV3) PF 10/16/2015, 10/20/2016     Influenza Quad, Recombinant, pf(RIV4) (Flublok) 12/05/2018, 11/05/2019     Influenza Vaccine IM > 6 months Valent IIV4 (Alfuria,Fluzone) 10/18/2017     Influenza, Quad, High  Dose, Pf, 65yr+ (Fluzone HD) 09/21/2020, 11/02/2021     TD (ADULT, 7+) 06/28/2000     TDAP Vaccine (Adacel) 10/04/2010     TDAP Vaccine (Boostrix) 10/04/2010     Tdap (Adacel,Boostrix) 10/29/2020       Allergies:   Allergies   Allergen Reactions     Contrast Dye Itching     Reaction of immediate burning and severe itching in Right ear and back of throat after injection for CT.      Sulfa Drugs      hives         Medications:  Current Outpatient Medications   Medication Sig Dispense Refill     Ascorbic Acid (VITAMIN C PO) Take 250 mg by mouth every morning (0.5 x 500 mg tablet = 250 mg dose)       aspirin 325 MG tablet Take 325 mg by mouth every morning        atenolol (TENORMIN) 50 MG tablet TAKE 1 TABLET BY MOUTH EVERY DAY 90 tablet 3     Calcium Citrate-Vitamin D (CALCIUM CITRATE + D PO) Take 2 tablets by mouth every morning        COMPOUNDED NON-CONTROLLED SUBSTANCE (CMPD RX) - PHARMACY TO MIX COMPOUNDED MEDICATION Estriol 1 mg/g in HRT base, apply small amount to finger and apply to inside vagina daily for 2 weeks then twice weekly 30 g 11     Cranberry 450 MG TABS Take by mouth 2 times daily       Cyanocobalamin 2500 MCG TABS Take 2,500 mcg by mouth once a week Mon       Ferrous Sulfate 27 MG TABS Take 27 mg by mouth every morning        Fesoterodine Fumarate (TOVIAZ) 8 MG TB24 Take 1 tablet (8 mg) by mouth daily 90 tablet 3     FIBER COMPLETE PO Take 1 capsule by mouth every morning        hydrochlorothiazide (HYDRODIURIL) 12.5 MG tablet TAKE 1 TABLET BY MOUTH EVERY DAY 90 tablet 3     imipramine (TOFRANIL) 25 MG tablet TAKE 1 TABLET BY MOUTH AT BEDTIME 90 tablet 3     loratadine (CLARITIN) 10 MG tablet Take 10 mg by mouth every morning        losartan (COZAAR) 50 MG tablet TAKE 1 TABLET BY MOUTH EVERY DAY 90 tablet 3     methenamine hippurate (HIPREX) 1 g tablet Take 1 tablet (1 g) by mouth 2 times daily 180 tablet 0     topiramate (TOPAMAX) 25 MG tablet TAKE 1 TABLET BY MOUTH TWICE A  tablet 0      UNABLE TO FIND CPAP machine every night       VITAMIN D, CHOLECALCIFEROL, PO Take 500 Units by mouth every morning        bisacodyl (DULCOLAX) 5 MG EC tablet Take 2 tablets at 3 pm the day before your procedure. If your procedure is before 11 am, take 2 additional tablets at 8 pm. If your procedure is after 11 am, take 2 additional tablets at 6 am. For additional instructions refer to your colonoscopy prep instructions. 4 tablet 0     polyethylene glycol (GOLYTELY) 236 g suspension The night before the exam at 6 pm drink an 8-ounce glass every 15 minutes until the jug is half empty. If you arrive before 11 AM: Drink the other half of the Golytely jug at 11 PM night before procedure. If you arrive after 11 AM: Drink the other half of the Golytely jug at 6 AM day of procedure. For additional instructions refer to your colonoscopy prep instructions. 4000 mL 0         Past Medical Hx:  Past Medical History:   Diagnosis Date     Anemia      Arthritis 2015    Hands, back, hips. Various dates first noted.     CVA (cerebral vascular accident) (H) 2017    ?migraine, ?pfo--negative vasc w/u, neg hypercoag w/u     Diffuse cystic mastopathy     Fibrocystic breast disease     HTN, goal below 140/90      Hyperparathyroidism (H)      Infectious mononucleosis     Mono at age 16     Irregular heart beat     PAT no afib on 30day monitor     Labyrinthitis, unspecified      Migraine headache with aura      Osteopenia      Pain in joint, shoulder region     Secondary to a fall     Palpitations      PFO (patent foramen ovale)     s/p closure with amplazter device 7/13/17     S/P gastric bypass June, 2010     Sleep apnea     she is on CPAP     Sleep apnea      Vitamin D deficiencies          Family History:  Family History   Problem Relation Age of Onset     Breast Cancer Mother      Hypertension Mother      Arthritis Mother      Thyroid Disease Mother         Hypo     Ulcerative Colitis Mother      Cerebrovascular Disease Mother          Age 78 - Cerebral Hemorrhage,      Anxiety Disorder Mother      Depression Mother      Genetic Disorder Mother         Ulcerative colitis     Obesity Mother      Anesthesia Reaction Mother         headaches, N/V     Breast Cancer Maternal Aunt      Hypertension Paternal Grandmother      Cerebrovascular Disease Maternal Grandmother         Age 72 -      Family History Negative Maternal Grandfather      Family History Negative Paternal Grandfather      Family History Negative Son      Family History Negative Daughter      Other Cancer Father         Skin - Non-melanoma varieties     Hypertension Father      Asthma Father      Family History Negative Daughter      Ulcerative Colitis Sister      Hypertension Sister      Hyperlipidemia Sister      Anxiety Disorder Sister      Depression Sister      Diabetes Sister      Obesity Sister      Asthma Sister      Anesthesia Reaction Sister         Debilitating headaches     Hypertension Brother      Anxiety Disorder Brother      Depression Brother      Asthma Nephew      Hypertension Sister         Congestive Heart Failure     Anxiety Disorder Sister      Genetic Disorder Sister         Ulcerative colitis     Obesity Sister      Genetic Disorder Niece         Ulcerative colitis     Colon Cancer No family hx of      Coronary Artery Disease No family hx of      Mental Illness No family hx of      Substance Abuse No family hx of      Osteoporosis No family hx of      Deep Vein Thrombosis No family hx of          Examination:  Unable to examine due to virtual visit       Laboratory:  Hematology:  Recent Labs   Lab Test 22  0846 21  2258 21  1118 20  0554 20  0634 20  0831 19  0846 17  0916 17  1545   WBC 5.5 7.9 5.7 13.5* 7.1 5.7 6.3   < > 8.8   ANEU  --  5.3 3.1  --   --   --  3.2  --  5.6   ALYM  --  1.6 1.7  --   --   --  2.2  --  2.1   CHANTELLE  --  0.7 0.7  --   --   --  0.6  --  1.0   AEOS  --  0.2 0.2  --    --   --  0.2  --  0.1   HGB 13.3 13.1 13.1 11.4* 14.2 12.8 13.7   < > 13.8   HCT 40.9 39.4 40.7 36.2 42.9 40.1 42.3   < > 41.6    242 239 239 274 259 317   < > 322    < > = values in this interval not displayed.       Chemistry:  Recent Labs   Lab Test 02/14/22  0846 03/23/21 2258 01/11/21  1118 03/17/20  0554 03/16/20  0634 01/07/20  0831 04/29/19  0846 08/10/16  0937 11/16/15  1459 09/16/15  1043 07/29/15  0845    132* 139 141  --  137 140   < > 140   < > 140   POTASSIUM 4.3 3.4 4.8 4.7 4.4 3.7 3.7   < > 4.2   < > 3.9   CHLORIDE 106 101 107 109  --  104 106   < > 105   < > 105   CO2 30 25 28 29  --  28 28   < > 30   < > 26   ANIONGAP 3 6 4 3  --  5 6   < > 5   < > 9   BUN 18 19 25 11  --  15 22   < > 13   < > 13   CR 0.76 0.85 0.92 0.66 0.75 0.72 0.70   < > 0.85   < > 0.73   GFRESTIMATED 85 71 64 >90 84 87 >90   < > 68   < > 81   GLC 95 131* 89 108* 104* 95 102*   < > 90   < > 91   MAXIMILIANO 9.4 9.0 9.1 8.4*  --  8.9 8.5   < > 9.2   < > 8.4*   MAG  --   --   --   --   --   --   --   --  2.1  --  2.0   LACT  --  0.7  --   --   --   --   --   --   --   --   --     < > = values in this interval not displayed.       Liver Function Studies:  Recent Labs   Lab Test 02/14/22  0846 01/11/21  1118 01/07/20  0831 02/10/17  0635 08/10/16  0937 07/29/15  0845   BILITOTAL 0.6 0.4 0.3 0.3 0.4 0.3   ALKPHOS 93 77 78 53 73 68   ALBUMIN 3.3* 3.5 3.6 3.2* 3.6 3.4   AST 18 22 17 23 17 15   ALT 23 24 22 20 20 20

## 2022-10-21 ENCOUNTER — TELEPHONE (OUTPATIENT)
Dept: INFECTIOUS DISEASES | Facility: CLINIC | Age: 67
End: 2022-10-21

## 2022-10-21 NOTE — TELEPHONE ENCOUNTER
6 month visit scheduled by BUTCH BARON [[id number]] ([email or web address]). Visit is set for 4/21/2023.   Taylor Myhre,VVF

## 2022-11-09 NOTE — PROGRESS NOTES
GI CLINIC VISIT    CC/REFERRING MD:  Lian Martinez  REASON FOR CONSULTATION: Diarrhea    ASSESSMENT/PLAN:  #Loose Stools  #Flatus  #Fecal Incontinence  Patient having 1-2 stools per day, but loose, yellow pasty in color and typically fairly urgent and unpredictable which has been quite bothersome for her. She notes change from previous bowel pattern (1 formed brown stool per day) over past 2 years. She also notes fecal leakage which she does not perceive so wears a pad daily. Recent infectious stools studies including C.Diff, enteric panel, crypto/giarrdia, and O&P were negative. Fecal lactoferrin was positive. Colonoscopy was then performed which did which was unremarkable on endoscopy, on histology no evidence of microscopic or inflammatory colitis driving diarrhea, though did show melanosis coli (denies laxative use). She has had multiple UTI's and treated with antibiotics over the past 2-3 years. She also had surgery for rectocele and cystocele in 2020 about the time symptoms started. Differential includes IBS-D, postinfectious IBS/dysbiosis related to multiple antibiotic courses, celiac, bile acid diarrhea, pancreatic insufficiency, SIBO, medication induced with polypharmacy and vitamin supplements s/p bariatric surgery. This may also be related to rapid transit 2/2 gastric bypass surgery. Suspect fecal incontinence related to pelvic floor dysfunction. Will get celiac serologies and stool testing for pancreatic elastase and fecal fat given there may be a malabsorption component. Will also start soluble fiber powder supplementation and work up to therapeutic dosage to help bulk and regulate stools, this will also help with incontinence (currently taking 1 capsule of unknown type of fiber). Discussed that artifical sugars/sugar alcohols can cause loose/urgent stools, she will look at labels and change up iced tea. Discussed pelvic floor dysfunction and offered referral to pelvic floor center, she would hold  off today.  Future considerations include SIBO breath testing vs emperic treatment with Rifaxamin, trial of bile acid sequestrant, lactose-free trial x 2 weeks.  If abdominal pain returns consider cross sectional imaging.   Plan:  -- Celiac serolgies  -- Stool test for pancreatic elastase and fecal fat  -- Start soluble supplementation: Recommend Citrucel, start 1/2 Tbs per day, increase by 1/2 Tbs every 1-2 weeks to goal of 2 Tbs per day  -- avoid artifical sweeteners including sorbital, truvia, maltitol, mannitol, xylitol, glycerol, lactilol      Colorectal cancer screening: Last 10/2022 - next due in 10 years (2032)    RTC 3 months    Thank you for this consultation.  It was a pleasure to participate in the care of this patient; please contact us with any further questions.     80Minutes was spent on the date of the encounter during chart review, history and exam, documentation, and further activities as noted       Ynes Vuong PA-C, RD  Division of Gastroenterology, Hepatology & Nutrition  [facility]        HPI  Jose Coronado is a 66 year old female with a history of migraines, ASD, stroke (left temporal lobe infarction), cerebral aneurysm, hyperparathyroidism, HTN, gastric bypass (2010), osteopenia, cystocele, rectocele s/p hysterectomy and oophorectomy with mesh sling placement, who presents for evaluation of diarrhea. They are new to the [facility] clinic and this is my first encounter with the patient.     Patient reports that starting about 2 years ago consistency and color of her stools changed.  She was initially having chalky pale stools which turned to pasty yellow, which she continues to have today.  She reports she is to have formed stools but now soft to loose (BSC 4-6), once per week will have formed (BSC 4).  Currently having 1-2 stools per day but unpredictable timing, can be overnight, and often quite urgent. She also reports increasing flatus over this time.  Denies blood or melena.   Denies missing days without stool or having hard stools. She also notes fecal incontinence/leakage, which she is not able to feel so wears a pad daily.  Notices stool and pad about once per week.  Had mesh sling placed for cystocele/rectocele in 2020 but notes that she can still feel a bulge at the introitus.      Denies regular abdominal pain, nausea, vomiting, bloating, dysphagia or odynophagia. She reports having intermittent reflux if she eats particular foods or too late at night, reports this is resolved with TUMS and not overly bothersome.    She also notes 2 discrete episodes of severe abdominal pain over the last 2 years, 1 November 2020 and 1 in July 2021, each lasting 24 to 48 hours, but and able to get out of bed or eat or drink during episodes.  They eventually self resolved with sips of Pedialyte and rest.  Has not recurred since.    Stool studies done 8/29/22 were negative for C.Diff, enteric panel, cryptosporidium, giardia, and O&P. Lactoferrin was positive. Colonoscopy done 10/6/22 - examined colon was was normal, biopsies were negative for microscopic colitis but did show melanosis coli.    She reports that she started drinking more alcohol over the last 2 years during the pandemic with daughter in house and experimenting with different cocktails.  After having fatty liver visualized on CT she has since drastically cut alcohol intake and only occasionally has a glass of wine now, no change in stool pattern since reducing alcohol intake.    Reports that she used stool softeners for a while after her pelvic surgery, denies any laxative use including stimulant laxatives.    Reports previous weight loss from 203 down to 183.  Weight now stable/up in the past 6 months.  Wt Readings from Last 5 Encounters:   11/10/22 85.4 kg (188 lb 4.8 oz)   08/24/22 85.9 kg (189 lb 4.8 oz)   05/04/22 90.7 kg (200 lb)   02/14/22 91.3 kg (201 lb 4.8 oz)   05/10/21 93.4 kg (206 lb)       Diet Recall -  Breakfast: 2 eggs  and toast, yogurt and blueberries, oatmeal, nuts and blueberries with sugar-free maple syrup - always fruit  Lunch: turkey or tuna sandwich, cottage cheese, bean salad, soup, tacos  Dinner: out to eat - fish or chicken with rice/potatoes and vegetables, soup,   Drinks: water 100oz, iced tea sweetened with sweet and low, coffee (2-3 cups)      ROS:    No fevers or chills  + weight loss  No blurry vision, double vision or change in vision  No sore throat  No lymphadenopathy  No headache, paraesthesias, or weakness in a limb  No shortness of breath or wheezing  No chest pain or pressure  No arthralgias or myalgias  No rashes or skin changes  No odynophagia or dysphagia  No BRBPR, hematochezia, melena  No dysuria, frequency or urgency  No hot/cold intolerance or polyria  No anxiety or depression      PROBLEM LIST    Patient Active Problem List    Diagnosis Date Noted     Atrophic vaginitis 02/10/2021     Priority: Medium     Urge incontinence 02/10/2021     Priority: Medium     Urinary urgency 02/10/2021     Priority: Medium     Midline cystocele 12/16/2019     Priority: Medium     Added automatically from request for surgery [id number]       Rectocele 12/16/2019     Priority: Medium     Added automatically from request for surgery [id number]       Osteopenia 03/17/2019     Priority: Medium     Vitamin D deficiency 03/17/2019     Priority: Medium     (Problem list name updated by automated process. Provider to review and confirm.)    (Problem list name updated by automated process. Provider to review and confirm.)       Hyperparathyroidism (H) 03/17/2019     Priority: Medium     Word finding difficulty 03/17/2019     Priority: Medium     Received intravenous tissue plasminogen activator (tPA) in emergency department 03/17/2019     Priority: Medium     Accidental marijuana poisoning 03/17/2019     Priority: Medium     Migraine with aura and without status migrainosus, not intractable 01/07/2019     Priority: Medium     ASD  (atrial septal defect) 07/13/2017     Priority: Medium     Cerebral aneurysm without rupture 04/10/2017     Priority: Medium     Left temporal lobe infarction (H) 02/09/2017     Priority: Medium     Stroke (H) 02/09/2017     Priority: Medium     Essential hypertension with goal blood pressure less than 140/90 10/31/2016     Priority: Medium     PFO (patent foramen ovale) 02/15/2016     Priority: Medium     s/p closure with amplazter device 7/13/17       Lump or mass in breast 11/09/2015     Priority: Medium     Class 1 obesity in adult 10/16/2015     Priority: Medium     Iron deficiency anemia 10/16/2015     Priority: Medium     ACP (advance care planning) 08/23/2012     Priority: Medium     Discussed Advance Directive planning with patient; information given to patient to review.       S/P gastric bypass 06/01/2010     Priority: Medium     Sleep apnea 08/28/2009     Priority: Medium     Female stress incontinence 06/09/2008     Priority: Medium     (Problem list name updated by automated process. Provider to review and confirm.)       Family history of malignant neoplasm of breast 10/02/2003     Priority: Medium       PERTINENT PAST MEDICAL HISTORY:    Past Medical History:   Diagnosis Date     Anemia      Arthritis 2015    Hands, back, hips. Various dates first noted.     CVA (cerebral vascular accident) (H) 2017    ?migraine, ?pfo--negative vasc w/u, neg hypercoag w/u     Diffuse cystic mastopathy     Fibrocystic breast disease     HTN, goal below 140/90      Hyperparathyroidism (H)      Infectious mononucleosis     Mono at age 16     Irregular heart beat     PAT no afib on 30day monitor     Labyrinthitis, unspecified      Migraine headache with aura      Osteopenia      Pain in joint, shoulder region     Secondary to a fall     Palpitations      PFO (patent foramen ovale)     s/p closure with amplazter device 7/13/17     S/P gastric bypass June, 2010     Sleep apnea     she is on CPAP     Sleep apnea       Vitamin D deficiencies        PREVIOUS SURGERIES:    Past Surgical History:   Procedure Laterality Date     BIOPSY  1984    Breast biopsies     BREAST SURGERY  1984    Above     CARDIAC SURGERY  7/13/2017    PFO closure     COLONOSCOPY N/A 8/12/2015    Procedure: COLONOSCOPY;  Surgeon: Deandre Brooks MD;  Location: [facility]     COLONOSCOPY N/A 10/6/2022    Procedure: COLONOSCOPY, WITH BIOPSIES;  Surgeon: Freddie Gonzalez MD;  Location: [facility]     DAVINCI HYSTERECTOMY SUPRACERVICAL, SALPINGO-OOPHORECTOMY INCLUDING BILATERAL N/A 3/16/2020    Procedure: DAVINCI HYSTERECTOMY SUPRACERVICAL, SALPINGO-OOPHORECTOMY INCLUDING BILATERAL WITH EXAM UNDER ANESTHESIA;  Surgeon: Lily Yancey MD;  Location: [facility]     DAVINCI SACROCOLPOPEXY, MIDURETHRAL SLING, CYSTOSCOPY N/A 3/16/2020    Procedure: SACROCOLPOPEXY, ROBOT-ASSISTED, LAPAROSCOPIC, WITH INSERTION OF MIDURETHRAL SLING AND CYSTOSCOPY;  Surgeon: Carolyn Valdivia MD;  Location: [facility]     GASTRIC BYPASS  June 24, 2010     GENITOURINARY SURGERY  3/16/2020    Cystocele/rectocele repair     GYN SURGERY  3/16/2020    Hysterectomy     ORTHOPEDIC SURGERY Left 2010    wrist fracture     PARATHYROIDECTOMY  9/19/11     [facility] ANEURYSM, INTRACRAN, SIMPLE SURG  04/2017    coil of aneurysm right posterior paraophthalmic artery     [facility] NONSPECIFIC PROCEDURE      S/P multiple breast biopies - all negative / benign     [facility] NONSPECIFIC PROCEDURE      S/P T&A     [facility] NONSPECIFIC PROCEDURE      [place] teeth extraction     [facility] NONSPECIFIC PROCEDURE      S/P (? unreadable) ankle       PREVIOUS ENDOSCOPY:  Colonoscopy 10/06/22  Impression:                 - Diverticulosis in the sigmoid colon.   - The entire examined colon is normal on direct and retroflexion views.   - No specimens collected.   - Biopsies were taken with a cold forceps from the ascending colon and descending colon for evaluation of microscopic colitis.     A.  Ascending colon, biopsy:  -Negative for diagnostic colitis,  dysplasia, or malignancy.     B.  Descending colon, biopsy:  -Melanosis coli.  -Negative for diagnostic colitis, dysplasia, or malignancy.    ALLERGIES:     Allergies   Allergen Reactions     Contrast Dye Itching     Reaction of immediate burning and severe itching in Right ear and back of throat after injection for CT.      Sulfa Drugs      hives       PERTINENT MEDICATIONS:    Current Outpatient Medications:      Ascorbic Acid (VITAMIN C PO), Take 250 mg by mouth every morning (0.5 x 500 mg tablet = 250 mg dose), Disp: , Rfl:      aspirin 325 MG tablet, Take 325 mg by mouth every morning , Disp: , Rfl:      atenolol (TENORMIN) 50 MG tablet, TAKE 1 TABLET BY MOUTH EVERY DAY, Disp: 90 tablet, Rfl: 3     Calcium Citrate-Vitamin D (CALCIUM CITRATE + D PO), Take 2 tablets by mouth every morning , Disp: , Rfl:      COMPOUNDED NON-CONTROLLED SUBSTANCE (CMPD RX) - PHARMACY TO MIX COMPOUNDED MEDICATION, Estriol 1 mg/g in HRT base, apply small amount to finger and apply to inside vagina daily for 2 weeks then twice weekly, Disp: 30 g, Rfl: 11     Cranberry 450 MG TABS, Take by mouth 2 times daily, Disp: , Rfl:      Cyanocobalamin 2500 MCG TABS, Take 2,500 mcg by mouth once a week Mon, Disp: , Rfl:      Ferrous Sulfate 27 MG TABS, Take 27 mg by mouth every morning , Disp: , Rfl:      Fesoterodine Fumarate (TOVIAZ) 8 MG TB24, Take 1 tablet (8 mg) by mouth daily, Disp: 90 tablet, Rfl: 3     FIBER COMPLETE PO, Take 1 capsule by mouth every morning , Disp: , Rfl:      hydrochlorothiazide (HYDRODIURIL) 12.5 MG tablet, TAKE 1 TABLET BY MOUTH EVERY DAY, Disp: 90 tablet, Rfl: 3     imipramine (TOFRANIL) 25 MG tablet, TAKE 1 TABLET BY MOUTH AT BEDTIME, Disp: 90 tablet, Rfl: 3     loratadine (CLARITIN) 10 MG tablet, Take 10 mg by mouth every morning , Disp: , Rfl:      losartan (COZAAR) 50 MG tablet, TAKE 1 TABLET BY MOUTH EVERY DAY, Disp: 90 tablet, Rfl: 3     methenamine hippurate (HIPREX) 1 g tablet, Take 1 tablet (1 g) by mouth 2  times daily, Disp: 180 tablet, Rfl: 0     topiramate (TOPAMAX) 25 MG tablet, TAKE 1 TABLET BY MOUTH TWICE A DAY, Disp: 180 tablet, Rfl: 0     UNABLE TO FIND, CPAP machine every night, Disp: , Rfl:      VITAMIN D, CHOLECALCIFEROL, PO, Take 500 Units by mouth every morning , Disp: , Rfl:   No other NSAID/anticoagulation reported by patient.  No other OTC/herbal/supplements reported by patient.    SOCIAL HISTORY:    Tobacco: no   Alcohol: minimal  Drugs Use: no    Social History     Socioeconomic History     Marital status:      Spouse name: Not on file     Number of children: Not on file     Years of education: Not on file     Highest education level: Not on file   Occupational History     Not on file   Tobacco Use     Smoking status: Never     Smokeless tobacco: Never   Vaping Use     Vaping Use: Never used   Substance and Sexual Activity     Alcohol use: Yes     Alcohol/week: 0.0 standard drinks     Comment: Social     Drug use: No     Sexual activity: Not Currently     Partners: Male     Birth control/protection: Pill   Other Topics Concern     Parent/sibling w/ CABG, MI or angioplasty before 65F 55M? No   Social History Narrative     Not on file     Social Determinants of Health     Financial Resource Strain: Not on file   Food Insecurity: Not on file   Transportation Needs: Not on file   Physical Activity: Not on file   Stress: Not on file   Social Connections: Not on file   Intimate Partner Violence: Not on file   Housing Stability: Not on file       FAMILY HISTORY:  FH of CRC: no  FH of IBD: ulcerative colitis (mother, sister, niece), brother has IBS, many people in family has diarrhea    No Colon/Panc/Esophageal/other GI CA. No IBD or Celiac Disease. No other Autoimmune, Liver, or Thyroid disease.    Family History   Problem Relation Age of Onset     Breast Cancer Mother      Hypertension Mother      Arthritis Mother      Thyroid Disease Mother         Hypo     Ulcerative Colitis Mother       Cerebrovascular Disease Mother         Age 78 - Cerebral Hemorrhage,      Anxiety Disorder Mother      Depression Mother      Genetic Disorder Mother         Ulcerative colitis     Obesity Mother      Anesthesia Reaction Mother         headaches, N/V     Breast Cancer Maternal Aunt      Hypertension Paternal Grandmother      Cerebrovascular Disease Maternal Grandmother         Age 72 -      Family History Negative Maternal Grandfather      Family History Negative Paternal Grandfather      Family History Negative Son      Family History Negative Daughter      Other Cancer Father         Skin - Non-melanoma varieties     Hypertension Father      Asthma Father      Family History Negative Daughter      Ulcerative Colitis Sister      Hypertension Sister      Hyperlipidemia Sister      Anxiety Disorder Sister      Depression Sister      Diabetes Sister      Obesity Sister      Asthma Sister      Anesthesia Reaction Sister         Debilitating headaches     Hypertension Brother      Anxiety Disorder Brother      Depression Brother      Asthma Nephew      Hypertension Sister         Congestive Heart Failure     Anxiety Disorder Sister      Genetic Disorder Sister         Ulcerative colitis     Obesity Sister      Genetic Disorder Niece         Ulcerative colitis     Colon Cancer No family hx of      Coronary Artery Disease No family hx of      Mental Illness No family hx of      Substance Abuse No family hx of      Osteoporosis No family hx of      Deep Vein Thrombosis No family hx of        Past/family/social history reviewed and no changes    PHYSICAL EXAMINATION:  Constitutional: AAOx3, cooperative, pleasant, not dyspneic/diaphoretic, no acute distress  Vitals reviewed: There were no vitals taken for this visit.  Wt:   Wt Readings from Last 2 Encounters:   22 85.9 kg (189 lb 4.8 oz)   22 90.7 kg (200 lb)      Eyes: Sclera anicteric/injected  Ears/nose/mouth/throat: Normal oropharynx without  ulcers or exudate, mucus membranes moist, hearing intact  Neck: supple, thyroid normal size  CV: No edema  Respiratory: Unlabored breathing  Lymph: No axillary, submandibular, supraclavicular or inguinal lymphadenopathy  Abd: Nondistended, +bs, no hepatosplenomegaly, nontender, no peritoneal signs  Skin: warm, perfused, no jaundice  Psych: Normal affect  MSK: Normal gait      PERTINENT STUDIES:    Office Visit on 08/24/2022   Component Date Value Ref Range Status     Campylobacter group 08/29/2022 Not Detected  Not Detected Final     Salmonella species 08/29/2022 Not Detected  Not Detected Final     Shigella species 08/29/2022 Not Detected  Not Detected Final     Vibrio group 08/29/2022 Not Detected  Not Detected Final     Rotavirus 08/29/2022 Not Detected  Not Detected Final     Shiga toxin 1 gene 08/29/2022 Not Detected  Not Detected Final     Shiga toxin 2 gene 08/29/2022 Not Detected  Not Detected Final     Norovirus I and II 08/29/2022 Not Detected  Not Detected Final     Yersinia enterocolitica 08/29/2022 Not Detected  Not Detected Final     C Difficile Toxin B by PCR 08/29/2022 Negative  Negative Final     Fecal Lactoferrin 08/29/2022 Positive (A)  Negative Final     OVA AND PARASITE EXAM 08/29/2022 Negative  Negative Final     Cryptosporidium parvum antigen 08/29/2022 Negative  Negative Final     Giardia lamblia antigen 08/29/2022 Negative  Negative Final     INR 08/24/2022 0.92  0.85 - 1.15 Final         CT AP 5/2022  IMPRESSION:   1. Air within the urinary bladder may be related to recent  instrumentation.  2. Diffuse fatty infiltration of the liver.  3. Cholelithiasis.  4. Several low density lesions in the liver are too small to  characterize, but could represent cysts or hemangiomas. Consider liver  MRI for further characterization.    MRI Liver 9/2022  IMPRESSION: Multiple liver cysts.

## 2022-11-10 ENCOUNTER — PRE VISIT (OUTPATIENT)
Dept: GASTROENTEROLOGY | Facility: CLINIC | Age: 67
End: 2022-11-10

## 2022-11-10 ENCOUNTER — OFFICE VISIT (OUTPATIENT)
Dept: GASTROENTEROLOGY | Facility: CLINIC | Age: 67
End: 2022-11-10
Attending: INTERNAL MEDICINE
Payer: MEDICARE

## 2022-11-10 VITALS
DIASTOLIC BLOOD PRESSURE: 65 MMHG | BODY MASS INDEX: 30.26 KG/M2 | OXYGEN SATURATION: 95 % | HEART RATE: 76 BPM | HEIGHT: 66 IN | WEIGHT: 188.3 LBS | SYSTOLIC BLOOD PRESSURE: 114 MMHG

## 2022-11-10 DIAGNOSIS — R19.7 DIARRHEA, UNSPECIFIED TYPE: ICD-10-CM

## 2022-11-10 PROCEDURE — 99215 OFFICE O/P EST HI 40 MIN: CPT | Performed by: DIETITIAN, REGISTERED

## 2022-11-10 PROCEDURE — 99417 PROLNG OP E/M EACH 15 MIN: CPT | Performed by: DIETITIAN, REGISTERED

## 2022-11-10 ASSESSMENT — PAIN SCALES - GENERAL: PAINLEVEL: NO PAIN (0)

## 2022-11-10 NOTE — PATIENT INSTRUCTIONS
It was a pleasure taking care of you today.  I've included a brief summary of our discussion and care plan from today's visit below.  Please review this information with your primary care provider.  ______________________________________________________________________    My recommendations are summarized as follows:  -- Lab test for celiac   -- Stool test for pancreatic elastase  -- Start soluble supplementation: Recommend Citrucel, start 1/2 Tbs per day, increase by 1/2 Tbs every 1-2 weeks to goal of 2 Tbs per day  -- avoid artifical sweeteners including sorbital, truvia, maltitol, mannitol, xylitol, glycerol, lactilol      -- please see scheduling information provided below     Return to GI Clinic in 3 months to review your progress.    ______________________________________________________________________    How do I schedule labs, imaging studies, or procedures that were ordered in clinic today?     Labs: To schedule lab appointment at the [facility], use my chart or call [phone]. If you have a [place] lab closer to home where you are regularly seen you can give them a call.     Procedures: If a colonoscopy, upper endoscopy, breath test, esophageal manometry, or pH impedence was ordered today, our endoscopy team will call you to schedule this. If you have not heard from our endoscopy team within a week, please call [phone] to schedule.     Imaging Studies: If you were scheduled for a CT scan, X-ray, MRI, ultrasound, HIDA scan or other imaging study, please call [phone] to have this scheduled.     Referral: If a referral to another specialty was ordered, expect a phone call or follow instructions above. If you have not heard from anyone regarding your referral in a week, please call our clinic to check the status.     Who do I call with any questions after my visit?  Please be in touch if there are any further questions that arise following today's visit.  There are multiple ways  to contact your gastroenterology care team.      During business hours, you may reach a Gastroenterology nurse at [phone]    To schedule or reschedule an appointment, please call [phone].     You can always send a secure message through Xuba.  Xuba messages are answered by your nurse or doctor typically within 24 hours.  Please allow extra time on weekends and holidays.      For urgent/emergent questions after business hours, you may reach the on-call GI Fellow by contacting the [facility]  at [phone].     How will I get the results of any tests ordered?    You will receive all of your results.  If you have signed up for TriggerMailt, any tests ordered at your visit will be available to you after your provider reviews them.  Typically this takes 1-2 weeks.  If there are urgent results that require a change in your care plan, your provider or nurse will call you to discuss the next steps.      What is Xuba?  Xuba is a secure way for you to access all of your healthcare records from the [facility].  It is a web based computer program, so you can sign on to it from any location.  It also allows you to send secure messages to your care team.  I recommend signing up for Xuba access if you have not already done so and are comfortable with using a computer.      How to I schedule a follow-up visit?  If you did not schedule a follow-up visit today, please call [phone] to schedule a follow-up office visit.      Sincerely,    Ynes Vuong PA-C  Division of Gastroenterology, Hepatology & Nutrition  [facility]

## 2022-11-10 NOTE — NURSING NOTE
"Chief Complaint   Patient presents with     New Patient       Vitals:    11/10/22 0651   BP: 114/65   BP Location: Left arm   Patient Position: Sitting   Cuff Size: Adult Large   Pulse: 76   SpO2: 95%   Weight: 85.4 kg (188 lb 4.8 oz)   Height: 1.664 m (5' 5.5\")       Body mass index is 30.86 kg/m .      Gerri Fowler LPN                      "

## 2022-11-10 NOTE — LETTER
11/10/2022         RE: Jose Coronado  [address] [place] [address]        Dear Colleague,    Thank you for referring your patient, Jose Coronado, to the [facility] [place]. Please see a copy of my visit note below.    GI CLINIC VISIT    CC/REFERRING MD:  Lian Martinez  REASON FOR CONSULTATION: Diarrhea    ASSESSMENT/PLAN:  #Loose Stools  #Flatus  #Fecal Incontinence  Patient having 1-2 stools per day, but loose, yellow pasty in color and typically fairly urgent and unpredictable which has been quite bothersome for her. She notes change from previous bowel pattern (1 formed brown stool per day) over past 2 years. She also notes fecal leakage which she does not perceive so wears a pad daily. Recent infectious stools studies including C.Diff, enteric panel, crypto/giarrdia, and O&P were negative. Fecal lactoferrin was positive. Colonoscopy was then performed which did which was unremarkable on endoscopy, on histology no evidence of microscopic or inflammatory colitis driving diarrhea, though did show melanosis coli (denies laxative use). She has had multiple UTI's and treated with antibiotics over the past 2-3 years. She also had surgery for rectocele and cystocele in 2020 about the time symptoms started. Differential includes IBS-D, postinfectious IBS/dysbiosis related to multiple antibiotic courses, celiac, bile acid diarrhea, pancreatic insufficiency, SIBO, medication induced with polypharmacy and vitamin supplements s/p bariatric surgery. This may also be related to rapid transit 2/2 gastric bypass surgery. Suspect fecal incontinence related to pelvic floor dysfunction. Will get celiac serologies and stool testing for pancreatic elastase and fecal fat given there may be a malabsorption component. Will also start soluble fiber powder supplementation and work up to therapeutic dosage to help bulk and regulate stools, this will also help with incontinence (currently  taking 1 capsule of unknown type of fiber). Discussed that artifical sugars/sugar alcohols can cause loose/urgent stools, she will look at labels and change up iced tea. Discussed pelvic floor dysfunction and offered referral to pelvic floor center, she would hold off today.  Future considerations include SIBO breath testing vs emperic treatment with Rifaxamin, trial of bile acid sequestrant, lactose-free trial x 2 weeks.  If abdominal pain returns consider cross sectional imaging.   Plan:  -- Celiac serolgies  -- Stool test for pancreatic elastase and fecal fat  -- Start soluble supplementation: Recommend Citrucel, start 1/2 Tbs per day, increase by 1/2 Tbs every 1-2 weeks to goal of 2 Tbs per day  -- avoid artifical sweeteners including sorbital, truvia, maltitol, mannitol, xylitol, glycerol, lactilol      Colorectal cancer screening: Last 10/2022 - next due in 10 years (2032)    RTC 3 months    Thank you for this consultation.  It was a pleasure to participate in the care of this patient; please contact us with any further questions.     80Minutes was spent on the date of the encounter during chart review, history and exam, documentation, and further activities as noted       Ynes Vuong PA-C, RD  Division of Gastroenterology, Hepatology & Nutrition  [facility]        HPI  Jose Coronado is a 66 year old female with a history of migraines, ASD, stroke (left temporal lobe infarction), cerebral aneurysm, hyperparathyroidism, HTN, gastric bypass (2010), osteopenia, cystocele, rectocele s/p hysterectomy and oophorectomy with mesh sling placement, who presents for evaluation of diarrhea. They are new to the [facility] GI clinic and this is my first encounter with the patient.     Patient reports that starting about 2 years ago consistency and color of her stools changed.  She was initially having chalky pale stools which turned to pasty yellow, which she continues to have today.  She reports she is to have formed  stools but now soft to loose (BSC 4-6), once per week will have formed (BSC 4).  Currently having 1-2 stools per day but unpredictable timing, can be overnight, and often quite urgent. She also reports increasing flatus over this time.  Denies blood or melena.  Denies missing days without stool or having hard stools. She also notes fecal incontinence/leakage, which she is not able to feel so wears a pad daily.  Notices stool and pad about once per week.  Had mesh sling placed for cystocele/rectocele in 2020 but notes that she can still feel a bulge at the introitus.      Denies regular abdominal pain, nausea, vomiting, bloating, dysphagia or odynophagia. She reports having intermittent reflux if she eats particular foods or too late at night, reports this is resolved with TUMS and not overly bothersome.    She also notes 2 discrete episodes of severe abdominal pain over the last 2 years, 1 November 2020 and 1 in July 2021, each lasting 24 to 48 hours, but and able to get out of bed or eat or drink during episodes.  They eventually self resolved with sips of Pedialyte and rest.  Has not recurred since.    Stool studies done 8/29/22 were negative for C.Diff, enteric panel, cryptosporidium, giardia, and O&P. Lactoferrin was positive. Colonoscopy done 10/6/22 - examined colon was was normal, biopsies were negative for microscopic colitis but did show melanosis coli.    She reports that she started drinking more alcohol over the last 2 years during the pandemic with daughter in house and experimenting with different cocktails.  After having fatty liver visualized on CT she has since drastically cut alcohol intake and only occasionally has a glass of wine now, no change in stool pattern since reducing alcohol intake.    Reports that she used stool softeners for a while after her pelvic surgery, denies any laxative use including stimulant laxatives.    Reports previous weight loss from 203 down to 183.  Weight now  stable/up in the past 6 months.  Wt Readings from Last 5 Encounters:   11/10/22 85.4 kg (188 lb 4.8 oz)   08/24/22 85.9 kg (189 lb 4.8 oz)   05/04/22 90.7 kg (200 lb)   02/14/22 91.3 kg (201 lb 4.8 oz)   05/10/21 93.4 kg (206 lb)       Diet Recall -  Breakfast: 2 eggs and toast, yogurt and blueberries, oatmeal, nuts and blueberries with sugar-free maple syrup - always fruit  Lunch: turkey or tuna sandwich, cottage cheese, bean salad, soup, tacos  Dinner: out to eat - fish or chicken with rice/potatoes and vegetables, soup,   Drinks: water 100oz, iced tea sweetened with sweet and low, coffee (2-3 cups)      ROS:    No fevers or chills  + weight loss  No blurry vision, double vision or change in vision  No sore throat  No lymphadenopathy  No headache, paraesthesias, or weakness in a limb  No shortness of breath or wheezing  No chest pain or pressure  No arthralgias or myalgias  No rashes or skin changes  No odynophagia or dysphagia  No BRBPR, hematochezia, melena  No dysuria, frequency or urgency  No hot/cold intolerance or polyria  No anxiety or depression      PROBLEM LIST    Patient Active Problem List    Diagnosis Date Noted     Atrophic vaginitis 02/10/2021     Priority: Medium     Urge incontinence 02/10/2021     Priority: Medium     Urinary urgency 02/10/2021     Priority: Medium     Midline cystocele 12/16/2019     Priority: Medium     Added automatically from request for surgery [id number]       Rectocele 12/16/2019     Priority: Medium     Added automatically from request for surgery [id number]       Osteopenia 03/17/2019     Priority: Medium     Vitamin D deficiency 03/17/2019     Priority: Medium     (Problem list name updated by automated process. Provider to review and confirm.)    (Problem list name updated by automated process. Provider to review and confirm.)       Hyperparathyroidism (H) 03/17/2019     Priority: Medium     Word finding difficulty 03/17/2019     Priority: Medium     Received intravenous  tissue plasminogen activator (tPA) in emergency department 03/17/2019     Priority: Medium     Accidental marijuana poisoning 03/17/2019     Priority: Medium     Migraine with aura and without status migrainosus, not intractable 01/07/2019     Priority: Medium     ASD (atrial septal defect) 07/13/2017     Priority: Medium     Cerebral aneurysm without rupture 04/10/2017     Priority: Medium     Left temporal lobe infarction (H) 02/09/2017     Priority: Medium     Stroke (H) 02/09/2017     Priority: Medium     Essential hypertension with goal blood pressure less than 140/90 10/31/2016     Priority: Medium     PFO (patent foramen ovale) 02/15/2016     Priority: Medium     s/p closure with amplazter device 7/13/17       Lump or mass in breast 11/09/2015     Priority: Medium     Class 1 obesity in adult 10/16/2015     Priority: Medium     Iron deficiency anemia 10/16/2015     Priority: Medium     ACP (advance care planning) 08/23/2012     Priority: Medium     Discussed Advance Directive planning with patient; information given to patient to review.       S/P gastric bypass 06/01/2010     Priority: Medium     Sleep apnea 08/28/2009     Priority: Medium     Female stress incontinence 06/09/2008     Priority: Medium     (Problem list name updated by automated process. Provider to review and confirm.)       Family history of malignant neoplasm of breast 10/02/2003     Priority: Medium       PERTINENT PAST MEDICAL HISTORY:    Past Medical History:   Diagnosis Date     Anemia      Arthritis 2015    Hands, back, hips. Various dates first noted.     CVA (cerebral vascular accident) (H) 2017    ?migraine, ?pfo--negative vasc w/u, neg hypercoag w/u     Diffuse cystic mastopathy     Fibrocystic breast disease     HTN, goal below 140/90      Hyperparathyroidism (H)      Infectious mononucleosis     Mono at age 16     Irregular heart beat     PAT no afib on 30day monitor     Labyrinthitis, unspecified      Migraine headache with  aura      Osteopenia      Pain in joint, shoulder region     Secondary to a fall     Palpitations      PFO (patent foramen ovale)     s/p closure with amplazter device 7/13/17     S/P gastric bypass June, 2010     Sleep apnea     she is on CPAP     Sleep apnea      Vitamin D deficiencies        PREVIOUS SURGERIES:    Past Surgical History:   Procedure Laterality Date     BIOPSY  1984    Breast biopsies     BREAST SURGERY  1984    Above     CARDIAC SURGERY  7/13/2017    PFO closure     COLONOSCOPY N/A 8/12/2015    Procedure: COLONOSCOPY;  Surgeon: Deandre Brooks MD;  Location: [facility]     COLONOSCOPY N/A 10/6/2022    Procedure: COLONOSCOPY, WITH BIOPSIES;  Surgeon: Freddie Gonzalez MD;  Location: [facility]     DAVINCI HYSTERECTOMY SUPRACERVICAL, SALPINGO-OOPHORECTOMY INCLUDING BILATERAL N/A 3/16/2020    Procedure: DAVINCI HYSTERECTOMY SUPRACERVICAL, SALPINGO-OOPHORECTOMY INCLUDING BILATERAL WITH EXAM UNDER ANESTHESIA;  Surgeon: Lily Yancey MD;  Location: [facility] OR     DAVINCI SACROCOLPOPEXY, MIDURETHRAL SLING, CYSTOSCOPY N/A 3/16/2020    Procedure: SACROCOLPOPEXY, ROBOT-ASSISTED, LAPAROSCOPIC, WITH INSERTION OF MIDURETHRAL SLING AND CYSTOSCOPY;  Surgeon: Carolyn Valdivia MD;  Location: [facility] OR     GASTRIC BYPASS  June 24, 2010     GENITOURINARY SURGERY  3/16/2020    Cystocele/rectocele repair     GYN SURGERY  3/16/2020    Hysterectomy     ORTHOPEDIC SURGERY Left 2010    wrist fracture     PARATHYROIDECTOMY  9/19/11     [facility] ANEURYSM, INTRACRAN, SIMPLE SURG  04/2017    coil of aneurysm right posterior paraophthalmic artery     [facility] NONSPECIFIC PROCEDURE      S/P multiple breast biopies - all negative / benign     [facility] NONSPECIFIC PROCEDURE      S/P T&A     Z NONSPECIFIC PROCEDURE      [place] teeth extraction     [facility] NONSPECIFIC PROCEDURE      S/P (? unreadable) ankle       PREVIOUS ENDOSCOPY:  Colonoscopy 10/06/22  Impression:                 - Diverticulosis in the sigmoid colon.   - The entire examined colon is  normal on direct and retroflexion views.   - No specimens collected.   - Biopsies were taken with a cold forceps from the ascending colon and descending colon for evaluation of microscopic colitis.     A.  Ascending colon, biopsy:  -Negative for diagnostic colitis, dysplasia, or malignancy.     B.  Descending colon, biopsy:  -Melanosis coli.  -Negative for diagnostic colitis, dysplasia, or malignancy.    ALLERGIES:     Allergies   Allergen Reactions     Contrast Dye Itching     Reaction of immediate burning and severe itching in Right ear and back of throat after injection for CT.      Sulfa Drugs      hives       PERTINENT MEDICATIONS:    Current Outpatient Medications:      Ascorbic Acid (VITAMIN C PO), Take 250 mg by mouth every morning (0.5 x 500 mg tablet = 250 mg dose), Disp: , Rfl:      aspirin 325 MG tablet, Take 325 mg by mouth every morning , Disp: , Rfl:      atenolol (TENORMIN) 50 MG tablet, TAKE 1 TABLET BY MOUTH EVERY DAY, Disp: 90 tablet, Rfl: 3     Calcium Citrate-Vitamin D (CALCIUM CITRATE + D PO), Take 2 tablets by mouth every morning , Disp: , Rfl:      COMPOUNDED NON-CONTROLLED SUBSTANCE (CMPD RX) - PHARMACY TO MIX COMPOUNDED MEDICATION, Estriol 1 mg/g in HRT base, apply small amount to finger and apply to inside vagina daily for 2 weeks then twice weekly, Disp: 30 g, Rfl: 11     Cranberry 450 MG TABS, Take by mouth 2 times daily, Disp: , Rfl:      Cyanocobalamin 2500 MCG TABS, Take 2,500 mcg by mouth once a week Mon, Disp: , Rfl:      Ferrous Sulfate 27 MG TABS, Take 27 mg by mouth every morning , Disp: , Rfl:      Fesoterodine Fumarate (TOVIAZ) 8 MG TB24, Take 1 tablet (8 mg) by mouth daily, Disp: 90 tablet, Rfl: 3     FIBER COMPLETE PO, Take 1 capsule by mouth every morning , Disp: , Rfl:      hydrochlorothiazide (HYDRODIURIL) 12.5 MG tablet, TAKE 1 TABLET BY MOUTH EVERY DAY, Disp: 90 tablet, Rfl: 3     imipramine (TOFRANIL) 25 MG tablet, TAKE 1 TABLET BY MOUTH AT BEDTIME, Disp: 90 tablet,  Rfl: 3     loratadine (CLARITIN) 10 MG tablet, Take 10 mg by mouth every morning , Disp: , Rfl:      losartan (COZAAR) 50 MG tablet, TAKE 1 TABLET BY MOUTH EVERY DAY, Disp: 90 tablet, Rfl: 3     methenamine hippurate (HIPREX) 1 g tablet, Take 1 tablet (1 g) by mouth 2 times daily, Disp: 180 tablet, Rfl: 0     topiramate (TOPAMAX) 25 MG tablet, TAKE 1 TABLET BY MOUTH TWICE A DAY, Disp: 180 tablet, Rfl: 0     UNABLE TO FIND, CPAP machine every night, Disp: , Rfl:      VITAMIN D, CHOLECALCIFEROL, PO, Take 500 Units by mouth every morning , Disp: , Rfl:   No other NSAID/anticoagulation reported by patient.  No other OTC/herbal/supplements reported by patient.    SOCIAL HISTORY:    Tobacco: no   Alcohol: minimal  Drugs Use: no    Social History     Socioeconomic History     Marital status:      Spouse name: Not on file     Number of children: Not on file     Years of education: Not on file     Highest education level: Not on file   Occupational History     Not on file   Tobacco Use     Smoking status: Never     Smokeless tobacco: Never   Vaping Use     Vaping Use: Never used   Substance and Sexual Activity     Alcohol use: Yes     Alcohol/week: 0.0 standard drinks     Comment: Social     Drug use: No     Sexual activity: Not Currently     Partners: Male     Birth control/protection: Pill   Other Topics Concern     Parent/sibling w/ CABG, MI or angioplasty before 65F 55M? No   Social History Narrative     Not on file     Social Determinants of Health     Financial Resource Strain: Not on file   Food Insecurity: Not on file   Transportation Needs: Not on file   Physical Activity: Not on file   Stress: Not on file   Social Connections: Not on file   Intimate Partner Violence: Not on file   Housing Stability: Not on file       FAMILY HISTORY:  FH of CRC: no  FH of IBD: ulcerative colitis (mother, sister, niece), brother has IBS, many people in family has diarrhea    No Colon/Panc/Esophageal/other GI CA. No IBD or  Celiac Disease. No other Autoimmune, Liver, or Thyroid disease.    Family History   Problem Relation Age of Onset     Breast Cancer Mother      Hypertension Mother      Arthritis Mother      Thyroid Disease Mother         Hypo     Ulcerative Colitis Mother      Cerebrovascular Disease Mother         Age 78 - Cerebral Hemorrhage,      Anxiety Disorder Mother      Depression Mother      Genetic Disorder Mother         Ulcerative colitis     Obesity Mother      Anesthesia Reaction Mother         headaches, N/V     Breast Cancer Maternal Aunt      Hypertension Paternal Grandmother      Cerebrovascular Disease Maternal Grandmother         Age 72 -      Family History Negative Maternal Grandfather      Family History Negative Paternal Grandfather      Family History Negative Son      Family History Negative Daughter      Other Cancer Father         Skin - Non-melanoma varieties     Hypertension Father      Asthma Father      Family History Negative Daughter      Ulcerative Colitis Sister      Hypertension Sister      Hyperlipidemia Sister      Anxiety Disorder Sister      Depression Sister      Diabetes Sister      Obesity Sister      Asthma Sister      Anesthesia Reaction Sister         Debilitating headaches     Hypertension Brother      Anxiety Disorder Brother      Depression Brother      Asthma Nephew      Hypertension Sister         Congestive Heart Failure     Anxiety Disorder Sister      Genetic Disorder Sister         Ulcerative colitis     Obesity Sister      Genetic Disorder Niece         Ulcerative colitis     Colon Cancer No family hx of      Coronary Artery Disease No family hx of      Mental Illness No family hx of      Substance Abuse No family hx of      Osteoporosis No family hx of      Deep Vein Thrombosis No family hx of        Past/family/social history reviewed and no changes    PHYSICAL EXAMINATION:  Constitutional: AAOx3, cooperative, pleasant, not dyspneic/diaphoretic, no acute  distress  Vitals reviewed: There were no vitals taken for this visit.  Wt:   Wt Readings from Last 2 Encounters:   08/24/22 85.9 kg (189 lb 4.8 oz)   05/04/22 90.7 kg (200 lb)      Eyes: Sclera anicteric/injected  Ears/nose/mouth/throat: Normal oropharynx without ulcers or exudate, mucus membranes moist, hearing intact  Neck: supple, thyroid normal size  CV: No edema  Respiratory: Unlabored breathing  Lymph: No axillary, submandibular, supraclavicular or inguinal lymphadenopathy  Abd: Nondistended, +bs, no hepatosplenomegaly, nontender, no peritoneal signs  Skin: warm, perfused, no jaundice  Psych: Normal affect  MSK: Normal gait      PERTINENT STUDIES:    Office Visit on 08/24/2022   Component Date Value Ref Range Status     Campylobacter group 08/29/2022 Not Detected  Not Detected Final     Salmonella species 08/29/2022 Not Detected  Not Detected Final     Shigella species 08/29/2022 Not Detected  Not Detected Final     Vibrio group 08/29/2022 Not Detected  Not Detected Final     Rotavirus 08/29/2022 Not Detected  Not Detected Final     Shiga toxin 1 gene 08/29/2022 Not Detected  Not Detected Final     Shiga toxin 2 gene 08/29/2022 Not Detected  Not Detected Final     Norovirus I and II 08/29/2022 Not Detected  Not Detected Final     Yersinia enterocolitica 08/29/2022 Not Detected  Not Detected Final     C Difficile Toxin B by PCR 08/29/2022 Negative  Negative Final     Fecal Lactoferrin 08/29/2022 Positive (A)  Negative Final     OVA AND PARASITE EXAM 08/29/2022 Negative  Negative Final     Cryptosporidium parvum antigen 08/29/2022 Negative  Negative Final     Giardia lamblia antigen 08/29/2022 Negative  Negative Final     INR 08/24/2022 0.92  0.85 - 1.15 Final     CT AP 5/2022  IMPRESSION:   1. Air within the urinary bladder may be related to recent  instrumentation.  2. Diffuse fatty infiltration of the liver.  3. Cholelithiasis.  4. Several low density lesions in the liver are too small to  characterize, but  could represent cysts or hemangiomas. Consider liver  MRI for further characterization.    MRI Liver 9/2022  IMPRESSION: Multiple liver cysts.       Again, thank you for allowing me to participate in the care of your patient.      Sincerely,    Ynes Vuong PA-C

## 2022-11-17 ENCOUNTER — LAB (OUTPATIENT)
Dept: LAB | Facility: CLINIC | Age: 67
End: 2022-11-17
Payer: MEDICARE

## 2022-11-17 DIAGNOSIS — R19.7 DIARRHEA, UNSPECIFIED TYPE: ICD-10-CM

## 2022-11-17 PROCEDURE — 82784 ASSAY IGA/IGD/IGG/IGM EACH: CPT

## 2022-11-17 PROCEDURE — 86364 TISS TRNSGLTMNASE EA IG CLAS: CPT

## 2022-11-17 PROCEDURE — 36415 COLL VENOUS BLD VENIPUNCTURE: CPT

## 2022-11-17 PROCEDURE — 86258 DGP ANTIBODY EACH IG CLASS: CPT

## 2022-11-18 LAB — IGA SERPL-MCNC: 159 MG/DL (ref 84–499)

## 2022-11-18 PROCEDURE — 82653 EL-1 FECAL QUANTITATIVE: CPT

## 2022-11-18 PROCEDURE — 99000 SPECIMEN HANDLING OFFICE-LAB: CPT

## 2022-11-18 PROCEDURE — 82705 FATS/LIPIDS FECES QUAL: CPT | Mod: 90

## 2022-11-20 LAB
FAT STL QL: ABNORMAL
NEUTRAL FAT STL QL: NORMAL

## 2022-11-21 LAB
GLIADIN IGA SER-ACNC: 1.1 U/ML
GLIADIN IGG SER-ACNC: <0.6 U/ML
TTG IGA SER-ACNC: 0.7 U/ML
TTG IGG SER-ACNC: <0.6 U/ML

## 2022-11-23 LAB — ELASTASE PANC STL-MCNT: >800 UG/G

## 2022-11-30 ENCOUNTER — MYC MEDICAL ADVICE (OUTPATIENT)
Dept: GASTROENTEROLOGY | Facility: CLINIC | Age: 67
End: 2022-11-30

## 2022-11-30 DIAGNOSIS — R19.7 DIARRHEA, UNSPECIFIED TYPE: Primary | ICD-10-CM

## 2022-12-05 NOTE — TELEPHONE ENCOUNTER
Telephone Call--    Called patient to discuss recent labs and next steps for her chronic diarrhea with recent labs showing increased split fecal fat and low vitamin E. Celiac serologies negative, other fat soluble vitamins wnl, methylmalonic acid wnl, pancreatic elastase wnl.    Given history of gastric bypass and increasing flatulence, we will pursue hydrogen breath testing for SIBO as initial work up. If this is positive with will discuss with ID regarding treatment regimens. Also discussed carbohydrate malabsorption, specifically lactase deficiency. Recommended 2 week trial of dairy avoidance while we wait for hydrogen breath testing. Patient agrees with this plan.     All questions answered to patients stated satisfaction. Will continue to follow.    Ynes Vuong PA-C  Division of Gastroenterology, Hepatology & Nutrition  [facility]

## 2022-12-16 ENCOUNTER — PATIENT OUTREACH (OUTPATIENT)
Dept: GASTROENTEROLOGY | Facility: CLINIC | Age: 67
End: 2022-12-16

## 2022-12-30 ENCOUNTER — TELEPHONE (OUTPATIENT)
Dept: GASTROENTEROLOGY | Facility: CLINIC | Age: 67
End: 2022-12-30

## 2022-12-30 NOTE — TELEPHONE ENCOUNTER
Screening Questions  BLUE  KIND OF PREP RED  LOCATION [review exclusion criteria] GREEN  SEDATION TYPE        Y Are you active on mychart?       Vuong Ordering/Referring Provider?        Medicare What type of coverage do you have?      n Have you had a positive covid test in the last 14 days?         - SCHEDULING DETAILS -  1/6/23  Date of Procedure  Hydrogen Breath Test [HBT]  Type of Procedure Scheduled  [facility] [place] [facility]          Scheduled per artem. Originally cancelled 12/23 due to weather and she thought it was supposed to be on 12/30, but there was no appointment.

## 2023-01-06 ENCOUNTER — OFFICE VISIT (OUTPATIENT)
Dept: GASTROENTEROLOGY | Facility: CLINIC | Age: 68
End: 2023-01-06
Payer: MEDICARE

## 2023-01-06 VITALS
SYSTOLIC BLOOD PRESSURE: 119 MMHG | OXYGEN SATURATION: 99 % | DIASTOLIC BLOOD PRESSURE: 74 MMHG | WEIGHT: 177.3 LBS | BODY MASS INDEX: 29.06 KG/M2 | RESPIRATION RATE: 13 BRPM | HEART RATE: 80 BPM

## 2023-01-06 DIAGNOSIS — R19.7 DIARRHEA, UNSPECIFIED TYPE: ICD-10-CM

## 2023-01-06 PROCEDURE — 91065 BREATH HYDROGEN/METHANE TEST: CPT

## 2023-01-06 ASSESSMENT — PAIN SCALES - GENERAL: PAINLEVEL: NO PAIN (0)

## 2023-01-06 NOTE — PROGRESS NOTES
Non-Invasive GI Procedure Visit    Jose oCronado is a 67 year old female with history of Diarrhea, unspecified type.   Patient stated reason for procedure: Diarrhea    COVID-19 PCR Results    COVID-19 PCR Results 11/19/21   SARS CoV2 PCR Positive (A)   (A) Abnormal value       Comments are available for some flowsheets but are not being displayed.         COVID-19 Antibody Results, Testing for Immunity    COVID-19 Antibody Results, Testing for Immunity   No data to display.             Pre-Procedure Assessment  Patient presents to clinic today for Hydrogen Breath Test    Referring Provider: Ynes Vuong PA-C    Previous HBT: No  Is patient a smoker: No    Does patient report having a colonoscopy, barium study, barium enema or taking antibiotics within the last 2 weeks? Yes / No: No.  Does patient report taking a stool softener, fiber supplement, laxative or anti-diarrheal in the last 3 - 4 days? Yes / No: No.  Does the patient report taking a probiotic in the last 1 - 2 days? Yes / No: No.  Does the patient report taking any medications today? No    Does the patient report following the pre-procedure bland diet? Yes  Does patient report being NPO for a minimum of 12 hours before the test? Yes.     Patient reported symptoms:Diarrhea  How long has patient had these symptoms? Years      Patient Hx  Patient's history, medications and allergies were reviewed.     Height: Data Unavailable   Weight: 177 lbs 4.8 oz    Patient Active Problem List    Diagnosis Date Noted     Atrophic vaginitis 02/10/2021     Priority: Medium     Urge incontinence 02/10/2021     Priority: Medium     Urinary urgency 02/10/2021     Priority: Medium     Midline cystocele 12/16/2019     Priority: Medium     Added automatically from request for surgery [id number]       Rectocele 12/16/2019     Priority: Medium     Added automatically from request for surgery [id number]       Osteopenia 03/17/2019     Priority: Medium     Vitamin D deficiency 03/17/2019      Priority: Medium     (Problem list name updated by automated process. Provider to review and confirm.)    (Problem list name updated by automated process. Provider to review and confirm.)       Hyperparathyroidism (H) 03/17/2019     Priority: Medium     Word finding difficulty 03/17/2019     Priority: Medium     Received intravenous tissue plasminogen activator (tPA) in emergency department 03/17/2019     Priority: Medium     Accidental marijuana poisoning 03/17/2019     Priority: Medium     Migraine with aura and without status migrainosus, not intractable 01/07/2019     Priority: Medium     ASD (atrial septal defect) 07/13/2017     Priority: Medium     Cerebral aneurysm without rupture 04/10/2017     Priority: Medium     Left temporal lobe infarction (H) 02/09/2017     Priority: Medium     Stroke (H) 02/09/2017     Priority: Medium     Essential hypertension with goal blood pressure less than 140/90 10/31/2016     Priority: Medium     PFO (patent foramen ovale) 02/15/2016     Priority: Medium     s/p closure with amplazter device 7/13/17       Lump or mass in breast 11/09/2015     Priority: Medium     Class 1 obesity in adult 10/16/2015     Priority: Medium     Iron deficiency anemia 10/16/2015     Priority: Medium     ACP (advance care planning) 08/23/2012     Priority: Medium     Discussed Advance Directive planning with patient; information given to patient to review.       S/P gastric bypass 06/01/2010     Priority: Medium     Sleep apnea 08/28/2009     Priority: Medium     Female stress incontinence 06/09/2008     Priority: Medium     (Problem list name updated by automated process. Provider to review and confirm.)       Family history of malignant neoplasm of breast 10/02/2003     Priority: Medium      Prior to Admission medications    Medication Sig Start Date End Date Taking? Authorizing Provider   Ascorbic Acid (VITAMIN C PO) Take 250 mg by mouth every morning (0.5 x 500 mg tablet = 250 mg dose)     Reported, Patient   aspirin 325 MG tablet Take 325 mg by mouth every morning     Reported, Patient   atenolol (TENORMIN) 50 MG tablet TAKE 1 TABLET BY MOUTH EVERY DAY 4/5/22   Lian Martinez MD   Calcium Citrate-Vitamin D (CALCIUM CITRATE + D PO) Take 2 tablets by mouth every morning     Unknown, Entered By History   COMPOUNDED NON-CONTROLLED SUBSTANCE (CMPD RX) - PHARMACY TO MIX COMPOUNDED MEDICATION Estriol 1 mg/g in HRT base, apply small amount to finger and apply to inside vagina daily for 2 weeks then twice weekly 5/12/22   Carolyn Valdivia MD   Cranberry 450 MG TABS Take by mouth 2 times daily    Reported, Patient   Cyanocobalamin 2500 MCG TABS Take 2,500 mcg by mouth once a week Mon    Unknown, Entered By History   Ferrous Sulfate 27 MG TABS Take 27 mg by mouth every morning     Unknown, Entered By History   Fesoterodine Fumarate (TOVIAZ) 8 MG TB24 Take 1 tablet (8 mg) by mouth daily 1/5/22   Carolyn Valdivia MD   FIBER COMPLETE PO Take 1 capsule by mouth every morning     Reported, Patient   hydrochlorothiazide (HYDRODIURIL) 12.5 MG tablet TAKE 1 TABLET BY MOUTH EVERY DAY 4/5/22   Lian Martinez MD   imipramine (TOFRANIL) 25 MG tablet TAKE 1 TABLET BY MOUTH AT BEDTIME 12/20/21   Lian Martinez MD   loratadine (CLARITIN) 10 MG tablet Take 10 mg by mouth every morning     Reported, Patient   losartan (COZAAR) 50 MG tablet TAKE 1 TABLET BY MOUTH EVERY DAY 4/5/22   Lian Martinez MD   topiramate (TOPAMAX) 25 MG tablet TAKE 1 TABLET BY MOUTH TWICE A DAY 10/11/22   Lian Martinez MD   UNABLE TO FIND CPAP machine every night    Reported, Patient   VITAMIN D, CHOLECALCIFEROL, PO Take 500 Units by mouth every morning     Reported, Patient     Allergies   Allergen Reactions     Contrast Dye Itching     Reaction of immediate burning and severe itching in Right ear and back of throat after injection for CT.      Sulfa Drugs      hives     Past Medical History:   Diagnosis Date     Anemia       Arthritis 2015    Hands, back, hips. Various dates first noted.     CVA (cerebral vascular accident) (H) 2017    ?migraine, ?pfo--negative vasc w/u, neg hypercoag w/u     Diffuse cystic mastopathy     Fibrocystic breast disease     HTN, goal below 140/90      Hyperparathyroidism (H)      Infectious mononucleosis     Mono at age 16     Irregular heart beat     PAT no afib on 30day monitor     Labyrinthitis, unspecified      Migraine headache with aura      Osteopenia      Pain in joint, shoulder region     Secondary to a fall     Palpitations      PFO (patent foramen ovale)     s/p closure with amplazter device 7/13/17     S/P gastric bypass June, 2010     Sleep apnea     she is on CPAP     Sleep apnea      Vitamin D deficiencies      Past Surgical History:   Procedure Laterality Date     BIOPSY  1984    Breast biopsies     BREAST SURGERY  1984    Above     CARDIAC SURGERY  7/13/2017    PFO closure     COLONOSCOPY N/A 8/12/2015    Procedure: COLONOSCOPY;  Surgeon: Deandre Brooks MD;  Location:  GI     COLONOSCOPY N/A 10/6/2022    Procedure: COLONOSCOPY, WITH BIOPSIES;  Surgeon: Freddie Gonzalez MD;  Location:  GI     DAVINCI HYSTERECTOMY SUPRACERVICAL, SALPINGO-OOPHORECTOMY INCLUDING BILATERAL N/A 3/16/2020    Procedure: DAVINCI HYSTERECTOMY SUPRACERVICAL, SALPINGO-OOPHORECTOMY INCLUDING BILATERAL WITH EXAM UNDER ANESTHESIA;  Surgeon: Lily Yancey MD;  Location: [facility]     DAVINCI SACROCOLPOPEXY, MIDURETHRAL SLING, CYSTOSCOPY N/A 3/16/2020    Procedure: SACROCOLPOPEXY, ROBOT-ASSISTED, LAPAROSCOPIC, WITH INSERTION OF MIDURETHRAL SLING AND CYSTOSCOPY;  Surgeon: Carolyn Valdivia MD;  Location: [facility] OR     GASTRIC BYPASS  June 24, 2010     GENITOURINARY SURGERY  3/16/2020    Cystocele/rectocele repair     GYN SURGERY  3/16/2020    Hysterectomy     ORTHOPEDIC SURGERY Left 2010    wrist fracture     PARATHYROIDECTOMY  9/19/11     [facility] ANEURYSM, INTRACRAN, SIMPLE SURG  04/2017    coil of aneurysm right  posterior paraophthalmic artery     [facility] NONSPECIFIC PROCEDURE      S/P multiple breast biopies - all negative / benign     [facility] NONSPECIFIC PROCEDURE      S/P T&A     [facility] NONSPECIFIC PROCEDURE      [place] teeth extraction     [facility] NONSPECIFIC PROCEDURE      S/P (? unreadable) ankle     Family History   Problem Relation Age of Onset     Breast Cancer Mother      Hypertension Mother      Arthritis Mother      Thyroid Disease Mother         Hypo     Ulcerative Colitis Mother      Cerebrovascular Disease Mother         Age 78 - Cerebral Hemorrhage,      Anxiety Disorder Mother      Depression Mother      Genetic Disorder Mother         Ulcerative colitis     Obesity Mother      Anesthesia Reaction Mother         headaches, N/V     Breast Cancer Maternal Aunt      Hypertension Paternal Grandmother      Cerebrovascular Disease Maternal Grandmother         Age 72 -      Family History Negative Maternal Grandfather      Family History Negative Paternal Grandfather      Family History Negative Son      Family History Negative Daughter      Other Cancer Father         Skin - Non-melanoma varieties     Hypertension Father      Asthma Father      Family History Negative Daughter      Ulcerative Colitis Sister      Hypertension Sister      Hyperlipidemia Sister      Anxiety Disorder Sister      Depression Sister      Diabetes Sister      Obesity Sister      Asthma Sister      Anesthesia Reaction Sister         Debilitating headaches     Hypertension Brother      Anxiety Disorder Brother      Depression Brother      Asthma Nephew      Hypertension Sister         Congestive Heart Failure     Anxiety Disorder Sister      Genetic Disorder Sister         Ulcerative colitis     Obesity Sister      Genetic Disorder Niece         Ulcerative colitis     Colon Cancer No family hx of      Coronary Artery Disease No family hx of      Mental Illness No family hx of      Substance Abuse No family hx of      Osteoporosis No  family hx of      Deep Vein Thrombosis No family hx of      Social History     Tobacco Use     Smoking status: Never     Smokeless tobacco: Never   Substance Use Topics     Alcohol use: Yes     Alcohol/week: 0.0 standard drinks     Comment: Social        Pre-Procedure Education & Consent  Procedure education was provided to: Patient  Teaching method: Explanation  Barriers to learning: No Barrier    Patient indicated understanding of pre-procedure instruction and appropriate consent was obtained and documented.    ____________________________________________________________________    Post-Procedure Documentation: Hydrogen Breath Test     Baseline breath obtained prior to patient drinking Lactulose.     Patient tolerated test with no complaints.    HBT Results    Sample Clock Times Ppm H2 Ppm CH4 CO2% Comments   Baseline 1047 14 10 3.9     Challenge Dose Given 1050 - - - -   #1 1110 19 12 3.7     #2 1130 40 12 4.0     #3 1150 53 13 3.7     #4 1210 37 9 3.7     #5 1230 63 14 3.9     #6 1250 38 11 4.1     #7 1310 49 11 3.6     #8 1330 33 9 3.6     #9 1350 64 12 3.7       #  B   Patient given discharge instructions.    Notification of pending test results sent to provider for interpretation. Please reference scanned document for final interpretation of results. Patient will follow up with referring provider for test results.    aSbra Thibodeaux RN on 1/6/2023 at 10:38 AM

## 2023-01-06 NOTE — PATIENT INSTRUCTIONS
Hydrogen Breath Test  1. You may experience diarrhea for the next 12-24 hours.  2. Resume a regular diet.  3. Follow-up with your referring doctor in clinic.  4. If you have questions call [phone] from 7:00am-5:00pm.

## 2023-01-08 DIAGNOSIS — N39.41 URGE INCONTINENCE: ICD-10-CM

## 2023-01-08 DIAGNOSIS — R39.15 URINARY URGENCY: ICD-10-CM

## 2023-01-10 RX ORDER — FESOTERODINE FUMARATE 8 MG/1
1 TABLET, FILM COATED, EXTENDED RELEASE ORAL DAILY
Qty: 90 TABLET | Refills: 0 | Status: SHIPPED | OUTPATIENT
Start: 2023-01-10 | End: 2023-02-21

## 2023-01-23 DIAGNOSIS — R19.7 DIARRHEA, UNSPECIFIED TYPE: Primary | ICD-10-CM

## 2023-01-23 DIAGNOSIS — K63.8219 SMALL INTESTINAL BACTERIAL OVERGROWTH (SIBO): ICD-10-CM

## 2023-01-23 RX ORDER — NEOMYCIN SULFATE 500 MG/1
500 TABLET ORAL 2 TIMES DAILY
Qty: 28 TABLET | Refills: 0 | Status: SHIPPED | OUTPATIENT
Start: 2023-01-23 | End: 2023-03-09

## 2023-02-02 ENCOUNTER — TELEPHONE (OUTPATIENT)
Dept: GASTROENTEROLOGY | Facility: CLINIC | Age: 68
End: 2023-02-02

## 2023-02-02 NOTE — TELEPHONE ENCOUNTER
Central Prior Authorization Team   Phone: [phone]      PA Initiation    Medication: rifaximin (XIFAXAN) 550 MG TABS tablet  Insurance Company: [employer] - Phone [phone] Fax [phone]  Pharmacy Filling the Rx: [id number] - [place], [place] - [address] AT [facility]  Filling Pharmacy Phone:    Filling Pharmacy Fax:    Start Date: 2/2/2023

## 2023-02-04 NOTE — TELEPHONE ENCOUNTER
Central Prior Authorization Team   Phone: [phone]      PRIOR AUTHORIZATION DENIED    Medication: rifaximin (XIFAXAN) 550 MG TABS tablet - PA DENIED/APPEAL DENIED     Denial Date: 2/3/2023    Denial Rational:       Appeal Information:

## 2023-02-04 NOTE — TELEPHONE ENCOUNTER
Central Prior Authorization Team   Phone: [phone]      MEDICATION APPEAL DENIED - Re-faxed the insurance as when I checked, it was not faxed out properly. Not sure if they received it double, but they processed one of them as an appeal on their end.    Medication: rifaximin (XIFAXAN) 550 MG TABS tablet - PA DENIED/APPEAL DENIED - Appeal Denied     Denial Date: 2/3/2023    Denial Rational:       Second Level Appeal Information:    Second level appeals will be managed by the clinic staff and provider. Please contact the [facility] Prior Authorization Team if additional information about the denial is needed.

## 2023-02-06 ENCOUNTER — ANCILLARY PROCEDURE (OUTPATIENT)
Dept: MAMMOGRAPHY | Facility: CLINIC | Age: 68
End: 2023-02-06
Attending: INTERNAL MEDICINE
Payer: MEDICARE

## 2023-02-06 DIAGNOSIS — Z12.31 VISIT FOR SCREENING MAMMOGRAM: ICD-10-CM

## 2023-02-06 PROCEDURE — 77067 SCR MAMMO BI INCL CAD: CPT | Mod: TC | Performed by: RADIOLOGY

## 2023-02-06 PROCEDURE — 77063 BREAST TOMOSYNTHESIS BI: CPT | Mod: TC | Performed by: RADIOLOGY

## 2023-02-08 ENCOUNTER — MYC MEDICAL ADVICE (OUTPATIENT)
Dept: GASTROENTEROLOGY | Facility: CLINIC | Age: 68
End: 2023-02-08
Payer: MEDICARE

## 2023-02-13 ENCOUNTER — VIRTUAL VISIT (OUTPATIENT)
Dept: GASTROENTEROLOGY | Facility: CLINIC | Age: 68
End: 2023-02-13
Payer: MEDICARE

## 2023-02-13 DIAGNOSIS — K63.8219 SMALL INTESTINAL BACTERIAL OVERGROWTH (SIBO): Primary | ICD-10-CM

## 2023-02-13 DIAGNOSIS — R19.7 DIARRHEA, UNSPECIFIED TYPE: ICD-10-CM

## 2023-02-13 PROCEDURE — 99215 OFFICE O/P EST HI 40 MIN: CPT | Mod: VID | Performed by: DIETITIAN, REGISTERED

## 2023-02-13 PROCEDURE — 99417 PROLNG OP E/M EACH 15 MIN: CPT | Mod: VID | Performed by: DIETITIAN, REGISTERED

## 2023-02-13 NOTE — PATIENT INSTRUCTIONS
It was a pleasure taking care of you today.  I've included a brief summary of our discussion and care plan from today's visit below.  Please review this information with your primary care provider.  ______________________________________________________________________    My recommendations are summarized as follows:  --Start rifaximin and neomycin pending insurance coverage.  --Continue to follow low fermentation diet.  --Meet with Sylvia Pink RD our dietitian.  --Continue Citrucel 2 tablespoons/day.  -- please see scheduling information provided below     Return to GI Clinic in 3-4 months to review your progress.    ______________________________________________________________________    How do I schedule labs, imaging studies, or procedures that were ordered in clinic today?     Labs: To schedule lab appointment at the [facility], use my chart or call [phone]. If you have a [place] lab closer to home where you are regularly seen you can give them a call.     Procedures: If a colonoscopy, upper endoscopy, breath test, esophageal manometry, or pH impedence was ordered today, our endoscopy team will call you to schedule this. If you have not heard from our endoscopy team within a week, please call [phone] to schedule.     Imaging Studies: If you were scheduled for a CT scan, X-ray, MRI, ultrasound, HIDA scan or other imaging study, please call [phone] to have this scheduled.     Referral: If a referral to another specialty was ordered, expect a phone call or follow instructions above. If you have not heard from anyone regarding your referral in a week, please call our clinic to check the status.     Who do I call with any questions after my visit?  Please be in touch if there are any further questions that arise following today's visit.  There are multiple ways to contact your gastroenterology care team.      During business hours, you may reach a Gastroenterology nurse at  [phone]    To schedule or reschedule an appointment, please call [phone].     You can always send a secure message through [employer].  [employer] messages are answered by your nurse or doctor typically within 24 hours.  Please allow extra time on weekends and holidays.      For urgent/emergent questions after business hours, you may reach the on-call GI Fellow by contacting the [facility]  at [phone].     How will I get the results of any tests ordered?    You will receive all of your results.  If you have signed up for [employer], any tests ordered at your visit will be available to you after your provider reviews them.  Typically this takes 1-2 weeks.  If there are urgent results that require a change in your care plan, your provider or nurse will call you to discuss the next steps.      What is [employer]?  U[employer]ra is a secure way for you to access all of your healthcare records from the [facility].  It is a web based computer program, so you can sign on to it from any location.  It also allows you to send secure messages to your care team.  I recommend signing up for Ub[employer] access if you have not already done so and are comfortable with using a computer.      How to I schedule a follow-up visit?  If you did not schedule a follow-up visit today, please call [phone] to schedule a follow-up office visit.      Sincerely,    Ynes Vuong PA-C  Division of Gastroenterology, Hepatology & Nutrition  [facility]

## 2023-02-13 NOTE — LETTER
2/13/2023         RE: Jose Coronado  [address] [place] [address]        Dear Colleague,    Thank you for referring your patient, Jose Coronado, to the [facility] GASTROENTEROLOGY [facility] [place]. Please see a copy of my visit note below.    GI CLINIC VISIT    CC/REFERRING MD:  Lian Martinez  REASON FOR CONSULTATION: Diarrhea    ASSESSMENT/PLAN:  #Loose Stools  #Flatus  #Fecal Incontinence  #SIBO  #Increased Split fecal fat  Bowel pattern change over past two years, recently having 1-2 stools per day, but loose, yellow pasty in color and typically fairly urgent and unpredictable, with fecal leakage which she does not perceive so wears a pad daily. Infectious stools studies including C.Diff, enteric panel, crypto/giarrdia, and O&P were negative. Fecal lactoferrin was positive. Colonoscopy was then performed which did which was unremarkable on endoscopy, on histology no evidence of microscopic or inflammatory colitis driving diarrhea, though did show melanosis coli (denies laxative use).  After last visit hydrogen breath test positive for SIBO and methanogen overgrowth. Stool testing with increased split fecal fat, normal neutral fat. Fecal elastase normal. Celiac serologies negative. Vitamin E low with testing with her neurologist, and zinc lower than ideal so was started on vitamin E and zinc supplementation with per their recommendation.  Suspect SIBO is driving at least some of her symptoms, previous gastric bypass surgery risk factor and increased alcohol use at time of change in stool pattern that did not resolve with decreased use.  Other possible contributors include IBS-D, postinfectious IBS/dysbiosis related to multiple antibiotic courses, celiac, bile acid diarrhea, pancreatic insufficiency, SIBO, medication induced with polypharmacy and vitamin supplements s/p bariatric surgery. This may also be related to rapid transit 2/2 gastric bypass surgery.  Increased split fecal fat  likely related to SIBO versus malabsorption with gastric bypass. Suspect fecal incontinence related to pelvic floor dysfunction. She also had surgery for rectocele and cystocele in 2020 about the time symptoms started.  Given positive SIBO test recommend treatment per guidelines with rifaximin 550 mg 3 times daily x14 days plus neomycin 500 mg twice daily x14 days.  Encouraged her to also continue to work with diet as this seems to be improving her symptoms, she is interested in meeting with our registered dietitian to learn more about diet and potential SIBO treatment/prevention.  She should also continue Citrucel soluble fiber at 2 tablespoons daily to help bulk stools.  If fecal incontinence returns recommend pelvic floor evaluation.     Plan:  --Start rifaximin and neomycin pending insurance coverage.  --Continue to follow low fermentation diet.  --Meet with Sylvia Pink RD our dietitian.  --Continue Citrucel 2 tablespoons/day.    Colorectal cancer screening: Last 10/2022 - next due in 10 years (2032)    RTC 3-4 months    Thank you for this consultation.  It was a pleasure to participate in the care of this patient; please contact us with any further questions.     80Minutes was spent on the date of the encounter during chart review, history and exam, documentation, and further activities as noted       Ynes Vuong PA-C, RD  Division of Gastroenterology, Hepatology & Nutrition  [facility]        VANITA Coronado is a 66 year old female with a history of migraines, ASD, stroke (left temporal lobe infarction), cerebral aneurysm, hyperparathyroidism, HTN, gastric bypass (2010), osteopenia, cystocele, rectocele s/p hysterectomy and oophorectomy with mesh sling placement, who presents for evaluation of diarrhea.     Initial Visit:  Patient reports that starting about 2 years ago consistency and color of her stools changed.  She was initially having chalky pale stools which turned to pasty yellow, which  she continues to have today.  She reports she is to have formed stools but now soft to loose (BSC 4-6), once per week will have formed (BSC 4).  Currently having 1-2 stools per day but unpredictable timing, can be overnight, and often quite urgent. She also reports increasing flatus over this time.  Denies blood or melena.  Denies missing days without stool or having hard stools. She also notes fecal incontinence/leakage, which she is not able to feel so wears a pad daily.  Notices stool and pad about once per week.  Had mesh sling placed for cystocele/rectocele in 2020 but notes that she can still feel a bulge at the introitus.      Denies regular abdominal pain, nausea, vomiting, bloating, dysphagia or odynophagia. She reports having intermittent reflux if she eats particular foods or too late at night, reports this is resolved with TUMS and not overly bothersome.    She also notes 2 discrete episodes of severe abdominal pain over the last 2 years, 1 November 2020 and 1 in July 2021, each lasting 24 to 48 hours, but and able to get out of bed or eat or drink during episodes.  They eventually self resolved with sips of Pedialyte and rest.  Has not recurred since.    Stool studies done 8/29/22 were negative for C.Diff, enteric panel, cryptosporidium, giardia, and O&P. Lactoferrin was positive. Colonoscopy done 10/6/22 - examined colon was was normal, biopsies were negative for microscopic colitis but did show melanosis coli.    She reports that she started drinking more alcohol over the last 2 years during the pandemic with daughter in house and experimenting with different cocktails.  After having fatty liver visualized on CT she has since drastically cut alcohol intake and only occasionally has a glass of wine now, no change in stool pattern since reducing alcohol intake.    Reports that she used stool softeners for a while after her pelvic surgery, denies any laxative use including stimulant laxatives.    Diet  "Recall -  Breakfast: 2 eggs and toast, yogurt and blueberries, oatmeal, nuts and blueberries with sugar-free maple syrup - always fruit  Lunch: turkey or tuna sandwich, cottage cheese, bean salad, soup, tacos  Dinner: out to eat - fish or chicken with rice/potatoes and vegetables, soup,   Drinks: water 100oz, iced tea sweetened with sweet and low, coffee (2-3 cups)    Interval History 2/13/23:  After last visit testing completed. 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 Stool testing with increased split fecal fat, normal neutral fat. Fecal elastase normal. Celiac serologies negative. Vitamin E low with testing with her neurologist, and zinc lower than ideal so was started on vitamin E and zinc supplementation with per their recommendation.    Many questions today regarding SIBO diagnosis, course and treatment which were discussed in detail.  She notes that she has not been able to get Xifaxan approved through her insurance so has not started any antibiotics yet.  Reports that she is not excited about starting antibiotics and would like to avoid multiple courses.    Today Jose reports that she is feeling better than she was from a bowel perspective.  She notes that she was having a terrible flare with loose stools, foul-smelling flatulence, and bloating and early to mid January, then however transition to 2 weeks of normal, formed, nonurgent stool with 1 bowel movement per day (though still \"waxy \").  Recently she has again started to have softer bowel movements but not as frequent or loose his previous and no recent incontinence.    She notes that she is recently put a lot of effort into eating better and doing research regarding SIBO and diet.  She has been following a low fermentation eating plan, avoiding things like lactose, artificial sugars, legumes.  She has also been working on increasing her soluble fiber as recommended at last visit, currently taking 2 tablespoons of Citrucel " daily.     Reports previous weight loss from 203 down to  180.  Reports weight now stable.  Wt Readings from Last 5 Encounters:   01/06/23 80.4 kg (177 lb 4.8 oz)   11/10/22 85.4 kg (188 lb 4.8 oz)   08/24/22 85.9 kg (189 lb 4.8 oz)   05/04/22 90.7 kg (200 lb)   02/14/22 91.3 kg (201 lb 4.8 oz)       ROS:    No fevers or chills  + weight loss  No blurry vision, double vision or change in vision  No sore throat  No lymphadenopathy  No headache, paraesthesias, or weakness in a limb  No shortness of breath or wheezing  No chest pain or pressure  No arthralgias or myalgias  No rashes or skin changes  No odynophagia or dysphagia  No BRBPR, hematochezia, melena  No dysuria, frequency or urgency  No hot/cold intolerance or polyria  No anxiety or depression      PROBLEM LIST    Patient Active Problem List    Diagnosis Date Noted     Atrophic vaginitis 02/10/2021     Priority: Medium     Urge incontinence 02/10/2021     Priority: Medium     Urinary urgency 02/10/2021     Priority: Medium     Midline cystocele 12/16/2019     Priority: Medium     Added automatically from request for surgery [id number]       Rectocele 12/16/2019     Priority: Medium     Added automatically from request for surgery [id number]       Osteopenia 03/17/2019     Priority: Medium     Vitamin D deficiency 03/17/2019     Priority: Medium     (Problem list name updated by automated process. Provider to review and confirm.)    (Problem list name updated by automated process. Provider to review and confirm.)       Hyperparathyroidism (H) 03/17/2019     Priority: Medium     Word finding difficulty 03/17/2019     Priority: Medium     Received intravenous tissue plasminogen activator (tPA) in emergency department 03/17/2019     Priority: Medium     Accidental marijuana poisoning 03/17/2019     Priority: Medium     Migraine with aura and without status migrainosus, not intractable 01/07/2019     Priority: Medium     ASD (atrial septal defect) 07/13/2017      Priority: Medium     Cerebral aneurysm without rupture 04/10/2017     Priority: Medium     Left temporal lobe infarction (H) 02/09/2017     Priority: Medium     Stroke (H) 02/09/2017     Priority: Medium     Essential hypertension with goal blood pressure less than 140/90 10/31/2016     Priority: Medium     PFO (patent foramen ovale) 02/15/2016     Priority: Medium     s/p closure with amplazter device 7/13/17       Lump or mass in breast 11/09/2015     Priority: Medium     Class 1 obesity in adult 10/16/2015     Priority: Medium     Iron deficiency anemia 10/16/2015     Priority: Medium     ACP (advance care planning) 08/23/2012     Priority: Medium     Discussed Advance Directive planning with patient; information given to patient to review.       S/P gastric bypass 06/01/2010     Priority: Medium     Sleep apnea 08/28/2009     Priority: Medium     Female stress incontinence 06/09/2008     Priority: Medium     (Problem list name updated by automated process. Provider to review and confirm.)       Family history of malignant neoplasm of breast 10/02/2003     Priority: Medium       PERTINENT PAST MEDICAL HISTORY:    Past Medical History:   Diagnosis Date     Anemia      Arthritis 2015    Hands, back, hips. Various dates first noted.     CVA (cerebral vascular accident) (H) 2017    ?migraine, ?pfo--negative vasc w/u, neg hypercoag w/u     Diffuse cystic mastopathy     Fibrocystic breast disease     HTN, goal below 140/90      Hyperparathyroidism (H)      Infectious mononucleosis     Mono at age 16     Irregular heart beat     PAT no afib on 30day monitor     Labyrinthitis, unspecified      Migraine headache with aura      Osteopenia      Pain in joint, shoulder region     Secondary to a fall     Palpitations      PFO (patent foramen ovale)     s/p closure with amplazter device 7/13/17     S/P gastric bypass June, 2010     Sleep apnea     she is on CPAP     Sleep apnea      Vitamin D deficiencies        PREVIOUS  SURGERIES:    Past Surgical History:   Procedure Laterality Date     BIOPSY  1984    Breast biopsies     BREAST SURGERY  1984    Above     CARDIAC SURGERY  7/13/2017    PFO closure     COLONOSCOPY N/A 8/12/2015    Procedure: COLONOSCOPY;  Surgeon: Deandre Brooks MD;  Location: [facility]     COLONOSCOPY N/A 10/6/2022    Procedure: COLONOSCOPY, WITH BIOPSIES;  Surgeon: Freddie Gonzalez MD;  Location: [facility]     DAVINCI HYSTERECTOMY SUPRACERVICAL, SALPINGO-OOPHORECTOMY INCLUDING BILATERAL N/A 3/16/2020    Procedure: DAVINCI HYSTERECTOMY SUPRACERVICAL, SALPINGO-OOPHORECTOMY INCLUDING BILATERAL WITH EXAM UNDER ANESTHESIA;  Surgeon: Lily Yancey MD;  Location: [facility]     DAVINCI SACROCOLPOPEXY, MIDURETHRAL SLING, CYSTOSCOPY N/A 3/16/2020    Procedure: SACROCOLPOPEXY, ROBOT-ASSISTED, LAPAROSCOPIC, WITH INSERTION OF MIDURETHRAL SLING AND CYSTOSCOPY;  Surgeon: Carolyn Valdivia MD;  Location: [facility] OR     GASTRIC BYPASS  June 24, 2010     GENITOURINARY SURGERY  3/16/2020    Cystocele/rectocele repair     GYN SURGERY  3/16/2020    Hysterectomy     ORTHOPEDIC SURGERY Left 2010    wrist fracture     PARATHYROIDECTOMY  9/19/11     [facility] ANEURYSM, INTRACRAN, SIMPLE SURG  04/2017    coil of aneurysm right posterior paraophthalmic artery     [facility] NONSPECIFIC PROCEDURE      S/P multiple breast biopies - all negative / benign     [facility] NONSPECIFIC PROCEDURE      S/P T&A     [facility] NONSPECIFIC PROCEDURE      Daisy teeth extraction     [facility] NONSPECIFIC PROCEDURE      S/P (? unreadable) ankle       PREVIOUS ENDOSCOPY:  Colonoscopy 10/06/22  Impression:                 - Diverticulosis in the sigmoid colon.   - The entire examined colon is normal on direct and retroflexion views.   - No specimens collected.   - Biopsies were taken with a cold forceps from the ascending colon and descending colon for evaluation of microscopic colitis.     A.  Ascending colon, biopsy:  -Negative for diagnostic colitis, dysplasia, or malignancy.     B.   Descending colon, biopsy:  -Melanosis coli.  -Negative for diagnostic colitis, dysplasia, or malignancy.    ALLERGIES:     Allergies   Allergen Reactions     Contrast Dye Itching     Reaction of immediate burning and severe itching in Right ear and back of throat after injection for CT.      Sulfa Drugs      hives       PERTINENT MEDICATIONS:    Current Outpatient Medications:      Ascorbic Acid (VITAMIN C PO), Take 250 mg by mouth every morning (0.5 x 500 mg tablet = 250 mg dose), Disp: , Rfl:      aspirin 325 MG tablet, Take 325 mg by mouth every morning , Disp: , Rfl:      atenolol (TENORMIN) 50 MG tablet, TAKE 1 TABLET BY MOUTH EVERY DAY, Disp: 90 tablet, Rfl: 3     Calcium Citrate-Vitamin D (CALCIUM CITRATE + D PO), Take 2 tablets by mouth every morning , Disp: , Rfl:      COMPOUNDED NON-CONTROLLED SUBSTANCE (CMPD RX) - PHARMACY TO MIX COMPOUNDED MEDICATION, Estriol 1 mg/g in HRT base, apply small amount to finger and apply to inside vagina daily for 2 weeks then twice weekly, Disp: 30 g, Rfl: 11     Cranberry 450 MG TABS, Take by mouth 2 times daily, Disp: , Rfl:      Cyanocobalamin 2500 MCG TABS, Take 2,500 mcg by mouth once a week Mon, Disp: , Rfl:      Ferrous Sulfate 27 MG TABS, Take 27 mg by mouth every morning , Disp: , Rfl:      Fesoterodine Fumarate (TOVIAZ) 8 MG TB24, Take 1 tablet (8 mg) by mouth daily, Disp: 90 tablet, Rfl: 0     FIBER COMPLETE PO, Take 1 capsule by mouth every morning , Disp: , Rfl:      hydrochlorothiazide (HYDRODIURIL) 12.5 MG tablet, TAKE 1 TABLET BY MOUTH EVERY DAY, Disp: 90 tablet, Rfl: 3     imipramine (TOFRANIL) 25 MG tablet, TAKE 1 TABLET BY MOUTH AT BEDTIME, Disp: 90 tablet, Rfl: 3     loratadine (CLARITIN) 10 MG tablet, Take 10 mg by mouth every morning , Disp: , Rfl:      losartan (COZAAR) 50 MG tablet, TAKE 1 TABLET BY MOUTH EVERY DAY, Disp: 90 tablet, Rfl: 3     neomycin (MYCIFRADIN) 500 MG tablet, Take 1 tablet (500 mg) by mouth 2 times daily, Disp: 28 tablet, Rfl:  0     topiramate (TOPAMAX) 25 MG tablet, TAKE 1 TABLET BY MOUTH TWICE A DAY, Disp: 180 tablet, Rfl: 0     UNABLE TO FIND, CPAP machine every night, Disp: , Rfl:      VITAMIN D, CHOLECALCIFEROL, PO, Take 500 Units by mouth every morning , Disp: , Rfl:   No other NSAID/anticoagulation reported by patient.  No other OTC/herbal/supplements reported by patient.    SOCIAL HISTORY:    Tobacco: no   Alcohol: minimal  Drugs Use: no    Social History     Socioeconomic History     Marital status:      Spouse name: Not on file     Number of children: Not on file     Years of education: Not on file     Highest education level: Not on file   Occupational History     Not on file   Tobacco Use     Smoking status: Never     Smokeless tobacco: Never   Vaping Use     Vaping Use: Never used   Substance and Sexual Activity     Alcohol use: Yes     Alcohol/week: 0.0 standard drinks     Comment: Social     Drug use: No     Sexual activity: Not Currently     Partners: Male     Birth control/protection: Pill   Other Topics Concern     Parent/sibling w/ CABG, MI or angioplasty before 65F 55M? No   Social History Narrative     Not on file     Social Determinants of Health     Financial Resource Strain: Not on file   Food Insecurity: Not on file   Transportation Needs: Not on file   Physical Activity: Not on file   Stress: Not on file   Social Connections: Not on file   Intimate Partner Violence: Not on file   Housing Stability: Not on file       FAMILY HISTORY:  FH of CRC: no  FH of IBD: ulcerative colitis (mother, sister, niece), brother has IBS, many people in family has diarrhea    No Colon/Panc/Esophageal/other GI CA. No IBD or Celiac Disease. No other Autoimmune, Liver, or Thyroid disease.    Family History   Problem Relation Age of Onset     Breast Cancer Mother      Hypertension Mother      Arthritis Mother      Thyroid Disease Mother         Hypo     Ulcerative Colitis Mother      Cerebrovascular Disease Mother         Age 78  - Cerebral Hemorrhage,      Anxiety Disorder Mother      Depression Mother      Genetic Disorder Mother         Ulcerative colitis     Obesity Mother      Anesthesia Reaction Mother         headaches, N/V     Breast Cancer Maternal Aunt      Hypertension Paternal Grandmother      Cerebrovascular Disease Maternal Grandmother         Age 72 -      Family History Negative Maternal Grandfather      Family History Negative Paternal Grandfather      Family History Negative Son      Family History Negative Daughter      Other Cancer Father         Skin - Non-melanoma varieties     Hypertension Father      Asthma Father      Family History Negative Daughter      Ulcerative Colitis Sister      Hypertension Sister      Hyperlipidemia Sister      Anxiety Disorder Sister      Depression Sister      Diabetes Sister      Obesity Sister      Asthma Sister      Anesthesia Reaction Sister         Debilitating headaches     Hypertension Brother      Anxiety Disorder Brother      Depression Brother      Asthma Nephew      Hypertension Sister         Congestive Heart Failure     Anxiety Disorder Sister      Genetic Disorder Sister         Ulcerative colitis     Obesity Sister      Genetic Disorder Niece         Ulcerative colitis     Colon Cancer No family hx of      Coronary Artery Disease No family hx of      Mental Illness No family hx of      Substance Abuse No family hx of      Osteoporosis No family hx of      Deep Vein Thrombosis No family hx of        Past/family/social history reviewed and no changes    PHYSICAL EXAMINATION:  Constitutional: AAOx3, cooperative, pleasant, not dyspneic/diaphoretic, no acute distress  Vitals reviewed: There were no vitals taken for this visit.  Wt:   Wt Readings from Last 2 Encounters:   23 80.4 kg (177 lb 4.8 oz)   11/10/22 85.4 kg (188 lb 4.8 oz)      Eyes: Sclera anicteric/injected  Ears/nose/mouth/throat: Normal oropharynx without ulcers or exudate, mucus membranes  moist, hearing intact  Neck: supple, thyroid normal size  CV: No edema  Respiratory: Unlabored breathing  Lymph: No axillary, submandibular, supraclavicular or inguinal lymphadenopathy  Abd: Nondistended, +bs, no hepatosplenomegaly, nontender, no peritoneal signs  Skin: warm, perfused, no jaundice  Psych: Normal affect  MSK: Normal gait      PERTINENT STUDIES:    Office Visit on 08/24/2022   Component Date Value Ref Range Status     Campylobacter group 08/29/2022 Not Detected  Not Detected Final     Salmonella species 08/29/2022 Not Detected  Not Detected Final     Shigella species 08/29/2022 Not Detected  Not Detected Final     Vibrio group 08/29/2022 Not Detected  Not Detected Final     Rotavirus 08/29/2022 Not Detected  Not Detected Final     Shiga toxin 1 gene 08/29/2022 Not Detected  Not Detected Final     Shiga toxin 2 gene 08/29/2022 Not Detected  Not Detected Final     Norovirus I and II 08/29/2022 Not Detected  Not Detected Final     Yersinia enterocolitica 08/29/2022 Not Detected  Not Detected Final     C Difficile Toxin B by PCR 08/29/2022 Negative  Negative Final     Fecal Lactoferrin 08/29/2022 Positive (A)  Negative Final     OVA AND PARASITE EXAM 08/29/2022 Negative  Negative Final     Cryptosporidium parvum antigen 08/29/2022 Negative  Negative Final     Giardia lamblia antigen 08/29/2022 Negative  Negative Final     INR 08/24/2022 0.92  0.85 - 1.15 Final         CT AP 5/2022  IMPRESSION:   1. Air within the urinary bladder may be related to recent  instrumentation.  2. Diffuse fatty infiltration of the liver.  3. Cholelithiasis.  4. Several low density lesions in the liver are too small to  characterize, but could represent cysts or hemangiomas. Consider liver  MRI for further characterization.    MRI Liver 9/2022  IMPRESSION: Multiple liver cysts.     Hydrogen Breath Test:  Positive hydrogen breath test consistent with small intestinal bacterial overgrowth. High methane production at baseline and  throughout the test.        Sincerely,    Ynes Vuong PA-C

## 2023-02-13 NOTE — NURSING NOTE
Is the patient currently in the state of [place]? YES    Visit mode:VIDEO    If the visit is dropped, the patient can be reconnected by: VIDEO VISIT: Send to e-mail at: [email or web address]    Will anyone else be joining the visit? NO      How would you like to obtain your AVS? MyChart    Are changes needed to the allergy or medication list? YES: taking Citrucel soluable powder form.    Comments or concerns regarding today's visit: Pt was prescribed Rifaxmin and Neomycin - they were declined by insurance.    Padmaja Gruber VF

## 2023-02-13 NOTE — PROGRESS NOTES
Video-Visit Details    Type of service:  Video Visit    Video Start Time (time video started): 9:01 AM    Video End Time (time video stopped): 9:40 AM    Originating Location (pt. Location): Home    Distant Location (provider location):  Off-site    Mode of Communication:  Video Conference via [facility]            GI CLINIC VISIT    CC/REFERRING MD:  Lian Martinez  REASON FOR CONSULTATION: Diarrhea    ASSESSMENT/PLAN:  #Loose Stools  #Flatus  #Fecal Incontinence  #SIBO  #Increased Split fecal fat  Bowel pattern change over past two years, recently having 1-2 stools per day, but loose, yellow pasty in color and typically fairly urgent and unpredictable, with fecal leakage which she does not perceive so wears a pad daily. Infectious stools studies including C.Diff, enteric panel, crypto/giarrdia, and O&P were negative. Fecal lactoferrin was positive. Colonoscopy was then performed which did which was unremarkable on endoscopy, on histology no evidence of microscopic or inflammatory colitis driving diarrhea, though did show melanosis coli (denies laxative use).  After last visit hydrogen breath test positive for SIBO and methanogen overgrowth. Stool testing with increased split fecal fat, normal neutral fat. Fecal elastase normal. Celiac serologies negative. Vitamin E low with testing with her neurologist, and zinc lower than ideal so was started on vitamin E and zinc supplementation with per their recommendation.  Suspect SIBO is driving at least some of her symptoms, previous gastric bypass surgery risk factor and increased alcohol use at time of change in stool pattern that did not resolve with decreased use.  Other possible contributors include IBS-D, postinfectious IBS/dysbiosis related to multiple antibiotic courses, celiac, bile acid diarrhea, pancreatic insufficiency, SIBO, medication induced with polypharmacy and vitamin supplements s/p bariatric surgery. This may also be related to rapid transit 2/2  gastric bypass surgery.  Increased split fecal fat likely related to SIBO versus malabsorption with gastric bypass. Suspect fecal incontinence related to pelvic floor dysfunction. She also had surgery for rectocele and cystocele in 2020 about the time symptoms started.  Given positive SIBO test recommend treatment per guidelines with rifaximin 550 mg 3 times daily x14 days plus neomycin 500 mg twice daily x14 days.  Encouraged her to also continue to work with diet as this seems to be improving her symptoms, she is interested in meeting with our registered dietitian to learn more about diet and potential SIBO treatment/prevention.  She should also continue Citrucel soluble fiber at 2 tablespoons daily to help bulk stools.  If fecal incontinence returns recommend pelvic floor evaluation.     Plan:  --Start rifaximin and neomycin pending insurance coverage.  --Continue to follow low fermentation diet.  --Meet with Sylvia Pink RD our dietitian.  --Continue Citrucel 2 tablespoons/day.    Colorectal cancer screening: Last 10/2022 - next due in 10 years (2032)    RTC 3-4 months    Thank you for this consultation.  It was a pleasure to participate in the care of this patient; please contact us with any further questions.     80Minutes was spent on the date of the encounter during chart review, history and exam, documentation, and further activities as noted       Ynes Vuong PA-C, RD  Division of Gastroenterology, Hepatology & Nutrition  [facility]        VANITA Coronado is a 66 year old female with a history of migraines, ASD, stroke (left temporal lobe infarction), cerebral aneurysm, hyperparathyroidism, HTN, gastric bypass (2010), osteopenia, cystocele, rectocele s/p hysterectomy and oophorectomy with mesh sling placement, who presents for evaluation of diarrhea.     Initial Visit:  Patient reports that starting about 2 years ago consistency and color of her stools changed.  She was initially having  chalky pale stools which turned to pasty yellow, which she continues to have today.  She reports she is to have formed stools but now soft to loose (BSC 4-6), once per week will have formed (BSC 4).  Currently having 1-2 stools per day but unpredictable timing, can be overnight, and often quite urgent. She also reports increasing flatus over this time.  Denies blood or melena.  Denies missing days without stool or having hard stools. She also notes fecal incontinence/leakage, which she is not able to feel so wears a pad daily.  Notices stool and pad about once per week.  Had mesh sling placed for cystocele/rectocele in 2020 but notes that she can still feel a bulge at the introitus.      Denies regular abdominal pain, nausea, vomiting, bloating, dysphagia or odynophagia. She reports having intermittent reflux if she eats particular foods or too late at night, reports this is resolved with TUMS and not overly bothersome.    She also notes 2 discrete episodes of severe abdominal pain over the last 2 years, 1 November 2020 and 1 in July 2021, each lasting 24 to 48 hours, but and able to get out of bed or eat or drink during episodes.  They eventually self resolved with sips of Pedialyte and rest.  Has not recurred since.    Stool studies done 8/29/22 were negative for C.Diff, enteric panel, cryptosporidium, giardia, and O&P. Lactoferrin was positive. Colonoscopy done 10/6/22 - examined colon was was normal, biopsies were negative for microscopic colitis but did show melanosis coli.    She reports that she started drinking more alcohol over the last 2 years during the pandemic with daughter in house and experimenting with different cocktails.  After having fatty liver visualized on CT she has since drastically cut alcohol intake and only occasionally has a glass of wine now, no change in stool pattern since reducing alcohol intake.    Reports that she used stool softeners for a while after her pelvic surgery, denies any  "laxative use including stimulant laxatives.    Diet Recall -  Breakfast: 2 eggs and toast, yogurt and blueberries, oatmeal, nuts and blueberries with sugar-free maple syrup - always fruit  Lunch: turkey or tuna sandwich, cottage cheese, bean salad, soup, tacos  Dinner: out to eat - fish or chicken with rice/potatoes and vegetables, soup,   Drinks: water 100oz, iced tea sweetened with sweet and low, coffee (2-3 cups)    Interval History 2/13/23:  After last visit testing completed. 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 Stool testing with increased split fecal fat, normal neutral fat. Fecal elastase normal. Celiac serologies negative. Vitamin E low with testing with her neurologist, and zinc lower than ideal so was started on vitamin E and zinc supplementation with per their recommendation.    Many questions today regarding SIBO diagnosis, course and treatment which were discussed in detail.  She notes that she has not been able to get Xifaxan approved through her insurance so has not started any antibiotics yet.  Reports that she is not excited about starting antibiotics and would like to avoid multiple courses.    Today Jose reports that she is feeling better than she was from a bowel perspective.  She notes that she was having a terrible flare with loose stools, foul-smelling flatulence, and bloating and early to mid January, then however transition to 2 weeks of normal, formed, nonurgent stool with 1 bowel movement per day (though still \"waxy \").  Recently she has again started to have softer bowel movements but not as frequent or loose his previous and no recent incontinence.    She notes that she is recently put a lot of effort into eating better and doing research regarding SIBO and diet.  She has been following a low fermentation eating plan, avoiding things like lactose, artificial sugars, legumes.  She has also been working on increasing her soluble fiber as recommended at last " visit, currently taking 2 tablespoons of Citrucel daily.     Reports previous weight loss from 203 down to  180.  Reports weight now stable.  Wt Readings from Last 5 Encounters:   01/06/23 80.4 kg (177 lb 4.8 oz)   11/10/22 85.4 kg (188 lb 4.8 oz)   08/24/22 85.9 kg (189 lb 4.8 oz)   05/04/22 90.7 kg (200 lb)   02/14/22 91.3 kg (201 lb 4.8 oz)       ROS:    No fevers or chills  + weight loss  No blurry vision, double vision or change in vision  No sore throat  No lymphadenopathy  No headache, paraesthesias, or weakness in a limb  No shortness of breath or wheezing  No chest pain or pressure  No arthralgias or myalgias  No rashes or skin changes  No odynophagia or dysphagia  No BRBPR, hematochezia, melena  No dysuria, frequency or urgency  No hot/cold intolerance or polyria  No anxiety or depression      PROBLEM LIST    Patient Active Problem List    Diagnosis Date Noted     Atrophic vaginitis 02/10/2021     Priority: Medium     Urge incontinence 02/10/2021     Priority: Medium     Urinary urgency 02/10/2021     Priority: Medium     Midline cystocele 12/16/2019     Priority: Medium     Added automatically from request for surgery [id number]       Rectocele 12/16/2019     Priority: Medium     Added automatically from request for surgery [id number]       Osteopenia 03/17/2019     Priority: Medium     Vitamin D deficiency 03/17/2019     Priority: Medium     (Problem list name updated by automated process. Provider to review and confirm.)    (Problem list name updated by automated process. Provider to review and confirm.)       Hyperparathyroidism (H) 03/17/2019     Priority: Medium     Word finding difficulty 03/17/2019     Priority: Medium     Received intravenous tissue plasminogen activator (tPA) in emergency department 03/17/2019     Priority: Medium     Accidental marijuana poisoning 03/17/2019     Priority: Medium     Migraine with aura and without status migrainosus, not intractable 01/07/2019     Priority: Medium      ASD (atrial septal defect) 07/13/2017     Priority: Medium     Cerebral aneurysm without rupture 04/10/2017     Priority: Medium     Left temporal lobe infarction (H) 02/09/2017     Priority: Medium     Stroke (H) 02/09/2017     Priority: Medium     Essential hypertension with goal blood pressure less than 140/90 10/31/2016     Priority: Medium     PFO (patent foramen ovale) 02/15/2016     Priority: Medium     s/p closure with amplazter device 7/13/17       Lump or mass in breast 11/09/2015     Priority: Medium     Class 1 obesity in adult 10/16/2015     Priority: Medium     Iron deficiency anemia 10/16/2015     Priority: Medium     ACP (advance care planning) 08/23/2012     Priority: Medium     Discussed Advance Directive planning with patient; information given to patient to review.       S/P gastric bypass 06/01/2010     Priority: Medium     Sleep apnea 08/28/2009     Priority: Medium     Female stress incontinence 06/09/2008     Priority: Medium     (Problem list name updated by automated process. Provider to review and confirm.)       Family history of malignant neoplasm of breast 10/02/2003     Priority: Medium       PERTINENT PAST MEDICAL HISTORY:    Past Medical History:   Diagnosis Date     Anemia      Arthritis 2015    Hands, back, hips. Various dates first noted.     CVA (cerebral vascular accident) (H) 2017    ?migraine, ?pfo--negative vasc w/u, neg hypercoag w/u     Diffuse cystic mastopathy     Fibrocystic breast disease     HTN, goal below 140/90      Hyperparathyroidism (H)      Infectious mononucleosis     Mono at age 16     Irregular heart beat     PAT no afib on 30day monitor     Labyrinthitis, unspecified      Migraine headache with aura      Osteopenia      Pain in joint, shoulder region     Secondary to a fall     Palpitations      PFO (patent foramen ovale)     s/p closure with amplazter device 7/13/17     S/P gastric bypass June, 2010     Sleep apnea     she is on CPAP     Sleep apnea       Vitamin D deficiencies        PREVIOUS SURGERIES:    Past Surgical History:   Procedure Laterality Date     BIOPSY  1984    Breast biopsies     BREAST SURGERY  1984    Above     CARDIAC SURGERY  7/13/2017    PFO closure     COLONOSCOPY N/A 8/12/2015    Procedure: COLONOSCOPY;  Surgeon: Deandre Brooks MD;  Location: [facility]     COLONOSCOPY N/A 10/6/2022    Procedure: COLONOSCOPY, WITH BIOPSIES;  Surgeon: Freddie Gonzalez MD;  Location: [facility]     DAVINCI HYSTERECTOMY SUPRACERVICAL, SALPINGO-OOPHORECTOMY INCLUDING BILATERAL N/A 3/16/2020    Procedure: DAVINCI HYSTERECTOMY SUPRACERVICAL, SALPINGO-OOPHORECTOMY INCLUDING BILATERAL WITH EXAM UNDER ANESTHESIA;  Surgeon: Lily Yancey MD;  Location: [facility]     DAVINCI SACROCOLPOPEXY, MIDURETHRAL SLING, CYSTOSCOPY N/A 3/16/2020    Procedure: SACROCOLPOPEXY, ROBOT-ASSISTED, LAPAROSCOPIC, WITH INSERTION OF MIDURETHRAL SLING AND CYSTOSCOPY;  Surgeon: Carolyn Valdivia MD;  Location: [facility]     GASTRIC BYPASS  June 24, 2010     GENITOURINARY SURGERY  3/16/2020    Cystocele/rectocele repair     GYN SURGERY  3/16/2020    Hysterectomy     ORTHOPEDIC SURGERY Left 2010    wrist fracture     PARATHYROIDECTOMY  9/19/11     [facility] ANEURYSM, INTRACRAN, SIMPLE SURG  04/2017    coil of aneurysm right posterior paraophthalmic artery     [facility] NONSPECIFIC PROCEDURE      S/P multiple breast biopies - all negative / benign     [facility] NONSPECIFIC PROCEDURE      S/P T&A     [facility] NONSPECIFIC PROCEDURE      [place] teeth extraction     [facility] NONSPECIFIC PROCEDURE      S/P (? unreadable) ankle       PREVIOUS ENDOSCOPY:  Colonoscopy 10/06/22  Impression:                 - Diverticulosis in the sigmoid colon.   - The entire examined colon is normal on direct and retroflexion views.   - No specimens collected.   - Biopsies were taken with a cold forceps from the ascending colon and descending colon for evaluation of microscopic colitis.     A.  Ascending colon, biopsy:  -Negative for diagnostic colitis,  dysplasia, or malignancy.     B.  Descending colon, biopsy:  -Melanosis coli.  -Negative for diagnostic colitis, dysplasia, or malignancy.    ALLERGIES:     Allergies   Allergen Reactions     Contrast Dye Itching     Reaction of immediate burning and severe itching in Right ear and back of throat after injection for CT.      Sulfa Drugs      hives       PERTINENT MEDICATIONS:    Current Outpatient Medications:      Ascorbic Acid (VITAMIN C PO), Take 250 mg by mouth every morning (0.5 x 500 mg tablet = 250 mg dose), Disp: , Rfl:      aspirin 325 MG tablet, Take 325 mg by mouth every morning , Disp: , Rfl:      atenolol (TENORMIN) 50 MG tablet, TAKE 1 TABLET BY MOUTH EVERY DAY, Disp: 90 tablet, Rfl: 3     Calcium Citrate-Vitamin D (CALCIUM CITRATE + D PO), Take 2 tablets by mouth every morning , Disp: , Rfl:      COMPOUNDED NON-CONTROLLED SUBSTANCE (CMPD RX) - PHARMACY TO MIX COMPOUNDED MEDICATION, Estriol 1 mg/g in HRT base, apply small amount to finger and apply to inside vagina daily for 2 weeks then twice weekly, Disp: 30 g, Rfl: 11     Cranberry 450 MG TABS, Take by mouth 2 times daily, Disp: , Rfl:      Cyanocobalamin 2500 MCG TABS, Take 2,500 mcg by mouth once a week Mon, Disp: , Rfl:      Ferrous Sulfate 27 MG TABS, Take 27 mg by mouth every morning , Disp: , Rfl:      Fesoterodine Fumarate (TOVIAZ) 8 MG TB24, Take 1 tablet (8 mg) by mouth daily, Disp: 90 tablet, Rfl: 0     FIBER COMPLETE PO, Take 1 capsule by mouth every morning , Disp: , Rfl:      hydrochlorothiazide (HYDRODIURIL) 12.5 MG tablet, TAKE 1 TABLET BY MOUTH EVERY DAY, Disp: 90 tablet, Rfl: 3     imipramine (TOFRANIL) 25 MG tablet, TAKE 1 TABLET BY MOUTH AT BEDTIME, Disp: 90 tablet, Rfl: 3     loratadine (CLARITIN) 10 MG tablet, Take 10 mg by mouth every morning , Disp: , Rfl:      losartan (COZAAR) 50 MG tablet, TAKE 1 TABLET BY MOUTH EVERY DAY, Disp: 90 tablet, Rfl: 3     neomycin (MYCIFRADIN) 500 MG tablet, Take 1 tablet (500 mg) by mouth 2  times daily, Disp: 28 tablet, Rfl: 0     topiramate (TOPAMAX) 25 MG tablet, TAKE 1 TABLET BY MOUTH TWICE A DAY, Disp: 180 tablet, Rfl: 0     UNABLE TO FIND, CPAP machine every night, Disp: , Rfl:      VITAMIN D, CHOLECALCIFEROL, PO, Take 500 Units by mouth every morning , Disp: , Rfl:   No other NSAID/anticoagulation reported by patient.  No other OTC/herbal/supplements reported by patient.    SOCIAL HISTORY:    Tobacco: no   Alcohol: minimal  Drugs Use: no    Social History     Socioeconomic History     Marital status:      Spouse name: Not on file     Number of children: Not on file     Years of education: Not on file     Highest education level: Not on file   Occupational History     Not on file   Tobacco Use     Smoking status: Never     Smokeless tobacco: Never   Vaping Use     Vaping Use: Never used   Substance and Sexual Activity     Alcohol use: Yes     Alcohol/week: 0.0 standard drinks     Comment: Social     Drug use: No     Sexual activity: Not Currently     Partners: Male     Birth control/protection: Pill   Other Topics Concern     Parent/sibling w/ CABG, MI or angioplasty before 65F 55M? No   Social History Narrative     Not on file     Social Determinants of Health     Financial Resource Strain: Not on file   Food Insecurity: Not on file   Transportation Needs: Not on file   Physical Activity: Not on file   Stress: Not on file   Social Connections: Not on file   Intimate Partner Violence: Not on file   Housing Stability: Not on file       FAMILY HISTORY:  FH of CRC: no  FH of IBD: ulcerative colitis (mother, sister, niece), brother has IBS, many people in family has diarrhea    No Colon/Panc/Esophageal/other GI CA. No IBD or Celiac Disease. No other Autoimmune, Liver, or Thyroid disease.    Family History   Problem Relation Age of Onset     Breast Cancer Mother      Hypertension Mother      Arthritis Mother      Thyroid Disease Mother         Hypo     Ulcerative Colitis Mother       Cerebrovascular Disease Mother         Age 78 - Cerebral Hemorrhage,      Anxiety Disorder Mother      Depression Mother      Genetic Disorder Mother         Ulcerative colitis     Obesity Mother      Anesthesia Reaction Mother         headaches, N/V     Breast Cancer Maternal Aunt      Hypertension Paternal Grandmother      Cerebrovascular Disease Maternal Grandmother         Age 72 -      Family History Negative Maternal Grandfather      Family History Negative Paternal Grandfather      Family History Negative Son      Family History Negative Daughter      Other Cancer Father         Skin - Non-melanoma varieties     Hypertension Father      Asthma Father      Family History Negative Daughter      Ulcerative Colitis Sister      Hypertension Sister      Hyperlipidemia Sister      Anxiety Disorder Sister      Depression Sister      Diabetes Sister      Obesity Sister      Asthma Sister      Anesthesia Reaction Sister         Debilitating headaches     Hypertension Brother      Anxiety Disorder Brother      Depression Brother      Asthma Nephew      Hypertension Sister         Congestive Heart Failure     Anxiety Disorder Sister      Genetic Disorder Sister         Ulcerative colitis     Obesity Sister      Genetic Disorder Niece         Ulcerative colitis     Colon Cancer No family hx of      Coronary Artery Disease No family hx of      Mental Illness No family hx of      Substance Abuse No family hx of      Osteoporosis No family hx of      Deep Vein Thrombosis No family hx of        Past/family/social history reviewed and no changes    PHYSICAL EXAMINATION:  Constitutional: AAOx3, cooperative, pleasant, not dyspneic/diaphoretic, no acute distress  Vitals reviewed: There were no vitals taken for this visit.  Wt:   Wt Readings from Last 2 Encounters:   23 80.4 kg (177 lb 4.8 oz)   11/10/22 85.4 kg (188 lb 4.8 oz)      Eyes: Sclera anicteric/injected  Ears/nose/mouth/throat: Normal oropharynx  without ulcers or exudate, mucus membranes moist, hearing intact  Neck: supple, thyroid normal size  CV: No edema  Respiratory: Unlabored breathing  Lymph: No axillary, submandibular, supraclavicular or inguinal lymphadenopathy  Abd: Nondistended, +bs, no hepatosplenomegaly, nontender, no peritoneal signs  Skin: warm, perfused, no jaundice  Psych: Normal affect  MSK: Normal gait      PERTINENT STUDIES:    Office Visit on 08/24/2022   Component Date Value Ref Range Status     Campylobacter group 08/29/2022 Not Detected  Not Detected Final     Salmonella species 08/29/2022 Not Detected  Not Detected Final     Shigella species 08/29/2022 Not Detected  Not Detected Final     Vibrio group 08/29/2022 Not Detected  Not Detected Final     Rotavirus 08/29/2022 Not Detected  Not Detected Final     Shiga toxin 1 gene 08/29/2022 Not Detected  Not Detected Final     Shiga toxin 2 gene 08/29/2022 Not Detected  Not Detected Final     Norovirus I and II 08/29/2022 Not Detected  Not Detected Final     Yersinia enterocolitica 08/29/2022 Not Detected  Not Detected Final     C Difficile Toxin B by PCR 08/29/2022 Negative  Negative Final     Fecal Lactoferrin 08/29/2022 Positive (A)  Negative Final     OVA AND PARASITE EXAM 08/29/2022 Negative  Negative Final     Cryptosporidium parvum antigen 08/29/2022 Negative  Negative Final     Giardia lamblia antigen 08/29/2022 Negative  Negative Final     INR 08/24/2022 0.92  0.85 - 1.15 Final         CT AP 5/2022  IMPRESSION:   1. Air within the urinary bladder may be related to recent  instrumentation.  2. Diffuse fatty infiltration of the liver.  3. Cholelithiasis.  4. Several low density lesions in the liver are too small to  characterize, but could represent cysts or hemangiomas. Consider liver  MRI for further characterization.    MRI Liver 9/2022  IMPRESSION: Multiple liver cysts.     Hydrogen Breath Test:  Positive hydrogen breath test consistent with small intestinal bacterial  overgrowth. High methane production at baseline and throughout the test.

## 2023-02-15 ENCOUNTER — TELEPHONE (OUTPATIENT)
Dept: GASTROENTEROLOGY | Facility: CLINIC | Age: 68
End: 2023-02-15
Payer: MEDICARE

## 2023-02-15 NOTE — TELEPHONE ENCOUNTER
CADEN and bri sent to schedule GI clinic follow up orders per Ynes Vuong    Schedule Return GI 4 mo follow up visit with Gen GI provider (around 6/13/23)

## 2023-02-17 ENCOUNTER — TELEPHONE (OUTPATIENT)
Dept: GASTROENTEROLOGY | Facility: CLINIC | Age: 68
End: 2023-02-17
Payer: MEDICARE

## 2023-02-17 DIAGNOSIS — N39.41 URGE INCONTINENCE: ICD-10-CM

## 2023-02-17 DIAGNOSIS — R39.15 URINARY URGENCY: ICD-10-CM

## 2023-02-17 NOTE — TELEPHONE ENCOUNTER
Spoke with Jose and the letter will be amended.   Amendment completed   Informed Jose that she does not need to do anything with the letter, the letter will be submitted by our team.

## 2023-02-17 NOTE — TELEPHONE ENCOUNTER
The provider completed a letter for the appeal.   The letter is in the chart.    Are you able to submit the letter?

## 2023-02-17 NOTE — TELEPHONE ENCOUNTER
Central Prior Authorization Team   Phone: [phone]      Medication Appeal Initiation - I HAVE FAXED THE SECOND LEVEL APPEAL FOR THE PROVIDER DUE TO MY FAX WAS PROCESSED TWICE AND THEY CONSIDERED THE SECOND FAX AN APPEAL - WHICH IT WASN'T. THIS IS NOT NORMAL SOP IN THE DEPARTMENT. I DID THIS TO SERVE THE PATIENT.  We have initiated an appeal for the requested medication:  Medication: rifaximin (XIFAXAN) 550 MG TABS tablet - PA DENIED/APPEAL DENIED - Appeal Denied   Appeal Start Date:  2/3/2023  Insurance Company: [employer] - Phone [phone] Fax [phone]  Comments:

## 2023-02-17 NOTE — TELEPHONE ENCOUNTER
[facility] Call Center    Phone Message    May a detailed message be left on voicemail: yes     Reason for Call: Other: Patient called as she received a later today in [employer], dated 2/17/23, that was sent to Medicare by Ynes Vuong; however, the letter in the last paragraph mentions another patient: Madina Lizarraga. Patient would like the letter corrected and resent, as she knows Medicare is extremely picky.  Please follow up with patient by sending another copy [facility]     Action Taken: Message routed to:  [facility]    Travel Screening: Not Applicable

## 2023-02-21 RX ORDER — FESOTERODINE FUMARATE 8 MG/1
1 TABLET, FILM COATED, EXTENDED RELEASE ORAL DAILY
Qty: 90 TABLET | Refills: 0 | Status: SHIPPED | OUTPATIENT
Start: 2023-02-21 | End: 2023-06-08

## 2023-02-21 NOTE — TELEPHONE ENCOUNTER
Fesoterodine Fumarate (TOVIAZ) 8 MG TB24    Muscarinic Antagonists (Urinary Incontinence Agents) Passed       5/18/2022  [facility] Carolyn Smith MD  Urology

## 2023-02-23 NOTE — TELEPHONE ENCOUNTER
Central Prior Authorization Team   Phone: [phone]      Second Level Appeal Denied     Medication: rifaximin (XIFAXAN) 550 MG TABS tablet - [place] DENIED/APPEAL DENIED - Appeal Denied - Second Level Appeal Denied    Denial Date: 2/3/2023    Denial Rational:

## 2023-02-24 ENCOUNTER — PATIENT OUTREACH (OUTPATIENT)
Dept: GASTROENTEROLOGY | Facility: CLINIC | Age: 68
End: 2023-02-24
Payer: MEDICARE

## 2023-02-24 NOTE — PROGRESS NOTES
Spoke with Jose regarding the appeal denial of rifaximin.   Ynes reccommended either Augmentin or Cipro as the alternative.  Discussed the alternatives with Jose and she is going to check in with the ID MD to see which one is a better option for her.   She will update this RN once she has heard back from ID.

## 2023-03-06 ASSESSMENT — ENCOUNTER SYMPTOMS
SORE THROAT: 0
DYSURIA: 0
HEADACHES: 0
MYALGIAS: 0
DIZZINESS: 0
HEARTBURN: 0
ARTHRALGIAS: 0
FREQUENCY: 1
ABDOMINAL PAIN: 0
PARESTHESIAS: 0
CONSTIPATION: 0
SHORTNESS OF BREATH: 0
JOINT SWELLING: 0
FEVER: 0
NERVOUS/ANXIOUS: 0
DIARRHEA: 1
COUGH: 0
CHILLS: 0
PALPITATIONS: 0
WEAKNESS: 0
BREAST MASS: 0
HEMATURIA: 0
EYE PAIN: 0
HEMATOCHEZIA: 0
NAUSEA: 0

## 2023-03-06 ASSESSMENT — ACTIVITIES OF DAILY LIVING (ADL): CURRENT_FUNCTION: NO ASSISTANCE NEEDED

## 2023-03-09 ENCOUNTER — OFFICE VISIT (OUTPATIENT)
Dept: INTERNAL MEDICINE | Facility: CLINIC | Age: 68
End: 2023-03-09
Payer: MEDICARE

## 2023-03-09 VITALS
DIASTOLIC BLOOD PRESSURE: 72 MMHG | HEART RATE: 75 BPM | HEIGHT: 66 IN | RESPIRATION RATE: 18 BRPM | OXYGEN SATURATION: 99 % | TEMPERATURE: 98.1 F | SYSTOLIC BLOOD PRESSURE: 122 MMHG | WEIGHT: 173 LBS | BODY MASS INDEX: 27.8 KG/M2

## 2023-03-09 DIAGNOSIS — Z00.00 ENCOUNTER FOR ANNUAL WELLNESS EXAM IN MEDICARE PATIENT: Primary | ICD-10-CM

## 2023-03-09 DIAGNOSIS — I10 ESSENTIAL HYPERTENSION WITH GOAL BLOOD PRESSURE LESS THAN 140/90: ICD-10-CM

## 2023-03-09 DIAGNOSIS — I48.0 PAROXYSMAL ATRIAL FIBRILLATION (H): ICD-10-CM

## 2023-03-09 DIAGNOSIS — E21.3 HYPERPARATHYROIDISM (H): ICD-10-CM

## 2023-03-09 DIAGNOSIS — K63.8219 SMALL INTESTINAL BACTERIAL OVERGROWTH (SIBO): Primary | ICD-10-CM

## 2023-03-09 DIAGNOSIS — Z13.29 SCREENING FOR THYROID DISORDER: ICD-10-CM

## 2023-03-09 DIAGNOSIS — Z13.220 SCREENING FOR HYPERLIPIDEMIA: ICD-10-CM

## 2023-03-09 PROBLEM — I63.9 STROKE (H): Status: RESOLVED | Noted: 2017-02-09 | Resolved: 2023-03-09

## 2023-03-09 PROCEDURE — 99214 OFFICE O/P EST MOD 30 MIN: CPT | Mod: 25 | Performed by: INTERNAL MEDICINE

## 2023-03-09 PROCEDURE — G0439 PPPS, SUBSEQ VISIT: HCPCS | Performed by: INTERNAL MEDICINE

## 2023-03-09 ASSESSMENT — ENCOUNTER SYMPTOMS
DIARRHEA: 1
WEAKNESS: 0
FREQUENCY: 1
PARESTHESIAS: 0
FEVER: 0
HEADACHES: 0
JOINT SWELLING: 0
HEMATURIA: 0
COUGH: 0
HEARTBURN: 0
EYE PAIN: 0
DYSURIA: 0
NAUSEA: 0
NERVOUS/ANXIOUS: 0
PALPITATIONS: 0
ABDOMINAL PAIN: 0
BREAST MASS: 0
DIZZINESS: 0
CHILLS: 0
CONSTIPATION: 0
HEMATOCHEZIA: 0
ARTHRALGIAS: 0
SHORTNESS OF BREATH: 0
SORE THROAT: 0
MYALGIAS: 0

## 2023-03-09 ASSESSMENT — ACTIVITIES OF DAILY LIVING (ADL): CURRENT_FUNCTION: NO ASSISTANCE NEEDED

## 2023-03-09 NOTE — PROGRESS NOTES
"SUBJECTIVE:   Jose is a 67 year old who presents for Preventive Visit.    Patient has been advised of split billing requirements and indicates understanding: Yes  Are you in the first 12 months of your Medicare coverage?  No    Patient is a 67-year-old  female who presents to the clinic for her annual wellness exam.  She has no acute concerns or complaints.  Patient is in the process of being treated for SIBO by her gastroenterologist, therefore she does have some issues with diarrhea and weight loss.  Otherwise, she is doing well at this time.  Patient reports a stable appetite.  She is sleeping well.  She is not fasting for lab work today.  She does have a history of hypertension, and she does currently take 50 mg of losartan daily along with 12.5 mg of hydrochlorothiazide for management of her blood pressure.  Patient does not check her blood pressure very frequently.  She is also on atenolol 50 mg daily as she does have a history of atrial fibrillation.  Patient does take a daily aspirin as well.  Patient's immunizations are up-to-date.  She did have a recent mammogram in February 2023 with no concerning findings noted.    Healthy Habits:     In general, how would you rate your overall health?  Good    Frequency of exercise:  None    Do you usually eat at least 4 servings of fruit and vegetables a day, include whole grains    & fiber and avoid regularly eating high fat or \"junk\" foods?  No    Taking medications regularly:  Yes    Medication side effects:  None    Ability to successfully perform activities of daily living:  No assistance needed    Home Safety:  No safety concerns identified    Hearing Impairment:  No hearing concerns    In the past 6 months, have you been bothered by leaking of urine? Yes    In general, how would you rate your overall mental or emotional health?  Good      PHQ-2 Total Score: 0    Additional concerns today:  No      Have you ever done Advance Care Planning? (For example, a " Health Directive, POLST, or a discussion with a medical provider or your loved ones about your wishes): No, advance care planning information given to patient to review.  Advanced care planning was discussed at today's visit.       Fall risk  Fallen 2 or more times in the past year?: No  Any fall with injury in the past year?: No    Cognitive Screening   1) Repeat 3 items (Leader, Season, Table)      2) Clock draw:   NORMAL  3) 3 item recall: }Recalls 3 objects  Results: 3 items recalled: COGNITIVE IMPAIRMENT LESS LIKELY    Mini-CogTM Copyright S Shelia. Licensed by the author for use in [facility] Pocketbook; reprinted with permission (tez@[facility]). All rights reserved.      Do you have sleep apnea, excessive snoring or daytime drowsiness?: yes    Reviewed and updated as needed this visit by clinical staff   Tobacco  Allergies  Meds  Problems  Med Hx  Surg Hx  Fam Hx          Reviewed and updated as needed this visit by Provider   Tobacco  Allergies  Meds  Problems  Med Hx  Surg Hx  Fam Hx         Social History     Tobacco Use     Smoking status: Never     Smokeless tobacco: Never   Substance Use Topics     Alcohol use: Yes     Comment: Social         Alcohol Use 3/6/2023   Prescreen: >3 drinks/day or >7 drinks/week? No           Hypertension Follow-up      Do you check your blood pressure regularly outside of the clinic? No     Are you following a low salt diet? Yes    Are your blood pressures ever more than 140 on the top number (systolic) OR more   than 90 on the bottom number (diastolic), for example 140/90? No      Current providers sharing in care for this patient include:   Patient Care Team:  Lian Martinez MD as PCP - General (Internal Medicine)  Lian Martinez MD as Assigned PCP  Olivia Vasquez PA-C as Physician Assistant (Physician Assistant - Medical)  Carolyn Valdivia MD as MD (Urology)  Lita Goodman, RN as Specialty Care Coordinator (Urology)  Lian Martinez MD  as Referring Physician (Internal Medicine)  Carolyn Valdivia MD as Assigned Surgical Provider  Lily Yancey MD as MD (OB/Gyn)  Mary Mathews MD as Referring Physician (Internal Medicine)  Olivia Vasquez PA-C as Physician Assistant (Urology)  Olivia Vasquez PA-C as Assigned OBGYN Provider  Alexandra Whitten MD as MD (Infectious Diseases)  Alexandra Whitten MD as Assigned Infectious Disease Provider  Ynes Vuong PA-C as Physician Assistant (Gastroenterology)    The following health maintenance items are reviewed in Epic and correct as of today:  Health Maintenance   Topic Date Due     ANNUAL REVIEW OF HM ORDERS  08/24/2023     MAMMO SCREENING  02/06/2024     MEDICARE ANNUAL WELLNESS VISIT  03/09/2024     FALL RISK ASSESSMENT  03/09/2024     LIPID  02/14/2027     ADVANCE CARE PLANNING  03/09/2028     DTAP/TDAP/TD IMMUNIZATION (5 - Td or Tdap) 10/29/2030     DEXA  11/21/2031     COLORECTAL CANCER SCREENING  10/06/2032     HEPATITIS C SCREENING  Completed     PHQ-2 (once per calendar year)  Completed     INFLUENZA VACCINE  Completed     Pneumococcal Vaccine: 65+ Years  Completed     ZOSTER IMMUNIZATION  Completed     COVID-19 Vaccine  Completed     IPV IMMUNIZATION  Aged Out     MENINGITIS IMMUNIZATION  Aged Out     Lab work is in process      FHS-7:   Breast CA Risk Assessment (FHS-7) 1/17/2022 2/6/2023 3/6/2023   Did any of your first-degree relatives have breast or ovarian cancer? Yes Yes Yes   Did any of your relatives have bilateral breast cancer? No No No   Did any man in your family have breast cancer? No No No   Did any woman in your family have breast and ovarian cancer? No Yes Yes   Did any woman in your family have breast cancer before age 50 y? No No No   Do you have 2 or more relatives with breast and/or ovarian cancer? No No Yes   Do you have 2 or more relatives with breast and/or bowel cancer? No No Yes       Mammogram Screening: Recommended mammography every 1-2 years  "with patient discussion and risk factor consideration  Pertinent mammograms are reviewed under the imaging tab.    Review of Systems   Constitutional: Negative for chills and fever.   HENT: Negative for congestion, ear pain, hearing loss and sore throat.    Eyes: Negative for pain and visual disturbance.   Respiratory: Negative for cough and shortness of breath.    Cardiovascular: Negative for chest pain, palpitations and peripheral edema.   Gastrointestinal: Positive for diarrhea. Negative for abdominal pain, constipation, heartburn, hematochezia and nausea.   Breasts:  Negative for tenderness, breast mass and discharge.   Genitourinary: Positive for frequency. Negative for dysuria, genital sores, hematuria, pelvic pain, urgency, vaginal bleeding and vaginal discharge.   Musculoskeletal: Negative for arthralgias, joint swelling and myalgias.   Skin: Negative for rash.   Neurological: Negative for dizziness, weakness, headaches and paresthesias.   Psychiatric/Behavioral: Negative for mood changes. The patient is not nervous/anxious.          OBJECTIVE:   Blood pressure 122/72, pulse 75, temperature 98.1  F (36.7  C), resp. rate 18, height 1.664 m (5' 5.5\"), weight 78.5 kg (173 lb), SpO2 99 %, not currently breastfeeding.     Estimated body mass index is 28.35 kg/m  as calculated from the following:    Height as of this encounter: 1.664 m (5' 5.5\").    Weight as of this encounter: 78.5 kg (173 lb).  Physical Exam  Vitals reviewed.   Constitutional:       Appearance: Normal appearance.   HENT:      Head: Normocephalic and atraumatic.      Right Ear: Tympanic membrane, ear canal and external ear normal.      Left Ear: Tympanic membrane, ear canal and external ear normal.      Mouth/Throat:      Mouth: Mucous membranes are moist.      Pharynx: Oropharynx is clear.   Eyes:      Extraocular Movements: Extraocular movements intact.      Conjunctiva/sclera: Conjunctivae normal.      Pupils: Pupils are equal, round, and " reactive to light.   Cardiovascular:      Rate and Rhythm: Normal rate and regular rhythm.      Pulses: Normal pulses.      Heart sounds: Normal heart sounds.   Pulmonary:      Effort: Pulmonary effort is normal.      Breath sounds: Normal breath sounds.   Abdominal:      General: Bowel sounds are normal.      Palpations: Abdomen is soft.   Musculoskeletal:      Cervical back: Normal range of motion and neck supple.   Skin:     General: Skin is warm and dry.      Capillary Refill: Capillary refill takes less than 2 seconds.   Neurological:      General: No focal deficit present.      Mental Status: She is alert and oriented to person, place, and time.       Diagnostic Test Results: Future lab orders for CMP, CBC, FLP, and TSH have been placed.    ASSESSMENT / PLAN:   (Z00.00) Encounter for annual wellness exam in Medicare patient  (primary encounter diagnosis)  Comment: Adult wellness plan reviewed.    (E21.3) Hyperparathyroidism (H)  Comment: Chronic condition.  No change in status.    (I48.0) Paroxysmal atrial fibrillation (H)  Comment: Patient will continue her atenolol as currently prescribed    (I10) Essential hypertension with goal blood pressure less than 140/90  Comment: At this time, it does appear that her blood pressure is under good control.  Assuming no unexpected abnormalities on her outstanding metabolic panel, we will continue her losartan at 50 mg daily along with hydrochlorothiazide 12.5 mg daily for management of her blood pressure.  Side effects of each medication were stressed.  Patient was encouraged to monitor blood pressure outside the clinic setting.  We also reviewed dietary and lifestyle modifications that can help keep her blood pressure under good control.    (Z13.220) Screening for hyperlipidemia  Comment: Lipid panel reflex to direct LDL Fasting is pending.    (Z13.29) Screening for thyroid disorder  Comment: TSH with free T4 reflex is pending.      Patient has been advised of split  "billing requirements and indicates understanding: Yes      COUNSELING:  Reviewed preventive health counseling, as reflected in patient instructions      BMI:   Estimated body mass index is 28.35 kg/m  as calculated from the following:    Height as of this encounter: 1.664 m (5' 5.5\").    Weight as of this encounter: 78.5 kg (173 lb).         She reports that she has never smoked. She has never used smokeless tobacco.      Appropriate preventive services were discussed with this patient, including applicable screening as appropriate for cardiovascular disease, diabetes, osteopenia/osteoporosis, and glaucoma.  As appropriate for age/gender, discussed screening for colorectal cancer, prostate cancer, breast cancer, and cervical cancer. Checklist reviewing preventive services available has been given to the patient.    Reviewed patients plan of care and provided an AVS. The Basic Care Plan (routine screening as documented in Health Maintenance) for Jose meets the Care Plan requirement. This Care Plan has been established and reviewed with the Patient.      Sanjeev Carmichael MD  [facility]    Identified Health Risks:  "

## 2023-03-09 NOTE — PATIENT INSTRUCTIONS
Preventive Health Recommendations    See your health care provider every year to  Review health changes.   Discuss preventive care.    Review your medicines if your doctor has prescribed any.  You no longer need a yearly Pap test unless you've had an abnormal Pap test in the past 10 years. If you have vaginal symptoms, such as bleeding or discharge, be sure to talk with your provider about a Pap test.  Every 1 to 2 years, have a mammogram.  If you are over 69, talk with your health care provider about whether or not you want to continue having screening mammograms.  Every 10 years, have a colonoscopy. Or, have a yearly FIT test (stool test). These exams will check for colon cancer.   Have a cholesterol test every 5 years, or more often if your doctor advises it.   Have a diabetes test (fasting glucose) every three years. If you are at risk for diabetes, you should have this test more often.   At age 65, have a bone density scan (DEXA) to check for osteoporosis (brittle bone disease).    Shots:  Get a flu shot each year.  Get a tetanus shot every 10 years.  Talk to your doctor about your pneumonia vaccines. There are now two you should receive - Pneumovax (PPSV 23) and Prevnar (PCV 13).  Talk to your pharmacist about the shingles vaccine.  Talk to your doctor about the hepatitis B vaccine.    Nutrition:   Eat at least 5 servings of fruits and vegetables each day.  Eat whole-grain bread, whole-wheat pasta and brown rice instead of white grains and rice.  Get adequate Calcium and Vitamin D.     Lifestyle  Exercise at least 150 minutes a week (30 minutes a day, 5 days a week). This will help you control your weight and prevent disease.  Limit alcohol to one drink per day.  No smoking.   Wear sunscreen to prevent skin cancer.   See your dentist twice a year for an exam and cleaning.  See your eye doctor every 1 to 2 years to screen for conditions such as glaucoma, macular degeneration and cataracts.    Personalized  Prevention Plan  You are due for the preventive services outlined below.  Your care team is available to assist you in scheduling these services.  If you have already completed any of these items, please share that information with your care team to update in your medical record.  Health Maintenance   Topic Date Due    MEDICARE ANNUAL WELLNESS VISIT  02/14/2023    ANNUAL REVIEW OF HM ORDERS  08/24/2023    MAMMO SCREENING  02/06/2024    FALL RISK ASSESSMENT  03/09/2024    LIPID  02/14/2027    ADVANCE CARE PLANNING  03/09/2028    DTAP/TDAP/TD IMMUNIZATION (5 - Td or Tdap) 10/29/2030    DEXA  11/21/2031    COLORECTAL CANCER SCREENING  10/06/2032    HEPATITIS C SCREENING  Completed    PHQ-2 (once per calendar year)  Completed    INFLUENZA VACCINE  Completed    Pneumococcal Vaccine: 65+ Years  Completed    ZOSTER IMMUNIZATION  Completed    COVID-19 Vaccine  Completed    IPV IMMUNIZATION  Aged Out    MENINGITIS IMMUNIZATION  Aged Out

## 2023-03-13 ENCOUNTER — LAB (OUTPATIENT)
Dept: LAB | Facility: CLINIC | Age: 68
End: 2023-03-13
Payer: MEDICARE

## 2023-03-13 DIAGNOSIS — Z13.29 SCREENING FOR THYROID DISORDER: ICD-10-CM

## 2023-03-13 DIAGNOSIS — Z00.00 ENCOUNTER FOR ANNUAL WELLNESS EXAM IN MEDICARE PATIENT: ICD-10-CM

## 2023-03-13 DIAGNOSIS — I10 ESSENTIAL HYPERTENSION WITH GOAL BLOOD PRESSURE LESS THAN 140/90: ICD-10-CM

## 2023-03-13 DIAGNOSIS — Z13.220 SCREENING FOR HYPERLIPIDEMIA: ICD-10-CM

## 2023-03-13 LAB
ALBUMIN SERPL BCG-MCNC: 3.6 G/DL (ref 3.5–5.2)
ALP SERPL-CCNC: 101 U/L (ref 35–104)
ALT SERPL W P-5'-P-CCNC: 34 U/L (ref 10–35)
ANION GAP SERPL CALCULATED.3IONS-SCNC: 12 MMOL/L (ref 7–15)
AST SERPL W P-5'-P-CCNC: 40 U/L (ref 10–35)
BASOPHILS # BLD AUTO: 0 10E3/UL (ref 0–0.2)
BASOPHILS NFR BLD AUTO: 1 %
BILIRUB SERPL-MCNC: 0.5 MG/DL
BUN SERPL-MCNC: 15 MG/DL (ref 8–23)
CALCIUM SERPL-MCNC: 8.9 MG/DL (ref 8.8–10.2)
CHLORIDE SERPL-SCNC: 103 MMOL/L (ref 98–107)
CHOLEST SERPL-MCNC: 127 MG/DL
CREAT SERPL-MCNC: 0.72 MG/DL (ref 0.51–0.95)
DEPRECATED HCO3 PLAS-SCNC: 25 MMOL/L (ref 22–29)
EOSINOPHIL # BLD AUTO: 0.1 10E3/UL (ref 0–0.7)
EOSINOPHIL NFR BLD AUTO: 2 %
ERYTHROCYTE [DISTWIDTH] IN BLOOD BY AUTOMATED COUNT: 14.1 % (ref 10–15)
GFR SERPL CREATININE-BSD FRML MDRD: >90 ML/MIN/1.73M2
GLUCOSE SERPL-MCNC: 78 MG/DL (ref 70–99)
HCT VFR BLD AUTO: 38.2 % (ref 35–47)
HDLC SERPL-MCNC: 58 MG/DL
HGB BLD-MCNC: 12.7 G/DL (ref 11.7–15.7)
LDLC SERPL CALC-MCNC: 62 MG/DL
LYMPHOCYTES # BLD AUTO: 1 10E3/UL (ref 0.8–5.3)
LYMPHOCYTES NFR BLD AUTO: 19 %
MCH RBC QN AUTO: 30 PG (ref 26.5–33)
MCHC RBC AUTO-ENTMCNC: 33.2 G/DL (ref 31.5–36.5)
MCV RBC AUTO: 90 FL (ref 78–100)
MONOCYTES # BLD AUTO: 0.8 10E3/UL (ref 0–1.3)
MONOCYTES NFR BLD AUTO: 14 %
NEUTROPHILS # BLD AUTO: 3.5 10E3/UL (ref 1.6–8.3)
NEUTROPHILS NFR BLD AUTO: 64 %
NONHDLC SERPL-MCNC: 69 MG/DL
PLATELET # BLD AUTO: 232 10E3/UL (ref 150–450)
POTASSIUM SERPL-SCNC: 3.5 MMOL/L (ref 3.4–5.3)
PROT SERPL-MCNC: 5.7 G/DL (ref 6.4–8.3)
RBC # BLD AUTO: 4.23 10E6/UL (ref 3.8–5.2)
SODIUM SERPL-SCNC: 140 MMOL/L (ref 136–145)
TRIGL SERPL-MCNC: 36 MG/DL
TSH SERPL DL<=0.005 MIU/L-ACNC: 0.85 UIU/ML (ref 0.3–4.2)
WBC # BLD AUTO: 5.5 10E3/UL (ref 4–11)

## 2023-03-13 PROCEDURE — 36415 COLL VENOUS BLD VENIPUNCTURE: CPT

## 2023-03-13 PROCEDURE — 80050 GENERAL HEALTH PANEL: CPT

## 2023-03-13 PROCEDURE — 80061 LIPID PANEL: CPT

## 2023-03-13 NOTE — PROGRESS NOTES
SUBJECTIVE:                                                    Jose Coronado is a 61 year old female who presents to clinic today for the following health issues:          Hospital Follow-up Visit:    Hospital/Nursing Home/IP Rehab Facility: [facility]  Date of Admission: 2/9/2017  Date of Discharge: 2/10/2017  Reason(s) for Admission: small left temporal lobe stroke            Problems taking medications regularly:  None       Medication changes since discharge: None       Problems adhering to non-medication therapy:  None    Summary of hospitalization:  [facility] discharge summary reviewed  Diagnostic Tests/Treatments reviewed.  Follow up needed: with Cardiology and Neurology   Other Healthcare Providers Involved in Patient s Care:         usual PCP is Dr Spaulding  Update since discharge: improved. Her speech is almost back to normal. She will on occasion have something nonsensical come out. She is back to work and her usual activities. No problems with fatigue.        Post Discharge Medication Reconciliation: discharge medications reconciled, continue medications without change.  Plan of care communicated with patient     Coding guidelines for this visit:  Type of Medical   Decision Making Face-to-Face Visit       within 7 Days of discharge Face-to-Face Visit        within 14 days of discharge   Moderate Complexity 28932 [id number]   High Complexity 11610 [id number]              Problem list and histories reviewed & adjusted, as indicated.  Additional history: as documented    Patient Active Problem List   Diagnosis     Family history of malignant neoplasm of breast     Female stress incontinence     Sleep apnea     Osteopenia     S/P gastric bypass     Vitamin D deficiency     Hyperparathyroidism (H)     Hirsutism     Advanced directives, counseling/discussion     Overweight, BMI > 35     Iron deficiency anemia     Lump or mass in breast     PFO (patent foramen ovale)     Essential hypertension with  Follow up with your doctor for continued evaluation. This could be related to your varicose veins and circulation too. Take tylenol or motrin as needed every 6 hours for pain. Use warm compresses to area of pain to help if related to muscle. GO TO ED For worsening leg pain with redness, swelling, chest pain or shortness of breath. "goal blood pressure less than 140/90     Left temporal lobe infarction (H)     Stroke (H)     Long-term (current) use of anticoagulants [Z79.01]     Past Surgical History   Procedure Laterality Date     C nonspecific procedure       S/P multiple breast biopies - all negative / benign     C nonspecific procedure       S/P T&A     C nonspecific procedure       [place] teeth extraction     C nonspecific procedure       S/P (? unreadable) ankle     Gastric bypass  June 24, 2010     Parathyroidectomy  9/19/11     Orthopedic surgery Left 2010     wrist fracture     Colonoscopy N/A 8/12/2015     Procedure: COLONOSCOPY;  Surgeon: Deandre Brooks MD;  Location:  GI       Social History   Substance Use Topics     Smoking status: Never Smoker     Smokeless tobacco: Never Used     Alcohol use 0.0 oz/week     0 Standard drinks or equivalent per week      Comment: 1 glass wine 4-5 days a week     Family History   Problem Relation Age of Onset     Breast Cancer Mother      Hypertension Mother      Arthritis Mother      Thyroid Disease Mother      hypo     Breast Cancer Maternal Aunt      Hypertension Paternal Grandmother      Hypertension Sister      CEREBROVASCULAR DISEASE Maternal Grandmother      Colon Cancer No family hx of            ROS:  Constitutional, HEENT, cardiovascular, pulmonary, GI, , musculoskeletal, neuro, skin, endocrine and psych systems are negative, except as otherwise noted.    OBJECTIVE:                                                    /62 (BP Location: Left arm, Patient Position: Chair, Cuff Size: Adult Large)  Pulse 97  Temp 98.6  F (37  C) (Oral)  Resp 14  Ht 5' 5.5\" (1.664 m)  Wt 240 lb (108.9 kg)  SpO2 92%  Breastfeeding? No  BMI 39.33 kg/m2  Body mass index is 39.33 kg/(m^2).  GENERAL: healthy, alert and no distress  EYES: Eyes grossly normal to inspection, PERRL and conjunctivae and sclerae normal  NECK: no adenopathy, no asymmetry, masses, or scars and thyroid normal to " palpation  RESP: lungs clear to auscultation - no rales, rhonchi or wheezes  CV: regular rate and rhythm, normal S1 S2, no S3 or S4, no murmur, click or rub, no peripheral edema and peripheral pulses strong  ABDOMEN: soft, nontender, no hepatosplenomegaly, no masses and bowel sounds normal  MS: no gross musculoskeletal defects noted, no edema  NEURO: Normal strength and tone, mentation intact and speech normal  PSYCH: mentation appears normal, affect normal/bright       ASSESSMENT/PLAN:                                                        1. Left temporal lobe infarction (H)  Doing well, Follow up with Neurology as planned Follow up with PCP in 1 month. Continue coumadin    2. Morbid obesity due to excess calories (H)  Discussed the need for wt loss. She already has plans how to address it with diet. Follow up in 1 month     3. PFO (patent foramen ovale)  Follow up with Cardiology as planned     4. Need for hepatitis C screening test  Due for      Lian Martinez MD  [facility]

## 2023-04-21 ENCOUNTER — VIRTUAL VISIT (OUTPATIENT)
Dept: INFECTIOUS DISEASES | Facility: CLINIC | Age: 68
End: 2023-04-21
Attending: STUDENT IN AN ORGANIZED HEALTH CARE EDUCATION/TRAINING PROGRAM
Payer: MEDICARE

## 2023-04-21 DIAGNOSIS — N39.0 RECURRENT UTI: Primary | ICD-10-CM

## 2023-04-21 DIAGNOSIS — K52.9 CHRONIC DIARRHEA: ICD-10-CM

## 2023-04-21 DIAGNOSIS — Z23 NEED FOR VACCINATION: ICD-10-CM

## 2023-04-21 DIAGNOSIS — N95.2 ATROPHIC VAGINITIS: ICD-10-CM

## 2023-04-21 DIAGNOSIS — N81.10 VAGINAL PROLAPSE: ICD-10-CM

## 2023-04-21 DIAGNOSIS — N39.41 URGE INCONTINENCE: ICD-10-CM

## 2023-04-21 PROCEDURE — 99213 OFFICE O/P EST LOW 20 MIN: CPT | Mod: VID | Performed by: STUDENT IN AN ORGANIZED HEALTH CARE EDUCATION/TRAINING PROGRAM

## 2023-04-21 NOTE — LETTER
4/21/2023       RE: Jose Coronado  [address] [place] [address]     Dear Colleague,    Thank you for referring your patient, Jose Coronado, to the [facility] INFECT[facility] DISEASE CLINIC [place] at [facility]. Please see a copy of my visit note below.    Virtual Visit Details    Type of service:  Video Visit   Video Start Time: 1.04 pm  Video End Time:1.23 pm    Originating Location (pt. Location): Home    Distant Location (provider location):  Off-site  Platform used for Video Visit: [email or web address]     Total time including chart review, care-coordination and documentation time on the date of encounter - 22 mins            [facility]  Infectious Disease Consultation note  Today's Date: 04/21/2023    Recommendations:  Continue try stopping vitamin C, cranberry sequentially  Continue Increase water intake to 100 ounces a day in the summer   Continue hygiene etiquettes  Can continue vaginal estrogen 2-3 times weekly if helping with urinary incontinence - defer to urology   Due for pneumovax     No follow up scheduled unless issues arise     Thank you for involving Infectious Disease in the care of this patient.     Alexandra Whitten  , [facility]  Pager - [phone]    Assessment:  Jose Coronado is a 66 year old with PMH of HTN, sleep apnea on CPAP, parathyroid tumors, minor stroke in 2017 (no residual), brain anerysm repair, PFO repair, Grade 4 prolapse with urinary incontinence s/p rectocele, cystocele repair, hysterectomy March 2020     Recurrent UTIs: after stroke in 2017 but only once in 6 months, every 2 months after prolapse repair in March 2020. Has Slight urge and stress incontinence- toviaz has helped.   Persistent E.coli UTI since 3/2022, resolved after abx therapy in May 2022.    No UTIs since May 2022.      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 Got a mild yeast infection that resolved with OTC   Infectious work up for diarrhea has been negative - C.diff, crypto, enteric, O and P, norovirus  Got vaccines - except pneumovax       Interval events:   Last seen 7/2022  No UTIs in the interim   2 episodes of twinges like bladder spasms, drank more water and it resolved   On Hiprex, vitamin C, cranberry, vaginal estrogen cream three times a week  Has improved hygiene   Changes pads for occasional incontinence frequently   Uses special wipes, cleans better after BM     Reason for consult / Chief complaint:   Consulted by Dr. Valdivia for recurrent UTI    History of presenting illness:  Jose Coronado is a 66 year old with PMH of HTN, sleep apnea on CPAP, parathyroid tumors   , minor stroke in 2017 (no residual), brain anerysm repair, PFO repair, Grade 4 prolapse with urinary incontinence s/p rectocele, cystocele repair, hysterectomy March 2020.    After 2017 (?stroke) developed UTI once every 6 months, UTIs increased to every 2 months after the prolapse repair in 2020 but I didn't find UA/UC (during that time frame) till 12/2021. She reports all were done at [facility]. Perhaps she had clinic UA done with no cultures. UA/UC indicate persistent E.coli UTI since 3/2022 and progressively more resistant. She was treated with cipro, macrobid and most recently  augmentin (although I to augmentin). She reports no infection symptoms since May 18, 2022 after she took the augmentin.     She is very concerned about the resistant profile of the organisms and had many questions regarding that.      She reports increase in yeast infections after taking a strong abx.     On Hiprex, vitamin C, cranberry - started in May 2022  On estrogen cream twice a week since 2020  Has Slight urge and stress incontinence- toviaz has helped     5/18/22 cystoscopy normal     CT 5/2020   1. Air within the urinary bladder may be related to recent  instrumentation.  2. Diffuse fatty infiltration of the liver.  3. Cholelithiasis.  4. Several low density lesions in the liver are too small to  characterize, but could represent cysts or hemangiomas. Consider liver  MRI for further characterization.      UA   Latest Reference Range & Units 12/16/21 13:46 12/16/21 14:41 02/14/22 09:21 03/01/22 11:42 04/08/22 10:50 04/18/22 09:30 05/18/22 08:18 06/06/22 10:45 06/09/22 12:32   Color Urine Colorless, Straw, Light Yellow, Yellow  Yellow  Yellow  Yellow Yellow  Yellow    COLOR, URINE POCT Colorless, Straw, Light Yellow, Yellow        Yellow     Appearance Urine Clear  Clear  Clear  Cloudy ! Cloudy !  Clear    CLARITY, URINE POCT Clear        Clear     Glucose Urine Negative mg/dL Negative  Negative  Negative Negative  Negative    GLUCOSE, URINE POCT Negative mg/dL       Negative     Bilirubin Urine Negative  Negative  Negative  Negative Negative  Negative    BILIRUBIN, URINE POCT Negative        Negative     Ketones Urine Negative mg/dL Negative  Negative  Negative Negative  Trace !    KETONES, URINE POCT Negative mg/dL mg/dL       Negative     Specific Gravity Urine 1.003 - 1.035  1.020  1.015  1.015 1.010  1.015    SPECIFIC GRAVITY POCT 1.005 - 1.030        1.015     pH Urine 5.0 - 7.0  7.0  6.0  6.0 6.0  6.5    PH, URINE POCT 5.0 - 8.0        6.0     Protein Albumin Urine Negative mg/dL Negative  Negative   Negative Negative  Negative    PROTEIN, URINE POCT Negative mg/dL       Negative     Urobilinogen Urine 0.2, 1.0 E.U./dL 2.0 !  0.2  1.0 0.2  0.2    UROBILINOGEN, URINE POCT 0.2, 1.0 E.U./dL       1.0     Nitrite Urine Negative  Positive !  Negative  Positive ! Positive !  Negative    NITRITES POCT Negative        Positive !     Blood Urine Negative  Trace !  Negative  Moderate ! Moderate !  Negative    BLOOD, URINE POCT Negative        Trace !     Leukocyte Esterase Urine Negative  Small !  Trace !  Large ! Large !  Negative    LEUK ESTERASE, POCT Negative        Small !     WBC Urine 0-5 /HPF /HPF 0-5  0-5 >100 ! 10-25 !  !  0-5    RBC Urine 0-2 /HPF /HPF 0-2  0-2 10-25 ! 2-5 ! 5-10 !  0-2    Bacteria Urine None Seen /HPF Many !  Moderate ! Many ! Many ! Many !  Few !    WBC Clumps None Seen /HPF     Present ! Present !      Clue cells Absent   Present !       Absent   Squamous Epithelial /LPF Urine None Seen /LPF Moderate !  Few ! Few ! Few ! Few !  Few !    Mucus Urine None Seen /LPF   Present !     Present !    Hyaline Casts None Seen /LPF   0-2 !         Calcium Oxalate None Seen /HPF      Moderate !  Few !        Urine cxs   5/2022 , 4/2022, 3/2022 - >100k E.coli   12/2021 - >100k Klebsiella pneumoniae, 50-100K E.coli     5/2022   Escherichia coli     IDA     Ampicillin >=32.0 ug/mL Resistant     Ampicillin/ Sulbactam 16.0 ug/mL Intermediate     Cefazolin >=64.0 ug/mL Resistant 1     Cefepime 0.25 ug/mL Susceptible     Cefoxitin >=64.0 ug/mL Resistant     Ceftazidime >32 ug/mL Resistant     Ceftriaxone 16 ug/mL Resistant     Ciprofloxacin >=4.0 ug/mL Resistant     Gentamicin >=16.0 ug/mL Resistant     Levofloxacin >=8.0 ug/mL Resistant     Nitrofurantoin 256.0 ug/mL Resistant     Piperacillin/Tazobactam <=4.0 ug/mL Susceptible     Tobramycin 8.0 ug/mL Intermediate     Trimethoprim/Sulfamethoxazole >16/304 ug/mL Resistant                 Social Hx:  Social History     Tobacco Use    Smoking status: Never     Smokeless tobacco: Never   Vaping Use    Vaping status: Never Used     Passive vaping exposure: Yes   Substance Use Topics    Alcohol use: Yes     Comment: Social    Drug use: No         Immunizations:  Immunization History   Administered Date(s) Administered    COVID-19 Vaccine 12+ (Pfizer 2022) 06/10/2022    COVID-19 Vaccine 12+ (Pfizer) 03/02/2021, 03/26/2021, 11/02/2021    COVID-19 Vaccine Bivalent Booster 12+ (Pfizer) 11/09/2022    FLU 6-35 months 09/20/2011    Flu, Unspecified 11/09/2022    Influenza (IIV3) PF 10/16/2015, 10/20/2016    Influenza Vaccine 50-64 or 18-64 w/egg allergy (Flublok) 12/05/2018, 11/05/2019    Influenza Vaccine 65+ (Fluzone HD) 09/21/2020, 11/02/2021, 11/09/2022    Influenza Vaccine >6 months (Alfuria,Fluzone) 10/18/2017    Pneumococcal 20 valent Conjugate (Prevnar 20) 11/30/2022    TD,PF 7+ (Tenivac) 06/28/2000    TDAP (Adacel,Boostrix) 10/29/2020    TDAP Vaccine (Adacel) 10/04/2010    TDAP Vaccine (Boostrix) 10/04/2010    Zoster recombinant adjuvanted (SHINGRIX) 11/30/2022, 02/06/2023       Allergies:   Allergies   Allergen Reactions    Contrast Dye Itching     Reaction of immediate burning and severe itching in Right ear and back of throat after injection for CT.     Sulfa Drugs      hives         Medications:  Current Outpatient Medications   Medication Sig Dispense Refill    Ascorbic Acid (VITAMIN C PO) Take 250 mg by mouth every morning (0.5 x 500 mg tablet = 250 mg dose)      aspirin 325 MG tablet Take 325 mg by mouth every morning       atenolol (TENORMIN) 50 MG tablet TAKE 1 TABLET BY MOUTH EVERY DAY 30 tablet 0    Calcium Citrate-Vitamin D (CALCIUM CITRATE + D PO) Take 2 tablets by mouth every morning       COMPOUNDED NON-CONTROLLED SUBSTANCE (CMPD RX) - PHARMACY TO MIX COMPOUNDED MEDICATION Estriol 1 mg/g in HRT base, apply small amount to finger and apply to inside vagina daily for 2 weeks then twice weekly 30 g 11    Cranberry 450 MG TABS Take by mouth 2 times daily       Cyanocobalamin 2500 MCG TABS Take 2,500 mcg by mouth once a week Mon      Ferrous Sulfate 27 MG TABS Take 27 mg by mouth every morning       Fesoterodine Fumarate 8 MG TB24 Take 1 tablet (8 mg) by mouth daily 90 tablet 0    FIBER COMPLETE PO Take 1 capsule by mouth every morning      hydrochlorothiazide (HYDRODIURIL) 12.5 MG tablet TAKE 1 TABLET BY MOUTH EVERY DAY 30 tablet 0    imipramine (TOFRANIL) 25 MG tablet TAKE 1 TABLET BY MOUTH EVERYDAY AT BEDTIME 90 tablet 1    loratadine (CLARITIN) 10 MG tablet Take 10 mg by mouth every morning       losartan (COZAAR) 50 MG tablet TAKE 1 TABLET BY MOUTH EVERY DAY 30 tablet 0    topiramate (TOPAMAX) 25 MG tablet TAKE 1 TABLET BY MOUTH TWICE A DAY 60 tablet 1    UNABLE TO FIND CPAP machine every night      VITAMIN D, CHOLECALCIFEROL, PO Take 500 Units by mouth every morning            Past Medical Hx:  Past Medical History:   Diagnosis Date    Anemia     Arthritis     Hands, back, hips. Various dates first noted.    CVA (cerebral vascular accident) (H)     ?migraine, ?pfo--negative vasc w/u, neg hypercoag w/u    Diffuse cystic mastopathy     Fibrocystic breast disease    HTN, goal below 140/90     Hyperparathyroidism (H)     Infectious mononucleosis     Mono at age 16    Irregular heart beat     PAT no afib on 30day monitor    Labyrinthitis, unspecified     Migraine headache with aura     Osteopenia     Pain in joint, shoulder region     Secondary to a fall    Palpitations     PFO (patent foramen ovale)     s/p closure with amplazter device 17    S/P gastric bypass     Sleep apnea     she is on CPAP    Sleep apnea     Vitamin D deficiencies          Family History:  Family History   Problem Relation Age of Onset    Breast Cancer Mother     Hypertension Mother     Arthritis Mother     Thyroid Disease Mother         Hypo    Ulcerative Colitis Mother     Cerebrovascular Disease Mother         Age 78 - Cerebral Hemorrhage,     Anxiety Disorder  Mother     Depression Mother     Genetic Disorder Mother         Ulcerative colitis    Obesity Mother     Anesthesia Reaction Mother         Same    Breast Cancer Maternal Aunt     Hypertension Paternal Grandmother     Cerebrovascular Disease Maternal Grandmother         Age 72 -     Family History Negative Maternal Grandfather     Family History Negative Paternal Grandfather     Family History Negative Son     Family History Negative Daughter     Other Cancer Father         Skin - Non-melanoma varieties    Hypertension Father     Asthma Father     Family History Negative Daughter     Ulcerative Colitis Sister     Hypertension Sister     Hyperlipidemia Sister     Anxiety Disorder Sister     Depression Sister     Diabetes Sister     Obesity Sister     Asthma Sister     Anesthesia Reaction Sister         Debilitating headaches    Hypertension Brother     Anxiety Disorder Brother     Depression Brother     Asthma Nephew     Hypertension Sister         Congestive Heart Failure    Anxiety Disorder Sister     Genetic Disorder Sister         Ulcerative colitis    Obesity Sister     Genetic Disorder Niece         Ulcerative colitis    Colon Cancer No family hx of     Coronary Artery Disease No family hx of     Mental Illness No family hx of     Substance Abuse No family hx of     Osteoporosis No family hx of     Deep Vein Thrombosis No family hx of          Examination:  Unable to examine due to virtual visit       Laboratory:  Hematology:  Recent Labs   Lab Test 23  1122 22  0846 21  2258 21  1118 20  0554 20  0634 20  0831 19  0846 17  0916 17  1545   WBC 5.5 5.5 7.9 5.7 13.5* 7.1   < > 6.3   < > 8.8   ANEU  --   --  5.3 3.1  --   --   --  3.2  --  5.6   ALYM  --   --  1.6 1.7  --   --   --  2.2  --  2.1   CHANTELLE  --   --  0.7 0.7  --   --   --  0.6  --  1.0   AEOS  --   --  0.2 0.2  --   --   --  0.2  --  0.1   HGB 12.7 13.3 13.1 13.1 11.4* 14.2   < > 13.7    < > 13.8   HCT 38.2 40.9 39.4 40.7 36.2 42.9   < > 42.3   < > 41.6    275 242 239 239 274   < > 317   < > 322    < > = values in this interval not displayed.       Chemistry:  Recent Labs   Lab Test 03/13/23  1122 02/14/22  0846 03/23/21  2258 01/11/21  1118 03/17/20  0554 03/16/20  0634 01/07/20  0831 08/10/16  0937 11/16/15  1459 09/16/15  1043 07/29/15  0845    139 132* 139 141  --  137   < > 140   < > 140   POTASSIUM 3.5 4.3 3.4 4.8 4.7 4.4 3.7   < > 4.2   < > 3.9   CHLORIDE 103 106 101 107 109  --  104   < > 105   < > 105   CO2 25 30 25 28 29  --  28   < > 30   < > 26   ANIONGAP 12 3 6 4 3  --  5   < > 5   < > 9   BUN 15.0 18 19 25 11  --  15   < > 13   < > 13   CR 0.72 0.76 0.85 0.92 0.66 0.75 0.72   < > 0.85   < > 0.73   GFRESTIMATED >90 85 71 64 >90 84 87   < > 68   < > 81   GLC 78 95 131* 89 108* 104* 95   < > 90   < > 91   MAXIMILIANO 8.9 9.4 9.0 9.1 8.4*  --  8.9   < > 9.2   < > 8.4*   MAG  --   --   --   --   --   --   --   --  2.1  --  2.0   LACT  --   --  0.7  --   --   --   --   --   --   --   --     < > = values in this interval not displayed.       Liver Function Studies:  Recent Labs   Lab Test 03/13/23  1122 02/14/22  0846 01/11/21  1118 01/07/20  0831 02/10/17  0635 08/10/16  0937   BILITOTAL 0.5 0.6 0.4 0.3 0.3 0.4   ALKPHOS 101 93 77 78 53 73   ALBUMIN 3.6 3.3* 3.5 3.6 3.2* 3.6   AST 40* 18 22 17 23 17   ALT 34 23 24 22 20 20       Alexandra Whitten MD

## 2023-04-21 NOTE — NURSING NOTE
Is the patient currently in the state of [place]? YES    Visit mode:VIDEO    If the visit is dropped, the patient can be reconnected by: VIDEO VISIT: Send to e-mail at: [email or web address]    Will anyone else be joining the visit? NO      How would you like to obtain your AVS? MyChart    Are changes needed to the allergy or medication list? NO    Reason for visit: Video Visit      Keyanna Hadley VF

## 2023-04-21 NOTE — PROGRESS NOTES
Virtual Visit Details    Type of service:  Video Visit   Video Start Time: 1.04 pm  Video End Time:1.23 pm    Originating Location (pt. Location): Home    Distant Location (provider location):  Off-site  Platform used for Video Visit: PlaySight     Total time including chart review, care-coordination and documentation time on the date of encounter - 22 mins            [facility]  Infectious Disease Consultation note  Today's Date: 04/21/2023    Recommendations:  Continue try stopping vitamin C, cranberry sequentially  Continue Increase water intake to 100 ounces a day in the summer   Continue hygiene etiquettes  Can continue vaginal estrogen 2-3 times weekly if helping with urinary incontinence - defer to urology   Due for pneumovax     No follow up scheduled unless issues arise     Thank you for involving Infectious Disease in the care of this patient.     Alexandra Whitten  , [facility]  Pager - [phone]    Assessment:  Jose Coronado is a 66 year old with PMH of HTN, sleep apnea on CPAP, parathyroid tumors, minor stroke in 2017 (no residual), brain anerysm repair, PFO repair, Grade 4 prolapse with urinary incontinence s/p rectocele, cystocele repair, hysterectomy March 2020     Recurrent UTIs: after stroke in 2017 but only once in 6 months, every 2 months after prolapse repair in March 2020. Has Slight urge and stress incontinence- toviaz has helped.   Persistent E.coli UTI since 3/2022, resolved after abx therapy in May 2022.    No UTIs since May 2022.     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 Got a mild yeast infection that resolved with OTC   Infectious work up for diarrhea has been negative - C.diff, crypto, enteric, O and P, norovirus  Got vaccines - except pneumovax       Interval events:   Last seen 7/2022  No UTIs in the interim   2 episodes of twinges like bladder spasms, drank more water and it resolved   On Hiprex, vitamin C, cranberry, vaginal estrogen cream three times a week  Has improved hygiene   Changes pads for occasional incontinence frequently   Uses special wipes, cleans better after BM     Reason for consult / Chief complaint:   Consulted by Dr. Valdivia for recurrent UTI    History of presenting illness:  Jose Coronado is a 66 year old with PMH of HTN, sleep apnea on CPAP, parathyroid tumors   , minor stroke in 2017 (no residual), brain anerysm repair, PFO repair, Grade 4 prolapse with urinary incontinence s/p rectocele, cystocele repair, hysterectomy March 2020.    After 2017 (?stroke) developed UTI once every 6 months, UTIs increased to every 2 months after the prolapse repair in 2020 but I didn't find UA/UC (during that time frame) till 12/2021. She reports all were done at [place]. Perhaps she had clinic UA done with no cultures. UA/UC indicate persistent E.coli UTI since 3/2022 and progressively more resistant. She was treated with cipro, macrobid and most recently augmentin (although I to augmentin). She reports no infection symptoms since May 18, 2022 after she took the augmentin.     She is very concerned about the resistant profile of the organisms and had many questions regarding that.      She reports increase in yeast infections after taking a strong abx.     On Hiprex, vitamin C, cranberry - started in May 2022  On estrogen cream twice a week since 2020  Has Slight urge  and stress incontinence- toviaz has helped     5/18/22 cystoscopy normal     CT 5/2020   1. Air within the urinary bladder may be related to recent  instrumentation.  2. Diffuse fatty infiltration of the liver.  3. Cholelithiasis.  4. Several low density lesions in the liver are too small to  characterize, but could represent cysts or hemangiomas. Consider liver  MRI for further characterization.      UA   Latest Reference Range & Units 12/16/21 13:46 12/16/21 14:41 02/14/22 09:21 03/01/22 11:42 04/08/22 10:50 04/18/22 09:30 05/18/22 08:18 06/06/22 10:45 06/09/22 12:32   Color Urine Colorless, Straw, Light Yellow, Yellow  Yellow  Yellow  Yellow Yellow  Yellow    COLOR, URINE POCT Colorless, Straw, Light Yellow, Yellow        Yellow     Appearance Urine Clear  Clear  Clear  Cloudy ! Cloudy !  Clear    CLARITY, URINE POCT Clear        Clear     Glucose Urine Negative mg/dL Negative  Negative  Negative Negative  Negative    GLUCOSE, URINE POCT Negative mg/dL       Negative     Bilirubin Urine Negative  Negative  Negative  Negative Negative  Negative    BILIRUBIN, URINE POCT Negative        Negative     Ketones Urine Negative mg/dL Negative  Negative  Negative Negative  Trace !    KETONES, URINE POCT Negative mg/dL mg/dL       Negative     Specific Gravity Urine 1.003 - 1.035  1.020  1.015  1.015 1.010  1.015    SPECIFIC GRAVITY POCT 1.005 - 1.030        1.015     pH Urine 5.0 - 7.0  7.0  6.0  6.0 6.0  6.5    PH, URINE POCT 5.0 - 8.0        6.0     Protein Albumin Urine Negative mg/dL Negative  Negative  Negative Negative  Negative    PROTEIN, URINE POCT Negative mg/dL       Negative     Urobilinogen Urine 0.2, 1.0 E.U./dL 2.0 !  0.2  1.0 0.2  0.2    UROBILINOGEN, URINE POCT 0.2, 1.0 E.U./dL       1.0     Nitrite Urine Negative  Positive !  Negative  Positive ! Positive !  Negative    NITRITES POCT Negative        Positive !     Blood Urine Negative  Trace !  Negative  Moderate ! Moderate !  Negative    BLOOD, URINE  POCT Negative        Trace !     Leukocyte Esterase Urine Negative  Small !  Trace !  Large ! Large !  Negative    LEUK ESTERASE, POCT Negative        Small !     WBC Urine 0-5 /HPF /HPF 0-5  0-5 >100 ! 10-25 !  !  0-5    RBC Urine 0-2 /HPF /HPF 0-2  0-2 10-25 ! 2-5 ! 5-10 !  0-2    Bacteria Urine None Seen /HPF Many !  Moderate ! Many ! Many ! Many !  Few !    WBC Clumps None Seen /HPF     Present ! Present !      Clue cells Absent   Present !       Absent   Squamous Epithelial /LPF Urine None Seen /LPF Moderate !  Few ! Few ! Few ! Few !  Few !    Mucus Urine None Seen /LPF   Present !     Present !    Hyaline Casts None Seen /LPF   0-2 !         Calcium Oxalate None Seen /HPF      Moderate !  Few !        Urine cxs   5/2022 , 4/2022, 3/2022 - >100k E.coli   12/2021 - >100k Klebsiella pneumoniae, 50-100K E.coli     5/2022   Escherichia coli     IDA     Ampicillin >=32.0 ug/mL Resistant     Ampicillin/ Sulbactam 16.0 ug/mL Intermediate     Cefazolin >=64.0 ug/mL Resistant 1     Cefepime 0.25 ug/mL Susceptible     Cefoxitin >=64.0 ug/mL Resistant     Ceftazidime >32 ug/mL Resistant     Ceftriaxone 16 ug/mL Resistant     Ciprofloxacin >=4.0 ug/mL Resistant     Gentamicin >=16.0 ug/mL Resistant     Levofloxacin >=8.0 ug/mL Resistant     Nitrofurantoin 256.0 ug/mL Resistant     Piperacillin/Tazobactam <=4.0 ug/mL Susceptible     Tobramycin 8.0 ug/mL Intermediate     Trimethoprim/Sulfamethoxazole >16/304 ug/mL Resistant                 Social Hx:  Social History     Tobacco Use     Smoking status: Never     Smokeless tobacco: Never   Vaping Use     Vaping status: Never Used     Passive vaping exposure: Yes   Substance Use Topics     Alcohol use: Yes     Comment: Social     Drug use: No         Immunizations:  Immunization History   Administered Date(s) Administered     COVID-19 Vaccine 12+ (Pfizer 2022) 06/10/2022     COVID-19 Vaccine 12+ (Pfizer) 03/02/2021, 03/26/2021, 11/02/2021     COVID-19 Vaccine Bivalent  Booster 12+ (Pfizer) 11/09/2022     FLU 6-35 months 09/20/2011     Flu, Unspecified 11/09/2022     Influenza (IIV3) PF 10/16/2015, 10/20/2016     Influenza Vaccine 50-64 or 18-64 w/egg allergy (Flublok) 12/05/2018, 11/05/2019     Influenza Vaccine 65+ (Fluzone HD) 09/21/2020, 11/02/2021, 11/09/2022     Influenza Vaccine >6 months (Alfuria,Fluzone) 10/18/2017     Pneumococcal 20 valent Conjugate (Prevnar 20) 11/30/2022     TD,PF 7+ (Tenivac) 06/28/2000     TDAP (Adacel,Boostrix) 10/29/2020     TDAP Vaccine (Adacel) 10/04/2010     TDAP Vaccine (Boostrix) 10/04/2010     Zoster recombinant adjuvanted (SHINGRIX) 11/30/2022, 02/06/2023       Allergies:   Allergies   Allergen Reactions     Contrast Dye Itching     Reaction of immediate burning and severe itching in Right ear and back of throat after injection for CT.      Sulfa Drugs      hives         Medications:  Current Outpatient Medications   Medication Sig Dispense Refill     Ascorbic Acid (VITAMIN C PO) Take 250 mg by mouth every morning (0.5 x 500 mg tablet = 250 mg dose)       aspirin 325 MG tablet Take 325 mg by mouth every morning        atenolol (TENORMIN) 50 MG tablet TAKE 1 TABLET BY MOUTH EVERY DAY 30 tablet 0     Calcium Citrate-Vitamin D (CALCIUM CITRATE + D PO) Take 2 tablets by mouth every morning        COMPOUNDED NON-CONTROLLED SUBSTANCE (CMPD RX) - PHARMACY TO MIX COMPOUNDED MEDICATION Estriol 1 mg/g in HRT base, apply small amount to finger and apply to inside vagina daily for 2 weeks then twice weekly 30 g 11     Cranberry 450 MG TABS Take by mouth 2 times daily       Cyanocobalamin 2500 MCG TABS Take 2,500 mcg by mouth once a week Mon       Ferrous Sulfate 27 MG TABS Take 27 mg by mouth every morning        Fesoterodine Fumarate 8 MG TB24 Take 1 tablet (8 mg) by mouth daily 90 tablet 0     FIBER COMPLETE PO Take 1 capsule by mouth every morning       hydrochlorothiazide (HYDRODIURIL) 12.5 MG tablet TAKE 1 TABLET BY MOUTH EVERY DAY 30 tablet 0      imipramine (TOFRANIL) 25 MG tablet TAKE 1 TABLET BY MOUTH EVERYDAY AT BEDTIME 90 tablet 1     loratadine (CLARITIN) 10 MG tablet Take 10 mg by mouth every morning        losartan (COZAAR) 50 MG tablet TAKE 1 TABLET BY MOUTH EVERY DAY 30 tablet 0     topiramate (TOPAMAX) 25 MG tablet TAKE 1 TABLET BY MOUTH TWICE A DAY 60 tablet 1     UNABLE TO FIND CPAP machine every night       VITAMIN D, CHOLECALCIFEROL, PO Take 500 Units by mouth every morning            Past Medical Hx:  Past Medical History:   Diagnosis Date     Anemia      Arthritis     Hands, back, hips. Various dates first noted.     CVA (cerebral vascular accident) (H) 2017    ?migraine, ?pfo--negative vasc w/u, neg hypercoag w/u     Diffuse cystic mastopathy     Fibrocystic breast disease     HTN, goal below 140/90      Hyperparathyroidism (H)      Infectious mononucleosis     Mono at age 16     Irregular heart beat     PAT no afib on 30day monitor     Labyrinthitis, unspecified      Migraine headache with aura      Osteopenia      Pain in joint, shoulder region     Secondary to a fall     Palpitations      PFO (patent foramen ovale)     s/p closure with amplazter device 17     S/P gastric bypass      Sleep apnea     she is on CPAP     Sleep apnea      Vitamin D deficiencies          Family History:  Family History   Problem Relation Age of Onset     Breast Cancer Mother      Hypertension Mother      Arthritis Mother      Thyroid Disease Mother         Hypo     Ulcerative Colitis Mother      Cerebrovascular Disease Mother         Age 78 - Cerebral Hemorrhage,      Anxiety Disorder Mother      Depression Mother      Genetic Disorder Mother         Ulcerative colitis     Obesity Mother      Anesthesia Reaction Mother         Same     Breast Cancer Maternal Aunt      Hypertension Paternal Grandmother      Cerebrovascular Disease Maternal Grandmother         Age 72 -      Family History Negative Maternal Grandfather       Family History Negative Paternal Grandfather      Family History Negative Son      Family History Negative Daughter      Other Cancer Father         Skin - Non-melanoma varieties     Hypertension Father      Asthma Father      Family History Negative Daughter      Ulcerative Colitis Sister      Hypertension Sister      Hyperlipidemia Sister      Anxiety Disorder Sister      Depression Sister      Diabetes Sister      Obesity Sister      Asthma Sister      Anesthesia Reaction Sister         Debilitating headaches     Hypertension Brother      Anxiety Disorder Brother      Depression Brother      Asthma Nephew      Hypertension Sister         Congestive Heart Failure     Anxiety Disorder Sister      Genetic Disorder Sister         Ulcerative colitis     Obesity Sister      Genetic Disorder Niece         Ulcerative colitis     Colon Cancer No family hx of      Coronary Artery Disease No family hx of      Mental Illness No family hx of      Substance Abuse No family hx of      Osteoporosis No family hx of      Deep Vein Thrombosis No family hx of          Examination:  Unable to examine due to virtual visit       Laboratory:  Hematology:  Recent Labs   Lab Test 03/13/23  1122 02/14/22  0846 03/23/21 2258 01/11/21  1118 03/17/20  0554 03/16/20  0634 01/07/20  0831 04/29/19  0846 04/05/17  0916 02/09/17  1545   WBC 5.5 5.5 7.9 5.7 13.5* 7.1   < > 6.3   < > 8.8   ANEU  --   --  5.3 3.1  --   --   --  3.2  --  5.6   ALYM  --   --  1.6 1.7  --   --   --  2.2  --  2.1   CHANTELLE  --   --  0.7 0.7  --   --   --  0.6  --  1.0   AEOS  --   --  0.2 0.2  --   --   --  0.2  --  0.1   HGB 12.7 13.3 13.1 13.1 11.4* 14.2   < > 13.7   < > 13.8   HCT 38.2 40.9 39.4 40.7 36.2 42.9   < > 42.3   < > 41.6    275 242 239 239 274   < > 317   < > 322    < > = values in this interval not displayed.       Chemistry:  Recent Labs   Lab Test 03/13/23  1122 02/14/22  0846 03/23/21 2258 01/11/21  1118 03/17/20  0554 03/16/20  0634  01/07/20  0831 08/10/16  0937 11/16/15  1459 09/16/15  1043 07/29/15  0845    139 132* 139 141  --  137   < > 140   < > 140   POTASSIUM 3.5 4.3 3.4 4.8 4.7 4.4 3.7   < > 4.2   < > 3.9   CHLORIDE 103 106 101 107 109  --  104   < > 105   < > 105   CO2 25 30 25 28 29  --  28   < > 30   < > 26   ANIONGAP 12 3 6 4 3  --  5   < > 5   < > 9   BUN 15.0 18 19 25 11  --  15   < > 13   < > 13   CR 0.72 0.76 0.85 0.92 0.66 0.75 0.72   < > 0.85   < > 0.73   GFRESTIMATED >90 85 71 64 >90 84 87   < > 68   < > 81   GLC 78 95 131* 89 108* 104* 95   < > 90   < > 91   MAXIMILIANO 8.9 9.4 9.0 9.1 8.4*  --  8.9   < > 9.2   < > 8.4*   MAG  --   --   --   --   --   --   --   --  2.1  --  2.0   LACT  --   --  0.7  --   --   --   --   --   --   --   --     < > = values in this interval not displayed.       Liver Function Studies:  Recent Labs   Lab Test 03/13/23  1122 02/14/22  0846 01/11/21  1118 01/07/20  0831 02/10/17  0635 08/10/16  0937   BILITOTAL 0.5 0.6 0.4 0.3 0.3 0.4   ALKPHOS 101 93 77 78 53 73   ALBUMIN 3.6 3.3* 3.5 3.6 3.2* 3.6   AST 40* 18 22 17 23 17   ALT 34 23 24 22 20 20

## 2023-04-25 DIAGNOSIS — K63.8219 SMALL INTESTINAL BACTERIAL OVERGROWTH (SIBO): Primary | ICD-10-CM

## 2023-04-26 ENCOUNTER — PATIENT OUTREACH (OUTPATIENT)
Dept: GASTROENTEROLOGY | Facility: CLINIC | Age: 68
End: 2023-04-26
Payer: MEDICARE

## 2023-05-29 DIAGNOSIS — G43.109 MIGRAINE WITH AURA AND WITHOUT STATUS MIGRAINOSUS, NOT INTRACTABLE: ICD-10-CM

## 2023-06-01 ENCOUNTER — PATIENT OUTREACH (OUTPATIENT)
Dept: GASTROENTEROLOGY | Facility: CLINIC | Age: 68
End: 2023-06-01
Payer: MEDICARE

## 2023-06-01 RX ORDER — TOPIRAMATE 25 MG/1
TABLET, FILM COATED ORAL
Qty: 60 TABLET | Refills: 1 | Status: SHIPPED | OUTPATIENT
Start: 2023-06-01 | End: 2023-07-27

## 2023-06-01 NOTE — PROGRESS NOTES
Received a fax for a peer to peer hearing for a second level appeal for rifaximin on 5-22.     Hearing date changed to 5-25 and before the hearing a fax was received denying the hearing due to the medication not seriously jeopardizing the life, health or or ability to maintain function.     Patient has an appointment on 6-14, provider updated

## 2023-06-01 NOTE — TELEPHONE ENCOUNTER
Prescription approved per [facility] Refill Protocol.    Reviewed chart.  No other prescribing providers noted for this medication.

## 2023-06-05 ENCOUNTER — TRANSFERRED RECORDS (OUTPATIENT)
Dept: HEALTH INFORMATION MANAGEMENT | Facility: CLINIC | Age: 68
End: 2023-06-05
Payer: MEDICARE

## 2023-06-08 DIAGNOSIS — N39.41 URGE INCONTINENCE: ICD-10-CM

## 2023-06-08 DIAGNOSIS — G43.109 MIGRAINE WITH AURA AND WITHOUT STATUS MIGRAINOSUS, NOT INTRACTABLE: ICD-10-CM

## 2023-06-08 DIAGNOSIS — R39.15 URINARY URGENCY: ICD-10-CM

## 2023-06-08 DIAGNOSIS — R00.2 PALPITATIONS: ICD-10-CM

## 2023-06-08 DIAGNOSIS — I10 ESSENTIAL HYPERTENSION WITH GOAL BLOOD PRESSURE LESS THAN 140/90: ICD-10-CM

## 2023-06-08 RX ORDER — FESOTERODINE FUMARATE 8 MG/1
1 TABLET, FILM COATED, EXTENDED RELEASE ORAL DAILY
Qty: 90 TABLET | Refills: 0 | Status: SHIPPED | OUTPATIENT
Start: 2023-06-08 | End: 2023-07-22

## 2023-06-08 NOTE — TELEPHONE ENCOUNTER
Fesoterodine Fumarate 8 MG TB24  Last Written Prescription Date:  2/21/23  Last Fill Quantity: 90,   # refills: 0  Last Office Visit : 5/18/22  Future Office visit:  7/26/23

## 2023-06-08 NOTE — TELEPHONE ENCOUNTER
[facility] Call Center    Phone Message    May a detailed message be left on voicemail: yes     Reason for Call: Medication Refill Request    Has the patient contacted the pharmacy for the refill? Yes   Name of medication being requested: Fesoterodine Fumarate 8 MG TB24     Provider who prescribed the medication: Carolyn Valdivia MD  Pharmacy: [id number] - [place], [place] - [address] AT [facility]  Date medication is needed:ASAP    Pt pharmacy stated they're having trouble filling medication. Pt scheduled for follow up in case that's what's needed for refill. Please call if not needed. Thank you    Action Taken: Message routed to:  [facility] ([facility] Uro    Travel Screening: Not Applicable

## 2023-06-09 DIAGNOSIS — N95.2 ATROPHIC VAGINITIS: ICD-10-CM

## 2023-06-09 NOTE — TELEPHONE ENCOUNTER
COMPOUNDED NON-CONTROLLED SUBSTANCE (CMPD RX) - PHARMACY TO MIX COMPOUNDED MEDICATION      Last Written Prescription Date:  5/12/22  Last Fill Quantity: 30g,   # refills: 11  Last Office Visit : 5/18/22  Future Office visit:  7/26/23    Routing refill request to provider for review/approval because:  Drug not on the [facility], [facility] or [facility] refill protocol or controlled substance

## 2023-06-10 RX ORDER — HYDROCHLOROTHIAZIDE 12.5 MG/1
12.5 TABLET ORAL DAILY
Qty: 30 TABLET | Refills: 8 | Status: SHIPPED | OUTPATIENT
Start: 2023-06-10 | End: 2024-01-04

## 2023-06-10 RX ORDER — LOSARTAN POTASSIUM 50 MG/1
50 TABLET ORAL DAILY
Qty: 30 TABLET | Refills: 8 | Status: SHIPPED | OUTPATIENT
Start: 2023-06-10 | End: 2024-01-04

## 2023-06-10 RX ORDER — ATENOLOL 50 MG/1
50 TABLET ORAL DAILY
Qty: 30 TABLET | Refills: 8 | Status: SHIPPED | OUTPATIENT
Start: 2023-06-10 | End: 2024-01-04

## 2023-06-10 RX ORDER — IMIPRAMINE HCL 25 MG
25 TABLET ORAL AT BEDTIME
Qty: 90 TABLET | Refills: 2 | Status: SHIPPED | OUTPATIENT
Start: 2023-06-10 | End: 2024-06-06

## 2023-06-11 NOTE — TELEPHONE ENCOUNTER
"Requested Prescriptions   Pending Prescriptions Disp Refills     atenolol (TENORMIN) 50 MG tablet 30 tablet 0     Sig: Take 1 tablet (50 mg) by mouth daily       Beta-Blockers Protocol Passed - 6/8/2023 11:25 AM        Passed - Blood pressure under 140/90 in past 12 months     BP Readings from Last 3 Encounters:   03/09/23 122/72   01/06/23 119/74   11/10/22 114/65                 Passed - Patient is age 6 or older        Passed - Recent (12 mo) or future (30 days) visit within the authorizing provider's specialty     Patient has had an office visit with the authorizing provider or a provider within the authorizing providers department within the previous 12 mos or has a future within next 30 days. See \"Patient Info\" tab in inbasket, or \"Choose Columns\" in Meds & Orders section of the refill encounter.              Passed - Medication is active on med list           hydrochlorothiazide (HYDRODIURIL) 12.5 MG tablet 30 tablet 0     Sig: Take 1 tablet (12.5 mg) by mouth daily       Diuretics (Including Combos) Protocol Passed - 6/8/2023 11:25 AM        Passed - Blood pressure under 140/90 in past 12 months     BP Readings from Last 3 Encounters:   03/09/23 122/72   01/06/23 119/74   11/10/22 114/65                 Passed - Recent (12 mo) or future (30 days) visit within the authorizing provider's specialty     Patient has had an office visit with the authorizing provider or a provider within the authorizing providers department within the previous 12 mos or has a future within next 30 days. See \"Patient Info\" tab in inbasket, or \"Choose Columns\" in Meds & Orders section of the refill encounter.              Passed - Medication is active on med list        Passed - Patient is age 18 or older        Passed - No active pregancy on record        Passed - Normal serum creatinine on file in past 12 months     Recent Labs   Lab Test 03/13/23  1122   CR 0.72              Passed - Normal serum potassium on file in past 12 " "months     Recent Labs   Lab Test 03/13/23  1122   POTASSIUM 3.5                    Passed - Normal serum sodium on file in past 12 months     Recent Labs   Lab Test 03/13/23  1122                 Passed - No positive pregnancy test in past 12 months           losartan (COZAAR) 50 MG tablet 30 tablet 0     Sig: Take 1 tablet (50 mg) by mouth daily       Angiotensin-II Receptors Passed - 6/8/2023 11:25 AM        Passed - Last blood pressure under 140/90 in past 12 months     BP Readings from Last 3 Encounters:   03/09/23 122/72   01/06/23 119/74   11/10/22 114/65                 Passed - Recent (12 mo) or future (30 days) visit within the authorizing provider's specialty     Patient has had an office visit with the authorizing provider or a provider within the authorizing providers department within the previous 12 mos or has a future within next 30 days. See \"Patient Info\" tab in inbasket, or \"Choose Columns\" in Meds & Orders section of the refill encounter.              Passed - Medication is active on med list        Passed - Patient is age 18 or older        Passed - No active pregnancy on record        Passed - Normal serum creatinine on file in past 12 months     Recent Labs   Lab Test 03/13/23  1122 10/24/18  0918 01/08/18  1134   CR 0.72   < >  --    CREAT  --   --  0.7    < > = values in this interval not displayed.       Ok to refill medication if creatinine is low          Passed - Normal serum potassium on file in past 12 months     Recent Labs   Lab Test 03/13/23  1122   POTASSIUM 3.5                    Passed - No positive pregnancy test in past 12 months           imipramine (TOFRANIL) 25 MG tablet 90 tablet 1     Sig: Take 1 tablet (25 mg) by mouth At Bedtime       Tricyclic Antidepressants Protocol Passed - 6/8/2023 11:25 AM        Passed - Blood pressure under 140/90 in past 12 months     BP Readings from Last 3 Encounters:   03/09/23 122/72   01/06/23 119/74   11/10/22 114/65                 " "Passed - Recent (12 mo) or future (30 days) visit within the authorizing provider's specialty      Patient has had an office visit with the authorizing provider or a provider within the authorizing providers department within the previous 12 mos or has a future within next 30 days. See \"Patient Info\" tab in inbasket, or \"Choose Columns\" in Meds & Orders section of the refill encounter.              Passed - Medication is active on med list        Passed - Patient is age 18 or older        Passed - No active pregnancy on record        Passed - No positive pregnancy test in past 12 months             "

## 2023-06-14 ENCOUNTER — VIRTUAL VISIT (OUTPATIENT)
Dept: GASTROENTEROLOGY | Facility: CLINIC | Age: 68
End: 2023-06-14
Payer: MEDICARE

## 2023-06-14 VITALS — WEIGHT: 170 LBS | BODY MASS INDEX: 27.32 KG/M2 | HEIGHT: 66 IN

## 2023-06-14 DIAGNOSIS — R19.7 DIARRHEA, UNSPECIFIED TYPE: Primary | ICD-10-CM

## 2023-06-14 DIAGNOSIS — K63.8219 SMALL INTESTINAL BACTERIAL OVERGROWTH (SIBO): ICD-10-CM

## 2023-06-14 PROCEDURE — 99215 OFFICE O/P EST HI 40 MIN: CPT | Mod: VID | Performed by: PHYSICIAN ASSISTANT

## 2023-06-14 RX ORDER — CIPROFLOXACIN 500 MG/1
500 TABLET, FILM COATED ORAL 2 TIMES DAILY
Qty: 20 TABLET | Refills: 0 | Status: CANCELLED | OUTPATIENT
Start: 2023-06-14 | End: 2023-06-24

## 2023-06-14 ASSESSMENT — PAIN SCALES - GENERAL: PAINLEVEL: NO PAIN (0)

## 2023-06-14 NOTE — PROGRESS NOTES
"Gastroenterology Visit for: Jose Coronado 1955   MRN: [id number]     Reason for Visit:  chief complaint    Referred by: Carolann  / [address] [place] [address] / [facility] [id number]  Patient Care Team:  Lian Martinez MD as PCP - General (Internal Medicine)  Lian Martinez MD as Assigned PCP  Olivia Vasquez PA-C as Physician Assistant (Physician Assistant - Medical)  Carolyn Valdivia MD as MD (Urology)  Lita Goodman RN as Specialty Care Coordinator (Urology)  Lian Martinez MD as Referring Physician (Internal Medicine)  Carolyn Valdivia MD as Assigned Surgical Provider  Lily Yancey MD as MD (OB/Gyn)  Mary Mathews MD as Referring Physician (Internal Medicine)  Olivia Vasquez PA-C as Physician Assistant (Urology)  Olivia Vasquez PA-C as Assigned OBGYN Provider  Alexandra Whtiten MD as MD (Infectious Diseases)  Alexandra Whitten MD as Assigned Infectious Disease Provider  Ynes Vuong PA-C as Physician Assistant (Gastroenterology)    History of Present Illness:   Jose Coronado is 67 year old female with significant past medical history pertinent for  hyperparathyroidism, parathyroid tumors, HTN, MITCHELL on CPAP, paroxysmal AFIB, minor stroke in 2017 (no residual), brain anerysm repair, PFO repair, Grade 4 prolapse with urinary incontinence s/p rectocele/cystocele repair/hysterectomy March 2020, recurrent UTIs (No UTI since 5/2022) who is presenting as follow up with chief complaint of diarrhea/discussion on SIBO tx.    Interval History June 14, 2023:    Today Jose reports that she is having more firm stools than she does have diarrhea. Having less gas. Attributes this to adjustments in dietary habits following a modified low FODMAPS diet and limited lactose diet. Appearence of the stool remains the same with reports of oily appearance.     Continues to have \"stools on and off, waxy yellowy type of stool, alternates between solid stool and semi solid stool.\" Will have a " "BM daily with 4-5 days per week with well formed stools consistent with Hubbard Stool Scale Type 4. \"I have many more now, well formed clean stool then I do the days of soft/mushy.\" Alternatively there are 2 days where the stools I described as \"messy\" most consistent with Hubbard Stool Scale Type 5. No liquid stool. Associated with intermittent incontinence. Denies outward signs of GI bleeding.     With additional attacks of with emesis, generalized abdominal cramping which has occurred x4-5 times in the past few years. Associated with increased flatulence. No change in bowel habits during this time. There is no impact on the stooling. Symptoms will last for approximately 48 hours.     Weight loss has overall improved. She reports that her weight is now overall stable. Now weighing 170 lbs. Since January has lost 7 - 8 lbs. January 2020 weighed 209 lbs. May 2022 weighed 200 lbs.    Has not trialed a complete lactose free diet.     Metamucil made symptoms worse. Was taking 2 tablespoons per day. Noted less stomach pain/gas.     No previous Imodium trial.     -------------------------------------------------------------------------------------------------------------------------------------------------------------  Interval History 2/13/23:  After last visit testing completed. 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 Stool testing with increased split fecal fat, normal neutral fat. Fecal elastase normal. Celiac serologies negative. Vitamin E low with testing with her neurologist, and zinc lower than ideal so was started on vitamin E and zinc supplementation with per their recommendation.     Many questions today regarding SIBO diagnosis, course and treatment which were discussed in detail.  She notes that she has not been able to get Xifaxan approved through her insurance so has not started any antibiotics yet.  Reports that she is not excited about starting antibiotics and would like to " "avoid multiple courses.     Today Jose reports that she is feeling better than she was from a bowel perspective.  She notes that she was having a terrible flare with loose stools, foul-smelling flatulence, and bloating and early to mid January, then however transition to 2 weeks of normal, formed, nonurgent stool with 1 bowel movement per day (though still \"waxy \").  Recently she has again started to have softer bowel movements but not as frequent or loose his previous and no recent incontinence.     She notes that she is recently put a lot of effort into eating better and doing research regarding SIBO and diet.  She has been following a low fermentation eating plan, avoiding things like lactose, artificial sugars, legumes.  She has also been working on increasing her soluble fiber as recommended at last visit, currently taking 2 tablespoons of Citrucel daily.     Reports previous weight loss from 203 down to 180. Reports weight now stable.  ---------------------------------------------------------------------------------------------------------------------------------------------------------------  11/2022 Ynes WALDRON Initial Visit:  Patient reports that starting about 2 years ago consistency and color of her stools changed.  She was initially having chalky pale stools which turned to pasty yellow, which she continues to have today.  She reports she is to have formed stools but now soft to loose (BSC 4-6), once per week will have formed (BSC 4).  Currently having 1-2 stools per day but unpredictable timing, can be overnight, and often quite urgent. She also reports increasing flatus over this time.  Denies blood or melena.  Denies missing days without stool or having hard stools. She also notes fecal incontinence/leakage, which she is not able to feel so wears a pad daily.  Notices stool and pad about once per week.  Had mesh sling placed for cystocele/rectocele in 2020 but notes that she can still feel a bulge " at the introitus.       Denies regular abdominal pain, nausea, vomiting, bloating, dysphagia or odynophagia. She reports having intermittent reflux if she eats particular foods or too late at night, reports this is resolved with TUMS and not overly bothersome.     She also notes 2 discrete episodes of severe abdominal pain over the last 2 years, 1 November 2020 and 1 in July 2021, each lasting 24 to 48 hours, but and able to get out of bed or eat or drink during episodes.  They eventually self resolved with sips of Pedialyte and rest.  Has not recurred since.     Stool studies done 8/29/22 were negative for C.Diff, enteric panel, cryptosporidium, giardia, and O&P. Lactoferrin was positive. Colonoscopy done 10/6/22 - examined colon was was normal, biopsies were negative for microscopic colitis but did show melanosis coli.     She reports that she started drinking more alcohol over the last 2 years during the pandemic with daughter in house and experimenting with different cocktails.  After having fatty liver visualized on CT she has since drastically cut alcohol intake and only occasionally has a glass of wine now, no change in stool pattern since reducing alcohol intake.     Reports that she used stool softeners for a while after her pelvic surgery, denies any laxative use including stimulant laxatives.     Diet Recall -  Breakfast: 2 eggs and toast, yogurt and blueberries, oatmeal, nuts and blueberries with sugar-free maple syrup - always fruit  Lunch: turkey or tuna sandwich, cottage cheese, bean salad, soup, tacos  Dinner: out to eat - fish or chicken with rice/potatoes and vegetables, soup,   Drinks: water 100oz, iced tea sweetened with sweet and low, coffee (2-3 cups)    Esophageal Questionnaire(s)    BEDQ Questionnaire       View : No data to display.                   View : No data to display.                Eckardt Questionnaire       View : No data to display.                Promis 10 Questionnaire        View : No data to display.                STUDIES & PROCEDURES:    EGD:       Colonoscopy:    10/6/2022   Findings:        Multiple medium-mouthed diverticula were found in the sigmoid colon.        The entire examined colon otherwise appeared normal on direct and        retroflexion views.                                                                                     Impression:               - Diverticulosis in the sigmoid colon.                             - The entire examined colon is normal on direct and                             retroflexion views.                             - Biopsies were taken with a cold forceps from the                             ascending colon and descending colon for evaluation                             of microscopic colitis.    Final Diagnosis   A.  Ascending colon, biopsy:  -Negative for diagnostic colitis, dysplasia, or malignancy.     B.  Descending colon, biopsy:  -Melanosis coli.  -Negative for diagnostic colitis, dysplasia, or malignancy.       8/12/2015   Findings:        Diverticula were found in the sigmoid colon.                                                                                     Impression:               - Diverticulosis in the sigmoid colon.   Recommendation:           High fiber diet. Repeat in ten years.     EndoFLIP directed at the UES or LES (8cm (EF-325) balloon length or 16cm (EF-322) balloon length):   Date:  8cm balloon  Balloon inflation Balloon pressure CSA (mm^2) DI (mm^2/mmHg) Dmin (mm) Compliance   20 (ladmark ID)        30        40        50           16cm balloon  Balloon inflation Balloon pressure CSA (mm^2) DI (mm^2/mmHg) Dmin (mm) Compliance   30 (ladmark ID)        40        50        60        70           High Resolution Manometry:    PH/Impedance:     Anderson:    CT:    5/6/2022 CT AP WO Contrast                                            IMPRESSION:   1. Air within the urinary bladder may be related to  recent  instrumentation.  2. Diffuse fatty infiltration of the liver.  3. Cholelithiasis.  4. Several low density lesions in the liver are too small to  characterize, but could represent cysts or hemangiomas. Consider liver  MRI for further characterization.       Esophagram:    FL VSS:     GES:    U/S:     XRAY:    Other:     9/2022 MRI Liver WO & W   TECHNIQUE: Multiplanar multisequence imaging of the abdomen acquired  before and after administration of 10 mL Gadavist intravenous  contrast.     FINDINGS: Multiple hepatic cysts. No definite hemangiomas. Signal  intensity of the liver parenchyma is unremarkable without evidence of  hepatic steatosis. There is no definite intra or extrahepatic biliary  dilatation. Liver is normal size and normal in contour. Adrenal  glands, kidneys, spleen, and pancreas demonstrate no worrisome focal  lesion. Osseous structures demonstrate no worrisome signal  abnormality. No definite adenopathy or bowel obstruction in the  visualized abdomen. No ascites.                                                            IMPRESSION: Multiple liver cysts.    1/6/2023 Hydrogen Breath Test  Impression    Positive hydrogen breath test consistent with small intestinal bacterial overgrowth. High methane production at baseline and throughout the test. This can be seen with constipation, though data on this is limited. Please correlate clinically. 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 Consideration could be given to treatment with neomycin and rifaximin. (ACG Clinical Guidelines: Small Intestinal Bacterial Overgrowth; Hastings et al. The American Journal of Gastroenterology: February 2020 - Volume 115 - Issue 2 - p 165-178)         Prior medical records were reviewed including, but not limited to, notes from referring providers, lab work, radiographic tests, and other diagnostic tests. Pertinent results were summarized above.     History     Past Medical  History:   Diagnosis Date     Anemia      Arthritis 2015    Hands, back, hips. Various dates first noted.     CVA (cerebral vascular accident) (H) 2017    ?migraine, ?pfo--negative vasc w/u, neg hypercoag w/u     Diffuse cystic mastopathy     Fibrocystic breast disease     HTN, goal below 140/90      Hyperparathyroidism (H)      Infectious mononucleosis     Mono at age 16     Irregular heart beat     PAT no afib on 30day monitor     Labyrinthitis, unspecified      Migraine headache with aura      Osteopenia      Pain in joint, shoulder region     Secondary to a fall     Palpitations      PFO (patent foramen ovale)     s/p closure with amplazter device 7/13/17     S/P gastric bypass June, 2010     Sleep apnea     she is on CPAP     Sleep apnea      Vitamin D deficiencies        Past Surgical History:   Procedure Laterality Date     BIOPSY  1984    Breast biopsies     BREAST SURGERY  1984    Above     CARDIAC SURGERY  7/13/2017    PFO closure     COLONOSCOPY N/A 8/12/2015    Procedure: COLONOSCOPY;  Surgeon: Deandre Brooks MD;  Location:  GI     COLONOSCOPY N/A 10/6/2022    Procedure: COLONOSCOPY, WITH BIOPSIES;  Surgeon: Freddie Gonzalez MD;  Location: [facility]     DAVINCI HYSTERECTOMY SUPRACERVICAL, SALPINGO-OOPHORECTOMY INCLUDING BILATERAL N/A 3/16/2020    Procedure: DAVINCI HYSTERECTOMY SUPRACERVICAL, SALPINGO-OOPHORECTOMY INCLUDING BILATERAL WITH EXAM UNDER ANESTHESIA;  Surgeon: Lily Yancey MD;  Location: [facility]     DAVINCI SACROCOLPOPEXY, MIDURETHRAL SLING, CYSTOSCOPY N/A 3/16/2020    Procedure: SACROCOLPOPEXY, ROBOT-ASSISTED, LAPAROSCOPIC, WITH INSERTION OF MIDURETHRAL SLING AND CYSTOSCOPY;  Surgeon: Carolyn Valdivia MD;  Location: [facility] OR     GASTRIC BYPASS  June 24, 2010     GENITOURINARY SURGERY  3/16/2020    Cystocele/rectocele repair     GYN SURGERY  3/16/2020    Hysterectomy     ORTHOPEDIC SURGERY Left 2010    wrist fracture     PARATHYROIDECTOMY  9/19/11     ZZC ANEURYSM, INTRACRAN, SIMPLE  SURG  2017    coil of aneurysm right posterior paraophthalmic artery     ZZC NONSPECIFIC PROCEDURE      S/P multiple breast biopies - all negative / benign     ZZC NONSPECIFIC PROCEDURE      S/P T&A     ZZC NONSPECIFIC PROCEDURE      Rawlings teeth extraction     ZZC NONSPECIFIC PROCEDURE      S/P (? unreadable) ankle       Social History     Socioeconomic History     Marital status:      Spouse name: Not on file     Number of children: Not on file     Years of education: Not on file     Highest education level: Not on file   Occupational History     Not on file   Tobacco Use     Smoking status: Never     Smokeless tobacco: Never   Vaping Use     Vaping status: Never Used     Passive vaping exposure: Yes   Substance and Sexual Activity     Alcohol use: Yes     Comment: Social     Drug use: No     Sexual activity: Not Currently     Partners: Male     Birth control/protection: Pill   Other Topics Concern     Parent/sibling w/ CABG, MI or angioplasty before 65F 55M? No   Social History Narrative     Not on file     Social Determinants of Health     Financial Resource Strain: Not on file   Food Insecurity: Not on file   Transportation Needs: Not on file   Physical Activity: Not on file   Stress: Not on file   Social Connections: Not on file   Intimate Partner Violence: Not on file   Housing Stability: Not on file       Family History   Problem Relation Age of Onset     Breast Cancer Mother      Hypertension Mother      Arthritis Mother      Thyroid Disease Mother         Hypo     Ulcerative Colitis Mother      Cerebrovascular Disease Mother         Age 78 - Cerebral Hemorrhage,      Anxiety Disorder Mother      Depression Mother      Genetic Disorder Mother         Ulcerative colitis     Obesity Mother      Anesthesia Reaction Mother         Same     Breast Cancer Maternal Aunt      Hypertension Paternal Grandmother      Cerebrovascular Disease Maternal Grandmother         Age 72 -      Family  History Negative Maternal Grandfather      Family History Negative Paternal Grandfather      Family History Negative Son      Family History Negative Daughter      Other Cancer Father         Skin - Non-melanoma varieties     Hypertension Father      Asthma Father      Family History Negative Daughter      Ulcerative Colitis Sister      Hypertension Sister      Hyperlipidemia Sister      Anxiety Disorder Sister      Depression Sister      Diabetes Sister      Obesity Sister      Asthma Sister      Anesthesia Reaction Sister         Debilitating headaches     Hypertension Brother      Anxiety Disorder Brother      Depression Brother      Asthma Nephew      Hypertension Sister         Congestive Heart Failure     Anxiety Disorder Sister      Genetic Disorder Sister         Ulcerative colitis     Obesity Sister      Genetic Disorder Niece         Ulcerative colitis     Colon Cancer No family hx of      Coronary Artery Disease No family hx of      Mental Illness No family hx of      Substance Abuse No family hx of      Osteoporosis No family hx of      Deep Vein Thrombosis No family hx of      Family history reviewed and edited as appropriate    Medications and Allergies:     Outpatient Encounter Medications as of 6/14/2023   Medication Sig Dispense Refill     Ascorbic Acid (VITAMIN C PO) Take 250 mg by mouth every morning (0.5 x 500 mg tablet = 250 mg dose)       aspirin 325 MG tablet Take 325 mg by mouth every morning        atenolol (TENORMIN) 50 MG tablet Take 1 tablet (50 mg) by mouth daily 30 tablet 8     Calcium Citrate-Vitamin D (CALCIUM CITRATE + D PO) Take 2 tablets by mouth every morning        COMPOUNDED NON-CONTROLLED SUBSTANCE (CMPD RX) - PHARMACY TO MIX COMPOUNDED MEDICATION Estriol 1 mg/g in HRT base, apply small amount to finger and apply to inside vagina daily for 2 weeks then twice weekly 30 g 11     Cranberry 450 MG TABS Take by mouth 2 times daily       Cyanocobalamin 2500 MCG TABS Take 2,500 mcg by  mouth once a week Mon       Ferrous Sulfate 27 MG TABS Take 27 mg by mouth every morning        Fesoterodine Fumarate 8 MG TB24 Take 1 tablet (8 mg) by mouth daily 90 tablet 0     FIBER COMPLETE PO Take 1 capsule by mouth every morning       hydrochlorothiazide (HYDRODIURIL) 12.5 MG tablet Take 1 tablet (12.5 mg) by mouth daily 30 tablet 8     imipramine (TOFRANIL) 25 MG tablet Take 1 tablet (25 mg) by mouth At Bedtime 90 tablet 2     loratadine (CLARITIN) 10 MG tablet Take 10 mg by mouth every morning        losartan (COZAAR) 50 MG tablet Take 1 tablet (50 mg) by mouth daily 30 tablet 8     topiramate (TOPAMAX) 25 MG tablet TAKE 1 TABLET BY MOUTH TWICE A DAY 60 tablet 1     UNABLE TO FIND CPAP machine every night       VITAMIN D, CHOLECALCIFEROL, PO Take 500 Units by mouth every morning        No facility-administered encounter medications on file as of 6/14/2023.        Allergies   Allergen Reactions     Contrast Dye Itching     Reaction of immediate burning and severe itching in Right ear and back of throat after injection for CT.      Sulfa Antibiotics      hives        Review of systems:  A full 10 point review of systems was obtained and was negative except for the pertinent positives and negatives stated within the HPI.    Objective Findings:   Physical Exam:    Constitutional: There were no vitals taken for this visit.  General: Alert, cooperative, no distress, well-appearing  Head: Atraumatic, normocephalic, no obvious abnormalities   Eyes: Sclera anicteric, no obvious conjunctival hemorrhage   Nose: Nares normal, no obvious malformation, no obvious rhinorrhea   Respiratory: Resting comfortably, no apparent distress, no cough.   Skin: No jaundice, no obvious rash  Neurologic: AAOx3, no obvious neurologic abnormality  Psychiatric: Normal Affect, appropriate mood  Extremities: No obvious edema, no obvious malformation     Labs, Radiology, Pathology     Lab Results   Component Value Date    WBC 5.5 03/13/2023     WBC 5.5 02/14/2022    WBC 7.9 03/23/2021    HGB 12.7 03/13/2023    HGB 13.3 02/14/2022    HGB 13.1 03/23/2021     03/13/2023     02/14/2022     03/23/2021    CHOL 127 03/13/2023    CHOL 138 02/14/2022    CHOL 177 01/11/2021    TRIG 36 03/13/2023    TRIG 64 02/14/2022    TRIG 43 01/11/2021    HDL 58 03/13/2023    HDL 71 02/14/2022    HDL 73 01/11/2021    ALT 34 03/13/2023    ALT 23 02/14/2022    ALT 24 01/11/2021    AST 40 (H) 03/13/2023    AST 18 02/14/2022    AST 22 01/11/2021     03/13/2023     02/14/2022     (L) 03/23/2021    BUN 15.0 03/13/2023    BUN 18 02/14/2022    BUN 19 03/23/2021    CO2 25 03/13/2023    CO2 30 02/14/2022    CO2 25 03/23/2021    TSH 0.85 03/13/2023    TSH 1.13 02/14/2022    TSH 2.40 03/23/2021    INR 0.92 08/24/2022    INR 0.87 03/23/2021    INR 0.86 04/29/2019        Liver Function Studies -   Recent Labs   Lab Test 03/13/23  1122   PROTTOTAL 5.7*   ALBUMIN 3.6   BILITOTAL 0.5   ALKPHOS 101   AST 40*   ALT 34          Patient Active Problem List    Diagnosis Date Noted     Atrophic vaginitis 02/10/2021     Priority: Medium     Urge incontinence 02/10/2021     Priority: Medium     Urinary urgency 02/10/2021     Priority: Medium     Midline cystocele 12/16/2019     Priority: Medium     Added automatically from request for surgery [id number]       Rectocele 12/16/2019     Priority: Medium     Added automatically from request for surgery [id number]       Osteopenia 03/17/2019     Priority: Medium     Vitamin D deficiency 03/17/2019     Priority: Medium     (Problem list name updated by automated process. Provider to review and confirm.)    (Problem list name updated by automated process. Provider to review and confirm.)       Hyperparathyroidism (H) 03/17/2019     Priority: Medium     Word finding difficulty 03/17/2019     Priority: Medium     Received intravenous tissue plasminogen activator (tPA) in emergency department 03/17/2019     Priority: Medium      Accidental marijuana poisoning 03/17/2019     Priority: Medium     Migraine with aura and without status migrainosus, not intractable 01/07/2019     Priority: Medium     ASD (atrial septal defect) 07/13/2017     Priority: Medium     Cerebral aneurysm without rupture 04/10/2017     Priority: Medium     Left temporal lobe infarction (H) 02/09/2017     Priority: Medium     Essential hypertension with goal blood pressure less than 140/90 10/31/2016     Priority: Medium     PFO (patent foramen ovale) 02/15/2016     Priority: Medium     s/p closure with amplazter device 7/13/17       Lump or mass in breast 11/09/2015     Priority: Medium     Class 1 obesity in adult 10/16/2015     Priority: Medium     Iron deficiency anemia 10/16/2015     Priority: Medium     ACP (advance care planning) 08/23/2012     Priority: Medium     Discussed Advance Directive planning with patient; information given to patient to review.       S/P gastric bypass 06/01/2010     Priority: Medium     Sleep apnea 08/28/2009     Priority: Medium     Female stress incontinence 06/09/2008     Priority: Medium     (Problem list name updated by automated process. Provider to review and confirm.)       Family history of malignant neoplasm of breast 10/02/2003     Priority: Medium      Assessment and Plan   Assessment/Plan:    Jose Coronado is 67 year old female with significant past medical history pertinent for  hyperparathyroidism, parathyroid tumors, HTN, MITCHELL on CPAP, paroxysmal AFIB, minor stroke in 2017 (no residual), brain anerysm repair, PFO repair, Grade 4 prolapse with urinary incontinence s/p rectocele/cystocele repair/hysterectomy March 2020, recurrent UTIs (No UTI since 5/2022) who is presenting as follow up with chief complaint of diarrhea/discussion on SIBO tx.    ASSESSMENT/PLAN:    #Fluctuation in Bowel Habits   #Fecal Urgency  #Flatus  #SIBO/IMO  Pertinent history for recurrent UTI's and treated with antibiotics over the past 2-3 years (last  "UTI 5/2022). She is also s/p surgery for rectocele and c ystocele in 2020 which correlated with the onset of symptoms.    Prior Evaluation:    8/2022 Infectious stools studies including C.Diff, enteric panel, crypto/giarrdia, and O&P were negative. Fecal lactoferrin was positive.     10/2022 Colonoscopy was then performed which did which was unremarkable on endoscopy, on histology no evidence of microscopic or inflammatory colitis driving diarrhea, though did show melanosis coli (denies laxative use).  Colonoscopy was not completed to the TI.     11/2022 Vit A/D, B12/folate and iron studies unremarkable. Vit E level low.     11/2022 celiac serologies negative, fecal elastase negative and split fecal fat increased. Neutral fecal fat normal.     1/2023 hydrogen breath testing positive for SIBO and high levels of methane (IMO)    3/2023 treated with Augmentin x10 days without improvement in symptoms as Xifaxin was not covered by insurance    3/13/2023 CBC/TSH w/ reflex unremarkable     6/1/2023 Fax received hearing for appeal was denied \"due to the medication not seriously jeopardizing the life, health or ability to maintain function.\"    Initial onset of symptoms 2-3 years prior. Overall symptoms have improved since the last office visit. Today Jose reports having stools occurring daily with 4-5 days per week most consistent with Moro Stool Scale Type 4. Alternative days of the week stools are most consistent with Moro Stool Scale Type 5. The appearance of the stools continues to be described as oily with intermittent fecal urgency/rare incontience. Weight loss is overall stable and she denies outward signs of GI bleeding. Additionally, Jose reports having acute symptoms of abdominal pain, nausea and emesis lasting 24 - 48 hours occurring every other month. During these episodes there is no change in bowel patterns.      Symptoms are likely largely driven by SIBO, history of gastric bypass surgery and pelvic floor " dysfunction. Differential should also include IBS-D, postinfectious IBS/dysbiosis related to significant history of antibiotic use, celiac disease (however negative serologies, no prior EGD/duodenal biopsies), bile acid diarrhea, medication induced with polypharmacy and vitamin supplements s/p bariatric surgery.     Increased split fecal fat likely related to SIBO vs malabsorption with gastric bypass. With history of surgery for rectocele and cystocele in 2020 and no concerns of fecal urgency/fecal incontinence is suspected to be secondary to pelvic floor dysfunction.    As symptoms have largely improved with implementing low FODMAPs diet/SIBO diet with avoidance of simple sugars as well as artifical sweeteners will defer additional ABX treatment and secondary to the increased risk of development of GI infection/dysbiosis with frequent ABX use. If Jose were to again develop diarrhea for consecutive days would then consider a treatment with Ciprofloxacin/Neomycin for treatment. If fecal incontinence returns future considerations would be to obtain pelvic floor evaluation. Additional considerations would be trial of bile acid sequestrant, lactose-free trial x 2 weeks.     -- Low FODMAPs diet  -- Complete alcohol cessation   -- Avoid simple sugars as well as artifical sweeteners including sorbital, truvia, maltitol, mannitol, xylitol, glycerol, lactilol. Additional education link provided within the AVS  -- Consider trial of probiotic Culturelle, Florastar or VSL - 3. Prebiotics such as inulin should be avoided  -- Continue soluble supplementation: Recommend Citrucel, start 1/2 Tbs per day, increase by 1/2 Tbs every 1-2 weeks to goal of 2 Tbs per day  -- During times of increased abdominal pain please trial Dicyclomine 10 mg every 6- 8 hours once/twice daily as needed      #Colorectal Cancer Screening: Last 10/2022 - next due in 10 years (2032)    #Hepatic Cysts   #Fatty Liver   CT AP WO Contrast with fatty liver and  several low density lesions in the liver that were described to be too small to characterize, but could represent cysts or hemangiomas. Follow up MRI Liver W WO Contrast with multiple hepatic cysts, no definite hemangiomas and hepatic steatosis. No intra/extrahepatic biliary dilation.       Follow up plan:   Return to clinic 3 months and as needed.    The risks and benefits of my recommendations, as well as other treatment options were discussed with the patient and any available family today. All questions were answered.     o Follow up: As planned above. Today, I personally spent 49 minutes in direct face to face time with the patient, of which greater than 50% of the time was spent in patient education and counseling as described above. Approximately 17 minutes were spent on indirect care associated with the patient's consultation including but not limited to review of: patient medical records to date, clinic visits, hospital records, lab results, imaging studies, procedural documentation, and coordinating care with other providers. The findings from this review are summarized in the above note. All of the above accounted for a cumulative time of 66 minutes and was performed on the date of service.     The patient verbalized understanding of the plan and was appreciative for the time spent and information provided during the office visit.           Karen Cabrera PA-C  Division of Gastroenterology, Hepatology, and Nutrition  [facility]       Documentation assisted by voice recognition and documentation system.

## 2023-06-14 NOTE — NURSING NOTE
Is the patient currently in the state of [place]? YES    Visit mode:VIDEO    If the visit is dropped, the patient can be reconnected by: VIDEO VISIT: Send to e-mail at: [email or web address]    Will anyone else be joining the visit? NO      How would you like to obtain your AVS? MyChart    Are changes needed to the allergy or medication list? NO    Reason for visit: RECHECK

## 2023-06-14 NOTE — PROGRESS NOTES
Virtual Visit Details    Type of service:  Video Visit   Video Start Time: 1051  Video End Time:1140    Originating Location (pt. Location): Home    Distant Location (provider location):  Off-site  Platform used for Video Visit: [employer]

## 2023-06-14 NOTE — Clinical Note
"    6/14/2023         RE: Jose Coronado  [address] [place] [address]        Dear Colleague,    Thank you for referring your patient, Jose Coronado, to the [facility] [place]. Please see a copy of my visit note below.    Gastroenterology Visit for: Jose Coronado 1955   MRN: [id number]     Reason for Visit:  chief complaint    Referred by: Carolann  / Daisy [facility] [id number]  Patient Care Team:  Lian Martinez MD as PCP - General (Internal Medicine)  Lian Martinez MD as Assigned PCP  Olivia Vasquez PA-C as Physician Assistant (Physician Assistant - Medical)  Carolyn Valdivia MD as MD (Urology)  Lita Goodman RN as Specialty Care Coordinator (Urology)  Lian Martinez MD as Referring Physician (Internal Medicine)  Carolyn Valdivia MD as Assigned Surgical Provider  Lily Yancey MD as MD (OB/Gyn)  Mary Mathews MD as Referring Physician (Internal Medicine)  Olivia Vasquez PA-C as Physician Assistant (Urology)  Olivia Vasquez PA-C as Assigned OBGYN Provider  Alexandra Whitten MD as MD (Infectious Diseases)  Alexandra Whitten MD as Assigned Infectious Disease Provider  Ynes Vuong PA-C as Physician Assistant (Gastroenterology)    History of Present Illness:   Jose Coronado is a 67 year old female with significant past medical history pertinent for *** who is presenting as a new patient in consultation at the request of Dr. Vuong with a chief complaint of ***.    Interval History June 14, 2023:    Today Jose reports that she is having more firm stools than she does have diarrhea. Having less gas. Attributes this to adjustments in dietary habits following a modified low FODMAPS diet and limited lactose diet. Appearence and quality of the stool remains the same.     Continues to have \"stools on and off, waxy yellowy type of stool, alternates between solid stool and semi solid stool.\" Will have a BM daily " "with 4-5 days per week with well formed however soft stools consistent with Minto Stool Scale Type 4/5. \"I have many more now, well formed clean stool then I do the days of soft/mushy.\" Alternatively there are 2 days where the stools I described as \"messy\" most consistent with Minto Stool Scale Type 5. No liquid stool. Associated with intermittent incontinence. Denies outward signs of GI bleeding.     Weight loss has improved. She continues to loose weight however at a slower rate. Now weighing 170 lbs. Since January has lost 7 - 8 lbs. January 2020 weighed 209 lbs. May 2022 weighed 200 lbs.    With additional attacks of with emesis, generalized abdominal cramping which has occurred x4-5 times in the past few years. Associated with increased flatulence. There is no impact on the stooling. Symptoms will last for 48 hours.     Has not trialed a complete lactose free diet.     Metamucil made symptoms worse. Was taking 2 tablespoons per day. Noted less stomach pain/gas.     No previous Imodium trial.     Weight is now stable.     -------------------------------------------------------------------------------------------------------------------------------------------------------------  Interval History 2/13/23:  After last visit testing completed. 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 Stool testing with increased split fecal fat, normal neutral fat. Fecal elastase normal. Celiac serologies negative. Vitamin E low with testing with her neurologist, and zinc lower than ideal so was started on vitamin E and zinc supplementation with per their recommendation.     Many questions today regarding SIBO diagnosis, course and treatment which were discussed in detail.  She notes that she has not been able to get Xifaxan approved through her insurance so has not started any antibiotics yet.  Reports that she is not excited about starting antibiotics and would like to avoid multiple courses.     Today " "Jose reports that she is feeling better than she was from a bowel perspective.  She notes that she was having a terrible flare with loose stools, foul-smelling flatulence, and bloating and early to mid January, then however transition to 2 weeks of normal, formed, nonurgent stool with 1 bowel movement per day (though still \"waxy \").  Recently she has again started to have softer bowel movements but not as frequent or loose his previous and no recent incontinence.     She notes that she is recently put a lot of effort into eating better and doing research regarding SIBO and diet.  She has been following a low fermentation eating plan, avoiding things like lactose, artificial sugars, legumes.  She has also been working on increasing her soluble fiber as recommended at last visit, currently taking 2 tablespoons of Citrucel daily.     Reports previous weight loss from 203 down to 180. Reports weight now stable.  ---------------------------------------------------------------------------------------------------------------------------------------------------------------  11/2022 Ynes WALDRON Initial Visit:  Patient reports that starting about 2 years ago consistency and color of her stools changed.  She was initially having chalky pale stools which turned to pasty yellow, which she continues to have today.  She reports she is to have formed stools but now soft to loose (BSC 4-6), once per week will have formed (BSC 4).  Currently having 1-2 stools per day but unpredictable timing, can be overnight, and often quite urgent. She also reports increasing flatus over this time.  Denies blood or melena.  Denies missing days without stool or having hard stools. She also notes fecal incontinence/leakage, which she is not able to feel so wears a pad daily.  Notices stool and pad about once per week.  Had mesh sling placed for cystocele/rectocele in 2020 but notes that she can still feel a bulge at the introitus.       Denies " regular abdominal pain, nausea, vomiting, bloating, dysphagia or odynophagia. She reports having intermittent reflux if she eats particular foods or too late at night, reports this is resolved with TUMS and not overly bothersome.     She also notes 2 discrete episodes of severe abdominal pain over the last 2 years, 1 November 2020 and 1 in July 2021, each lasting 24 to 48 hours, but and able to get out of bed or eat or drink during episodes.  They eventually self resolved with sips of Pedialyte and rest.  Has not recurred since.     Stool studies done 8/29/22 were negative for C.Diff, enteric panel, cryptosporidium, giardia, and O&P. Lactoferrin was positive. Colonoscopy done 10/6/22 - examined colon was was normal, biopsies were negative for microscopic colitis but did show melanosis coli.     She reports that she started drinking more alcohol over the last 2 years during the pandemic with daughter in house and experimenting with different cocktails.  After having fatty liver visualized on CT she has since drastically cut alcohol intake and only occasionally has a glass of wine now, no change in stool pattern since reducing alcohol intake.     Reports that she used stool softeners for a while after her pelvic surgery, denies any laxative use including stimulant laxatives.     Diet Recall -  Breakfast: 2 eggs and toast, yogurt and blueberries, oatmeal, nuts and blueberries with sugar-free maple syrup - always fruit  Lunch: turkey or tuna sandwich, cottage cheese, bean salad, soup, tacos  Dinner: out to eat - fish or chicken with rice/potatoes and vegetables, soup,   Drinks: water 100oz, iced tea sweetened with sweet and low, coffee (2-3 cups)    Esophageal Questionnaire(s)    BEDQ Questionnaire       View : No data to display.                   View : No data to display.                Eckardt Questionnaire       View : No data to display.                Promis 10 Questionnaire       View : No data to display.                 STUDIES & PROCEDURES:    EGD:       Colonoscopy:     EndoFLIP directed at the UES or LES (8cm (EF-325) balloon length or 16cm (EF-322) balloon length):   Date:  8cm balloon  Balloon inflation Balloon pressure CSA (mm^2) DI (mm^2/mmHg) Dmin (mm) Compliance   20 (ladmark ID)        30        40        50           16cm balloon  Balloon inflation Balloon pressure CSA (mm^2) DI (mm^2/mmHg) Dmin (mm) Compliance   30 (ladmark ID)        40        50        60        70           High Resolution Manometry:    PH/Impedance:     Anderson:    CT:    5/6/2022 CT AP WO Contrast                                            IMPRESSION:   1. Air within the urinary bladder may be related to recent  instrumentation.  2. Diffuse fatty infiltration of the liver.  3. Cholelithiasis.  4. Several low density lesions in the liver are too small to  characterize, but could represent cysts or hemangiomas. Consider liver  MRI for further characterization.       Esophagram:    FL VSS:     GES:    U/S:     XRAY:    Other:     9/2022 MRI Liver WO & W   TECHNIQUE: Multiplanar multisequence imaging of the abdomen acquired  before and after administration of 10 mL Gadavist intravenous  contrast.     FINDINGS: Multiple hepatic cysts. No definite hemangiomas. Signal  intensity of the liver parenchyma is unremarkable without evidence of  hepatic steatosis. There is no definite intra or extrahepatic biliary  dilatation. Liver is normal size and normal in contour. Adrenal  glands, kidneys, spleen, and pancreas demonstrate no worrisome focal  lesion. Osseous structures demonstrate no worrisome signal  abnormality. No definite adenopathy or bowel obstruction in the  visualized abdomen. No ascites.                                                                      IMPRESSION: Multiple liver cysts.      Prior medical records were reviewed including, but not limited to, notes from referring providers, lab work, radiographic tests, and other diagnostic  tests. Pertinent results were summarized above.     History     Past Medical History:   Diagnosis Date     Anemia      Arthritis 2015    Hands, back, hips. Various dates first noted.     CVA (cerebral vascular accident) (H) 2017    ?migraine, ?pfo--negative vasc w/u, neg hypercoag w/u     Diffuse cystic mastopathy     Fibrocystic breast disease     HTN, goal below 140/90      Hyperparathyroidism (H)      Infectious mononucleosis     Mono at age 16     Irregular heart beat     PAT no afib on 30day monitor     Labyrinthitis, unspecified      Migraine headache with aura      Osteopenia      Pain in joint, shoulder region     Secondary to a fall     Palpitations      PFO (patent foramen ovale)     s/p closure with amplazter device 7/13/17     S/P gastric bypass June, 2010     Sleep apnea     she is on CPAP     Sleep apnea      Vitamin D deficiencies        Past Surgical History:   Procedure Laterality Date     BIOPSY  1984    Breast biopsies     BREAST SURGERY  1984    Above     CARDIAC SURGERY  7/13/2017    PFO closure     COLONOSCOPY N/A 8/12/2015    Procedure: COLONOSCOPY;  Surgeon: Deandre Brooks MD;  Location:  GI     COLONOSCOPY N/A 10/6/2022    Procedure: COLONOSCOPY, WITH BIOPSIES;  Surgeon: Freddie Gonzalez MD;  Location: [facility] GI     DAVINCI HYSTERECTOMY SUPRACERVICAL, SALPINGO-OOPHORECTOMY INCLUDING BILATERAL N/A 3/16/2020    Procedure: DAVINCI HYSTERECTOMY SUPRACERVICAL, SALPINGO-OOPHORECTOMY INCLUDING BILATERAL WITH EXAM UNDER ANESTHESIA;  Surgeon: Lily Yancey MD;  Location: [facility] OR     DAVINCI SACROCOLPOPEXY, MIDURETHRAL SLING, CYSTOSCOPY N/A 3/16/2020    Procedure: SACROCOLPOPEXY, ROBOT-ASSISTED, LAPAROSCOPIC, WITH INSERTION OF MIDURETHRAL SLING AND CYSTOSCOPY;  Surgeon: Carolyn Valdivia MD;  Location: [facility] OR     GASTRIC BYPASS  June 24, 2010     GENITOURINARY SURGERY  3/16/2020    Cystocele/rectocele repair     GYN SURGERY  3/16/2020    Hysterectomy     ORTHOPEDIC SURGERY Left 2010    wrist  fracture     PARATHYROIDECTOMY  10     [facility] ANEURYSM, INTRACRAN, SIMPLE SURG  2017    coil of aneurysm right posterior paraophthalmic artery     [facility] NONSPECIFIC PROCEDURE      S/P multiple breast biopies - all negative / benign     [facility] NONSPECIFIC PROCEDURE      S/P T&A     [facility] NONSPECIFIC PROCEDURE      [place] teeth extraction     [facility] NONSPECIFIC PROCEDURE      S/P (? unreadable) ankle       Social History     Socioeconomic History     Marital status:      Spouse name: Not on file     Number of children: Not on file     Years of education: Not on file     Highest education level: Not on file   Occupational History     Not on file   Tobacco Use     Smoking status: Never     Smokeless tobacco: Never   Vaping Use     Vaping status: Never Used     Passive vaping exposure: Yes   Substance and Sexual Activity     Alcohol use: Yes     Comment: Social     Drug use: No     Sexual activity: Not Currently     Partners: Male     Birth control/protection: Pill   Other Topics Concern     Parent/sibling w/ CABG, MI or angioplasty before 65F 55M? No   Social History Narrative     Not on file     Social Determinants of Health     Financial Resource Strain: Not on file   Food Insecurity: Not on file   Transportation Needs: Not on file   Physical Activity: Not on file   Stress: Not on file   Social Connections: Not on file   Intimate Partner Violence: Not on file   Housing Stability: Not on file       Family History   Problem Relation Age of Onset     Breast Cancer Mother      Hypertension Mother      Arthritis Mother      Thyroid Disease Mother         Hypo     Ulcerative Colitis Mother      Cerebrovascular Disease Mother         Age 78 - Cerebral Hemorrhage,      Anxiety Disorder Mother      Depression Mother      Genetic Disorder Mother         Ulcerative colitis     Obesity Mother      Anesthesia Reaction Mother         Same     Breast Cancer Maternal Aunt      Hypertension Paternal Grandmother       Cerebrovascular Disease Maternal Grandmother         Age 72 -      Family History Negative Maternal Grandfather      Family History Negative Paternal Grandfather      Family History Negative Son      Family History Negative Daughter      Other Cancer Father         Skin - Non-melanoma varieties     Hypertension Father      Asthma Father      Family History Negative Daughter      Ulcerative Colitis Sister      Hypertension Sister      Hyperlipidemia Sister      Anxiety Disorder Sister      Depression Sister      Diabetes Sister      Obesity Sister      Asthma Sister      Anesthesia Reaction Sister         Debilitating headaches     Hypertension Brother      Anxiety Disorder Brother      Depression Brother      Asthma Nephew      Hypertension Sister         Congestive Heart Failure     Anxiety Disorder Sister      Genetic Disorder Sister         Ulcerative colitis     Obesity Sister      Genetic Disorder Niece         Ulcerative colitis     Colon Cancer No family hx of      Coronary Artery Disease No family hx of      Mental Illness No family hx of      Substance Abuse No family hx of      Osteoporosis No family hx of      Deep Vein Thrombosis No family hx of      Family history reviewed and edited as appropriate    Medications and Allergies:     Outpatient Encounter Medications as of 2023   Medication Sig Dispense Refill     Ascorbic Acid (VITAMIN C PO) Take 250 mg by mouth every morning (0.5 x 500 mg tablet = 250 mg dose)       aspirin 325 MG tablet Take 325 mg by mouth every morning        atenolol (TENORMIN) 50 MG tablet Take 1 tablet (50 mg) by mouth daily 30 tablet 8     Calcium Citrate-Vitamin D (CALCIUM CITRATE + D PO) Take 2 tablets by mouth every morning        COMPOUNDED NON-CONTROLLED SUBSTANCE (CMPD RX) - PHARMACY TO MIX COMPOUNDED MEDICATION Estriol 1 mg/g in HRT base, apply small amount to finger and apply to inside vagina daily for 2 weeks then twice weekly 30 g 11     Cranberry 450 MG  TABS Take by mouth 2 times daily       Cyanocobalamin 2500 MCG TABS Take 2,500 mcg by mouth once a week Mon       Ferrous Sulfate 27 MG TABS Take 27 mg by mouth every morning        Fesoterodine Fumarate 8 MG TB24 Take 1 tablet (8 mg) by mouth daily 90 tablet 0     FIBER COMPLETE PO Take 1 capsule by mouth every morning       hydrochlorothiazide (HYDRODIURIL) 12.5 MG tablet Take 1 tablet (12.5 mg) by mouth daily 30 tablet 8     imipramine (TOFRANIL) 25 MG tablet Take 1 tablet (25 mg) by mouth At Bedtime 90 tablet 2     loratadine (CLARITIN) 10 MG tablet Take 10 mg by mouth every morning        losartan (COZAAR) 50 MG tablet Take 1 tablet (50 mg) by mouth daily 30 tablet 8     topiramate (TOPAMAX) 25 MG tablet TAKE 1 TABLET BY MOUTH TWICE A DAY 60 tablet 1     UNABLE TO FIND CPAP machine every night       VITAMIN D, CHOLECALCIFEROL, PO Take 500 Units by mouth every morning        No facility-administered encounter medications on file as of 6/14/2023.        Allergies   Allergen Reactions     Contrast Dye Itching     Reaction of immediate burning and severe itching in Right ear and back of throat after injection for CT.      Sulfa Antibiotics      hives        Review of systems:  A full 10 point review of systems was obtained and was negative except for the pertinent positives and negatives stated within the HPI.    Objective Findings:   Physical Exam:    Constitutional: There were no vitals taken for this visit.  General: Alert, cooperative, no distress, well-appearing  Head: Atraumatic, normocephalic, no obvious abnormalities   Eyes: Sclera anicteric, no obvious conjunctival hemorrhage   Nose: Nares normal, no obvious malformation, no obvious rhinorrhea   Respiratory: Resting comfortably, no apparent distress, no cough.   Skin: No jaundice, no obvious rash  Neurologic: AAOx3, no obvious neurologic abnormality  Psychiatric: Normal Affect, appropriate mood  Extremities: No obvious edema, no obvious  malformation     Labs, Radiology, Pathology     Lab Results   Component Value Date    WBC 5.5 03/13/2023    WBC 5.5 02/14/2022    WBC 7.9 03/23/2021    HGB 12.7 03/13/2023    HGB 13.3 02/14/2022    HGB 13.1 03/23/2021     03/13/2023     02/14/2022     03/23/2021    CHOL 127 03/13/2023    CHOL 138 02/14/2022    CHOL 177 01/11/2021    TRIG 36 03/13/2023    TRIG 64 02/14/2022    TRIG 43 01/11/2021    HDL 58 03/13/2023    HDL 71 02/14/2022    HDL 73 01/11/2021    ALT 34 03/13/2023    ALT 23 02/14/2022    ALT 24 01/11/2021    AST 40 (H) 03/13/2023    AST 18 02/14/2022    AST 22 01/11/2021     03/13/2023     02/14/2022     (L) 03/23/2021    BUN 15.0 03/13/2023    BUN 18 02/14/2022    BUN 19 03/23/2021    CO2 25 03/13/2023    CO2 30 02/14/2022    CO2 25 03/23/2021    TSH 0.85 03/13/2023    TSH 1.13 02/14/2022    TSH 2.40 03/23/2021    INR 0.92 08/24/2022    INR 0.87 03/23/2021    INR 0.86 04/29/2019        Liver Function Studies -   Recent Labs   Lab Test 03/13/23  1122   PROTTOTAL 5.7*   ALBUMIN 3.6   BILITOTAL 0.5   ALKPHOS 101   AST 40*   ALT 34          Patient Active Problem List    Diagnosis Date Noted     Atrophic vaginitis 02/10/2021     Priority: Medium     Urge incontinence 02/10/2021     Priority: Medium     Urinary urgency 02/10/2021     Priority: Medium     Midline cystocele 12/16/2019     Priority: Medium     Added automatically from request for surgery [id number]       Rectocele 12/16/2019     Priority: Medium     Added automatically from request for surgery [id number]       Osteopenia 03/17/2019     Priority: Medium     Vitamin D deficiency 03/17/2019     Priority: Medium     (Problem list name updated by automated process. Provider to review and confirm.)    (Problem list name updated by automated process. Provider to review and confirm.)       Hyperparathyroidism (H) 03/17/2019     Priority: Medium     Word finding difficulty 03/17/2019     Priority: Medium     Received  intravenous tissue plasminogen activator (tPA) in emergency department 03/17/2019     Priority: Medium     Accidental marijuana poisoning 03/17/2019     Priority: Medium     Migraine with aura and without status migrainosus, not intractable 01/07/2019     Priority: Medium     ASD (atrial septal defect) 07/13/2017     Priority: Medium     Cerebral aneurysm without rupture 04/10/2017     Priority: Medium     Left temporal lobe infarction (H) 02/09/2017     Priority: Medium     Essential hypertension with goal blood pressure less than 140/90 10/31/2016     Priority: Medium     PFO (patent foramen ovale) 02/15/2016     Priority: Medium     s/p closure with amplazter device 7/13/17       Lump or mass in breast 11/09/2015     Priority: Medium     Class 1 obesity in adult 10/16/2015     Priority: Medium     Iron deficiency anemia 10/16/2015     Priority: Medium     ACP (advance care planning) 08/23/2012     Priority: Medium     Discussed Advance Directive planning with patient; information given to patient to review.       S/P gastric bypass 06/01/2010     Priority: Medium     Sleep apnea 08/28/2009     Priority: Medium     Female stress incontinence 06/09/2008     Priority: Medium     (Problem list name updated by automated process. Provider to review and confirm.)       Family history of malignant neoplasm of breast 10/02/2003     Priority: Medium      Assessment and Plan   Assessment/Plan:    Jose Coronado is 67 year old female with significant past medical history pertinent for  hyperparathyroidism, parathyroid tumors, HTN, MITCHELL on CPAP, paroxysmal AFIB, minor stroke in 2017 (no residual), brain anerysm repair, PFO repair, Grade 4 prolapse with urinary incontinence s/p rectocele/cystocele repair/hysterectomy March 2020, recurrent UTIs (No UTI since 5/2022),  *** who is presenting as a new patient in consultation at the request of Dr. Vuong with a chief complaint of ***.    ASSESSMENT/PLAN:  #Loose Stools  #Flatus  #Fecal  "Incontinence  Pertinent history for recurrent UTI's and treated with antibiotics over the past 2-3 years (last UTI 5/2022). She is also s/p surgery for rectocele and c ystocele in 2020 which correlated with the onset of symptoms.    Today Jose reports having *** Patient having 1-2 stools per day, but loose, yellow pasty in color and typically fairly urgent and unpredictable which has been quite bothersome for her. She notes change from previous bowel pattern (1 formed brown stool per day) over past 2 years. She also notes fecal leakage which she does not perceive so wears a pad daily.     8/2022 Infectious stools studies including C.Diff, enteric panel, crypto/giarrdia, and O&P were negative. Fecal lactoferrin was positive.     10/2022 Colonoscopy was then performed which did which was unremarkable on endoscopy, on histology no evidence of microscopic or inflammatory colitis driving diarrhea, though did show melanosis coli (denies laxative use).  *** (not to TI)     11/2022 Vit A/D, B12/folate and iron studies unremarkable     11/2022 celiac serologies negative, fecal elastase negative and split fecal fat increased. Neutral fecal fat normal.     1/2023 hydrogen breath testing positive for SIBO and high levels of methane.     3/2023 treated with Augmentin x10 days without improvement in symptoms as Xifaxin was not covered by insurance    3/13/2023 CBC/TSH w/ reflex unremarkable     6/1/2023 Fax received hearing for appeal was denied \"due to the medication not seriously jeopardizing the life, health or ability to maintain function.\"    Suspect JOSE is driving at least some of her symptoms, previous gastric bypass surgery risk factor and increased alcohol use at time of change in stool pattern that did not resolve with decreased use.  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 This may also be related to rapid transit 2/2 gastric bypass surgery.  Increased split fecal fat likely related to SIBO versus malabsorption with gastric bypass. Suspect fecal incontinence related to pelvic floor dysfunction. She also had surgery for rectocele and cystocele in 2020 about the time symptoms started.      Given positive SIBO test recommend treatment per guidelines with rifaximin 550 mg 3 times daily x14 days plus neomycin 500 mg twice daily x14 days.  Encouraged her to also continue to work with diet as this seems to be improving her symptoms, she is interested in meeting with our registered dietitian to learn more about diet and potential SIBO treatment/prevention.  She should also continue Citrucel soluble fiber at 2 tablespoons daily to help bulk stools.  If fecal incontinence returns recommend pelvic floor evaluation.    Future considerations include trial of bile acid sequestrant, lactose-free trial x 2 weeks.  If abdominal pain returns consider cross sectional imaging.     Cipro as the alternative      Plan:    -- Low FODMAPs diet  -- Avoid simple sugars as well as artifical sweeteners including sorbital, truvia, maltitol, mannitol, xylitol, glycerol, lactilol   - [email or web address].[email or web address]/docs/per/symptomatic-intestinal-bacterial-overgrowth-diet-sibo/handout_view_patient/@@getDocument   -- Consider trial of probiotic Culturelle, Florastar or VSL - 3  -- Start soluble supplementation: Recommend Citrucel, start 1/2 Tbs per day, increase by 1/2 Tbs every 1-2 weeks to goal of 2 Tbs per day      However, the dominant theme in diet manipulation for SIBO is  the reduction of fermentable products. In most cases, this  involves a low fiber approach as well as avoidance of alcohol  sugars and other fermentable sweeteners such as sucralose. In  addition, prebiotics such as inulin  should also be avoided.  However, the data on using diet for SIBO are principally extensions of the data from IBS. A recent meta-analysis of low FODMAP (Fermentable Oligo-, Di-, Mono-saccharides And Polyols)  and gluten-free diets in IBS noted that there was no good evidence  to support gluten-free approaches and  very low quality evidence  for low FODMAP diets (106).  Despite the conclusions of the meta-analysis, data do support  that a low FODMAP diet is associated with fewer fermentation  products, as assessed by the breath test. In 1 study, daily hydrogen  output was far higher when FODMAPS were ingested (107). A  study by Tu et al. that compared the effect of low vs high  FODMAP diets on symptom severity, metabolomic markers, and  the microbiome in subjects with IBS also found a small decrease in  hydrogen production in subjects who consumed a low vs a high  FODMAP diet (108).     Neutral fats include the monoglycerides, diglycerides, and   triglycerides while split fats are the free fatty acids   that are liberated from them. Impaired synthesis or   secretion of pancreatic enzymes or bile may cause an   increase in neutral fats while an increase in split fats   suggests impaired absorption of nutrients.     Colorectal cancer screening: Last 10/2022 - next due in 10 years (2032)      Follow up plan:   Return to clinic 3 months and as needed.    The risks and benefits of my recommendations, as well as other treatment options were discussed with the patient and any available family today. All questions were answered.     o Follow up: As planned above. Today, I personally spent *** minutes in direct face to face time with the patient, of which greater than 50% of the time was spent in patient education and counseling as described above. Approximately *** minutes were spent on indirect care associated with the patient's consultation including but not limited to review of: patient medical records to date,  clinic visits, hospital records, lab results, imaging studies, procedural documentation, and coordinating care with other providers. The findings from this review are summarized in the above note. All of the above accounted for a cumulative time of *** minutes and was performed on the date of service.     The patient verbalized understanding of the plan and was appreciative for the time spent and information provided during the office visit.           Karen Cabrera PA-C  Division of Gastroenterology, Hepatology, and Nutrition  [facility]       Documentation assisted by voice recognition and documentation system.            Virtual Visit Details    Type of service:  Video Visit   Video Start Time: 1051  Video End Time:1140    Originating Location (pt. Location): Home  {PROVIDER LOCATION On-site should be selected for visits conducted from your clinic location or adjoining [facility] hospital, academic office, or other nearby [facility] building. Off-site should be selected for all other provider locations, including home:[id number]}  Distant Location (provider location):  Off-site  Platform used for Video Visit: SandraWell      Again, thank you for allowing me to participate in the care of your patient.        Sincerely,        Karen Cabrera PA-C

## 2023-06-16 NOTE — PATIENT INSTRUCTIONS
It was a pleasure taking care of you today.  I've included a brief summary of our discussion and care plan from today's visit below.  Please review this information with your primary care provider.  _______________________________________________________________________    My recommendations are summarized as follows:      -- Low FODMAPs diet  -- Complete alcohol cessation   -- Avoid simple sugars as well as artifical sweeteners including sorbital, truvia, maltitol, mannitol, xylitol, glycerol, lactilol     [email or web address].healthsystem.[facility]/docs/per/symptomatic-intestinal-bacterial-overgrowth-diet-sibo/handout_view_patient/@@getDo[facility]ly [facility]     -- Consider trial of probiotic Culturelle, Florastar or VSL - 3. Prebiotics such as inulin should be avoided  -- Continue soluble supplementation: Recommend Citrucel, start 1/2 Tbs per day, increase by 1/2 Tbs every 1-2 weeks to goal of 2 Tbs per day  -- During times of increased abdominal pain please trial Dicyclomine 10 mg every 6- 8 hours once/twice daily as needed        To schedule endoscopic procedures you may call: [phone]  To schedule radiology (imaging) tests you may call: [phone]  To schedule an ENT appointment you may call: [phone]    Please call my nurse Swathi ([phone])Sabra ([phone]) with any questions or concerns.      Return to GI Clinic in 3 months to review your progress.    _______________________________________________________________________    Who do I call with any questions after my visit?  Please be in touch if there are any further questions that arise following today's visit.  There are multiple ways to contact your gastroenterology care team.      During business hours, you may reach a Gastroenterology nurse at [phone] and choose option 3.       To schedule or reschedule an appointment, please call [phone].     You can always send a secure message through ikeGPS.  ikeGPS messages are answered by your nurse or  doctor typically within 24 hours.  Please allow extra time on weekends and holidays.      For urgent/emergent questions after business hours, you may reach the on-call GI Fellow by contacting the [facility]  at [phone].     How will I get the results of any tests ordered?    You will receive all of your results.  If you have signed up for Guideslyhart, any tests ordered at your visit will be available to you after your physician reviews them.  Typically this takes 1-2 weeks.  If there are urgent results that require a change in your care plan, your physician or nurse will call you to discuss the next steps.      What is Guideslyhart?  [employer] is a secure way for you to access all of your healthcare records from the [facility].  It is a web based computer program, so you can sign on to it from any location.  It also allows you to send secure messages to your care team.  I recommend signing up for [employer] access if you have not already done so and are comfortable with using a computer.      How to I schedule a follow-up visit?  If you did not schedule a follow-up visit today, please call [phone] to schedule a follow-up office visit.      If you feel you received exceptional care and are interested in supporting the clinical and research goals of Karen Cabrera PA-C or the Division of Gastroenterology, Hepatology, and Nutrition please contact [email or web address] from the [facility] to discuss opportunities to donate.    Sincerely,    Karen Cabrera PA-C  Division of Gastroenterology, Hepatology, and Nutrition  [facility]

## 2023-06-19 RX ORDER — DICYCLOMINE HYDROCHLORIDE 10 MG/1
10 CAPSULE ORAL 2 TIMES DAILY PRN
Qty: 30 CAPSULE | Refills: 0 | Status: SHIPPED | OUTPATIENT
Start: 2023-06-19 | End: 2023-07-19

## 2023-06-29 DIAGNOSIS — G43.109 MIGRAINE WITH AURA AND WITHOUT STATUS MIGRAINOSUS, NOT INTRACTABLE: ICD-10-CM

## 2023-06-29 RX ORDER — TOPIRAMATE 25 MG/1
TABLET, FILM COATED ORAL
Qty: 60 TABLET | Refills: 1 | OUTPATIENT
Start: 2023-06-29

## 2023-07-07 ENCOUNTER — TELEPHONE (OUTPATIENT)
Dept: GASTROENTEROLOGY | Facility: CLINIC | Age: 68
End: 2023-07-07
Payer: MEDICARE

## 2023-07-07 DIAGNOSIS — R19.7 DIARRHEA, UNSPECIFIED TYPE: Primary | ICD-10-CM

## 2023-07-07 DIAGNOSIS — K63.8219 SMALL INTESTINAL BACTERIAL OVERGROWTH (SIBO): ICD-10-CM

## 2023-07-12 ENCOUNTER — TELEPHONE (OUTPATIENT)
Dept: GASTROENTEROLOGY | Facility: CLINIC | Age: 68
End: 2023-07-12
Payer: MEDICARE

## 2023-07-17 ENCOUNTER — PRE VISIT (OUTPATIENT)
Dept: UROLOGY | Facility: CLINIC | Age: 68
End: 2023-07-17
Payer: MEDICARE

## 2023-07-17 NOTE — TELEPHONE ENCOUNTER
Reason for Visit: Follow-up to discuss symptoms    Diagnosis: Frequent UTI, Recurrent UTI, Acute cystitis without hematuria, Atrophic vaginitis    Rooming Requirements: Ascencion Palmer  07/17/23  2:03 PM

## 2023-07-17 NOTE — TELEPHONE ENCOUNTER
COMFORTM and sent mychart (2nd attempt) for patient to schedule:    '[facility] GI' inperson/virtual visit with Karen for 3 mo follow-up (around 10/7/23)

## 2023-07-20 ENCOUNTER — MYC MEDICAL ADVICE (OUTPATIENT)
Dept: INTERNAL MEDICINE | Facility: CLINIC | Age: 68
End: 2023-07-20
Payer: MEDICARE

## 2023-07-20 DIAGNOSIS — B00.1 RECURRENT COLD SORES: Primary | ICD-10-CM

## 2023-07-20 RX ORDER — VALACYCLOVIR HYDROCHLORIDE 1 G/1
2000 TABLET, FILM COATED ORAL 2 TIMES DAILY
Qty: 4 TABLET | Refills: 1 | Status: SHIPPED | OUTPATIENT
Start: 2023-07-20 | End: 2023-07-21

## 2023-07-22 ENCOUNTER — MYC REFILL (OUTPATIENT)
Dept: UROLOGY | Facility: CLINIC | Age: 68
End: 2023-07-22
Payer: MEDICARE

## 2023-07-22 DIAGNOSIS — R39.15 URINARY URGENCY: ICD-10-CM

## 2023-07-22 DIAGNOSIS — N39.41 URGE INCONTINENCE: ICD-10-CM

## 2023-07-24 DIAGNOSIS — B00.1 RECURRENT COLD SORES: ICD-10-CM

## 2023-07-24 RX ORDER — FESOTERODINE FUMARATE 8 MG/1
1 TABLET, FILM COATED, EXTENDED RELEASE ORAL DAILY
Qty: 90 TABLET | Refills: 0 | Status: SHIPPED | OUTPATIENT
Start: 2023-07-24 | End: 2023-10-23

## 2023-07-24 NOTE — TELEPHONE ENCOUNTER
Fesoterodine Fumarate 8 MG TB24   Last Written Prescription Date:  6/8/23  Last Fill Quantity: 90,   # refills: 0  Last Office Visit :5/18/22  Future Office visit:  7/26/23

## 2023-07-25 RX ORDER — VALACYCLOVIR HYDROCHLORIDE 1 G/1
TABLET, FILM COATED ORAL
Qty: 4 TABLET | Refills: 2 | OUTPATIENT
Start: 2023-07-25

## 2023-07-25 NOTE — TELEPHONE ENCOUNTER
Medication refill request denied due to duplicate refill request.    Medication(s) last filled on 07/20/2023.    Edson Sanchez, Triage RN [facility]  3:50 PM 7/25/2023

## 2023-07-27 DIAGNOSIS — G43.109 MIGRAINE WITH AURA AND WITHOUT STATUS MIGRAINOSUS, NOT INTRACTABLE: ICD-10-CM

## 2023-07-27 RX ORDER — TOPIRAMATE 25 MG/1
TABLET, FILM COATED ORAL
Qty: 180 TABLET | Refills: 0 | Status: SHIPPED | OUTPATIENT
Start: 2023-07-27 | End: 2024-01-08

## 2023-08-21 ENCOUNTER — VIRTUAL VISIT (OUTPATIENT)
Dept: UROLOGY | Facility: CLINIC | Age: 68
End: 2023-08-21
Attending: UROLOGY
Payer: MEDICARE

## 2023-08-21 VITALS — HEIGHT: 66 IN | BODY MASS INDEX: 25.88 KG/M2 | WEIGHT: 161 LBS

## 2023-08-21 DIAGNOSIS — R35.0 FREQUENCY OF URINATION: ICD-10-CM

## 2023-08-21 DIAGNOSIS — N95.8 GENITOURINARY SYNDROME OF MENOPAUSE: ICD-10-CM

## 2023-08-21 DIAGNOSIS — N39.41 URGE INCONTINENCE: Primary | ICD-10-CM

## 2023-08-21 PROCEDURE — 99214 OFFICE O/P EST MOD 30 MIN: CPT | Mod: 95 | Performed by: UROLOGY

## 2023-08-21 ASSESSMENT — PAIN SCALES - GENERAL: PAINLEVEL: NO PAIN (0)

## 2023-08-21 NOTE — NURSING NOTE
Is the patient currently in the state of [place]? YES    Visit mode:VIDEO    If the visit is dropped, the patient can be reconnected by: VIDEO VISIT: Send to e-mail at: [email or web address]    Will anyone else be joining the visit? NO  (If patient encounters technical issues they should call [phone])    How would you like to obtain your AVS? MyChart    Are changes needed to the allergy or medication list? No    Reason for visit: Follow Up    Lavonne BARROW

## 2023-08-21 NOTE — PROGRESS NOTES
Reason for visit:  F/u on recurrent uti's.      Clinical Data: Ms. Jose Coronado  is a 67 year old female with a history of robotic sacrocolpopexy in conjuntion with hysterectomy on 3/16/20.  She feels like she is doing well but feels like she has a little bulging since Sept of 2020.  The urinary symptoms of overactivity are improved since before the surgery, but continues to have some urge incontinence.  She continues to use the estrogen cream.      She was started on Toviaz and that seems to be helping significantly. She continues to have just a little leakage, but not as it been before, she just wears on a panty liner.  And it is related to urge, not stress maneuvers.    She was getting recurrent uti's and was on methenamine prophylaxis which she stopped 6 months ago and has done well.    Cystoscopy in May of 2022 was normal       A/P 66 y/o female s/p Robotic sacrocolpopexy and midurethral sling along with hysterectomy doing well except for a little urgency and associated urge incontinence that is mostly well controlled.  We discussed adding another medication but the beta agonists are very expensive for her.  She also explains that when she gets up from a sitting position then she has the leakage which is associated with ISD.  We therefore discussed periurethral bulking.  She would like to think about it for a bit.  -stay well hydrated  -continue with estrogen cream twice per week for the atrophic vaginitis   -continue Tovias, also can be renewed by primary.  -f/u prn pt. Will let me know if she would like to have periurethral bulking.    Thank you for allowing me to participate in the care of  Ms.Jan MERVIN Coronado and I will keep you updated on her progress.    Carolyn Valdivia MD       35 minutes spent by me on the date of the encounter doing chart review, history and exam, documentation and further activities per the note

## 2023-08-21 NOTE — PATIENT INSTRUCTIONS
-stay well hydrated  -continue with estrogen cream twice per week for the atrophic vaginitis   -continue Tovias, also can be renewed by primary.  -f/u prn  Will let me know if she would like to have periurethral bulking.

## 2023-08-21 NOTE — PROGRESS NOTES
Reason for visit:  F/u on recurrent uti's.      Clinical Data: Ms. Jose Coronado  is a 67 year old female with a history of robotic sacrocolpopexy in conjuntion with hysterectomy on 3/16/20.  She feels like she is doing well but feels like she has a little bulging since Sept of 2020.  The urinary symptoms of overactivity are improved since before the surgery, but continues to have some urge incontinence.  She continues to use the estrogen cream.      She was started on Toviaz and that seems to be helping significantly. She continues to have just a little leakage, but not as it been before, she just wears on a panty liner.  And it is related to urge, not stress maneuvers.    She was getting recurrent uti's and was on methenamine prophylaxis which she stopped 6 months ago and has done well.    Cystoscopy in May of 2022 was normal       A/P 68 y/o female s/p Robotic sacrocolpopexy and midurethral sling along with hysterectomy doing well except for a little urgency and associated urge incontinence that is mostly well controlled.  We discussed adding another medication but the beta agonists are very expensive for her.  She also explains that when she gets up from a sitting position then she has the leakage which is associated with ISD.  We therefore discussed periurethral bulking.  She would like to think about it for a bit.  -stay well hydrated  -continue with estrogen cream twice per week for the atrophic vaginitis   -continue Tovias, also can be renewed by primary.  -f/u prn pt. Will let me know if she would like to have periurethral bulking.    Thank you for allowing me to participate in the care of  Ms.Jan MERVIN Coronado and I will keep you updated on her progress.    Carolyn Valdivia MD       35 minutes spent by me on the date of the encounter doing chart review, history and exam, documentation and further activities per the note

## 2023-08-21 NOTE — PROGRESS NOTES
Virtual Visit Details    Type of service:  Video Visit   Video Start Time: 10:29 AM  Video End Time:10:48 AM    Originating Location (pt. Location): Home    Distant Location (provider location):  Off-site  Platform used for Video Visit: Well

## 2023-10-09 ENCOUNTER — OFFICE VISIT (OUTPATIENT)
Dept: INTERNAL MEDICINE | Facility: CLINIC | Age: 68
End: 2023-10-09
Payer: MEDICARE

## 2023-10-09 VITALS
WEIGHT: 159 LBS | HEIGHT: 66 IN | HEART RATE: 72 BPM | BODY MASS INDEX: 25.55 KG/M2 | SYSTOLIC BLOOD PRESSURE: 124 MMHG | DIASTOLIC BLOOD PRESSURE: 74 MMHG | OXYGEN SATURATION: 100 % | TEMPERATURE: 97.1 F | RESPIRATION RATE: 20 BRPM

## 2023-10-09 DIAGNOSIS — I10 ESSENTIAL HYPERTENSION WITH GOAL BLOOD PRESSURE LESS THAN 140/90: ICD-10-CM

## 2023-10-09 DIAGNOSIS — E55.9 VITAMIN D DEFICIENCY: ICD-10-CM

## 2023-10-09 DIAGNOSIS — R63.4 WEIGHT LOSS: Primary | ICD-10-CM

## 2023-10-09 DIAGNOSIS — Z98.84 S/P GASTRIC BYPASS: ICD-10-CM

## 2023-10-09 PROCEDURE — 99213 OFFICE O/P EST LOW 20 MIN: CPT | Performed by: INTERNAL MEDICINE

## 2023-10-09 NOTE — PROGRESS NOTES
"  Assessment & Plan     Weight loss  She thinks her wt has stabilized but looking at chart I am not convinced encouraged her to eat calorie dense foods. She may even want to try fruit jiuces with sugar in them as they are usually not filling. Then she can eat regular food in her current qty as tolerated    S/P gastric bypass  As above    Vitamin D deficiency       Essential hypertension with goal blood pressure less than 140/90  under good control        BMI:   Estimated body mass index is 26.06 kg/m  as calculated from the following:    Height as of this encounter: 1.664 m (5' 5.5\").    Weight as of this encounter: 72.1 kg (159 lb).           Lian Martinez MD  [facility] VICKEY Garcia is a 67 year old, presenting for the following health issues:  RECHECK and Gastrointestinal Problem      10/9/2023     1:48 PM   Additional Questions   Roomed by ALYSHA Everett LPN   Accompanied by self         10/9/2023     1:48 PM   Patient Reported Additional Medications   Patient reports taking the following new medications none       History of Present Illness       Reason for visit:  Nutrition recommendation, possible transfer of physician    She eats 2-3 servings of fruits and vegetables daily.She consumes 0 sweetened beverage(s) daily.She exercises with enough effort to increase her heart rate 9 or less minutes per day.  She exercises with enough effort to increase her heart rate 3 or less days per week.   She is taking medications regularly.     She has been losing wt for months for no clear reason. She is status post gastric bypass. The [facility] thought she had small bowel bacterial over growth. They treated her with Augmentin and her wt loss seems to have slowed down. They had wanted another drug but insurance would only cover Augmentin.     But she still gets intermittent abdominal pain that can last for a few days and during that she cannot eat or keep much down. Which is when she looses wt. " "      Review of Systems   Constitutional, HEENT, cardiovascular, pulmonary, GI, , musculoskeletal, neuro, skin, endocrine and psych systems are negative, except as otherwise noted.      Objective    Pulse 72   Temp 97.1  F (36.2  C) (Oral)   Resp 20   Ht 1.664 m (5' 5.5\")   Wt 72.1 kg (159 lb)   SpO2 100%   Breastfeeding No   BMI 26.06 kg/m    Body mass index is 26.06 kg/m .  Physical Exam   GENERAL: healthy, alert and no distress  NECK: no adenopathy, no asymmetry, masses, or scars and thyroid normal to palpation  RESP: lungs clear to auscultation - no rales, rhonchi or wheezes  CV: regular rate and rhythm, normal S1 S2, no S3 or S4, no murmur, click or rub, no peripheral edema and peripheral pulses strong  ABDOMEN: soft, nontender, no hepatosplenomegaly, no masses and bowel sounds normal  MS: no gross musculoskeletal defects noted, no edema  PSYCH: mentation appears normal, affect normal/bright              "

## 2023-10-19 ENCOUNTER — TELEPHONE (OUTPATIENT)
Dept: UROLOGY | Facility: CLINIC | Age: 68
End: 2023-10-19
Payer: MEDICARE

## 2023-10-19 DIAGNOSIS — N39.41 URGE INCONTINENCE: ICD-10-CM

## 2023-10-19 DIAGNOSIS — R39.15 URINARY URGENCY: ICD-10-CM

## 2023-10-23 DIAGNOSIS — R39.15 URINARY URGENCY: ICD-10-CM

## 2023-10-23 DIAGNOSIS — N39.41 URGE INCONTINENCE: ICD-10-CM

## 2023-10-23 RX ORDER — FESOTERODINE FUMARATE 8 MG/1
1 TABLET, FILM COATED, EXTENDED RELEASE ORAL DAILY
Qty: 90 TABLET | Refills: 3 | Status: SHIPPED | OUTPATIENT
Start: 2023-10-23 | End: 2023-12-06

## 2023-10-23 NOTE — TELEPHONE ENCOUNTER
Prior Authorization Retail Medication Request    Medication/Dose: Fesoterodine Fumarate 8 MG TB24   ICD code (if different than what is on RX):    Previously Tried and Failed:    Rationale:      Insurance Name:    Insurance ID:        Pharmacy Information (if different than what is on RX)  Name:    Phone:       Mary Mcintosh RN, BSN

## 2023-10-23 NOTE — TELEPHONE ENCOUNTER
Central Prior Authorization Team   Phone: [phone]    PA Initiation    Medication: FESOTERODINE FUMARATE ER 8 MG PO TB24  Insurance Company: [employer] - Phone [phone] Fax [phone]  Pharmacy Filling the Rx: [id number] - [place], [place] - [address] AT [facility]  Filling Pharmacy Phone: [phone]  Filling Pharmacy Fax:    Start Date: 10/23/2023

## 2023-10-23 NOTE — TELEPHONE ENCOUNTER
Per chart review and the 8/21/23 virtual visit with Dr. Valdivia, patient was to continue toviaz. Toviaz reordered with plan to send to PA team for review.     Called and spoke to patient who is aware that a new prescription for Toviaz has been sent to her pharmacy and that a message will be sent to our PA team for review. Patient aware that she will be contacted with additional information. Patient reports that the Toviaz medication has been helpful for her and she only has a few pills left.     Mary Mcintosh RN, BSN

## 2023-10-24 RX ORDER — FESOTERODINE FUMARATE 8 MG/1
1 TABLET, FILM COATED, EXTENDED RELEASE ORAL DAILY
Qty: 90 TABLET | Refills: 0 | OUTPATIENT
Start: 2023-10-24

## 2023-10-24 NOTE — TELEPHONE ENCOUNTER
FESOTERODINE ER 8 MG TABLET    Disp Refills Start End PACO   Fesoterodine Fumarate 8 MG TB24 90 tablet 3 10/23/2023  No   Sig - Route: TAKE 1 TABLET BY MOUTH EVERY DAY - Oral   Sent to pharmacy as: Fesoterodine Fumarate ER 8 MG Oral Tablet Extended Release 24 Hour   Class: E-Prescribe   Notes to Pharmacy: Will send to our prior auth team for review   Order: [id number]   E-Prescribing Status: Transmission to pharmacy failed (10/23/2023  4:54 PM CDT)   Message completed by Doege, Kathleen M, RN (10/23/2023 5:07 PM).     Printout Tracking    External Result Report     Pharmacy    [id number] - [place], [place] - [address] AT [facility]

## 2023-10-24 NOTE — TELEPHONE ENCOUNTER
Prior Authorization Approval    Medication: FESOTERODINE FUMARATE ER 8 MG PO TB24  Authorization Effective Date: 10/23/2023  Authorization Expiration Date: 12/31/2024  Approved Dose/Quantity:   Reference #:     Insurance Company: [employer] - [place] [phone] Fax [phone]  Expected CoPay: $    CoPay Card Available:      Financial Assistance Needed:   Which Pharmacy is filling the prescription: [id number] - [place], [place] - [address] AT [facility]  Pharmacy Notified:Yes   Patient Notified: **Instructed pharmacy to notify patient when script is ready to /ship.**

## 2023-11-01 ENCOUNTER — VIRTUAL VISIT (OUTPATIENT)
Dept: GASTROENTEROLOGY | Facility: CLINIC | Age: 68
End: 2023-11-01
Payer: MEDICARE

## 2023-11-01 DIAGNOSIS — R10.12 LUQ ABDOMINAL PAIN: ICD-10-CM

## 2023-11-01 DIAGNOSIS — R68.81 EARLY SATIETY: ICD-10-CM

## 2023-11-01 DIAGNOSIS — R10.32 LLQ ABDOMINAL PAIN: ICD-10-CM

## 2023-11-01 DIAGNOSIS — R63.4 UNINTENTIONAL WEIGHT LOSS: Primary | ICD-10-CM

## 2023-11-01 PROCEDURE — 99417 PROLNG OP E/M EACH 15 MIN: CPT | Mod: VID | Performed by: PHYSICIAN ASSISTANT

## 2023-11-01 PROCEDURE — 99215 OFFICE O/P EST HI 40 MIN: CPT | Mod: VID | Performed by: PHYSICIAN ASSISTANT

## 2023-11-01 RX ORDER — PREDNISONE, DIPHENHYDRAMINE
1 KIT ONCE
Qty: 1 KIT | Refills: 0 | Status: SHIPPED | OUTPATIENT
Start: 2023-11-01 | End: 2023-11-06

## 2023-11-01 ASSESSMENT — PAIN SCALES - GENERAL: PAINLEVEL: NO PAIN (0)

## 2023-11-01 NOTE — NURSING NOTE
Is the patient currently in the state of [place]? YES    Visit mode:VIDEO    If the visit is dropped, the patient can be reconnected by: VIDEO VISIT: Text to cell phone:   Telephone Information:   Mobile [phone]   Mobile Not on file.       Will anyone else be joining the visit? NO  (If patient encounters technical issues they should call [phone])    How would you like to obtain your AVS? MyChart    Are changes needed to the allergy or medication list? No    Reason for visit: RECHECK    Charanjit BARROW

## 2023-11-01 NOTE — PROGRESS NOTES
"Virtual Visit Details    Type of service:  Video Visit   Video Start Time:  1203  Video End Time: 1252    Originating Location (pt. Location): Home    Distant Location (provider location):  Off-site  Platform used for Video Visit: [facility]     Gastroenterology Visit for: Jose Coronado 1955   MRN: [id number]     Reason for Visit:  chief complaint    Referred by: Carolann  / Daisy [facility] [id number]  Patient Care Team:  Lian Martinez MD as PCP - General (Internal Medicine)  Olivia Vasquez PA-C as Physician Assistant (Physician Assistant - Medical)  Carolyn Valdivia MD as MD (Urology)  Lita Goodman, RN as Specialty Care Coordinator (Urology)  Lian Martinez MD as Referring Physician (Internal Medicine)  Carolyn Valdivia MD as Assigned Surgical Provider  Lily Yancey MD as MD (OB/Gyn)  Mary Mathews MD as Referring Physician (Internal Medicine)  Olivia Vasquez PA-C as Physician Assistant (Urology)  Olivia Vasquez PA-C as Assigned OBGYN Provider  Alexandra Whitten MD as MD (Infectious Diseases)  Alexandra Whitten MD as Assigned Infectious Disease Provider  Ynes Vuong PA-C as Physician Assistant (Gastroenterology)  Lian Martinez MD as Assigned PCP    History of Present Illness:   Jose Coronado is 67 year old female with significant past medical history pertinent for  hyperparathyroidism, parathyroid tumors, HTN, MITCHELL on CPAP, paroxysmal AFIB, minor stroke in 2017 (no residual), brain anerysm repair, PFO repair, history of gastric bypass, grade 4 prolapse with urinary incontinence s/p rectocele/cystocele repair/hysterectomy March 2020, recurrent UTIs (No UTI since 5/2022) who is presenting as follow up with chief complaint of abdominal pain.    Interval History November 1, 2023:    Today Jose reports that she had had \"an attack\" in July. During this episode she reports that her temperature drops to 94 degrees.  This most recent episode lasted 9 " "days and she describes having severe abdominal pain where she was unable to leave bed. The pain starts towards the upper left breast and moves to her left pelvis.  During severe episodes this can involve the entirety of the lower abdomen. Her most recent episode was associated with an 11 lb weight loss. Attributed to inability to tolerate oral intake and only able to sip Pedialyte.     She continues to have abdominal pain in between these episodes that resolves on it own. This can last for 1 to 2 days at a time.  She believes this can be gas related as she has increased belching during these periods.  She further explains \"that I am not bloated physically however cannot have external pressure on the abdomen.\" Explaining that if anything touches her abdomen this results in discomfort.  No identifiable food trigger.  However reports eating plain white bread and crackers can improve her symptoms.     Additionally she explains that she rarely has hunger pains. She is eating smaller portion sizes however she has experienced this since the bypass.  Noting now this is different over the past 2-3 years.     For example last night she has not stopped at [employer] and had a half of a quarter pounder and a few french fries.  She had felt fine until she laid down for bed and then she had become nauseous as if was going to throw up. Noticing that sensation occurs when she eats later into the evening in close proximity to laying down flat.     She has had unintentional weight loss of approximately 25 lbs since her last office visit. She has attempted to go to the [facility] however was denied consultation by the GI clinic.  At the beginning of the visit Jose had asked \"How long can I live, I am not trying to be dramatic Karen I am trying to plan.\" Further explaining that she is frustrated stating \"I don't want to leave here today, come back in six months with the plan of tell me how you are doing. That is no longer acceptable " "to me.\"     Diarrhea is now resolved. Having solid bowel movements. Every other stools continues to be waxy.     Last CT with contrast she had developed itching deep in the ears which traveled to the back of the throat. This occurred within minutes of the CT finishing.  -------------------------------------------------------------------------------------------------------------------------------------------------------------------------------------------------------------    Interval History June 14, 2023:    Today Jose reports that she is having more firm stools than she does have diarrhea. Having less gas. Attributes this to adjustments in dietary habits following a modified low FODMAPS diet and limited lactose diet. Appearence of the stool remains the same with reports of oily appearance.     Continues to have \"stools on and off, waxy yellowy type of stool, alternates between solid stool and semi solid stool.\" Will have a BM daily with 4-5 days per week with well formed stools consistent with [place] Stool Scale Type 4. \"I have many more now, well formed clean stool then I do the days of soft/mushy.\" Alternatively there are 2 days where the stools I described as \"messy\" most consistent with [place] Stool Scale Type 5. No liquid stool. Associated with intermittent incontinence. Denies outward signs of GI bleeding.     With additional attacks of with emesis, generalized abdominal cramping which has occurred x4-5 times in the past few years. Associated with increased flatulence. No change in bowel habits during this time. There is no impact on the stooling. Symptoms will last for approximately 48 hours.     Weight loss has overall improved. She reports that her weight is now overall stable. Now weighing 170 lbs. Since January has lost 7 - 8 lbs. January 2020 weighed 209 lbs. May 2022 weighed 200 lbs.     Has not trialed a complete lactose free diet.     Metamucil made symptoms worse. Was taking 2 tablespoons per day. " "Noted less stomach pain/gas.     No previous Imodium trial.     -----------------------------------------------------------------------------------------------------------------------------------------------------------------------------------------------------------  Interval History 2/13/23:  After last visit testing completed. 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 Stool testing with increased split fecal fat, normal neutral fat. Fecal elastase normal. Celiac serologies negative. Vitamin E low with testing with her neurologist, and zinc lower than ideal so was started on vitamin E and zinc supplementation with per their recommendation.     Many questions today regarding SIBO diagnosis, course and treatment which were discussed in detail.  She notes that she has not been able to get Xifaxan approved through her insurance so has not started any antibiotics yet.  Reports that she is not excited about starting antibiotics and would like to avoid multiple courses.     Today Jose reports that she is feeling better than she was from a bowel perspective.  She notes that she was having a terrible flare with loose stools, foul-smelling flatulence, and bloating and early to mid January, then however transition to 2 weeks of normal, formed, nonurgent stool with 1 bowel movement per day (though still \"waxy \").  Recently she has again started to have softer bowel movements but not as frequent or loose his previous and no recent incontinence.     She notes that she is recently put a lot of effort into eating better and doing research regarding SIBO and diet.  She has been following a low fermentation eating plan, avoiding things like lactose, artificial sugars, legumes.  She has also been working on increasing her soluble fiber as recommended at last visit, currently taking 2 tablespoons of Citrucel daily.     Reports previous weight loss from 203 down to 180. Reports weight now " stable.  ---------------------------------------------------------------------------------------------------------------------------------------------------------------  11/2022 Ynes WALDRON Initial Visit:  Patient reports that starting about 2 years ago consistency and color of her stools changed.  She was initially having chalky pale stools which turned to pasty yellow, which she continues to have today.  She reports she is to have formed stools but now soft to loose (BSC 4-6), once per week will have formed (BSC 4).  Currently having 1-2 stools per day but unpredictable timing, can be overnight, and often quite urgent. She also reports increasing flatus over this time.  Denies blood or melena.  Denies missing days without stool or having hard stools. She also notes fecal incontinence/leakage, which she is not able to feel so wears a pad daily.  Notices stool and pad about once per week.  Had mesh sling placed for cystocele/rectocele in 2020 but notes that she can still feel a bulge at the introitus.       Denies regular abdominal pain, nausea, vomiting, bloating, dysphagia or odynophagia. She reports having intermittent reflux if she eats particular foods or too late at night, reports this is resolved with TUMS and not overly bothersome.     She also notes 2 discrete episodes of severe abdominal pain over the last 2 years, 1 November 2020 and 1 in July 2021, each lasting 24 to 48 hours, but and able to get out of bed or eat or drink during episodes.  They eventually self resolved with sips of Pedialyte and rest.  Has not recurred since.     Stool studies done 8/29/22 were negative for C.Diff, enteric panel, cryptosporidium, giardia, and O&P. Lactoferrin was positive. Colonoscopy done 10/6/22 - examined colon was was normal, biopsies were negative for microscopic colitis but did show melanosis coli.     She reports that she started drinking more alcohol over the last 2 years during the pandemic with daughter in  house and experimenting with different cocktails.  After having fatty liver visualized on CT she has since drastically cut alcohol intake and only occasionally has a glass of wine now, no change in stool pattern since reducing alcohol intake.     Reports that she used stool softeners for a while after her pelvic surgery, denies any laxative use including stimulant laxatives.     Diet Recall -  Breakfast: 2 eggs and toast, yogurt and blueberries, oatmeal, nuts and blueberries with sugar-free maple syrup - always fruit  Lunch: turkey or tuna sandwich, cottage cheese, bean salad, soup, tacos  Dinner: out to eat - fish or chicken with rice/potatoes and vegetables, soup,   Drinks: water 100oz, iced tea sweetened with sweet and low, coffee (2-3 cups)    Esophageal Questionnaire(s)    BEDQ Questionnaire       No data to display                   No data to display                Eckardt Questionnaire       No data to display                Promis 10 Questionnaire       No data to display                STUDIES & PROCEDURES:    EGD:       Colonoscopy:    10/6/2022   Findings:        Multiple medium-mouthed diverticula were found in the sigmoid colon.        The entire examined colon otherwise appeared normal on direct and        retroflexion views.                                                                                     Impression:               - Diverticulosis in the sigmoid colon.                             - The entire examined colon is normal on direct and                             retroflexion views.                             - Biopsies were taken with a cold forceps from the                             ascending colon and descending colon for evaluation                             of microscopic colitis.    Final Diagnosis   A.  Ascending colon, biopsy:  -Negative for diagnostic colitis, dysplasia, or malignancy.     B.  Descending colon, biopsy:  -Melanosis coli.  -Negative for diagnostic colitis,  dysplasia, or malignancy.       8/12/2015   Findings:        Diverticula were found in the sigmoid colon.                                                                                     Impression:               - Diverticulosis in the sigmoid colon.   Recommendation:           High fiber diet. Repeat in ten years.     EndoFLIP directed at the UES or LES (8cm (EF-325) balloon length or 16cm (EF-322) balloon length):   Date:  8cm balloon  Balloon inflation Balloon pressure CSA (mm^2) DI (mm^2/mmHg) Dmin (mm) Compliance   20 (ladmark ID)        30        40        50           16cm balloon  Balloon inflation Balloon pressure CSA (mm^2) DI (mm^2/mmHg) Dmin (mm) Compliance   30 (ladmark ID)        40        50        60        70           High Resolution Manometry:    PH/Impedance:     Anderson:    CT:    5/6/2022 CT AP WO Contrast                                            IMPRESSION:   1. Air within the urinary bladder may be related to recent  instrumentation.  2. Diffuse fatty infiltration of the liver.  3. Cholelithiasis.  4. Several low density lesions in the liver are too small to  characterize, but could represent cysts or hemangiomas. Consider liver  MRI for further characterization.       Esophagram:    FL VSS:     GES:    U/S:     XRAY:    Other:     9/2022 MRI Liver WO & W   TECHNIQUE: Multiplanar multisequence imaging of the abdomen acquired  before and after administration of 10 mL Gadavist intravenous  contrast.     FINDINGS: Multiple hepatic cysts. No definite hemangiomas. Signal  intensity of the liver parenchyma is unremarkable without evidence of  hepatic steatosis. There is no definite intra or extrahepatic biliary  dilatation. Liver is normal size and normal in contour. Adrenal  glands, kidneys, spleen, and pancreas demonstrate no worrisome focal  lesion. Osseous structures demonstrate no worrisome signal  abnormality. No definite adenopathy or bowel obstruction in the  visualized abdomen. No  ascites.                                                            IMPRESSION: Multiple liver cysts.    1/6/2023 Hydrogen Breath Test  Impression    Positive hydrogen breath test consistent with small intestinal bacterial overgrowth. High methane production at baseline and throughout the test. This can be seen with constipation, though data on this is limited. Please correlate clinically. 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 Consideration could be given to treatment with neomycin and rifaximin. (ACG Clinical Guidelines: Small Intestinal Bacterial Overgrowth; Hastings et al. The American Journal of Gastroenterology: February 2020 - Volume 115 - Issue 2 - p 165-178)         Prior medical records were reviewed including, but not limited to, notes from referring providers, lab work, radiographic tests, and other diagnostic tests. Pertinent results were summarized above.     History     Past Medical History:   Diagnosis Date     Anemia      Arthritis 2015    Hands, back, hips. Various dates first noted.     CVA (cerebral vascular accident) (H) 2017    ?migraine, ?pfo--negative vasc w/u, neg hypercoag w/u     Diffuse cystic mastopathy     Fibrocystic breast disease     HTN, goal below 140/90      Hyperparathyroidism (H24)      Infectious mononucleosis     Mono at age 16     Irregular heart beat     PAT no afib on 30day monitor     Labyrinthitis, unspecified      Migraine headache with aura      Osteopenia      Pain in joint, shoulder region     Secondary to a fall     Palpitations      PFO (patent foramen ovale)     s/p closure with amplazter device 7/13/17     S/P gastric bypass June, 2010     Sleep apnea     she is on CPAP     Sleep apnea      Vitamin D deficiencies        Past Surgical History:   Procedure Laterality Date     BIOPSY  1984    Breast biopsies     BREAST SURGERY  1984    Above     CARDIAC SURGERY  7/13/2017    PFO closure     COLONOSCOPY N/A 8/12/2015    Procedure:  COLONOSCOPY;  Surgeon: Deandre Brooks MD;  Location: [facility]     COLONOSCOPY N/A 10/6/2022    Procedure: COLONOSCOPY, WITH BIOPSIES;  Surgeon: Freddie Gonzalez MD;  Location: [facility]     DAVINCI HYSTERECTOMY SUPRACERVICAL, SALPINGO-OOPHORECTOMY INCLUDING BILATERAL N/A 3/16/2020    Procedure: DAVINCI HYSTERECTOMY SUPRACERVICAL, SALPINGO-OOPHORECTOMY INCLUDING BILATERAL WITH EXAM UNDER ANESTHESIA;  Surgeon: Lily Yancey MD;  Location: [facility]     DAVINCI SACROCOLPOPEXY, MIDURETHRAL SLING, CYSTOSCOPY N/A 3/16/2020    Procedure: SACROCOLPOPEXY, ROBOT-ASSISTED, LAPAROSCOPIC, WITH INSERTION OF MIDURETHRAL SLING AND CYSTOSCOPY;  Surgeon: Carolyn Valdivia MD;  Location: [facility]     GASTRIC BYPASS  June 24, 2010     GENITOURINARY SURGERY  3/16/2020    Cystocele/rectocele repair     GYN SURGERY  3/16/2020    Hysterectomy     ORTHOPEDIC SURGERY Left 2010    wrist fracture     PARATHYROIDECTOMY  9/19/11     ZZC ANEURYSM, INTRACRAN, SIMPLE SURG  04/2017    coil of aneurysm right posterior paraophthalmic artery     ZZC NONSPECIFIC PROCEDURE      S/P multiple breast biopies - all negative / benign     ZZC NONSPECIFIC PROCEDURE      S/P T&A     ZZC NONSPECIFIC PROCEDURE      Ryder teeth extraction     ZZC NONSPECIFIC PROCEDURE      S/P (? unreadable) ankle       Social History     Socioeconomic History     Marital status:      Spouse name: Not on file     Number of children: Not on file     Years of education: Not on file     Highest education level: Not on file   Occupational History     Not on file   Tobacco Use     Smoking status: Never     Smokeless tobacco: Never   Vaping Use     Vaping Use: Never used   Substance and Sexual Activity     Alcohol use: Yes     Comment: Social     Drug use: No     Sexual activity: Not Currently     Partners: Male     Birth control/protection: Pill   Other Topics Concern     Parent/sibling w/ CABG, MI or angioplasty before 65F 55M? No   Social History Narrative     Not on file      Social Determinants of Health     Financial Resource Strain: Low Risk  (10/4/2023)    Financial Resource Strain      Within the past 12 months, have you or your family members you live with been unable to get utilities (heat, electricity) when it was really needed?: No   Food Insecurity: Low Risk  (10/4/2023)    Food Insecurity      Within the past 12 months, did you worry that your food would run out before you got money to buy more?: No      Within the past 12 months, did the food you bought just not last and you didn t have money to get more?: No   Transportation Needs: Low Risk  (10/4/2023)    Transportation Needs      Within the past 12 months, has lack of transportation kept you from medical appointments, getting your medicines, non-medical meetings or appointments, work, or from getting things that you need?: No   Physical Activity: Not on file   Stress: Not on file   Social Connections: Not on file   Interpersonal Safety: Low Risk  (10/9/2023)    Interpersonal Safety      Do you feel physically and emotionally safe where you currently live?: Yes      Within the past 12 months, have you been hit, slapped, kicked or otherwise physically hurt by someone?: No      Within the past 12 months, have you been humiliated or emotionally abused in other ways by your partner or ex-partner?: No   Housing Stability: Low Risk  (10/4/2023)    Housing Stability      Do you have housing? : Yes      Are you worried about losing your housing?: No       Family History   Problem Relation Age of Onset     Breast Cancer Mother      Hypertension Mother      Arthritis Mother      Thyroid Disease Mother         Hypo     Ulcerative Colitis Mother      Cerebrovascular Disease Mother         Age 78 - Cerebral Hemorrhage,      Anxiety Disorder Mother      Depression Mother      Genetic Disorder Mother         Ulcerative colitis     Obesity Mother      Anesthesia Reaction Mother         Same     Breast Cancer Maternal Aunt       Hypertension Paternal Grandmother      Cerebrovascular Disease Maternal Grandmother         Age 72 -      Family History Negative Maternal Grandfather      Family History Negative Paternal Grandfather      Family History Negative Son      Family History Negative Daughter      Other Cancer Father         Skin - Non-melanoma varieties     Hypertension Father      Asthma Father      Family History Negative Daughter      Ulcerative Colitis Sister      Hypertension Sister      Hyperlipidemia Sister      Anxiety Disorder Sister      Depression Sister      Diabetes Sister      Obesity Sister      Asthma Sister      Anesthesia Reaction Sister         Debilitating headaches     Hypertension Brother      Anxiety Disorder Brother      Depression Brother      Asthma Nephew      Hypertension Sister         Congestive Heart Failure     Anxiety Disorder Sister      Genetic Disorder Sister         Ulcerative colitis     Obesity Sister      Genetic Disorder Niece         Ulcerative colitis     Colon Cancer No family hx of      Coronary Artery Disease No family hx of      Mental Illness No family hx of      Substance Abuse No family hx of      Osteoporosis No family hx of      Deep Vein Thrombosis No family hx of      Family history reviewed and edited as appropriate    Medications and Allergies:     Outpatient Encounter Medications as of 2023   Medication Sig Dispense Refill     Ascorbic Acid (VITAMIN C PO) Take 250 mg by mouth every morning (0.5 x 500 mg tablet = 250 mg dose) (Patient not taking: Reported on 2023)       aspirin 325 MG tablet Take 325 mg by mouth every morning        atenolol (TENORMIN) 50 MG tablet Take 1 tablet (50 mg) by mouth daily 30 tablet 8     Calcium Citrate-Vitamin D (CALCIUM CITRATE + D PO) Take 2 tablets by mouth every morning        COMPOUNDED NON-CONTROLLED SUBSTANCE (CMPD RX) - PHARMACY TO MIX COMPOUNDED MEDICATION Estriol 1 mg/g in HRT base, apply small amount to finger and apply to  inside vagina daily for 2 weeks then twice weekly 30 g 1     COMPOUNDED NON-CONTROLLED SUBSTANCE (CMPD RX) - PHARMACY TO MIX COMPOUNDED MEDICATION Estriol 1 mg/g in HRT base, apply small amount to finger and apply to inside vagina daily for 2 weeks then twice weekly 30 g 11     Cranberry 450 MG TABS Take by mouth 2 times daily (Patient not taking: Reported on 6/14/2023)       Cyanocobalamin 2500 MCG TABS Take 2,500 mcg by mouth once a week Mon       Ferrous Sulfate 27 MG TABS Take 27 mg by mouth every morning        Fesoterodine Fumarate 8 MG TB24 TAKE 1 TABLET BY MOUTH EVERY DAY 90 tablet 3     FIBER COMPLETE PO Take 1 capsule by mouth every morning       hydrochlorothiazide (HYDRODIURIL) 12.5 MG tablet Take 1 tablet (12.5 mg) by mouth daily 30 tablet 8     imipramine (TOFRANIL) 25 MG tablet Take 1 tablet (25 mg) by mouth At Bedtime 90 tablet 2     loratadine (CLARITIN) 10 MG tablet Take 10 mg by mouth every morning        losartan (COZAAR) 50 MG tablet Take 1 tablet (50 mg) by mouth daily 30 tablet 8     topiramate (TOPAMAX) 25 MG tablet TAKE 1 TABLET BY MOUTH TWICE A  tablet 0     UNABLE TO FIND CPAP machine every night       valACYclovir (VALTREX) 1000 mg tablet Take 2 tablets (2,000 mg) by mouth 2 times daily for 1 day 4 tablet 1     VITAMIN D, CHOLECALCIFEROL, PO Take 500 Units by mouth every morning        No facility-administered encounter medications on file as of 11/1/2023.        Allergies   Allergen Reactions     Contrast Dye Itching     Reaction of immediate burning and severe itching in Right ear and back of throat after injection for CT.      Sulfa Antibiotics      hives        Review of systems:  A full 10 point review of systems was obtained and was negative except for the pertinent positives and negatives stated within the HPI.    Objective Findings:   Physical Exam:    Constitutional: There were no vitals taken for this visit.  General: Alert, cooperative, no distress, well-appearing  Head:  Atraumatic, normocephalic, no obvious abnormalities   Eyes: Sclera anicteric, no obvious conjunctival hemorrhage   Nose: Nares normal, no obvious malformation, no obvious rhinorrhea   Respiratory: Resting comfortably, no apparent distress, no cough.   Skin: No jaundice, no obvious rash  Neurologic: AAOx3, no obvious neurologic abnormality  Psychiatric: Normal Affect, appropriate mood  Extremities: No obvious edema, no obvious malformation     Labs, Radiology, Pathology     Lab Results   Component Value Date    WBC 5.5 03/13/2023    WBC 5.5 02/14/2022    WBC 7.9 03/23/2021    HGB 12.7 03/13/2023    HGB 13.3 02/14/2022    HGB 13.1 03/23/2021     03/13/2023     02/14/2022     03/23/2021    CHOL 127 03/13/2023    CHOL 138 02/14/2022    CHOL 177 01/11/2021    TRIG 36 03/13/2023    TRIG 64 02/14/2022    TRIG 43 01/11/2021    HDL 58 03/13/2023    HDL 71 02/14/2022    HDL 73 01/11/2021    ALT 34 03/13/2023    ALT 23 02/14/2022    ALT 24 01/11/2021    AST 40 (H) 03/13/2023    AST 18 02/14/2022    AST 22 01/11/2021     03/13/2023     02/14/2022     (L) 03/23/2021    BUN 15.0 03/13/2023    BUN 18 02/14/2022    BUN 19 03/23/2021    CO2 25 03/13/2023    CO2 30 02/14/2022    CO2 25 03/23/2021    TSH 0.85 03/13/2023    TSH 1.13 02/14/2022    TSH 2.40 03/23/2021    INR 0.92 08/24/2022    INR 0.87 03/23/2021    INR 0.86 04/29/2019        Liver Function Studies -   Recent Labs   Lab Test 03/13/23  1122   PROTTOTAL 5.7*   ALBUMIN 3.6   BILITOTAL 0.5   ALKPHOS 101   AST 40*   ALT 34        Patient Active Problem List    Diagnosis Date Noted     Atrophic vaginitis 02/10/2021     Priority: Medium     Urge incontinence 02/10/2021     Priority: Medium     Urinary urgency 02/10/2021     Priority: Medium     Midline cystocele 12/16/2019     Priority: Medium     Added automatically from request for surgery [id number]       Rectocele 12/16/2019     Priority: Medium     Added automatically from request for  surgery [id number]       Osteopenia 03/17/2019     Priority: Medium     Vitamin D deficiency 03/17/2019     Priority: Medium     (Problem list name updated by automated process. Provider to review and confirm.)    (Problem list name updated by automated process. Provider to review and confirm.)       Hyperparathyroidism (H24) 03/17/2019     Priority: Medium     Word finding difficulty 03/17/2019     Priority: Medium     Received intravenous tissue plasminogen activator (tPA) in emergency department 03/17/2019     Priority: Medium     Accidental marijuana poisoning 03/17/2019     Priority: Medium     Migraine with aura and without status migrainosus, not intractable 01/07/2019     Priority: Medium     ASD (atrial septal defect) 07/13/2017     Priority: Medium     Cerebral aneurysm without rupture 04/10/2017     Priority: Medium     Left temporal lobe infarction (H) 02/09/2017     Priority: Medium     Essential hypertension with goal blood pressure less than 140/90 10/31/2016     Priority: Medium     PFO (patent foramen ovale) 02/15/2016     Priority: Medium     s/p closure with amplazter device 7/13/17       Lump or mass in breast 11/09/2015     Priority: Medium     Class 1 obesity in adult 10/16/2015     Priority: Medium     Iron deficiency anemia 10/16/2015     Priority: Medium     ACP (advance care planning) 08/23/2012     Priority: Medium     Discussed Advance Directive planning with patient; information given to patient to review.       S/P gastric bypass 06/01/2010     Priority: Medium     Sleep apnea 08/28/2009     Priority: Medium     Female stress incontinence 06/09/2008     Priority: Medium     (Problem list name updated by automated process. Provider to review and confirm.)       Family history of malignant neoplasm of breast 10/02/2003     Priority: Medium      Assessment and Plan   Assessment/Plan:    Jose Coronado is 67 year old female with significant past medical history pertinent for   "hyperparathyroidism, parathyroid tumors, HTN, MITCHELL on CPAP, paroxysmal AFIB, minor stroke in 2017 (no residual), brain anerysm repair, PFO repair, Grade 4 prolapse with urinary incontinence s/p rectocele/cystocele repair/hysterectomy March 2020, recurrent UTIs (No UTI since 5/2022) who is presenting as follow up with chief complaint of abdominal pain.    Prior Evaluation:    5/9/2022 CT AP WO Contrast diffuse fatty infiltration of the liver, cholelithiasis and several small densities within the liver too small to characterize however could represent cyst versus hemangiomas.    8/2022 Infectious stools studies including C.Diff, enteric panel, crypto/giarrdia, and O&P were negative. Fecal lactoferrin was positive.     MRI Liver W WO Contrast 9/9/2022 with multiple hepatic cysts, no definite hemangiomas and the liver parenchyma is unremarkable without evidence of hepatic steatosis. No intra/extrahepatic biliary dilation.     10/2022 Colonoscopy was then performed which did which was unremarkable on endoscopy, on histology no evidence of microscopic or inflammatory colitis driving diarrhea, though did show melanosis coli (denies laxative use).  Colonoscopy was not completed to the TI.     11/2022 Vit A/D, B12/folate and iron studies unremarkable. Vit E level low.     11/2022 celiac serologies negative, fecal elastase negative and split fecal fat increased. Neutral fecal fat normal.     1/2023 hydrogen breath testing positive for SIBO and high levels of methane (IMO)    3/2023 treated with Augmentin x10 days without improvement in symptoms as Rifaximin was not covered by insurance    3/13/2023 CBC/TSH w/ reflex unremarkable     6/1/2023 Fax received appeal for Rifaximin was denied \"due to the medication not seriously jeopardizing the life, health or ability to maintain function.\"      #Abdominal Pain   #Unintentional Weight Loss   #Early Satiation   #Early Satiety  Today Jose reports that since our last office visit she " "had experienced which she describes to be \"an attack\" of abdominal pain. These episodes are more severe than the intermittent abdominal pain that she experiences throughout remainder of the weeks/days. Specifically this last episode had lasted for 9 days. The pain began in the left upper quadrant and radiated down to her pelvis. When the pain is most severe it is located across the entirety of the lower abdomen. She did not seek evaluation during this past episode. Additionally, during this episode she was unable to tolerate any oral intake other than sips of Pedialyte which resulted in an 11 pound weight loss.    Additionally she experiences a smilar however less severe abdominal pain which can last 24 to 48 hours. This pain is described to self relieving and less debilitating. Futhermore, she describes symptoms of early satiety and early satiation. Noting that she did have a change in her eating patterns/quantity of what she could consume after her gastric bypass however there has been an additional change in the past 2 to 3 years.    As for her weight loss this was noted to be improved January - June with a total of approximately 6 pound weight loss over the span of time. However, since our last office visit Jose reports an addiitonal 25 lb weight loss.  In January she had weighed 177 lbs, June 170 lbs and now 159 lbs. January 2020 when symptoms had initially began she had weighed 209 lbs per chart review.    Differential is broad and includes however is not limited to organic etiologies of the upper GI tract such as anastomotic stricture/ulcer, esophagitis, gastritis, duodenitis, peptic ulcer disease or erosions, gastroparesis (in setting of prior Jennie-en-Y gastric bypass), partial small bowel obstructions/adhesions during episodes of acute pain with history of prior intraabdominal surgical intervention, pancreatitis vs other. Ultimately, with presenting symptoms malignancy should be considered and a CT scan with " contrast of the chest, abdomen and pelvis has been ordered in addition to an upper endoscopy. Jose does report a reaction to contrast which she described as itching/burning within her ears that moved down her neck.  As this was not a true anaphylactic reaction patient will receive the premedication with methylprednisolone/Benadryl prior to the imaging study.    Future considerations would be to obtain MRE for additional small bowel evaluation.     - Upper endoscopy to be completed by luminal provider. Please obtain gastric and duodenal biopsies -to be completed in the next 2 to 3 weeks   - CT CAP W Contrast with continued unintentional weight loss     #Fluctuation in Bowel Habits - Resolved   #Fecal Urgency - Resolved   #Flatus - Resolved   #SIBO/IMO - Resolved   Pertinent history for recurrent UTI's and treated with antibiotics over the past 2-3 years (last UTI 5/2022). She is also s/p surgery for rectocele and cystocele in 2020 which correlated with the onset of symptoms.    Initial onset of symptoms 2-3 years prior. Overall symptoms have since resolved.     Symptoms were thought to be largely driven by SIBO, history of gastric bypass surgery and pelvic floor dysfunction with pertinent prior surgical history for rectocele and cystocele in 2020. Differential had also included IBS-D, postinfectious IBS/dysbiosis related to significant history of antibiotic use, bile acid diarrhea, medication induced with polypharmacy and vitamin supplements s/p bariatric surgery. Previous findings of increased split fecal fat were attributed to history of SIBO vs malabsorption secondary to gastric bypass.      If Jose were to again develop diarrhea for consecutive days would then consider repeat breath testing for SIBO and if positive treatment with Ciprofloxacin/Neomycin. If fecal incontinence returns future considerations would be to obtain pelvic floor evaluation. Additional considerations would be trial of bile acid sequestrant,  lactose-free trial x 2 weeks.     - Continue to follow a modified low FODMAPs diet   - Avoid simple sugars as well as artifical sweeteners including sorbital, truvia, maltitol, mannitol, xylitol, glycerol, lactilol.     #Colorectal Cancer Screening  Last colonsocopy 10/2022 - recall due in 10 years (2032)    #Hepatic Cysts   #Fatty Liver   CT AP WO Contrast with fatty liver and several low density lesions in the liver that were described to be too small to characterize, but could represent cysts or hemangiomas. Follow up MRI Liver W WO Contrast 9/9/2022 with multiple hepatic cysts, no definite hemangiomas and the liver parenchyma is unremarkable without evidence of hepatic steatosis. No intra/extrahepatic biliary dilation.       Follow up plan:   Return to clinic 3 months and as needed.    The risks and benefits of my recommendations, as well as other treatment options were discussed with the patient and any available family today. All questions were answered.     o Follow up: As planned above. Today, I personally spent 49 minutes in direct face to face time with the patient, of which greater than 50% of the time was spent in patient education and counseling as described above. Approximately 21 minutes were spent on indirect care associated with the patient's consultation including but not limited to review of: patient medical records to date, clinic visits, hospital records, lab results, imaging studies, procedural documentation, and coordinating care with other providers. The findings from this review are summarized in the above note. All of the above accounted for a cumulative time of 70 minutes and was performed on the date of service.     The patient verbalized understanding of the plan and was appreciative for the time spent and information provided during the office visit.           Karen Cabrera PA-C  Division of Gastroenterology, Hepatology, and Nutrition  [facility]       Documentation  assisted by voice recognition and documentation system.

## 2023-11-01 NOTE — PATIENT INSTRUCTIONS
It was a pleasure taking care of you today.  I've included a brief summary of our discussion and care plan from today's visit below.  Please review this information with your primary care provider.  _______________________________________________________________________    My recommendations are summarized as follows:    - Nutrition consultation for on going weight loss   - Upper endoscopy to be completed by luminal provider. Please obtain gastric and duodenal biopsies -to be completed in the next 2 to 3 weeks   - CT CAP W Contrast with continued unintentional weight loss   - Continue to follow a modified low FODMAPs diet   - Avoid simple sugars as well as artifical sweeteners including sorbital, truvia, maltitol, mannitol, xylitol, glycerol, lactilol.     To schedule endoscopic procedures you may call: [phone]  To schedule radiology (imaging) tests you may call: [phone]  To schedule an ENT appointment you may call: [phone]    Please call my nurse Swathi ([phone]), Sabra ([phone]) with any questions or concerns.      Return to GI Clinic in 3 months to review your progress.    _______________________________________________________________________    Who do I call with any questions after my visit?  Please be in touch if there are any further questions that arise following today's visit.  There are multiple ways to contact your gastroenterology care team.      During business hours, you may reach a Gastroenterology nurse at [phone] and choose option 3.       To schedule or reschedule an appointment, please call [phone].     You can always send a secure message through [employer].  [employer] messages are answered by your nurse or doctor typically within 24 hours.  Please allow extra time on weekends and holidays.      For urgent/emergent questions after business hours, you may reach the on-call GI Fellow by contacting the [facility]  at [phone].     How will I get  the results of any tests ordered?    You will receive all of your results.  If you have signed up for [employer], any tests ordered at your visit will be available to you after your physician reviews them.  Typically this takes 1-2 weeks.  If there are urgent results that require a change in your care plan, your physician or nurse will call you to discuss the next steps.      What is [employer]?  [employer] is a secure way for you to access all of your healthcare records from the [facility].  It is a web based computer program, so you can sign on to it from any location.  It also allows you to send secure messages to your care team.  I recommend signing up for [employer] access if you have not already done so and are comfortable with using a computer.      How to I schedule a follow-up visit?  If you did not schedule a follow-up visit today, please call [phone] to schedule a follow-up office visit.      If you feel you received exceptional care and are interested in supporting the clinical and research goals of Karen Cabrera PA-C or the Division of Gastroenterology, Hepatology, and Nutrition please contact [email or web address] from the [facility] to discuss opportunities to donate.    Sincerely,    Karen Cabrera PA-C  Division of Gastroenterology, Hepatology, and Nutrition  [facility]

## 2023-11-01 NOTE — LETTER
11/1/2023         RE: Jose Coronado  [address] [place] [address]        Dear Colleague,    Thank you for referring your patient, Jose Coronado, to the [facility] [place]. Please see a copy of my visit note below.    Virtual Visit Details    Type of service:  Video Visit   Video Start Time:  1203  Video End Time: 1252    Originating Location (pt. Location): Home    Distant Location (provider location):  Off-site  Platform used for Video Visit: [facility]     Gastroenterology Visit for: Jose Coronado 1955   MRN: [id number]     Reason for Visit:  chief complaint    Referred by: Carolann  / [facility] / [facility] [id number]  Patient Care Team:  Lian Martinez MD as PCP - General (Internal Medicine)  Olivia Vasquez PA-C as Physician Assistant (Physician Assistant - Medical)  Carolyn Valdivia MD as MD (Urology)  Lita Goodman, RN as Specialty Care Coordinator (Urology)  Lian Martinez MD as Referring Physician (Internal Medicine)  Carolyn Valdivia MD as Assigned Surgical Provider  Lily Yancey MD as MD (OB/Gyn)  Mary Mathews MD as Referring Physician (Internal Medicine)  Olivia Vasquez PA-C as Physician Assistant (Urology)  Olivia Vasquez PA-C as Assigned OBGYN Provider  Alexandra Whitten MD as MD (Infectious Diseases)  Alexandra Whitten MD as Assigned Infectious Disease Provider  Ynes Vuong PA-C as Physician Assistant (Gastroenterology)  Lian Martinez MD as Assigned PCP    History of Present Illness:   Jose Coronado is 67 year old female with significant past medical history pertinent for  hyperparathyroidism, parathyroid tumors, HTN, MITCHELL on CPAP, paroxysmal AFIB, minor stroke in 2017 (no residual), brain anerysm repair, PFO repair, history of gastric bypass, grade 4 prolapse with urinary incontinence s/p rectocele/cystocele repair/hysterectomy March 2020, recurrent UTIs (No UTI since 5/2022) who is  "presenting as follow up with chief complaint of abdominal pain.    Interval History November 1, 2023:    Today Jose reports that she had had \"an attack\" in July. During this episode she reports that her temperature drops to 94 degrees.  This most recent episode lasted 9 days and she describes having severe abdominal pain where she was unable to leave bed. The pain starts towards the upper left breast and moves to her left pelvis.  During severe episodes this can involve the entirety of the lower abdomen. Her most recent episode was associated with an 11 lb weight loss. Attributed to inability to tolerate oral intake and only able to sip Pedialyte.     She continues to have abdominal pain in between these episodes that resolves on it own. This can last for 1 to 2 days at a time.  She believes this can be gas related as she has increased belching during these periods.  She further explains \"that I am not bloated physically however cannot have external pressure on the abdomen.\" Explaining that if anything touches her abdomen this results in discomfort.  No identifiable food trigger.  However reports eating plain white bread and crackers can improve her symptoms.     Additionally she explains that she rarely has hunger pains. She is eating smaller portion sizes however she has experienced this since the bypass.  Noting now this is different over the past 2-3 years.     For example last night she has not stopped at [employer] and had a half of a quarter pounder and a few french fries.  She had felt fine until she laid down for bed and then she had become nauseous as if was going to throw up. Noticing that sensation occurs when she eats later into the evening in close proximity to laying down flat.     She has had unintentional weight loss of approximately 25 lbs since her last office visit. She has attempted to go to the [facility] however was denied consultation by the GI clinic.  At the beginning of the visit Jose had " "asked \"How long can I live, I am not trying to be dramatic Karen I am trying to plan.\" Further explaining that she is frustrated stating \"I don't want to leave here today, come back in six months with the plan of tell me how you are doing. That is no longer acceptable to me.\"     Diarrhea is now resolved. Having solid bowel movements. Every other stools continues to be waxy.     Last CT with contrast she had developed itching deep in the ears which traveled to the back of the throat. This occurred within minutes of the CT finishing.  -------------------------------------------------------------------------------------------------------------------------------------------------------------------------------------------------------------    Interval History June 14, 2023:    Today Jose reports that she is having more firm stools than she does have diarrhea. Having less gas. Attributes this to adjustments in dietary habits following a modified low FODMAPS diet and limited lactose diet. Appearence of the stool remains the same with reports of oily appearance.     Continues to have \"stools on and off, waxy yellowy type of stool, alternates between solid stool and semi solid stool.\" Will have a BM daily with 4-5 days per week with well formed stools consistent with [place] Stool Scale Type 4. \"I have many more now, well formed clean stool then I do the days of soft/mushy.\" Alternatively there are 2 days where the stools I described as \"messy\" most consistent with [place] Stool Scale Type 5. No liquid stool. Associated with intermittent incontinence. Denies outward signs of GI bleeding.     With additional attacks of with emesis, generalized abdominal cramping which has occurred x4-5 times in the past few years. Associated with increased flatulence. No change in bowel habits during this time. There is no impact on the stooling. Symptoms will last for approximately 48 hours.     Weight loss has overall improved. She reports " "that her weight is now overall stable. Now weighing 170 lbs. Since January has lost 7 - 8 lbs. January 2020 weighed 209 lbs. May 2022 weighed 200 lbs.     Has not trialed a complete lactose free diet.     Metamucil made symptoms worse. Was taking 2 tablespoons per day. Noted less stomach pain/gas.     No previous Imodium trial.     -----------------------------------------------------------------------------------------------------------------------------------------------------------------------------------------------------------  Interval History 2/13/23:  After last visit testing completed. 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 Stool testing with increased split fecal fat, normal neutral fat. Fecal elastase normal. Celiac serologies negative. Vitamin E low with testing with her neurologist, and zinc lower than ideal so was started on vitamin E and zinc supplementation with per their recommendation.     Many questions today regarding SIBO diagnosis, course and treatment which were discussed in detail.  She notes that she has not been able to get Xifaxan approved through her insurance so has not started any antibiotics yet.  Reports that she is not excited about starting antibiotics and would like to avoid multiple courses.     Today Jose reports that she is feeling better than she was from a bowel perspective.  She notes that she was having a terrible flare with loose stools, foul-smelling flatulence, and bloating and early to mid January, then however transition to 2 weeks of normal, formed, nonurgent stool with 1 bowel movement per day (though still \"waxy \").  Recently she has again started to have softer bowel movements but not as frequent or loose his previous and no recent incontinence.     She notes that she is recently put a lot of effort into eating better and doing research regarding SIBO and diet.  She has been following a low fermentation eating plan, avoiding things like " lactose, artificial sugars, legumes.  She has also been working on increasing her soluble fiber as recommended at last visit, currently taking 2 tablespoons of Citrucel daily.     Reports previous weight loss from 203 down to 180. Reports weight now stable.  ---------------------------------------------------------------------------------------------------------------------------------------------------------------  11/2022 Ynes WALDRON Initial Visit:  Patient reports that starting about 2 years ago consistency and color of her stools changed.  She was initially having chalky pale stools which turned to pasty yellow, which she continues to have today.  She reports she is to have formed stools but now soft to loose (BSC 4-6), once per week will have formed (BSC 4).  Currently having 1-2 stools per day but unpredictable timing, can be overnight, and often quite urgent. She also reports increasing flatus over this time.  Denies blood or melena.  Denies missing days without stool or having hard stools. She also notes fecal incontinence/leakage, which she is not able to feel so wears a pad daily.  Notices stool and pad about once per week.  Had mesh sling placed for cystocele/rectocele in 2020 but notes that she can still feel a bulge at the introitus.       Denies regular abdominal pain, nausea, vomiting, bloating, dysphagia or odynophagia. She reports having intermittent reflux if she eats particular foods or too late at night, reports this is resolved with TUMS and not overly bothersome.     She also notes 2 discrete episodes of severe abdominal pain over the last 2 years, 1 November 2020 and 1 in July 2021, each lasting 24 to 48 hours, but and able to get out of bed or eat or drink during episodes.  They eventually self resolved with sips of Pedialyte and rest.  Has not recurred since.     Stool studies done 8/29/22 were negative for C.Diff, enteric panel, cryptosporidium, giardia, and O&P. Lactoferrin was  positive. Colonoscopy done 10/6/22 - examined colon was was normal, biopsies were negative for microscopic colitis but did show melanosis coli.     She reports that she started drinking more alcohol over the last 2 years during the pandemic with daughter in house and experimenting with different cocktails.  After having fatty liver visualized on CT she has since drastically cut alcohol intake and only occasionally has a glass of wine now, no change in stool pattern since reducing alcohol intake.     Reports that she used stool softeners for a while after her pelvic surgery, denies any laxative use including stimulant laxatives.     Diet Recall -  Breakfast: 2 eggs and toast, yogurt and blueberries, oatmeal, nuts and blueberries with sugar-free maple syrup - always fruit  Lunch: turkey or tuna sandwich, cottage cheese, bean salad, soup, tacos  Dinner: out to eat - fish or chicken with rice/potatoes and vegetables, soup,   Drinks: water 100oz, iced tea sweetened with sweet and low, coffee (2-3 cups)    Esophageal Questionnaire(s)    BEDQ Questionnaire       No data to display                   No data to display                Eckardt Questionnaire       No data to display                Promis 10 Questionnaire       No data to display                STUDIES & PROCEDURES:    EGD:       Colonoscopy:    10/6/2022   Findings:        Multiple medium-mouthed diverticula were found in the sigmoid colon.        The entire examined colon otherwise appeared normal on direct and        retroflexion views.                                                                                     Impression:               - Diverticulosis in the sigmoid colon.                             - The entire examined colon is normal on direct and                             retroflexion views.                             - Biopsies were taken with a cold forceps from the                             ascending colon and descending colon for  evaluation                             of microscopic colitis.    Final Diagnosis   A.  Ascending colon, biopsy:  -Negative for diagnostic colitis, dysplasia, or malignancy.     B.  Descending colon, biopsy:  -Melanosis coli.  -Negative for diagnostic colitis, dysplasia, or malignancy.       8/12/2015   Findings:        Diverticula were found in the sigmoid colon.                                                                                     Impression:               - Diverticulosis in the sigmoid colon.   Recommendation:           High fiber diet. Repeat in ten years.     EndoFLIP directed at the UES or LES (8cm (EF-325) balloon length or 16cm (EF-322) balloon length):   Date:  8cm balloon  Balloon inflation Balloon pressure CSA (mm^2) DI (mm^2/mmHg) Dmin (mm) Compliance   20 (ladmark ID)        30        40        50           16cm balloon  Balloon inflation Balloon pressure CSA (mm^2) DI (mm^2/mmHg) Dmin (mm) Compliance   30 (ladmark ID)        40        50        60        70           High Resolution Manometry:    PH/Impedance:     Anderson:    CT:    5/6/2022 CT AP WO Contrast                                            IMPRESSION:   1. Air within the urinary bladder may be related to recent  instrumentation.  2. Diffuse fatty infiltration of the liver.  3. Cholelithiasis.  4. Several low density lesions in the liver are too small to  characterize, but could represent cysts or hemangiomas. Consider liver  MRI for further characterization.       Esophagram:    FL VSS:     GES:    U/S:     XRAY:    Other:     9/2022 MRI Liver WO & W   TECHNIQUE: Multiplanar multisequence imaging of the abdomen acquired  before and after administration of 10 mL Gadavist intravenous  contrast.     FINDINGS: Multiple hepatic cysts. No definite hemangiomas. Signal  intensity of the liver parenchyma is unremarkable without evidence of  hepatic steatosis. There is no definite intra or extrahepatic biliary  dilatation. Liver is  normal size and normal in contour. Adrenal  glands, kidneys, spleen, and pancreas demonstrate no worrisome focal  lesion. Osseous structures demonstrate no worrisome signal  abnormality. No definite adenopathy or bowel obstruction in the  visualized abdomen. No ascites.                                                            IMPRESSION: Multiple liver cysts.    1/6/2023 Hydrogen Breath Test  Impression    Positive hydrogen breath test consistent with small intestinal bacterial overgrowth. High methane production at baseline and throughout the test. This can be seen with constipation, though data on this is limited. Please correlate clinically. 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 Consideration could be given to treatment with neomycin and rifaximin. (ACG Clinical Guidelines: Small Intestinal Bacterial Overgrowth; Venkata et al. The American Journal of Gastroenterology: February 2020 - Volume 115 - Issue 2 - p 165-178)         Prior medical records were reviewed including, but not limited to, notes from referring providers, lab work, radiographic tests, and other diagnostic tests. Pertinent results were summarized above.     History     Past Medical History:   Diagnosis Date    Anemia     Arthritis 2015    Hands, back, hips. Various dates first noted.    CVA (cerebral vascular accident) (H) 2017    ?migraine, ?pfo--negative vasc w/u, neg hypercoag w/u    Diffuse cystic mastopathy     Fibrocystic breast disease    HTN, goal below 140/90     Hyperparathyroidism (H24)     Infectious mononucleosis     Mono at age 16    Irregular heart beat     PAT no afib on 30day monitor    Labyrinthitis, unspecified     Migraine headache with aura     Osteopenia     Pain in joint, shoulder region     Secondary to a fall    Palpitations     PFO (patent foramen ovale)     s/p closure with amplazter device 7/13/17    S/P gastric bypass June, 2010    Sleep apnea     she is on CPAP    Sleep apnea      Vitamin D deficiencies        Past Surgical History:   Procedure Laterality Date    BIOPSY  1984    Breast biopsies    BREAST SURGERY  1984    Above    CARDIAC SURGERY  7/13/2017    PFO closure    COLONOSCOPY N/A 8/12/2015    Procedure: COLONOSCOPY;  Surgeon: Deandre Brooks MD;  Location: [facility]    COLONOSCOPY N/A 10/6/2022    Procedure: COLONOSCOPY, WITH BIOPSIES;  Surgeon: Freddie Gonzalez MD;  Location: [facility]    DAVINCI HYSTERECTOMY SUPRACERVICAL, SALPINGO-OOPHORECTOMY INCLUDING BILATERAL N/A 3/16/2020    Procedure: DAVINCI HYSTERECTOMY SUPRACERVICAL, SALPINGO-OOPHORECTOMY INCLUDING BILATERAL WITH EXAM UNDER ANESTHESIA;  Surgeon: Lily Yancey MD;  Location: [facility]    DAVINCI SACROCOLPOPEXY, MIDURETHRAL SLING, CYSTOSCOPY N/A 3/16/2020    Procedure: SACROCOLPOPEXY, ROBOT-ASSISTED, LAPAROSCOPIC, WITH INSERTION OF MIDURETHRAL SLING AND CYSTOSCOPY;  Surgeon: Carolyn Valdivia MD;  Location: [facility] OR    GASTRIC BYPASS  June 24, 2010    GENITOURINARY SURGERY  3/16/2020    Cystocele/rectocele repair    GYN SURGERY  3/16/2020    Hysterectomy    ORTHOPEDIC SURGERY Left 2010    wrist fracture    PARATHYROIDECTOMY  9/19/11    ZZC ANEURYSM, INTRACRAN, SIMPLE SURG  04/2017    coil of aneurysm right posterior paraophthalmic artery    ZZC NONSPECIFIC PROCEDURE      S/P multiple breast biopies - all negative / benign    ZZC NONSPECIFIC PROCEDURE      S/P T&A    ZZC NONSPECIFIC PROCEDURE      [place] teeth extraction    ZZC NONSPECIFIC PROCEDURE      S/P (? unreadable) ankle       Social History     Socioeconomic History    Marital status:      Spouse name: Not on file    Number of children: Not on file    Years of education: Not on file    Highest education level: Not on file   Occupational History    Not on file   Tobacco Use    Smoking status: Never    Smokeless tobacco: Never   Vaping Use    Vaping Use: Never used   Substance and Sexual Activity    Alcohol use: Yes     Comment: Social    Drug use: No     Sexual activity: Not Currently     Partners: Male     Birth control/protection: Pill   Other Topics Concern    Parent/sibling w/ CABG, MI or angioplasty before 65F 55M? No   Social History Narrative    Not on file     Social Determinants of Health     Financial Resource Strain: Low Risk  (10/4/2023)    Financial Resource Strain     Within the past 12 months, have you or your family members you live with been unable to get utilities (heat, electricity) when it was really needed?: No   Food Insecurity: Low Risk  (10/4/2023)    Food Insecurity     Within the past 12 months, did you worry that your food would run out before you got money to buy more?: No     Within the past 12 months, did the food you bought just not last and you didn t have money to get more?: No   Transportation Needs: Low Risk  (10/4/2023)    Transportation Needs     Within the past 12 months, has lack of transportation kept you from medical appointments, getting your medicines, non-medical meetings or appointments, work, or from getting things that you need?: No   Physical Activity: Not on file   Stress: Not on file   Social Connections: Not on file   Interpersonal Safety: Low Risk  (10/9/2023)    Interpersonal Safety     Do you feel physically and emotionally safe where you currently live?: Yes     Within the past 12 months, have you been hit, slapped, kicked or otherwise physically hurt by someone?: No     Within the past 12 months, have you been humiliated or emotionally abused in other ways by your partner or ex-partner?: No   Housing Stability: Low Risk  (10/4/2023)    Housing Stability     Do you have housing? : Yes     Are you worried about losing your housing?: No       Family History   Problem Relation Age of Onset    Breast Cancer Mother     Hypertension Mother     Arthritis Mother     Thyroid Disease Mother         Hypo    Ulcerative Colitis Mother     Cerebrovascular Disease Mother         Age 78 - Cerebral Hemorrhage,      Anxiety Disorder Mother     Depression Mother     Genetic Disorder Mother         Ulcerative colitis    Obesity Mother     Anesthesia Reaction Mother         Same    Breast Cancer Maternal Aunt     Hypertension Paternal Grandmother     Cerebrovascular Disease Maternal Grandmother         Age 72 -     Family History Negative Maternal Grandfather     Family History Negative Paternal Grandfather     Family History Negative Son     Family History Negative Daughter     Other Cancer Father         Skin - Non-melanoma varieties    Hypertension Father     Asthma Father     Family History Negative Daughter     Ulcerative Colitis Sister     Hypertension Sister     Hyperlipidemia Sister     Anxiety Disorder Sister     Depression Sister     Diabetes Sister     Obesity Sister     Asthma Sister     Anesthesia Reaction Sister         Debilitating headaches    Hypertension Brother     Anxiety Disorder Brother     Depression Brother     Asthma Nephew     Hypertension Sister         Congestive Heart Failure    Anxiety Disorder Sister     Genetic Disorder Sister         Ulcerative colitis    Obesity Sister     Genetic Disorder Niece         Ulcerative colitis    Colon Cancer No family hx of     Coronary Artery Disease No family hx of     Mental Illness No family hx of     Substance Abuse No family hx of     Osteoporosis No family hx of     Deep Vein Thrombosis No family hx of      Family history reviewed and edited as appropriate    Medications and Allergies:     Outpatient Encounter Medications as of 2023   Medication Sig Dispense Refill    Ascorbic Acid (VITAMIN C PO) Take 250 mg by mouth every morning (0.5 x 500 mg tablet = 250 mg dose) (Patient not taking: Reported on 2023)      aspirin 325 MG tablet Take 325 mg by mouth every morning       atenolol (TENORMIN) 50 MG tablet Take 1 tablet (50 mg) by mouth daily 30 tablet 8    Calcium Citrate-Vitamin D (CALCIUM CITRATE + D PO) Take 2 tablets by mouth every morning        COMPOUNDED NON-CONTROLLED SUBSTANCE (CMPD RX) - PHARMACY TO MIX COMPOUNDED MEDICATION Estriol 1 mg/g in HRT base, apply small amount to finger and apply to inside vagina daily for 2 weeks then twice weekly 30 g 1    COMPOUNDED NON-CONTROLLED SUBSTANCE (CMPD RX) - PHARMACY TO MIX COMPOUNDED MEDICATION Estriol 1 mg/g in HRT base, apply small amount to finger and apply to inside vagina daily for 2 weeks then twice weekly 30 g 11    Cranberry 450 MG TABS Take by mouth 2 times daily (Patient not taking: Reported on 6/14/2023)      Cyanocobalamin 2500 MCG TABS Take 2,500 mcg by mouth once a week Mon      Ferrous Sulfate 27 MG TABS Take 27 mg by mouth every morning       Fesoterodine Fumarate 8 MG TB24 TAKE 1 TABLET BY MOUTH EVERY DAY 90 tablet 3    FIBER COMPLETE PO Take 1 capsule by mouth every morning      hydrochlorothiazide (HYDRODIURIL) 12.5 MG tablet Take 1 tablet (12.5 mg) by mouth daily 30 tablet 8    imipramine (TOFRANIL) 25 MG tablet Take 1 tablet (25 mg) by mouth At Bedtime 90 tablet 2    loratadine (CLARITIN) 10 MG tablet Take 10 mg by mouth every morning       losartan (COZAAR) 50 MG tablet Take 1 tablet (50 mg) by mouth daily 30 tablet 8    topiramate (TOPAMAX) 25 MG tablet TAKE 1 TABLET BY MOUTH TWICE A  tablet 0    UNABLE TO FIND CPAP machine every night      valACYclovir (VALTREX) 1000 mg tablet Take 2 tablets (2,000 mg) by mouth 2 times daily for 1 day 4 tablet 1    VITAMIN D, CHOLECALCIFEROL, PO Take 500 Units by mouth every morning        No facility-administered encounter medications on file as of 11/1/2023.        Allergies   Allergen Reactions    Contrast Dye Itching     Reaction of immediate burning and severe itching in Right ear and back of throat after injection for CT.     Sulfa Antibiotics      hives        Review of systems:  A full 10 point review of systems was obtained and was negative except for the pertinent positives and negatives stated within the HPI.    Objective Findings:    Physical Exam:    Constitutional: There were no vitals taken for this visit.  General: Alert, cooperative, no distress, well-appearing  Head: Atraumatic, normocephalic, no obvious abnormalities   Eyes: Sclera anicteric, no obvious conjunctival hemorrhage   Nose: Nares normal, no obvious malformation, no obvious rhinorrhea   Respiratory: Resting comfortably, no apparent distress, no cough.   Skin: No jaundice, no obvious rash  Neurologic: AAOx3, no obvious neurologic abnormality  Psychiatric: Normal Affect, appropriate mood  Extremities: No obvious edema, no obvious malformation     Labs, Radiology, Pathology     Lab Results   Component Value Date    WBC 5.5 03/13/2023    WBC 5.5 02/14/2022    WBC 7.9 03/23/2021    HGB 12.7 03/13/2023    HGB 13.3 02/14/2022    HGB 13.1 03/23/2021     03/13/2023     02/14/2022     03/23/2021    CHOL 127 03/13/2023    CHOL 138 02/14/2022    CHOL 177 01/11/2021    TRIG 36 03/13/2023    TRIG 64 02/14/2022    TRIG 43 01/11/2021    HDL 58 03/13/2023    HDL 71 02/14/2022    HDL 73 01/11/2021    ALT 34 03/13/2023    ALT 23 02/14/2022    ALT 24 01/11/2021    AST 40 (H) 03/13/2023    AST 18 02/14/2022    AST 22 01/11/2021     03/13/2023     02/14/2022     (L) 03/23/2021    BUN 15.0 03/13/2023    BUN 18 02/14/2022    BUN 19 03/23/2021    CO2 25 03/13/2023    CO2 30 02/14/2022    CO2 25 03/23/2021    TSH 0.85 03/13/2023    TSH 1.13 02/14/2022    TSH 2.40 03/23/2021    INR 0.92 08/24/2022    INR 0.87 03/23/2021    INR 0.86 04/29/2019        Liver Function Studies -   Recent Labs   Lab Test 03/13/23  1122   PROTTOTAL 5.7*   ALBUMIN 3.6   BILITOTAL 0.5   ALKPHOS 101   AST 40*   ALT 34        Patient Active Problem List    Diagnosis Date Noted    Atrophic vaginitis 02/10/2021     Priority: Medium    Urge incontinence 02/10/2021     Priority: Medium    Urinary urgency 02/10/2021     Priority: Medium    Midline cystocele 12/16/2019     Priority: Medium      Added automatically from request for surgery [id number]      Rectocele 12/16/2019     Priority: Medium     Added automatically from request for surgery [id number]      Osteopenia 03/17/2019     Priority: Medium    Vitamin D deficiency 03/17/2019     Priority: Medium     (Problem list name updated by automated process. Provider to review and confirm.)    (Problem list name updated by automated process. Provider to review and confirm.)      Hyperparathyroidism (H24) 03/17/2019     Priority: Medium    Word finding difficulty 03/17/2019     Priority: Medium    Received intravenous tissue plasminogen activator (tPA) in emergency department 03/17/2019     Priority: Medium    Accidental marijuana poisoning 03/17/2019     Priority: Medium    Migraine with aura and without status migrainosus, not intractable 01/07/2019     Priority: Medium    ASD (atrial septal defect) 07/13/2017     Priority: Medium    Cerebral aneurysm without rupture 04/10/2017     Priority: Medium    Left temporal lobe infarction (H) 02/09/2017     Priority: Medium    Essential hypertension with goal blood pressure less than 140/90 10/31/2016     Priority: Medium    PFO (patent foramen ovale) 02/15/2016     Priority: Medium     s/p closure with amplazter device 7/13/17      Lump or mass in breast 11/09/2015     Priority: Medium    Class 1 obesity in adult 10/16/2015     Priority: Medium    Iron deficiency anemia 10/16/2015     Priority: Medium    ACP (advance care planning) 08/23/2012     Priority: Medium     Discussed Advance Directive planning with patient; information given to patient to review.      S/P gastric bypass 06/01/2010     Priority: Medium    Sleep apnea 08/28/2009     Priority: Medium    Female stress incontinence 06/09/2008     Priority: Medium     (Problem list name updated by automated process. Provider to review and confirm.)      Family history of malignant neoplasm of breast 10/02/2003     Priority: Medium      Assessment and Plan  "  Assessment/Plan:    Jose Coronado is 67 year old female with significant past medical history pertinent for  hyperparathyroidism, parathyroid tumors, HTN, MITCHELL on CPAP, paroxysmal AFIB, minor stroke in 2017 (no residual), brain anerysm repair, PFO repair, Grade 4 prolapse with urinary incontinence s/p rectocele/cystocele repair/hysterectomy March 2020, recurrent UTIs (No UTI since 5/2022) who is presenting as follow up with chief complaint of abdominal pain.    Prior Evaluation:    5/9/2022 CT AP WO Contrast diffuse fatty infiltration of the liver, cholelithiasis and several small densities within the liver too small to characterize however could represent cyst versus hemangiomas.    8/2022 Infectious stools studies including C.Diff, enteric panel, crypto/giarrdia, and O&P were negative. Fecal lactoferrin was positive.     MRI Liver W WO Contrast 9/9/2022 with multiple hepatic cysts, no definite hemangiomas and the liver parenchyma is unremarkable without evidence of hepatic steatosis. No intra/extrahepatic biliary dilation.     10/2022 Colonoscopy was then performed which did which was unremarkable on endoscopy, on histology no evidence of microscopic or inflammatory colitis driving diarrhea, though did show melanosis coli (denies laxative use).  Colonoscopy was not completed to the TI.     11/2022 Vit A/D, B12/folate and iron studies unremarkable. Vit E level low.     11/2022 celiac serologies negative, fecal elastase negative and split fecal fat increased. Neutral fecal fat normal.     1/2023 hydrogen breath testing positive for SIBO and high levels of methane (IMO)    3/2023 treated with Augmentin x10 days without improvement in symptoms as Rifaximin was not covered by insurance    3/13/2023 CBC/TSH w/ reflex unremarkable     6/1/2023 Fax received appeal for Rifaximin was denied \"due to the medication not seriously jeopardizing the life, health or ability to maintain function.\"      #Abdominal Pain " "  #Unintentional Weight Loss   #Early Satiation   #Early Satiety  Today Jose reports that since our last office visit she had experienced which she describes to be \"an attack\" of abdominal pain. These episodes are more severe than the intermittent abdominal pain that she experiences throughout remainder of the weeks/days. Specifically this last episode had lasted for 9 days. The pain began in the left upper quadrant and radiated down to her pelvis. When the pain is most severe it is located across the entirety of the lower abdomen. She did not seek evaluation during this past episode. Additionally, during this episode she was unable to tolerate any oral intake other than sips of Pedialyte which resulted in an 11 pound weight loss.    Additionally she experiences a smilar however less severe abdominal pain which can last 24 to 48 hours. This pain is described to self relieving and less debilitating. Futhermore, she describes symptoms of early satiety and early satiation. Noting that she did have a change in her eating patterns/quantity of what she could consume after her gastric bypass however there has been an additional change in the past 2 to 3 years.    As for her weight loss this was noted to be improved January - June with a total of approximately 6 pound weight loss over the span of time. However, since our last office visit Jose reports an addiitonal 25 lb weight loss.  In January she had weighed 177 lbs, June 170 lbs and now 159 lbs. January 2020 when symptoms had initially began she had weighed 209 lbs per chart review.    Differential is broad and includes however is not limited to organic etiologies of the upper GI tract such as anastomotic stricture/ulcer, esophagitis, gastritis, duodenitis, peptic ulcer disease or erosions, gastroparesis (in setting of prior Jennie-en-Y gastric bypass), partial small bowel obstructions/adhesions during episodes of acute pain with history of prior intraabdominal surgical " intervention, pancreatitis vs other. Ultimately, with presenting symptoms malignancy should be considered and a CT scan with contrast of the chest, abdomen and pelvis has been ordered in addition to an upper endoscopy. Jose does report a reaction to contrast which she described as itching/burning within her ears that moved down her neck.  As this was not a true anaphylactic reaction patient will receive the premedication with methylprednisolone/Benadryl prior to the imaging study.    Future considerations would be to obtain MRE for additional small bowel evaluation.     - Upper endoscopy to be completed by luminal provider. Please obtain gastric and duodenal biopsies -to be completed in the next 2 to 3 weeks   - CT CAP W Contrast with continued unintentional weight loss     #Fluctuation in Bowel Habits - Resolved   #Fecal Urgency - Resolved   #Flatus - Resolved   #SIBO/IMO - Resolved   Pertinent history for recurrent UTI's and treated with antibiotics over the past 2-3 years (last UTI 5/2022). She is also s/p surgery for rectocele and cystocele in 2020 which correlated with the onset of symptoms.    Initial onset of symptoms 2-3 years prior. Overall symptoms have since resolved.     Symptoms were thought to be largely driven by SIBO, history of gastric bypass surgery and pelvic floor dysfunction with pertinent prior surgical history for rectocele and cystocele in 2020. Differential had also included IBS-D, postinfectious IBS/dysbiosis related to significant history of antibiotic use, bile acid diarrhea, medication induced with polypharmacy and vitamin supplements s/p bariatric surgery. Previous findings of increased split fecal fat were attributed to history of SIBO vs malabsorption secondary to gastric bypass.      If Jose were to again develop diarrhea for consecutive days would then consider repeat breath testing for SIBO and if positive treatment with Ciprofloxacin/Neomycin. If fecal incontinence returns future  considerations would be to obtain pelvic floor evaluation. Additional considerations would be trial of bile acid sequestrant, lactose-free trial x 2 weeks.     - Continue to follow a modified low FODMAPs diet   - Avoid simple sugars as well as artifical sweeteners including sorbital, truvia, maltitol, mannitol, xylitol, glycerol, lactilol.     #Colorectal Cancer Screening  Last colonsocopy 10/2022 - recall due in 10 years (2032)    #Hepatic Cysts   #Fatty Liver   CT AP WO Contrast with fatty liver and several low density lesions in the liver that were described to be too small to characterize, but could represent cysts or hemangiomas. Follow up MRI Liver W WO Contrast 9/9/2022 with multiple hepatic cysts, no definite hemangiomas and the liver parenchyma is unremarkable without evidence of hepatic steatosis. No intra/extrahepatic biliary dilation.       Follow up plan:   Return to clinic 3 months and as needed.    The risks and benefits of my recommendations, as well as other treatment options were discussed with the patient and any available family today. All questions were answered.     Follow up: As planned above. Today, I personally spent 49 minutes in direct face to face time with the patient, of which greater than 50% of the time was spent in patient education and counseling as described above. Approximately 21 minutes were spent on indirect care associated with the patient's consultation including but not limited to review of: patient medical records to date, clinic visits, hospital records, lab results, imaging studies, procedural documentation, and coordinating care with other providers. The findings from this review are summarized in the above note. All of the above accounted for a cumulative time of 70 minutes and was performed on the date of service.     The patient verbalized understanding of the plan and was appreciative for the time spent and information provided during the office visit.      Documentation assisted by voice recognition and documentation system.          Again, thank you for allowing me to participate in the care of your patient.      Sincerely,    Karen Cabrera PA-C

## 2023-11-03 ENCOUNTER — TELEPHONE (OUTPATIENT)
Dept: GASTROENTEROLOGY | Facility: CLINIC | Age: 68
End: 2023-11-03
Payer: MEDICARE

## 2023-11-03 NOTE — TELEPHONE ENCOUNTER
"Endoscopy Scheduling Screen    Have you had a positive Covid test in the last 14 days?  No    Are you active on MyChart?   Yes    What insurance is in the chart?  Other:  Medicare [facility]    Ordering/Referring Provider: Rick   (If ordering provider performs procedure, schedule with ordering provider unless otherwise instructed. )    BMI: Estimated body mass index is 26.06 kg/m  as calculated from the following:    Height as of 10/9/23: 1.664 m (5' 5.5\").    Weight as of 10/9/23: 72.1 kg (159 lb).     Sedation Ordered  MAC/deep sedation.   BMI<= 45 45 < BMI <= 48 48 < BMI < = 50  BMI > 50   No Restrictions No [facility]  No [facility]  [facility] with exceptions Hospital Only OR Only       Are you taking any prescription medications for pain 3 or more times per week?   No    Do you have a history of malignant hyperthermia or adverse reaction to anesthesia?  No    (Females) Are you currently pregnant?   No     Have you been diagnosed or told you have pulmonary hypertension?   No    Do you have an LVAD?  No    Have you been told you have moderate to severe sleep apnea?  Yes (RN Review required for scheduling unless scheduling in Hospital.)uses cpap    Have you been told you have COPD, asthma, or any other lung disease?  No    Do you have any heart conditions?  Yes -PFO closure in 2017    In the past 6 months, have you had any hospitalizations for heart related issues including cardiomyopathy, heart attack, or stent placement?  No    Do you have any implantable devices in your body (pacemaker, ICD)?  No    Do you take nitroglycerine?  No    Have you ever had an organ transplant?   No    Have you ever had or are you awaiting a heart or lung transplant?   No    Have you had a stroke or transient ischemic attack (TIA aka \"mini stroke\" in the last 6 months?   No    Have you been diagnosed with or been told you have cirrhosis of the liver?   No    Are you currently on dialysis?   No    Do you need assistance " "transferring?   No    BMI: Estimated body mass index is 26.06 kg/m  as calculated from the following:    Height as of 10/9/23: 1.664 m (5' 5.5\").    Weight as of 10/9/23: 72.1 kg (159 lb).     Is patients BMI > 40 and scheduling location [facility]?  No    Do you take an injectable medication for weight loss or diabetes (excluding insulin)?  No    Do you take the medication Naltrexone?  No    Do you take blood thinners?  No       Prep   Are you currently on dialysis or do you have chronic kidney disease?  No    Do you have a diagnosis of diabetes?  No    Do you have a diagnosis of cystic fibrosis (CF)?  No    On a regular basis do you go 3 -5 days between bowel movements?  No    BMI > 40?  No    Preferred Pharmacy:    [facility]/pharmacy #[id number] - [place], [place] - [address] AT [address] [place] [address]  Phone: [phone] Fax: [phone]      Final Scheduling Details   Colonoscopy prep sent?  N/A    Procedure scheduled  Upper endoscopy (EGD)    Surgeon:  Lydia     Date of procedure:  12/7/23 -patient is traveling prior to December and could not do a sooner date.  Order was for 1-2 weeks.  Provider notified    Pre-OP / PAC:   Yes - Patient informed of pre-op requirement.    Location  [facility] - Patient preference.    Sedation   MAC/Deep Sedation - Per order.      Patient Reminders:   You will receive a call from a Nurse to review instructions and health history.  This assessment must be completed prior to your procedure.  Failure to complete the Nurse assessment may result in the procedure being cancelled.      On the day of your procedure, please designate an adult(s) who can drive you home stay with you for the next 24 hours. The medicines used in the exam will make you sleepy. You will not be able to drive.      You cannot take public transportation, ride share services, or non-medical taxi service without a responsible caregiver.  Medical transport services are allowed with the " requirement that a responsible caregiver will receive you at your destination.  We require that drivers and caregivers are confirmed prior to your procedure.

## 2023-11-06 DIAGNOSIS — T50.8X5A ALLERGIC REACTION TO CONTRAST DYE: Primary | ICD-10-CM

## 2023-11-06 RX ORDER — DIPHENHYDRAMINE HCL 50 MG
50 CAPSULE ORAL ONCE
Qty: 1 CAPSULE | Refills: 0 | Status: SHIPPED | OUTPATIENT
Start: 2023-11-06 | End: 2023-11-06

## 2023-11-06 RX ORDER — METHYLPREDNISOLONE 32 MG/1
32 TABLET ORAL ONCE
Qty: 2 TABLET | Refills: 0 | Status: SHIPPED | OUTPATIENT
Start: 2023-11-06 | End: 2023-11-06

## 2023-11-06 NOTE — PROGRESS NOTES
Contrast allergy pre med pack  Per [place] imaging protocol:   32 mg methylprednisolone 12 hours and 2 hours prior  1 benadryl 50 mg 1 hour prior

## 2023-11-07 ENCOUNTER — PATIENT OUTREACH (OUTPATIENT)
Dept: GASTROENTEROLOGY | Facility: CLINIC | Age: 68
End: 2023-11-07

## 2023-11-07 ENCOUNTER — MYC MEDICAL ADVICE (OUTPATIENT)
Dept: GASTROENTEROLOGY | Facility: CLINIC | Age: 68
End: 2023-11-07
Payer: MEDICARE

## 2023-11-07 RX ORDER — METHYLPREDNISOLONE 32 MG/1
TABLET ORAL
Qty: 2 TABLET | Refills: 0 | Status: SHIPPED | OUTPATIENT
Start: 2023-11-07 | End: 2024-03-22

## 2023-11-07 RX ORDER — DIPHENHYDRAMINE HCL 50 MG
50 CAPSULE ORAL ONCE
Qty: 1 CAPSULE | Refills: 0 | Status: SHIPPED | OUTPATIENT
Start: 2023-11-07 | End: 2023-11-07

## 2023-11-08 ENCOUNTER — TELEPHONE (OUTPATIENT)
Dept: GASTROENTEROLOGY | Facility: CLINIC | Age: 68
End: 2023-11-08
Payer: MEDICARE

## 2023-11-08 NOTE — TELEPHONE ENCOUNTER
Reached out to pt regarding care plan and assisting with coordination.  [facility] message also replied to.

## 2023-11-10 ENCOUNTER — TELEPHONE (OUTPATIENT)
Dept: GASTROENTEROLOGY | Facility: CLINIC | Age: 68
End: 2023-11-10
Payer: MEDICARE

## 2023-11-20 ENCOUNTER — TELEPHONE (OUTPATIENT)
Dept: INTERVENTIONAL RADIOLOGY/VASCULAR | Facility: CLINIC | Age: 68
End: 2023-11-20
Payer: MEDICARE

## 2023-11-20 NOTE — TELEPHONE ENCOUNTER
Spoke with patient about up coming CT scan and contrast allergy. States she has her medications and has the instructions to follow, has no questions at this time.

## 2023-11-21 ENCOUNTER — TELEPHONE (OUTPATIENT)
Dept: GASTROENTEROLOGY | Facility: CLINIC | Age: 68
End: 2023-11-21
Payer: MEDICARE

## 2023-11-21 NOTE — TELEPHONE ENCOUNTER
Pre assessment completed for upcoming procedure.      Procedure details:    Patient scheduled for Upper endoscopy (EGD) on 12/7/23.     Arrival time: 0930. Procedure time 1030    Pre op exam needed? Yes. Pre op exam scheduled for 11/30/23 with Jose Verduzco MD    Facility location: [facility]; [facility] [address] [address]    Sedation type: MAC    Indication for procedure: EGD MAC history of RNY gastric bypass. Now with intermittent abdominal pain and unintentional weight loss. Please obtain gastric and duodenal biopsies.  Unintentional weight loss, LUQ abdominal pain, LLQ abdominal pain, Early satiety    COVID policy reviewed.    Designated  policy reviewed. Instructed to have someone stay 24 hours post procedure.       Chart review:     Electronic implanted devices? No    Recent diagnosis of diverticulitis within the last 6 weeks?  N/A    Diabetic? No    Diabetic medication HOLDING recommendations: (if applicable)  Oral diabetic medications: N/A  Diabetic injectables: N/A  Insulin: N/A    Medication review:    Anticoagulants? No    NSAIDS? Yes.  Ibuprofen (Advil, Motrin).  Holding interval of 1 day before procedure.    Other medication HOLDING recommendations:  N/A      Prep for procedure:     Bowel prep recommendation: N/A    Prep instructions sent via [facility]     Reviewed procedure prep instructions.     Patient verbalized understanding and had no questions or concerns at this time.        Aylin Fay RN  Endoscopy Procedure Pre Assessment RN  [phone] option 4

## 2023-11-22 ENCOUNTER — HOSPITAL ENCOUNTER (OUTPATIENT)
Dept: CT IMAGING | Facility: CLINIC | Age: 68
Discharge: HOME | End: 2023-11-22
Attending: PHYSICIAN ASSISTANT | Admitting: PHYSICIAN ASSISTANT
Payer: MEDICARE

## 2023-11-22 DIAGNOSIS — R10.12 LUQ ABDOMINAL PAIN: ICD-10-CM

## 2023-11-22 DIAGNOSIS — R68.81 EARLY SATIETY: ICD-10-CM

## 2023-11-22 DIAGNOSIS — R63.4 UNINTENTIONAL WEIGHT LOSS: ICD-10-CM

## 2023-11-22 DIAGNOSIS — R10.32 LLQ ABDOMINAL PAIN: ICD-10-CM

## 2023-11-22 PROCEDURE — 250N000011 HC RX IP 250 OP 636: Performed by: PHYSICIAN ASSISTANT

## 2023-11-22 PROCEDURE — G1010 CDSM STANSON: HCPCS

## 2023-11-22 PROCEDURE — 250N000009 HC RX 250: Performed by: PHYSICIAN ASSISTANT

## 2023-11-22 PROCEDURE — 74177 CT ABD & PELVIS W/CONTRAST: CPT | Mod: MG

## 2023-11-22 RX ORDER — IOPAMIDOL 755 MG/ML
80 INJECTION, SOLUTION INTRAVASCULAR ONCE
Status: COMPLETED | OUTPATIENT
Start: 2023-11-22 | End: 2023-11-22

## 2023-11-22 RX ADMIN — SODIUM CHLORIDE 60 ML: 9 INJECTION, SOLUTION INTRAVENOUS at 09:50

## 2023-11-22 RX ADMIN — IOPAMIDOL 80 ML: 755 INJECTION, SOLUTION INTRAVENOUS at 09:50

## 2023-11-30 ENCOUNTER — MYC MEDICAL ADVICE (OUTPATIENT)
Dept: UROLOGY | Facility: CLINIC | Age: 68
End: 2023-11-30

## 2023-11-30 ENCOUNTER — OFFICE VISIT (OUTPATIENT)
Dept: INTERNAL MEDICINE | Facility: CLINIC | Age: 68
End: 2023-11-30
Payer: MEDICARE

## 2023-11-30 ENCOUNTER — TELEPHONE (OUTPATIENT)
Dept: UROLOGY | Facility: CLINIC | Age: 68
End: 2023-11-30

## 2023-11-30 VITALS
BODY MASS INDEX: 25.33 KG/M2 | WEIGHT: 152 LBS | SYSTOLIC BLOOD PRESSURE: 118 MMHG | HEART RATE: 86 BPM | DIASTOLIC BLOOD PRESSURE: 70 MMHG | OXYGEN SATURATION: 100 % | TEMPERATURE: 98.4 F | RESPIRATION RATE: 16 BRPM | HEIGHT: 65 IN

## 2023-11-30 DIAGNOSIS — G43.109 MIGRAINE WITH AURA AND WITHOUT STATUS MIGRAINOSUS, NOT INTRACTABLE: ICD-10-CM

## 2023-11-30 DIAGNOSIS — N39.0 RECURRENT UTI: Primary | ICD-10-CM

## 2023-11-30 DIAGNOSIS — I63.89 LEFT TEMPORAL LOBE INFARCTION (H): ICD-10-CM

## 2023-11-30 DIAGNOSIS — Q21.10 ASD (ATRIAL SEPTAL DEFECT): ICD-10-CM

## 2023-11-30 DIAGNOSIS — R63.4 WEIGHT LOSS: ICD-10-CM

## 2023-11-30 DIAGNOSIS — E04.1 THYROID NODULE: ICD-10-CM

## 2023-11-30 DIAGNOSIS — Z98.84 S/P GASTRIC BYPASS: ICD-10-CM

## 2023-11-30 DIAGNOSIS — Z01.818 PRE-OP EXAMINATION: Primary | ICD-10-CM

## 2023-11-30 DIAGNOSIS — I10 ESSENTIAL HYPERTENSION WITH GOAL BLOOD PRESSURE LESS THAN 140/90: ICD-10-CM

## 2023-11-30 DIAGNOSIS — I67.1 CEREBRAL ANEURYSM WITHOUT RUPTURE: ICD-10-CM

## 2023-11-30 DIAGNOSIS — Z98.84 HISTORY OF ROUX-EN-Y GASTRIC BYPASS: ICD-10-CM

## 2023-11-30 DIAGNOSIS — N39.0 RECURRENT UTI: ICD-10-CM

## 2023-11-30 DIAGNOSIS — R10.9 RECURRENT ABDOMINAL PAIN: ICD-10-CM

## 2023-11-30 LAB
ALBUMIN UR-MCNC: NEGATIVE MG/DL
APPEARANCE UR: CLEAR
BACTERIA #/AREA URNS HPF: ABNORMAL /HPF
BILIRUB UR QL STRIP: NEGATIVE
COLOR UR AUTO: YELLOW
ERYTHROCYTE [DISTWIDTH] IN BLOOD BY AUTOMATED COUNT: 13.3 % (ref 10–15)
GLUCOSE UR STRIP-MCNC: NEGATIVE MG/DL
HCT VFR BLD AUTO: 39.1 % (ref 35–47)
HGB BLD-MCNC: 13.3 G/DL (ref 11.7–15.7)
HGB UR QL STRIP: ABNORMAL
KETONES UR STRIP-MCNC: NEGATIVE MG/DL
LEUKOCYTE ESTERASE UR QL STRIP: NEGATIVE
MCH RBC QN AUTO: 30.3 PG (ref 26.5–33)
MCHC RBC AUTO-ENTMCNC: 34 G/DL (ref 31.5–36.5)
MCV RBC AUTO: 89 FL (ref 78–100)
NITRATE UR QL: NEGATIVE
PH UR STRIP: 6 [PH] (ref 5–7)
PLATELET # BLD AUTO: 302 10E3/UL (ref 150–450)
RBC # BLD AUTO: 4.39 10E6/UL (ref 3.8–5.2)
RBC #/AREA URNS AUTO: ABNORMAL /HPF
SP GR UR STRIP: 1.02 (ref 1–1.03)
SQUAMOUS #/AREA URNS AUTO: ABNORMAL /LPF
UROBILINOGEN UR STRIP-ACNC: 2 E.U./DL
WBC # BLD AUTO: 7.7 10E3/UL (ref 4–11)
WBC #/AREA URNS AUTO: ABNORMAL /HPF

## 2023-11-30 PROCEDURE — 87186 SC STD MICRODIL/AGAR DIL: CPT | Performed by: INTERNAL MEDICINE

## 2023-11-30 PROCEDURE — 80048 BASIC METABOLIC PNL TOTAL CA: CPT | Performed by: INTERNAL MEDICINE

## 2023-11-30 PROCEDURE — 87088 URINE BACTERIA CULTURE: CPT | Performed by: INTERNAL MEDICINE

## 2023-11-30 PROCEDURE — 85027 COMPLETE CBC AUTOMATED: CPT | Performed by: INTERNAL MEDICINE

## 2023-11-30 PROCEDURE — 81001 URINALYSIS AUTO W/SCOPE: CPT | Performed by: INTERNAL MEDICINE

## 2023-11-30 PROCEDURE — 36415 COLL VENOUS BLD VENIPUNCTURE: CPT | Performed by: INTERNAL MEDICINE

## 2023-11-30 PROCEDURE — 99214 OFFICE O/P EST MOD 30 MIN: CPT | Mod: 25 | Performed by: INTERNAL MEDICINE

## 2023-11-30 PROCEDURE — 93000 ELECTROCARDIOGRAM COMPLETE: CPT | Performed by: INTERNAL MEDICINE

## 2023-11-30 PROCEDURE — 87086 URINE CULTURE/COLONY COUNT: CPT | Performed by: INTERNAL MEDICINE

## 2023-11-30 NOTE — PROGRESS NOTES
[facility]  [address]  [address]  [facility] [address]  Phone: [phone]  Primary Provider: Lian Martinez  Pre-op Performing Provider: DENISE GAITAN      PREOPERATIVE EVALUATION:  Today's date: 11/30/2023    Jose is a 67 year old, presenting for the following:  Pre-Op Exam        11/30/2023     2:50 PM   Additional Questions   Roomed by Dayana WOODALL CMA       Surgical Information:  Surgery/Procedure:Esophagoscopy, gastroscopy, duodenoscopy (EGD), combined   Surgery Location: [facility]  Surgeon: Lydia  Surgery Date: 12/07/2023  Time of Surgery: 1030  Where patient plans to recover: At home with family  Fax number for surgical facility: Note does not need to be faxed, will be available electronically in Epic.    Assessment & Plan     The proposed surgical procedure is considered LOW risk.    (Z01.818) Pre-op examination  (primary encounter diagnosis)  Comment:  Satisfactory operative candidate with anesthesia as required.   Plan: EKG 12-lead complete w/read - Clinics, Basic         metabolic panel, CBC with platelets          (R63.4) Weight loss  (R10.9) Recurrent abdominal pain  Comment: Reasons for procedure (EGD).    (Z98.84) History of Jennie-en-Y gastric bypass  (Z98.84) S/P gastric bypass  Comment: Contributory to present illness and need for EGD.     (E04.1) Thyroid nodule  Comment: Incidental finding on recent CT chest/abdomen.Will order thyroid US/endocrinology consult.   Plan: US Thyroid, Adult Endocrinology          Referral          (I10) Essential hypertension with goal blood pressure less than 140/90  Comment: BP satisfactorily controlled     (G43.109) Migraine with aura and without status migrainosus, not intractable  Comment: Stable recently.     (Q21.10) ASD (atrial septal defect)  Comment: s/p closure with Amplatzer device 7/13/2017    (I67.1) Cerebral aneurysm without rupture  Comment: H/o endovascular coiling of right  "paraophthalmic ICA aneurysm, followed by Neurology, most recent MRA 11/16/2022.    (I63.89) Left temporal lobe infarction (H)  Comment: occurred in 2017, no recent neurologic symptoms.     (N39.0) Recurrent UTI  Comment: Update UA.  Plan: UA Microscopic with Reflex to Culture              Possible Sleep Apnea: has MITCHELL, uses CPAP      Implanted \"Device\":  H/o cerebral artery aneurysm/coiling--has documentation  H/o ASD repair (\"PFO occluder\")--has documentation         - No identified additional risk factors other than previously addressed      RECOMMENDATION:  APPROVAL GIVEN to proceed with proposed procedure, without further diagnostic evaluation.    Patient Instructions   Everything looks fine to go ahead with procedure as planned.  Okay to wait until after procedure to take daily medications.     Lab results will be available soon on [employer].    See a provider in March 2024 for next Annual Wellness Visit.          Subjective       HPI related to upcoming procedure: No acute illness symptoms. Chronic abdominal pains and significant unplanned weight loss prompting EGD. Has h/o Jennie-en-Y gastric bypass in 2010.         11/27/2023    12:56 PM   Preop Questions   1. Have you ever had a heart attack or stroke? YES - Left parietal infarct in 2017--etiology undetermined, had PFO occluder placed.    2. Have you ever had surgery on your heart or blood vessels, such as a stent placement, a coronary artery bypass, or surgery on an artery in your head, neck, heart, or legs? YES - cerebral aneurysm coiling/cerebral artery arter stent (4/2017)m PFO occluder 7/2017   3. Do you have chest pain with activity? No   4. Do you have a history of  heart failure? No   5. Do you currently have a cold, bronchitis or symptoms of other infection? No   6. Do you have a cough, shortness of breath, or wheezing? No   7. Do you or anyone in your family have previous history of blood clots? No   8. Do you or does anyone in your family have a " "serious bleeding problem such as prolonged bleeding following surgeries or cuts? No   9. Have you ever had problems with anemia or been told to take iron pills? YES -    10. Have you had any abnormal blood loss such as black, tarry or bloody stools, or abnormal vaginal bleeding? No   11. Have you ever had a blood transfusion? No   12. Are you willing to have a blood transfusion if it is medically needed before, during, or after your surgery? Yes   13. Have you or any of your relatives ever had problems with anesthesia? YES - post-op headaches in several female family members--not patient herself   14. Do you have sleep apnea, excessive snoring or daytime drowsiness? YES -    14a. Do you have a CPAP machine? Yes   15. Do you have any artifical heart valves or other implanted medical devices like a pacemaker, defibrillator, or continuous glucose monitor? YES - see above   15a. What type of device do you have? \"PFO occluder\"   15b. Name of the clinic that manages your device:  [facility]   16. Do you have artificial joints? No   17. Are you allergic to latex? No       Health Care Directive:  Patient has a Health Care Directive on file      Preoperative Review of :   reviewed - no record of controlled substances prescribed.      Review of Systems  Occasional lightheadedness, no near-syncope. Chonic abdominal pains. Recent pneumaturia--Urology involved  REVIEW OF SYSTEMS: The following systems have been completely reviewed and are negative except as noted above:   Constitutional, HEENT, respiratory, cardiovascular, gastrointestinal, genitourinary, musculoskeletal, hematologic, endocrine, and neurologic systems.      Patient Active Problem List    Diagnosis Date Noted    Atrophic vaginitis 02/10/2021     Priority: Medium    Urge incontinence 02/10/2021     Priority: Medium    Urinary urgency 02/10/2021     Priority: Medium    Midline cystocele 12/16/2019     Priority: Medium     Added automatically from request " for surgery [id number]      Rectocele 12/16/2019     Priority: Medium     Added automatically from request for surgery [id number]      Osteopenia 03/17/2019     Priority: Medium    Vitamin D deficiency 03/17/2019     Priority: Medium     (Problem list name updated by automated process. Provider to review and confirm.)    (Problem list name updated by automated process. Provider to review and confirm.)      Hyperparathyroidism (H24) 03/17/2019     Priority: Medium    Word finding difficulty 03/17/2019     Priority: Medium    Received intravenous tissue plasminogen activator (tPA) in emergency department 03/17/2019     Priority: Medium    Accidental marijuana poisoning 03/17/2019     Priority: Medium    Migraine with aura and without status migrainosus, not intractable 01/07/2019     Priority: Medium    ASD (atrial septal defect) 07/13/2017     Priority: Medium    Cerebral aneurysm without rupture 04/10/2017     Priority: Medium    Left temporal lobe infarction (H) 02/09/2017     Priority: Medium    Essential hypertension with goal blood pressure less than 140/90 10/31/2016     Priority: Medium    PFO (patent foramen ovale) 02/15/2016     Priority: Medium     s/p closure with amplazter device 7/13/17      Lump or mass in breast 11/09/2015     Priority: Medium    Class 1 obesity in adult 10/16/2015     Priority: Medium    Iron deficiency anemia 10/16/2015     Priority: Medium    ACP (advance care planning) 08/23/2012     Priority: Medium     Discussed Advance Directive planning with patient; information given to patient to review.      S/P gastric bypass 06/01/2010     Priority: Medium    Sleep apnea 08/28/2009     Priority: Medium    Female stress incontinence 06/09/2008     Priority: Medium     (Problem list name updated by automated process. Provider to review and confirm.)      Family history of malignant neoplasm of breast 10/02/2003     Priority: Medium      Past Medical History:   Diagnosis Date    Anemia      Arthritis 2015    Hands, back, hips. Various dates first noted.    CVA (cerebral vascular accident) (H) 2017    ?migraine, ?pfo--negative vasc w/u, neg hypercoag w/u    Diffuse cystic mastopathy     Fibrocystic breast disease    HTN, goal below 140/90     Hyperparathyroidism (H24)     Infectious mononucleosis     Mono at age 16    Irregular heart beat     PAT no afib on 30day monitor    Labyrinthitis, unspecified     Migraine headache with aura     Osteopenia     Pain in joint, shoulder region     Secondary to a fall    Palpitations     PFO (patent foramen ovale)     s/p closure with amplazter device 7/13/17    S/P gastric bypass June, 2010    Sleep apnea     she is on CPAP    Sleep apnea     Vitamin D deficiencies      Past Surgical History:   Procedure Laterality Date    BIOPSY  1984    Breast biopsies    BREAST SURGERY  1984    Above    CARDIAC SURGERY  7/13/2017    PFO closure    COLONOSCOPY N/A 8/12/2015    Procedure: COLONOSCOPY;  Surgeon: Deandre Brooks MD;  Location:  GI    COLONOSCOPY N/A 10/6/2022    Procedure: COLONOSCOPY, WITH BIOPSIES;  Surgeon: Freddie Gonzalez MD;  Location: [facility]    DAVINCI HYSTERECTOMY SUPRACERVICAL, SALPINGO-OOPHORECTOMY INCLUDING BILATERAL N/A 3/16/2020    Procedure: DAVINCI HYSTERECTOMY SUPRACERVICAL, SALPINGO-OOPHORECTOMY INCLUDING BILATERAL WITH EXAM UNDER ANESTHESIA;  Surgeon: Lily Yancey MD;  Location: [facility] OR    DAVINCI SACROCOLPOPEXY, MIDURETHRAL SLING, CYSTOSCOPY N/A 3/16/2020    Procedure: SACROCOLPOPEXY, ROBOT-ASSISTED, LAPAROSCOPIC, WITH INSERTION OF MIDURETHRAL SLING AND CYSTOSCOPY;  Surgeon: Carolyn Valdivia MD;  Location: [facility] OR    GASTRIC BYPASS  June 24, 2010    GENITOURINARY SURGERY  3/16/2020    Cystocele/rectocele repair    GYN SURGERY  3/16/2020    Hysterectomy    ORTHOPEDIC SURGERY Left 2010    wrist fracture    PARATHYROIDECTOMY  9/19/11    ZZC ANEURYSM, INTRACRAN, SIMPLE SURG  04/2017    coil of aneurysm right posterior paraophthalmic artery     Z NONSPECIFIC PROCEDURE      S/P multiple breast biopies - all negative / benign    [facility] NONSPECIFIC PROCEDURE      S/P T&A    Z NONSPECIFIC PROCEDURE      [place] teeth extraction    Z NONSPECIFIC PROCEDURE      S/P (? unreadable) ankle     Current Outpatient Medications   Medication Sig Dispense Refill    aspirin 325 MG tablet Take 325 mg by mouth every morning       atenolol (TENORMIN) 50 MG tablet Take 1 tablet (50 mg) by mouth daily 30 tablet 8    Calcium Citrate-Vitamin D (CALCIUM CITRATE + D PO) Take 2 tablets by mouth every morning       COMPOUNDED NON-CONTROLLED SUBSTANCE (CMPD RX) - PHARMACY TO MIX COMPOUNDED MEDICATION Estriol 1 mg/g in HRT base, apply small amount to finger and apply to inside vagina daily for 2 weeks then twice weekly 30 g 1    COMPOUNDED NON-CONTROLLED SUBSTANCE (CMPD RX) - PHARMACY TO MIX COMPOUNDED MEDICATION Estriol 1 mg/g in HRT base, apply small amount to finger and apply to inside vagina daily for 2 weeks then twice weekly 30 g 11    Cyanocobalamin 2500 MCG TABS Take 2,500 mcg by mouth once a week Mon      Ferrous Sulfate 27 MG TABS Take 27 mg by mouth every morning       Fesoterodine Fumarate 8 MG TB24 TAKE 1 TABLET BY MOUTH EVERY DAY 90 tablet 3    hydrochlorothiazide (HYDRODIURIL) 12.5 MG tablet Take 1 tablet (12.5 mg) by mouth daily 30 tablet 8    imipramine (TOFRANIL) 25 MG tablet Take 1 tablet (25 mg) by mouth At Bedtime 90 tablet 2    losartan (COZAAR) 50 MG tablet Take 1 tablet (50 mg) by mouth daily 30 tablet 8    topiramate (TOPAMAX) 25 MG tablet TAKE 1 TABLET BY MOUTH TWICE A  tablet 0    UNABLE TO FIND CPAP machine every night      VITAMIN D, CHOLECALCIFEROL, PO Take 500 Units by mouth every morning       Ascorbic Acid (VITAMIN C PO) Take 250 mg by mouth every morning (0.5 x 500 mg tablet = 250 mg dose) (Patient not taking: Reported on 6/14/2023)      Cranberry 450 MG TABS Take by mouth 2 times daily (Patient not taking: Reported on 6/14/2023)      FIBER  COMPLETE PO Take 1 capsule by mouth every morning      loratadine (CLARITIN) 10 MG tablet Take 10 mg by mouth every morning       methylPREDNISolone (MEDROL) 32 MG tablet Contrast allergy pre med pack: 32 mg methylprednisolone 12 hours and 2 hours prior 1 benadryl 50 mg 1 hour prior 2 tablet 0    valACYclovir (VALTREX) 1000 mg tablet Take 2 tablets (2,000 mg) by mouth 2 times daily for 1 day 4 tablet 1       Allergies   Allergen Reactions    Contrast Dye Itching     Reaction of immediate burning and severe itching in Right ear and back of throat after injection for CT.     Sulfa Antibiotics      hives        Social History     Tobacco Use    Smoking status: Never    Smokeless tobacco: Never   Substance Use Topics    Alcohol use: Yes     Comment: Social     Family History   Problem Relation Age of Onset    Breast Cancer Mother     Hypertension Mother     Arthritis Mother     Thyroid Disease Mother         Hypo    Ulcerative Colitis Mother     Cerebrovascular Disease Mother         Age 78 - Cerebral Hemorrhage,     Anxiety Disorder Mother     Depression Mother     Genetic Disorder Mother         Ulcerative colitis    Obesity Mother     Anesthesia Reaction Mother         Same    Breast Cancer Maternal Aunt     Hypertension Paternal Grandmother     Cerebrovascular Disease Maternal Grandmother         Age 72 -     Family History Negative Maternal Grandfather     Family History Negative Paternal Grandfather     Family History Negative Son     Family History Negative Daughter     Other Cancer Father         Skin - Non-melanoma varieties    Hypertension Father     Asthma Father     Family History Negative Daughter     Ulcerative Colitis Sister     Hypertension Sister     Hyperlipidemia Sister     Anxiety Disorder Sister     Depression Sister     Diabetes Sister     Obesity Sister     Asthma Sister     Anesthesia Reaction Sister         Debilitating headaches    Hypertension Brother     Anxiety Disorder  "Brother     Depression Brother     Asthma Nephew     Hypertension Sister         Congestive Heart Failure    Anxiety Disorder Sister     Genetic Disorder Sister         Ulcerative colitis    Obesity Sister     Genetic Disorder Niece         Ulcerative colitis    Colon Cancer No family hx of     Coronary Artery Disease No family hx of     Mental Illness No family hx of     Substance Abuse No family hx of     Osteoporosis No family hx of     Deep Vein Thrombosis No family hx of      History   Drug Use No         Objective     /70 (BP Location: Right arm, Patient Position: Sitting, Cuff Size: Adult Large)   Pulse 86   Temp 98.4  F (36.9  C) (Oral)   Resp 16   Ht 1.651 m (5' 5\")   Wt 68.9 kg (152 lb)   LMP  (LMP Unknown)   SpO2 100%   Breastfeeding No   BMI 25.29 kg/m      Physical Exam  GENERAL APPEARANCE: healthy, alert and no distress  HENT: ear canals and TM's normal and nose and mouth without ulcers or lesions  RESP: lungs clear to auscultation - no rales, rhonchi or wheezes  CV: regular rate and rhythm, normal S1 S2, no S3 or S4 and no murmur, click or rub   ABDOMEN: soft, nontender, no HSM or masses and bowel sounds normal  NEURO: Normal strength and tone, sensory exam grossly normal, mentation intact and speech normal    Recent Labs   Lab Test 03/13/23  1122 08/24/22  1124 02/14/22  0846   HGB 12.7  --  13.3     --  275   INR  --  0.92  --      --  139   POTASSIUM 3.5  --  4.3   CR 0.72  --  0.76        Diagnostics:  Labs pending at this time.  Results will be reviewed when available.   EKG: Normal Sinus Rhythm, first degree AV block, delayed anterior R-wave progression, noted on older EKG's. Otherwise, normal axis, normal intervals, no acute ST/T changes c/w ischemia, no LVH by voltage criteria, unchanged from previous tracings    Revised Cardiac Risk Index (RCRI):  The patient has the following serious cardiovascular risks for perioperative complications:   - Cerebrovascular Disease " (TIA or CVA) = 1 point     RCRI Interpretation: 1 point: Class II (low risk - 0.9% complication rate)         Signed Electronically by: Jose Verduzco MD,   Copy of this evaluation report is provided to requesting physician.

## 2023-11-30 NOTE — TELEPHONE ENCOUNTER
Note below reviewed by writer. Dr. Valdivia is out of the office. High priority message sent to Meka Bo PA-C with request to review and advise.    Mary Mcintosh RN, BSN

## 2023-11-30 NOTE — TELEPHONE ENCOUNTER
[facility] Call Center    Phone Message    May a detailed message be left on voicemail: yes     Reason for Call: Other: Pt calling stating her GI provider called her regarding pt recent CT results. Pt states she was instructed to contact Skip HARDY to discuss results of CT scan. Please advise and call pt      Action Taken: Message routed to:  Other: Uro    Travel Screening: Not Applicable

## 2023-11-30 NOTE — TELEPHONE ENCOUNTER
" Meka Bo PA-C  You2 hours ago (9:26 AM)     KB  Call patient and see if has UTI symptoms or symptoms of urine/flatulence coming out of vagina. Recommend UA/UC given CT findings. Needs cysto next available urologist to further evaluate pneumaturia; prefer if with Dr. Valdivia since is her patient, however if booked out should see other urologist.    [email or web address]     Received the above message from Meka Bo PA-C. Future UA/UC ordered. Called and spoke to patient who is aware of the above information. Patient denies UTI symptoms and reports passing flatus from her urethra periodically for the past month or so. Patient stated, \"I periodically expel large amounts of gas from my urethra. I originally thought it was maybe from my vagina, but I know for sure it is from my urethra.\"     Informed patient that it is recommended for her to complete a UA/UC. Patient is scheduled for a pre-op for an endoscopy today and will complete the urine sample at that time. Cysto with Dr. Valdivia scheduled for 12/11/23 at 11:00am at [facility]. Patient aware of clinic address and where to check in. Questions regarding cysto answered. Will await results of today's UA/UC prior to scheduling UA per protocol for cysto.    Informed patient that we will watch for the urine sample results and contact her once available.    Mary Mcintosh RN, BSN      " Calm

## 2023-11-30 NOTE — PATIENT INSTRUCTIONS
Everything looks fine to go ahead with procedure as planned.  Okay to wait until after procedure to take daily medications.     Lab results will be available soon on [employer].    See a provider in March 2024 for next Annual Wellness Visit.

## 2023-12-01 ENCOUNTER — TELEPHONE (OUTPATIENT)
Dept: UROLOGY | Facility: CLINIC | Age: 68
End: 2023-12-01
Payer: MEDICARE

## 2023-12-01 DIAGNOSIS — R39.15 URINARY URGENCY: ICD-10-CM

## 2023-12-01 DIAGNOSIS — N39.41 URGE INCONTINENCE: ICD-10-CM

## 2023-12-01 DIAGNOSIS — N39.0 RECURRENT UTI: ICD-10-CM

## 2023-12-01 DIAGNOSIS — N39.0 RECURRENT UTI: Primary | ICD-10-CM

## 2023-12-01 LAB
ANION GAP SERPL CALCULATED.3IONS-SCNC: 10 MMOL/L (ref 7–15)
BUN SERPL-MCNC: 20.7 MG/DL (ref 8–23)
CALCIUM SERPL-MCNC: 9.3 MG/DL (ref 8.8–10.2)
CHLORIDE SERPL-SCNC: 99 MMOL/L (ref 98–107)
CREAT SERPL-MCNC: 0.88 MG/DL (ref 0.51–0.95)
DEPRECATED HCO3 PLAS-SCNC: 28 MMOL/L (ref 22–29)
EGFRCR SERPLBLD CKD-EPI 2021: 71 ML/MIN/1.73M2
GLUCOSE SERPL-MCNC: 95 MG/DL (ref 70–99)
POTASSIUM SERPL-SCNC: 4.2 MMOL/L (ref 3.4–5.3)
SODIUM SERPL-SCNC: 137 MMOL/L (ref 135–145)

## 2023-12-01 RX ORDER — METHYLPREDNISOLONE 32 MG/1
32 TABLET ORAL DAILY
Qty: 1 TABLET | Refills: 0 | Status: SHIPPED | OUTPATIENT
Start: 2023-12-01 | End: 2024-02-08

## 2023-12-01 NOTE — CONFIDENTIAL NOTE
Chart reviewed. 11/30/23 UA results abnormal but did not meet the criteria for urine culture. Urine culture added on. Called and spoke to [place] lab who will start processing urine culture.     Mary Mcintosh RN, BSN

## 2023-12-01 NOTE — TELEPHONE ENCOUNTER
Patient has been contacted regarding 11/30/23 UA results. UC was added on and is in process. See 11/30/23 my chart encounter.    Mary Mcintosh RN, BSN

## 2023-12-02 ENCOUNTER — MYC MEDICAL ADVICE (OUTPATIENT)
Dept: INFECTIOUS DISEASES | Facility: CLINIC | Age: 68
End: 2023-12-02
Payer: MEDICARE

## 2023-12-02 DIAGNOSIS — Z79.2 PROPHYLACTIC ANTIBIOTIC: Primary | ICD-10-CM

## 2023-12-02 LAB — BACTERIA UR CULT: ABNORMAL

## 2023-12-04 DIAGNOSIS — N39.0 RECURRENT UTI: Primary | ICD-10-CM

## 2023-12-05 ENCOUNTER — HOSPITAL ENCOUNTER (OUTPATIENT)
Dept: GENERAL RADIOLOGY | Facility: CLINIC | Age: 68
Discharge: HOME | End: 2023-12-05
Attending: UROLOGY | Admitting: UROLOGY
Payer: MEDICARE

## 2023-12-05 DIAGNOSIS — N39.0 RECURRENT UTI: ICD-10-CM

## 2023-12-05 PROCEDURE — 51600 INJECTION FOR BLADDER X-RAY: CPT

## 2023-12-05 PROCEDURE — 255N000002 HC RX 255 OP 636: Performed by: UROLOGY

## 2023-12-05 RX ORDER — CIPROFLOXACIN 500 MG/1
500 TABLET, FILM COATED ORAL 2 TIMES DAILY
Qty: 10 TABLET | Refills: 0 | Status: SHIPPED | OUTPATIENT
Start: 2023-12-05 | End: 2023-12-10

## 2023-12-05 RX ORDER — CIPROFLOXACIN 500 MG/1
500 TABLET, FILM COATED ORAL ONCE
Qty: 1 TABLET | Refills: 0 | Status: SHIPPED | OUTPATIENT
Start: 2023-12-05 | End: 2023-12-05

## 2023-12-05 RX ADMIN — DIATRIZOATE MEGLUMINE 300 ML: 180 INJECTION, SOLUTION INTRAVESICAL at 14:22

## 2023-12-05 RX ADMIN — DIATRIZOATE MEGLUMINE 50 ML: 180 INJECTION, SOLUTION INTRAVESICAL at 14:29

## 2023-12-05 NOTE — TELEPHONE ENCOUNTER
Per Dr. Whitten send in one dose Cipro 500 mg  2-3 hours before the cystoscopy procedure on 12/11 to prevent infection       Surjit Cullen RN  Infectious Disease on 12/5/2023 at 7:16 AM

## 2023-12-06 RX ORDER — PROCHLORPERAZINE MALEATE 5 MG
5 TABLET ORAL EVERY 6 HOURS PRN
Status: CANCELLED | OUTPATIENT
Start: 2023-12-06

## 2023-12-06 RX ORDER — NALOXONE HYDROCHLORIDE 0.4 MG/ML
0.2 INJECTION, SOLUTION INTRAMUSCULAR; INTRAVENOUS; SUBCUTANEOUS
Status: CANCELLED | OUTPATIENT
Start: 2023-12-06

## 2023-12-06 RX ORDER — NALOXONE HYDROCHLORIDE 0.4 MG/ML
0.4 INJECTION, SOLUTION INTRAMUSCULAR; INTRAVENOUS; SUBCUTANEOUS
Status: CANCELLED | OUTPATIENT
Start: 2023-12-06

## 2023-12-06 RX ORDER — ONDANSETRON 4 MG/1
4 TABLET, ORALLY DISINTEGRATING ORAL EVERY 6 HOURS PRN
Status: CANCELLED | OUTPATIENT
Start: 2023-12-06

## 2023-12-06 RX ORDER — ONDANSETRON 2 MG/ML
4 INJECTION INTRAMUSCULAR; INTRAVENOUS EVERY 6 HOURS PRN
Status: CANCELLED | OUTPATIENT
Start: 2023-12-06

## 2023-12-06 RX ORDER — ONDANSETRON 2 MG/ML
4 INJECTION INTRAMUSCULAR; INTRAVENOUS
Status: CANCELLED | OUTPATIENT
Start: 2023-12-06

## 2023-12-06 RX ORDER — LIDOCAINE 40 MG/G
CREAM TOPICAL
Status: CANCELLED | OUTPATIENT
Start: 2023-12-06

## 2023-12-06 RX ORDER — FESOTERODINE FUMARATE 8 MG/1
1 TABLET, FILM COATED, EXTENDED RELEASE ORAL DAILY
Qty: 90 TABLET | Refills: 3 | Status: SHIPPED | OUTPATIENT
Start: 2023-12-06 | End: 2024-09-12

## 2023-12-06 RX ORDER — FLUMAZENIL 0.1 MG/ML
0.2 INJECTION, SOLUTION INTRAVENOUS
Status: CANCELLED | OUTPATIENT
Start: 2023-12-06 | End: 2023-12-07

## 2023-12-06 NOTE — TELEPHONE ENCOUNTER
methylPREDNISolone (MEDROL) 32 MG tablet 1 tablet 0 2023  No   Sig - Route: Take 1 tablet (32 mg) by mouth daily 12 hours prior to the procedure with IV contrast       Vs   Disp Refills Start End PACO   methylPREDNISolone (MEDROL) 32 MG tablet 1 tablet 0 2023  No   Sig - Route: Take 1 tablet (32 mg) by mouth daily 2 hours prior to the procedure with IV contrast - Oral   Sent to pharmacy as: methylPREDNISolone 32 MG Oral Tablet         Routing refill request to provider for review/approval because:  -Deny or Clarify dosage timing as per pharmacy note. 2 RXS SENT FOR 1 TAB WITH SIG 1 TAB 12 HOURS PRIOR  AND 1 TAB 2 HOURS PRIOR. WHICH IS CORRECT?   Contrast dye allergy documented.  She had cystogram done 23 so this order may be / non-applicable at this point. Cystoscopy scheduled for 23

## 2023-12-06 NOTE — TELEPHONE ENCOUNTER
FESOTERODINE ER 8 MG TABLET     Resent order to pharm.  First order was not received.   90 Tabs, 3 refills sent to pharm     Radha Luis RN  Central Triage Red Flags/Med Refills  Muscarinic Antagonists (Urinary Incontinence Agents) [id number]/01/2023 01:01 PM   Protocol Details Recent (12 mo) or future (30 days) visit within the authorizing provider's specialty    Patient does not have a diagnosis of glaucoma on the problem list    Medication is active on med list    Patient is 18 years of age or older       To be filled at: [facility]/pharmacy #[id number] - [place], [place] - [address] AT [facility]

## 2023-12-06 NOTE — TELEPHONE ENCOUNTER
Incoming patient wanting to confirm that pre assessment was completed.     Upon review pre assessment was completed on 11/21/23. Patient wanted to verify arrival time and check in.     Malu Ferguson RN  Endoscopy Procedure Pre Assessment RN  [phone] option 4

## 2023-12-07 ENCOUNTER — ANESTHESIA (OUTPATIENT)
Dept: GASTROENTEROLOGY | Facility: CLINIC | Age: 68
End: 2023-12-07
Payer: MEDICARE

## 2023-12-07 ENCOUNTER — HOSPITAL ENCOUNTER (OUTPATIENT)
Facility: CLINIC | Age: 68
Discharge: HOME | End: 2023-12-07
Attending: INTERNAL MEDICINE | Admitting: INTERNAL MEDICINE
Payer: MEDICARE

## 2023-12-07 ENCOUNTER — ANESTHESIA EVENT (OUTPATIENT)
Dept: GASTROENTEROLOGY | Facility: CLINIC | Age: 68
End: 2023-12-07
Payer: MEDICARE

## 2023-12-07 VITALS
SYSTOLIC BLOOD PRESSURE: 132 MMHG | WEIGHT: 152 LBS | DIASTOLIC BLOOD PRESSURE: 77 MMHG | OXYGEN SATURATION: 100 % | BODY MASS INDEX: 25.33 KG/M2 | RESPIRATION RATE: 15 BRPM | HEART RATE: 88 BPM | HEIGHT: 65 IN

## 2023-12-07 LAB — UPPER GI ENDOSCOPY: NORMAL

## 2023-12-07 PROCEDURE — 88342 IMHCHEM/IMCYTCHM 1ST ANTB: CPT | Mod: 26 | Performed by: PATHOLOGY

## 2023-12-07 PROCEDURE — 88305 TISSUE EXAM BY PATHOLOGIST: CPT | Mod: TC | Performed by: INTERNAL MEDICINE

## 2023-12-07 PROCEDURE — 43239 EGD BIOPSY SINGLE/MULTIPLE: CPT | Performed by: INTERNAL MEDICINE

## 2023-12-07 PROCEDURE — 258N000003 HC RX IP 258 OP 636: Performed by: NURSE ANESTHETIST, CERTIFIED REGISTERED

## 2023-12-07 PROCEDURE — 370N000017 HC ANESTHESIA TECHNICAL FEE, PER MIN: Performed by: INTERNAL MEDICINE

## 2023-12-07 PROCEDURE — 250N000009 HC RX 250: Performed by: NURSE ANESTHETIST, CERTIFIED REGISTERED

## 2023-12-07 PROCEDURE — 999N000010 HC STATISTIC ANES STAT CODE-CRNA PER MINUTE: Performed by: INTERNAL MEDICINE

## 2023-12-07 PROCEDURE — 88305 TISSUE EXAM BY PATHOLOGIST: CPT | Mod: 26 | Performed by: PATHOLOGY

## 2023-12-07 PROCEDURE — 250N000011 HC RX IP 250 OP 636: Performed by: NURSE ANESTHETIST, CERTIFIED REGISTERED

## 2023-12-07 RX ORDER — PROPOFOL 10 MG/ML
INJECTION, EMULSION INTRAVENOUS PRN
Status: DISCONTINUED | OUTPATIENT
Start: 2023-12-07 | End: 2023-12-07

## 2023-12-07 RX ORDER — PROPOFOL 10 MG/ML
INJECTION, EMULSION INTRAVENOUS CONTINUOUS PRN
Status: DISCONTINUED | OUTPATIENT
Start: 2023-12-07 | End: 2023-12-07

## 2023-12-07 RX ORDER — SODIUM CHLORIDE, SODIUM LACTATE, POTASSIUM CHLORIDE, CALCIUM CHLORIDE 600; 310; 30; 20 MG/100ML; MG/100ML; MG/100ML; MG/100ML
INJECTION, SOLUTION INTRAVENOUS CONTINUOUS PRN
Status: DISCONTINUED | OUTPATIENT
Start: 2023-12-07 | End: 2023-12-07

## 2023-12-07 RX ORDER — LIDOCAINE HYDROCHLORIDE 20 MG/ML
INJECTION, SOLUTION INFILTRATION; PERINEURAL PRN
Status: DISCONTINUED | OUTPATIENT
Start: 2023-12-07 | End: 2023-12-07

## 2023-12-07 RX ORDER — GLYCOPYRROLATE 0.2 MG/ML
INJECTION, SOLUTION INTRAMUSCULAR; INTRAVENOUS PRN
Status: DISCONTINUED | OUTPATIENT
Start: 2023-12-07 | End: 2023-12-07

## 2023-12-07 RX ADMIN — GLYCOPYRROLATE 0.2 MG: 0.2 INJECTION, SOLUTION INTRAMUSCULAR; INTRAVENOUS at 11:37

## 2023-12-07 RX ADMIN — PROPOFOL 30 MG: 10 INJECTION, EMULSION INTRAVENOUS at 11:42

## 2023-12-07 RX ADMIN — LIDOCAINE HYDROCHLORIDE 80 MG: 20 INJECTION, SOLUTION INFILTRATION; PERINEURAL at 11:41

## 2023-12-07 RX ADMIN — PROPOFOL 20 MG: 10 INJECTION, EMULSION INTRAVENOUS at 11:43

## 2023-12-07 RX ADMIN — SODIUM CHLORIDE, POTASSIUM CHLORIDE, SODIUM LACTATE AND CALCIUM CHLORIDE: 600; 310; 30; 20 INJECTION, SOLUTION INTRAVENOUS at 11:37

## 2023-12-07 RX ADMIN — PROPOFOL 200 MCG/KG/MIN: 10 INJECTION, EMULSION INTRAVENOUS at 11:37

## 2023-12-07 ASSESSMENT — ACTIVITIES OF DAILY LIVING (ADL): ADLS_ACUITY_SCORE: 35

## 2023-12-07 NOTE — H&P
I have seen and evaluated the patient today.  There are no significant changes in the patient's history or physical exam from the prior date of service.  The patient is stable and appropriate to undergo the proposed endoscopic procedure today.  Bubba Freeman MD

## 2023-12-07 NOTE — ANESTHESIA POSTPROCEDURE EVALUATION
Patient: Jose Coronado    Procedure: Procedure(s):  Esophagoscopy, gastroscopy, duodenoscopy (EGD), combined       Anesthesia Type:  MAC    Note:  Disposition: Outpatient   Postop Pain Control: Uneventful            Sign Out: Well controlled pain   PONV: No   Neuro/Psych: Uneventful            Sign Out: Acceptable/Baseline neuro status   Airway/Respiratory: Uneventful            Sign Out: Acceptable/Baseline resp. status   CV/Hemodynamics: Uneventful            Sign Out: Acceptable CV status; No obvious hypovolemia; No obvious fluid overload   Other NRE: NONE   DID A NON-ROUTINE EVENT OCCUR? No           Last vitals:  Vitals Value Taken Time   /77 12/07/23 1223   Temp     Pulse 79 12/07/23 1223   Resp 11 12/07/23 1225   SpO2 99 % 12/07/23 1223   Vitals shown include unfiled device data.    Electronically Signed By: GARY WALKER MD  December 7, 2023  1:45 PM

## 2023-12-07 NOTE — ANESTHESIA CARE TRANSFER NOTE
Patient: Jose Coronado    Procedure: Procedure(s):  Esophagoscopy, gastroscopy, duodenoscopy (EGD), combined       Diagnosis: Unintentional weight loss [R63.4]  LUQ abdominal pain [R10.12]  LLQ abdominal pain [R10.32]  Early satiety [R68.81]  Diagnosis Additional Information: No value filed.    Anesthesia Type:   MAC     Note:    Oropharynx: oropharynx clear of all foreign objects and spontaneously breathing  Level of Consciousness: awake  Oxygen Supplementation: room air    Independent Airway: airway patency satisfactory and stable  Dentition: dentition unchanged  Vital Signs Stable: post-procedure vital signs reviewed and stable  Report to RN Given: handoff report given  Patient transferred to: Phase II  Comments:     After procedure in [facility] [place] under monitored anesthesia care (MAC), patient exhibited spontaneous respirations, patient breathing room air with oxygen saturation maintained greater than 95%, patient brought to [facility] [place] recovery bay for postprocedure recovery, SpO2, NiBP, and EKG monitors and alarms on and functioning, report on patient's clinical status given to recovery-trained endoscopy RN, RN questions answered.            Handoff Report: Identifed the Patient, Identified the Reponsible Provider, Reviewed the pertinent medical history, Discussed the surgical course, Reviewed Intra-OP anesthesia mangement and issues during anesthesia, Set expectations for post-procedure period and Allowed opportunity for questions and acknowledgement of understanding      Vitals:  Vitals Value Taken Time   /64 12/07/23 1158   Temp     Pulse 79 12/07/23 1159   Resp 12 12/07/23 1159   SpO2 99 % 12/07/23 1159   Vitals shown include unfiled device data.    Electronically Signed By: RON Ortega CRNA  December 7, 2023  12:01 PM

## 2023-12-07 NOTE — ANESTHESIA PREPROCEDURE EVALUATION
Anesthesia Pre-Procedure Evaluation    Patient: Jose Coronado   MRN: [id number] : 1955        Procedure : Procedure(s):  Esophagoscopy, gastroscopy, duodenoscopy (EGD), combined          Past Medical History:   Diagnosis Date    Anemia     Arthritis     Hands, back, hips. Various dates first noted.    CVA (cerebral vascular accident) (H)     ?migraine, ?pfo--negative vasc w/u, neg hypercoag w/u    Diffuse cystic mastopathy     Fibrocystic breast disease    HTN, goal below 140/90     Hyperparathyroidism (H24)     Infectious mononucleosis     Mono at age 16    Irregular heart beat     PAT no afib on 30day monitor    Labyrinthitis, unspecified     Migraine headache with aura     Osteopenia     Pain in joint, shoulder region     Secondary to a fall    Palpitations     PFO (patent foramen ovale)     s/p closure with amplazter device 17    S/P gastric bypass     Sleep apnea     she is on CPAP    Sleep apnea     Vitamin D deficiencies       Past Surgical History:   Procedure Laterality Date    BIOPSY      Breast biopsies    BREAST SURGERY      Above    CARDIAC SURGERY  2017    PFO closure    COLONOSCOPY N/A 2015    Procedure: COLONOSCOPY;  Surgeon: Deandre Brooks MD;  Location:  GI    COLONOSCOPY N/A 10/6/2022    Procedure: COLONOSCOPY, WITH BIOPSIES;  Surgeon: Freddie Gonzalez MD;  Location:  GI    DAVINCI HYSTERECTOMY SUPRACERVICAL, SALPINGO-OOPHORECTOMY INCLUDING BILATERAL N/A 3/16/2020    Procedure: DAVINCI HYSTERECTOMY SUPRACERVICAL, SALPINGO-OOPHORECTOMY INCLUDING BILATERAL WITH EXAM UNDER ANESTHESIA;  Surgeon: Lily Yancey MD;  Location: [facility] OR    DAVINCI SACROCOLPOPEXY, MIDURETHRAL SLING, CYSTOSCOPY N/A 3/16/2020    Procedure: SACROCOLPOPEXY, ROBOT-ASSISTED, LAPAROSCOPIC, WITH INSERTION OF MIDURETHRAL SLING AND CYSTOSCOPY;  Surgeon: Carolyn Valdivia MD;  Location: [facility] OR    GASTRIC BYPASS  2010    GENITOURINARY SURGERY  3/16/2020     Cystocele/rectocele repair    GYN SURGERY  3/16/2020    Hysterectomy    ORTHOPEDIC SURGERY Left 2010    wrist fracture    PARATHYROIDECTOMY  9/19/11    [facility] ANEURYSM, INTRACRAN, SIMPLE SURG  04/2017    coil of aneurysm right posterior paraophthalmic artery    [facility] NONSPECIFIC PROCEDURE      S/P multiple breast biopies - all negative / benign    [facility] NONSPECIFIC PROCEDURE      S/P T&A    Z NONSPECIFIC PROCEDURE      Shirley teeth extraction    [facility] NONSPECIFIC PROCEDURE      S/P (? unreadable) ankle      Allergies   Allergen Reactions    Contrast Dye Itching     Reaction of immediate burning and severe itching in Right ear and back of throat after injection for CT.     Sulfa Antibiotics      hives      Social History     Tobacco Use    Smoking status: Never    Smokeless tobacco: Never   Substance Use Topics    Alcohol use: Yes     Comment: Social      Wt Readings from Last 1 Encounters:   11/30/23 68.9 kg (152 lb)        Anesthesia Evaluation            ROS/MED HX  ENT/Pulmonary:     (+) sleep apnea, uses CPAP,                                     Neurologic: Comment: Cerebral aneurysm coiling;    (+)      migraines,    CVA,                      Cardiovascular: Comment: PFO closure;    (+)  hypertension- -   -  - -                                      METS/Exercise Tolerance:     Hematologic:       Musculoskeletal:       GI/Hepatic:    (-) GERD   Renal/Genitourinary:       Endo: Comment: Hyperparathryoidism;      Psychiatric/Substance Use:       Infectious Disease:       Malignancy:       Other:            Physical Exam    Airway        Mallampati: II   TM distance: > 3 FB   Neck ROM: full   Mouth opening: > 3 cm    Respiratory Devices and Support         Dental       (+) Minor Abnormalities - some fillings, tiny chips      Cardiovascular   cardiovascular exam normal          Pulmonary   pulmonary exam normal                OUTSIDE LABS:  CBC:   Lab Results   Component Value Date    WBC 7.7 11/30/2023    WBC 5.5  03/13/2023    HGB 13.3 11/30/2023    HGB 12.7 03/13/2023    HCT 39.1 11/30/2023    HCT 38.2 03/13/2023     11/30/2023     03/13/2023     BMP:   Lab Results   Component Value Date     11/30/2023     03/13/2023    POTASSIUM 4.2 11/30/2023    POTASSIUM 3.5 03/13/2023    CHLORIDE 99 11/30/2023    CHLORIDE 103 03/13/2023    CO2 28 11/30/2023    CO2 25 03/13/2023    BUN 20.7 11/30/2023    BUN 15.0 03/13/2023    CR 0.88 11/30/2023    CR 0.72 03/13/2023    GLC 95 11/30/2023    GLC 78 03/13/2023     COAGS:   Lab Results   Component Value Date    PTT 25 04/29/2019    INR 0.92 08/24/2022     POC:   Lab Results   Component Value Date     (H) 06/24/2010     HEPATIC:   Lab Results   Component Value Date    ALBUMIN 3.6 03/13/2023    PROTTOTAL 5.7 (L) 03/13/2023    ALT 34 03/13/2023    AST 40 (H) 03/13/2023    ALKPHOS 101 03/13/2023    BILITOTAL 0.5 03/13/2023     OTHER:   Lab Results   Component Value Date    LACT 0.7 03/23/2021    A1C 5.7 06/17/2010    MAXIMILIANO 9.3 11/30/2023    PHOS 3.5 05/17/2011    MAG 2.1 11/16/2015    TSH 0.85 03/13/2023    T4 0.90 09/14/2012    T3 108 05/17/2011    SED 8 02/28/2017       Anesthesia Plan    ASA Status:  3    NPO Status:  NPO Appropriate    Anesthesia Type: MAC.              Consents    Anesthesia Plan(s) and associated risks, benefits, and realistic alternatives discussed. Questions answered and patient/representative(s) expressed understanding.     - Discussed:     - Discussed with:  Patient            Postoperative Care       PONV prophylaxis: Ondansetron (or other 5HT-3)     Comments:               GARY WALKER MD    I have reviewed the pertinent notes and labs in the chart from the past 30 days and (re)examined the patient.  Any updates or changes from those notes are reflected in this note.             # Drug Induced Platelet Defect: home medication list includes an antiplatelet medication  # Overweight: Estimated body mass index is 25.29 kg/m  as  "calculated from the following:    Height as of 11/30/23: 1.651 m (5' 5\").    Weight as of 11/30/23: 68.9 kg (152 lb).      "

## 2023-12-08 ENCOUNTER — MYC MEDICAL ADVICE (OUTPATIENT)
Dept: GASTROENTEROLOGY | Facility: CLINIC | Age: 68
End: 2023-12-08
Payer: MEDICARE

## 2023-12-08 DIAGNOSIS — K28.9 ULCER, ANASTOMOTIC: Primary | ICD-10-CM

## 2023-12-08 RX ORDER — OMEPRAZOLE 40 MG/1
40 CAPSULE, DELAYED RELEASE ORAL 2 TIMES DAILY
Qty: 180 CAPSULE | Refills: 1 | Status: SHIPPED | OUTPATIENT
Start: 2023-12-08 | End: 2024-06-05

## 2023-12-11 ENCOUNTER — MYC MEDICAL ADVICE (OUTPATIENT)
Dept: UROLOGY | Facility: CLINIC | Age: 68
End: 2023-12-11

## 2023-12-11 ENCOUNTER — OFFICE VISIT (OUTPATIENT)
Dept: UROLOGY | Facility: CLINIC | Age: 68
End: 2023-12-11
Payer: MEDICARE

## 2023-12-11 DIAGNOSIS — R39.89 PNEUMATURIA: Primary | ICD-10-CM

## 2023-12-11 DIAGNOSIS — N30.00 ACUTE CYSTITIS WITHOUT HEMATURIA: ICD-10-CM

## 2023-12-11 DIAGNOSIS — N95.8 GENITOURINARY SYNDROME OF MENOPAUSE: ICD-10-CM

## 2023-12-11 DIAGNOSIS — K28.9 ULCER, ANASTOMOTIC: Primary | ICD-10-CM

## 2023-12-11 PROCEDURE — 52000 CYSTOURETHROSCOPY: CPT | Performed by: UROLOGY

## 2023-12-11 PROCEDURE — 99214 OFFICE O/P EST MOD 30 MIN: CPT | Mod: 25 | Performed by: UROLOGY

## 2023-12-11 RX ORDER — CIPROFLOXACIN 500 MG/1
500 TABLET, FILM COATED ORAL 2 TIMES DAILY
Qty: 10 TABLET | Refills: 0 | Status: SHIPPED | OUTPATIENT
Start: 2023-12-11 | End: 2024-02-08

## 2023-12-11 NOTE — PATIENT INSTRUCTIONS
After Your Cystoscopy    What happens after the exam?    You may go back to your normal diet and activity as you feel ready, unless your doctor tells you not to.    For the next two days, you may notice:    Some blood in your urine  Some burning when you urinate (use the toilet)  An urge to urinate more often  Bladder spasms    These are normal after the procedure and should go away after a day or two.  To relieve these problems drink 6 to 8 large glasses of water each day (includes drinks at meals) as this will help clear the urine.  Take warm baths to relieve pain and bladder spasms.  Do not add anything to the bath water.  You may also take Tylenol (acetaminophen) for pain if needed.    When should I call my doctor?    A fever over 100F (38C) for more than a day. (Before you call the doctor, check your temperature under your tongue)  Chills  Failure to urinate: No urine comes out when you try to use the toilet. (Try soaking in a bathtub full of warm water. If still no urine, call your doctor)  A lot of blood in the urine, or blood clots larger than a nickel  Pain in the back or belly area (abdomen)  Pain or spasms that are not relieved by warm tub baths and pain medicine  Severe pain, burning or other problems while passing urine  Pain that gets worse after two days    -continue estrogen cream  -stay well hydrated to keep abot  oz  -cranberry supplement with 36 PAC.  -f/u with PA in 6 months

## 2023-12-11 NOTE — PROGRESS NOTES
Reason for Visit:  Cystoscopy    Clinical Data: Ms. Jose Coronado is a 67 year old female with a hx of Unintentional weight loss; LUQ abdominal pain; LLQ abdominal pain; Early satiety.  She underwent a CT and found to have air in her bladder.    CT a/d 11/22/23:  IMPRESSION:  1.  A few small pulmonary nodules measuring up to 3 mm. Please see follow-up guidelines.  2.  Increased size of a nodule in or abutting the inferior left lobe of the thyroid gland since 2014. Consider repeat thyroid ultrasound for further characterization.  3.  Cholelithiasis and gallbladder distention.  4.  Large amount of gas in the urinary bladder may be due to infection or instrumentation.    Cystogram 12/5/23:  IMPRESSION: No specific bladder abnormality identified. No bladder  leak, bladder lumen filling defect, or visible fistula can be  identified.    Cystoscopy procedure:  Pt. Was consented and placed in the lithotomy position.  She was cleaned and preparred in the usual fashion.  Lidocain gel was inserted into the urethra and given time to take effect.  A 16 fr flexible cystoscope was then inserted through the urethra and into the bladder.  The urethra was wnl.  The bladder was with 1+ trabeculation.  No tumors, diverticulae, or stones.  Bilateral u/o's were effluxing clear urine.  The cystoscope was then withdrawn.  The pt. Tolerated the procedure well.    A/P:  67 year old female with pneumaturia however no identifiable area of fistula on cystogram or cystoscopy.  We discussed that this could be an infection.  She is asymptomatic but traveling soon.  Will give cipro to take prn while traveling.  We discussed the importance of staying well hydrated.  She has a bit of an ulcer so avoiding the Vit C and methenamine but will take a cranberry supplement.  We discussed the 36 PAC kinds to be more effective.  -continue estrogen cream  -stay well hydrated to keep abot  oz  -cranberry supplement with 36 PAC.  -f/u with PA in 6  months    Thank you for allowing me to participate in the care of  Ms. Jose Coronado and I will keep you updated on her progress.    Carolyn Valdivia MD    30 minutes spent by me on the date of the encounter doing chart review, history and exam, documentation and further activities per the note in addition to the cystoscopy.

## 2023-12-11 NOTE — NURSING NOTE
Jose Coronado's goals for this visit include:   Chief Complaint   Patient presents with    Cystoscopy     Evaluate pneumaturia       She requests these members of her care team be copied on today's visit information:       PCP: Lian Martinez    Referring Provider:  No referring provider defined for this encounter.    LMP  (LMP Unknown)     Do you need any medication refills at today's visit?     Marianela Celeste LPN on 12/11/2023 at 10:57 AM

## 2023-12-12 ENCOUNTER — TELEPHONE (OUTPATIENT)
Dept: UROLOGY | Facility: CLINIC | Age: 68
End: 2023-12-12
Payer: MEDICARE

## 2023-12-12 NOTE — TELEPHONE ENCOUNTER
Left Voicemail (1st Attempt) for the patient to call back and schedule the following:    Appointment type: return  Provider: dr. henry  Return date: 6/11/2024  Specialty phone number: [phone]   Additonal Notes: follow up in 6 months with bridger antunez Procedure   Orthopedics, Podiatry, Sports Medicine, Ent ,Eye , Audiology, Adult Endocrine & Diabetes, Nutrition & Medication Therapy Management Specialties   [facility]

## 2023-12-13 LAB
PATH REPORT.ADDENDUM SPEC: NORMAL
PATH REPORT.COMMENTS IMP SPEC: NORMAL
PATH REPORT.FINAL DX SPEC: NORMAL
PATH REPORT.GROSS SPEC: NORMAL
PATH REPORT.MICROSCOPIC SPEC OTHER STN: NORMAL
PATH REPORT.RELEVANT HX SPEC: NORMAL
PHOTO IMAGE: NORMAL

## 2023-12-14 RX ORDER — METHYLPREDNISOLONE 32 MG/1
32 TABLET ORAL DAILY
Qty: 1 TABLET | Refills: 0 | OUTPATIENT
Start: 2023-12-14

## 2023-12-15 ENCOUNTER — MYC MEDICAL ADVICE (OUTPATIENT)
Dept: GASTROENTEROLOGY | Facility: CLINIC | Age: 68
End: 2023-12-15
Payer: MEDICARE

## 2023-12-18 ENCOUNTER — HOSPITAL ENCOUNTER (OUTPATIENT)
Dept: ULTRASOUND IMAGING | Facility: CLINIC | Age: 68
Discharge: HOME | End: 2023-12-18
Attending: INTERNAL MEDICINE | Admitting: INTERNAL MEDICINE
Payer: MEDICARE

## 2023-12-18 ENCOUNTER — TELEPHONE (OUTPATIENT)
Dept: GASTROENTEROLOGY | Facility: CLINIC | Age: 68
End: 2023-12-18
Payer: MEDICARE

## 2023-12-18 DIAGNOSIS — E04.1 THYROID NODULE: ICD-10-CM

## 2023-12-18 PROCEDURE — 76536 US EXAM OF HEAD AND NECK: CPT

## 2023-12-20 ENCOUNTER — TELEPHONE (OUTPATIENT)
Dept: GASTROENTEROLOGY | Facility: CLINIC | Age: 68
End: 2023-12-20
Payer: MEDICARE

## 2023-12-20 NOTE — TELEPHONE ENCOUNTER
Left Voicemail (2nd Attempt) for the patient to call back and schedule the following:    Appointment type: MARY VERMA  Provider: ENIO FLAHERTY  Return date: NEXT AVAILABLE   Specialty phone number: [phone]  Additional appointment(s) needed:   Additonal Notes:

## 2023-12-27 ENCOUNTER — TELEPHONE (OUTPATIENT)
Dept: GASTROENTEROLOGY | Facility: CLINIC | Age: 68
End: 2023-12-27
Payer: MEDICARE

## 2023-12-27 NOTE — TELEPHONE ENCOUNTER
"Endoscopy Scheduling Screen     Have you had a positive Covid test in the last 14 days?  No     Are you active on MyChart?   Yes     What insurance is in the chart?  Other:  Medicare [facility]     Ordering/Referring Provider: KESHA WRIGHT   (If ordering provider performs procedure, schedule with ordering provider unless otherwise instructed. )     BMI: Estimated body mass index is 26.06 kg/m  as calculated from the following:    Height as of 10/9/23: 1.664 m (5' 5.5\").    Weight as of 10/9/23: 72.1 kg (159 lb).      Sedation Ordered  MAC/deep sedation.   BMI<= 45 45 < BMI <= 48 48 < BMI < = 50  BMI > 50   No Restrictions No [facility]  No [facility]  [facility] with exceptions Hospital Only OR Only         Are you taking any prescription medications for pain 3 or more times per week?   No     Do you have a history of malignant hyperthermia or adverse reaction to anesthesia?  No     (Females) Are you currently pregnant?   No          Have you been diagnosed or told you have pulmonary hypertension?   No     Do you have an LVAD?  No     Have you been told you have moderate to severe sleep apnea?  Yes (RN Review required for scheduling unless scheduling in Hospital.) CPAP     Have you been told you have COPD, asthma, or any other lung disease?  No     Do you have any heart conditions?  Yes     In the past 6 months, have you had any hospitalizations for heart related issues including cardiomyopathy, heart attack, or stent placement?  No     Do you have any implantable devices in your body (pacemaker, ICD)?  No     Do you take nitroglycerine?  No     Have you ever had an organ transplant?         No     Have you ever had or are you awaiting a heart or lung transplant?    No     Have you had a stroke or transient ischemic attack (TIA aka \"mini stroke\" in the last 6 months?           No     Have you been diagnosed with or been told you have cirrhosis of the liver?             No     Are you currently on dialysis?   No     Do you need " "assistance transferring?              No     BMI: Estimated body mass index is 26.06 kg/m  as calculated from the following:    Height as of 10/9/23: 1.664 m (5' 5.5\").    Weight as of 10/9/23: 72.1 kg (159 lb).      Is patients BMI > 40 and scheduling location [facility]?  No     Do you take an injectable medication for weight loss or diabetes (excluding insulin)?  No     Do you take the medication Naltrexone?  No     Do you take blood thinners?  No         Prep   Are you currently on dialysis or do you have chronic kidney disease?  No     Do you have a diagnosis of diabetes?  No     Do you have a diagnosis of cystic fibrosis (CF)?  No     On a regular basis do you go 3 -5 days between bowel movements?       BMI > 40?       Preferred Pharmacy:     [id number] - [place], [place] - [address] [place] [address]  Phone: [phone] Fax: [phone]        Final Scheduling Details    Colonoscopy prep sent?  N/A     Procedure scheduled  Upper endoscopy (EGD)     Surgeon:  MICKI  Date of procedure:  2/22/24     Pre-OP / PAC:   Yes - Patient informed of pre-op requirement.     Location  [facility] - Patient preference.     Sedation            MAC/Deep Sedation - Per order.        Patient Reminders:   You will receive a call from a Nurse to review instructions and health history.  This assessment must be completed prior to your procedure.  Failure to complete the Nurse assessment may result in the procedure being cancelled.      On the day of your procedure, please designate an adult(s) who can drive you home stay with you for the next 24 hours. The medicines used in the exam will make you sleepy. You will not be able to drive.      You cannot take public transportation, ride share services, or non-medical taxi service without a responsible caregiver.  Medical transport services are allowed with the requirement that a responsible caregiver will receive you at your " destination.  We require that drivers and caregivers are confirmed prior to your procedure.

## 2023-12-28 ENCOUNTER — MYC MEDICAL ADVICE (OUTPATIENT)
Dept: UROLOGY | Facility: CLINIC | Age: 68
End: 2023-12-28
Payer: MEDICARE

## 2023-12-28 DIAGNOSIS — N39.0 FREQUENT UTI: Primary | ICD-10-CM

## 2023-12-29 LAB
ALBUMIN UR-MCNC: NEGATIVE MG/DL
APPEARANCE UR: ABNORMAL
BACTERIA #/AREA URNS HPF: ABNORMAL /HPF
BILIRUB UR QL STRIP: NEGATIVE
COLOR UR AUTO: YELLOW
GLUCOSE UR STRIP-MCNC: NEGATIVE MG/DL
HGB UR QL STRIP: ABNORMAL
KETONES UR STRIP-MCNC: NEGATIVE MG/DL
LEUKOCYTE ESTERASE UR QL STRIP: ABNORMAL
NITRATE UR QL: POSITIVE
PH UR STRIP: 6 [PH] (ref 5–7)
RBC #/AREA URNS AUTO: ABNORMAL /HPF
SP GR UR STRIP: 1.01 (ref 1–1.03)
SQUAMOUS #/AREA URNS AUTO: ABNORMAL /LPF
UROBILINOGEN UR STRIP-ACNC: 1 E.U./DL
WBC #/AREA URNS AUTO: ABNORMAL /HPF

## 2023-12-29 PROCEDURE — 87086 URINE CULTURE/COLONY COUNT: CPT | Performed by: UROLOGY

## 2023-12-29 PROCEDURE — 87186 SC STD MICRODIL/AGAR DIL: CPT | Performed by: UROLOGY

## 2023-12-29 PROCEDURE — 87088 URINE BACTERIA CULTURE: CPT | Performed by: UROLOGY

## 2023-12-29 PROCEDURE — 81001 URINALYSIS AUTO W/SCOPE: CPT | Performed by: UROLOGY

## 2024-01-02 ENCOUNTER — TELEPHONE (OUTPATIENT)
Dept: INFECTIOUS DISEASES | Facility: CLINIC | Age: 69
End: 2024-01-02
Payer: MEDICARE

## 2024-01-02 DIAGNOSIS — N39.0 FREQUENT UTI: ICD-10-CM

## 2024-01-02 RX ORDER — GRANULES FOR ORAL 3 G/1
3 POWDER ORAL
Qty: 3 PACKET | Refills: 0 | Status: SHIPPED | OUTPATIENT
Start: 2024-01-02 | End: 2024-01-07

## 2024-01-02 NOTE — TELEPHONE ENCOUNTER
"Spoke with Jose. Relayed Dr. Whitten's recommendation of stopping taking her Cipro, and informed her we are sending a different medication, Fosfomycin, to her [facility]. Jose stated that no one has explained to her what is going on; informed Jose that the lab will be running sensitivities on her recent culture to assess if Fosfomycin is the correct treatment. Jose reports she is leaving the country on 01[place]12 for [place], and will not be returning until early February. Jose states she is \"panicked\" and wants this to be resolved before she cannot receive care. Jose expressed thanks to Dr. Whitten for sending a new medication and will plan to pick it up tomorrow. No questions at this time.   "

## 2024-01-02 NOTE — TELEPHONE ENCOUNTER
Called [facility] lab to request to run sensitivities of the E.coli from the urine culture for fosfomycin. No questions.

## 2024-01-03 ENCOUNTER — TELEPHONE (OUTPATIENT)
Dept: INFECTIOUS DISEASES | Facility: CLINIC | Age: 69
End: 2024-01-03
Payer: MEDICARE

## 2024-01-03 NOTE — TELEPHONE ENCOUNTER
Called Jose to inform her that the pharmacy declined her Fosfomycin as it isn't covered. Offered the option of using [facility] to bring down prices for the Fosfomycin so that she may start taking it immediately vs. waiting for prior auth approval. Due to Jose's travel plans coming up, she decided to pay out of pocket for the Fosfomycin using [facility]. Requested she reach out if she has any issues picking up rx. No questions.

## 2024-01-04 DIAGNOSIS — R00.2 PALPITATIONS: ICD-10-CM

## 2024-01-04 DIAGNOSIS — I10 ESSENTIAL HYPERTENSION WITH GOAL BLOOD PRESSURE LESS THAN 140/90: ICD-10-CM

## 2024-01-04 DIAGNOSIS — G43.109 MIGRAINE WITH AURA AND WITHOUT STATUS MIGRAINOSUS, NOT INTRACTABLE: ICD-10-CM

## 2024-01-04 RX ORDER — LOSARTAN POTASSIUM 50 MG/1
50 TABLET ORAL DAILY
Qty: 90 TABLET | Refills: 2 | Status: SHIPPED | OUTPATIENT
Start: 2024-01-04

## 2024-01-04 RX ORDER — HYDROCHLOROTHIAZIDE 12.5 MG/1
12.5 TABLET ORAL DAILY
Qty: 90 TABLET | Refills: 2 | Status: SHIPPED | OUTPATIENT
Start: 2024-01-04

## 2024-01-04 RX ORDER — ATENOLOL 50 MG/1
50 TABLET ORAL DAILY
Qty: 90 TABLET | Refills: 2 | Status: SHIPPED | OUTPATIENT
Start: 2024-01-04

## 2024-01-05 LAB
BACTERIA UR CULT: ABNORMAL

## 2024-01-08 RX ORDER — TOPIRAMATE 25 MG/1
TABLET, FILM COATED ORAL
Qty: 180 TABLET | Refills: 0 | Status: SHIPPED | OUTPATIENT
Start: 2024-01-08 | End: 2024-03-22

## 2024-01-08 RX ORDER — NITROFURANTOIN 25; 75 MG/1; MG/1
CAPSULE ORAL
Qty: 1 CAPSULE | Refills: 0 | OUTPATIENT
Start: 2024-01-08

## 2024-02-06 ENCOUNTER — TELEPHONE (OUTPATIENT)
Dept: GASTROENTEROLOGY | Facility: CLINIC | Age: 69
End: 2024-02-06
Payer: MEDICARE

## 2024-02-06 NOTE — TELEPHONE ENCOUNTER
Contacted pt to remind them of their PAC appt prior to their endoscopy procedure. Pt stated they will attempt to schedule with PCP and if not available will call back to schedule with PAC in \[facility]\""

## 2024-02-07 ENCOUNTER — VIRTUAL VISIT (OUTPATIENT)
Dept: GASTROENTEROLOGY | Facility: CLINIC | Age: 69
End: 2024-02-07
Attending: PHYSICIAN ASSISTANT
Payer: MEDICARE

## 2024-02-07 DIAGNOSIS — R63.4 UNINTENTIONAL WEIGHT LOSS: ICD-10-CM

## 2024-02-07 DIAGNOSIS — R10.12 LUQ ABDOMINAL PAIN: ICD-10-CM

## 2024-02-07 DIAGNOSIS — K63.8219 SMALL INTESTINAL BACTERIAL OVERGROWTH (SIBO): ICD-10-CM

## 2024-02-07 DIAGNOSIS — R68.81 EARLY SATIETY: ICD-10-CM

## 2024-02-07 DIAGNOSIS — R10.32 LLQ ABDOMINAL PAIN: ICD-10-CM

## 2024-02-07 PROCEDURE — 97802 MEDICAL NUTRITION INDIV IN: CPT | Mod: 95 | Performed by: DIETITIAN, REGISTERED

## 2024-02-07 PROCEDURE — 99207 PR NO CHARGE LOS: CPT | Mod: 95 | Performed by: DIETITIAN, REGISTERED

## 2024-02-07 NOTE — NURSING NOTE
Is the patient currently in the state of [place]? YES    Visit mode:VIDEO    If the visit is dropped, the patient can be reconnected by: VIDEO VISIT: Send to e-mail at: [email or web address]    Will anyone else be joining the visit? NO  (If patient encounters technical issues they should call [phone]    How would you like to obtain your AVS? MyChart    Are changes needed to the allergy or medication list? No    Reason for visit: Consult    Keyanna BARROW

## 2024-02-07 NOTE — PATIENT INSTRUCTIONS
It was nice meeting you today. Below are the nutrition recommendations we discussed at your visit.    Please let me know if you have any additional questions.    Nutrition Recommendations    Aim for eating multiple smaller meals per day such as 4-6 smaller meals per day.    Wait at least 30 minutes after eating before drinking liquids. (If you can try to stop drinking liquid about 15-30 minutes before eating a meal too, but if not able to do this, then especially wait 30 minutes after a meal to drink).    Include some good sources of soluble fiber in your diet such as oats, barley, sweet potato, potato, aundrea seeds, flaxseeds, avocado, berries, apple (without the peel might be better), pears, beans/legumes (especially navy beans), figs, banana, brussels sprouts, carrots.    Continue to avoid sugar alcohols which are sugar substitutes such as sorbitol, xylitol, mannitol, maltitol, erythritol, isomalt, lactitol. These can be found in some sugar free candies, drinks, gums and sugar free protein bar such as vitamin water zero, Lilly drinks, some sugar free gums such as Trident and some protein bars such as Quest protein bars.    Continue to avoid alcoholic beverages and also avoid carbonated beverages.    6. If you decide that you would like to follow a low FODMAP diet after your next endoscopy, here are the directions for following a full and for following a partial low FODMAP diet:    For full low FoDMAP diet, then:  - Start with eliminating higher FODMAP foods for 3-4 weeks.   -Then after 3-4 weeks start adding those higher FODMAP foods back into diet. (see process for reintroducing below).    OR   If you prefer to do a partial lower FODMAP diet, then    -For Modified/partial lower FODMAP diet, review the list of high FODMAP foods and then eliminate several of those higher FODMAP foods that you eat most days of the week for 3-4 weeks.    After 3-4 weeks, to add higher FODMAP foods back into diet:    -Start by adding  back in 1 higher fodmap food at a time by eating a small serving of that food each day for 3 days in a row OR you can gradually increase the portion size over the course of the 3 days. For example on day 1, can have 1/4 cup serving, then on day 2, can have 1/3 c and then on day 3, can have a 1/2 cup serving.    -If tolerated, then wait at least one day, and then add another higher FODMAP food back into diet for 3 days in a row and onward.     -If you prefer to reintroduce foods slowly, then you could add one food each week (depending on how quickly you'd like to re-introduce and also depending on if you have a return of symptoms that may linger for a day or so).    -If you notice any symptoms after adding back in a higher FODMAP food than can wait until symptoms improve before trying the next higher FODMAP food.     -Also if you notice a significant increase in symptoms after retrying the first day, you do not need to eat on additional days    --Keep daily food and beverage journals and track all symptoms.    --Plan menus and meals ahead of time to help you stick to the elimination diet.    Here are some websites with low FODMAP recipes:    Www.[employer].Marketing Munch  Www.[employer]  IBSfree.net    [employer] at [email or web address].Marketing Munch sells ready made low FODMAP meals.     Can follow up as needed.    If you would like to schedule a follow up appointment, please call [phone].    Sylvia Hernández, MS, RD, LD

## 2024-02-07 NOTE — LETTER
2/7/2024         RE: Jose Coronado  [address] [place] [address]        Dear Colleague,    Thank you for referring your patient, Jose Coronado, to the [facility] [place]. Please see a copy of my visit note below.    [facility] Outpatient Medical Nutrition Therapy      Time Spent:  35 minutes  Session Type:  Initial   Referring Physician:  Karen Cabrera PA-C  Reason for RD Visit:   unintended weight loss, early satiety, LUQ abdominal pain     Nutrition Assessment:  Patient is a 68 year old female with history that includes acute gastrojejunal junction ulcer (per EGD in 12/23), hyperparathyroidism, parathyroid tumor, hypertension, MITCHELL on CPap, A-fib, minor stroke in 2017, brain aneurysm, obesity, s/p gastric bypass (in ~2010), grade 4 prolapse, urinary incontinence s/p rectocele/cystocele repair, hysterectomy  and recurrent UTIs. She also reported hx of SIBO    She stated that she was found to have SIBO with symptoms that include a lot of diarrhea and also had EGD two months ago and found to have an ulcer. She was started on PPI. She is now feeling really well with regards to her ulcer after starting PPI and made some dietary changes after looking up recommendations online and on [employer] site. She is avoiding fried foods, spicy foods and alcohol. She hasn't given up coffee though and would like to continue drinking some. She has 1 c in morning and then an iced coffee in the afternoon but tolerating and not causing her any upper GI/ulcer symptoms. She has a f/up EGD in a couple of weeks on 2/22 to recheck.     She is now eating mostly a normal diet. She is still losing a little bit of weight. She lost 25 lbs in the past year with having SIBO and ulcer symptoms so now she is trying to maintain her weight. From chart review, she is down 36 lbs in 14 months. Was 188 lbs in 11/2022, then 177 lbs in 1/2023 and was 170 lbs in 6/2023 and now ~150-152 lbs. Overall down  ~14-15% over the past year and 11% over the past 8 months. She reported hx of being heavy, hx of bad eating habits and working with weight watchers and has hx of gastric bypass (in about 2010). She was recently on an [place] cruise x 14 days and was able to eat a normal diet. Just towards the end of the trip, she started to have more diarrhea and SiBO symptoms. She c/o having ongoing issues with multiple diarrhea stools currently. Initially stools were a Leota 7 but now stools are feathery and more Leota 6, not formed though. When she first started having issues with SIBO she looked up some information online and had been following a version on low FODMAP and SIBO diet tips previously and it helped with her symptoms. She is interested in diet education and tips for SIBO and had some diet question r/t recent issues with ulcer but since this is mostly improved mostly SIBO diet and diarrhea diet questions. She doesn't want to follow a low FODMAP diet yet as she would like to wait to see what her next EGD shows and try some other tips discussed today. See goals below.    Patient Active Problem List   Diagnosis    Family history of malignant neoplasm of breast    Female stress incontinence    Sleep apnea    Osteopenia    S/P gastric bypass    Vitamin D deficiency    Hyperparathyroidism (H24)    ACP (advance care planning)    Class 1 obesity in adult    Iron deficiency anemia    Lump or mass in breast    PFO (patent foramen ovale)    Essential hypertension with goal blood pressure less than 140/90    Left temporal lobe infarction (H)    Cerebral aneurysm without rupture    ASD (atrial septal defect)    Migraine with aura and without status migrainosus, not intractable    Midline cystocele    Rectocele    Word finding difficulty    Received intravenous tissue plasminogen activator (tPA) in emergency department    Accidental marijuana poisoning    Atrophic vaginitis    Urge incontinence    Urinary urgency     Height:  "  Ht Readings from Last 1 Encounters:   12/07/23 1.651 m (5' 5\")     Weight: about 150-152 (fully dressed was 152 lbs today). Has had non significant weight loss of 11% over the past 8 months.  Wt Readings from Last 10 Encounters:   12/07/23 68.9 kg (152 lb)   11/30/23 68.9 kg (152 lb)   10/09/23 72.1 kg (159 lb)   08/21/23 73 kg (161 lb)   06/14/23 77.1 kg (170 lb)   03/09/23 78.5 kg (173 lb)   01/06/23 80.4 kg (177 lb 4.8 oz)   11/10/22 85.4 kg (188 lb 4.8 oz)   08/24/22 85.9 kg (189 lb 4.8 oz)   05/04/22 90.7 kg (200 lb)     BMI: Estimated body mass index is 25.29 kg/m  as calculated from the following:    Height as of 12/7/23: 1.651 m (5' 5\").    Weight as of 12/7/23: 68.9 kg (152 lb).    Diet Recall:  (some usual/recent meals/snacks/beverages): under treatment with infectious dz doctor for bladder issues. Tends to get hungry in evenings so eats more during the evening until bedtime. Eats smaller amounts at a time, may only be able to eat 4 bites of a food/1/4 of hamburger. Tries to eat the protein portion first, if unable to eat much.  Meal Food    Breakfast Likes variety: yogurt with fr and granola or eggs and toast or cold cereal and fruit or toast with PB and banana   Lunch Forces herslef to eat: 1/2 sandwich or leftover fish   Dinner 5 PM: chicken/fish with vegetables and tries to eat salads or enchilada casserole   Snacks Eats a lot more at night: 7, 9 PM and onward:     Beverages 1 c in AM and iced coffee drink in afternoon,  oz water   Alcohol Intake none      Labs:    Last Comprehensive Metabolic Panel:  Sodium   Date Value Ref Range Status   11/30/2023 137 135 - 145 mmol/L Final     Comment:     Reference intervals for this test were updated on 09/26/2023 to more accurately reflect our healthy population. There may be differences in the flagging of prior results with similar values performed with this method. Interpretation of those prior results can be made in the context of the updated " reference intervals.    03/23/2021 132 (L) 133 - 144 mmol/L Final     Potassium   Date Value Ref Range Status   11/30/2023 4.2 3.4 - 5.3 mmol/L Final   02/14/2022 4.3 3.4 - 5.3 mmol/L Final   03/23/2021 3.4 3.4 - 5.3 mmol/L Final     Chloride   Date Value Ref Range Status   11/30/2023 99 98 - 107 mmol/L Final   02/14/2022 106 94 - 109 mmol/L Final   03/23/2021 101 94 - 109 mmol/L Final     Carbon Dioxide   Date Value Ref Range Status   03/23/2021 25 20 - 32 mmol/L Final     Carbon Dioxide (CO2)   Date Value Ref Range Status   11/30/2023 28 22 - 29 mmol/L Final   02/14/2022 30 20 - 32 mmol/L Final     Anion Gap   Date Value Ref Range Status   11/30/2023 10 7 - 15 mmol/L Final   02/14/2022 3 3 - 14 mmol/L Final   03/23/2021 6 3 - 14 mmol/L Final     Glucose   Date Value Ref Range Status   11/30/2023 95 70 - 99 mg/dL Final   02/14/2022 95 70 - 99 mg/dL Final   03/23/2021 131 (H) 70 - 99 mg/dL Final     Urea Nitrogen   Date Value Ref Range Status   11/30/2023 20.7 8.0 - 23.0 mg/dL Final   02/14/2022 18 7 - 30 mg/dL Final   03/23/2021 19 7 - 30 mg/dL Final     Creatinine   Date Value Ref Range Status   11/30/2023 0.88 0.51 - 0.95 mg/dL Final   03/23/2021 0.85 0.52 - 1.04 mg/dL Final     GFR Estimate   Date Value Ref Range Status   11/30/2023 71 >60 mL/min/1.73m2 Final   03/23/2021 71 >60 mL/min/[1.73_m2] Final     Comment:     Non  GFR Calc  Starting 12/18/2018, serum creatinine based estimated GFR (eGFR) will be   calculated using the Chronic Kidney Disease Epidemiology Collaboration   (CKD-EPI) equation.       Calcium   Date Value Ref Range Status   11/30/2023 9.3 8.8 - 10.2 mg/dL Final   03/23/2021 9.0 8.5 - 10.1 mg/dL Final     CBC RESULTS:   Recent Labs   Lab Test 11/30/23  1633   WBC 7.7   RBC 4.39   HGB 13.3   HCT 39.1   MCV 89   MCH 30.3   MCHC 34.0   RDW 13.3        Pertinent Medications/vitamin and mineral supplements:      Current Outpatient Medications   Medication    aspirin 325 MG  tablet    atenolol (TENORMIN) 50 MG tablet    Calcium Citrate-Vitamin D (CALCIUM CITRATE + D PO)    ciprofloxacin (CIPRO) 500 MG tablet    COMPOUNDED NON-CONTROLLED SUBSTANCE (CMPD RX) - PHARMACY TO MIX COMPOUNDED MEDICATION    COMPOUNDED NON-CONTROLLED SUBSTANCE (CMPD RX) - PHARMACY TO MIX COMPOUNDED MEDICATION    Cyanocobalamin 2500 MCG TABS    Ferrous Sulfate 27 MG TABS    Fesoterodine Fumarate 8 MG TB24    hydrochlorothiazide (HYDRODIURIL) 12.5 MG tablet    imipramine (TOFRANIL) 25 MG tablet    losartan (COZAAR) 50 MG tablet    methylPREDNISolone (MEDROL) 32 MG tablet    methylPREDNISolone (MEDROL) 32 MG tablet    methylPREDNISolone (MEDROL) 32 MG tablet    omeprazole (PRILOSEC) 40 MG DR capsule    topiramate (TOPAMAX) 25 MG tablet    UNABLE TO FIND    valACYclovir (VALTREX) 1000 mg tablet    VITAMIN D, CHOLECALCIFEROL, PO     No current facility-administered medications for this visit.     Food Allergies:  NKFA     MALNUTRITION:  % Weight Loss:  10% in 6 months (moderate malnutrition)  % Intake:  <75% for >/= 3 months (moderate malnutrition)  Subcutaneous Fat Loss:  None observed  Muscle Loss:  None observed  Fluid Retention:  None noted    Malnutrition Diagnosis: Moderate malnutrition  In Context of:  Acute illness or injury  Chronic illness or disease    Nutrition Prescription: Nutrition Education     Nutrition Intervention      Provided diet education for SIBO, diarrhea, abdominal pain, ulcer, early satiety and unintentional weight loss and hx gastric bypass tips to decrease risk of loose stools and for meals that may be better tolerated.    Answered patient's questions. Patient verbalized understanding of education provided. See Goals below.     Educational Materials Provided:  Nutrition Care Manual: Low FODMAP nutrition therapy and FODMAP food chart     Goals:    Aim for eating multiple smaller meals per day such as 4-6 smaller meals per day.    Wait at least 30 minutes after eating before drinking  liquids. (If you can try to stop drinking liquid about 15-30 minutes before eating a meal too, but if not able to do this, then especially wait 30 minutes after a meal to drink).    Include some good sources of soluble fiber in your diet such as oats, barley, sweet potato, potato, aundera seeds, flaxseeds, avocado, berries, apple (without the peel might be better), pears, beans/legumes (especially navy beans), figs, banana, brussels sprouts, carrots.    Continue to avoid sugar alcohols which are sugar substitutes such as sorbitol, xylitol, mannitol, maltitol, erythritol, isomalt, lactitol. These can be found in some sugar free candies, drinks, gums and sugar free protein bar such as vitamin water zero, Lilly drinks, some sugar free gums such as Trident and some protein bars such as Quest protein bars.    Continue to avoid alcoholic beverages and also avoid carbonated beverages.    6. If you decide that you would like to follow a low FODMAP diet after your next endoscopy, here are the directions for following a full and for following a partial low FODMAP diet:    For full low FoDMAP diet, then:  - Start with eliminating higher FODMAP foods for 3-4 weeks.   -Then after 3-4 weeks start adding those higher FODMAP foods back into diet. (see process for reintroducing below).    OR   If you prefer to do a partial lower FODMAP diet, then    -For Modified/partial lower FODMAP diet, review the list of high FODMAP foods and then eliminate several of those higher FODMAP foods that you eat most days of the week for 3-4 weeks.    After 3-4 weeks, to add higher FODMAP foods back into diet:    -Start by adding back in 1 higher fodmap food at a time by eating a small serving of that food each day for 3 days in a row OR you can gradually increase the portion size over the course of the 3 days. For example on day 1, can have 1/4 cup serving, then on day 2, can have 1/3 c and then on day 3, can have a 1/2 cup serving.    -If tolerated, then  wait at least one day, and then add another higher FODMAP food back into diet for 3 days in a row and onward.     -If you prefer to reintroduce foods slowly, then you could add one food each week (depending on how quickly you'd like to re-introduce and also depending on if you have a return of symptoms that may linger for a day or so).    -If you notice any symptoms after adding back in a higher FODMAP food than can wait until symptoms improve before trying the next higher FODMAP food.     -Also if you notice a significant increase in symptoms after retrying the first day, you do not need to eat on additional days    --Keep daily food and beverage journals and track all symptoms.    --Plan menus and meals ahead of time to help you stick to the elimination diet.    Here are some websites with low FODMAP recipes:    Www.fodmapeverNok [employer]ok Labs.[employer]  Www.[employer]pag[employer]  IBSfree.net    [employer] at www.[employer] sells ready made low FODMAP meals.       Nutrition Monitoring and Evaluation: Will monitor adherence to nutrition recommendations at future RD visits.     Further Medical Nutrition Therapy:  Follow-up as needed.    Patient was encouraged to contact RD with any further questions.          Again, thank you for allowing me to participate in the care of your patient.      Sincerely,    Sylvia Hernández RD

## 2024-02-08 ENCOUNTER — OFFICE VISIT (OUTPATIENT)
Dept: INTERNAL MEDICINE | Facility: CLINIC | Age: 69
End: 2024-02-08
Payer: MEDICARE

## 2024-02-08 ENCOUNTER — TELEPHONE (OUTPATIENT)
Dept: GASTROENTEROLOGY | Facility: CLINIC | Age: 69
End: 2024-02-08

## 2024-02-08 VITALS
HEIGHT: 65 IN | TEMPERATURE: 97 F | DIASTOLIC BLOOD PRESSURE: 73 MMHG | BODY MASS INDEX: 25.49 KG/M2 | SYSTOLIC BLOOD PRESSURE: 127 MMHG | RESPIRATION RATE: 16 BRPM | OXYGEN SATURATION: 98 % | WEIGHT: 153 LBS | HEART RATE: 66 BPM

## 2024-02-08 DIAGNOSIS — Z01.818 PRE-OP EXAM: Primary | ICD-10-CM

## 2024-02-08 DIAGNOSIS — R63.4 UNINTENTIONAL WEIGHT LOSS: ICD-10-CM

## 2024-02-08 PROCEDURE — 99214 OFFICE O/P EST MOD 30 MIN: CPT

## 2024-02-08 NOTE — PROGRESS NOTES
Preoperative Evaluation  [facility]  [address]  [address]  [facility] [address]  Phone: [phone]  Primary Provider: Lian Martinez  Pre-op Performing Provider: KATHY CAMARGO  Feb 8, 2024       Jose is a 68 year old, presenting for the following:  Pre-Op Exam        2/8/2024     1:39 PM   Additional Questions   Roomed by ALYSHA Everett LPN   Accompanied by self         2/8/2024     1:39 PM   Patient Reported Additional Medications   Patient reports taking the following new medications none     Surgical Information  Surgery/Procedure: EGD  Surgery Location: [facility]  Surgeon: Dr.C. Freeman  Surgery Date: 2-  Time of Surgery: AM  Where patient plans to recover: At home with family  Fax number for surgical facility: Note does not need to be faxed, will be available electronically in Epic.    Assessment & Plan     The proposed surgical procedure is considered LOW risk.    (Z01.818) Pre-op exam  (primary encounter diagnosis)  Comment: Patient presents to the clinic for a preop exam for an EGD due to a follow-up from a gastric ulcer.  Patient is undergoing treatment and states she is feeling much better.  Patient states that the scope is to ensure that it is improving and healing as it is supposed to.  Plan: At this time I do not see any major concerns to not proceed with the procedure.    (R63.4) Unintentional weight loss  Comment: Patient has been experiencing unintentional weight loss and was subsequently diagnosed with a stomach ulcer and has been treated for that.  Patient is undergoing a follow-up  scope to ensure that the ulcer is healing appropriately.  Plan: At this time I do not see any major concerns to not proceed with the procedure.      Possible Sleep Apnea: Patient has sleep apnea and currently using CPAP.            - No identified additional risk factors other than previously addressed    Antiplatelet or Anticoagulation Medication Instructions   -  aspirin: Discontinue aspirin 7-10 days prior to procedure to reduce bleeding risk. It should be resumed postoperatively.     Additional Medication Instructions   - ACE/ARB: Continue without modification (e.g., MAC anesthesia, neurosurgery, spine surgery, heart failure, or labile hypertension with risk of hypertension).   - Beta Blockers: Continue taking on the day of surgery.   - Diuretics: HOLD on the day of surgery.    Recommendation  APPROVAL GIVEN to proceed with proposed procedure, without further diagnostic evaluation.      35 minutes spent by me on the date of the encounter doing chart review, review of test results, interpretation of tests, patient visit, and documentation     Subjective       HPI related to upcoming procedure: Patient is here for a pre-op for a 2 month follow-up EGD to ensure her stomach ulcer around the staple site from her gastric bypass is healing. She states she is feeling so much better. She is able to eat again. She has gained a pound since last time being here and she is happy about that. Has been feeling well and does not report any acute illnesses. No chest pain, no shortness of breath or heart palpitations. She is dealing with SIBO and some lower GI symptoms.         2/8/2024    11:01 AM   Preop Questions   1. Have you ever had a heart attack or stroke? YES - stroke 2017   2. Have you ever had surgery on your heart or blood vessels, such as a stent placement, a coronary artery bypass, or surgery on an artery in your head, neck, heart, or legs? YES - 2017,  brain aneurysm, coil and stent.   July 2017- PFO occluder cardiac      3. Do you have chest pain with activity? No   4. Do you have a history of  heart failure? No   5. Do you currently have a cold, bronchitis or symptoms of other infection? No   6. Do you have a cough, shortness of breath, or wheezing? No   7. Do you or anyone in your family have previous history of blood clots? YES - stroke   8. Do you or does anyone in your  family have a serious bleeding problem such as prolonged bleeding following surgeries or cuts? No   9. Have you ever had problems with anemia or been told to take iron pills? YES    10. Have you had any abnormal blood loss such as black, tarry or bloody stools, or abnormal vaginal bleeding? No   11. Have you ever had a blood transfusion? No   12. Are you willing to have a blood transfusion if it is medically needed before, during, or after your surgery? Yes   13. Have you or any of your relatives ever had problems with anesthesia? YES - family, none personally.    14. Do you have sleep apnea, excessive snoring or daytime drowsiness? YES - uses CPAP   14a. Do you have a CPAP machine? Yes   15. Do you have any artifical heart valves or other implanted medical devices like a pacemaker, defibrillator, or continuous glucose monitor? No   16. Do you have artificial joints? No   17. Are you allergic to latex? No       Health Care Directive  Patient has a Health Care Directive on file      Preoperative Review of    reviewed - no record of controlled substances prescribed.      Status of Chronic Conditions:  ANEMIA - Patient has a recent history of moderate-severe anemia, which has not been symptomatic. Work up to date has revealed monitoring. Treatment has been monitoring.     HYPERTENSION - Patient has longstanding history of HTN , currently denies any symptoms referable to elevated blood pressure. Specifically denies chest pain, palpitations, dyspnea, orthopnea, PND or peripheral edema. Blood pressure readings have been in normal range. Current medication regimen is as listed below. Patient denies any side effects of medication.     Patient Active Problem List    Diagnosis Date Noted    Atrophic vaginitis 02/10/2021     Priority: Medium    Urge incontinence 02/10/2021     Priority: Medium    Urinary urgency 02/10/2021     Priority: Medium    Midline cystocele 12/16/2019     Priority: Medium     Added automatically  from request for surgery [id number]      Rectocele 12/16/2019     Priority: Medium     Added automatically from request for surgery [id number]      Osteopenia 03/17/2019     Priority: Medium    Vitamin D deficiency 03/17/2019     Priority: Medium     (Problem list name updated by automated process. Provider to review and confirm.)    (Problem list name updated by automated process. Provider to review and confirm.)      Hyperparathyroidism (H24) 03/17/2019     Priority: Medium    Word finding difficulty 03/17/2019     Priority: Medium    Received intravenous tissue plasminogen activator (tPA) in emergency department 03/17/2019     Priority: Medium    Accidental marijuana poisoning 03/17/2019     Priority: Medium    Migraine with aura and without status migrainosus, not intractable 01/07/2019     Priority: Medium    ASD (atrial septal defect) 07/13/2017     Priority: Medium    Cerebral aneurysm without rupture 04/10/2017     Priority: Medium    Left temporal lobe infarction (H) 02/09/2017     Priority: Medium    Essential hypertension with goal blood pressure less than 140/90 10/31/2016     Priority: Medium    PFO (patent foramen ovale) 02/15/2016     Priority: Medium     s/p closure with amplazter device 7/13/17      Lump or mass in breast 11/09/2015     Priority: Medium    Class 1 obesity in adult 10/16/2015     Priority: Medium    Iron deficiency anemia 10/16/2015     Priority: Medium    ACP (advance care planning) 08/23/2012     Priority: Medium     Discussed Advance Directive planning with patient; information given to patient to review.      S/P gastric bypass 06/01/2010     Priority: Medium    Sleep apnea 08/28/2009     Priority: Medium    Female stress incontinence 06/09/2008     Priority: Medium     (Problem list name updated by automated process. Provider to review and confirm.)      Family history of malignant neoplasm of breast 10/02/2003     Priority: Medium      Past Medical History:   Diagnosis Date    Anemia      Arthritis 2015    Hands, back, hips. Various dates first noted.    CVA (cerebral vascular accident) (H) 2017    ?migraine, ?pfo--negative vasc w/u, neg hypercoag w/u    Diffuse cystic mastopathy     Fibrocystic breast disease    HTN, goal below 140/90     Hyperparathyroidism (H24)     Infectious mononucleosis     Mono at age 16    Irregular heart beat     PAT no afib on 30day monitor    Labyrinthitis, unspecified     Migraine headache with aura     Osteopenia     Pain in joint, shoulder region     Secondary to a fall    Palpitations     PFO (patent foramen ovale)     s/p closure with amplazter device 7/13/17    S/P gastric bypass June, 2010    Sleep apnea     she is on CPAP    Sleep apnea     Vitamin D deficiencies      Past Surgical History:   Procedure Laterality Date    BIOPSY  1984    Breast biopsies    BREAST SURGERY  1984    Above    CARDIAC SURGERY  7/13/2017    PFO closure    COLONOSCOPY N/A 8/12/2015    Procedure: COLONOSCOPY;  Surgeon: Deandre Brooks MD;  Location:  GI    COLONOSCOPY N/A 10/6/2022    Procedure: COLONOSCOPY, WITH BIOPSIES;  Surgeon: Freddie Gonzalez MD;  Location:  GI    DAVINCI HYSTERECTOMY SUPRACERVICAL, SALPINGO-OOPHORECTOMY INCLUDING BILATERAL N/A 3/16/2020    Procedure: DAVINCI HYSTERECTOMY SUPRACERVICAL, SALPINGO-OOPHORECTOMY INCLUDING BILATERAL WITH EXAM UNDER ANESTHESIA;  Surgeon: Lily Yancey MD;  Location: [facility] OR    DAVINCI SACROCOLPOPEXY, MIDURETHRAL SLING, CYSTOSCOPY N/A 3/16/2020    Procedure: SACROCOLPOPEXY, ROBOT-ASSISTED, LAPAROSCOPIC, WITH INSERTION OF MIDURETHRAL SLING AND CYSTOSCOPY;  Surgeon: Carolyn Valdivia MD;  Location: [facility] OR    ESOPHAGOSCOPY, GASTROSCOPY, DUODENOSCOPY (EGD), COMBINED N/A 12/7/2023    Procedure: Esophagoscopy, gastroscopy, duodenoscopy (EGD), combined;  Surgeon: Bubba Freeman MD;  Location:  GI    GASTRIC BYPASS  June 24, 2010    GENITOURINARY SURGERY  3/16/2020    Cystocele/rectocele repair    GYN SURGERY   3/16/2020    Hysterectomy    ORTHOPEDIC SURGERY Left 2010    wrist fracture    PARATHYROIDECTOMY  9/19/11    [facility] ANEURYSM, INTRACRAN, SIMPLE SURG  04/2017    coil of aneurysm right posterior paraophthalmic artery    [facility] NONSPECIFIC PROCEDURE      S/P multiple breast biopies - all negative / benign    [facility] NONSPECIFIC PROCEDURE      S/P T&A    [facility] NONSPECIFIC PROCEDURE      Pierce teeth extraction    [facility] NONSPECIFIC PROCEDURE      S/P (? unreadable) ankle     Current Outpatient Medications   Medication Sig Dispense Refill    aspirin 325 MG tablet Take 325 mg by mouth every morning       atenolol (TENORMIN) 50 MG tablet TAKE 1 TABLET BY MOUTH EVERY DAY 90 tablet 2    Calcium Citrate-Vitamin D (CALCIUM CITRATE + D PO) Take 2 tablets by mouth every morning       COMPOUNDED NON-CONTROLLED SUBSTANCE (CMPD RX) - PHARMACY TO MIX COMPOUNDED MEDICATION Estriol 1 mg/g in HRT base, apply small amount to finger and apply to inside vagina daily for 2 weeks then twice weekly 30 g 1    Cyanocobalamin 2500 MCG TABS Take 2,500 mcg by mouth once a week Mon      Ferrous Sulfate 27 MG TABS Take 27 mg by mouth every morning       Fesoterodine Fumarate 8 MG TB24 Take 1 tablet (8 mg) by mouth daily 90 tablet 3    hydrochlorothiazide (HYDRODIURIL) 12.5 MG tablet TAKE 1 TABLET BY MOUTH EVERY DAY 90 tablet 2    imipramine (TOFRANIL) 25 MG tablet Take 1 tablet (25 mg) by mouth At Bedtime 90 tablet 2    losartan (COZAAR) 50 MG tablet TAKE 1 TABLET BY MOUTH EVERY DAY 90 tablet 2    omeprazole (PRILOSEC) 40 MG DR capsule Take 1 capsule (40 mg) by mouth 2 times daily for 180 days 180 capsule 1    topiramate (TOPAMAX) 25 MG tablet TAKE 1 TABLET BY MOUTH TWICE A  tablet 0    UNABLE TO FIND CPAP machine every night      VITAMIN D, CHOLECALCIFEROL, PO Take 500 Units by mouth every morning       ciprofloxacin (CIPRO) 500 MG tablet Take 1 tablet (500 mg) by mouth 2 times daily 10 tablet 0    COMPOUNDED NON-CONTROLLED SUBSTANCE (CMPD RX) - PHARMACY  TO MIX COMPOUNDED MEDICATION Estriol 1 mg/g in HRT base, apply small amount to finger and apply to inside vagina daily for 2 weeks then twice weekly 30 g 11    methylPREDNISolone (MEDROL) 32 MG tablet Take 1 tablet (32 mg) by mouth daily 12 hours prior to the procedure with IV contrast 1 tablet 0    methylPREDNISolone (MEDROL) 32 MG tablet Take 1 tablet (32 mg) by mouth daily 2 hours prior to the procedure with IV contrast 1 tablet 0    methylPREDNISolone (MEDROL) 32 MG tablet Contrast allergy pre med pack: 32 mg methylprednisolone 12 hours and 2 hours prior 1 benadryl 50 mg 1 hour prior (Patient not taking: Reported on 2024) 2 tablet 0    valACYclovir (VALTREX) 1000 mg tablet Take 2 tablets (2,000 mg) by mouth 2 times daily for 1 day 4 tablet 1       Allergies   Allergen Reactions    Contrast Dye Itching     Reaction of immediate burning and severe itching in Right ear and back of throat after injection for CT.     Sulfa Antibiotics      hives        Social History     Tobacco Use    Smoking status: Never    Smokeless tobacco: Never   Substance Use Topics    Alcohol use: Yes     Comment: Social     Family History   Problem Relation Age of Onset    Breast Cancer Mother     Hypertension Mother     Arthritis Mother     Thyroid Disease Mother         Hypo    Ulcerative Colitis Mother     Cerebrovascular Disease Mother         Age 78 - Cerebral Hemorrhage,     Anxiety Disorder Mother     Depression Mother     Genetic Disorder Mother         Ulcerative colitis    Obesity Mother     Anesthesia Reaction Mother         Same    Breast Cancer Maternal Aunt     Hypertension Paternal Grandmother     Cerebrovascular Disease Maternal Grandmother         Age 72 -     Family History Negative Maternal Grandfather     Family History Negative Paternal Grandfather     Family History Negative Son     Family History Negative Daughter     Other Cancer Father         Skin - Non-melanoma varieties    Hypertension Father   "   Asthma Father     Family History Negative Daughter     Ulcerative Colitis Sister     Hypertension Sister     Hyperlipidemia Sister     Anxiety Disorder Sister     Depression Sister     Diabetes Sister     Obesity Sister     Asthma Sister     Anesthesia Reaction Sister         Debilitating headaches    Hypertension Brother     Anxiety Disorder Brother     Depression Brother     Asthma Nephew     Hypertension Sister         Congestive Heart Failure    Anxiety Disorder Sister     Genetic Disorder Sister         Ulcerative colitis    Obesity Sister     Genetic Disorder Niece         Ulcerative colitis    Colon Cancer No family hx of     Coronary Artery Disease No family hx of     Mental Illness No family hx of     Substance Abuse No family hx of     Osteoporosis No family hx of     Deep Vein Thrombosis No family hx of      History   Drug Use No         Review of Systems    Review of Systems  Constitutional, HEENT, cardiovascular, pulmonary, gi and gu systems are negative, except as otherwise noted.  Objective    /73   Pulse 66   Temp 97  F (36.1  C) (Oral)   Resp 16   Ht 1.651 m (5' 5\")   Wt 69.4 kg (153 lb)   LMP  (LMP Unknown)   SpO2 98%   Breastfeeding No   BMI 25.46 kg/m     Estimated body mass index is 25.46 kg/m  as calculated from the following:    Height as of this encounter: 1.651 m (5' 5\").    Weight as of this encounter: 69.4 kg (153 lb).  Physical Exam  GENERAL: alert and no distress  EYES: Eyes grossly normal to inspection, PERRL and conjunctivae and sclerae normal  HENT: ear canals and TM's normal, nose and mouth without ulcers or lesions  NECK: no adenopathy, no asymmetry, masses, or scars  RESP: lungs clear to auscultation - no rales, rhonchi or wheezes  CV: regular rate and rhythm, normal S1 S2, no S3 or S4, no murmur, click or rub, no peripheral edema  ABDOMEN: soft, nontender, no hepatosplenomegaly, no masses and bowel sounds normal  MS: no gross musculoskeletal defects noted, no " edema  SKIN: no suspicious lesions or rashes  NEURO: Normal strength and tone, mentation intact and speech normal  PSYCH: mentation appears normal, affect normal/bright    Recent Labs   Lab Test 11/30/23  1633 03/13/23  1122 08/24/22  1124   HGB 13.3 12.7  --     232  --    INR  --   --  0.92    140  --    POTASSIUM 4.2 3.5  --    CR 0.88 0.72  --         Diagnostics  No labs were ordered during this visit.   No EKG required for low risk surgery (cataract, skin procedure, breast biopsy, etc).  No EKG this visit, completed in the last 90 days.    Revised Cardiac Risk Index (RCRI)  The patient has the following serious cardiovascular risks for perioperative complications:   - No serious cardiac risks = 0 points     RCRI Interpretation: 0 points: Class I (very low risk - 0.4% complication rate)         Signed Electronically by: RON Montenegro CNP  Copy of this evaluation report is provided to requesting physician.

## 2024-02-08 NOTE — TELEPHONE ENCOUNTER
Pre assessment completed for upcoming procedure.      Procedure details:    Patient scheduled for Upper endoscopy (EGD) on 2/22/24.     Arrival time: 1000. Procedure time 1100    Pre op exam needed? Yes. Pre op physical exam completed 2/8/24 with Miladis Nicolas APRN CNP however note is still in process, unsigned.    Facility location: [facility]; [address] [place] [id number]    Sedation type: MAC    Indication for procedure: Ulcer, anastomotic; follow up EGD    COVID policy reviewed.    Designated  policy reviewed. Instructed to have someone stay 24 hours post procedure.       Chart review:     Electronic implanted devices? No    Diabetic? No      Medication review:    Anticoagulants? No    NSAIDS? No    Other medication HOLDING recommendations:  N/A      Prep for procedure:     Prep instructions sent via [employer]    Reviewed procedure prep instructions.     Patient verbalized understanding and had no questions or concerns at this time.        Aylin Fay RN  Endoscopy Procedure Pre Assessment RN  [phone] option 4

## 2024-02-12 ENCOUNTER — TELEPHONE (OUTPATIENT)
Dept: INTERNAL MEDICINE | Facility: CLINIC | Age: 69
End: 2024-02-12

## 2024-02-12 NOTE — TELEPHONE ENCOUNTER
Patient advised and agrees to plan to get RSV immunization.  Reminded patient due to patients age and being exposed to RSV she is at higher risk for getting RSV. Also reminded patient the immunization does not offer immediate coverage and is not 100% effective.    No further questions from patient.   Sary Sams, [facility]    11/17/2020 1:39 PM      Subjective:     Ms. Luis Carlos Carroll is a 69 y.o. female who is here for evaluation of leg swelling. She has a PMHx of PAF, SSS s/p PPM, HTN, HLD, GERD and obesity. Complains of leg swelling ongoing for about 2 years. Getting worse recently, now happening throughout the day as opposed to only at night time. Is also working with foot & ankle specialists for tendon issues and working with PT.     Past Medical History:   Diagnosis Date    Arrhythmia 2014    atrial fibrillation 2014, sick sinus syndrome: [facility]    Arthritis     Bipolar affective disorder ([facility] [address] 3/31/2010    Chronic pain 2018    right shoulder    Colon polyps 03/31/2010    also 5/2018: colon polyps    Depression     Diarrhea 07/19/2013    D/t alpha gal allergy says patient    Epigastric pain 6/5/2015    GERD (gastroesophageal reflux disease)     occasiional only; controlled with med    Hemispheric carotid artery syndrome No stroke    Neuro: Maryanne Ordoñez  (CT scan head/neck negative 4/2018 in [facility])    Hyperlipidemia 3/31/2010    Hypothyroidism 3/31/2010    Long term current use of anticoagulant therapy     Lupus ([address]     Migraine 03/31/2010    has them x2 a month: last one 5/23/2018    Psychotic disorder ([facility] [address]     S/P ablation of atrial fibrillation 05/13/2014    S/P cardiac pacemaker procedure 9/16/2014 9/16/14 Med[employer] MRI compatible dual chamber pacemaker implant    Sleep apnea     unable to tolerate CPAP    Stool color black     Thyroid disease     Tick-borne disease     Alpha Gal allergy: no eat pork, beef, milk (Meat allergy showed up on a allergy test)    Umbilical hernia without obstruction and without gangrene 6/22/2020    Urge incontinence 3/31/2010    Vitamin D deficiency 3/31/2015        Past Surgical History:   Procedure Laterality Date    CARDIAC SURG PROCEDURE UNLIST  5/2014    ablation    COLONOSCOPY N/A 4/27/2018 COLONOSCOPY performed by Bianca Cuellar MD at [address] [place] [address]  4/27/2018         Tiffany Severino  4/27/2018         HX BUNIONECTOMY      HX GI  2010, 5/2018    egd, colonoscopy    HX HYSTERECTOMY      HX ORTHOPAEDIC  2000's    removed bone spurs from R & L hand    HX ORTHOPAEDIC  1990s? left bunionectomy     HX OTHER SURGICAL  07/2014    2 shocks to heart & ablations    HX PACEMAKER  2014    On the right side    HX PACEMAKER PLACEMENT  2015    HX TONSILLECTOMY  15 yrs old       Family History   Problem Relation Age of Onset    Arrhythmia Mother         afib    Stroke Mother     Heart Failure Mother     Colon Cancer Father     Heart Disease Father     Arrhythmia Brother         afib    Asthma Brother     COPD Brother         Social History     Tobacco Use    Smoking status: Never Smoker    Smokeless tobacco: Never Used   Substance Use Topics    Alcohol use: Yes     Alcohol/week: 1.0 standard drinks     Types: 1 Cans of beer per week     Frequency: Monthly or less     Comment: weekly       Visit Vitals  /82 (BP 1 Location: Right arm, BP Patient Position: Sitting)   Pulse 63   Resp 16   Ht 5' 5\" (1.651 m)   Wt 233 lb 12.8 oz (106.1 kg)   SpO2 100%   BMI 38.91 kg/m²       Current Outpatient Medications   Medication Sig    Golytely 227.1-21.5-6.36 gram pwpk TAKE AS DIRECTED    omeprazole (PRILOSEC) 40 mg capsule TAKE 1 CAPSULE BY MOUTH EVERY DAY IN THE MORNING    diclofenac (Voltaren) 1 % gel Apply two grams by topical route four times daily to the affected area (s).  timolol (TIMOPTIC) 0.5 % ophthalmic solution INSTILL 1 DROP 1 TIME EVERY DAY INTO BOTH EYES EVERY MORNING    warfarin (COUMADIN) 5 mg tablet TAKE 3 TABLETS BY MOUTH ON TUESDAY, FRIDAY AND 2 TABS ALL OTHER DAYS.     hydrOXYchloroQUINE (PLAQUENIL) 200 mg tablet TAKE 2 TABLETS BY MOUTH EVERY DAY    pregabalin (LYRICA) 100 mg capsule TAKE 1 CAPSULE BY MOUTH TWICE A DAY    hydrOXYzine HCL (ATARAX) 25 mg tablet TAKE 1 TAB BY MOUTH THREE (3) TIMES DAILY AS NEEDED FOR ITCHING FOR UP TO 10 DAYS.  ergocalciferol (ERGOCALCIFEROL) 1,250 mcg (50,000 unit) capsule TAKE 1 CAPSULE BY MOUTH ONE TIME PER WEEK    levothyroxine (SYNTHROID) 50 mcg tablet TAKE 1 TABLET BY MOUTH EVERY DAY    betamethasone valerate (VALISONE) 0.1 % topical cream APPLY TO AFFECTED AREA TWICE A DAY    primidone (MYSOLINE) 50 mg tablet TAKE 1/2 TABLET BY MOUTH NIGHTLY    latanoprost (XALATAN) 0.005 % ophthalmic solution Administer 1 Drop to both eyes two (2) times a day.  trimethoprim (TRIMPEX) 100 mg tablet TAKE 1 TABLET BY MOUTH EVERY DAY    zolpidem (AMBIEN) 10 mg tablet zolpidem 10 mg tablet    tiZANidine (ZANAFLEX) 2 mg tablet Take 1 Tab by mouth three (3) times daily.  topiramate (Topamax) 100 mg tablet Take 1 Tab by mouth two (2) times a day.  lithium 300 mg tablet Take 300 mg by mouth three (3) times daily.  alprazolam (XANAX) 0.5 mg tablet Take 0.5 mg by mouth nightly. No current facility-administered medications for this visit.           Allergies   Allergen Reactions    Latex Swelling     Swelling of lips says patient    Beef Containing Products Nausea and Vomiting     diarrhea    Ciprofloxacin Unknown (comments)    Pork Derived (Porcine) Nausea and Vomiting     diarrhea    Bovine Cartilage Nausea and Vomiting     diarrhea    Milk Nausea and Vomiting     diarrhea  diarrhea    Sulfa (Sulfonamide Antibiotics) Hives    Xarelto [Rivaroxaban] Other (comments)     GI problems       Objective:        Data Review:   Lab Results   Component Value Date/Time    Cholesterol, total 209 (H) 10/08/2020 11:51 AM    HDL Cholesterol 40 10/08/2020 11:51 AM    LDL, calculated 125 (H) 11/06/2018 08:19 AM    LDL, calculated 148 (H) 05/11/2018 08:29 AM    LDL, calculated 118 (H) 10/19/2017 08:58 AM    LDL Chol Calc (NIH) 118 (H) 10/08/2020 11:51 AM    VLDL, calculated 42 (H) 11/06/2018 08:19 AM    VLDL Cholesterol Miguel Angel 51 (H) 10/08/2020 11:51 AM    CHOL/HDL Ratio 5.3 (H) 03/30/2010 08:40 AM       Lab Results   Component Value Date/Time    Hemoglobin A1c 5.3 10/08/2020 11:51 AM    Hemoglobin A1c (POC) 5.9 (A) 04/28/2014 04:04 PM       Lab Results   Component Value Date/Time    Sodium 143 11/04/2020 07:48 PM    Potassium 4.5 11/04/2020 07:48 PM    Chloride 113 (H) 11/04/2020 07:48 PM    CO2 27 11/04/2020 07:48 PM    Glucose 87 11/04/2020 07:48 PM    BUN 19 11/04/2020 07:48 PM    Creatinine 0.96 11/04/2020 07:48 PM    BUN/Creatinine ratio 20 11/04/2020 07:48 PM    GFR est AA >60 11/04/2020 07:48 PM    GFR est non-AA 57 (L) 11/04/2020 07:48 PM    Calcium 9.4 11/04/2020 07:48 PM    Anion gap 3 (L) 11/04/2020 07:48 PM    Bilirubin, total 0.3 11/04/2020 07:48 PM    ALT (SGPT) 25 11/04/2020 07:48 PM    Alk. phosphatase 75 11/04/2020 07:48 PM    Protein, total 6.7 11/04/2020 07:48 PM    Albumin 3.6 11/04/2020 07:48 PM    Globulin 3.1 11/04/2020 07:48 PM    A-G Ratio 1.2 11/04/2020 07:48 PM       1. Have you ever had vein stripping surgery NO    2. Have you ever had vein injections? NO     3. Have you ever had a blood clot? NO    4. Have you ever had phlebitis? NO                                                                     Does anyone in your family have (or used to have) varicose veins, spider veins, leg ulcers or swollen legs? Father  NO  Mother NO  Brother(s) NO  Sister(s) NO  Other NO           1. Do you experience any of the following in your legs? Aching/pain? YES  [] One leg [x] Both legs  Heaviness? YES  [] One leg [x] Both legs  Tiredness/fatigue? YES  [] One leg [] Both legs  Itching/burning? NO  [] One leg [] Both legs  Swollen ankles? YES  [] One leg [x] Both legs  Leg cramps? NO  [] One leg [x] Both legs  Restless legs? NO  [] One leg [] Both legs  Throbbing? NO  [] One leg [x] Both legs  Other? 2.  Have your veins gotten worse in recent months? YES   3.     Do you take any medication for pain (i.e., Advil, Motrin)  NO       4. Do you elevate your legs to relieve discomfort? YES    If yes, how long per day do you elevate and does it provide relief? Overnight    5. Do you exercise? NO     6. Do you wear prescription compression stockings? NO       7. Do you wear light support hose (i.e., [employer])? YES              If yes, do they provide relief? YES      8. Do you have any problem walking? YES              If yes, describe how it interferes with your activities of daily living, which Activities? Heaviness with walking, gait instability due to tendon issues. 9.   What type of work do you do? Retired          How long do you stand (hours per day) at work? n/a At home? < 2 hours        10. Have you ever had any test(s) done on your veins? NO          11. Were you diagnosed with saphenous vein reflux? NO    Review of Symptoms:    Review of Systems   Constitutional: Negative for chills, fever and weight loss. HENT: Negative for nosebleeds. Eyes: Negative for blurred vision and double vision. Respiratory: Negative for cough, shortness of breath and wheezing. Cardiovascular: Positive for leg swelling. Negative for chest pain, palpitations, orthopnea and PND. Gastrointestinal: Negative for abdominal pain, blood in stool, diarrhea, nausea and vomiting. Musculoskeletal: Negative for joint pain. Skin: Negative for rash. Neurological: Negative for dizziness, tingling and loss of consciousness. Endo/Heme/Allergies: Does not bruise/bleed easily. Physical Exam:      General: Well developed, in no acute distress, cooperative and alert  HEENT: No carotid bruits, no JVD, trach is midline. Neck Supple, PEERL, EOM intact. Heart:  reg rate and rhythm; normal S1/S2; no murmurs, no gallops or rubs. Respiratory: Clear bilaterally x 4, no wheezing or rales  Abdomen:   Soft, non-tender, no distention, no masses. + BS.    Extremities: Normal cap refill, no cyanosis, atraumatic. Bilateral LE edema 2+ non-pitting  Neuro: A&Ox3, speech clear, gait stable. Skin: Skin color is normal. No rashes or lesions. Non diaphoretic  Vascular: 2+ pulses symmetric in all extremities      Assessment:       ICD-10-CM ICD-9-CM    1. Leg swelling  M79.89 729.81    2. Paroxysmal atrial fibrillation (HCC)  I48.0 427.31    3. Benign essential hypertension  I10 401.1    4. Mixed hyperlipidemia  E78.2 272.2        Plan:     1. Leg swelling  Will evaluate with venous duplex to assess for venous insufficiency  1.  - Instruction given on daily leg elevation   2.  - Instruction given for mild exercise  3.  - Instruction given for weight reduction    2. Paroxysmal atrial fibrillation (HCC)  Continue warfarin; care per EP  S/p PPM implant    3. Benign essential hypertension  BP controlled. Continue anti-hypertensive therapy and low sodium diet    F/u with Dr. Seena Goltz after testing complete    Clyde Harris NP  11/17/2020      Patient seen and examined by me with nurse practitioner in vascular clinic. I personally performed all components of the history, physical, and medical decision making and agree with the assessment and plan as noted.     Pita Salinas MD

## 2024-02-12 NOTE — TELEPHONE ENCOUNTER
RSV vaccine is part of the preventive care and any patient can get it at the pharmacy    What is the question for the doctor?

## 2024-02-12 NOTE — TELEPHONE ENCOUNTER
Patient called, states that her 20-month old grandson has been sick for a week for RSV, was hospitalized and is coming home today. Patient is wondering if it is safe for her to take care of her grandson if she hasn't been vaccinated against RSV. Patient states she badly need to take care of him. Advised patient to take extra precautions to prevent the spread of the virus. Patient verbalized understanding and would like to request RSV vaccine.

## 2024-02-15 ENCOUNTER — OFFICE VISIT (OUTPATIENT)
Dept: GASTROENTEROLOGY | Facility: CLINIC | Age: 69
End: 2024-02-15
Payer: MEDICARE

## 2024-02-15 ENCOUNTER — MEDICAL CORRESPONDENCE (OUTPATIENT)
Dept: HEALTH INFORMATION MANAGEMENT | Facility: CLINIC | Age: 69
End: 2024-02-15

## 2024-02-15 ENCOUNTER — DOCUMENTATION ONLY (OUTPATIENT)
Dept: GASTROENTEROLOGY | Facility: CLINIC | Age: 69
End: 2024-02-15

## 2024-02-15 VITALS
DIASTOLIC BLOOD PRESSURE: 76 MMHG | BODY MASS INDEX: 25.39 KG/M2 | OXYGEN SATURATION: 100 % | WEIGHT: 152.4 LBS | HEIGHT: 65 IN | SYSTOLIC BLOOD PRESSURE: 136 MMHG | HEART RATE: 74 BPM

## 2024-02-15 DIAGNOSIS — R10.12 LUQ ABDOMINAL PAIN: ICD-10-CM

## 2024-02-15 DIAGNOSIS — R68.81 EARLY SATIETY: ICD-10-CM

## 2024-02-15 DIAGNOSIS — R63.4 UNINTENTIONAL WEIGHT LOSS: ICD-10-CM

## 2024-02-15 DIAGNOSIS — R19.7 DIARRHEA, UNSPECIFIED TYPE: Primary | ICD-10-CM

## 2024-02-15 DIAGNOSIS — R19.5 LOOSE STOOLS: ICD-10-CM

## 2024-02-15 DIAGNOSIS — R10.32 LLQ ABDOMINAL PAIN: ICD-10-CM

## 2024-02-15 PROCEDURE — 99215 OFFICE O/P EST HI 40 MIN: CPT | Performed by: PHYSICIAN ASSISTANT

## 2024-02-15 PROCEDURE — 99417 PROLNG OP E/M EACH 15 MIN: CPT | Performed by: PHYSICIAN ASSISTANT

## 2024-02-15 RX ORDER — VITAMIN E 268 MG
1 CAPSULE ORAL 2 TIMES DAILY
COMMUNITY

## 2024-02-15 RX ORDER — PYRIDOXINE HCL (VITAMIN B6) 100 MG
1 TABLET ORAL 2 TIMES DAILY
COMMUNITY
Start: 2024-01-10

## 2024-02-15 RX ORDER — MULTIVIT-MIN/IRON/FOLIC ACID/K 18-600-40
CAPSULE ORAL 2 TIMES DAILY
COMMUNITY

## 2024-02-15 RX ORDER — LOPERAMIDE HYDROCHLORIDE 2 MG/1
2 TABLET ORAL DAILY
Qty: 90 TABLET | Refills: 1 | Status: SHIPPED | OUTPATIENT
Start: 2024-02-15 | End: 2024-08-13

## 2024-02-15 RX ORDER — BUTYROSPERMUM PARKII(SHEA BUTTER), SIMMONDSIA CHINENSIS (JOJOBA) SEED OIL, ALOE BARBADENSIS LEAF EXTRACT .01; 1; 3.5 G/100G; G/100G; G/100G
1 LIQUID TOPICAL DAILY
COMMUNITY

## 2024-02-15 ASSESSMENT — PAIN SCALES - GENERAL: PAINLEVEL: NO PAIN (0)

## 2024-02-15 NOTE — PROGRESS NOTES
[facility] order, demographics, last office note was faxed to the [facility] at [phone]. Order scanned into chart.     [facility] attempted to call patient on 2/19/2024 and was left message and mailed letter. Patient has not called back to schedule.    Gerri Fowler LPN

## 2024-02-15 NOTE — NURSING NOTE
"Chief Complaint   Patient presents with    Follow Up       Vitals:    02/15/24 0931   BP: 136/76   Pulse: 74   SpO2: 100%   Weight: 69.1 kg (152 lb 6.4 oz)   Height: 1.651 m (5' 5\")       Body mass index is 25.36 kg/m .    [employer]    "

## 2024-02-15 NOTE — PROGRESS NOTES
Gastroenterology Visit for: Jose Coronado 1955   MRN: [id number]     Reason for Visit:  chief complaint    Referred by: Carolann  / [address] [place] [address] / [facility] [id number]  Patient Care Team:  Lian Martinez MD as PCP - General (Internal Medicine)  Olivia Vasquez PA-C as Physician Assistant (Physician Assistant - Medical)  Carolyn Valdivia MD as MD (Urology)  Lita Goodman RN as Specialty Care Coordinator (Urology)  Lian Martinez MD as Referring Physician (Internal Medicine)  Carolyn Valdivia MD as Assigned Surgical Provider  Lily Yancey MD as MD (OB/Gyn)  Mary Mathews MD as Referring Physician (Internal Medicine)  Olivia Vasquez PA-C as Physician Assistant (Urology)  Alexandra Whitten MD as MD (Infectious Diseases)  Alexandra Whitten MD as Assigned Infectious Disease Provider  Ynes Vuong PA-C as Physician Assistant (Gastroenterology)  Lian Martinez MD as Assigned PCP  Olivia Vasquez PA-C as Physician Assistant (Urology)  Kaitlyn Wolfe MD as Hospitalist (Endocrinology, Diabetes, and Metabolism)  Sylvia Hernández RD as Registered Dietitian (Dietitian, Registered)  Karen Cabrera PA-C as Physician Assistant (Gastroenterology)  Karen Cabrera PA-C as Assigned Gastroenterology Provider    History of Present Illness:   Jose Coronado is 68 year old female with significant past medical history pertinent for  hyperparathyroidism, parathyroid tumors, HTN, MITCHELL on CPAP, paroxysmal AFIB, minor stroke in 2017 (no residual), brain anerysm repair, PFO repair, history of gastric bypass, grade 4 prolapse with urinary incontinence s/p rectocele/cystocele repair/hysterectomy March 2020, recurrent UTIs (No UTI since 5/2022) who is presenting as follow up with chief complaint of irregular bowel patterns.    Interval History February 15, 2024:    Today Jose reports that her upper GI symptoms had resolved after 1 week of Omeprazole 40 mg twice daily.  "Overall her appetite has returned. She is now consuming 3 meals per day and has been able to tolerate a more variety foods. Her weight has been overall stable.    Follows a reflux friendly diet other than 2 cups of coffee per day.     In regards to her bowel patterns Jose reports that in December she had again developed diarrhea. Prior to this she had soft formed stools for ~ 6 months occurring on a daily basis. Now she is stooling 10-15x daily consistent with [place] Stool Scale 5-6. Associated with tenesmus. Noting the stools are low volume.  Additionally she had 1 weeks of [place] 7 Type Stools which began in [place]  and resolved after 1 week juanita she returned.     Denies weight loss, nausea, emesis, dysphagia, odynophagia, abdominal pain, nocturnal stooling, incontinence, melena, hematochezia and BRBR.     ------------------------------------------------------------------------------------------------------------------------------------------------------------------------------------------------------------------------------    Interval History November 1, 2023:    Today Jose reports that she had had \"an attack\" in July. During this episode she reports that her temperature drops to 94 degrees.  This most recent episode lasted 9 days and she describes having severe abdominal pain where she was unable to leave bed. The pain starts towards the upper left breast and moves to her left pelvis.  During severe episodes this can involve the entirety of the lower abdomen. Her most recent episode was associated with an 11 lb weight loss. Attributed to inability to tolerate oral intake and only able to sip Pedialyte.     She continues to have abdominal pain in between these episodes that resolves on it own. This can last for 1 to 2 days at a time.  She believes this can be gas related as she has increased belching during these periods.  She further explains \"that I am not bloated physically however cannot have " "external pressure on the abdomen.\" Explaining that if anything touches her abdomen this results in discomfort.  No identifiable food trigger.  However reports eating plain white bread and crackers can improve her symptoms.     Additionally she explains that she rarely has hunger pains. She is eating smaller portion sizes however she has experienced this since the bypass.  Noting now this is different over the past 2-3 years.     For example last night she has not stopped at [employer] and had a half of a quarter pounder and a few french fries.  She had felt fine until she laid down for bed and then she had become nauseous as if was going to throw up. Noticing that sensation occurs when she eats later into the evening in close proximity to laying down flat.     She has had unintentional weight loss of approximately 25 lbs since her last office visit. She has attempted to go to the [facility] however was denied consultation by the GI clinic.  At the beginning of the visit Jose had asked \"How long can I live, I am not trying to be dramatic Karen I am trying to plan.\" Further explaining that she is frustrated stating \"I don't want to leave here today, come back in six months with the plan of tell me how you are doing. That is no longer acceptable to me.\"     Diarrhea is now resolved. Having solid bowel movements. Every other stools continues to be waxy.     Last CT with contrast she had developed itching deep in the ears which traveled to the back of the throat. This occurred within minutes of the CT finishing.  -------------------------------------------------------------------------------------------------------------------------------------------------------------------------------------------------------------    Interval History June 14, 2023:    Today Jose reports that she is having more firm stools than she does have diarrhea. Having less gas. Attributes this to adjustments in dietary habits following a modified " "low FODMAPS diet and limited lactose diet. Appearence of the stool remains the same with reports of oily appearance.     Continues to have \"stools on and off, waxy yellowy type of stool, alternates between solid stool and semi solid stool.\" Will have a BM daily with 4-5 days per week with well formed stools consistent with [place] Stool Scale Type 4. \"I have many more now, well formed clean stool then I do the days of soft/mushy.\" Alternatively there are 2 days where the stools I described as \"messy\" most consistent with [place] Stool Scale Type 5. No liquid stool. Associated with intermittent incontinence. Denies outward signs of GI bleeding.     With additional attacks of with emesis, generalized abdominal cramping which has occurred x4-5 times in the past few years. Associated with increased flatulence. No change in bowel habits during this time. There is no impact on the stooling. Symptoms will last for approximately 48 hours.     Weight loss has overall improved. She reports that her weight is now overall stable. Now weighing 170 lbs. Since January has lost 7 - 8 lbs. January 2020 weighed 209 lbs. May 2022 weighed 200 lbs.     Has not trialed a complete lactose free diet.     Metamucil made symptoms worse. Was taking 2 tablespoons per day. Noted less stomach pain/gas.     No previous Imodium trial.     -----------------------------------------------------------------------------------------------------------------------------------------------------------------------------------------------------------  Interval History 2/13/23:  After last visit testing completed. 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 Stool testing with increased split fecal fat, normal neutral fat. Fecal elastase normal. Celiac serologies negative. Vitamin E low with testing with her neurologist, and zinc lower than ideal so was started on vitamin E and zinc supplementation with per their recommendation.     Many " "questions today regarding SIBO diagnosis, course and treatment which were discussed in detail.  She notes that she has not been able to get Xifaxan approved through her insurance so has not started any antibiotics yet.  Reports that she is not excited about starting antibiotics and would like to avoid multiple courses.     Today Jose reports that she is feeling better than she was from a bowel perspective.  She notes that she was having a terrible flare with loose stools, foul-smelling flatulence, and bloating and early to mid January, then however transition to 2 weeks of normal, formed, nonurgent stool with 1 bowel movement per day (though still \"waxy \").  Recently she has again started to have softer bowel movements but not as frequent or loose his previous and no recent incontinence.     She notes that she is recently put a lot of effort into eating better and doing research regarding SIBO and diet.  She has been following a low fermentation eating plan, avoiding things like lactose, artificial sugars, legumes.  She has also been working on increasing her soluble fiber as recommended at last visit, currently taking 2 tablespoons of Citrucel daily.     Reports previous weight loss from 203 down to 180. Reports weight now stable.  --------------------------------------------------------------------------------------------------------------------------------------------------------------------------------------------------------  11/2022 Ynes WALRDON Initial Visit:  Patient reports that starting about 2 years ago consistency and color of her stools changed.  She was initially having chalky pale stools which turned to pasty yellow, which she continues to have today.  She reports she is to have formed stools but now soft to loose (BSC 4-6), once per week will have formed (BSC 4).  Currently having 1-2 stools per day but unpredictable timing, can be overnight, and often quite urgent. She also reports increasing " flatus over this time.  Denies blood or melena.  Denies missing days without stool or having hard stools. She also notes fecal incontinence/leakage, which she is not able to feel so wears a pad daily.  Notices stool and pad about once per week.  Had mesh sling placed for cystocele/rectocele in 2020 but notes that she can still feel a bulge at the introitus.       Denies regular abdominal pain, nausea, vomiting, bloating, dysphagia or odynophagia. She reports having intermittent reflux if she eats particular foods or too late at night, reports this is resolved with TUMS and not overly bothersome.     She also notes 2 discrete episodes of severe abdominal pain over the last 2 years, 1 November 2020 and 1 in July 2021, each lasting 24 to 48 hours, but and able to get out of bed or eat or drink during episodes.  They eventually self resolved with sips of Pedialyte and rest.  Has not recurred since.     Stool studies done 8/29/22 were negative for C.Diff, enteric panel, cryptosporidium, giardia, and O&P. Lactoferrin was positive. Colonoscopy done 10/6/22 - examined colon was was normal, biopsies were negative for microscopic colitis but did show melanosis coli.     She reports that she started drinking more alcohol over the last 2 years during the pandemic with daughter in house and experimenting with different cocktails.  After having fatty liver visualized on CT she has since drastically cut alcohol intake and only occasionally has a glass of wine now, no change in stool pattern since reducing alcohol intake.     Reports that she used stool softeners for a while after her pelvic surgery, denies any laxative use including stimulant laxatives.     Diet Recall -  Breakfast: 2 eggs and toast, yogurt and blueberries, oatmeal, nuts and blueberries with sugar-free maple syrup - always fruit  Lunch: turkey or tuna sandwich, cottage cheese, bean salad, soup, tacos  Dinner: out to eat - fish or chicken with rice/potatoes and  vegetables, soup,   Drinks: water 100oz, iced tea sweetened with sweet and low, coffee (2-3 cups)    Esophageal Questionnaire(s)    BEDQ Questionnaire       No data to display                   No data to display                Eckardt Questionnaire       No data to display                Promis 10 Questionnaire       No data to display                STUDIES & PROCEDURES:    EGD:     12/7/2023   Findings:        Esophagogastric landmarks were identified: the Z-line was found at 40        cm, the gastroesophageal junction was found at 40 cm and the site of the        diaphragmatic hiatus was found at 40 cm from the incisors.        The examined esophagus was normal.        Evidence of a Jennie-en-Y gastrojejunostomy was found. The gastric pouch        appeared healthy. The gastrojejunal anastomosis was characterized by        edema, friable mucosa, ulceration and an intact staple line. The        jejunojejunal anastomosis was characterized by healthy appearing mucosa.        The duodenum-to-jejunum limb was not examined as it could not be        reached. The excluded stomach was not examined as it could not be        traversed.        Biopsies for histology were taken with a cold forceps in the proximal        jejunum for evaluation of celiac disease. Verification of patient        identification for the specimen was done. Estimated blood loss was        minimal.        Biopsies were taken with a cold forceps from edges of gastrojejunal        ulcer for histology. Verification of patient identification for the        specimen was done. Estimated blood loss was minimal.        Biopsies were taken with a cold forceps in the gastric body for        Helicobacter pylori testing. Verification of patient identification for        the specimen was done. Estimated blood loss was minimal.                                                                                     Impression:               - Normal esophagus.                              - Jennie-en-Y gastrojejunostomy with large 2cm                             ulceration of the gastrojejunal anastomosis along                             the staple line with visible staples present. This                             could be ischemic/surgical related with                             contributing factor of daily 325mg aspirin.                             - Biopsies were taken with a cold forceps for                             Helicobacter pylori testing.                             - Biopsies were taken with a cold forceps for                             histology from edges of gastrojejunal ulcer.                             - Normal small bowel without evidence of celiac                             disease.                             - Biopsies were taken with a cold forceps for                             evaluation of celiac disease.     Addendum   This addendum is included to report findings of immunohistochemical stains for H. Pylori (Parts B,C):  -Negative for H. Pylori organisms on immunohistochemical stains.  -There is no change in diagnosis  All controls stain appropriately.      Addendum electronically signed by Lawrence Fernández MD on 12/13/2023 at  9:12 AM   Final Diagnosis   A(1).  Small bowel, jejunum, biopsy:  -Small intestinal mucosa with no significant histopathologic abnormalities.  -Normal villous architecture identified and no prominence in intraepithelial lymphocytes seen.  -Negative for luminal organisms.  -Negative for dysplasia or malignancy.        B(2).  Gastrojejunal junction, ulcerated site, biopsy:  -Erosive gastroenteric mucosa with acute inflammation, and surface atrophy (see comment).  -Negative for H. pylori organisms on routine stains.  -Negative for dysplasia or malignancy        C(3).  Gastric pouch, biopsy:  - Oxyntic type gastric mucosa with mild chronic inflammation.  - Negative for H. Pylori organisms on routine stains.  - Negative for intestinal  metaplasia.   -Negative for dysplasia or malignancy         Electronically signed by Lawrence Fernández MD on 12/8/2023 at  1:17 PM   Comment  [facility]   The constellation of histologic features raise the differential diagnosis of ischemia, and drug induced injury, among others.  ImmunoHistochemical stains for H. pylori have been requested and will be reported as an addendum when available.       Colonoscopy:    10/6/2022   Findings:        Multiple medium-mouthed diverticula were found in the sigmoid colon.        The entire examined colon otherwise appeared normal on direct and        retroflexion views.                                                                                     Impression:               - Diverticulosis in the sigmoid colon.                             - The entire examined colon is normal on direct and                             retroflexion views.                             - Biopsies were taken with a cold forceps from the                             ascending colon and descending colon for evaluation                             of microscopic colitis.    Final Diagnosis   A.  Ascending colon, biopsy:  -Negative for diagnostic colitis, dysplasia, or malignancy.     B.  Descending colon, biopsy:  -Melanosis coli.  -Negative for diagnostic colitis, dysplasia, or malignancy.       8/12/2015   Findings:        Diverticula were found in the sigmoid colon.                                                                                     Impression:               - Diverticulosis in the sigmoid colon.   Recommendation:           High fiber diet. Repeat in ten years.     EndoFLIP directed at the UES or LES (8cm (EF-325) balloon length or 16cm (EF-322) balloon length):   Date:  8cm balloon  Balloon inflation Balloon pressure CSA (mm^2) DI (mm^2/mmHg) Dmin (mm) Compliance   20 (ladmark ID)        30        40        50           16cm balloon  Balloon inflation Balloon pressure CSA (mm^2) DI  (mm^2/mmHg) Dmin (mm) Compliance   30 (ladmark ID)        40        50        60        70           High Resolution Manometry:    PH/Impedance:     [place]:    CT:    11/22/2023   IMPRESSION:  1.  A few small pulmonary nodules measuring up to 3 mm. Please see  follow-up guidelines.  2.  Increased size of a nodule in or abutting the inferior left lobe  of the thyroid gland since 2014. Consider repeat thyroid ultrasound  for further characterization.  3.  Cholelithiasis and gallbladder distention.  4.  Large amount of gas in the urinary bladder may be due to infection  or instrumentation.     11/22/2023 CT CAP W Contrast   IMPRESSION:  1.  A few small pulmonary nodules measuring up to 3 mm. Please see  follow-up guidelines.  2.  Increased size of a nodule in or abutting the inferior left lobe  of the thyroid gland since 2014. Consider repeat thyroid ultrasound  for further characterization.  3.  Cholelithiasis and gallbladder distention.  4.  Large amount of gas in the urinary bladder may be due to infection  or instrumentation.    5/6/2022 CT AP WO Contrast                                            IMPRESSION:   1. Air within the urinary bladder may be related to recent  instrumentation.  2. Diffuse fatty infiltration of the liver.  3. Cholelithiasis.  4. Several low density lesions in the liver are too small to  characterize, but could represent cysts or hemangiomas. Consider liver  MRI for further characterization.       Esophagram:    FL VSS:     GES:    U/S:     XRAY:    Other:     9/2022 MRI Liver WO & W   TECHNIQUE: Multiplanar multisequence imaging of the abdomen acquired  before and after administration of 10 mL Gadavist intravenous  contrast.     FINDINGS: Multiple hepatic cysts. No definite hemangiomas. Signal  intensity of the liver parenchyma is unremarkable without evidence of  hepatic steatosis. There is no definite intra or extrahepatic biliary  dilatation. Liver is normal size and normal in contour.  Adrenal  glands, kidneys, spleen, and pancreas demonstrate no worrisome focal  lesion. Osseous structures demonstrate no worrisome signal  abnormality. No definite adenopathy or bowel obstruction in the  visualized abdomen. No ascites.                                                            IMPRESSION: Multiple liver cysts.    1/6/2023 Hydrogen Breath Test  Impression    Positive hydrogen breath test consistent with small intestinal bacterial overgrowth. High methane production at baseline and throughout the test. This can be seen with constipation, though data on this is limited. Please correlate clinically. 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 Consideration could be given to treatment with neomycin and rifaximin. (ACG Clinical Guidelines: Small Intestinal Bacterial Overgrowth; Venkata et al. The American Journal of Gastroenterology: February 2020 - Volume 115 - Issue 2 - p 165-178)         Prior medical records were reviewed including, but not limited to, notes from referring providers, lab work, radiographic tests, and other diagnostic tests. Pertinent results were summarized above.     History     Past Medical History:   Diagnosis Date    Anemia     Arthritis 2015    Hands, back, hips. Various dates first noted.    CVA (cerebral vascular accident) (H) 2017    ?migraine, ?pfo--negative vasc w/u, neg hypercoag w/u    Diffuse cystic mastopathy     Fibrocystic breast disease    HTN, goal below 140/90     Hyperparathyroidism (H24)     Infectious mononucleosis     Mono at age 16    Irregular heart beat     PAT no afib on 30day monitor    Labyrinthitis, unspecified     Migraine headache with aura     Osteopenia     Pain in joint, shoulder region     Secondary to a fall    Palpitations     PFO (patent foramen ovale)     s/p closure with amplazter device 7/13/17    S/P gastric bypass June, 2010    Sleep apnea     she is on CPAP    Sleep apnea     Vitamin D deficiencies         Past Surgical History:   Procedure Laterality Date    BIOPSY  1984    Breast biopsies    BREAST SURGERY  1984    Above    CARDIAC SURGERY  7/13/2017    PFO closure    COLONOSCOPY N/A 8/12/2015    Procedure: COLONOSCOPY;  Surgeon: Deandre Brooks MD;  Location: [facility]    COLONOSCOPY N/A 10/6/2022    Procedure: COLONOSCOPY, WITH BIOPSIES;  Surgeon: Freddie Gonzalez MD;  Location: [facility]    DAVINCI HYSTERECTOMY SUPRACERVICAL, SALPINGO-OOPHORECTOMY INCLUDING BILATERAL N/A 3/16/2020    Procedure: DAVINCI HYSTERECTOMY SUPRACERVICAL, SALPINGO-OOPHORECTOMY INCLUDING BILATERAL WITH EXAM UNDER ANESTHESIA;  Surgeon: Lily Yancey MD;  Location: [facility]    DAVINCI SACROCOLPOPEXY, MIDURETHRAL SLING, CYSTOSCOPY N/A 3/16/2020    Procedure: SACROCOLPOPEXY, ROBOT-ASSISTED, LAPAROSCOPIC, WITH INSERTION OF MIDURETHRAL SLING AND CYSTOSCOPY;  Surgeon: Carolyn Valdivia MD;  Location: [facility]    ESOPHAGOSCOPY, GASTROSCOPY, DUODENOSCOPY (EGD), COMBINED N/A 12/7/2023    Procedure: Esophagoscopy, gastroscopy, duodenoscopy (EGD), combined;  Surgeon: Bubba Freeman MD;  Location:  GI    GASTRIC BYPASS  June 24, 2010    GENITOURINARY SURGERY  3/16/2020    Cystocele/rectocele repair    GYN SURGERY  3/16/2020    Hysterectomy    ORTHOPEDIC SURGERY Left 2010    wrist fracture    PARATHYROIDECTOMY  9/19/11    ZZC ANEURYSM, INTRACRAN, SIMPLE SURG  04/2017    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 unreadable) ankle       Social History     Socioeconomic History    Marital status:      Spouse name: Not on file    Number of children: Not on file    Years of education: Not on file    Highest education level: Not on file   Occupational History    Not on file   Tobacco Use    Smoking  status: Never    Smokeless tobacco: Never   Vaping Use    Vaping Use: Never used   Substance and Sexual Activity    Alcohol use: Yes     Comment: Social    Drug use: No    Sexual activity: Not Currently     Partners: Male     Birth control/protection: Pill   Other Topics Concern    Parent/sibling w/ CABG, MI or angioplasty before 65F 55M? No   Social History Narrative    Not on file     Social Determinants of Health     Financial Resource Strain: Low Risk  (11/27/2023)    Financial Resource Strain     Within the past 12 months, have you or your family members you live with been unable to get utilities (heat, electricity) when it was really needed?: No   Food Insecurity: Low Risk  (11/27/2023)    Food Insecurity     Within the past 12 months, did you worry that your food would run out before you got money to buy more?: No     Within the past 12 months, did the food you bought just not last and you didn t have money to get more?: No   Transportation Needs: Low Risk  (11/27/2023)    Transportation Needs     Within the past 12 months, has lack of transportation kept you from medical appointments, getting your medicines, non-medical meetings or appointments, work, or from getting things that you need?: No   Physical Activity: Not on file   Stress: Not on file   Social Connections: Not on file   Interpersonal Safety: Low Risk  (10/9/2023)    Interpersonal Safety     Do you feel physically and emotionally safe where you currently live?: Yes     Within the past 12 months, have you been hit, slapped, kicked or otherwise physically hurt by someone?: No     Within the past 12 months, have you been humiliated or emotionally abused in other ways by your partner or ex-partner?: No   Housing Stability: Low Risk  (11/27/2023)    Housing Stability     Do you have housing? : Yes     Are you worried about losing your housing?: No       Family History   Problem Relation Age of Onset    Breast Cancer Mother     Hypertension Mother      Arthritis Mother     Thyroid Disease Mother         Hypo    Ulcerative Colitis Mother     Cerebrovascular Disease Mother         Age 78 - Cerebral Hemorrhage,     Anxiety Disorder Mother     Depression Mother     Genetic Disorder Mother         Ulcerative colitis    Obesity Mother     Anesthesia Reaction Mother         Same    Breast Cancer Maternal Aunt     Hypertension Paternal Grandmother     Cerebrovascular Disease Maternal Grandmother         Age 72 -     Family History Negative Maternal Grandfather     Family History Negative Paternal Grandfather     Family History Negative Son     Family History Negative Daughter     Other Cancer Father         Skin - Non-melanoma varieties    Hypertension Father     Asthma Father     Family History Negative Daughter     Ulcerative Colitis Sister     Hypertension Sister     Hyperlipidemia Sister     Anxiety Disorder Sister     Depression Sister     Diabetes Sister     Obesity Sister     Asthma Sister     Anesthesia Reaction Sister         Debilitating headaches    Hypertension Brother     Anxiety Disorder Brother     Depression Brother     Asthma Nephew     Hypertension Sister         Congestive Heart Failure    Anxiety Disorder Sister     Genetic Disorder Sister         Ulcerative colitis    Obesity Sister     Genetic Disorder Niece         Ulcerative colitis    Colon Cancer No family hx of     Coronary Artery Disease No family hx of     Mental Illness No family hx of     Substance Abuse No family hx of     Osteoporosis No family hx of     Deep Vein Thrombosis No family hx of      Family history reviewed and edited as appropriate    Medications and Allergies:     Outpatient Encounter Medications as of 2/15/2024   Medication Sig Dispense Refill    aspirin 325 MG tablet Take 325 mg by mouth every morning       atenolol (TENORMIN) 50 MG tablet TAKE 1 TABLET BY MOUTH EVERY DAY 90 tablet 2    Calcium Citrate-Vitamin D (CALCIUM CITRATE + D PO) Take 2 tablets by  mouth every morning       COMPOUNDED NON-CONTROLLED SUBSTANCE (CMPD RX) - PHARMACY TO MIX COMPOUNDED MEDICATION Estriol 1 mg/g in HRT base, apply small amount to finger and apply to inside vagina daily for 2 weeks then twice weekly 30 g 1    Cyanocobalamin 2500 MCG TABS Take 2,500 mcg by mouth once a week Mon      Ferrous Sulfate 27 MG TABS Take 27 mg by mouth every morning       Fesoterodine Fumarate 8 MG TB24 Take 1 tablet (8 mg) by mouth daily 90 tablet 3    hydrochlorothiazide (HYDRODIURIL) 12.5 MG tablet TAKE 1 TABLET BY MOUTH EVERY DAY 90 tablet 2    imipramine (TOFRANIL) 25 MG tablet Take 1 tablet (25 mg) by mouth At Bedtime 90 tablet 2    losartan (COZAAR) 50 MG tablet TAKE 1 TABLET BY MOUTH EVERY DAY 90 tablet 2    methylPREDNISolone (MEDROL) 32 MG tablet Contrast allergy pre med pack: 32 mg methylprednisolone 12 hours and 2 hours prior 1 benadryl 50 mg 1 hour prior (Patient not taking: Reported on 2/8/2024) 2 tablet 0    omeprazole (PRILOSEC) 40 MG DR capsule Take 1 capsule (40 mg) by mouth 2 times daily for 180 days 180 capsule 1    topiramate (TOPAMAX) 25 MG tablet TAKE 1 TABLET BY MOUTH TWICE A  tablet 0    UNABLE TO FIND CPAP machine every night      valACYclovir (VALTREX) 1000 mg tablet Take 2 tablets (2,000 mg) by mouth 2 times daily for 1 day 4 tablet 1    VITAMIN D, CHOLECALCIFEROL, PO Take 500 Units by mouth every morning        No facility-administered encounter medications on file as of 2/15/2024.        Allergies   Allergen Reactions    Contrast Dye Itching     Reaction of immediate burning and severe itching in Right ear and back of throat after injection for CT.     Sulfa Antibiotics      hives        Review of systems:  A full 10 point review of systems was obtained and was negative except for the pertinent positives and negatives stated within the HPI.    Objective Findings:   Physical Exam:    Constitutional: LMP  (LMP Unknown)   General: Alert, cooperative, no distress,  well-appearing  Head: Atraumatic, normocephalic, no obvious abnormalities   Eyes: Sclera anicteric, no obvious conjunctival hemorrhage   Nose: Nares normal, no obvious malformation, no obvious rhinorrhea   Respiratory: Resting comfortably, no apparent distress, no cough.   Skin: No jaundice, no obvious rash  Neurologic: AAOx3, no obvious neurologic abnormality  Psychiatric: Normal Affect, appropriate mood  Extremities: No obvious edema, no obvious malformation     Labs, Radiology, Pathology     Lab Results   Component Value Date    WBC 7.7 11/30/2023    WBC 5.5 03/13/2023    WBC 5.5 02/14/2022    HGB 13.3 11/30/2023    HGB 12.7 03/13/2023    HGB 13.3 02/14/2022     11/30/2023     03/13/2023     02/14/2022    CHOL 127 03/13/2023    CHOL 138 02/14/2022    CHOL 177 01/11/2021    TRIG 36 03/13/2023    TRIG 64 02/14/2022    TRIG 43 01/11/2021    HDL 58 03/13/2023    HDL 71 02/14/2022    HDL 73 01/11/2021    ALT 34 03/13/2023    ALT 23 02/14/2022    ALT 24 01/11/2021    AST 40 (H) 03/13/2023    AST 18 02/14/2022    AST 22 01/11/2021     11/30/2023     03/13/2023     02/14/2022    BUN 20.7 11/30/2023    BUN 15.0 03/13/2023    BUN 18 02/14/2022    CO2 28 11/30/2023    CO2 25 03/13/2023    CO2 30 02/14/2022    TSH 0.85 03/13/2023    TSH 1.13 02/14/2022    TSH 2.40 03/23/2021    INR 0.92 08/24/2022    INR 0.87 03/23/2021    INR 0.86 04/29/2019        Liver Function Studies -   Recent Labs   Lab Test 03/13/23  1122   PROTTOTAL 5.7*   ALBUMIN 3.6   BILITOTAL 0.5   ALKPHOS 101   AST 40*   ALT 34        Patient Active Problem List    Diagnosis Date Noted    Atrophic vaginitis 02/10/2021     Priority: Medium    Urge incontinence 02/10/2021     Priority: Medium    Urinary urgency 02/10/2021     Priority: Medium    Midline cystocele 12/16/2019     Priority: Medium     Added automatically from request for surgery [id number]      Rectocele 12/16/2019     Priority: Medium     Added automatically from  request for surgery [id number]      Osteopenia 03/17/2019     Priority: Medium    Vitamin D deficiency 03/17/2019     Priority: Medium     (Problem list name updated by automated process. Provider to review and confirm.)    (Problem list name updated by automated process. Provider to review and confirm.)      Hyperparathyroidism (H24) 03/17/2019     Priority: Medium    Word finding difficulty 03/17/2019     Priority: Medium    Received intravenous tissue plasminogen activator (tPA) in emergency department 03/17/2019     Priority: Medium    Accidental marijuana poisoning 03/17/2019     Priority: Medium    Migraine with aura and without status migrainosus, not intractable 01/07/2019     Priority: Medium    ASD (atrial septal defect) 07/13/2017     Priority: Medium    Cerebral aneurysm without rupture 04/10/2017     Priority: Medium    Left temporal lobe infarction (H) 02/09/2017     Priority: Medium    Essential hypertension with goal blood pressure less than 140/90 10/31/2016     Priority: Medium    PFO (patent foramen ovale) 02/15/2016     Priority: Medium     s/p closure with amplazter device 7/13/17      Lump or mass in breast 11/09/2015     Priority: Medium    Class 1 obesity in adult 10/16/2015     Priority: Medium    Iron deficiency anemia 10/16/2015     Priority: Medium    ACP (advance care planning) 08/23/2012     Priority: Medium     Discussed Advance Directive planning with patient; information given to patient to review.      S/P gastric bypass 06/01/2010     Priority: Medium    Sleep apnea 08/28/2009     Priority: Medium    Female stress incontinence 06/09/2008     Priority: Medium     (Problem list name updated by automated process. Provider to review and confirm.)      Family history of malignant neoplasm of breast 10/02/2003     Priority: Medium      Assessment and Plan   Assessment/Plan:    Jose Coronado is 68 year old female with significant past medical history pertinent for  hyperparathyroidism,  "parathyroid tumors, HTN, MITCHELL on CPAP, paroxysmal AFIB, minor stroke in 2017 (no residual), brain anerysm repair, PFO repair, history of gastric bypass, grade 4 prolapse with urinary incontinence s/p rectocele/cystocele repair/hysterectomy March 2020, recurrent UTIs (No UTI since 5/2022) who is presenting as follow up with chief complaint of irregular bowel patterns.    Prior Evaluation:    5/9/2022 CT AP WO Contrast diffuse fatty infiltration of the liver, cholelithiasis and several small densities within the liver too small to characterize however could represent cyst versus hemangiomas.    8/2022 Infectious stools studies including C.Diff, enteric panel, crypto/giarrdia, and O&P were negative. Fecal lactoferrin was positive.     MRI Liver W WO Contrast 9/9/2022 with multiple hepatic cysts, no definite hemangiomas and the liver parenchyma is unremarkable without evidence of hepatic steatosis. No intra/extrahepatic biliary dilation.     10/2022 Colonoscopy was then performed which was unremarkable on endoscopy, on histology no evidence of microscopic or inflammatory colitis driving diarrhea, though did show melanosis coli (denies laxative use).  Colonoscopy was not completed to the TI.     11/2022 Vit A/D, B12/folate and iron studies unremarkable. Vit E level low.     11/2022 celiac serologies negative, fecal elastase negative and split fecal fat increased. Neutral fecal fat normal.     1/2023 hydrogen breath testing positive for SIBO and high levels of methane (IMO)    3/2023 treated with Augmentin x10 days without improvement in symptoms as Rifaximin was not covered by insurance    3/13/2023 CBC/TSH w/ reflex unremarkable     6/1/2023 Fax received appeal for Rifaximin was denied \"due to the medication not seriously jeopardizing the life, health or ability to maintain function.\"    11/22/2023 CT AP W Contrast notable for incidental findings unrelated to the GI tract.     12/7/2023 EGD with RNY anatomy with large 2 " cm ulceration at the GJ anastomosis along the staple line with visible staples present. GEJ ulceration site with erosive gastroenteric mucosa with acute inflammation and surface atrophy. Jejunal biopsies with no significant histopathological abnormalities.     #RYN Gastric Bypass  #Anastamotic Gastric Ulcer  #Abdominal Pain - Resolved  #Unintentional Weight Loss - Resolved   #Early Satiation - Resolved  #Early Satiety - Resolved   Prior symptoms are likely attributed to anastomotic ulcer seen on recent upper endoscopy.  Elbow, with the episodic component alternative etiologies including partial small bowel obstructions/adhesions with history of prior intraabdominal surgical intervention should be considered.     If pain episodes were again to reoccur could consider CT during an active episode vs MRE for additional small bowel evaluation.     - Upper endoscopy 2/22/2024   - Continue Omeprazole 40 mg twice daily this is best taken on an empty stomach 30-60 minutes prior to meals. Pending results of endoscopy would then deescalate this to once daily.      #Fluctuation in Bowel Habits  #Fecal Urgency    #Tenesmus  #Elevated Split Fecal Fat -> SIBO/RNY  #History of SIBO/IMO   Pertinent history for recurrent UTI's and treated with serial antibiotics over the past 2-3 years, history of surgery for rectocele and cystocele in 2020 which correlated with the onset of symptoms. As well as history of SIBO/IMO 1/2023 treated with Augmentin x 10 days with symptom resolution and sustained [place] 4 type stools for approximately 6 months after treatment.    Now with recurrence of 10-15 low volume [place] 5-6 stools occurring daily. Associated symptoms include tenesmus. Differential includes recurrence of SIBO (has not implemented a low FODMAPs diet) however patient wishes to not proceed with any additional antibiotic use and therefore breath testing will be deferred at this time, pelvic floor dysfunction, fat malabsorption/bile acid  diarrhea in setting of gastric bypass, dysbiosis in setting of recurrent antibiotic use vs other.    Future considerations would include additional diagnostic evaluation with 72-hour fecal fat vs fructose intolerance testing as well as dietary changes including low FODMAPs diet or trial of Questran (bile acid sequestrant)     - Consultation to the pelvic floor center   - Continue taking two tablespoons daily which can be increased to three times daily   - It is recommended to start Imodium (Loperamide) to help better control your diarrhea. We recommend the initial dose of 2mg/4mg taken once followed by 2mg for each additional loose stool. Maximum daily dosing is 16mg/day.    - Avoid simple sugars as well as artifical sweeteners including sorbital, truvia, maltitol, mannitol, xylitol, glycerol, lactilol.     #Colorectal Cancer Screening  Last colonsocopy 10/2022 - recall due in 10 years (2032)    #Hepatic Cysts   #Fatty Liver   CT AP WO Contrast with fatty liver and several low density lesions in the liver that were described to be too small to characterize, but could represent cysts or hemangiomas. Follow up MRI Liver W WO Contrast 9/9/2022 with multiple hepatic cysts, no definite hemangiomas and the liver parenchyma is unremarkable without evidence of hepatic steatosis. No intra/extrahepatic biliary dilation.     ---------------------------------------------------------------------------------------------------------------------------------------------------------------------------------------------------------------------------------  ADDENDUM 2/27/2024:    Telephone call to patient to review results of endoscopy with resolution of ulcer. She is to continue Omeprazole 40 mg once daily and is planning on discussing alternatives for ASA use with her neurologist.    In regards to her bowel regimen she is now taking two tablespoons daily rather than one tablespoon daily as well as a daily probiotic. Now stools are  occurring 1-2 times daily and are soft/formed. Also reports an improvement in gas/flatulence. She will continue with daily bowel regimen as above.     Follow up plan:   Return to clinic 3 months and as needed.    The risks and benefits of my recommendations, as well as other treatment options were discussed with the patient and any available family today. All questions were answered.     Follow up: As planned above. Today, I personally spent 46 minutes in direct face to face time with the patient, of which greater than 50% of the time was spent in patient education and counseling as described above. Approximately 15 minutes were spent on indirect care associated with the patient's consultation including but not limited to review of: patient medical records to date, clinic visits, hospital records, lab results, imaging studies, procedural documentation, and coordinating care with other providers. The findings from this review are summarized in the above note. All of the above accounted for a cumulative time of 61 minutes and was performed on the date of service.     The patient verbalized understanding of the plan and was appreciative for the time spent and information provided during the office visit.           Karen Cabrera PA-C  Division of Gastroenterology, Hepatology, and Nutrition  [facility]       Documentation assisted by voice recognition and documentation system.

## 2024-02-15 NOTE — LETTER
2/15/2024         RE: Jose Coronado  [address] [place] [address]        Dear Colleague,    Thank you for referring your patient, Jose Coronado, to the [facility] GASTROENTEROLOGY [facility]INIC [place]. Please see a copy of my visit note below.      Gastroenterology Visit for: Jose Coronado 1955   MRN: [id number]     Reason for Visit:  chief complaint    Referred by: Carolann  / [facility] / [facility] [id number]  Patient Care Team:  Lian Martinez MD as PCP - General (Internal Medicine)  Olivia Vasquez PA-C as Physician Assistant (Physician Assistant - Medical)  Carolyn Valdivia MD as MD (Urology)  Lita Goodman RN as Specialty Care Coordinator (Urology)  Lian Martinez MD as Referring Physician (Internal Medicine)  Carolyn Valdivia MD as Assigned Surgical Provider  Lily Yancey MD as MD (OB/Gyn)  Mary Mathews MD as Referring Physician (Internal Medicine)  Olivia Vasquez PA-C as Physician Assistant (Urology)  Alexandra Whitten MD as MD (Infectious Diseases)  Alexandra Whitten MD as Assigned Infectious Disease Provider  Ynes Vuong PA-C as Physician Assistant (Gastroenterology)  Lian Martinez MD as Assigned PCP  Olivia Vasquez PA-C as Physician Assistant (Urology)  Kaitlyn Wolfe MD as Hospitalist (Endocrinology, Diabetes, and Metabolism)  Sylvia Hernández RD as Registered Dietitian (Dietitian, Registered)  Karen Cabrera PA-C as Physician Assistant (Gastroenterology)  Karen Cabrera PA-C as Assigned Gastroenterology Provider    History of Present Illness:   Jose Coronado is 68 year old female with significant past medical history pertinent for  hyperparathyroidism, parathyroid tumors, HTN, MITCHELL on CPAP, paroxysmal AFIB, minor stroke in 2017 (no residual), brain anerysm repair, PFO repair, history of gastric bypass, grade 4 prolapse with urinary incontinence s/p rectocele/cystocele repair/hysterectomy March 2020,  "recurrent UTIs (No UTI since 5/2022) who is presenting as follow up with chief complaint of irregular bowel patterns.    Interval History February 15, 2024:    Today Jose reports that her upper GI symptoms had resolved after 1 week of Omeprazole 40 mg twice daily. Overall her appetite has returned. She is now consuming 3 meals per day and has been able to tolerate a more variety foods. Her weight has been overall stable.    Follows a reflux friendly diet other than 2 cups of coffee per day.     In regards to her bowel patterns Jose reports that in December she had again developed diarrhea. Prior to this she had soft formed stools for ~ 6 months occurring on a daily basis. Now she is stooling 10-15x daily consistent with Norman Stool Scale 5-6. Associated with tenesmus. Noting the stools are low volume.  Additionally she had 1 weeks of Norman 7 Type Stools which began in [place]  and resolved after 1 week juanita she returned.     Denies weight loss, nausea, emesis, dysphagia, odynophagia, abdominal pain, nocturnal stooling, incontinence, melena, hematochezia and BRBR.     ------------------------------------------------------------------------------------------------------------------------------------------------------------------------------------------------------------------------------    Interval History November 1, 2023:    Today Jose reports that she had had \"an attack\" in July. During this episode she reports that her temperature drops to 94 degrees.  This most recent episode lasted 9 days and she describes having severe abdominal pain where she was unable to leave bed. The pain starts towards the upper left breast and moves to her left pelvis.  During severe episodes this can involve the entirety of the lower abdomen. Her most recent episode was associated with an 11 lb weight loss. Attributed to inability to tolerate oral intake and only able to sip Pedialyte.     She continues to have abdominal pain " "in between these episodes that resolves on it own. This can last for 1 to 2 days at a time.  She believes this can be gas related as she has increased belching during these periods.  She further explains \"that I am not bloated physically however cannot have external pressure on the abdomen.\" Explaining that if anything touches her abdomen this results in discomfort.  No identifiable food trigger.  However reports eating plain white bread and crackers can improve her symptoms.     Additionally she explains that she rarely has hunger pains. She is eating smaller portion sizes however she has experienced this since the bypass.  Noting now this is different over the past 2-3 years.     For example last night she has not stopped at [employer] and had a half of a quarter pounder and a few french fries.  She had felt fine until she laid down for bed and then she had become nauseous as if was going to throw up. Noticing that sensation occurs when she eats later into the evening in close proximity to laying down flat.     She has had unintentional weight loss of approximately 25 lbs since her last office visit. She has attempted to go to the [facility] however was denied consultation by the GI clinic.  At the beginning of the visit Jose had asked \"How long can I live, I am not trying to be dramatic Karen I am trying to plan.\" Further explaining that she is frustrated stating \"I don't want to leave here today, come back in six months with the plan of tell me how you are doing. That is no longer acceptable to me.\"     Diarrhea is now resolved. Having solid bowel movements. Every other stools continues to be waxy.     Last CT with contrast she had developed itching deep in the ears which traveled to the back of the throat. This occurred within minutes of the CT " "finishing.  -------------------------------------------------------------------------------------------------------------------------------------------------------------------------------------------------------------    Interval History June 14, 2023:    Today Jose reports that she is having more firm stools than she does have diarrhea. Having less gas. Attributes this to adjustments in dietary habits following a modified low FODMAPS diet and limited lactose diet. Appearence of the stool remains the same with reports of oily appearance.     Continues to have \"stools on and off, waxy yellowy type of stool, alternates between solid stool and semi solid stool.\" Will have a BM daily with 4-5 days per week with well formed stools consistent with Fairfax Stool Scale Type 4. \"I have many more now, well formed clean stool then I do the days of soft/mushy.\" Alternatively there are 2 days where the stools I described as \"messy\" most consistent with [place] Stool Scale Type 5. No liquid stool. Associated with intermittent incontinence. Denies outward signs of GI bleeding.     With additional attacks of with emesis, generalized abdominal cramping which has occurred x4-5 times in the past few years. Associated with increased flatulence. No change in bowel habits during this time. There is no impact on the stooling. Symptoms will last for approximately 48 hours.     Weight loss has overall improved. She reports that her weight is now overall stable. Now weighing 170 lbs. Since January has lost 7 - 8 lbs. January 2020 weighed 209 lbs. May 2022 weighed 200 lbs.     Has not trialed a complete lactose free diet.     Metamucil made symptoms worse. Was taking 2 tablespoons per day. Noted less stomach pain/gas.     No previous Imodium trial. " "    -----------------------------------------------------------------------------------------------------------------------------------------------------------------------------------------------------------  Interval History 2/13/23:  After last visit testing completed. 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 Stool testing with increased split fecal fat, normal neutral fat. Fecal elastase normal. Celiac serologies negative. Vitamin E low with testing with her neurologist, and zinc lower than ideal so was started on vitamin E and zinc supplementation with per their recommendation.     Many questions today regarding SIBO diagnosis, course and treatment which were discussed in detail.  She notes that she has not been able to get Xifaxan approved through her insurance so has not started any antibiotics yet.  Reports that she is not excited about starting antibiotics and would like to avoid multiple courses.     Today Jose reports that she is feeling better than she was from a bowel perspective.  She notes that she was having a terrible flare with loose stools, foul-smelling flatulence, and bloating and early to mid January, then however transition to 2 weeks of normal, formed, nonurgent stool with 1 bowel movement per day (though still \"waxy \").  Recently she has again started to have softer bowel movements but not as frequent or loose his previous and no recent incontinence.     She notes that she is recently put a lot of effort into eating better and doing research regarding SIBO and diet.  She has been following a low fermentation eating plan, avoiding things like lactose, artificial sugars, legumes.  She has also been working on increasing her soluble fiber as recommended at last visit, currently taking 2 tablespoons of Citrucel daily.     Reports previous weight loss from 203 down to 180. Reports weight now " stable.  --------------------------------------------------------------------------------------------------------------------------------------------------------------------------------------------------------  11/2022 Ynes WALDRON Initial Visit:  Patient reports that starting about 2 years ago consistency and color of her stools changed.  She was initially having chalky pale stools which turned to pasty yellow, which she continues to have today.  She reports she is to have formed stools but now soft to loose (BSC 4-6), once per week will have formed (BSC 4).  Currently having 1-2 stools per day but unpredictable timing, can be overnight, and often quite urgent. She also reports increasing flatus over this time.  Denies blood or melena.  Denies missing days without stool or having hard stools. She also notes fecal incontinence/leakage, which she is not able to feel so wears a pad daily.  Notices stool and pad about once per week.  Had mesh sling placed for cystocele/rectocele in 2020 but notes that she can still feel a bulge at the introitus.       Denies regular abdominal pain, nausea, vomiting, bloating, dysphagia or odynophagia. She reports having intermittent reflux if she eats particular foods or too late at night, reports this is resolved with TUMS and not overly bothersome.     She also notes 2 discrete episodes of severe abdominal pain over the last 2 years, 1 November 2020 and 1 in July 2021, each lasting 24 to 48 hours, but and able to get out of bed or eat or drink during episodes.  They eventually self resolved with sips of Pedialyte and rest.  Has not recurred since.     Stool studies done 8/29/22 were negative for C.Diff, enteric panel, cryptosporidium, giardia, and O&P. Lactoferrin was positive. Colonoscopy done 10/6/22 - examined colon was was normal, biopsies were negative for microscopic colitis but did show melanosis coli.     She reports that she started drinking more alcohol over the last 2  years during the pandemic with daughter in house and experimenting with different cocktails.  After having fatty liver visualized on CT she has since drastically cut alcohol intake and only occasionally has a glass of wine now, no change in stool pattern since reducing alcohol intake.     Reports that she used stool softeners for a while after her pelvic surgery, denies any laxative use including stimulant laxatives.     Diet Recall -  Breakfast: 2 eggs and toast, yogurt and blueberries, oatmeal, nuts and blueberries with sugar-free maple syrup - always fruit  Lunch: turkey or tuna sandwich, cottage cheese, bean salad, soup, tacos  Dinner: out to eat - fish or chicken with rice/potatoes and vegetables, soup,   Drinks: water 100oz, iced tea sweetened with sweet and low, coffee (2-3 cups)    Esophageal Questionnaire(s)    BEDQ Questionnaire       No data to display                   No data to display                Eckardt Questionnaire       No data to display                Promis 10 Questionnaire       No data to display                STUDIES & PROCEDURES:    EGD:     12/7/2023   Findings:        Esophagogastric landmarks were identified: the Z-line was found at 40        cm, the gastroesophageal junction was found at 40 cm and the site of the        diaphragmatic hiatus was found at 40 cm from the incisors.        The examined esophagus was normal.        Evidence of a Jennie-en-Y gastrojejunostomy was found. The gastric pouch        appeared healthy. The gastrojejunal anastomosis was characterized by        edema, friable mucosa, ulceration and an intact staple line. The        jejunojejunal anastomosis was characterized by healthy appearing mucosa.        The duodenum-to-jejunum limb was not examined as it could not be        reached. The excluded stomach was not examined as it could not be        traversed.        Biopsies for histology were taken with a cold forceps in the proximal        jejunum for  evaluation of celiac disease. Verification of patient        identification for the specimen was done. Estimated blood loss was        minimal.        Biopsies were taken with a cold forceps from edges of gastrojejunal        ulcer for histology. Verification of patient identification for the        specimen was done. Estimated blood loss was minimal.        Biopsies were taken with a cold forceps in the gastric body for        Helicobacter pylori testing. Verification of patient identification for        the specimen was done. Estimated blood loss was minimal.                                                                                     Impression:               - Normal esophagus.                             - Jennie-en-Y gastrojejunostomy with large 2cm                             ulceration of the gastrojejunal anastomosis along                             the staple line with visible staples present. This                             could be ischemic/surgical related with                             contributing factor of daily 325mg aspirin.                             - Biopsies were taken with a cold forceps for                             Helicobacter pylori testing.                             - Biopsies were taken with a cold forceps for                             histology from edges of gastrojejunal ulcer.                             - Normal small bowel without evidence of celiac                             disease.                             - Biopsies were taken with a cold forceps for                             evaluation of celiac disease.     Addendum   This addendum is included to report findings of immunohistochemical stains for H. Pylori (Parts B,C):  -Negative for H. Pylori organisms on immunohistochemical stains.  -There is no change in diagnosis  All controls stain appropriately.      Addendum electronically signed by Lawrence Fernández MD on 12/13/2023 at  9:12 AM   Final Diagnosis    A(1).  Small bowel, jejunum, biopsy:  -Small intestinal mucosa with no significant histopathologic abnormalities.  -Normal villous architecture identified and no prominence in intraepithelial lymphocytes seen.  -Negative for luminal organisms.  -Negative for dysplasia or malignancy.        B(2).  Gastrojejunal junction, ulcerated site, biopsy:  -Erosive gastroenteric mucosa with acute inflammation, and surface atrophy (see comment).  -Negative for H. pylori organisms on routine stains.  -Negative for dysplasia or malignancy        C(3).  Gastric pouch, biopsy:  - Oxyntic type gastric mucosa with mild chronic inflammation.  - Negative for H. Pylori organisms on routine stains.  - Negative for intestinal metaplasia.   -Negative for dysplasia or malignancy         Electronically signed by Lawrence Fernández MD on 12/8/2023 at  1:17 PM   Comment  [facility]   The constellation of histologic features raise the differential diagnosis of ischemia, and drug induced injury, among others.  ImmunoHistochemical stains for H. pylori have been requested and will be reported as an addendum when available.       Colonoscopy:    10/6/2022   Findings:        Multiple medium-mouthed diverticula were found in the sigmoid colon.        The entire examined colon otherwise appeared normal on direct and        retroflexion views.                                                                                     Impression:               - Diverticulosis in the sigmoid colon.                             - The entire examined colon is normal on direct and                             retroflexion views.                             - Biopsies were taken with a cold forceps from the                             ascending colon and descending colon for evaluation                             of microscopic colitis.    Final Diagnosis   A.  Ascending colon, biopsy:  -Negative for diagnostic colitis, dysplasia, or malignancy.     B.  Descending colon,  biopsy:  -Melanosis coli.  -Negative for diagnostic colitis, dysplasia, or malignancy.       8/12/2015   Findings:        Diverticula were found in the sigmoid colon.                                                                                     Impression:               - Diverticulosis in the sigmoid colon.   Recommendation:           High fiber diet. Repeat in ten years.     EndoFLIP directed at the UES or LES (8cm (EF-325) balloon length or 16cm (EF-322) balloon length):   Date:  8cm balloon  Balloon inflation Balloon pressure CSA (mm^2) DI (mm^2/mmHg) Dmin (mm) Compliance   20 (ladmark ID)        30        40        50           16cm balloon  Balloon inflation Balloon pressure CSA (mm^2) DI (mm^2/mmHg) Dmin (mm) Compliance   30 (ladmark ID)        40        50        60        70           High Resolution Manometry:    PH/Impedance:     Anderson:    CT:    11/22/2023   IMPRESSION:  1.  A few small pulmonary nodules measuring up to 3 mm. Please see  follow-up guidelines.  2.  Increased size of a nodule in or abutting the inferior left lobe  of the thyroid gland since 2014. Consider repeat thyroid ultrasound  for further characterization.  3.  Cholelithiasis and gallbladder distention.  4.  Large amount of gas in the urinary bladder may be due to infection  or instrumentation.     11/22/2023 CT CAP W Contrast   IMPRESSION:  1.  A few small pulmonary nodules measuring up to 3 mm. Please see  follow-up guidelines.  2.  Increased size of a nodule in or abutting the inferior left lobe  of the thyroid gland since 2014. Consider repeat thyroid ultrasound  for further characterization.  3.  Cholelithiasis and gallbladder distention.  4.  Large amount of gas in the urinary bladder may be due to infection  or instrumentation.    5/6/2022 CT AP WO Contrast                                            IMPRESSION:   1. Air within the urinary bladder may be related to recent  instrumentation.  2. Diffuse fatty infiltration of  the liver.  3. Cholelithiasis.  4. Several low density lesions in the liver are too small to  characterize, but could represent cysts or hemangiomas. Consider liver  MRI for further characterization.       Esophagram:    FL VSS:     GES:    U/S:     XRAY:    Other:     9/2022 MRI Liver WO & W   TECHNIQUE: Multiplanar multisequence imaging of the abdomen acquired  before and after administration of 10 mL Gadavist intravenous  contrast.     FINDINGS: Multiple hepatic cysts. No definite hemangiomas. Signal  intensity of the liver parenchyma is unremarkable without evidence of  hepatic steatosis. There is no definite intra or extrahepatic biliary  dilatation. Liver is normal size and normal in contour. Adrenal  glands, kidneys, spleen, and pancreas demonstrate no worrisome focal  lesion. Osseous structures demonstrate no worrisome signal  abnormality. No definite adenopathy or bowel obstruction in the  visualized abdomen. No ascites.                                                            IMPRESSION: Multiple liver cysts.    1/6/2023 Hydrogen Breath Test  Impression    Positive hydrogen breath test consistent with small intestinal bacterial overgrowth. High methane production at baseline and throughout the test. This can be seen with constipation, though data on this is limited. Please correlate clinically. 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 Consideration could be given to treatment with neomycin and rifaximin. (ACG Clinical Guidelines: Small Intestinal Bacterial Overgrowth; Hastings et al. The American Journal of Gastroenterology: February 2020 - Volume 115 - Issue 2 - p 165-178)         Prior medical records were reviewed including, but not limited to, notes from referring providers, lab work, radiographic tests, and other diagnostic tests. Pertinent results were summarized above.     History     Past Medical History:   Diagnosis Date    Anemia     Arthritis 2015    Hands,  back, hips. Various dates first noted.    CVA (cerebral vascular accident) (H) 2017    ?migraine, ?pfo--negative vasc w/u, neg hypercoag w/u    Diffuse cystic mastopathy     Fibrocystic breast disease    HTN, goal below 140/90     Hyperparathyroidism (H24)     Infectious mononucleosis     Mono at age 16    Irregular heart beat     PAT no afib on 30day monitor    Labyrinthitis, unspecified     Migraine headache with aura     Osteopenia     Pain in joint, shoulder region     Secondary to a fall    Palpitations     PFO (patent foramen ovale)     s/p closure with amplazter device 7/13/17    S/P gastric bypass June, 2010    Sleep apnea     she is on CPAP    Sleep apnea     Vitamin D deficiencies        Past Surgical History:   Procedure Laterality Date    BIOPSY  1984    Breast biopsies    BREAST SURGERY  1984    Above    CARDIAC SURGERY  7/13/2017    PFO closure    COLONOSCOPY N/A 8/12/2015    Procedure: COLONOSCOPY;  Surgeon: Deandre Brooks MD;  Location:  GI    COLONOSCOPY N/A 10/6/2022    Procedure: COLONOSCOPY, WITH BIOPSIES;  Surgeon: Freddie Gonzalez MD;  Location:  GI    DAVINCI HYSTERECTOMY SUPRACERVICAL, SALPINGO-OOPHORECTOMY INCLUDING BILATERAL N/A 3/16/2020    Procedure: DAVINCI HYSTERECTOMY SUPRACERVICAL, SALPINGO-OOPHORECTOMY INCLUDING BILATERAL WITH EXAM UNDER ANESTHESIA;  Surgeon: Lily Yancey MD;  Location: [facility] OR    DAVINCI SACROCOLPOPEXY, MIDURETHRAL SLING, CYSTOSCOPY N/A 3/16/2020    Procedure: SACROCOLPOPEXY, ROBOT-ASSISTED, LAPAROSCOPIC, WITH INSERTION OF MIDURETHRAL SLING AND CYSTOSCOPY;  Surgeon: Carolyn Valdivia MD;  Location: [facility] OR    ESOPHAGOSCOPY, GASTROSCOPY, DUODENOSCOPY (EGD), COMBINED N/A 12/7/2023    Procedure: Esophagoscopy, gastroscopy, duodenoscopy (EGD), combined;  Surgeon: Bubba Freeman MD;  Location:  GI    GASTRIC BYPASS  June 24, 2010    GENITOURINARY SURGERY  3/16/2020    Cystocele/rectocele repair    GYN SURGERY  3/16/2020    Hysterectomy     ORTHOPEDIC SURGERY Left 2010    wrist fracture    PARATHYROIDECTOMY  9/19/11    [facility] ANEURYSM, INTRACRAN, SIMPLE SURG  04/2017    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 unreadable) ankle       Social History     Socioeconomic History    Marital status:      Spouse name: Not on file    Number of children: Not on file    Years of education: Not on file    Highest education level: Not on file   Occupational History    Not on file   Tobacco Use    Smoking status: Never    Smokeless tobacco: Never   Vaping Use    Vaping Use: Never used   Substance and Sexual Activity    Alcohol use: Yes     Comment: Social    Drug use: No    Sexual activity: Not Currently     Partners: Male     Birth control/protection: Pill   Other Topics Concern    Parent/sibling w/ CABG, MI or angioplasty before 65F 55M? No   Social History Narrative    Not on file     Social Determinants of Health     Financial Resource Strain: Low Risk  (11/27/2023)    Financial Resource Strain     Within the past 12 months, have you or your family members you live with been unable to get utilities (heat, electricity) when it was really needed?: No   Food Insecurity: Low Risk  (11/27/2023)    Food Insecurity     Within the past 12 months, did you worry that your food would run out before you got money to buy more?: No     Within the past 12 months, did the food you bought just not last and you didn t have money to get more?: No   Transportation Needs: Low Risk  (11/27/2023)    Transportation Needs     Within the past 12 months, has lack of transportation kept you from medical appointments, getting your medicines, non-medical meetings or appointments, work, or from getting things that you need?: No   Physical Activity:  Not on file   Stress: Not on file   Social Connections: Not on file   Interpersonal Safety: Low Risk  (10/9/2023)    Interpersonal Safety     Do you feel physically and emotionally safe where you currently live?: Yes     Within the past 12 months, have you been hit, slapped, kicked or otherwise physically hurt by someone?: No     Within the past 12 months, have you been humiliated or emotionally abused in other ways by your partner or ex-partner?: No   Housing Stability: Low Risk  (2023)    Housing Stability     Do you have housing? : Yes     Are you worried about losing your housing?: No       Family History   Problem Relation Age of Onset    Breast Cancer Mother     Hypertension Mother     Arthritis Mother     Thyroid Disease Mother         Hypo    Ulcerative Colitis Mother     Cerebrovascular Disease Mother         Age 78 - Cerebral Hemorrhage,     Anxiety Disorder Mother     Depression Mother     Genetic Disorder Mother         Ulcerative colitis    Obesity Mother     Anesthesia Reaction Mother         Same    Breast Cancer Maternal Aunt     Hypertension Paternal Grandmother     Cerebrovascular Disease Maternal Grandmother         Age 72 -     Family History Negative Maternal Grandfather     Family History Negative Paternal Grandfather     Family History Negative Son     Family History Negative Daughter     Other Cancer Father         Skin - Non-melanoma varieties    Hypertension Father     Asthma Father     Family History Negative Daughter     Ulcerative Colitis Sister     Hypertension Sister     Hyperlipidemia Sister     Anxiety Disorder Sister     Depression Sister     Diabetes Sister     Obesity Sister     Asthma Sister     Anesthesia Reaction Sister         Debilitating headaches    Hypertension Brother     Anxiety Disorder Brother     Depression Brother     Asthma Nephew     Hypertension Sister         Congestive Heart Failure    Anxiety Disorder Sister     Genetic Disorder Sister          Ulcerative colitis    Obesity Sister     Genetic Disorder Niece         Ulcerative colitis    Colon Cancer No family hx of     Coronary Artery Disease No family hx of     Mental Illness No family hx of     Substance Abuse No family hx of     Osteoporosis No family hx of     Deep Vein Thrombosis No family hx of      Family history reviewed and edited as appropriate    Medications and Allergies:     Outpatient Encounter Medications as of 2/15/2024   Medication Sig Dispense Refill    aspirin 325 MG tablet Take 325 mg by mouth every morning       atenolol (TENORMIN) 50 MG tablet TAKE 1 TABLET BY MOUTH EVERY DAY 90 tablet 2    Calcium Citrate-Vitamin D (CALCIUM CITRATE + D PO) Take 2 tablets by mouth every morning       COMPOUNDED NON-CONTROLLED SUBSTANCE (CMPD RX) - PHARMACY TO MIX COMPOUNDED MEDICATION Estriol 1 mg/g in HRT base, apply small amount to finger and apply to inside vagina daily for 2 weeks then twice weekly 30 g 1    Cyanocobalamin 2500 MCG TABS Take 2,500 mcg by mouth once a week Mon      Ferrous Sulfate 27 MG TABS Take 27 mg by mouth every morning       Fesoterodine Fumarate 8 MG TB24 Take 1 tablet (8 mg) by mouth daily 90 tablet 3    hydrochlorothiazide (HYDRODIURIL) 12.5 MG tablet TAKE 1 TABLET BY MOUTH EVERY DAY 90 tablet 2    imipramine (TOFRANIL) 25 MG tablet Take 1 tablet (25 mg) by mouth At Bedtime 90 tablet 2    losartan (COZAAR) 50 MG tablet TAKE 1 TABLET BY MOUTH EVERY DAY 90 tablet 2    methylPREDNISolone (MEDROL) 32 MG tablet Contrast allergy pre med pack: 32 mg methylprednisolone 12 hours and 2 hours prior 1 benadryl 50 mg 1 hour prior (Patient not taking: Reported on 2/8/2024) 2 tablet 0    omeprazole (PRILOSEC) 40 MG DR capsule Take 1 capsule (40 mg) by mouth 2 times daily for 180 days 180 capsule 1    topiramate (TOPAMAX) 25 MG tablet TAKE 1 TABLET BY MOUTH TWICE A  tablet 0    UNABLE TO FIND CPAP machine every night      valACYclovir (VALTREX) 1000 mg tablet Take 2 tablets  (2,000 mg) by mouth 2 times daily for 1 day 4 tablet 1    VITAMIN D, CHOLECALCIFEROL, PO Take 500 Units by mouth every morning        No facility-administered encounter medications on file as of 2/15/2024.        Allergies   Allergen Reactions    Contrast Dye Itching     Reaction of immediate burning and severe itching in Right ear and back of throat after injection for CT.     Sulfa Antibiotics      hives        Review of systems:  A full 10 point review of systems was obtained and was negative except for the pertinent positives and negatives stated within the HPI.    Objective Findings:   Physical Exam:    Constitutional: LMP  (LMP Unknown)   General: Alert, cooperative, no distress, well-appearing  Head: Atraumatic, normocephalic, no obvious abnormalities   Eyes: Sclera anicteric, no obvious conjunctival hemorrhage   Nose: Nares normal, no obvious malformation, no obvious rhinorrhea   Respiratory: Resting comfortably, no apparent distress, no cough.   Skin: No jaundice, no obvious rash  Neurologic: AAOx3, no obvious neurologic abnormality  Psychiatric: Normal Affect, appropriate mood  Extremities: No obvious edema, no obvious malformation     Labs, Radiology, Pathology     Lab Results   Component Value Date    WBC 7.7 11/30/2023    WBC 5.5 03/13/2023    WBC 5.5 02/14/2022    HGB 13.3 11/30/2023    HGB 12.7 03/13/2023    HGB 13.3 02/14/2022     11/30/2023     03/13/2023     02/14/2022    CHOL 127 03/13/2023    CHOL 138 02/14/2022    CHOL 177 01/11/2021    TRIG 36 03/13/2023    TRIG 64 02/14/2022    TRIG 43 01/11/2021    HDL 58 03/13/2023    HDL 71 02/14/2022    HDL 73 01/11/2021    ALT 34 03/13/2023    ALT 23 02/14/2022    ALT 24 01/11/2021    AST 40 (H) 03/13/2023    AST 18 02/14/2022    AST 22 01/11/2021     11/30/2023     03/13/2023     02/14/2022    BUN 20.7 11/30/2023    BUN 15.0 03/13/2023    BUN 18 02/14/2022    CO2 28 11/30/2023    CO2 25 03/13/2023    CO2 30  02/14/2022    TSH 0.85 03/13/2023    TSH 1.13 02/14/2022    TSH 2.40 03/23/2021    INR 0.92 08/24/2022    INR 0.87 03/23/2021    INR 0.86 04/29/2019        Liver Function Studies -   Recent Labs   Lab Test 03/13/23  1122   PROTTOTAL 5.7*   ALBUMIN 3.6   BILITOTAL 0.5   ALKPHOS 101   AST 40*   ALT 34        Patient Active Problem List    Diagnosis Date Noted    Atrophic vaginitis 02/10/2021     Priority: Medium    Urge incontinence 02/10/2021     Priority: Medium    Urinary urgency 02/10/2021     Priority: Medium    Midline cystocele 12/16/2019     Priority: Medium     Added automatically from request for surgery [id number]      Rectocele 12/16/2019     Priority: Medium     Added automatically from request for surgery [id number]      Osteopenia 03/17/2019     Priority: Medium    Vitamin D deficiency 03/17/2019     Priority: Medium     (Problem list name updated by automated process. Provider to review and confirm.)    (Problem list name updated by automated process. Provider to review and confirm.)      Hyperparathyroidism (H24) 03/17/2019     Priority: Medium    Word finding difficulty 03/17/2019     Priority: Medium    Received intravenous tissue plasminogen activator (tPA) in emergency department 03/17/2019     Priority: Medium    Accidental marijuana poisoning 03/17/2019     Priority: Medium    Migraine with aura and without status migrainosus, not intractable 01/07/2019     Priority: Medium    ASD (atrial septal defect) 07/13/2017     Priority: Medium    Cerebral aneurysm without rupture 04/10/2017     Priority: Medium    Left temporal lobe infarction (H) 02/09/2017     Priority: Medium    Essential hypertension with goal blood pressure less than 140/90 10/31/2016     Priority: Medium    PFO (patent foramen ovale) 02/15/2016     Priority: Medium     s/p closure with amplazter device 7/13/17      Lump or mass in breast 11/09/2015     Priority: Medium    Class 1 obesity in adult 10/16/2015     Priority: Medium    Iron  deficiency anemia 10/16/2015     Priority: Medium    ACP (advance care planning) 08/23/2012     Priority: Medium     Discussed Advance Directive planning with patient; information given to patient to review.      S/P gastric bypass 06/01/2010     Priority: Medium    Sleep apnea 08/28/2009     Priority: Medium    Female stress incontinence 06/09/2008     Priority: Medium     (Problem list name updated by automated process. Provider to review and confirm.)      Family history of malignant neoplasm of breast 10/02/2003     Priority: Medium      Assessment and Plan   Assessment/Plan:    Jose Coronado is 68 year old female with significant past medical history pertinent for  hyperparathyroidism, parathyroid tumors, HTN, MITCHELL on CPAP, paroxysmal AFIB, minor stroke in 2017 (no residual), brain anerysm repair, PFO repair, history of gastric bypass, grade 4 prolapse with urinary incontinence s/p rectocele/cystocele repair/hysterectomy March 2020, recurrent UTIs (No UTI since 5/2022) who is presenting as follow up with chief complaint of irregular bowel patterns.    Prior Evaluation:    5/9/2022 CT AP WO Contrast diffuse fatty infiltration of the liver, cholelithiasis and several small densities within the liver too small to characterize however could represent cyst versus hemangiomas.    8/2022 Infectious stools studies including C.Diff, enteric panel, crypto/giarrdia, and O&P were negative. Fecal lactoferrin was positive.     MRI Liver W WO Contrast 9/9/2022 with multiple hepatic cysts, no definite hemangiomas and the liver parenchyma is unremarkable without evidence of hepatic steatosis. No intra/extrahepatic biliary dilation.     10/2022 Colonoscopy was then performed which was unremarkable on endoscopy, on histology no evidence of microscopic or inflammatory colitis driving diarrhea, though did show melanosis coli (denies laxative use).  Colonoscopy was not completed to the TI.     11/2022 Vit A/D, B12/folate and iron  "studies unremarkable. Vit E level low.     11/2022 celiac serologies negative, fecal elastase negative and split fecal fat increased. Neutral fecal fat normal.     1/2023 hydrogen breath testing positive for SIBO and high levels of methane (IMO)    3/2023 treated with Augmentin x10 days without improvement in symptoms as Rifaximin was not covered by insurance    3/13/2023 CBC/TSH w/ reflex unremarkable     6/1/2023 Fax received appeal for Rifaximin was denied \"due to the medication not seriously jeopardizing the life, health or ability to maintain function.\"    11/22/2023 CT AP W Contrast notable for incidental findings unrelated to the GI tract.     12/7/2023 EGD with RNY anatomy with large 2 cm ulceration at the GJ anastomosis along the staple line with visible staples present. GEJ ulceration site with erosive gastroenteric mucosa with acute inflammation and surface atrophy. Jejunal biopsies with no significant histopathological abnormalities.     #RYN Gastric Bypass  #Anastamotic Gastric Ulcer  #Abdominal Pain - Resolved  #Unintentional Weight Loss - Resolved   #Early Satiation - Resolved  #Early Satiety - Resolved   Prior symptoms are likely attributed to anastomotic ulcer seen on recent upper endoscopy.  Elbow, with the episodic component alternative etiologies including partial small bowel obstructions/adhesions with history of prior intraabdominal surgical intervention should be considered.     If pain episodes were again to reoccur could consider CT during an active episode vs MRE for additional small bowel evaluation.     - Upper endoscopy 2/22/2024   - Continue Omeprazole 40 mg twice daily this is best taken on an empty stomach 30-60 minutes prior to meals. Pending results of endoscopy would then deescalate this to once daily.      #Fluctuation in Bowel Habits  #Fecal Urgency    #Tenesmus  #Elevated Split Fecal Fat -> SIBO/RNY  #History of SIBO/IMO   Pertinent history for recurrent UTI's and treated with " serial antibiotics over the past 2-3 years, history of surgery for rectocele and cystocele in 2020 which correlated with the onset of symptoms. As well as history of SIBO/IMO 1/2023 treated with Augmentin x 10 days with symptom resolution and sustained [place] 4 type stools for approximately 6 months after treatment.    Now with recurrence of 10-15 low volume [place] 5-6 stools occurring daily. Associated symptoms include tenesmus. Differential includes recurrence of SIBO (has not implemented a low FODMAPs diet) however patient wishes to not proceed with any additional antibiotic use and therefore breath testing will be deferred at this time, pelvic floor dysfunction, fat malabsorption/bile acid diarrhea in setting of gastric bypass, dysbiosis in setting of recurrent antibiotic use vs other.    Future considerations would include additional diagnostic evaluation with 72-hour fecal fat vs fructose intolerance testing as well as dietary changes including low FODMAPs diet or trial of Questran (bile acid sequestrant)     - Consultation to the pelvic floor center   - Continue taking two tablespoons daily which can be increased to three times daily   - It is recommended to start Imodium (Loperamide) to help better control your diarrhea. We recommend the initial dose of 2mg/4mg taken once followed by 2mg for each additional loose stool. Maximum daily dosing is 16mg/day.    - Avoid simple sugars as well as artifical sweeteners including sorbital, truvia, maltitol, mannitol, xylitol, glycerol, lactilol.     #Colorectal Cancer Screening  Last colonsocopy 10/2022 - recall due in 10 years (2032)    #Hepatic Cysts   #Fatty Liver   CT AP WO Contrast with fatty liver and several low density lesions in the liver that were described to be too small to characterize, but could represent cysts or hemangiomas. Follow up MRI Liver W WO Contrast 9/9/2022 with multiple hepatic cysts, no definite hemangiomas and the liver parenchyma is  unremarkable without evidence of hepatic steatosis. No intra/extrahepatic biliary dilation.       Follow up plan:   Return to clinic 3 months and as needed.    The risks and benefits of my recommendations, as well as other treatment options were discussed with the patient and any available family today. All questions were answered.     Follow up: As planned above. Today, I personally spent 46 minutes in direct face to face time with the patient, of which greater than 50% of the time was spent in patient education and counseling as described above. Approximately 15 minutes were spent on indirect care associated with the patient's consultation including but not limited to review of: patient medical records to date, clinic visits, hospital records, lab results, imaging studies, procedural documentation, and coordinating care with other providers. The findings from this review are summarized in the above note. All of the above accounted for a cumulative time of 61 minutes and was performed on the date of service.     The patient verbalized understanding of the plan and was appreciative for the time spent and information provided during the office visit.       Documentation assisted by voice recognition and documentation system.            Again, thank you for allowing me to participate in the care of your patient.      Sincerely,    Karen Cabrera PA-C

## 2024-02-15 NOTE — PATIENT INSTRUCTIONS
It was a pleasure taking care of you today.  I've included a brief summary of our discussion and care plan from today's visit below.  Please review this information with your primary care provider.  _______________________________________________________________________    My recommendations are summarized as follows:    - Consultation to the pelvic floor center   - Continue taking two tablespoons daily which can be increased to three times daily   - It is recommended to start Imodium (Loperamide) to help better control your diarrhea. We recommend the initial dose of 2mg/4mg taken once followed by 2mg for each additional loose stool. Maximum daily dosing is 16mg/day.    - Avoid simple sugars as well as artifical sweeteners including sorbital, truvia, maltitol, mannitol, xylitol, glycerol, lactilol.   - Future considerations would be low FODMAPs diet, lactose free prior 2-4 weeks, fructose intolerance testing, Questran (bile acid sequestrant)   - Upper endoscopy 2/22/2024   - Continue Omeprazole 40 mg twice daily this is best taken on an empty stomach 30-60 minutes prior to meals. Pending results of endoscopy would then deescalate this to once daily.       To schedule endoscopic procedures you may call: [phone]  To schedule radiology (imaging) tests you may call: [phone]  To schedule an ENT appointment you may call: [phone]    Please call my nurse Swathi ([phone])Sabra ([phone]) with any questions or concerns.      Return to GI Clinic in 4 months to review your progress.    _______________________________________________________________________    Who do I call with any questions after my visit?  Please be in touch if there are any further questions that arise following today's visit.  There are multiple ways to contact your gastroenterology care team.      During business hours, you may reach a Gastroenterology nurse at [phone] and choose option 3.       To schedule or reschedule an  appointment, please call [phone].     You can always send a secure message through [employer].  [employer] messages are answered by your nurse or doctor typically within 24 hours.  Please allow extra time on weekends and holidays.      For urgent/emergent questions after business hours, you may reach the on-call GI Fellow by contacting the [facility]  at [phone].     How will I get the results of any tests ordered?    You will receive all of your results.  If you have signed up for [employer], any tests ordered at your visit will be available to you after your physician reviews them.  Typically this takes 1-2 weeks.  If there are urgent results that require a change in your care plan, your physician or nurse will call you to discuss the next steps.      What is [employer]?  [employer] is a secure way for you to access all of your healthcare records from the [facility].  It is a web based computer program, so you can sign on to it from any location.  It also allows you to send secure messages to your care team.  I recommend signing up for [employer] access if you have not already done so and are comfortable with using a computer.      How to I schedule a follow-up visit?  If you did not schedule a follow-up visit today, please call [phone] to schedule a follow-up office visit.      If you feel you received exceptional care and are interested in supporting the clinical and research goals of Karen Cabrera PA-C or the Division of Gastroenterology, Hepatology, and Nutrition please contact [email or web address] from the [facility] to discuss opportunities to donate.    Sincerely,    Karen Cabrera PA-C  Division of Gastroenterology, Hepatology, and Nutrition  [facility]

## 2024-02-21 ENCOUNTER — ANESTHESIA EVENT (OUTPATIENT)
Dept: GASTROENTEROLOGY | Facility: CLINIC | Age: 69
End: 2024-02-21
Payer: MEDICARE

## 2024-02-21 RX ORDER — PROCHLORPERAZINE MALEATE 5 MG
5 TABLET ORAL EVERY 6 HOURS PRN
Status: CANCELLED | OUTPATIENT
Start: 2024-02-21

## 2024-02-21 RX ORDER — ONDANSETRON 4 MG/1
4 TABLET, ORALLY DISINTEGRATING ORAL EVERY 6 HOURS PRN
Status: CANCELLED | OUTPATIENT
Start: 2024-02-21

## 2024-02-21 RX ORDER — FLUMAZENIL 0.1 MG/ML
0.2 INJECTION, SOLUTION INTRAVENOUS
Status: CANCELLED | OUTPATIENT
Start: 2024-02-21 | End: 2024-02-22

## 2024-02-21 RX ORDER — NALOXONE HYDROCHLORIDE 0.4 MG/ML
0.2 INJECTION, SOLUTION INTRAMUSCULAR; INTRAVENOUS; SUBCUTANEOUS
Status: CANCELLED | OUTPATIENT
Start: 2024-02-21

## 2024-02-21 RX ORDER — NALOXONE HYDROCHLORIDE 0.4 MG/ML
0.4 INJECTION, SOLUTION INTRAMUSCULAR; INTRAVENOUS; SUBCUTANEOUS
Status: CANCELLED | OUTPATIENT
Start: 2024-02-21

## 2024-02-21 RX ORDER — LIDOCAINE 40 MG/G
CREAM TOPICAL
Status: CANCELLED | OUTPATIENT
Start: 2024-02-21

## 2024-02-21 RX ORDER — ONDANSETRON 2 MG/ML
4 INJECTION INTRAMUSCULAR; INTRAVENOUS
Status: CANCELLED | OUTPATIENT
Start: 2024-02-21

## 2024-02-21 RX ORDER — ONDANSETRON 2 MG/ML
4 INJECTION INTRAMUSCULAR; INTRAVENOUS EVERY 6 HOURS PRN
Status: CANCELLED | OUTPATIENT
Start: 2024-02-21

## 2024-02-22 ENCOUNTER — ANESTHESIA (OUTPATIENT)
Dept: GASTROENTEROLOGY | Facility: CLINIC | Age: 69
End: 2024-02-22
Payer: MEDICARE

## 2024-02-22 ENCOUNTER — HOSPITAL ENCOUNTER (OUTPATIENT)
Facility: CLINIC | Age: 69
Discharge: HOME | End: 2024-02-22
Attending: INTERNAL MEDICINE | Admitting: INTERNAL MEDICINE
Payer: MEDICARE

## 2024-02-22 VITALS
HEART RATE: 66 BPM | SYSTOLIC BLOOD PRESSURE: 124 MMHG | WEIGHT: 152 LBS | DIASTOLIC BLOOD PRESSURE: 75 MMHG | RESPIRATION RATE: 22 BRPM | HEIGHT: 65 IN | OXYGEN SATURATION: 98 % | BODY MASS INDEX: 25.33 KG/M2

## 2024-02-22 LAB — UPPER GI ENDOSCOPY: NORMAL

## 2024-02-22 PROCEDURE — 250N000009 HC RX 250: Performed by: ANESTHESIOLOGY

## 2024-02-22 PROCEDURE — 370N000017 HC ANESTHESIA TECHNICAL FEE, PER MIN: Performed by: INTERNAL MEDICINE

## 2024-02-22 PROCEDURE — 43235 EGD DIAGNOSTIC BRUSH WASH: CPT | Performed by: NURSE ANESTHETIST, CERTIFIED REGISTERED

## 2024-02-22 PROCEDURE — 999N000010 HC STATISTIC ANES STAT CODE-CRNA PER MINUTE: Performed by: INTERNAL MEDICINE

## 2024-02-22 PROCEDURE — 250N000011 HC RX IP 250 OP 636: Performed by: ANESTHESIOLOGY

## 2024-02-22 PROCEDURE — 258N000003 HC RX IP 258 OP 636: Performed by: ANESTHESIOLOGY

## 2024-02-22 PROCEDURE — 43235 EGD DIAGNOSTIC BRUSH WASH: CPT | Performed by: INTERNAL MEDICINE

## 2024-02-22 RX ORDER — PROPOFOL 10 MG/ML
INJECTION, EMULSION INTRAVENOUS CONTINUOUS PRN
Status: DISCONTINUED | OUTPATIENT
Start: 2024-02-22 | End: 2024-02-22

## 2024-02-22 RX ORDER — ONDANSETRON 2 MG/ML
INJECTION INTRAMUSCULAR; INTRAVENOUS PRN
Status: DISCONTINUED | OUTPATIENT
Start: 2024-02-22 | End: 2024-02-22

## 2024-02-22 RX ORDER — PROPOFOL 10 MG/ML
INJECTION, EMULSION INTRAVENOUS PRN
Status: DISCONTINUED | OUTPATIENT
Start: 2024-02-22 | End: 2024-02-22

## 2024-02-22 RX ORDER — NALOXONE HYDROCHLORIDE 0.4 MG/ML
0.1 INJECTION, SOLUTION INTRAMUSCULAR; INTRAVENOUS; SUBCUTANEOUS
Status: DISCONTINUED | OUTPATIENT
Start: 2024-02-22 | End: 2024-02-22 | Stop reason: HOSPADM

## 2024-02-22 RX ORDER — ACETAMINOPHEN 325 MG/1
975 TABLET ORAL
Status: CANCELLED | OUTPATIENT
Start: 2024-02-22

## 2024-02-22 RX ORDER — ONDANSETRON 2 MG/ML
4 INJECTION INTRAMUSCULAR; INTRAVENOUS EVERY 30 MIN PRN
Status: CANCELLED | OUTPATIENT
Start: 2024-02-22

## 2024-02-22 RX ORDER — ONDANSETRON 4 MG/1
4 TABLET, ORALLY DISINTEGRATING ORAL EVERY 30 MIN PRN
Status: CANCELLED | OUTPATIENT
Start: 2024-02-22

## 2024-02-22 RX ORDER — LIDOCAINE HYDROCHLORIDE 20 MG/ML
INJECTION, SOLUTION INFILTRATION; PERINEURAL PRN
Status: DISCONTINUED | OUTPATIENT
Start: 2024-02-22 | End: 2024-02-22

## 2024-02-22 RX ORDER — OXYCODONE HYDROCHLORIDE 5 MG/1
5 TABLET ORAL
Status: CANCELLED | OUTPATIENT
Start: 2024-02-22

## 2024-02-22 RX ORDER — FENTANYL CITRATE 50 UG/ML
25 INJECTION, SOLUTION INTRAMUSCULAR; INTRAVENOUS
Status: CANCELLED | OUTPATIENT
Start: 2024-02-22

## 2024-02-22 RX ORDER — OXYCODONE HYDROCHLORIDE 5 MG/1
10 TABLET ORAL
Status: CANCELLED | OUTPATIENT
Start: 2024-02-22

## 2024-02-22 RX ORDER — SODIUM CHLORIDE, SODIUM LACTATE, POTASSIUM CHLORIDE, CALCIUM CHLORIDE 600; 310; 30; 20 MG/100ML; MG/100ML; MG/100ML; MG/100ML
INJECTION, SOLUTION INTRAVENOUS CONTINUOUS PRN
Status: DISCONTINUED | OUTPATIENT
Start: 2024-02-22 | End: 2024-02-22

## 2024-02-22 RX ADMIN — SODIUM CHLORIDE, POTASSIUM CHLORIDE, SODIUM LACTATE AND CALCIUM CHLORIDE: 600; 310; 30; 20 INJECTION, SOLUTION INTRAVENOUS at 11:37

## 2024-02-22 RX ADMIN — PROPOFOL 200 MCG/KG/MIN: 10 INJECTION, EMULSION INTRAVENOUS at 11:37

## 2024-02-22 RX ADMIN — LIDOCAINE HYDROCHLORIDE 100 MG: 20 INJECTION, SOLUTION INFILTRATION; PERINEURAL at 11:37

## 2024-02-22 RX ADMIN — ONDANSETRON 4 MG: 2 INJECTION INTRAMUSCULAR; INTRAVENOUS at 11:37

## 2024-02-22 RX ADMIN — PROPOFOL 30 MG: 10 INJECTION, EMULSION INTRAVENOUS at 11:42

## 2024-02-22 ASSESSMENT — ACTIVITIES OF DAILY LIVING (ADL)
ADLS_ACUITY_SCORE: 36
ADLS_ACUITY_SCORE: 38
ADLS_ACUITY_SCORE: 38

## 2024-02-22 NOTE — ANESTHESIA CARE TRANSFER NOTE
Patient: Jose Coronado    Procedure: Procedure(s):  Esophagoscopy, gastroscopy, duodenoscopy (EGD), combined       Diagnosis: Ulcer, anastomotic [K28.9]  Diagnosis Additional Information: No value filed.    Anesthesia Type:   No value filed.     Note:    Oropharynx: oropharynx clear of all foreign objects and spontaneously breathing  Level of Consciousness: drowsy      Independent Airway: airway patency satisfactory and stable  Dentition: dentition unchanged  Vital Signs Stable: post-procedure vital signs reviewed and stable  Report to RN Given: handoff report given  Patient transferred to: PACU  Comments: At end of procedure, spontaneous respirations, patient alert to voice, able to follow commands. Patient breathing room air at room air to Endoscopy Recovery. SpO2, NiBP, and EKG monitors and alarms on and functioning, report on patient's clinical status given to Endoscopy RN, RN questions answered.      Handoff Report: Identifed the Patient, Identified the Reponsible Provider, Reviewed the pertinent medical history, Discussed the surgical course, Reviewed Intra-OP anesthesia mangement and issues during anesthesia, Set expectations for post-procedure period and Allowed opportunity for questions and acknowledgement of understanding      Vitals:  Vitals Value Taken Time   BP     Temp     Pulse     Resp     SpO2         Electronically Signed By: RON Moreau CRNA  February 22, 2024  11:58 AM

## 2024-02-22 NOTE — ANESTHESIA PREPROCEDURE EVALUATION
Anesthesia Pre-Procedure Evaluation    Patient: Jose Coronado   MRN: [id number] : 1955        Procedure : Procedure(s):  Esophagoscopy, gastroscopy, duodenoscopy (EGD), combined          Past Medical History:   Diagnosis Date    Anemia     Arthritis     Hands, back, hips. Various dates first noted.    CVA (cerebral vascular accident) (H)     ?migraine, ?pfo--negative vasc w/u, neg hypercoag w/u    Diffuse cystic mastopathy     Fibrocystic breast disease    HTN, goal below 140/90     Hyperparathyroidism (H24)     Infectious mononucleosis     Mono at age 16    Irregular heart beat     PAT no afib on 30day monitor    Labyrinthitis, unspecified     Migraine headache with aura     Osteopenia     Pain in joint, shoulder region     Secondary to a fall    Palpitations     PFO (patent foramen ovale)     s/p closure with amplazter device 17    S/P gastric bypass     Sleep apnea     she is on CPAP    Sleep apnea     Vitamin D deficiencies       Past Surgical History:   Procedure Laterality Date    BIOPSY      Breast biopsies    BREAST SURGERY      Above    CARDIAC SURGERY  2017    PFO closure    COLONOSCOPY N/A 2015    Procedure: COLONOSCOPY;  Surgeon: Deandre Brooks MD;  Location:  GI    COLONOSCOPY N/A 10/6/2022    Procedure: COLONOSCOPY, WITH BIOPSIES;  Surgeon: Freddie Gonzalez MD;  Location:  GI    DAVINCI HYSTERECTOMY SUPRACERVICAL, SALPINGO-OOPHORECTOMY INCLUDING BILATERAL N/A 3/16/2020    Procedure: DAVINCI HYSTERECTOMY SUPRACERVICAL, SALPINGO-OOPHORECTOMY INCLUDING BILATERAL WITH EXAM UNDER ANESTHESIA;  Surgeon: Lily Yancey MD;  Location: [facility]    DAVINCI SACROCOLPOPEXY, MIDURETHRAL SLING, CYSTOSCOPY N/A 3/16/2020    Procedure: SACROCOLPOPEXY, ROBOT-ASSISTED, LAPAROSCOPIC, WITH INSERTION OF MIDURETHRAL SLING AND CYSTOSCOPY;  Surgeon: Carolyn Valdivia MD;  Location: [facility] OR    ESOPHAGOSCOPY, GASTROSCOPY, DUODENOSCOPY (EGD), COMBINED N/A 2023     Procedure: Esophagoscopy, gastroscopy, duodenoscopy (EGD), combined;  Surgeon: Bubba Freeman MD;  Location:  GI    GASTRIC BYPASS  June 24, 2010    GENITOURINARY SURGERY  3/16/2020    Cystocele/rectocele repair    GYN SURGERY  3/16/2020    Hysterectomy    ORTHOPEDIC SURGERY Left 2010    wrist fracture    PARATHYROIDECTOMY  9/19/11    Z ANEURYSM, INTRACRAN, SIMPLE SURG  04/2017    coil of aneurysm right posterior paraophthalmic artery    Z NONSPECIFIC PROCEDURE      S/P multiple breast biopies - all negative / benign    Z NONSPECIFIC PROCEDURE      S/P T&A    ZZ NONSPECIFIC PROCEDURE      [place] teeth extraction    Z NONSPECIFIC PROCEDURE      S/P (? unreadable) ankle      Allergies   Allergen Reactions    Contrast Dye Itching     Reaction of immediate burning and severe itching in Right ear and back of throat after injection for CT.     Sulfa Antibiotics      hives      Social History     Tobacco Use    Smoking status: Never    Smokeless tobacco: Never   Substance Use Topics    Alcohol use: Yes     Comment: Social      Wt Readings from Last 1 Encounters:   02/22/24 68.9 kg (152 lb)        Anesthesia Evaluation            ROS/MED HX  ENT/Pulmonary:     (+) sleep apnea, uses CPAP,                                      Neurologic: Comment: Cerebral aneurysm coiling;    (+)      migraines,    CVA,                      Cardiovascular: Comment: PFO closure;    (+)  hypertension- -   -  - -                                      METS/Exercise Tolerance:     Hematologic:       Musculoskeletal:       GI/Hepatic:    (-) GERD   Renal/Genitourinary:       Endo: Comment: Hyperparathryoidism;      Psychiatric/Substance Use:       Infectious Disease:       Malignancy:       Other:            Physical Exam    Airway        Mallampati: II   TM distance: > 3 FB   Neck ROM: full   Mouth opening: > 3 cm    Respiratory Devices and Support         Dental       (+) Minor Abnormalities - some fillings, tiny  chips      Cardiovascular          Rhythm and rate: regular and normal     Pulmonary           breath sounds clear to auscultation           OUTSIDE LABS:  CBC:   Lab Results   Component Value Date    WBC 7.7 11/30/2023    WBC 5.5 03/13/2023    HGB 13.3 11/30/2023    HGB 12.7 03/13/2023    HCT 39.1 11/30/2023    HCT 38.2 03/13/2023     11/30/2023     03/13/2023     BMP:   Lab Results   Component Value Date     11/30/2023     03/13/2023    POTASSIUM 4.2 11/30/2023    POTASSIUM 3.5 03/13/2023    CHLORIDE 99 11/30/2023    CHLORIDE 103 03/13/2023    CO2 28 11/30/2023    CO2 25 03/13/2023    BUN 20.7 11/30/2023    BUN 15.0 03/13/2023    CR 0.88 11/30/2023    CR 0.72 03/13/2023    GLC 95 11/30/2023    GLC 78 03/13/2023     COAGS:   Lab Results   Component Value Date    PTT 25 04/29/2019    INR 0.92 08/24/2022     POC:   Lab Results   Component Value Date     (H) 06/24/2010     HEPATIC:   Lab Results   Component Value Date    ALBUMIN 3.6 03/13/2023    PROTTOTAL 5.7 (L) 03/13/2023    ALT 34 03/13/2023    AST 40 (H) 03/13/2023    ALKPHOS 101 03/13/2023    BILITOTAL 0.5 03/13/2023     OTHER:   Lab Results   Component Value Date    LACT 0.7 03/23/2021    A1C 5.7 06/17/2010    MAXIMILIANO 9.3 11/30/2023    PHOS 3.5 05/17/2011    MAG 2.1 11/16/2015    TSH 0.85 03/13/2023    T4 0.90 09/14/2012    T3 108 05/17/2011    SED 8 02/28/2017       Anesthesia Plan    ASA Status:  2    NPO Status:  NPO Appropriate                  Consents    Anesthesia Plan(s) and associated risks, benefits, and realistic alternatives discussed. Questions answered and patient/representative(s) expressed understanding.     - Discussed: Risks, Benefits and Alternatives for BOTH SEDATION and the PROCEDURE were discussed     - Discussed with:  Patient            Postoperative Care            Comments:               Krystal Mullins MD

## 2024-02-22 NOTE — ANESTHESIA POSTPROCEDURE EVALUATION
Patient: Jose Coronado    Procedure: Procedure(s):  Esophagoscopy, gastroscopy, duodenoscopy (EGD), combined       Anesthesia Type:  No value filed.    Note:  Disposition: Outpatient   Postop Pain Control: Uneventful            Sign Out: Well controlled pain   PONV: No   Neuro/Psych: Uneventful            Sign Out: Acceptable/Baseline neuro status   Airway/Respiratory: Uneventful            Sign Out: Acceptable/Baseline resp. status   CV/Hemodynamics: Uneventful            Sign Out: Acceptable CV status; No obvious hypovolemia; No obvious fluid overload   Other NRE:    DID A NON-ROUTINE EVENT OCCUR?            Last vitals:  Vitals Value Taken Time   /75 02/22/24 1210   Temp     Pulse 65 02/22/24 1218   Resp 19 02/22/24 1219   SpO2 100 % 02/22/24 1218   Vitals shown include unfiled device data.    Electronically Signed By: Krystal Mullins MD  February 22, 2024  1:04 PM

## 2024-03-17 SDOH — HEALTH STABILITY: PHYSICAL HEALTH: ON AVERAGE, HOW MANY MINUTES DO YOU ENGAGE IN EXERCISE AT THIS LEVEL?: 0 MIN

## 2024-03-17 SDOH — HEALTH STABILITY: PHYSICAL HEALTH: ON AVERAGE, HOW MANY DAYS PER WEEK DO YOU ENGAGE IN MODERATE TO STRENUOUS EXERCISE (LIKE A BRISK WALK)?: 0 DAYS

## 2024-03-17 ASSESSMENT — SOCIAL DETERMINANTS OF HEALTH (SDOH): HOW OFTEN DO YOU GET TOGETHER WITH FRIENDS OR RELATIVES?: THREE TIMES A WEEK

## 2024-03-17 NOTE — COMMUNITY RESOURCES LIST (ENGLISH)
March 17, 2024           YOUR PERSONALIZED LIST OF SERVICES & PROGRAMS           & RECREATION    [place] - Sports clubs and recreational activities - [facility]  [address] [place] [address] (Distance: 4.7 miles)  Language: English  Fee: Self pay, Sliding scale      [facility] - Adult Enrichment  Phone: [phone]  Website: [email or web address]/adult-enrichment/  Language: English  Hours: Mon 7:30 AM - 4:00 PM Tue 7:30 AM - 4:00 PM Wed 7:30 AM - 4:00 PM Thu 7:30 AM - 4:00 PM Fri 7:30 AM - 4:00 PM      LEAGUE - [employer]ASE[employer] AND SOFTBALL  Website: http://[email or web address]    Classes/Groups      [facility] - Group Exercise  [address] [place] [address] (Distance: 13.2 miles)  Phone: [phone]  Website: [email or web address]://[email or web address].org/locations/[place]/foreverwell/classes_programs  Language: English  Fee: Self pay      - Online and Local Fitness Classes  Phone: [phone]  Website: [email or web address]://[employer]/Search/OnlineClasses  Language: English  Fee: Free      [facility] - Adult Enrichment  Phone: [phone]  Website: [email or web address]/adults-seniors/adult-enrichment/  Language: English  Hours: Mon 7:30 AM - 4:00 PM Tue 7:30 AM - 4:00 PM Wed 7:30 AM - 4:00 PM Thu 7:30 AM - 4:00 PM Fri 7:30 AM - 4:00 PM               IMPORTANT NUMBERS & WEBSITES        Emergency Services  911  .   United Way  211 http://211unitedway.org  .   Poison Control  (679) 620-6074 http://[place]poison.org http://wis[place]poison.org  .     Suicide and Crisis Lifeline  988 http://988lifeline.org  .   Childhelp National Child Abuse Hotline  487.280.4048 http://Childhelphotline.org   .   National Sexual Assault Hotline  (677) 879-6086 (HOPE) http://Rainn.org   .     National Runaway Safeline  (106) 991-1562 (RUNAWAY) http://FTBprorumygall.org  .   Pregnancy &  Postpartum Support  Call/text 010-408-4548  [place]: http://ppsupportmn.org  [place]: http://psichapters.com/wi  .   Substance Abuse National Helpline ([facility])  508-247-HELP (5966) http://Findtreatment.gov   .                DISCLAIMER: [employer]  does not endorse any service providers mentioned in this resource list. [employer] does not guarantee that the services mentioned in this resource list will be available to you or will improve your health or wellness.    [facility]

## 2024-03-22 ENCOUNTER — OFFICE VISIT (OUTPATIENT)
Dept: INTERNAL MEDICINE | Facility: CLINIC | Age: 69
End: 2024-03-22
Payer: MEDICARE

## 2024-03-22 VITALS
HEIGHT: 65 IN | BODY MASS INDEX: 25.16 KG/M2 | HEART RATE: 70 BPM | OXYGEN SATURATION: 98 % | DIASTOLIC BLOOD PRESSURE: 76 MMHG | WEIGHT: 151 LBS | TEMPERATURE: 97.5 F | RESPIRATION RATE: 18 BRPM | SYSTOLIC BLOOD PRESSURE: 119 MMHG

## 2024-03-22 DIAGNOSIS — Z00.00 ENCOUNTER FOR ANNUAL WELLNESS EXAM IN MEDICARE PATIENT: Primary | ICD-10-CM

## 2024-03-22 DIAGNOSIS — I10 ESSENTIAL HYPERTENSION WITH GOAL BLOOD PRESSURE LESS THAN 140/90: ICD-10-CM

## 2024-03-22 DIAGNOSIS — Z13.29 SCREENING FOR THYROID DISORDER: ICD-10-CM

## 2024-03-22 DIAGNOSIS — Z12.31 VISIT FOR SCREENING MAMMOGRAM: ICD-10-CM

## 2024-03-22 DIAGNOSIS — G43.109 MIGRAINE WITH AURA AND WITHOUT STATUS MIGRAINOSUS, NOT INTRACTABLE: ICD-10-CM

## 2024-03-22 DIAGNOSIS — Z13.220 SCREENING FOR HYPERLIPIDEMIA: ICD-10-CM

## 2024-03-22 DIAGNOSIS — E21.3 HYPERPARATHYROIDISM (H): ICD-10-CM

## 2024-03-22 LAB
BASOPHILS # BLD AUTO: 0 10E3/UL (ref 0–0.2)
BASOPHILS NFR BLD AUTO: 1 %
EOSINOPHIL # BLD AUTO: 0.1 10E3/UL (ref 0–0.7)
EOSINOPHIL NFR BLD AUTO: 2 %
ERYTHROCYTE [DISTWIDTH] IN BLOOD BY AUTOMATED COUNT: 14 % (ref 10–15)
HCT VFR BLD AUTO: 37.6 % (ref 35–47)
HGB BLD-MCNC: 12.9 G/DL (ref 11.7–15.7)
IMM GRANULOCYTES # BLD: 0 10E3/UL
IMM GRANULOCYTES NFR BLD: 0 %
LYMPHOCYTES # BLD AUTO: 1.2 10E3/UL (ref 0.8–5.3)
LYMPHOCYTES NFR BLD AUTO: 27 %
MCH RBC QN AUTO: 30.1 PG (ref 26.5–33)
MCHC RBC AUTO-ENTMCNC: 34.3 G/DL (ref 31.5–36.5)
MCV RBC AUTO: 88 FL (ref 78–100)
MONOCYTES # BLD AUTO: 0.7 10E3/UL (ref 0–1.3)
MONOCYTES NFR BLD AUTO: 14 %
NEUTROPHILS # BLD AUTO: 2.6 10E3/UL (ref 1.6–8.3)
NEUTROPHILS NFR BLD AUTO: 57 %
PLATELET # BLD AUTO: 227 10E3/UL (ref 150–450)
RBC # BLD AUTO: 4.28 10E6/UL (ref 3.8–5.2)
WBC # BLD AUTO: 4.6 10E3/UL (ref 4–11)

## 2024-03-22 PROCEDURE — 80061 LIPID PANEL: CPT | Performed by: INTERNAL MEDICINE

## 2024-03-22 PROCEDURE — 85025 COMPLETE CBC W/AUTO DIFF WBC: CPT | Performed by: INTERNAL MEDICINE

## 2024-03-22 PROCEDURE — 36415 COLL VENOUS BLD VENIPUNCTURE: CPT | Performed by: INTERNAL MEDICINE

## 2024-03-22 PROCEDURE — 84443 ASSAY THYROID STIM HORMONE: CPT | Mod: GZ | Performed by: INTERNAL MEDICINE

## 2024-03-22 PROCEDURE — 80053 COMPREHEN METABOLIC PANEL: CPT | Performed by: INTERNAL MEDICINE

## 2024-03-22 PROCEDURE — 99214 OFFICE O/P EST MOD 30 MIN: CPT | Mod: 25 | Performed by: INTERNAL MEDICINE

## 2024-03-22 PROCEDURE — G0439 PPPS, SUBSEQ VISIT: HCPCS | Performed by: INTERNAL MEDICINE

## 2024-03-22 RX ORDER — RESPIRATORY SYNCYTIAL VIRUS VACCINE 120MCG/0.5
0.5 KIT INTRAMUSCULAR ONCE
Qty: 1 EACH | Refills: 0 | Status: CANCELLED | OUTPATIENT
Start: 2024-03-22 | End: 2024-03-22

## 2024-03-22 RX ORDER — TOPIRAMATE 25 MG/1
25 TABLET, FILM COATED ORAL 2 TIMES DAILY
Qty: 180 TABLET | Refills: 3 | Status: SHIPPED | OUTPATIENT
Start: 2024-03-22

## 2024-03-22 NOTE — PATIENT INSTRUCTIONS
Patient Education   Well Visit, Over 65: Care Instructions  Well visits can help you stay healthy. Your doctor has checked your overall health and may have suggested ways to take good care of yourself. Your doctor also may have recommended tests. You can help prevent illness with healthy eating, good sleep, vaccinations, regular exercise, and other steps.    Get the tests that you and your doctor decide on. Depending on your age and risks, examples might include hearing tests as well as screening for colon, breast, and lung cancer. Screening helps find diseases before any symptoms appear.   Eat healthy foods. Choose fruits, vegetables, whole grains, lean protein, and low-fat dairy foods. Limit saturated fat, and reduce salt.     Limit alcohol. Men should have no more than 2 drinks a day. Women should have no more than 1. For some people, no alcohol is the best choice.   Exercise. It can help prevent falls. Get at least 30 minutes of exercise on most days of the week. Walking, yoga, and carie chi can be good choices.     Reach and stay at your healthy weight. This will lower your risk for many health problems.   Take care of your mental health. Try to stay connected with friends, family, and community, and find ways to manage stress.     If you're feeling depressed or hopeless, talk to someone. A counselor can help. If you don't have a counselor, talk to your doctor.   Talk to your doctor if you think you may have a problem with alcohol or drug use. This includes prescription medicines and illegal drugs.     Avoid tobacco and nicotine: Don't smoke, vape, or chew. If you need help quitting, talk to your doctor.   Practice safer sex. Getting tested, using condoms or dental dams, and limiting sex partners can help prevent STIs.     Make an advance directive. This is a legal way to tell your family and doctor what you want to happen at the end of your life or when you can't speak for yourself.   Prevent problems where you  "can. Protect your skin from too much sun, wash your hands, brush your teeth twice a day, and wear a seat belt in the car.   Where can you learn more?  Go to https://www.[employer].net/patiented  Enter K859 in the search box to learn more about \"Well Visit, Over 65: Care Instructions.\"  Current as of: August 6, 2023               Content Version: 14.0    0095-2408 [employer]   Care instructions adapted under license by your healthcare professional. If you have questions about a medical condition or this instruction, always ask your healthcare professional. Healthwise, [employer] disclaims any warranty or liability for your use of this information.         "

## 2024-03-22 NOTE — PROGRESS NOTES
"Preventive Care Visit  [facility]  Sanjeev Carmichael MD, Internal Medicine  Mar 22, 2024      Assessment & Plan     Encounter for annual wellness exam in Medicare patient  Adult wellness plan reviewed.  - CBC with platelets and differential    Visit for screening mammogram  - MA SCREENING DIGITAL BILAT - Future  (s+30); Future    Hyperparathyroidism (H24)  Chronic condition.  No change in status.    Essential hypertension with goal blood pressure less than 140/90  Patient blood pressure is currently under good control.  Assuming no unexpected abnormalities on her outstanding metabolic panel, we will continue her losartan at 50 mg daily, atenolol 50 mg daily, and hydrochlorothiazide 12.5 mg daily.  Side effects of each medication were reviewed.  Patient was encouraged to monitor blood pressure outside the clinic setting.  - Comprehensive metabolic panel (BMP + Alb, Alk Phos, ALT, AST, Total. Bili, TP)    Migraine with aura and without status migrainosus, not intractable  Patient has had a good response to the use of Topamax as a preventative measure for her migraine headaches.  We did elect to continue her Topamax at 25 mg per mouth twice per day.  Side effects medication reviewed.  Patient in no further questions or concerns in this regard.    Screening for thyroid disorder  - TSH with free T4 reflex    Screening for hyperlipidemia  - Lipid panel reflex to direct LDL Fasting    Patient has been advised of split billing requirements and indicates understanding: Yes  Ordering of each unique test  Prescription drug management  30 minutes spent by me on the date of the encounter doing chart review, history and exam, documentation and further activities per the note      BMI  Estimated body mass index is 25.13 kg/m  as calculated from the following:    Height as of this encounter: 1.651 m (5' 5\").    Weight as of this encounter: 68.5 kg (151 lb).       Counseling  Appropriate preventive " services were discussed with this patient, including applicable screening as appropriate for fall prevention, nutrition, physical activity, Tobacco-use cessation, weight loss and cognition.  Checklist reviewing preventive services available has been given to the patient.  Reviewed patient's diet, addressing concerns and/or questions.   Information on urinary incontinence and treatment options given to patient.       See Patient Instructions    Subjective   Jose is a 68 year old, presenting for the following:  Medicare Visit        Health Care Directive  Patient has a Health Care Directive on file  Advance care planning document is on file and is current.    Patient is a 68-year-old  female who presents to the clinic for annual wellness examination.  She does have a complicated past medical history, and she does see several specialist.  Patient does have a history of hypertension, and she has been taking 50 mg of losartan daily, hydrochlorothiazide 12.5 mg daily, and atenolol 50 mg/day.  She is tolerating all medications without issue.  Patient is also struggling with migraine headaches, she does take Topamax 25 mg twice per day for prophylaxis.  Patient has found this medication to be quite helpful.  She does report a stable appetite.  Patient is stooling and voiding without issue.  She is fasting for lab work today.  Patient is due for her annual mammogram.      Hypertension Follow-up    Do you check your blood pressure regularly outside of the clinic? No   Are you following a low salt diet? Yes  Are your blood pressures ever more than 140 on the top number (systolic) OR more   than 90 on the bottom number (diastolic), for example 140/90? No        3/17/2024   General Health   How would you rate your overall physical health? Good   Feel stress (tense, anxious, or unable to sleep) Not at all         3/17/2024   Nutrition   Diet: Regular (no restrictions)         3/17/2024   Exercise   Days per week of  moderate/strenous exercise 0 days   Average minutes spent exercising at this level 0 min   (!) EXERCISE CONCERN      3/17/2024   Social Factors   Frequency of gathering with friends or relatives Three times a week   Worry food won't last until get money to buy more No   Food not last or not have enough money for food? No   Do you have housing?  Yes   Are you worried about losing your housing? No   Lack of transportation? No   Unable to get utilities (heat,electricity)? No         3/17/2024   Activities of Daily Living- Home Safety   Needs help with the following daily activites None of the above   Safety concerns in the home None of the above         3/17/2024   Dental   Dentist two times every year? Yes         3/17/2024   Hearing Screening   Hearing concerns? None of the above         3/17/2024   Driving Risk Screening   Patient/family members have concerns about driving No         3/17/2024   General Alertness/Fatigue Screening   Have you been more tired than usual lately? No         3/17/2024   Urinary Incontinence Screening   Bothered by leaking urine in past 6 months Yes         3/17/2024   TB Screening   Were you born outside of the [place]? No         Today's PHQ-2 Score:       3/22/2024     9:01 AM   PHQ-2 ( 1999 Pfizer)   Q1: Little interest or pleasure in doing things 0   Q2: Feeling down, depressed or hopeless 0   PHQ-2 Score 0   Q1: Little interest or pleasure in doing things Not at all   Q2: Feeling down, depressed or hopeless Not at all   PHQ-2 Score 0           3/17/2024   Substance Use   Alcohol more than 3/day or more than 7/wk No   Do you have a current opioid prescription? No   How severe/bad is pain from 1 to 10? 0/10 (No Pain)   Do you use any other substances recreationally? No     Social History     Tobacco Use    Smoking status: Never    Smokeless tobacco: Never   Vaping Use    Vaping Use: Never used   Substance Use Topics    Alcohol use: Yes     Comment: Social    Drug use: No            11/27/2023   LAST FHS-7 RESULTS   1st degree relative breast or ovarian cancer Yes   Any relative bilateral breast cancer Yes   Any male have breast cancer No   Any ONE woman have BOTH breast AND ovarian cancer Yes   Any woman with breast cancer before 50yrs No   2 or more relatives with breast AND/OR ovarian cancer Yes   2 or more relatives with breast AND/OR bowel cancer Yes       Mammogram Screening - Mammogram every 1-2 years updated in Health Maintenance based on mutual decision making    ASCVD Risk   The ASCVD Risk score (Ethan DK, et al., 2019) failed to calculate for the following reasons:    The patient has a prior MI or stroke diagnosis    Reviewed and updated as needed this visit by Provider                    Lab work is in process  Current providers sharing in care for this patient include:  Patient Care Team:  Lian Martinez MD as PCP - General (Internal Medicine)  Olivia Vasquez PA-C as Physician Assistant (Physician Assistant - Medical)  Carolyn Valdivia MD as MD (Urology)  Lita Goodman, RN as Specialty Care Coordinator (Urology)  Lian Martinez MD as Referring Physician (Internal Medicine)  Carolyn Valdivia MD as Assigned Surgical Provider  Lily Yancey MD as MD (OB/Gyn)  Mary Mathews MD as Referring Physician (Internal Medicine)  Olivia Vasquez PA-C as Physician Assistant (Urology)  Alexandra Whitten MD as MD (Infectious Diseases)  Alexandra Whitten MD as Assigned Infectious Disease Provider  Ynes Voung PA-C as Physician Assistant (Gastroenterology)  Lian Martinez MD as Assigned PCP  Olivia Vasquez PA-C as Physician Assistant (Urology)  Kaitlyn Wolfe MD as Hospitalist (Endocrinology, Diabetes, and Metabolism)  Sylvia Hernández RD as Registered Dietitian (Dietitian, Registered)  Karen Cabrera PA-C as Physician Assistant (Gastroenterology)  Karen Cabrera PA-C as Assigned Gastroenterology Provider    The following  "health maintenance items are reviewed in Epic and correct as of today:  Health Maintenance   Topic Date Due    RSV VACCINE (Pregnancy & 60+) (1 - 1-dose 60+ series) Never done    MAMMO SCREENING  02/06/2024    MEDICARE ANNUAL WELLNESS VISIT  03/09/2024    ANNUAL REVIEW OF HM ORDERS  10/10/2024    FALL RISK ASSESSMENT  03/22/2025    GLUCOSE  11/30/2026    LIPID  03/13/2028    ADVANCE CARE PLANNING  03/22/2029    DTAP/TDAP/TD IMMUNIZATION (4 - Td or Tdap) 10/29/2030    DEXA  11/21/2031    COLORECTAL CANCER SCREENING  10/06/2032    HEPATITIS C SCREENING  Completed    PHQ-2 (once per calendar year)  Completed    INFLUENZA VACCINE  Completed    Pneumococcal Vaccine: 65+ Years  Completed    ZOSTER IMMUNIZATION  Completed    COVID-19 Vaccine  Completed    IPV IMMUNIZATION  Aged Out    HPV IMMUNIZATION  Aged Out    MENINGITIS IMMUNIZATION  Aged Out    RSV MONOCLONAL ANTIBODY  Aged Out         Review of Systems  CONSTITUTIONAL: NEGATIVE for fever, chills, change in weight  EYES: NEGATIVE for vision changes or irritation  ENT/MOUTH: NEGATIVE for ear, mouth and throat problems  RESP: NEGATIVE for significant cough or SOB  CV: NEGATIVE for chest pain, palpitations or peripheral edema  GI: NEGATIVE for nausea, abdominal pain, heartburn, or change in bowel habits  : NEGATIVE for frequency, dysuria, or hematuria  MUSCULOSKELETAL: NEGATIVE for significant arthralgias or myalgia  NEURO: NEGATIVE for weakness, dizziness or paresthesias     Objective    Exam  /76   Pulse 70   Temp 97.5  F (36.4  C)   Resp 18   Ht 1.651 m (5' 5\")   Wt 68.5 kg (151 lb)   LMP  (LMP Unknown)   SpO2 98%   Breastfeeding No   BMI 25.13 kg/m     Estimated body mass index is 25.13 kg/m  as calculated from the following:    Height as of this encounter: 1.651 m (5' 5\").    Weight as of this encounter: 68.5 kg (151 lb).    Physical Exam  Vitals reviewed.   Constitutional:       Appearance: Normal appearance.   HENT:      Head: Normocephalic and " atraumatic.      Right Ear: Tympanic membrane, ear canal and external ear normal.      Left Ear: Tympanic membrane, ear canal and external ear normal.      Mouth/Throat:      Mouth: Mucous membranes are moist.      Pharynx: Oropharynx is clear.   Eyes:      Extraocular Movements: Extraocular movements intact.      Conjunctiva/sclera: Conjunctivae normal.      Pupils: Pupils are equal, round, and reactive to light.   Cardiovascular:      Rate and Rhythm: Normal rate and regular rhythm.      Pulses: Normal pulses.      Heart sounds: Normal heart sounds.   Pulmonary:      Effort: Pulmonary effort is normal.      Breath sounds: Normal breath sounds.   Abdominal:      General: Bowel sounds are normal.      Palpations: Abdomen is soft.   Musculoskeletal:         General: Normal range of motion.      Cervical back: Normal range of motion and neck supple.   Skin:     General: Skin is warm and dry.      Capillary Refill: Capillary refill takes less than 2 seconds.   Neurological:      General: No focal deficit present.      Mental Status: She is alert and oriented to person, place, and time.     Diagnostic testing: CMP, CBC, FLP, and TSH are pending.         3/22/2024   Mini Cog   Clock Draw Score 2 Normal   3 Item Recall 3 objects recalled   Mini Cog Total Score 5          Signed Electronically by: Sanjeev Carmichael MD

## 2024-03-23 LAB
ALBUMIN SERPL BCG-MCNC: 4 G/DL (ref 3.5–5.2)
ALP SERPL-CCNC: 124 U/L (ref 40–150)
ALT SERPL W P-5'-P-CCNC: 31 U/L (ref 0–50)
ANION GAP SERPL CALCULATED.3IONS-SCNC: 9 MMOL/L (ref 7–15)
AST SERPL W P-5'-P-CCNC: 34 U/L (ref 0–45)
BILIRUB SERPL-MCNC: 0.6 MG/DL
BUN SERPL-MCNC: 13.6 MG/DL (ref 8–23)
CALCIUM SERPL-MCNC: 9.2 MG/DL (ref 8.8–10.2)
CHLORIDE SERPL-SCNC: 97 MMOL/L (ref 98–107)
CHOLEST SERPL-MCNC: 132 MG/DL
CREAT SERPL-MCNC: 0.61 MG/DL (ref 0.51–0.95)
DEPRECATED HCO3 PLAS-SCNC: 27 MMOL/L (ref 22–29)
EGFRCR SERPLBLD CKD-EPI 2021: >90 ML/MIN/1.73M2
FASTING STATUS PATIENT QL REPORTED: YES
GLUCOSE SERPL-MCNC: 88 MG/DL (ref 70–99)
HDLC SERPL-MCNC: 61 MG/DL
LDLC SERPL CALC-MCNC: 61 MG/DL
NONHDLC SERPL-MCNC: 71 MG/DL
POTASSIUM SERPL-SCNC: 4 MMOL/L (ref 3.4–5.3)
PROT SERPL-MCNC: 6.2 G/DL (ref 6.4–8.3)
SODIUM SERPL-SCNC: 133 MMOL/L (ref 135–145)
TRIGL SERPL-MCNC: 49 MG/DL
TSH SERPL DL<=0.005 MIU/L-ACNC: 0.93 UIU/ML (ref 0.3–4.2)

## 2024-05-30 ENCOUNTER — TRANSFERRED RECORDS (OUTPATIENT)
Dept: HEALTH INFORMATION MANAGEMENT | Facility: CLINIC | Age: 69
End: 2024-05-30

## 2024-05-30 ENCOUNTER — OFFICE VISIT (OUTPATIENT)
Dept: ENDOCRINOLOGY | Facility: CLINIC | Age: 69
End: 2024-05-30
Attending: INTERNAL MEDICINE
Payer: MEDICARE

## 2024-05-30 VITALS
RESPIRATION RATE: 18 BRPM | HEIGHT: 65 IN | WEIGHT: 166.4 LBS | OXYGEN SATURATION: 99 % | DIASTOLIC BLOOD PRESSURE: 71 MMHG | BODY MASS INDEX: 27.72 KG/M2 | TEMPERATURE: 98.3 F | SYSTOLIC BLOOD PRESSURE: 128 MMHG | HEART RATE: 69 BPM

## 2024-05-30 DIAGNOSIS — E04.1 THYROID NODULE: ICD-10-CM

## 2024-05-30 PROCEDURE — 82310 ASSAY OF CALCIUM: CPT | Performed by: INTERNAL MEDICINE

## 2024-05-30 PROCEDURE — G2211 COMPLEX E/M VISIT ADD ON: HCPCS | Performed by: INTERNAL MEDICINE

## 2024-05-30 PROCEDURE — 99204 OFFICE O/P NEW MOD 45 MIN: CPT | Performed by: INTERNAL MEDICINE

## 2024-05-30 PROCEDURE — 36415 COLL VENOUS BLD VENIPUNCTURE: CPT | Performed by: INTERNAL MEDICINE

## 2024-05-30 PROCEDURE — 83970 ASSAY OF PARATHORMONE: CPT | Performed by: INTERNAL MEDICINE

## 2024-05-30 NOTE — LETTER
5/30/2024         RE: Jose Coronado  [address] [place] [address]        Dear Colleague,    Thank you for referring your patient, Jose Coronado, to the [facility]. Please see a copy of my visit note below.    Name: Jose Coronado  Seen in consultation with Jose Verduzco MD for thyroid nodule.   HPI:  Jose Coronado is a 68 year old female who presents for the evaluation of thyroid nodule.   has a past medical history of Anemia, Arthritis (2015), CVA (cerebral vascular accident) (H) (2017), Diffuse cystic mastopathy, HTN, goal below 140/90, Hyperparathyroidism (H24), Infectious mononucleosis, Irregular heart beat, Labyrinthitis, unspecified, Migraine headache with aura, Osteopenia, Pain in joint, shoulder region, Palpitations, PFO (patent foramen ovale), S/P gastric bypass (June, 2010), Sleep apnea, Sleep apnea, and Vitamin D deficiencies.    Incidental finding of thyroid nodule on 11/2023 CT chest/abdomen.   Follow-up thyroid ultrasound 12/2023: 3 small thyroid nodules were seen PLUS Hypoechoic, solid structure measuring 2.3 x 1.7 x 1.6 appears inferior  and posterior to the left thyroid gland and may reflect a parathyroid adenoma.     H/o parathyroid surgery in 2011. (Right inferior parathyroid gland was removed).  Follow-up calcium and PTH levels were in normal range.  H/o gastric bypass in 2010    No FH of thyroid cancer  No history of radiation  No compressive s/s  Thyroid labs in acceptable range.  She is not on thyroid hormone replacement.  No other major risk factors.    Feeling OK.    Palpitations: History of A-fib--on beta blocker.  Diarrhea/Constipation:h/o gastric bypass-- lost 120 lbs and gained some back. H/o SIBO, irregular bowel patterns., diarrhea. Followed by GI.  Changes in menses: s/p menopause  Dysphagia or Shortness of breath:No  Tremors:No  Changes in weight: lost wt last year-- and now stable.  Wt Readings from Last 2 Encounters:   05/30/24 75.5 kg (166  lb 6.4 oz)   03/22/24 68.5 kg (151 lb)      PMH/PSH:  Past Medical History:   Diagnosis Date     Anemia      Arthritis 2015    Hands, back, hips. Various dates first noted.     CVA (cerebral vascular accident) (H) 2017    ?migraine, ?pfo--negative vasc w/u, neg hypercoag w/u     Diffuse cystic mastopathy     Fibrocystic breast disease     HTN, goal below 140/90      Hyperparathyroidism (H24)      Infectious mononucleosis     Mono at age 16     Irregular heart beat     PAT no afib on 30day monitor     Labyrinthitis, unspecified      Migraine headache with aura      Osteopenia      Pain in joint, shoulder region     Secondary to a fall     Palpitations      PFO (patent foramen ovale)     s/p closure with amplazter device 7/13/17     S/P gastric bypass June, 2010     Sleep apnea     she is on CPAP     Sleep apnea      Vitamin D deficiencies      Past Surgical History:   Procedure Laterality Date     BIOPSY  1984    Breast biopsies     BREAST SURGERY  1984    Above     CARDIAC SURGERY  7/13/2017    PFO closure     COLONOSCOPY N/A 8/12/2015    Procedure: COLONOSCOPY;  Surgeon: Deandre Brooks MD;  Location: [facility]     COLONOSCOPY N/A 10/6/2022    Procedure: COLONOSCOPY, WITH BIOPSIES;  Surgeon: Freddie Gonzalez MD;  Location: [facility]     DAVINCI HYSTERECTOMY SUPRACERVICAL, SALPINGO-OOPHORECTOMY INCLUDING BILATERAL N/A 3/16/2020    Procedure: DAVINCI HYSTERECTOMY SUPRACERVICAL, SALPINGO-OOPHORECTOMY INCLUDING BILATERAL WITH EXAM UNDER ANESTHESIA;  Surgeon: Lily Yancey MD;  Location: [facility]     DAVINCI SACROCOLPOPEXY, MIDURETHRAL SLING, CYSTOSCOPY N/A 3/16/2020    Procedure: SACROCOLPOPEXY, ROBOT-ASSISTED, LAPAROSCOPIC, WITH INSERTION OF MIDURETHRAL SLING AND CYSTOSCOPY;  Surgeon: Carolyn Valdivia MD;  Location: [facility]     ESOPHAGOSCOPY, GASTROSCOPY, DUODENOSCOPY (EGD), COMBINED N/A 12/7/2023    Procedure: Esophagoscopy, gastroscopy, duodenoscopy (EGD), combined;  Surgeon: Bubba Freeman MD;   Location:  GI     ESOPHAGOSCOPY, GASTROSCOPY, DUODENOSCOPY (EGD), COMBINED N/A 2024    Procedure: Esophagoscopy, gastroscopy, duodenoscopy (EGD), combined;  Surgeon: Bubba Freeman MD;  Location:  GI     GASTRIC BYPASS  2010     GENITOURINARY SURGERY  3/16/2020    Cystocele/rectocele repair     GYN SURGERY  3/16/2020    Hysterectomy     ORTHOPEDIC SURGERY Left 2010    wrist fracture     PARATHYROIDECTOMY  11     ZZ ANEURYSM, INTRACRAN, SIMPLE SURG  2017    coil of aneurysm right posterior paraophthalmic artery     ZZC NONSPECIFIC PROCEDURE      S/P multiple breast biopies - all negative / benign     ZZC NONSPECIFIC PROCEDURE      S/P T&A     ZZC NONSPECIFIC PROCEDURE      [place] teeth extraction     ZZC NONSPECIFIC PROCEDURE      S/P (? unreadable) ankle     Family Hx:  Family History   Problem Relation Age of Onset     Breast Cancer Mother      Hypertension Mother      Arthritis Mother      Thyroid Disease Mother         Hypo     Ulcerative Colitis Mother      Cerebrovascular Disease Mother         Age 78 - Cerebral Hemorrhage,      Anxiety Disorder Mother      Depression Mother      Genetic Disorder Mother         Ulcerative colitis     Obesity Mother      Anesthesia Reaction Mother         Same     Breast Cancer Maternal Aunt      Hypertension Paternal Grandmother      Cerebrovascular Disease Maternal Grandmother         Age 72 -      Family History Negative Maternal Grandfather      Family History Negative Paternal Grandfather      Family History Negative Son      Family History Negative Daughter      Other Cancer Father         Skin - Non-melanoma varieties     Hypertension Father      Asthma Father      Family History Negative Daughter      Ulcerative Colitis Sister      Hypertension Sister      Hyperlipidemia Sister      Anxiety Disorder Sister      Depression Sister      Diabetes Sister      Obesity Sister      Asthma Sister      Anesthesia Reaction  Sister         Debilitating headaches     Hypertension Brother      Anxiety Disorder Brother      Depression Brother      Asthma Nephew      Hypertension Sister         Congestive Heart Failure     Anxiety Disorder Sister      Genetic Disorder Sister         Ulcerative colitis     Obesity Sister      Genetic Disorder Niece         Ulcerative colitis     Colon Cancer No family hx of      Coronary Artery Disease No family hx of      Mental Illness No family hx of      Substance Abuse No family hx of      Osteoporosis No family hx of      Deep Vein Thrombosis No family hx of                Social Hx:  Social History     Socioeconomic History     Marital status:      Spouse name: Not on file     Number of children: Not on file     Years of education: Not on file     Highest education level: Not on file   Occupational History     Not on file   Tobacco Use     Smoking status: Never     Smokeless tobacco: Never   Vaping Use     Vaping status: Never Used   Substance and Sexual Activity     Alcohol use: Yes     Comment: Social     Drug use: No     Sexual activity: Not Currently     Partners: Male     Birth control/protection: Pill   Other Topics Concern     Parent/sibling w/ CABG, MI or angioplasty before 65F 55M? No   Social History Narrative     Not on file     Social Determinants of Health     Financial Resource Strain: Low Risk  (3/17/2024)    Financial Resource Strain      Within the past 12 months, have you or your family members you live with been unable to get utilities (heat, electricity) when it was really needed?: No   Food Insecurity: Low Risk  (3/17/2024)    Food Insecurity      Within the past 12 months, did you worry that your food would run out before you got money to buy more?: No      Within the past 12 months, did the food you bought just not last and you didn t have money to get more?: No   Transportation Needs: Low Risk  (3/17/2024)    Transportation Needs      Within the past 12 months, has lack  of transportation kept you from medical appointments, getting your medicines, non-medical meetings or appointments, work, or from getting things that you need?: No   Physical Activity: Inactive (3/17/2024)    Exercise Vital Sign      Days of Exercise per Week: 0 days      Minutes of Exercise per Session: 0 min   Stress: No Stress Concern Present (3/17/2024)    [employer] [place] [employer] - Occupational Stress Questionnaire      Feeling of Stress : Not at all   Social Connections: Unknown (3/17/2024)    Social Connection and Isolation Panel [NHANES]      Frequency of Communication with Friends and Family: Not on file      Frequency of Social Gatherings with Friends and Family: Three times a week      Attends [employer] Services: Not on file      Active Member of Clubs or Organizations: Not on file      Attends Club or Organization Meetings: Not on file      Marital Status: Not on file   Interpersonal Safety: Low Risk  (3/22/2024)    Interpersonal Safety      Do you feel physically and emotionally safe where you currently live?: Yes      Within the past 12 months, have you been hit, slapped, kicked or otherwise physically hurt by someone?: No      Within the past 12 months, have you been humiliated or emotionally abused in other ways by your partner or ex-partner?: No   Housing Stability: Low Risk  (3/17/2024)    Housing Stability      Do you have housing? : Yes      Are you worried about losing your housing?: No          MEDICATIONS:  has a current medication list which includes the following prescription(s): vitamin c, aspirin, atenolol, calcium citrate-vitamin d, compounded non-controlled substance, cranberry, cyanocobalamin, ferrous sulfate, fesoterodine fumarate, hydrochlorothiazide, imipramine, loperamide, losartan, omeprazole, topiramate, UNABLE TO FIND, cholecalciferol, vitamin e, zinc glycinate, and valacyclovir.    Review of Systems  10 point ROS neg other than the symptoms noted above in the  "HPI.    Physical Exam   VS: /71 (BP Location: Left arm, Patient Position: Chair, Cuff Size: Adult Regular)   Pulse 69   Temp 98.3  F (36.8  C) (Tympanic)   Resp 18   Ht 1.651 m (5' 5\")   Wt 75.5 kg (166 lb 6.4 oz)   LMP  (LMP Unknown)   SpO2 99%   Breastfeeding No   BMI 27.69 kg/m    GENERAL: healthy, alert and no distress  EYES: Eyes grossly normal to inspection, conjunctivae and sclerae normal  ENT: no nose swelling, nasal discharge.  Thyroid: no apparent thyroid nodules.  Thyroid appears normal in size and nontender.   CV: RRR, no rubs, gallops, no murmurs  RESP: CTAB, no wheezes, rales, or ronchi  ABDO: +BS  EXTREMITIES: no hand tremors.  NEURO: Cranial nerves grossly intact, mentation intact and speech normal  SKIN: No apparent skin lesions, rash or edema seen   PSYCH: mentation appears normal, affect normal/bright, judgement and insight intact, normal speech and appearance well-groomed      LABS:  TFTs:  TSH   Date Value Ref Range Status   03/22/2024 0.93 0.30 - 4.20 uIU/mL Final   02/14/2022 1.13 0.40 - 4.00 mU/L Final   03/23/2021 2.40 0.40 - 4.00 mU/L Final       Thyroid Ultrasound 12/2023:  IMPRESSION:  1.  Hypoechoic solid structure (2.3 cm) appears inferior and posterior  to the thyroid gland and may reflect a parathyroid adenoma. Correlate  with parathyroid laboratory values.  2.  Additional thyroid nodules as described above..    All pertinent notes, labs, and images personally reviewed by me.     A/P  Ms.Jan MERVIN Coronado is a 68 year old here for the evaluation of thyroid nodule:  #1 Thyroid Nodule:  3 small thyroid nodules are seen on ultrasound.   No FH of thyroid cancer  No history of radiation  No compressive s/s  Thyroid labs in acceptable range.  She is not on thyroid hormone replacement.  No other major risk factors.   Plan:  Discussed diagnosis, pathophysiology, management and treatment options of condition with pt.  In the setting of no major compressive symptoms and based on " ultrasound results, FNA is not indicated at this time.  Recommend thyroid ultrasound 12/2024 and follow-up after that.  Discussed compressive symptoms to monitor.    #2.  Parathyroid adenoma ?;  Thyroid ultrasound showing incidental finding of hypoechoic solid structure measuring 2.3 cm inferior and posterior to left thyroid gland.  Concern for parathyroid adenoma.  History of parathyroidectomy in 2011 demonstrated status post removal of right inferior parathyroid adenoma.  Follow-up calcium and PTH in normal range.  She also has history of gastric bypass.  Plan:  Discussed diagnosis, pathophysiology, management and treatment options of condition with pt.  Plan to check baseline calcium and PTH.  If stable then plan to continue monitor and recheck ultrasound as noted above.    Plan: Calcium, Parathyroid Hormone Intact, US Thyroid          Discussed possible outcomes of biopsy including possible benign, possible malignancy and possible AUS. If AUS indication for molecular marker testing.  Discussed possible compressive symptoms and signs to watch for.  Discussed that Thyroid nodules are common and are frequently benign. Data suggest that the prevalence of palpable thyroid nodules is 3% to 7% in [place]; the prevalence is as high as 50% based on ultrasonography (US) or autopsy data. All patients with a palpable thyroid nodule, however, should undergo US examination. US-guided FNA (US-FNA) is recommended for nodules =10 mm; US-FNA is suggested for nodules <10 mm only if clinical information or US features are suspicious.  The frequency of thyroid nodules in general population was discussed. Also discussed possibility of malignancy, potential for thyroid autonomy.     Causes of thyroid Nodules: Benign (Multinodular goiter, Hashimoto s thyroiditis, Simple or hemorrhagic cysts, Follicular adenomas, Subacute thyroiditis) or Malignant(Papillary carcinoma, Follicular carcinoma, Hürthle cell carcinoma, Medullary  carcinoma, Anaplastic carcinoma, Primary thyroid lymphoma, or Metastatic malignant lesion).    MultiNodule:  The risk of cancer is not significantly higher in palpable solitary thyroid nodules than in multinodular lesions or in nodules in diffuse goiters. In multinodular thyroid glands, the cytologic sampling should be focused on lesions characterized by suspicious US features rather than on larger or clinically dominant nodules.    Cyst:  Most complex thyroid nodules with a dominant fluid component are benign. USFNA, however, should always be performed because the rare papillary thyroid carcinoma (PTC) can be cystic. An unsatisfactory (nondiagnostic) specimen usually results from a cystic nodule that yields few or no follicular cells. Reaspiration yields satisfactory results in 50% of cases.    Discussed indications, risks and benefits of all medications prescribed, and answered questions to patient's satisfaction.  The longitudinal plan of care for the diagnosis(es)/condition(s) as documented were addressed during this visit. Due to the added complexity in care, I will continue to support Jose in the subsequent management and with ongoing continuity of care.  All questions were answered.  The patient indicates understanding of the above issues and agrees with the plan set forth.    Follow-up:  As noted in AVS    Kaitlyn Wolfe MD  Endocrinology   [facility]/[place]    Cc: Sanjeev Carmichael    Addendum to above note and clinic visit:    Labs reviewed.    See result note/telephone encounter.          Again, thank you for allowing me to participate in the care of your patient.        Sincerely,        Kaitlyn Wolfe MD

## 2024-05-30 NOTE — PROGRESS NOTES
Name: Jose Coronado  Seen in consultation with Jose Verduzco MD for thyroid nodule.   HPI:  Jose Coronado is a 68 year old female who presents for the evaluation of thyroid nodule.   has a past medical history of Anemia, Arthritis (2015), CVA (cerebral vascular accident) (H) (2017), Diffuse cystic mastopathy, HTN, goal below 140/90, Hyperparathyroidism (H24), Infectious mononucleosis, Irregular heart beat, Labyrinthitis, unspecified, Migraine headache with aura, Osteopenia, Pain in joint, shoulder region, Palpitations, PFO (patent foramen ovale), S/P gastric bypass (June, 2010), Sleep apnea, Sleep apnea, and Vitamin D deficiencies.    Incidental finding of thyroid nodule on 11/2023 CT chest/abdomen.   Follow-up thyroid ultrasound 12/2023: 3 small thyroid nodules were seen PLUS Hypoechoic, solid structure measuring 2.3 x 1.7 x 1.6 appears inferior  and posterior to the left thyroid gland and may reflect a parathyroid adenoma.     H/o parathyroid surgery in 2011. (Right inferior parathyroid gland was removed).  Follow-up calcium and PTH levels were in normal range.  H/o gastric bypass in 2010    No FH of thyroid cancer  No history of radiation  No compressive s/s  Thyroid labs in acceptable range.  She is not on thyroid hormone replacement.  No other major risk factors.    Feeling OK.    Palpitations: History of A-fib--on beta blocker.  Diarrhea/Constipation:h/o gastric bypass-- lost 120 lbs and gained some back. H/o SIBO, irregular bowel patterns., diarrhea. Followed by GI.  Changes in menses: s/p menopause  Dysphagia or Shortness of breath:No  Tremors:No  Changes in weight: lost wt last year-- and now stable.  Wt Readings from Last 2 Encounters:   05/30/24 75.5 kg (166 lb 6.4 oz)   03/22/24 68.5 kg (151 lb)      PMH/PSH:  Past Medical History:   Diagnosis Date    Anemia     Arthritis 2015    Hands, back, hips. Various dates first noted.    CVA (cerebral vascular accident) (H) 2017    ?migraine, ?pfo--negative  vasc w/u, neg hypercoag w/u    Diffuse cystic mastopathy     Fibrocystic breast disease    HTN, goal below 140/90     Hyperparathyroidism (H24)     Infectious mononucleosis     Mono at age 16    Irregular heart beat     PAT no afib on 30day monitor    Labyrinthitis, unspecified     Migraine headache with aura     Osteopenia     Pain in joint, shoulder region     Secondary to a fall    Palpitations     PFO (patent foramen ovale)     s/p closure with amplazter device 7/13/17    S/P gastric bypass June, 2010    Sleep apnea     she is on CPAP    Sleep apnea     Vitamin D deficiencies      Past Surgical History:   Procedure Laterality Date    BIOPSY  1984    Breast biopsies    BREAST SURGERY  1984    Above    CARDIAC SURGERY  7/13/2017    PFO closure    COLONOSCOPY N/A 8/12/2015    Procedure: COLONOSCOPY;  Surgeon: Deandre Brooks MD;  Location:  GI    COLONOSCOPY N/A 10/6/2022    Procedure: COLONOSCOPY, WITH BIOPSIES;  Surgeon: Freddie Gonzalez MD;  Location:  GI    DAVINCI HYSTERECTOMY SUPRACERVICAL, SALPINGO-OOPHORECTOMY INCLUDING BILATERAL N/A 3/16/2020    Procedure: DAVINCI HYSTERECTOMY SUPRACERVICAL, SALPINGO-OOPHORECTOMY INCLUDING BILATERAL WITH EXAM UNDER ANESTHESIA;  Surgeon: Lily Yancey MD;  Location: [facility] OR    DAVINCI SACROCOLPOPEXY, MIDURETHRAL SLING, CYSTOSCOPY N/A 3/16/2020    Procedure: SACROCOLPOPEXY, ROBOT-ASSISTED, LAPAROSCOPIC, WITH INSERTION OF MIDURETHRAL SLING AND CYSTOSCOPY;  Surgeon: Carolyn Valdivia MD;  Location: [facility] OR    ESOPHAGOSCOPY, GASTROSCOPY, DUODENOSCOPY (EGD), COMBINED N/A 12/7/2023    Procedure: Esophagoscopy, gastroscopy, duodenoscopy (EGD), combined;  Surgeon: Bubba Freeman MD;  Location:  GI    ESOPHAGOSCOPY, GASTROSCOPY, DUODENOSCOPY (EGD), COMBINED N/A 2/22/2024    Procedure: Esophagoscopy, gastroscopy, duodenoscopy (EGD), combined;  Surgeon: Bubba Freeman MD;  Location:  GI    GASTRIC BYPASS  June 24, 2010    GENITOURINARY SURGERY   3/16/2020    Cystocele/rectocele repair    GYN SURGERY  3/16/2020    Hysterectomy    ORTHOPEDIC SURGERY Left 2010    wrist fracture    PARATHYROIDECTOMY  11    [facility] ANEURYSM, INTRACRAN, SIMPLE SURG  2017    coil of aneurysm right posterior paraophthalmic artery    Z NONSPECIFIC PROCEDURE      S/P multiple breast biopies - all negative / benign    ZZC NONSPECIFIC PROCEDURE      S/P T&A    ZZC NONSPECIFIC PROCEDURE      Page teeth extraction    ZZC NONSPECIFIC PROCEDURE      S/P (? unreadable) ankle     Family Hx:  Family History   Problem Relation Age of Onset    Breast Cancer Mother     Hypertension Mother     Arthritis Mother     Thyroid Disease Mother         Hypo    Ulcerative Colitis Mother     Cerebrovascular Disease Mother         Age 78 - Cerebral Hemorrhage,     Anxiety Disorder Mother     Depression Mother     Genetic Disorder Mother         Ulcerative colitis    Obesity Mother     Anesthesia Reaction Mother         Same    Breast Cancer Maternal Aunt     Hypertension Paternal Grandmother     Cerebrovascular Disease Maternal Grandmother         Age 72 -     Family History Negative Maternal Grandfather     Family History Negative Paternal Grandfather     Family History Negative Son     Family History Negative Daughter     Other Cancer Father         Skin - Non-melanoma varieties    Hypertension Father     Asthma Father     Family History Negative Daughter     Ulcerative Colitis Sister     Hypertension Sister     Hyperlipidemia Sister     Anxiety Disorder Sister     Depression Sister     Diabetes Sister     Obesity Sister     Asthma Sister     Anesthesia Reaction Sister         Debilitating headaches    Hypertension Brother     Anxiety Disorder Brother     Depression Brother     Asthma Nephew     Hypertension Sister         Congestive Heart Failure    Anxiety Disorder Sister     Genetic Disorder Sister         Ulcerative colitis    Obesity Sister     Genetic Disorder Niece          Ulcerative colitis    Colon Cancer No family hx of     Coronary Artery Disease No family hx of     Mental Illness No family hx of     Substance Abuse No family hx of     Osteoporosis No family hx of     Deep Vein Thrombosis No family hx of                Social Hx:  Social History     Socioeconomic History    Marital status:      Spouse name: Not on file    Number of children: Not on file    Years of education: Not on file    Highest education level: Not on file   Occupational History    Not on file   Tobacco Use    Smoking status: Never    Smokeless tobacco: Never   Vaping Use    Vaping status: Never Used   Substance and Sexual Activity    Alcohol use: Yes     Comment: Social    Drug use: No    Sexual activity: Not Currently     Partners: Male     Birth control/protection: Pill   Other Topics Concern    Parent/sibling w/ CABG, MI or angioplasty before 65F 55M? No   Social History Narrative    Not on file     Social Determinants of Health     Financial Resource Strain: Low Risk  (3/17/2024)    Financial Resource Strain     Within the past 12 months, have you or your family members you live with been unable to get utilities (heat, electricity) when it was really needed?: No   Food Insecurity: Low Risk  (3/17/2024)    Food Insecurity     Within the past 12 months, did you worry that your food would run out before you got money to buy more?: No     Within the past 12 months, did the food you bought just not last and you didn t have money to get more?: No   Transportation Needs: Low Risk  (3/17/2024)    Transportation Needs     Within the past 12 months, has lack of transportation kept you from medical appointments, getting your medicines, non-medical meetings or appointments, work, or from getting things that you need?: No   Physical Activity: Inactive (3/17/2024)    Exercise Vital Sign     Days of Exercise per Week: 0 days     Minutes of Exercise per Session: 0 min   Stress: No Stress Concern Present  "(3/17/2024)    [employer] - Occupational Stress Questionnaire     Feeling of Stress : Not at all   Social Connections: Unknown (3/17/2024)    Social Connection and Isolation Panel [NHANES]     Frequency of Communication with Friends and Family: Not on file     Frequency of Social Gatherings with Friends and Family: Three times a week     Attends Mandaeism Services: Not on file     Active Member of Clubs or Organizations: Not on file     Attends Club or Organization Meetings: Not on file     Marital Status: Not on file   Interpersonal Safety: Low Risk  (3/22/2024)    Interpersonal Safety     Do you feel physically and emotionally safe where you currently live?: Yes     Within the past 12 months, have you been hit, slapped, kicked or otherwise physically hurt by someone?: No     Within the past 12 months, have you been humiliated or emotionally abused in other ways by your partner or ex-partner?: No   Housing Stability: Low Risk  (3/17/2024)    Housing Stability     Do you have housing? : Yes     Are you worried about losing your housing?: No          MEDICATIONS:  has a current medication list which includes the following prescription(s): vitamin c, aspirin, atenolol, calcium citrate-vitamin d, compounded non-controlled substance, cranberry, cyanocobalamin, ferrous sulfate, fesoterodine fumarate, hydrochlorothiazide, imipramine, loperamide, losartan, omeprazole, topiramate, UNABLE TO FIND, cholecalciferol, vitamin e, zinc glycinate, and valacyclovir.    Review of Systems  10 point ROS neg other than the symptoms noted above in the HPI.    Physical Exam   VS: /71 (BP Location: Left arm, Patient Position: Chair, Cuff Size: Adult Regular)   Pulse 69   Temp 98.3  F (36.8  C) (Tympanic)   Resp 18   Ht 1.651 m (5' 5\")   Wt 75.5 kg (166 lb 6.4 oz)   LMP  (LMP Unknown)   SpO2 99%   Breastfeeding No   BMI 27.69 kg/m    GENERAL: healthy, alert and no distress  EYES: Eyes grossly " normal to inspection, conjunctivae and sclerae normal  ENT: no nose swelling, nasal discharge.  Thyroid: no apparent thyroid nodules.  Thyroid appears normal in size and nontender.   CV: RRR, no rubs, gallops, no murmurs  RESP: CTAB, no wheezes, rales, or ronchi  ABDO: +BS  EXTREMITIES: no hand tremors.  NEURO: Cranial nerves grossly intact, mentation intact and speech normal  SKIN: No apparent skin lesions, rash or edema seen   PSYCH: mentation appears normal, affect normal/bright, judgement and insight intact, normal speech and appearance well-groomed      LABS:  TFTs:  TSH   Date Value Ref Range Status   03/22/2024 0.93 0.30 - 4.20 uIU/mL Final   02/14/2022 1.13 0.40 - 4.00 mU/L Final   03/23/2021 2.40 0.40 - 4.00 mU/L Final       Thyroid Ultrasound 12/2023:  IMPRESSION:  1.  Hypoechoic solid structure (2.3 cm) appears inferior and posterior  to the thyroid gland and may reflect a parathyroid adenoma. Correlate  with parathyroid laboratory values.  2.  Additional thyroid nodules as described above..    All pertinent notes, labs, and images personally reviewed by me.     A/P  Ms.Jan MERVIN Coronado is a 68 year old here for the evaluation of thyroid nodule:  #1 Thyroid Nodule:  3 small thyroid nodules are seen on ultrasound.   No FH of thyroid cancer  No history of radiation  No compressive s/s  Thyroid labs in acceptable range.  She is not on thyroid hormone replacement.  No other major risk factors.   Plan:  Discussed diagnosis, pathophysiology, management and treatment options of condition with pt.  In the setting of no major compressive symptoms and based on ultrasound results, FNA is not indicated at this time.  Recommend thyroid ultrasound 12/2024 and follow-up after that.  Discussed compressive symptoms to monitor.    #2.  Parathyroid adenoma ?;  Thyroid ultrasound showing incidental finding of hypoechoic solid structure measuring 2.3 cm inferior and posterior to left thyroid gland.  Concern for parathyroid  adenoma.  History of parathyroidectomy in 2011 demonstrated status post removal of right inferior parathyroid adenoma.  Follow-up calcium and PTH in normal range.  She also has history of gastric bypass.  Plan:  Discussed diagnosis, pathophysiology, management and treatment options of condition with pt.  Plan to check baseline calcium and PTH.  If stable then plan to continue monitor and recheck ultrasound as noted above.    Plan: Calcium, Parathyroid Hormone Intact, US Thyroid          Discussed possible outcomes of biopsy including possible benign, possible malignancy and possible AUS. If AUS indication for molecular marker testing.  Discussed possible compressive symptoms and signs to watch for.  Discussed that Thyroid nodules are common and are frequently benign. Data suggest that the prevalence of palpable thyroid nodules is 3% to 7% in [place]; the prevalence is as high as 50% based on ultrasonography (US) or autopsy data. All patients with a palpable thyroid nodule, however, should undergo US examination. US-guided FNA (US-FNA) is recommended for nodules ?10 mm; US-FNA is suggested for nodules <10 mm only if clinical information or US features are suspicious.  The frequency of thyroid nodules in general population was discussed. Also discussed possibility of malignancy, potential for thyroid autonomy.     Causes of thyroid Nodules: Benign (Multinodular goiter, Hashimoto s thyroiditis, Simple or hemorrhagic cysts, Follicular adenomas, Subacute thyroiditis) or Malignant(Papillary carcinoma, Follicular carcinoma, Hürthle cell carcinoma, Medullary carcinoma, Anaplastic carcinoma, Primary thyroid lymphoma, or Metastatic malignant lesion).    MultiNodule:  The risk of cancer is not significantly higher in palpable solitary thyroid nodules than in multinodular lesions or in nodules in diffuse goiters. In multinodular thyroid glands, the cytologic sampling should be focused on lesions characterized by  suspicious US features rather than on larger or clinically dominant nodules.    Cyst:  Most complex thyroid nodules with a dominant fluid component are benign. USFNA, however, should always be performed because the rare papillary thyroid carcinoma (PTC) can be cystic. An unsatisfactory (nondiagnostic) specimen usually results from a cystic nodule that yields few or no follicular cells. Reaspiration yields satisfactory results in 50% of cases.    Discussed indications, risks and benefits of all medications prescribed, and answered questions to patient's satisfaction.  The longitudinal plan of care for the diagnosis(es)/condition(s) as documented were addressed during this visit. Due to the added complexity in care, I will continue to support Jose in the subsequent management and with ongoing continuity of care.  All questions were answered.  The patient indicates understanding of the above issues and agrees with the plan set forth.    Follow-up:  As noted in AVS    Kaitlyn Wolfe MD  Endocrinology   [facility]    Cc: Sanjeev Carmichael    Addendum to above note and clinic visit:    Labs reviewed.    See result note/telephone encounter.

## 2024-05-30 NOTE — PATIENT INSTRUCTIONS
[facility]  Dr Wolfe, Endocrinology Department    [facility]   [address] [id number]  [place] [address]  Appointment [phone]  Fax: [phone]  [place]: Monday - Thursday      Please check the cost coverage and copay with insurance before recommended tests, services and medications (especially if new medications are prescribed).     If ordered, please get blood work done 1 week prior to your next appointment so they will be available to Dr. Wolfe at your visit.    Labs today.  Ultrasound in 2024 with Radiology.  Follow up after that.     [facility] radiology scheduleing   [place]  [phone]   [facility] Radiology scheduling  [place]  [phone]     Please call and schedule the recommended test as discussed in clinic visit. These are the numbers to call.

## 2024-05-31 LAB
CALCIUM SERPL-MCNC: 9.2 MG/DL (ref 8.8–10.2)
PTH-INTACT SERPL-MCNC: 24 PG/ML (ref 15–65)

## 2024-06-01 ENCOUNTER — HEALTH MAINTENANCE LETTER (OUTPATIENT)
Age: 69
End: 2024-06-01

## 2024-06-06 DIAGNOSIS — G43.109 MIGRAINE WITH AURA AND WITHOUT STATUS MIGRAINOSUS, NOT INTRACTABLE: ICD-10-CM

## 2024-06-06 RX ORDER — IMIPRAMINE HCL 25 MG
25 TABLET ORAL AT BEDTIME
Qty: 90 TABLET | Refills: 2 | Status: SHIPPED | OUTPATIENT
Start: 2024-06-06

## 2024-06-10 DIAGNOSIS — K28.9 ULCER, ANASTOMOTIC: ICD-10-CM

## 2024-06-17 ENCOUNTER — OFFICE VISIT (OUTPATIENT)
Dept: UROLOGY | Facility: CLINIC | Age: 69
End: 2024-06-17
Payer: MEDICARE

## 2024-06-17 VITALS
WEIGHT: 168 LBS | SYSTOLIC BLOOD PRESSURE: 136 MMHG | HEART RATE: 67 BPM | BODY MASS INDEX: 27.99 KG/M2 | OXYGEN SATURATION: 99 % | HEIGHT: 65 IN | DIASTOLIC BLOOD PRESSURE: 74 MMHG

## 2024-06-17 DIAGNOSIS — R39.15 URINARY URGENCY: ICD-10-CM

## 2024-06-17 DIAGNOSIS — Z87.440 HISTORY OF UTI: ICD-10-CM

## 2024-06-17 DIAGNOSIS — N39.41 URGE INCONTINENCE: Primary | ICD-10-CM

## 2024-06-17 DIAGNOSIS — N39.0 FREQUENT UTI: ICD-10-CM

## 2024-06-17 PROCEDURE — 99214 OFFICE O/P EST MOD 30 MIN: CPT | Performed by: PHYSICIAN ASSISTANT

## 2024-06-17 NOTE — PROGRESS NOTES
Reason for Visit:  Cystoscopy    Clinical Data: Ms. Jose Coronado is a pleasant 68 year old female with a hx of Unintentional weight loss; LUQ abdominal pain; LLQ abdominal pain; Early satiety.  She underwent a CT and found to have air in her bladder. She subsequently had a cystogram and cysto:     CT a/d 11/22/23:  IMPRESSION:  1.  A few small pulmonary nodules measuring up to 3 mm. Please see follow-up guidelines.  2.  Increased size of a nodule in or abutting the inferior left lobe of the thyroid gland since 2014. Consider repeat thyroid ultrasound for further characterization.  3.  Cholelithiasis and gallbladder distention.  4.  Large amount of gas in the urinary bladder may be due to infection or instrumentation.    Cystogram 12/5/23:  IMPRESSION: No specific bladder abnormality identified. No bladder  leak, bladder lumen filling defect, or visible fistula can be  identified.    Cystoscopy procedure:  Pt. Was consented and placed in the lithotomy position.  She was cleaned and preparred in the usual fashion.  Lidocain gel was inserted into the urethra and given time to take effect.  A 16 fr flexible cystoscope was then inserted through the urethra and into the bladder.  The urethra was wnl.  The bladder was with 1+ trabeculation.  No tumors, diverticulae, or stones.  Bilateral u/o's were effluxing clear urine.  The cystoscope was then withdrawn.  The pt. Tolerated the procedure well.    TODAY 6/17/24:  Feeling well.   On cran and Vit C as well as top estrogen.   On Toviaz  Pushes fluids if the slightest UTI symptom.    Past Medical History:   Diagnosis Date    Anemia     Arthritis 2015    Hands, back, hips. Various dates first noted.    CVA (cerebral vascular accident) (H) 2017    ?migraine, ?pfo--negative vasc w/u, neg hypercoag w/u    Diffuse cystic mastopathy     Fibrocystic breast disease    HTN, goal below 140/90     Hyperparathyroidism (H24)     Infectious mononucleosis     Mono at age 16    Irregular  heart beat     PAT no afib on 30day monitor    Labyrinthitis, unspecified     Migraine headache with aura     Osteopenia     Pain in joint, shoulder region     Secondary to a fall    Palpitations     PFO (patent foramen ovale)     s/p closure with amplazter device 7/13/17    S/P gastric bypass June, 2010    Sleep apnea     she is on CPAP    Sleep apnea     Vitamin D deficiencies          A/P:  68 year old female with pneumaturia however no identifiable area of fistula on cystogram or cystoscopy.  We discussed that this could be results of an infection.    -continue estrogen cream  -stay well hydrated  -cranberry supplement with 36 PAC.  -f/u annually and PRN      Olivia Vasquez PA-C  [facility]  Office: [phone]  After business hours: [phone]      30 minutes spent by me on the date of the encounter doing chart review, history and exam, documentation and further activities per the note in addition to the cystoscopy.

## 2024-06-17 NOTE — LETTER
6/17/2024       RE: Jose Coronado  [address] [place] [address]     Dear Colleague,    Thank you for referring your patient, Jose Coronado, to the [facility]. Please see a copy of my visit note below.    Reason for Visit:  Cystoscopy    Clinical Data: Ms. Jose Coronado is a pleasant 68 year old female with a hx of Unintentional weight loss; LUQ abdominal pain; LLQ abdominal pain; Early satiety.  She underwent a CT and found to have air in her bladder. She subsequently had a cystogram and cysto:     CT a/d 11/22/23:  IMPRESSION:  1.  A few small pulmonary nodules measuring up to 3 mm. Please see follow-up guidelines.  2.  Increased size of a nodule in or abutting the inferior left lobe of the thyroid gland since 2014. Consider repeat thyroid ultrasound for further characterization.  3.  Cholelithiasis and gallbladder distention.  4.  Large amount of gas in the urinary bladder may be due to infection or instrumentation.    Cystogram 12/5/23:  IMPRESSION: No specific bladder abnormality identified. No bladder  leak, bladder lumen filling defect, or visible fistula can be  identified.    Cystoscopy procedure:  Pt. Was consented and placed in the lithotomy position.  She was cleaned and preparred in the usual fashion.  Lidocain gel was inserted into the urethra and given time to take effect.  A 16 fr flexible cystoscope was then inserted through the urethra and into the bladder.  The urethra was wnl.  The bladder was with 1+ trabeculation.  No tumors, diverticulae, or stones.  Bilateral u/o's were effluxing clear urine.  The cystoscope was then withdrawn.  The pt. Tolerated the procedure well.    TODAY 6/17/24:  Feeling well.   On cran and Vit C as well as top estrogen.   On Toviaz  Pushes fluids if the slightest UTI symptom.    Past Medical History:   Diagnosis Date    Anemia     Arthritis 2015    Hands, back, hips. Various  dates first noted.    CVA (cerebral vascular accident) (H) 2017    ?migraine, ?pfo--negative vasc w/u, neg hypercoag w/u    Diffuse cystic mastopathy     Fibrocystic breast disease    HTN, goal below 140/90     Hyperparathyroidism (H24)     Infectious mononucleosis     Mono at age 16    Irregular heart beat     PAT no afib on 30day monitor    Labyrinthitis, unspecified     Migraine headache with aura     Osteopenia     Pain in joint, shoulder region     Secondary to a fall    Palpitations     PFO (patent foramen ovale)     s/p closure with amplazter device 7/13/17    S/P gastric bypass June, 2010    Sleep apnea     she is on CPAP    Sleep apnea     Vitamin D deficiencies          A/P:  68 year old female with pneumaturia however no identifiable area of fistula on cystogram or cystoscopy.  We discussed that this could be results of an infection.    -continue estrogen cream  -stay well hydrated  -cranberry supplement with 36 PAC.  -f/u annually and PRN      Olivia Vasquez PA-C  [facility]  Office: [phone]  After business hours: [phone]      30 minutes spent by me on the date of the encounter doing chart review, history and exam, documentation and further activities per the note in addition to the cystoscopy.

## 2024-06-18 NOTE — TELEPHONE ENCOUNTER
omeprazole (PRILOSEC) 40 MG DR capsule   180 capsule 1 12/8/2023 6/5/2024   Take 1 capsule (40 mg) by mouth 2 times daily for 180 days - Oral     Routing refill request to provider for review/approval because:  Drug not active on patient's medication list      Last Office Visit : 2-  Future Office visit:  6-    PPI Protocol Nytiyl87/10/2024 08:50 AM   Protocol Details Medication indicated for associated diagnosis   dication is prescribed for one or more of the following conditions:                Erosive esophagitis               Gastritis              Gastric hypersecretion              Gastric ulcer              Gastroesophageal reflux disease              Helicobacter pylori gastrointestinal tract infection              Ulcer of duodenum              Drug-induced peptic ulcer              Esophageal stricture              Gastrointestinal hemorrhage              Indigestion              Stress ulcer              Zollinger-Foss syndrome              Modi s esophagus              Laryngopharyngeal reflux

## 2024-06-21 DIAGNOSIS — K28.9 ULCER, ANASTOMOTIC: Primary | ICD-10-CM

## 2024-06-21 RX ORDER — OMEPRAZOLE 40 MG/1
40 CAPSULE, DELAYED RELEASE ORAL 2 TIMES DAILY
Qty: 180 CAPSULE | OUTPATIENT
Start: 2024-06-21

## 2024-06-21 RX ORDER — OMEPRAZOLE 40 MG/1
40 CAPSULE, DELAYED RELEASE ORAL
Qty: 180 CAPSULE | Refills: 3 | Status: SHIPPED | OUTPATIENT
Start: 2024-06-21

## 2024-07-03 DIAGNOSIS — N95.2 ATROPHIC VAGINITIS: ICD-10-CM

## 2024-07-10 ENCOUNTER — TELEPHONE (OUTPATIENT)
Dept: UROLOGY | Facility: CLINIC | Age: 69
End: 2024-07-10

## 2024-07-10 DIAGNOSIS — N95.2 ATROPHIC VAGINITIS: ICD-10-CM

## 2024-07-10 NOTE — TELEPHONE ENCOUNTER
[facility] Call Center    Phone Message    May a detailed message be left on voicemail: yes     Reason for Call: Medication Refill Request    Has the patient contacted the pharmacy for the refill? Yes   Name of medication being requested: COMPOUNDED NON-CONTROLLED SUBSTANCE (CMPD RX) - PHARMACY TO MIX COMPOUNDED MEDICATION  Provider who prescribed the medication: Dr. Valdivia  Pharmacy: COMPOUNDED NON-CONTROLLED SUBSTANCE - PHARMACY TO MIX COMPOUNDED MEDICATION  Date medication is needed: ASAP. Pt states she and pharmacy have put in requests for RX. Pt now is completely out of the cream. Please call pt to let know rx is sent or if there is any issues getting rx refilled. Thanks      Action Taken: Other: uro    Travel Screening: Not Applicable     Date of Service:

## 2024-07-10 NOTE — TELEPHONE ENCOUNTER
Medication request:  Compounded non controlled substance pharmacy to mix compounded medication.  Estriol 1mg/g in HRT base, apply small amount to finger and apply to inside vagina daily for 2 weeks then twice weekly.      Prescription written:  6/16/2023 by Dr. Valdivia  Last filled: 6/23/2023  Last Qty: 30 g with 1 refill  Pt's last office visit:   Next scheduled office visit: 6/17/2024 with Olivia Vasquez Urology PA-C    Prescription sent to pharmacy by provider.  Pharmacy will contact patient when ready for .    Patient notified of script refill.  Verbalized understanding and appreciative of call.      Shelby Adams RN on 7/10/2024 at 11:37 AM

## 2024-07-19 ENCOUNTER — ANCILLARY PROCEDURE (OUTPATIENT)
Dept: MAMMOGRAPHY | Facility: CLINIC | Age: 69
End: 2024-07-19
Attending: INTERNAL MEDICINE
Payer: MEDICARE

## 2024-07-19 DIAGNOSIS — Z12.31 VISIT FOR SCREENING MAMMOGRAM: ICD-10-CM

## 2024-07-19 PROCEDURE — 77067 SCR MAMMO BI INCL CAD: CPT | Mod: TC | Performed by: RADIOLOGY

## 2024-07-19 PROCEDURE — 77063 BREAST TOMOSYNTHESIS BI: CPT | Mod: TC | Performed by: RADIOLOGY

## 2024-09-12 ENCOUNTER — MYC MEDICAL ADVICE (OUTPATIENT)
Dept: UROLOGY | Facility: CLINIC | Age: 69
End: 2024-09-12
Payer: MEDICARE

## 2024-09-12 DIAGNOSIS — R39.15 URINARY URGENCY: ICD-10-CM

## 2024-09-12 DIAGNOSIS — N39.41 URGE INCONTINENCE: ICD-10-CM

## 2024-09-12 RX ORDER — FESOTERODINE FUMARATE 8 MG/1
1 TABLET, FILM COATED, EXTENDED RELEASE ORAL DAILY
Qty: 15 TABLET | Refills: 0 | Status: SHIPPED | OUTPATIENT
Start: 2024-09-12

## 2024-09-12 NOTE — CONFIDENTIAL NOTE
Patient out of [place] on [place] and did not take her Fesoteradine prescription with her.  Refill for 15 tablets sent to [facility] to get patient through until she returns home.    Refilled per Dr. Valdivia (per urology protocol)    Shelby Adams RN on 9/12/2024 at 2:27 PM

## 2024-09-23 ENCOUNTER — VIRTUAL VISIT (OUTPATIENT)
Dept: GASTROENTEROLOGY | Facility: CLINIC | Age: 69
End: 2024-09-23
Attending: PHYSICIAN ASSISTANT
Payer: MEDICARE

## 2024-09-23 VITALS — BODY MASS INDEX: 28.29 KG/M2 | WEIGHT: 170 LBS

## 2024-09-23 DIAGNOSIS — K28.9 ULCER, ANASTOMOTIC: Primary | ICD-10-CM

## 2024-09-23 DIAGNOSIS — K63.8219 SMALL INTESTINAL BACTERIAL OVERGROWTH (SIBO): ICD-10-CM

## 2024-09-23 DIAGNOSIS — R19.7 DIARRHEA, UNSPECIFIED TYPE: ICD-10-CM

## 2024-09-23 PROCEDURE — 99214 OFFICE O/P EST MOD 30 MIN: CPT | Mod: 95 | Performed by: STUDENT IN AN ORGANIZED HEALTH CARE EDUCATION/TRAINING PROGRAM

## 2024-09-23 ASSESSMENT — PAIN SCALES - GENERAL: PAINLEVEL: NO PAIN (0)

## 2024-09-23 NOTE — LETTER
9/23/2024      Jose Coronado  [address] [place] [address]      Dear Colleague,    Thank you for referring your patient, Jose Coronado, to the [facility] [place]. Please see a copy of my visit note below.    Virtual Visit Details    Type of service:  Video Visit   Video Start Time: 3:57 PM  Video End Time: 4:18 PM    Originating Location (pt. Location): Home    Distant Location (provider location):  Off-site  Platform used for Video Visit: [facility]    GASTROENTEROLOGY Follow-up VIDEO VISIT    CC/REFERRING MD:    Sanjeev Carmichael    REASON FOR CONSULTATION:   Karen Cabrera for   Chief Complaint   Patient presents with     Video Visit     Recheck       HISTORY OF PRESENT ILLNESS:    Jose Coronado is a 68 year old female who is being evaluated via a billable video visit for follow up on diarrhea. She has past medical history pertinent for hyperparathyroidism, parathyroid tumors, HTN, MITCHELL on CPAP, paroxysmal AFIB, minor stroke in 2017 (no residual), brain anerysm repair, PFO repair, history of gastric bypass, grade 4 prolapse with urinary incontinence s/p rectocele/cystocele repair/hysterectomy March 2020, recurrent UTIs (No UTI since 5/2022).    History by Karen Cabrera PA-C 2/2024   Today Jose reports that her upper GI symptoms had resolved after 1 week of Omeprazole 40 mg twice daily. Overall her appetite has returned. She is now consuming 3 meals per day and has been able to tolerate a more variety foods. Her weight has been overall stable.     Follows a reflux friendly diet other than 2 cups of coffee per day.      In regards to her bowel patterns Jose reports that in December she had again developed diarrhea. Prior to this she had soft formed stools for ~ 6 months occurring on a daily basis. Now she is stooling 10-15x daily consistent with Lares Stool Scale 5-6. Associated with tenesmus. Noting the stools are low volume.  Additionally she had 1  weeks of Pender 7 Type Stools which [place]  and resolved after 1 week juanita she returned.      Denies weight loss, nausea, emesis, dysphagia, odynophagia, abdominal pain, nocturnal stooling, incontinence, melena, hematochezia and BRBR.     Interval history 9/2024:   Today, Jose reports she had SIBO treatment which stabilized.Anastomotic ulcer has resolved. She has not had any recurrence of abdominal pain. She is still on omeprazole. She has a hx of stroke now on full ASA. Still takes 1 omeprazole 40 mg per day. Had a touch of heartburn for the first time this past week since February.   She was on vacation at the time and was drinking alcohol at the time. She is back to her normal routine and is free of heartburn.     Bowel habits have been stable but still dealing with alternation between very stoft stool and some fecal leakage. Lately she has been using imodium if she has very soft stools with leakage. Will take 1-2 imodium PRN, not daily. Overall feeling stable. No lower GI pain or gas anymore. Feels SIBO is doing much better and mild compared to before. Occasionally has normal stools, waxy yellow in color. Hx of RNY. Does have known malabsorption from the bypass. The imodium is effective for her and makes things maneagable. She takes fiber sporadically. Not interested PFC.      Wt Readings from Last 10 Encounters:   09/23/24 77.1 kg (170 lb)   06/17/24 76.2 kg (168 lb)   05/30/24 75.5 kg (166 lb 6.4 oz)   03/22/24 68.5 kg (151 lb)   02/22/24 68.9 kg (152 lb)   02/15/24 69.1 kg (152 lb 6.4 oz)   02/08/24 69.4 kg (153 lb)   12/07/23 68.9 kg (152 lb)   11/30/23 68.9 kg (152 lb)   10/09/23 72.1 kg (159 lb)         I have reviewed and updated the patient's Past Medical History, Social History, Family History and Medication List.    Exam:    General appearance:  Healthy appearing adult, in no acute distress  Eyes:  Sclera anicteric  Ears, nose, mouth and throat:  No obvious external lesions of ears and  nose.  Hearing intact  Neck:  Symmetric, No obvious external lesions  Respiratory:  Normal respiration, no use of accessory muscles   MSK:  No visual upper extremity, neck or facial muscle atrophy  Psychiatric:  Oriented to person, place and time, Appropriate mood and affect.   Neurologic:  Peripheral muscle function and dexterity appear to be intact      PERTINENT STUDIES have been reviewed.    12/7/2023   Findings:        Esophagogastric landmarks were identified: the Z-line was found at 40        cm, the gastroesophageal junction was found at 40 cm and the site of the        diaphragmatic hiatus was found at 40 cm from the incisors.        The examined esophagus was normal.        Evidence of a Jennie-en-Y gastrojejunostomy was found. The gastric pouch        appeared healthy. The gastrojejunal anastomosis was characterized by        edema, friable mucosa, ulceration and an intact staple line. The        jejunojejunal anastomosis was characterized by healthy appearing mucosa.        The duodenum-to-jejunum limb was not examined as it could not be        reached. The excluded stomach was not examined as it could not be        traversed.        Biopsies for histology were taken with a cold forceps in the proximal        jejunum for evaluation of celiac disease. Verification of patient        identification for the specimen was done. Estimated blood loss was        minimal.        Biopsies were taken with a cold forceps from edges of gastrojejunal        ulcer for histology. Verification of patient identification for the        specimen was done. Estimated blood loss was minimal.        Biopsies were taken with a cold forceps in the gastric body for        Helicobacter pylori testing. Verification of patient identification for        the specimen was done. Estimated blood loss was minimal.                                                                                     Impression:               - Normal esophagus.                              - Jennie-en-Y gastrojejunostomy with large 2cm                             ulceration of the gastrojejunal anastomosis along                             the staple line with visible staples present. This                             could be ischemic/surgical related with                             contributing factor of daily 325mg aspirin.                             - Biopsies were taken with a cold forceps for                             Helicobacter pylori testing.                             - Biopsies were taken with a cold forceps for                             histology from edges of gastrojejunal ulcer.                             - Normal small bowel without evidence of celiac                             disease.                             - Biopsies were taken with a cold forceps for                             evaluation of celiac disease.           Addendum   This addendum is included to report findings of immunohistochemical stains for H. Pylori (Parts B,C):  -Negative for H. Pylori organisms on immunohistochemical stains.  -There is no change in diagnosis  All controls stain appropriately.      Addendum electronically signed by Lawrence Fernández MD on 12/13/2023 at  9:12 AM   Final Diagnosis   A(1).  Small bowel, jejunum, biopsy:  -Small intestinal mucosa with no significant histopathologic abnormalities.  -Normal villous architecture identified and no prominence in intraepithelial lymphocytes seen.  -Negative for luminal organisms.  -Negative for dysplasia or malignancy.        B(2).  Gastrojejunal junction, ulcerated site, biopsy:  -Erosive gastroenteric mucosa with acute inflammation, and surface atrophy (see comment).  -Negative for H. pylori organisms on routine stains.  -Negative for dysplasia or malignancy        C(3).  Gastric pouch, biopsy:  - Oxyntic type gastric mucosa with mild chronic inflammation.  - Negative for H. Pylori organisms on routine stains.  -  Negative for intestinal metaplasia.   -Negative for dysplasia or malignancy         Electronically signed by Lawrence Fernández MD on 12/8/2023 at  1:17 PM   Comment   [facility]   The constellation of histologic features raise the differential diagnosis of ischemia, and drug induced injury, among others.  ImmunoHistochemical stains for H. pylori have been requested and will be reported as an addendum when available.         Colonoscopy:     10/6/2022   Findings:        Multiple medium-mouthed diverticula were found in the sigmoid colon.        The entire examined colon otherwise appeared normal on direct and        retroflexion views.                                                                                     Impression:               - Diverticulosis in the sigmoid colon.                             - The entire examined colon is normal on direct and                             retroflexion views.                             - Biopsies were taken with a cold forceps from the                             ascending colon and descending colon for evaluation                             of microscopic colitis.     Final Diagnosis   A.  Ascending colon, biopsy:  -Negative for diagnostic colitis, dysplasia, or malignancy.     B.  Descending colon, biopsy:  -Melanosis coli.  -Negative for diagnostic colitis, dysplasia, or malignancy.         8/12/2015   Findings:        Diverticula were found in the sigmoid colon.                                                                                     Impression:               - Diverticulosis in the sigmoid colon.   Recommendation:           High fiber diet. Repeat in ten years.      CT:     11/22/2023   IMPRESSION:  1.  A few small pulmonary nodules measuring up to 3 mm. Please see  follow-up guidelines.  2.  Increased size of a nodule in or abutting the inferior left lobe  of the thyroid gland since 2014. Consider repeat thyroid ultrasound  for further  characterization.  3.  Cholelithiasis and gallbladder distention.  4.  Large amount of gas in the urinary bladder may be due to infection  or instrumentation.      11/22/2023 CT CAP W Contrast   IMPRESSION:  1.  A few small pulmonary nodules measuring up to 3 mm. Please see  follow-up guidelines.  2.  Increased size of a nodule in or abutting the inferior left lobe  of the thyroid gland since 2014. Consider repeat thyroid ultrasound  for further characterization.  3.  Cholelithiasis and gallbladder distention.  4.  Large amount of gas in the urinary bladder may be due to infection  or instrumentation.     5/6/2022 CT AP WO Contrast                                            IMPRESSION:   1. Air within the urinary bladder may be related to recent  instrumentation.  2. Diffuse fatty infiltration of the liver.  3. Cholelithiasis.  4. Several low density lesions in the liver are too small to  characterize, but could represent cysts or hemangiomas. Consider liver  MRI for further characterization.       9/2022 MRI Liver WO & W   TECHNIQUE: Multiplanar multisequence imaging of the abdomen acquired  before and after administration of 10 mL Gadavist intravenous  contrast.     FINDINGS: Multiple hepatic cysts. No definite hemangiomas. Signal  intensity of the liver parenchyma is unremarkable without evidence of  hepatic steatosis. There is no definite intra or extrahepatic biliary  dilatation. Liver is normal size and normal in contour. Adrenal  glands, kidneys, spleen, and pancreas demonstrate no worrisome focal  lesion. Osseous structures demonstrate no worrisome signal  abnormality. No definite adenopathy or bowel obstruction in the  visualized abdomen. No ascites.                                                            IMPRESSION: Multiple liver cysts.     1/6/2023 Hydrogen Breath Test  Impression     Positive hydrogen breath test consistent with small intestinal bacterial overgrowth. High methane production at  baseline and throughout the test. This can be seen with constipation, though data on this is limited. Please correlate clinically. 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 Consideration could be given to treatment with neomycin and rifaximin. (ACG Clinical Guidelines: Small Intestinal Bacterial Overgrowth; Venkata et al. The American Journal of Gastroenterology: February 2020 - Volume 115 - Issue 2 - p 165178)            ASSESSMENT/PLAN:    Jose Coronado is a 68 year old female who presents for follow up of frequent stools, heartburn, h/o anastomotic ulcer, resolved.     1. Ulcer, anastomotic  2. Diarrhea, unspecified type  3. Small intestinal bacterial overgrowth (SIBO)  - Adult GI Clinic Follow-Up Order - RTC 6 Months; Future    She had an EGD 2/22/24 to follow up on anastomotic ulcer seen 12/2023. This showed resolution of ulcer. Recommended to continue Omeprazole 40 mg daily. Per discussion with her other providers she remains on a full 325 mg ASA daily and one 40 mg omeprazole daily. She has had a little bit of heartburn 2 days in a row while on vacation and reports was drinking more alcohol on this trip. Since being back into her routine, she no longer has heartburn. We reviewed trying tums, and if that is not effective, she could add in pepcid as needed on days with heartburn.     She was referred to PFC and recommended to continue fiber daily. Recommended to use imodium as needed, which has been helpful. She noted improvement with 2 T daily of fiber, having 1-2 bowel movements per day that are soft/formed. She reports symptoms are stable. She has no longer had abdominal bloating or abdominal pain and her weight has increased back towards baseline. She has not been consistent with fiber due to travel but plans to restart more regularly. She stopped her probiotic and she is not interested in the pelvic floor center.       We reviewed the following plan:   - Okay to take  imodium as needed 1-2 tablets for looser stools or leakage   - Continue fiber as consistently as you can, 2 tablespoons being a good goal each day. They make capsules that you can plan to bring with you on trips.  - Okay to stop the probiotic as you have   - Follow up in 6 months or sooner if needed       Video-Visit Details  Video Visit Time: 21 min  Type of service:  Video Visit  Originating Location (pt. Location): Home    Distant Location (provider location):  Off-site  Platform used for Video Visit: [employer]    38 minutes spent on the date of the encounter doing chart review, history and exam, documentation and further activities as noted above.    Karen Mcgee PA-C  Division of Gastroenterology, Hepatology, and Nutrition  [facility]     RTC 6 months      Again, thank you for allowing me to participate in the care of your patient.        Sincerely,        Karen Mcgee PA-C

## 2024-09-23 NOTE — PATIENT INSTRUCTIONS
It was a pleasure meeting with you today and discussing your healthcare plan. Below is a summary of what we covered:    - Okay to take imodium as needed 1-2 tablets for looser stools or leakage   - Continue fiber as consistently as you can, 2 tablespoons being a good goal each day. They make capsules that you can plan to bring with you on trips.  - Okay to stop the probiotic as you have   - Follow up in 6 months or sooner if needed     Please see below for any additional questions and scheduling guidelines.    Sign up for [employer]: [employer] patient portal serves as a secure platform for accessing your medical records from the [facility]. Additionally, [employer] facilitates easy, timely, and secure messaging with your care team. If you have not signed up, you may do so by using the provided code or calling [phone].    Coordinating your care after your visit:  There are multiple options for scheduling your follow-up care based on your provider's recommendation.    How do I schedule a follow-up clinic appointment:   After your appointment, you may receive scheduling assistance with the Clinic Coordinators by having a seat in the waiting room and a Clinic Coordinator will call you up to schedule.  Virtual visits or after you leave the clinic:  Your provider has placed a follow-up order in the [employer] portal for scheduling your return appointment. A member of the scheduling team will contact you to schedule.  [employer] Scheduling: Timely scheduling through [employer] is advised to ensure appointment availability.   Call to schedule: You may schedule your follow-up appointment(s) by calling [phone], option 1.    How do I schedule my endoscopy or colonoscopy procedure:  If a procedure, such as a colonoscopy or upper endoscopy was ordered by your provider, the scheduling team will contact you to schedule this procedure. Or you may choose to call to schedule at   [phone], option 2.  Please allow 20-30  minutes when scheduling a procedure.    How do I get my blood work done? To get your blood work done, you need to schedule a lab appointment at an [facility]. There are multiple ways to schedule:   At the clinic: The Clinic Coordinator you meet after your visit can help you schedule a lab appointment.   [employer] scheduling: [employer] offers online lab scheduling at all [facility] laboratory locations.   Call to schedule: You can call [phone] to schedule your lab appointment.    How do I schedule my imaging study: To schedule imaging studies, such as CT scans, ultrasounds, MRIs, or X-rays, contact Imaging Services at [phone].    How do I schedule a referral to another doctor: If your provider recommended a referral to another specialist(s), the referral order was placed by your provider. You will receive a phone call to schedule this referral, or you may choose to call the number attached to the referral to self-schedule.    For Post-Visit Question(s):  For any inquiries following today's visit:  Please utilize [employer] messaging and allow 48 hours for reply or contact the Call Center during normal business hours at [phone], option 3.  For Emergent After-hours questions, contact the On-Call GI Fellow through the [facility]  at [phone].  In addition, you may contact your Nurse directly using the provided contact information.    Test Results:  Test results will be accessible via [employer] in compliance with the 21st Century Cures Act. This means that your results will be available to you at the same time as your provider. Often you may see your results before your provider does. Results are reviewed by staff within two weeks with communication follow-up. Results may be released in the patient portal prior to your care team review.    Prescription Refill(s):  Medication prescribed by your provider will be addressed during your visit. For future refills, please  coordinate with your pharmacy. If you have not had a recent clinic visit or routine labs, for your safety, your provider may not be able to refill your prescription.

## 2024-09-23 NOTE — PROGRESS NOTES
Virtual Visit Details    Type of service:  Video Visit   Video Start Time: 3:57 PM  Video End Time: 4:18 PM    Originating Location (pt. Location): Home    Distant Location (provider location):  Off-site  Platform used for Video Visit: [facility]    GASTROENTEROLOGY Follow-up VIDEO VISIT    CC/REFERRING MD:    Sanjeev Carmichael    REASON FOR CONSULTATION:   Karen Cabrera for   Chief Complaint   Patient presents with    Video Visit     Recheck       HISTORY OF PRESENT ILLNESS:    Jose Coronado is a 68 year old female who is being evaluated via a billable video visit for follow up on diarrhea. She has past medical history pertinent for hyperparathyroidism, parathyroid tumors, HTN, MITCHELL on CPAP, paroxysmal AFIB, minor stroke in 2017 (no residual), brain anerysm repair, PFO repair, history of gastric bypass, grade 4 prolapse with urinary incontinence s/p rectocele/cystocele repair/hysterectomy March 2020, recurrent UTIs (No UTI since 5/2022).    History by Karen Cabrera PA-C 2/2024   Today Jose reports that her upper GI symptoms had resolved after 1 week of Omeprazole 40 mg twice daily. Overall her appetite has returned. She is now consuming 3 meals per day and has been able to tolerate a more variety foods. Her weight has been overall stable.     Follows a reflux friendly diet other than 2 cups of coffee per day.      In regards to her bowel patterns Jose reports that in December she had again developed diarrhea. Prior to this she had soft formed stools for ~ 6 months occurring on a daily basis. Now she is stooling 10-15x daily consistent with [place] Stool Scale 5-6. Associated with tenesmus. Noting the stools are low volume.  Additionally she had 1 weeks of [place] 7 Type Stools [place]  and resolved after 1 week juanita she returned.      Denies weight loss, nausea, emesis, dysphagia, odynophagia, abdominal pain, nocturnal stooling, incontinence, melena, hematochezia and BRBR.      Interval history 9/2024:   Today, Jose reports she had SIBO treatment which stabilized.Anastomotic ulcer has resolved. She has not had any recurrence of abdominal pain. She is still on omeprazole. She has a hx of stroke now on full ASA. Still takes 1 omeprazole 40 mg per day. Had a touch of heartburn for the first time this past week since February.   She was on vacation at the time and was drinking alcohol at the time. She is back to her normal routine and is free of heartburn.     Bowel habits have been stable but still dealing with alternation between very stoft stool and some fecal leakage. Lately she has been using imodium if she has very soft stools with leakage. Will take 1-2 imodium PRN, not daily. Overall feeling stable. No lower GI pain or gas anymore. Feels SIBO is doing much better and mild compared to before. Occasionally has normal stools, waxy yellow in color. Hx of RNY. Does have known malabsorption from the bypass. The imodium is effective for her and makes things maneagable. She takes fiber sporadically. Not interested PFC.      Wt Readings from Last 10 Encounters:   09/23/24 77.1 kg (170 lb)   06/17/24 76.2 kg (168 lb)   05/30/24 75.5 kg (166 lb 6.4 oz)   03/22/24 68.5 kg (151 lb)   02/22/24 68.9 kg (152 lb)   02/15/24 69.1 kg (152 lb 6.4 oz)   02/08/24 69.4 kg (153 lb)   12/07/23 68.9 kg (152 lb)   11/30/23 68.9 kg (152 lb)   10/09/23 72.1 kg (159 lb)         I have reviewed and updated the patient's Past Medical History, Social History, Family History and Medication List.    Exam:    General appearance:  Healthy appearing adult, in no acute distress  Eyes:  Sclera anicteric  Ears, nose, mouth and throat:  No obvious external lesions of ears and nose.  Hearing intact  Neck:  Symmetric, No obvious external lesions  Respiratory:  Normal respiration, no use of accessory muscles   MSK:  No visual upper extremity, neck or facial muscle atrophy  Psychiatric:  Oriented to person, place and time,  Appropriate mood and affect.   Neurologic:  Peripheral muscle function and dexterity appear to be intact      PERTINENT STUDIES have been reviewed.    12/7/2023   Findings:        Esophagogastric landmarks were identified: the Z-line was found at 40        cm, the gastroesophageal junction was found at 40 cm and the site of the        diaphragmatic hiatus was found at 40 cm from the incisors.        The examined esophagus was normal.        Evidence of a Jennie-en-Y gastrojejunostomy was found. The gastric pouch        appeared healthy. The gastrojejunal anastomosis was characterized by        edema, friable mucosa, ulceration and an intact staple line. The        jejunojejunal anastomosis was characterized by healthy appearing mucosa.        The duodenum-to-jejunum limb was not examined as it could not be        reached. The excluded stomach was not examined as it could not be        traversed.        Biopsies for histology were taken with a cold forceps in the proximal        jejunum for evaluation of celiac disease. Verification of patient        identification for the specimen was done. Estimated blood loss was        minimal.        Biopsies were taken with a cold forceps from edges of gastrojejunal        ulcer for histology. Verification of patient identification for the        specimen was done. Estimated blood loss was minimal.        Biopsies were taken with a cold forceps in the gastric body for        Helicobacter pylori testing. Verification of patient identification for        the specimen was done. Estimated blood loss was minimal.                                                                                     Impression:               - Normal esophagus.                             - Jennie-en-Y gastrojejunostomy with large 2cm                             ulceration of the gastrojejunal anastomosis along                             the staple line with visible staples present. This                              could be ischemic/surgical related with                             contributing factor of daily 325mg aspirin.                             - Biopsies were taken with a cold forceps for                             Helicobacter pylori testing.                             - Biopsies were taken with a cold forceps for                             histology from edges of gastrojejunal ulcer.                             - Normal small bowel without evidence of celiac                             disease.                             - Biopsies were taken with a cold forceps for                             evaluation of celiac disease.           Addendum   This addendum is included to report findings of immunohistochemical stains for H. Pylori (Parts B,C):  -Negative for H. Pylori organisms on immunohistochemical stains.  -There is no change in diagnosis  All controls stain appropriately.      Addendum electronically signed by Lawrence Fernández MD on 12/13/2023 at  9:12 AM   Final Diagnosis   A(1).  Small bowel, jejunum, biopsy:  -Small intestinal mucosa with no significant histopathologic abnormalities.  -Normal villous architecture identified and no prominence in intraepithelial lymphocytes seen.  -Negative for luminal organisms.  -Negative for dysplasia or malignancy.        B(2).  Gastrojejunal junction, ulcerated site, biopsy:  -Erosive gastroenteric mucosa with acute inflammation, and surface atrophy (see comment).  -Negative for H. pylori organisms on routine stains.  -Negative for dysplasia or malignancy        C(3).  Gastric pouch, biopsy:  - Oxyntic type gastric mucosa with mild chronic inflammation.  - Negative for H. Pylori organisms on routine stains.  - Negative for intestinal metaplasia.   -Negative for dysplasia or malignancy         Electronically signed by Lawrence Fernández MD on 12/8/2023 at  1:17 PM   Comment   [facility]   The constellation of histologic features raise the differential diagnosis of  ischemia, and drug induced injury, among others.  ImmunoHistochemical stains for H. pylori have been requested and will be reported as an addendum when available.         Colonoscopy:     10/6/2022   Findings:        Multiple medium-mouthed diverticula were found in the sigmoid colon.        The entire examined colon otherwise appeared normal on direct and        retroflexion views.                                                                                     Impression:               - Diverticulosis in the sigmoid colon.                             - The entire examined colon is normal on direct and                             retroflexion views.                             - Biopsies were taken with a cold forceps from the                             ascending colon and descending colon for evaluation                             of microscopic colitis.     Final Diagnosis   A.  Ascending colon, biopsy:  -Negative for diagnostic colitis, dysplasia, or malignancy.     B.  Descending colon, biopsy:  -Melanosis coli.  -Negative for diagnostic colitis, dysplasia, or malignancy.         8/12/2015   Findings:        Diverticula were found in the sigmoid colon.                                                                                     Impression:               - Diverticulosis in the sigmoid colon.   Recommendation:           High fiber diet. Repeat in ten years.      CT:     11/22/2023   IMPRESSION:  1.  A few small pulmonary nodules measuring up to 3 mm. Please see  follow-up guidelines.  2.  Increased size of a nodule in or abutting the inferior left lobe  of the thyroid gland since 2014. Consider repeat thyroid ultrasound  for further characterization.  3.  Cholelithiasis and gallbladder distention.  4.  Large amount of gas in the urinary bladder may be due to infection  or instrumentation.      11/22/2023 CT CAP W Contrast   IMPRESSION:  1.  A few small pulmonary nodules measuring up to 3 mm. Please  see  follow-up guidelines.  2.  Increased size of a nodule in or abutting the inferior left lobe  of the thyroid gland since 2014. Consider repeat thyroid ultrasound  for further characterization.  3.  Cholelithiasis and gallbladder distention.  4.  Large amount of gas in the urinary bladder may be due to infection  or instrumentation.     5/6/2022 CT AP WO Contrast                                            IMPRESSION:   1. Air within the urinary bladder may be related to recent  instrumentation.  2. Diffuse fatty infiltration of the liver.  3. Cholelithiasis.  4. Several low density lesions in the liver are too small to  characterize, but could represent cysts or hemangiomas. Consider liver  MRI for further characterization.       9/2022 MRI Liver WO & W   TECHNIQUE: Multiplanar multisequence imaging of the abdomen acquired  before and after administration of 10 mL Gadavist intravenous  contrast.     FINDINGS: Multiple hepatic cysts. No definite hemangiomas. Signal  intensity of the liver parenchyma is unremarkable without evidence of  hepatic steatosis. There is no definite intra or extrahepatic biliary  dilatation. Liver is normal size and normal in contour. Adrenal  glands, kidneys, spleen, and pancreas demonstrate no worrisome focal  lesion. Osseous structures demonstrate no worrisome signal  abnormality. No definite adenopathy or bowel obstruction in the  visualized abdomen. No ascites.                                                            IMPRESSION: Multiple liver cysts.     1/6/2023 Hydrogen Breath Test  Impression     Positive hydrogen breath test consistent with small intestinal bacterial overgrowth. High methane production at baseline and throughout the test. This can be seen with constipation, though data on this is limited. Please correlate clinically. 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 Consideration could be given to treatment with neomycin and rifaximin.  (ACG Clinical Guidelines: Small Intestinal Bacterial Overgrowth; Venkata et al. The American Journal of Gastroenterology: February 2020 - Volume 115 - Issue 2 - p [phone])            ASSESSMENT/PLAN:    Jose Coronado is a 68 year old female who presents for follow up of frequent stools, heartburn, h/o anastomotic ulcer, resolved.     1. Ulcer, anastomotic  2. Diarrhea, unspecified type  3. Small intestinal bacterial overgrowth (SIBO)  - Adult GI Clinic Follow-Up Order - RTC 6 Months; Future    She had an EGD 2/22/24 to follow up on anastomotic ulcer seen 12/2023. This showed resolution of ulcer. Recommended to continue Omeprazole 40 mg daily. Per discussion with her other providers she remains on a full 325 mg ASA daily and one 40 mg omeprazole daily. She has had a little bit of heartburn 2 days in a row while on vacation and reports was drinking more alcohol on this trip. Since being back into her routine, she no longer has heartburn. We reviewed trying tums, and if that is not effective, she could add in pepcid as needed on days with heartburn.     She was referred to PFC and recommended to continue fiber daily. Recommended to use imodium as needed, which has been helpful. She noted improvement with 2 T daily of fiber, having 1-2 bowel movements per day that are soft/formed. She reports symptoms are stable. She has no longer had abdominal bloating or abdominal pain and her weight has increased back towards baseline. She has not been consistent with fiber due to travel but plans to restart more regularly. She stopped her probiotic and she is not interested in the pelvic floor center.       We reviewed the following plan:   - Okay to take imodium as needed 1-2 tablets for looser stools or leakage   - Continue fiber as consistently as you can, 2 tablespoons being a good goal each day. They make capsules that you can plan to bring with you on trips.  - Okay to stop the probiotic as you have   - Follow up in 6 months  or sooner if needed       Video-Visit Details  Video Visit Time: 21 min  Type of service:  Video Visit  Originating Location (pt. Location): Home    Distant Location (provider location):  Off-site  Platform used for Video Visit: [employer]    38 minutes spent on the date of the encounter doing chart review, history and exam, documentation and further activities as noted above.    Karen Mcgee PA-C  Division of Gastroenterology, Hepatology, and Nutrition  [facility]     RTC 6 months

## 2024-09-23 NOTE — NURSING NOTE
Current patient location: [facility] [place] [address]    Is the patient currently in the state of [place]? YES    Visit mode:VIDEO    If the visit is dropped, the patient can be reconnected by: VIDEO VISIT: Text to cell phone:   Telephone Information:   Mobile [phone]   Mobile Not on file.       Will anyone else be joining the visit? NO  (If patient encounters technical issues they should call [phone]    How would you like to obtain your AVS? MyChart    Are changes needed to the allergy or medication list? No    Are refills needed on medications prescribed by this physician? NO    Rooming Documentation:  Questionnaire(s) completed    Reason for visit: Video Visit (Recheck)    Yolanda BARROW

## 2024-10-10 ENCOUNTER — TELEPHONE (OUTPATIENT)
Dept: GASTROENTEROLOGY | Facility: CLINIC | Age: 69
End: 2024-10-10
Payer: MEDICARE

## 2024-10-10 NOTE — TELEPHONE ENCOUNTER
Left Voicemail (1st Attempt) and Sent Mychart (1st Attempt) for the patient to call back and schedule the following:    Appointment type: Return GI  Provider: Karen Mcgee or Karen aCbrera   Return date: 6 mo (around 3/23/25)  Specialty phone number: [phone]  Additional appointment(s) needed: NA  Additonal Notes: LVM/MyC x1

## 2024-10-14 NOTE — TELEPHONE ENCOUNTER
Left Voicemail (2nd Attempt) and Sent Mychart (2nd Attempt) for the patient to call back and schedule the following:    Appointment type: Return GI  Provider: Karen Mcgee or Karen Cabrera   Return date: 6 mo (around 3/23/25)  Specialty phone number: [phone]  Additional appointment(s) needed: NA  Additonal Notes: LVMx2/MyC x2

## 2024-12-26 DIAGNOSIS — N39.41 URGE INCONTINENCE: ICD-10-CM

## 2024-12-26 DIAGNOSIS — R39.15 URINARY URGENCY: ICD-10-CM

## 2024-12-31 RX ORDER — FESOTERODINE FUMARATE 8 MG/1
1 TABLET, FILM COATED, EXTENDED RELEASE ORAL DAILY
Qty: 90 TABLET | Refills: 2 | Status: SHIPPED | OUTPATIENT
Start: 2024-12-31

## 2025-01-08 ENCOUNTER — HOSPITAL ENCOUNTER (OUTPATIENT)
Dept: ULTRASOUND IMAGING | Facility: CLINIC | Age: 70
Discharge: HOME | End: 2025-01-08
Attending: INTERNAL MEDICINE
Payer: MEDICARE

## 2025-01-08 DIAGNOSIS — E04.1 THYROID NODULE: ICD-10-CM

## 2025-01-08 PROCEDURE — 76536 US EXAM OF HEAD AND NECK: CPT

## 2025-01-13 ENCOUNTER — VIRTUAL VISIT (OUTPATIENT)
Dept: ENDOCRINOLOGY | Facility: CLINIC | Age: 70
End: 2025-01-13
Payer: MEDICARE

## 2025-01-13 VITALS — WEIGHT: 170 LBS | HEIGHT: 65 IN | BODY MASS INDEX: 28.32 KG/M2

## 2025-01-13 DIAGNOSIS — E21.3 HYPERPARATHYROIDISM: ICD-10-CM

## 2025-01-13 DIAGNOSIS — Z98.84 S/P GASTRIC BYPASS: ICD-10-CM

## 2025-01-13 DIAGNOSIS — E04.1 THYROID NODULE: Primary | ICD-10-CM

## 2025-01-13 ASSESSMENT — PAIN SCALES - GENERAL: PAINLEVEL_OUTOF10: NO PAIN (0)

## 2025-01-13 NOTE — LETTER
"1/13/2025      Jose Coronado  [address] [place] [address]      Dear Colleague,    Thank you for referring your patient, Jose Coronado, to the [facility]. Please see a copy of my visit note below.    THIS IS A VIDEO VISIT:    Phone call visit/virtual visit encounter:    Name of patient: Jose Coronado    Date of encounter: 1/13/2025    Time of start of video visit: 10:00    Video started: 10:11    Video ended: 10:24    Provider location: working from home/ [facility]    Patient location: patients home.    Mode of transmission: [employer] video/ [employer]    Verbal consent: obtained before starting visit. Pt is agreeable.      The patient has been notified of following:      \"This VIDEO visit will be conducted via a call between you and your physician/provider. We have found that certain health care needs can be provided without the need for a physical exam.  This service lets us provide the care you need with a short phone conversation.  If a prescription is necessary we can send it directly to your pharmacy.  If lab work is needed we can place an order for that and you can then stop by our lab to have the test done at a later time.     With new updates with corona virus patient might be billed as clinic visit.     If during the course of the call the physician/provider feels a telephone visit is not appropriate, you will not be charged for this service.\"      Past medical history, social history, family history, allergy and medications were reviewed and updated as appropriate.  Reviewed pertinent labs, notes, imaging studies personally.    Name: Jose Coronado  Seen for follow up of thyroid nodule.   HPI:  Jose Coronado is a 69 year old female who presents for the evaluation of thyroid nodule.   has a past medical history of Anemia, Arthritis (2015), CVA (cerebral vascular accident) (H) (2017), Diffuse cystic mastopathy, HTN, goal below 140/90, Hyperparathyroidism, Infectious " mononucleosis, Irregular heart beat, Labyrinthitis, unspecified, Migraine headache with aura, Osteopenia, Pain in joint, shoulder region, Palpitations, PFO (patent foramen ovale), S/P gastric bypass (June, 2010), Sleep apnea, Sleep apnea, and Vitamin D deficiencies.      Incidental finding of thyroid nodule on 11/2023 CT chest/abdomen.   Follow-up thyroid ultrasound 12/2023: 3 small thyroid nodules were seen PLUS Hypoechoic, solid structure measuring 2.3 x 1.7 x 1.6 appears inferior  and posterior to the left thyroid gland and may reflect a parathyroid adenoma.     US 1/2025: 3 stable appearing nodules. Stable 2.4 x 1.9 x 1.8 cm solid hypoechoic nodule abutting the inferior to the left lobe, previously 2.3 x 1.7 x 1.6 cm. likely a parathyroid adenoma. ( Nodule #4 -- now showing ill defined margins)    Normal calcium,PTH and TSH.    H/o parathyroid surgery in 2011. (Right inferior parathyroid gland was removed).  Follow-up calcium and PTH levels were in normal range.  H/o gastric bypass in 2010    No FH of thyroid cancer  No history of radiation  No compressive s/s  Thyroid labs in acceptable range.  She is not on thyroid hormone replacement.  No other major risk factors.    Feeling OK.    Palpitations: History of Arrythmia--on beta blocker. H/o PFO (closed) and ASD.  Diarrhea/Constipation:h/o gastric bypass-- lost 120 lbs and gained some back. H/o SIBO, irregular bowel patterns., diarrhea. Followed by GI.  Changes in menses: s/p menopause  Dysphagia or Shortness of breath:No  Tremors:No  Takes calcium supplement and vit D supplement.  Changes in weight: lost wt in 2023-- and now stable.  Wt Readings from Last 2 Encounters:   01/13/25 77.1 kg (170 lb)   09/23/24 77.1 kg (170 lb)      PMH/PSH:  Past Medical History:   Diagnosis Date     Anemia      Arthritis 2015    Hands, back, hips. Various dates first noted.     CVA (cerebral vascular accident) (H) 2017    ?migraine, ?pfo--negative vasc w/u, neg hypercoag w/u      Diffuse cystic mastopathy     Fibrocystic breast disease     HTN, goal below 140/90      Hyperparathyroidism      Infectious mononucleosis     Mono at age 16     Irregular heart beat     PAT no afib on 30day monitor     Labyrinthitis, unspecified      Migraine headache with aura      Osteopenia      Pain in joint, shoulder region     Secondary to a fall     Palpitations      PFO (patent foramen ovale)     s/p closure with amplazter device 7/13/17     S/P gastric bypass June, 2010     Sleep apnea     she is on CPAP     Sleep apnea      Vitamin D deficiencies      Past Surgical History:   Procedure Laterality Date     BIOPSY  1984    Breast biopsies     BREAST SURGERY  1984    Above     CARDIAC SURGERY  7/13/2017    PFO closure     COLONOSCOPY N/A 8/12/2015    Procedure: COLONOSCOPY;  Surgeon: Deandre Brooks MD;  Location:  GI     COLONOSCOPY N/A 10/6/2022    Procedure: COLONOSCOPY, WITH BIOPSIES;  Surgeon: Freddie Gonzalez MD;  Location:  GI     DAVINCI HYSTERECTOMY SUPRACERVICAL, SALPINGO-OOPHORECTOMY INCLUDING BILATERAL N/A 3/16/2020    Procedure: DAVINCI HYSTERECTOMY SUPRACERVICAL, SALPINGO-OOPHORECTOMY INCLUDING BILATERAL WITH EXAM UNDER ANESTHESIA;  Surgeon: Lily Yancey MD;  Location: [facility] OR     DAVINCI SACROCOLPOPEXY, MIDURETHRAL SLING, CYSTOSCOPY N/A 3/16/2020    Procedure: SACROCOLPOPEXY, ROBOT-ASSISTED, LAPAROSCOPIC, WITH INSERTION OF MIDURETHRAL SLING AND CYSTOSCOPY;  Surgeon: Carolyn Valdivia MD;  Location: [facility] OR     ESOPHAGOSCOPY, GASTROSCOPY, DUODENOSCOPY (EGD), COMBINED N/A 12/7/2023    Procedure: Esophagoscopy, gastroscopy, duodenoscopy (EGD), combined;  Surgeon: Bubba Freeman MD;  Location:  GI     ESOPHAGOSCOPY, GASTROSCOPY, DUODENOSCOPY (EGD), COMBINED N/A 2/22/2024    Procedure: Esophagoscopy, gastroscopy, duodenoscopy (EGD), combined;  Surgeon: Bubba Freeman MD;  Location:  GI     GASTRIC BYPASS  June 24, 2010     GENITOURINARY SURGERY  3/16/2020     Cystocele/rectocele repair     GYN SURGERY  3/16/2020    Hysterectomy     ORTHOPEDIC SURGERY Left     wrist fracture     PARATHYROIDECTOMY  11     [facility] ANEURYSM, INTRACRAN, SIMPLE SURG  2017    coil of aneurysm right posterior paraophthalmic artery     Z NONSPECIFIC PROCEDURE      S/P multiple breast biopies - all negative / benign     ZZC NONSPECIFIC PROCEDURE      S/P T&A     ZZC NONSPECIFIC PROCEDURE      [place] teeth extraction     ZZC NONSPECIFIC PROCEDURE      S/P (? unreadable) ankle     Family Hx:  Family History   Problem Relation Age of Onset     Breast Cancer Mother      Hypertension Mother      Arthritis Mother      Thyroid Disease Mother         Hypo     Ulcerative Colitis Mother      Cerebrovascular Disease Mother         Age 78 - Cerebral Hemorrhage,      Anxiety Disorder Mother      Depression Mother      Genetic Disorder Mother         Ulcerative colitis     Obesity Mother      Anesthesia Reaction Mother         Same     Breast Cancer Maternal Aunt      Hypertension Paternal Grandmother      Cerebrovascular Disease Maternal Grandmother         Age 72 -      Family History Negative Maternal Grandfather      Family History Negative Paternal Grandfather      Family History Negative Son      Family History Negative Daughter      Other Cancer Father         Skin - Non-melanoma varieties     Hypertension Father      Asthma Father      Family History Negative Daughter      Ulcerative Colitis Sister      Hypertension Sister      Hyperlipidemia Sister      Anxiety Disorder Sister      Depression Sister      Diabetes Sister      Obesity Sister      Asthma Sister      Anesthesia Reaction Sister         Debilitating headaches     Hypertension Brother      Anxiety Disorder Brother      Depression Brother      Asthma Nephew      Hypertension Sister         Congestive Heart Failure     Anxiety Disorder Sister      Genetic Disorder Sister         Ulcerative colitis     Obesity Sister       Genetic Disorder Niece         Ulcerative colitis     Colon Cancer No family hx of      Coronary Artery Disease No family hx of      Mental Illness No family hx of      Substance Abuse No family hx of      Osteoporosis No family hx of      Deep Vein Thrombosis No family hx of                Social Hx:  Social History     Socioeconomic History     Marital status:      Spouse name: Not on file     Number of children: Not on file     Years of education: Not on file     Highest education level: Not on file   Occupational History     Not on file   Tobacco Use     Smoking status: Never     Smokeless tobacco: Never   Vaping Use     Vaping status: Never Used   Substance and Sexual Activity     Alcohol use: Yes     Comment: Social     Drug use: No     Sexual activity: Not Currently     Partners: Male     Birth control/protection: Pill   Other Topics Concern     Parent/sibling w/ CABG, MI or angioplasty before 65F 55M? No   Social History Narrative     Not on file     Social Drivers of Health     Financial Resource Strain: Low Risk  (3/17/2024)    Financial Resource Strain      Within the past 12 months, have you or your family members you live with been unable to get utilities (heat, electricity) when it was really needed?: No   Food Insecurity: Low Risk  (3/17/2024)    Food Insecurity      Within the past 12 months, did you worry that your food would run out before you got money to buy more?: No      Within the past 12 months, did the food you bought just not last and you didn t have money to get more?: No   Transportation Needs: Low Risk  (3/17/2024)    Transportation Needs      Within the past 12 months, has lack of transportation kept you from medical appointments, getting your medicines, non-medical meetings or appointments, work, or from getting things that you need?: No   Physical Activity: Inactive (3/17/2024)    Exercise Vital Sign      Days of Exercise per Week: 0 days      Minutes of Exercise per  "Session: 0 min   Stress: No Stress Concern Present (3/17/2024)    [employer] - Occupational Stress Questionnaire      Feeling of Stress : Not at all   Social Connections: Unknown (9/23/2024)    Received from [facility]    Social Connections      Frequency of Communication with Friends and Family: Not on file   Interpersonal Safety: Low Risk  (3/22/2024)    Interpersonal Safety      Do you feel physically and emotionally safe where you currently live?: Yes      Within the past 12 months, have you been hit, slapped, kicked or otherwise physically hurt by someone?: No      Within the past 12 months, have you been humiliated or emotionally abused in other ways by your partner or ex-partner?: No   Housing Stability: Low Risk  (3/17/2024)    Housing Stability      Do you have housing? : Yes      Are you worried about losing your housing?: No          MEDICATIONS:  has a current medication list which includes the following prescription(s): omeprazole, vitamin c, aspirin, atenolol, calcium citrate-vitamin d, compounded non-controlled substance, cranberry, cyanocobalamin, ferrous sulfate, fesoterodine fumarate, hydrochlorothiazide, imipramine, losartan, topiramate, UNABLE TO FIND, valacyclovir, cholecalciferol, vitamin e, and zinc glycinate.    Review of Systems  10 point ROS neg other than the symptoms noted above in the HPI.    Physical Exam   VS: Ht 1.651 m (5' 5\")   Wt 77.1 kg (170 lb)   LMP  (LMP Unknown)   BMI 28.29 kg/m    GENERAL: healthy, alert and no distress  EYES: Eyes grossly normal to inspection, conjunctivae and sclerae normal  ENT: no nose swelling, nasal discharge.  Thyroid: no apparent thyroid nodules  RESP: no audible wheeze, cough, or visible cyanosis.  No visible retractions or increased work of breathing.  Able to speak fully in complete sentences.  ABDO: not evaluated.  EXTREMITIES: no hand tremors.  NEURO: Cranial nerves grossly " intact, mentation intact and speech normal  SKIN: No apparent skin lesions, rash or edema seen   PSYCH: mentation appears normal, affect normal/bright, judgement and insight intact, normal speech and appearance well-groomed    LABS:  TFTs:   Latest Ref Rng 3/22/2024  9:46 AM   ENDO THYROID LABS-[facility]     TSH 0.30 - 4.20 uIU/mL 0.93      Thyroid Ultrasound 12/2023:  IMPRESSION:  1.  Hypoechoic solid structure (2.3 cm) appears inferior and posterior  to the thyroid gland and may reflect a parathyroid adenoma. Correlate  with parathyroid laboratory values.  2.  Additional thyroid nodules as described above..    All pertinent notes, labs, and images personally reviewed by me.     A/P  Ms.Jan MERVIN Coronado is a 69 year old here for the evaluation of thyroid nodule:  #1 Thyroid Nodule:  3 small thyroid nodules are seen on ultrasound.   1/2025 thyroid US: ( Nodule #4 -- now showing ill defined margins)  No FH of thyroid cancer  No history of radiation  No compressive s/s  Thyroid labs in acceptable range.  She is not on thyroid hormone replacement.  No other major risk factors.   Plan:  Discussed diagnosis, pathophysiology, management and treatment options of condition with pt.  In the setting of no major compressive symptoms and based on ultrasound results plan to continue to monitor.  Recommend close follow up as 1/2025 US showed ( Nodule #4 -- now showing ill defined margins)  Recommend labs and thyroid ultrasound 6-7 months and follow-up after that.  Discussed compressive symptoms to monitor.    #2.  Parathyroid adenoma ?;  Thyroid ultrasound showing incidental finding of hypoechoic solid structure measuring 2.3 cm inferior and posterior to left thyroid gland.  Concern for parathyroid adenoma.  History of parathyroidectomy in 2011 demonstrated status post removal of right inferior parathyroid adenoma.  Follow-up calcium and PTH in normal range.  She also has history of gastric bypass.  Plan:  Discussed diagnosis,  pathophysiology, management and treatment options of condition with pt.  Monitor calcium and PTH.  Recheck labs in 6-7 mnths  Please make a lab appointment for blood work and follow up clinic appointment in 1 week after that to discuss results.    Plan: TSH, T4 free, US Thyroid  Comment: H/o parathyroid surgery in 2011. (Right inferior parathyroid gland was removed).  Plan: Calcium, Parathyroid Hormone Intact, Vitamin D         Deficiency, Creatinine                Discussed possible outcomes of biopsy including possible benign, possible malignancy and possible AUS. If AUS indication for molecular marker testing.  Discussed possible compressive symptoms and signs to watch for.  Discussed that Thyroid nodules are common and are frequently benign. Data suggest that the prevalence of palpable thyroid nodules is 3% to 7% in [place]; the prevalence is as high as 50% based on ultrasonography (US) or autopsy data. All patients with a palpable thyroid nodule, however, should undergo US examination. US-guided FNA (US-FNA) is recommended for nodules >=10 mm; US-FNA is suggested for nodules <10 mm only if clinical information or US features are suspicious.  The frequency of thyroid nodules in general population was discussed. Also discussed possibility of malignancy, potential for thyroid autonomy.     Causes of thyroid Nodules: Benign (Multinodular goiter, Hashimoto s thyroiditis, Simple or hemorrhagic cysts, Follicular adenomas, Subacute thyroiditis) or Malignant(Papillary carcinoma, Follicular carcinoma, Hürthle cell carcinoma, Medullary carcinoma, Anaplastic carcinoma, Primary thyroid lymphoma, or Metastatic malignant lesion).    MultiNodule:  The risk of cancer is not significantly higher in palpable solitary thyroid nodules than in multinodular lesions or in nodules in diffuse goiters. In multinodular thyroid glands, the cytologic sampling should be focused on lesions characterized by suspicious US features rather  than on larger or clinically dominant nodules.    Cyst:  Most complex thyroid nodules with a dominant fluid component are benign. USFNA, however, should always be performed because the rare papillary thyroid carcinoma (PTC) can be cystic. An unsatisfactory (nondiagnostic) specimen usually results from a cystic nodule that yields few or no follicular cells. Reaspiration yields satisfactory results in 50% of cases.    Discussed indications, risks and benefits of all medications prescribed, and answered questions to patient's satisfaction.  The longitudinal plan of care for the diagnosis(es)/condition(s) as documented were addressed during this visit. Due to the added complexity in care, I will continue to support Jose in the subsequent management and with ongoing continuity of care.  All questions were answered.  The patient indicates understanding of the above issues and agrees with the plan set forth.    Follow-up:  As noted in AVS    Kaitlyn Wolfe MD  Endocrinology   [facility]    Cc: Sanjeev Carmichael    Addendum to above note and clinic visit:    Labs reviewed.    See result note/telephone encounter.          Again, thank you for allowing me to participate in the care of your patient.        Sincerely,        Kaitlyn Wolfe MD    Electronically signed

## 2025-01-13 NOTE — PATIENT INSTRUCTIONS
[facility]  Dr Wolfe, Endocrinology Department    [facility]   [address] [facility]  [place] [address]  Appointment [phone]  Fax: [phone]  [place]: Monday - Thursday      Labs and thyroid US in 6-7 months  Please make a lab appointment for blood work and follow up clinic appointment in 1 week after that to discuss results.     [facility] radiology scheduleing   [place]  [phone]   [facility] Radiology scheduling  [place]  [phone]     Please call and schedule the recommended test as discussed in clinic visit. These are the numbers to call.

## 2025-01-13 NOTE — NURSING NOTE
Current patient location: [facility] [place] [address]    Is the patient currently in the state of [place]? YES    Visit mode: VIDEO    If the visit is dropped, the patient can be reconnected by:VIDEO VISIT: Send to e-mail at: [email or web address]    Will anyone else be joining the visit? NO  (If patient encounters technical issues they should call [phone]    Are changes needed to the allergy or medication list? No    Are refills needed on medications prescribed by this physician? NO    Rooming Documentation:  Questionnaire(s) completed    Reason for visit: RECHECK    Amy ROSENBERGF

## 2025-01-13 NOTE — PROGRESS NOTES
"THIS IS A VIDEO VISIT:    Phone call visit/virtual visit encounter:    Name of patient: Jose Coronado    Date of encounter: 1/13/2025    Time of start of video visit: 10:00    Video started: 10:11    Video ended: 10:24    Provider location: working from home/ [facility]    Patient location: patients home.    Mode of transmission: [employer] video/ [employer]    Verbal consent: obtained before starting visit. Pt is agreeable.      The patient has been notified of following:      \"This VIDEO visit will be conducted via a call between you and your physician/provider. We have found that certain health care needs can be provided without the need for a physical exam.  This service lets us provide the care you need with a short phone conversation.  If a prescription is necessary we can send it directly to your pharmacy.  If lab work is needed we can place an order for that and you can then stop by our lab to have the test done at a later time.     With new updates with corona virus patient might be billed as clinic visit.     If during the course of the call the physician/provider feels a telephone visit is not appropriate, you will not be charged for this service.\"      Past medical history, social history, family history, allergy and medications were reviewed and updated as appropriate.  Reviewed pertinent labs, notes, imaging studies personally.    Name: Jose Coronado  Seen for follow up of thyroid nodule.   HPI:  Jose Coronado is a 69 year old female who presents for the evaluation of thyroid nodule.   has a past medical history of Anemia, Arthritis (2015), CVA (cerebral vascular accident) (H) (2017), Diffuse cystic mastopathy, HTN, goal below 140/90, Hyperparathyroidism, Infectious mononucleosis, Irregular heart beat, Labyrinthitis, unspecified, Migraine headache with aura, Osteopenia, Pain in joint, shoulder region, Palpitations, PFO (patent foramen ovale), S/P gastric bypass (June, 2010), Sleep apnea, Sleep apnea, " and Vitamin D deficiencies.      Incidental finding of thyroid nodule on 11/2023 CT chest/abdomen.   Follow-up thyroid ultrasound 12/2023: 3 small thyroid nodules were seen PLUS Hypoechoic, solid structure measuring 2.3 x 1.7 x 1.6 appears inferior  and posterior to the left thyroid gland and may reflect a parathyroid adenoma.     US 1/2025: 3 stable appearing nodules. Stable 2.4 x 1.9 x 1.8 cm solid hypoechoic nodule abutting the inferior to the left lobe, previously 2.3 x 1.7 x 1.6 cm. likely a parathyroid adenoma. ( Nodule #4 -- now showing ill defined margins)    Normal calcium,PTH and TSH.    H/o parathyroid surgery in 2011. (Right inferior parathyroid gland was removed).  Follow-up calcium and PTH levels were in normal range.  H/o gastric bypass in 2010    No FH of thyroid cancer  No history of radiation  No compressive s/s  Thyroid labs in acceptable range.  She is not on thyroid hormone replacement.  No other major risk factors.    Feeling OK.    Palpitations: History of Arrythmia--on beta blocker. H/o PFO (closed) and ASD.  Diarrhea/Constipation:h/o gastric bypass-- lost 120 lbs and gained some back. H/o SIBO, irregular bowel patterns., diarrhea. Followed by GI.  Changes in menses: s/p menopause  Dysphagia or Shortness of breath:No  Tremors:No  Takes calcium supplement and vit D supplement.  Changes in weight: lost wt in 2023-- and now stable.  Wt Readings from Last 2 Encounters:   01/13/25 77.1 kg (170 lb)   09/23/24 77.1 kg (170 lb)      PMH/PSH:  Past Medical History:   Diagnosis Date    Anemia     Arthritis 2015    Hands, back, hips. Various dates first noted.    CVA (cerebral vascular accident) (H) 2017    ?migraine, ?pfo--negative vasc w/u, neg hypercoag w/u    Diffuse cystic mastopathy     Fibrocystic breast disease    HTN, goal below 140/90     Hyperparathyroidism     Infectious mononucleosis     Mono at age 16    Irregular heart beat     PAT no afib on 30day monitor    Labyrinthitis, unspecified      Migraine headache with aura     Osteopenia     Pain in joint, shoulder region     Secondary to a fall    Palpitations     PFO (patent foramen ovale)     s/p closure with amplazter device 7/13/17    S/P gastric bypass June, 2010    Sleep apnea     she is on CPAP    Sleep apnea     Vitamin D deficiencies      Past Surgical History:   Procedure Laterality Date    BIOPSY  1984    Breast biopsies    BREAST SURGERY  1984    Above    CARDIAC SURGERY  7/13/2017    PFO closure    COLONOSCOPY N/A 8/12/2015    Procedure: COLONOSCOPY;  Surgeon: Deandre Brooks MD;  Location: [facility]    COLONOSCOPY N/A 10/6/2022    Procedure: COLONOSCOPY, WITH BIOPSIES;  Surgeon: Freddie Gonzalez MD;  Location:  GI    DAVINCI HYSTERECTOMY SUPRACERVICAL, SALPINGO-OOPHORECTOMY INCLUDING BILATERAL N/A 3/16/2020    Procedure: DAVINCI HYSTERECTOMY SUPRACERVICAL, SALPINGO-OOPHORECTOMY INCLUDING BILATERAL WITH EXAM UNDER ANESTHESIA;  Surgeon: Lily Yancey MD;  Location: [facility]    DAVINCI SACROCOLPOPEXY, MIDURETHRAL SLING, CYSTOSCOPY N/A 3/16/2020    Procedure: SACROCOLPOPEXY, ROBOT-ASSISTED, LAPAROSCOPIC, WITH INSERTION OF MIDURETHRAL SLING AND CYSTOSCOPY;  Surgeon: Carolyn Valdivia MD;  Location: [facility]    ESOPHAGOSCOPY, GASTROSCOPY, DUODENOSCOPY (EGD), COMBINED N/A 12/7/2023    Procedure: Esophagoscopy, gastroscopy, duodenoscopy (EGD), combined;  Surgeon: Bubba Freeman MD;  Location:  GI    ESOPHAGOSCOPY, GASTROSCOPY, DUODENOSCOPY (EGD), COMBINED N/A 2/22/2024    Procedure: Esophagoscopy, gastroscopy, duodenoscopy (EGD), combined;  Surgeon: Bubba Freeman MD;  Location:  GI    GASTRIC BYPASS  June 24, 2010    GENITOURINARY SURGERY  3/16/2020    Cystocele/rectocele repair    GYN SURGERY  3/16/2020    Hysterectomy    ORTHOPEDIC SURGERY Left 2010    wrist fracture    PARATHYROIDECTOMY  9/19/11    [facility] ANEURYSM, INTRACRAN, SIMPLE SURG  04/2017    coil of aneurysm right posterior paraophthalmic artery    [facility]  NONSPECIFIC PROCEDURE      S/P multiple breast biopies - all negative / benign    ZZC NONSPECIFIC PROCEDURE      S/P T&A    ZZC NONSPECIFIC PROCEDURE      [place]y teeth extraction    ZZC NONSPECIFIC PROCEDURE      S/P (? unreadable) ankle     Family Hx:  Family History   Problem Relation Age of Onset    Breast Cancer Mother     Hypertension Mother     Arthritis Mother     Thyroid Disease Mother         Hypo    Ulcerative Colitis Mother     Cerebrovascular Disease Mother         Age 78 - Cerebral Hemorrhage,     Anxiety Disorder Mother     Depression Mother     Genetic Disorder Mother         Ulcerative colitis    Obesity Mother     Anesthesia Reaction Mother         Same    Breast Cancer Maternal Aunt     Hypertension Paternal Grandmother     Cerebrovascular Disease Maternal Grandmother         Age 72 -     Family History Negative Maternal Grandfather     Family History Negative Paternal Grandfather     Family History Negative Son     Family History Negative Daughter     Other Cancer Father         Skin - Non-melanoma varieties    Hypertension Father     Asthma Father     Family History Negative Daughter     Ulcerative Colitis Sister     Hypertension Sister     Hyperlipidemia Sister     Anxiety Disorder Sister     Depression Sister     Diabetes Sister     Obesity Sister     Asthma Sister     Anesthesia Reaction Sister         Debilitating headaches    Hypertension Brother     Anxiety Disorder Brother     Depression Brother     Asthma Nephew     Hypertension Sister         Congestive Heart Failure    Anxiety Disorder Sister     Genetic Disorder Sister         Ulcerative colitis    Obesity Sister     Genetic Disorder Niece         Ulcerative colitis    Colon Cancer No family hx of     Coronary Artery Disease No family hx of     Mental Illness No family hx of     Substance Abuse No family hx of     Osteoporosis No family hx of     Deep Vein Thrombosis No family hx of                Social Hx:  Social  History     Socioeconomic History    Marital status:      Spouse name: Not on file    Number of children: Not on file    Years of education: Not on file    Highest education level: Not on file   Occupational History    Not on file   Tobacco Use    Smoking status: Never    Smokeless tobacco: Never   Vaping Use    Vaping status: Never Used   Substance and Sexual Activity    Alcohol use: Yes     Comment: Social    Drug use: No    Sexual activity: Not Currently     Partners: Male     Birth control/protection: Pill   Other Topics Concern    Parent/sibling w/ CABG, MI or angioplasty before 65F 55M? No   Social History Narrative    Not on file     Social Drivers of Health     Financial Resource Strain: Low Risk  (3/17/2024)    Financial Resource Strain     Within the past 12 months, have you or your family members you live with been unable to get utilities (heat, electricity) when it was really needed?: No   Food Insecurity: Low Risk  (3/17/2024)    Food Insecurity     Within the past 12 months, did you worry that your food would run out before you got money to buy more?: No     Within the past 12 months, did the food you bought just not last and you didn t have money to get more?: No   Transportation Needs: Low Risk  (3/17/2024)    Transportation Needs     Within the past 12 months, has lack of transportation kept you from medical appointments, getting your medicines, non-medical meetings or appointments, work, or from getting things that you need?: No   Physical Activity: Inactive (3/17/2024)    Exercise Vital Sign     Days of Exercise per Week: 0 days     Minutes of Exercise per Session: 0 min   Stress: No Stress Concern Present (3/17/2024)    [employer] - Occupational Stress Questionnaire     Feeling of Stress : Not at all   Social Connections: Unknown (9/23/2024)    Received from [email or web address] [facility]    Social Connections     Frequency of Communication with  "Friends and Family: Not on file   Interpersonal Safety: Low Risk  (3/22/2024)    Interpersonal Safety     Do you feel physically and emotionally safe where you currently live?: Yes     Within the past 12 months, have you been hit, slapped, kicked or otherwise physically hurt by someone?: No     Within the past 12 months, have you been humiliated or emotionally abused in other ways by your partner or ex-partner?: No   Housing Stability: Low Risk  (3/17/2024)    Housing Stability     Do you have housing? : Yes     Are you worried about losing your housing?: No          MEDICATIONS:  has a current medication list which includes the following prescription(s): omeprazole, vitamin c, aspirin, atenolol, calcium citrate-vitamin d, compounded non-controlled substance, cranberry, cyanocobalamin, ferrous sulfate, fesoterodine fumarate, hydrochlorothiazide, imipramine, losartan, topiramate, UNABLE TO FIND, valacyclovir, cholecalciferol, vitamin e, and zinc glycinate.    Review of Systems  10 point ROS neg other than the symptoms noted above in the HPI.    Physical Exam   VS: Ht 1.651 m (5' 5\")   Wt 77.1 kg (170 lb)   LMP  (LMP Unknown)   BMI 28.29 kg/m    GENERAL: healthy, alert and no distress  EYES: Eyes grossly normal to inspection, conjunctivae and sclerae normal  ENT: no nose swelling, nasal discharge.  Thyroid: no apparent thyroid nodules  RESP: no audible wheeze, cough, or visible cyanosis.  No visible retractions or increased work of breathing.  Able to speak fully in complete sentences.  ABDO: not evaluated.  EXTREMITIES: no hand tremors.  NEURO: Cranial nerves grossly intact, mentation intact and speech normal  SKIN: No apparent skin lesions, rash or edema seen   PSYCH: mentation appears normal, affect normal/bright, judgement and insight intact, normal speech and appearance well-groomed    LABS:  TFTs:   Latest Ref Rng 3/22/2024  9:46 AM   [facility]YROID [facility]     TSH 0.30 - 4.20 uIU/mL 0.93      Thyroid " Ultrasound 12/2023:  IMPRESSION:  1.  Hypoechoic solid structure (2.3 cm) appears inferior and posterior  to the thyroid gland and may reflect a parathyroid adenoma. Correlate  with parathyroid laboratory values.  2.  Additional thyroid nodules as described above..    All pertinent notes, labs, and images personally reviewed by me.     A/P  Ms.Jan MERVIN Coronado is a 69 year old here for the evaluation of thyroid nodule:  #1 Thyroid Nodule:  3 small thyroid nodules are seen on ultrasound.   1/2025 thyroid US: ( Nodule #4 -- now showing ill defined margins)  No FH of thyroid cancer  No history of radiation  No compressive s/s  Thyroid labs in acceptable range.  She is not on thyroid hormone replacement.  No other major risk factors.   Plan:  Discussed diagnosis, pathophysiology, management and treatment options of condition with pt.  In the setting of no major compressive symptoms and based on ultrasound results plan to continue to monitor.  Recommend close follow up as 1/2025 US showed ( Nodule #4 -- now showing ill defined margins)  Recommend labs and thyroid ultrasound 6-7 months and follow-up after that.  Discussed compressive symptoms to monitor.    #2.  Parathyroid adenoma ?;  Thyroid ultrasound showing incidental finding of hypoechoic solid structure measuring 2.3 cm inferior and posterior to left thyroid gland.  Concern for parathyroid adenoma.  History of parathyroidectomy in 2011 demonstrated status post removal of right inferior parathyroid adenoma.  Follow-up calcium and PTH in normal range.  She also has history of gastric bypass.  Plan:  Discussed diagnosis, pathophysiology, management and treatment options of condition with pt.  Monitor calcium and PTH.  Recheck labs in 6-7 mnths  Please make a lab appointment for blood work and follow up clinic appointment in 1 week after that to discuss results.    Plan: TSH, T4 free, US Thyroid  Comment: H/o parathyroid surgery in 2011. (Right inferior parathyroid gland  was removed).  Plan: Calcium, Parathyroid Hormone Intact, Vitamin D         Deficiency, Creatinine                Discussed possible outcomes of biopsy including possible benign, possible malignancy and possible AUS. If AUS indication for molecular marker testing.  Discussed possible compressive symptoms and signs to watch for.  Discussed that Thyroid nodules are common and are frequently benign. Data suggest that the prevalence of palpable thyroid nodules is 3% to 7% in [place]; the prevalence is as high as 50% based on ultrasonography (US) or autopsy data. All patients with a palpable thyroid nodule, however, should undergo US examination. US-guided FNA (US-FNA) is recommended for nodules >=10 mm; US-FNA is suggested for nodules <10 mm only if clinical information or US features are suspicious.  The frequency of thyroid nodules in general population was discussed. Also discussed possibility of malignancy, potential for thyroid autonomy.     Causes of thyroid Nodules: Benign (Multinodular goiter, Hashimoto s thyroiditis, Simple or hemorrhagic cysts, Follicular adenomas, Subacute thyroiditis) or Malignant(Papillary carcinoma, Follicular carcinoma, Hürthle cell carcinoma, Medullary carcinoma, Anaplastic carcinoma, Primary thyroid lymphoma, or Metastatic malignant lesion).    MultiNodule:  The risk of cancer is not significantly higher in palpable solitary thyroid nodules than in multinodular lesions or in nodules in diffuse goiters. In multinodular thyroid glands, the cytologic sampling should be focused on lesions characterized by suspicious US features rather than on larger or clinically dominant nodules.    Cyst:  Most complex thyroid nodules with a dominant fluid component are benign. USFNA, however, should always be performed because the rare papillary thyroid carcinoma (PTC) can be cystic. An unsatisfactory (nondiagnostic) specimen usually results from a cystic nodule that yields few or no follicular  cells. Reaspiration yields satisfactory results in 50% of cases.    Discussed indications, risks and benefits of all medications prescribed, and answered questions to patient's satisfaction.  The longitudinal plan of care for the diagnosis(es)/condition(s) as documented were addressed during this visit. Due to the added complexity in care, I will continue to support Jose in the subsequent management and with ongoing continuity of care.  All questions were answered.  The patient indicates understanding of the above issues and agrees with the plan set forth.    Follow-up:  As noted in AVS    Kaitlyn Wolfe MD  Endocrinology   [facility]    Cc: Sanjeev Carmichael    Addendum to above note and clinic visit:    Labs reviewed.    See result note/telephone encounter.

## 2025-02-26 DIAGNOSIS — I10 ESSENTIAL HYPERTENSION WITH GOAL BLOOD PRESSURE LESS THAN 140/90: ICD-10-CM

## 2025-02-26 DIAGNOSIS — R00.2 PALPITATIONS: ICD-10-CM

## 2025-02-26 RX ORDER — HYDROCHLOROTHIAZIDE 12.5 MG/1
12.5 TABLET ORAL DAILY
Qty: 90 TABLET | Refills: 0 | Status: SHIPPED | OUTPATIENT
Start: 2025-02-26

## 2025-02-26 RX ORDER — ATENOLOL 50 MG/1
50 TABLET ORAL DAILY
Qty: 90 TABLET | Refills: 0 | Status: SHIPPED | OUTPATIENT
Start: 2025-02-26

## 2025-02-26 RX ORDER — LOSARTAN POTASSIUM 50 MG/1
50 TABLET ORAL DAILY
Qty: 90 TABLET | Refills: 0 | Status: SHIPPED | OUTPATIENT
Start: 2025-02-26

## 2025-03-05 DIAGNOSIS — G43.109 MIGRAINE WITH AURA AND WITHOUT STATUS MIGRAINOSUS, NOT INTRACTABLE: ICD-10-CM

## 2025-03-05 RX ORDER — IMIPRAMINE HYDROCHLORIDE 25 MG/1
25 TABLET, FILM COATED ORAL AT BEDTIME
Qty: 90 TABLET | Refills: 0 | Status: SHIPPED | OUTPATIENT
Start: 2025-03-05

## 2025-03-05 NOTE — PROGRESS NOTES
Virtual Visit Details    Type of service:  Video Visit   Video Start Time: 8:30 AM  Video End Time: 8:57 AM    Originating Location (pt. Location): Home    Distant Location (provider location):  Off-site  Platform used for Video Visit: [facility]      GASTROENTEROLOGY Follow-up VIDEO VISIT    CC/REFERRING MD:    Sanjeev Carmichael    REASON FOR CONSULTATION:   Karen Cabrera for   Chief Complaint   Patient presents with    RECHECK     F/U       HISTORY OF PRESENT ILLNESS:    Jose Coronado is a 69 year old female who is being evaluated via a billable video visit for follow up on diarrhea. She has past medical history pertinent for hyperparathyroidism, parathyroid tumors, HTN, MITCHELL on CPAP, paroxysmal AFIB, minor stroke in 2017 (no residual), brain anerysm repair, PFO repair, history of gastric bypass, grade 4 prolapse with urinary incontinence s/p rectocele/cystocele repair/hysterectomy March 2020, recurrent UTIs (No UTI since 5/2022).    History by Karen Cabrera PA-C 2/2024   Today Jose reports that her upper GI symptoms had resolved after 1 week of Omeprazole 40 mg twice daily. Overall her appetite has returned. She is now consuming 3 meals per day and has been able to tolerate a more variety foods. Her weight has been overall stable.     Follows a reflux friendly diet other than 2 cups of coffee per day.      In regards to her bowel patterns Jose reports that in December she had again developed diarrhea. Prior to this she had soft formed stools for ~ 6 months occurring on a daily basis. Now she is stooling 10-15x daily consistent with [place] Stool Scale 5-6. Associated with tenesmus. Noting the stools are low volume.  Additionally she had 1 weeks of [place] 7 Type Stools which [place]  and resolved after 1 week juanita she returned.      Denies weight loss, nausea, emesis, dysphagia, odynophagia, abdominal pain, nocturnal stooling, incontinence, melena, hematochezia and BRBR.  "    Interval history 9/2024:   Today, Jose reports she had SIBO treatment which stabilized.Anastomotic ulcer has resolved. She has not had any recurrence of abdominal pain. She is still on omeprazole. She has a hx of stroke now on full ASA. Still takes 1 omeprazole 40 mg per day. Had a touch of heartburn for the first time this past week since February.   She was on vacation at the time and was drinking alcohol at the time. She is back to her normal routine and is free of heartburn.     Bowel habits have been stable but still dealing with alternation between very stoft stool and some fecal leakage. Lately she has been using imodium if she has very soft stools with leakage. Will take 1-2 imodium PRN, not daily. Overall feeling stable. No lower GI pain or gas anymore. Feels SIBO is doing much better and mild compared to before. Occasionally has normal stools, waxy yellow in color. Hx of RNY. Does have known malabsorption from the bypass. The imodium is effective for her and makes things maneagable. She takes fiber sporadically. Not interested PFC.    Interval history 3/2024:  Jose reports she has been feeling pretty good. About once per month she gets bouts of very loose stools with a little leakage, which she takes imodium just as needed. Doesn't feel it helps a lot. This is a typical cycle for her where once every 3-4 weeks she feels her bowels need to \"purge.\" Stools are the same way as they have been, light in color (Feels this is the fat malabsorption from her bypass). Feels overall things are fine. Feels she is gaining weight quickly because she is able to eat now without pain or GI symptoms. Upper GI wise things are going well. Still on daily ASA so still takes one omeprazole per day. She is having for the first time in a while, in last couple of months, some heartburn and reflux again though nothing like she had before. Didn't experience heartburn or reflux for over 6 months before. Has had to add in tums PRN. " Feels she's eating more than she should be. It is not hindering her life, but is noticeable.     Not taking fiber supplement right now.Not having any pain with eating.     Has vitamin E and zinc monitored through her functional neurologist which have been low and has had a hard time repleting her zinc. She wonders if omeprazole is related to this and if she could ever come off. Still taking full ASA for hx of stroke, but has had discussion with her neurologist on potentially switching to plavix.      I have reviewed and updated the patient's Past Medical History, Social History, Family History and Medication List.    Exam:    General appearance:  Healthy appearing adult, in no acute distress  Eyes:  Sclera anicteric  Ears, nose, mouth and throat:  No obvious external lesions of ears and nose.  Hearing intact  Neck:  Symmetric, No obvious external lesions  Respiratory:  Normal respiration, no use of accessory muscles   MSK:  No visual upper extremity, neck or facial muscle atrophy  Psychiatric:  Oriented to person, place and time, Appropriate mood and affect.   Neurologic:  Peripheral muscle function and dexterity appear to be intact      PERTINENT STUDIES have been reviewed.    12/7/2023   Findings:        Esophagogastric landmarks were identified: the Z-line was found at 40        cm, the gastroesophageal junction was found at 40 cm and the site of the        diaphragmatic hiatus was found at 40 cm from the incisors.        The examined esophagus was normal.        Evidence of a Jennie-en-Y gastrojejunostomy was found. The gastric pouch        appeared healthy. The gastrojejunal anastomosis was characterized by        edema, friable mucosa, ulceration and an intact staple line. The        jejunojejunal anastomosis was characterized by healthy appearing mucosa.        The duodenum-to-jejunum limb was not examined as it could not be        reached. The excluded stomach was not examined as it could not be         traversed.        Biopsies for histology were taken with a cold forceps in the proximal        jejunum for evaluation of celiac disease. Verification of patient        identification for the specimen was done. Estimated blood loss was        minimal.        Biopsies were taken with a cold forceps from edges of gastrojejunal        ulcer for histology. Verification of patient identification for the        specimen was done. Estimated blood loss was minimal.        Biopsies were taken with a cold forceps in the gastric body for        Helicobacter pylori testing. Verification of patient identification for        the specimen was done. Estimated blood loss was minimal.                                                                                     Impression:               - Normal esophagus.                             - Jennie-en-Y gastrojejunostomy with large 2cm                             ulceration of the gastrojejunal anastomosis along                             the staple line with visible staples present. This                             could be ischemic/surgical related with                             contributing factor of daily 325mg aspirin.                             - Biopsies were taken with a cold forceps for                             Helicobacter pylori testing.                             - Biopsies were taken with a cold forceps for                             histology from edges of gastrojejunal ulcer.                             - Normal small bowel without evidence of celiac                             disease.                             - Biopsies were taken with a cold forceps for                             evaluation of celiac disease.           Addendum   This addendum is included to report findings of immunohistochemical stains for H. Pylori (Parts B,C):  -Negative for H. Pylori organisms on immunohistochemical stains.  -There is no change in diagnosis  All controls stain  appropriately.      Addendum electronically signed by Lawrence Fernández MD on 12/13/2023 at  9:12 AM   Final Diagnosis   A(1).  Small bowel, jejunum, biopsy:  -Small intestinal mucosa with no significant histopathologic abnormalities.  -Normal villous architecture identified and no prominence in intraepithelial lymphocytes seen.  -Negative for luminal organisms.  -Negative for dysplasia or malignancy.        B(2).  Gastrojejunal junction, ulcerated site, biopsy:  -Erosive gastroenteric mucosa with acute inflammation, and surface atrophy (see comment).  -Negative for H. pylori organisms on routine stains.  -Negative for dysplasia or malignancy        C(3).  Gastric pouch, biopsy:  - Oxyntic type gastric mucosa with mild chronic inflammation.  - Negative for H. Pylori organisms on routine stains.  - Negative for intestinal metaplasia.   -Negative for dysplasia or malignancy         Electronically signed by Lawrence Fernández MD on 12/8/2023 at  1:17 PM   Comment   [facility]   The constellation of histologic features raise the differential diagnosis of ischemia, and drug induced injury, among others.  ImmunoHistochemical stains for H. pylori have been requested and will be reported as an addendum when available.         Colonoscopy:     10/6/2022   Findings:        Multiple medium-mouthed diverticula were found in the sigmoid colon.        The entire examined colon otherwise appeared normal on direct and        retroflexion views.                                                                                     Impression:               - Diverticulosis in the sigmoid colon.                             - The entire examined colon is normal on direct and                             retroflexion views.                             - Biopsies were taken with a cold forceps from the                             ascending colon and descending colon for evaluation                             of microscopic colitis.     Final  Diagnosis   A.  Ascending colon, biopsy:  -Negative for diagnostic colitis, dysplasia, or malignancy.     B.  Descending colon, biopsy:  -Melanosis coli.  -Negative for diagnostic colitis, dysplasia, or malignancy.         8/12/2015   Findings:        Diverticula were found in the sigmoid colon.                                                                                     Impression:               - Diverticulosis in the sigmoid colon.   Recommendation:           High fiber diet. Repeat in ten years.      CT:     11/22/2023   IMPRESSION:  1.  A few small pulmonary nodules measuring up to 3 mm. Please see  follow-up guidelines.  2.  Increased size of a nodule in or abutting the inferior left lobe  of the thyroid gland since 2014. Consider repeat thyroid ultrasound  for further characterization.  3.  Cholelithiasis and gallbladder distention.  4.  Large amount of gas in the urinary bladder may be due to infection  or instrumentation.      11/22/2023 CT CAP W Contrast   IMPRESSION:  1.  A few small pulmonary nodules measuring up to 3 mm. Please see  follow-up guidelines.  2.  Increased size of a nodule in or abutting the inferior left lobe  of the thyroid gland since 2014. Consider repeat thyroid ultrasound  for further characterization.  3.  Cholelithiasis and gallbladder distention.  4.  Large amount of gas in the urinary bladder may be due to infection  or instrumentation.     5/6/2022 CT AP WO Contrast                                            IMPRESSION:   1. Air within the urinary bladder may be related to recent  instrumentation.  2. Diffuse fatty infiltration of the liver.  3. Cholelithiasis.  4. Several low density lesions in the liver are too small to  characterize, but could represent cysts or hemangiomas. Consider liver  MRI for further characterization.       9/2022 MRI Liver WO & W   TECHNIQUE: Multiplanar multisequence imaging of the abdomen acquired  before and after administration of 10 mL  Gadavist intravenous  contrast.     FINDINGS: Multiple hepatic cysts. No definite hemangiomas. Signal  intensity of the liver parenchyma is unremarkable without evidence of  hepatic steatosis. There is no definite intra or extrahepatic biliary  dilatation. Liver is normal size and normal in contour. Adrenal  glands, kidneys, spleen, and pancreas demonstrate no worrisome focal  lesion. Osseous structures demonstrate no worrisome signal  abnormality. No definite adenopathy or bowel obstruction in the  visualized abdomen. No ascites.                                                            IMPRESSION: Multiple liver cysts.     1/6/2023 Hydrogen Breath Test  Impression     Positive hydrogen breath test consistent with small intestinal bacterial overgrowth. High methane production at baseline and throughout the test. This can be seen with constipation, though data on this is limited. Please correlate clinically. 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 Consideration could be given to treatment with neomycin and rifaximin. (ACG Clinical Guidelines: Small Intestinal Bacterial Overgrowth; Hastings et al. The American Journal of Gastroenterology: February 2020 - Volume 115 - Issue 2 - p 165178)        ASSESSMENT/PLAN:    Jose Coronado is a 68 year old female who presents for follow up of frequent stools, heartburn, h/o anastomotic ulcer.     1. Ulcer, anastomotic - resolved  2. Diarrhea, unspecified type  3. Small intestinal bacterial overgrowth (SIBO)  4. Zinc deficiency  5. Vitamin E deficiency  6. Long-term current use of proton pump inhibitor therapy  7. History of gastric bypass  8. Intestinal malabsorption, unspecified type  9. Disorder of copper metabolism, unspecified (H)  - Magnesium; Future  - Zinc; Future  - Vitamin D Deficiency; Future  - Ferritin; Future  - Iron & Iron Binding Capacity; Future  - Vitamin B12; Future  - Folate; Future  - Copper level; Future  - Basic  metabolic panel; Future  - Vitamin E; Future    Jose had an EGD 2/22/24 to follow up on anastomotic ulcer seen 12/2023 which showed resolution of ulcer. Recommended to continue Omeprazole 40 mg daily. Per discussion with her other providers she remains on a full 325 mg ASA daily and one 40 mg omeprazole daily. She uses tums as needed for infrequent breakthrough heartburn and she is no longer having abdominal pain, eating well. She has had low levels of vitamin E and zinc, suspected partially related to her gastric bypass and chronic PPI use. If she switches to plavix and off full ASA daily, we could consider decreasing omeprazole to 20 mg daily for 2 weeks, then stopping it and using famotidine PRN at bedtime for heartburn and reflux. If this is not well controlled, we could restart omeprazole at 20 mg daily and will continue monitoring zinc and vitamin E. Of note she did have treatment for SIBO as well.    She has intermittent bouts of loose stools once every 3-4 weeks for which she takes imodium as needed, which has been somewhat helpful. She has not been able to keep consistent with fiber supplementation, but is satisfied with her current bowel habits without fiber. She has no longer had abdominal bloating or abdominal pain and her weight has increased back towards baseline. She stopped her probiotic and she is not interested in the pelvic floor center.     We reviewed the following plan:   - Let me know if you switch from aspirin to plavix, and we can then consider decreasing you to 20 mg omeprazole daily for 2 weeks, then stopping it and can use famotidine as needed for heartburn and reflux  - Try a wedge pillow at night   - Continue imodium as needed  - If stools become more consistently loose or irregular, restart daily fiber supplement  - Vitamin/mineral labs ordered to check absorption while on long term omeprazole (zinc, vitamin D, E, magnesium, iron studies, folate, BMP, copper)  - 1 year follow up, sooner  if needed by calling [phone]      Video-Visit Details  Video Visit Time: 26 min  Type of service:  Video Visit  Originating Location (pt. Location): Home    Distant Location (provider location):  Off-site  Platform used for Video Visit: [employer]    45 minutes spent on the date of the encounter doing chart review, history and exam, documentation and further activities as noted above.    Karen Mcgee PA-C  Division of Gastroenterology, Hepatology, and Nutrition  [facility] [place]     RTC 12 months

## 2025-03-06 ENCOUNTER — VIRTUAL VISIT (OUTPATIENT)
Dept: GASTROENTEROLOGY | Facility: CLINIC | Age: 70
End: 2025-03-06
Payer: MEDICARE

## 2025-03-06 VITALS — HEIGHT: 65 IN | BODY MASS INDEX: 29.16 KG/M2 | WEIGHT: 175 LBS

## 2025-03-06 DIAGNOSIS — K63.8219 SMALL INTESTINAL BACTERIAL OVERGROWTH (SIBO): ICD-10-CM

## 2025-03-06 DIAGNOSIS — K90.9 INTESTINAL MALABSORPTION, UNSPECIFIED TYPE: ICD-10-CM

## 2025-03-06 DIAGNOSIS — E56.0 VITAMIN E DEFICIENCY: ICD-10-CM

## 2025-03-06 DIAGNOSIS — E60 ZINC DEFICIENCY: ICD-10-CM

## 2025-03-06 DIAGNOSIS — Z98.84 HISTORY OF GASTRIC BYPASS: ICD-10-CM

## 2025-03-06 DIAGNOSIS — E83.00 DISORDER OF COPPER METABOLISM, UNSPECIFIED (H): ICD-10-CM

## 2025-03-06 DIAGNOSIS — Z79.899 LONG-TERM CURRENT USE OF PROTON PUMP INHIBITOR THERAPY: ICD-10-CM

## 2025-03-06 DIAGNOSIS — R19.7 DIARRHEA, UNSPECIFIED TYPE: ICD-10-CM

## 2025-03-06 DIAGNOSIS — K28.9 ULCER, ANASTOMOTIC: Primary | ICD-10-CM

## 2025-03-06 ASSESSMENT — PAIN SCALES - GENERAL: PAINLEVEL_OUTOF10: NO PAIN (0)

## 2025-03-06 NOTE — NURSING NOTE
Current patient location: [facility] [place] [address]    Is the patient currently in the state of [place]? YES    Visit mode: VIDEO    If the visit is dropped, the patient can be reconnected by:VIDEO VISIT: Text to cell phone:   Telephone Information:   Mobile [phone]   Mobile Not on file.       Will anyone else be joining the visit? NO  (If patient encounters technical issues they should call [phone]    Are changes needed to the allergy or medication list? No    Are refills needed on medications prescribed by this physician? NO    Rooming Documentation:  Questionnaire(s) completed    Reason for visit: RECHECK (F/U)    Yolanda BARROW

## 2025-03-06 NOTE — PATIENT INSTRUCTIONS
It was a pleasure meeting with you today and discussing your healthcare plan. Below is a summary of what we covered:    - Let me know if you switch from aspirin to plavix, and we can then consider decreasing you to 20 mg omeprazole daily for 2 weeks, then stopping it and can use famotidine as needed for heartburn and reflux  - Try a wedge pillow at night   - Continue imodium as needed  - If stools become more consistently loose or irregular, restart daily fiber supplement  - Vitamin/mineral labs ordered to check absorption while on long term omeprazole ( zinc, vitamin D, E, magnesium, iron studies, folate, BMP, copper)  - 1 year follow up, sooner if needed by calling [phone]      Please see below for any additional questions and scheduling guidelines.    Sign up for [facility]: [facility] patient portal serves as a secure platform for accessing your medical records from the [facility]. Additionally, [employer] facilitates easy, timely, and secure messaging with your care team. If you have not signed up, you may do so by using the provided code or calling [phone].    Coordinating your care after your visit:  There are multiple options for scheduling your follow-up care based on your provider's recommendation.    How do I schedule a follow-up clinic appointment:   After your appointment, you may receive scheduling assistance with the Clinic Coordinators by having a seat in the waiting room and a Clinic Coordinator will call you up to schedule.  Virtual visits or after you leave the clinic:  Your provider has placed a follow-up order in the [employer] portal for scheduling your return appointment. A member of the scheduling team will contact you to schedule.  [facility] Scheduling: Timely scheduling through [employer] is advised to ensure appointment availability.   Call to schedule: You may schedule your follow-up appointment(s) by calling [phone], option 1.    How do I schedule my endoscopy or colonoscopy  procedure:  If a procedure, such as a colonoscopy or upper endoscopy was ordered by your provider, the scheduling team will contact you to schedule this procedure. Or you may choose to call to schedule at   [phone], option 2.  Please allow 20-30 minutes when scheduling a procedure.    How do I get my blood work done? To get your blood work done, you need to schedule a lab appointment at an [facility]. There are multiple ways to schedule:   At the clinic: The Clinic Coordinator you meet after your visit can help you schedule a lab appointment.   [email or web address] scheduling: [email or web address] offers online lab scheduling at all [facility] laboratory locations.   Call to schedule: You can call [phone] to schedule your lab appointment.    How do I schedule my imaging study: To schedule imaging studies, such as CT scans, ultrasounds, MRIs, or X-rays, contact Imaging Services at [phone].    How do I schedule a referral to another doctor: If your provider recommended a referral to another specialist(s), the referral order was placed by your provider. You will receive a phone call to schedule this referral, or you may choose to call the number attached to the referral to self-schedule.    For Post-Visit Question(s):  For any inquiries following today's visit:  Please utilize [email or web address] messaging and allow 48 hours for reply or contact the Call Center during normal business hours at [phone], option 3.  For Emergent After-hours questions, contact the On-Call GI Fellow through the [facility]  at [phone].  In addition, you may contact your Nurse directly using the provided contact information.    Test Results:  Test results will be accessible via [email or web address] in compliance with the 21st Century Cures Act. This means that your results will be available to you at the same time as your provider. Often you may see your results before your provider does. Results are reviewed by staff within  two weeks with communication follow-up. Results may be released in the patient portal prior to your care team review.    Prescription Refill(s):  Medication prescribed by your provider will be addressed during your visit. For future refills, please coordinate with your pharmacy. If you have not had a recent clinic visit or routine labs, for your safety, your provider may not be able to refill your prescription.

## 2025-03-06 NOTE — LETTER
3/6/2025      Jose Coronado  [address] [place] [address]      Dear Colleague,    Thank you for referring your patient, Jose Coronado, to the [facility] [place]. Please see a copy of my visit note below.    Virtual Visit Details    Type of service:  Video Visit   Video Start Time: 8:30 AM  Video End Time: 8:57 AM    Originating Location (pt. Location): Home    Distant Location (provider location):  Off-site  Platform used for Video Visit: [employer]      GASTROENTEROLOGY Follow-up VIDEO VISIT    CC/REFERRING MD:    Sanjeev Carmichael    REASON FOR CONSULTATION:   Karen Cabrera for   Chief Complaint   Patient presents with     RECHECK     F/U       HISTORY OF PRESENT ILLNESS:    Jose Coronado is a 69 year old female who is being evaluated via a billable video visit for follow up on diarrhea. She has past medical history pertinent for hyperparathyroidism, parathyroid tumors, HTN, MITCHELL on CPAP, paroxysmal AFIB, minor stroke in 2017 (no residual), brain anerysm repair, PFO repair, history of gastric bypass, grade 4 prolapse with urinary incontinence s/p rectocele/cystocele repair/hysterectomy March 2020, recurrent UTIs (No UTI since 5/2022).    History by Karen Cabrera PA-C 2/2024   Today Jose reports that her upper GI symptoms had resolved after 1 week of Omeprazole 40 mg twice daily. Overall her appetite has returned. She is now consuming 3 meals per day and has been able to tolerate a more variety foods. Her weight has been overall stable.     Follows a reflux friendly diet other than 2 cups of coffee per day.      In regards to her bowel patterns Jose reports that in December she had again developed diarrhea. Prior to this she had soft formed stools for ~ 6 months occurring on a daily basis. Now she is stooling 10-15x daily consistent with Geary Stool Scale 5-6. Associated with tenesmus. Noting the stools are low volume.  Additionally she had 1 weeks of  "Bledsoe 7 Type Stools which [place]  and resolved after 1 week juanita she returned.      Denies weight loss, nausea, emesis, dysphagia, odynophagia, abdominal pain, nocturnal stooling, incontinence, melena, hematochezia and BRBR.     Interval history 9/2024:   Today, Jose reports she had SIBO treatment which stabilized.Anastomotic ulcer has resolved. She has not had any recurrence of abdominal pain. She is still on omeprazole. She has a hx of stroke now on full ASA. Still takes 1 omeprazole 40 mg per day. Had a touch of heartburn for the first time this past week since February.   She was on vacation at the time and was drinking alcohol at the time. She is back to her normal routine and is free of heartburn.     Bowel habits have been stable but still dealing with alternation between very stoft stool and some fecal leakage. Lately she has been using imodium if she has very soft stools with leakage. Will take 1-2 imodium PRN, not daily. Overall feeling stable. No lower GI pain or gas anymore. Feels SIBO is doing much better and mild compared to before. Occasionally has normal stools, waxy yellow in color. Hx of RNY. Does have known malabsorption from the bypass. The imodium is effective for her and makes things maneagable. She takes fiber sporadically. Not interested PFC.    Interval history 3/2024:  Jose reports she has been feeling pretty good. About once per month she gets bouts of very loose stools with a little leakage, which she takes imodium just as needed. Doesn't feel it helps a lot. This is a typical cycle for her where once every 3-4 weeks she feels her bowels need to \"purge.\" Stools are the same way as they have been, light in color (Feels this is the fat malabsorption from her bypass). Feels overall things are fine. Feels she is gaining weight quickly because she is able to eat now without pain or GI symptoms. Upper GI wise things are going well. Still on daily ASA so still takes one " omeprazole per day. She is having for the first time in a while, in last couple of months, some heartburn and reflux again though nothing like she had before. Didn't experience heartburn or reflux for over 6 months before. Has had to add in tums PRN. Feels she's eating more than she should be. It is not hindering her life, but is noticeable.     Not taking fiber supplement right now.Not having any pain with eating.     Has vitamin E and zinc monitored through her functional neurologist which have been low and has had a hard time repleting her zinc. She wonders if omeprazole is related to this and if she could ever come off. Still taking full ASA for hx of stroke, but has had discussion with her neurologist on potentially switching to plavix.      I have reviewed and updated the patient's Past Medical History, Social History, Family History and Medication List.    Exam:    General appearance:  Healthy appearing adult, in no acute distress  Eyes:  Sclera anicteric  Ears, nose, mouth and throat:  No obvious external lesions of ears and nose.  Hearing intact  Neck:  Symmetric, No obvious external lesions  Respiratory:  Normal respiration, no use of accessory muscles   MSK:  No visual upper extremity, neck or facial muscle atrophy  Psychiatric:  Oriented to person, place and time, Appropriate mood and affect.   Neurologic:  Peripheral muscle function and dexterity appear to be intact      PERTINENT STUDIES have been reviewed.    12/7/2023   Findings:        Esophagogastric landmarks were identified: the Z-line was found at 40        cm, the gastroesophageal junction was found at 40 cm and the site of the        diaphragmatic hiatus was found at 40 cm from the incisors.        The examined esophagus was normal.        Evidence of a Jennie-en-Y gastrojejunostomy was found. The gastric pouch        appeared healthy. The gastrojejunal anastomosis was characterized by        edema, friable mucosa, ulceration and an intact  staple line. The        jejunojejunal anastomosis was characterized by healthy appearing mucosa.        The duodenum-to-jejunum limb was not examined as it could not be        reached. The excluded stomach was not examined as it could not be        traversed.        Biopsies for histology were taken with a cold forceps in the proximal        jejunum for evaluation of celiac disease. Verification of patient        identification for the specimen was done. Estimated blood loss was        minimal.        Biopsies were taken with a cold forceps from edges of gastrojejunal        ulcer for histology. Verification of patient identification for the        specimen was done. Estimated blood loss was minimal.        Biopsies were taken with a cold forceps in the gastric body for        Helicobacter pylori testing. Verification of patient identification for        the specimen was done. Estimated blood loss was minimal.                                                                                     Impression:               - Normal esophagus.                             - Jennie-en-Y gastrojejunostomy with large 2cm                             ulceration of the gastrojejunal anastomosis along                             the staple line with visible staples present. This                             could be ischemic/surgical related with                             contributing factor of daily 325mg aspirin.                             - Biopsies were taken with a cold forceps for                             Helicobacter pylori testing.                             - Biopsies were taken with a cold forceps for                             histology from edges of gastrojejunal ulcer.                             - Normal small bowel without evidence of celiac                             disease.                             - Biopsies were taken with a cold forceps for                             evaluation of celiac disease.            Addendum   This addendum is included to report findings of immunohistochemical stains for H. Pylori (Parts B,C):  -Negative for H. Pylori organisms on immunohistochemical stains.  -There is no change in diagnosis  All controls stain appropriately.      Addendum electronically signed by Lawrence Fernández MD on 12/13/2023 at  9:12 AM   Final Diagnosis   A(1).  Small bowel, jejunum, biopsy:  -Small intestinal mucosa with no significant histopathologic abnormalities.  -Normal villous architecture identified and no prominence in intraepithelial lymphocytes seen.  -Negative for luminal organisms.  -Negative for dysplasia or malignancy.        B(2).  Gastrojejunal junction, ulcerated site, biopsy:  -Erosive gastroenteric mucosa with acute inflammation, and surface atrophy (see comment).  -Negative for H. pylori organisms on routine stains.  -Negative for dysplasia or malignancy        C(3).  Gastric pouch, biopsy:  - Oxyntic type gastric mucosa with mild chronic inflammation.  - Negative for H. Pylori organisms on routine stains.  - Negative for intestinal metaplasia.   -Negative for dysplasia or malignancy         Electronically signed by Lawrence Fernández MD on 12/8/2023 at  1:17 PM   Comment   [facility]   The constellation of histologic features raise the differential diagnosis of ischemia, and drug induced injury, among others.  ImmunoHistochemical stains for H. pylori have been requested and will be reported as an addendum when available.         Colonoscopy:     10/6/2022   Findings:        Multiple medium-mouthed diverticula were found in the sigmoid colon.        The entire examined colon otherwise appeared normal on direct and        retroflexion views.                                                                                     Impression:               - Diverticulosis in the sigmoid colon.                             - The entire examined colon is normal on direct and                              retroflexion views.                             - Biopsies were taken with a cold forceps from the                             ascending colon and descending colon for evaluation                             of microscopic colitis.     Final Diagnosis   A.  Ascending colon, biopsy:  -Negative for diagnostic colitis, dysplasia, or malignancy.     B.  Descending colon, biopsy:  -Melanosis coli.  -Negative for diagnostic colitis, dysplasia, or malignancy.         8/12/2015   Findings:        Diverticula were found in the sigmoid colon.                                                                                     Impression:               - Diverticulosis in the sigmoid colon.   Recommendation:           High fiber diet. Repeat in ten years.      CT:     11/22/2023   IMPRESSION:  1.  A few small pulmonary nodules measuring up to 3 mm. Please see  follow-up guidelines.  2.  Increased size of a nodule in or abutting the inferior left lobe  of the thyroid gland since 2014. Consider repeat thyroid ultrasound  for further characterization.  3.  Cholelithiasis and gallbladder distention.  4.  Large amount of gas in the urinary bladder may be due to infection  or instrumentation.      11/22/2023 CT CAP W Contrast   IMPRESSION:  1.  A few small pulmonary nodules measuring up to 3 mm. Please see  follow-up guidelines.  2.  Increased size of a nodule in or abutting the inferior left lobe  of the thyroid gland since 2014. Consider repeat thyroid ultrasound  for further characterization.  3.  Cholelithiasis and gallbladder distention.  4.  Large amount of gas in the urinary bladder may be due to infection  or instrumentation.     5/6/2022 CT AP WO Contrast                                            IMPRESSION:   1. Air within the urinary bladder may be related to recent  instrumentation.  2. Diffuse fatty infiltration of the liver.  3. Cholelithiasis.  4. Several low density lesions in the liver are too small to  characterize,  but could represent cysts or hemangiomas. Consider liver  MRI for further characterization.       9/2022 MRI Liver WO & W   TECHNIQUE: Multiplanar multisequence imaging of the abdomen acquired  before and after administration of 10 mL Gadavist intravenous  contrast.     FINDINGS: Multiple hepatic cysts. No definite hemangiomas. Signal  intensity of the liver parenchyma is unremarkable without evidence of  hepatic steatosis. There is no definite intra or extrahepatic biliary  dilatation. Liver is normal size and normal in contour. Adrenal  glands, kidneys, spleen, and pancreas demonstrate no worrisome focal  lesion. Osseous structures demonstrate no worrisome signal  abnormality. No definite adenopathy or bowel obstruction in the  visualized abdomen. No ascites.                                                            IMPRESSION: Multiple liver cysts.     1/6/2023 Hydrogen Breath Test  Impression     Positive hydrogen breath test consistent with small intestinal bacterial overgrowth. High methane production at baseline and throughout the test. This can be seen with constipation, though data on this is limited. Please correlate clinically. 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 Consideration could be given to treatment with neomycin and rifaximin. (ACG Clinical Guidelines: Small Intestinal Bacterial Overgrowth; Hastings et al. The American Journal of Gastroenterology: February 2020 - Volume 115 - Issue 2 - p [phone])        ASSESSMENT/PLAN:    Jose Coronado is a 68 year old female who presents for follow up of frequent stools, heartburn, h/o anastomotic ulcer.     1. Ulcer, anastomotic - resolved  2. Diarrhea, unspecified type  3. Small intestinal bacterial overgrowth (SIBO)  4. Zinc deficiency  5. Vitamin E deficiency  6. Long-term current use of proton pump inhibitor therapy  7. History of gastric bypass  8. Intestinal malabsorption, unspecified type  9. Disorder of copper  metabolism, unspecified (H)  - Magnesium; Future  - Zinc; Future  - Vitamin D Deficiency; Future  - Ferritin; Future  - Iron & Iron Binding Capacity; Future  - Vitamin B12; Future  - Folate; Future  - Copper level; Future  - Basic metabolic panel; Future  - Vitamin E; Future    Jose had an EGD 2/22/24 to follow up on anastomotic ulcer seen 12/2023 which showed resolution of ulcer. Recommended to continue Omeprazole 40 mg daily. Per discussion with her other providers she remains on a full 325 mg ASA daily and one 40 mg omeprazole daily. She uses tums as needed for infrequent breakthrough heartburn and she is no longer having abdominal pain, eating well. She has had low levels of vitamin E and zinc, suspected partially related to her gastric bypass and chronic PPI use. If she switches to plavix and off full ASA daily, we could consider decreasing omeprazole to 20 mg daily for 2 weeks, then stopping it and using famotidine PRN at bedtime for heartburn and reflux. If this is not well controlled, we could restart omeprazole at 20 mg daily and will continue monitoring zinc and vitamin E. Of note she did have treatment for SIBO as well.    She has intermittent bouts of loose stools once every 3-4 weeks for which she takes imodium as needed, which has been somewhat helpful. She has not been able to keep consistent with fiber supplementation, but is satisfied with her current bowel habits without fiber. She has no longer had abdominal bloating or abdominal pain and her weight has increased back towards baseline. She stopped her probiotic and she is not interested in the pelvic floor center.     We reviewed the following plan:   - Let me know if you switch from aspirin to plavix, and we can then consider decreasing you to 20 mg omeprazole daily for 2 weeks, then stopping it and can use famotidine as needed for heartburn and reflux  - Try a wedge pillow at night   - Continue imodium as needed  - If stools become more  consistently loose or irregular, restart daily fiber supplement  - Vitamin/mineral labs ordered to check absorption while on long term omeprazole (zinc, vitamin D, E, magnesium, iron studies, folate, BMP, copper)  - 1 year follow up, sooner if needed by calling [phone]      Video-Visit Details  Video Visit Time: 26 min  Type of service:  Video Visit  Originating Location (pt. Location): Home    Distant Location (provider location):  Off-site  Platform used for Video Visit: [employer]    45 minutes spent on the date of the encounter doing chart review, history and exam, documentation and further activities as noted above.    Karen Mcgee PA-C  Division of Gastroenterology, Hepatology, and Nutrition  [facility]     RTC 12 months      Again, thank you for allowing me to participate in the care of your patient.        Sincerely,        Karen Mcgee PA-C    Electronically signed Detail Level: Simple Instructions: This plan will send the code FBSE to the PM system.  DO NOT or CHANGE the price. Price (Do Not Change): 0.00

## 2025-03-20 SDOH — HEALTH STABILITY: PHYSICAL HEALTH: ON AVERAGE, HOW MANY DAYS PER WEEK DO YOU ENGAGE IN MODERATE TO STRENUOUS EXERCISE (LIKE A BRISK WALK)?: 0 DAYS

## 2025-03-20 SDOH — HEALTH STABILITY: PHYSICAL HEALTH: ON AVERAGE, HOW MANY MINUTES DO YOU ENGAGE IN EXERCISE AT THIS LEVEL?: 0 MIN

## 2025-03-20 ASSESSMENT — SOCIAL DETERMINANTS OF HEALTH (SDOH): HOW OFTEN DO YOU GET TOGETHER WITH FRIENDS OR RELATIVES?: THREE TIMES A WEEK

## 2025-03-24 ENCOUNTER — TELEPHONE (OUTPATIENT)
Dept: UROLOGY | Facility: CLINIC | Age: 70
End: 2025-03-24

## 2025-03-24 ENCOUNTER — OFFICE VISIT (OUTPATIENT)
Dept: INTERNAL MEDICINE | Facility: CLINIC | Age: 70
End: 2025-03-24
Attending: INTERNAL MEDICINE
Payer: MEDICARE

## 2025-03-24 VITALS
WEIGHT: 173 LBS | BODY MASS INDEX: 27.8 KG/M2 | HEIGHT: 66 IN | OXYGEN SATURATION: 98 % | HEART RATE: 58 BPM | SYSTOLIC BLOOD PRESSURE: 110 MMHG | RESPIRATION RATE: 16 BRPM | DIASTOLIC BLOOD PRESSURE: 70 MMHG | TEMPERATURE: 97.3 F

## 2025-03-24 DIAGNOSIS — K90.9 INTESTINAL MALABSORPTION, UNSPECIFIED TYPE: ICD-10-CM

## 2025-03-24 DIAGNOSIS — Z98.84 HISTORY OF GASTRIC BYPASS: ICD-10-CM

## 2025-03-24 DIAGNOSIS — I10 ESSENTIAL HYPERTENSION WITH GOAL BLOOD PRESSURE LESS THAN 140/90: ICD-10-CM

## 2025-03-24 DIAGNOSIS — Z13.220 SCREENING FOR HYPERLIPIDEMIA: ICD-10-CM

## 2025-03-24 DIAGNOSIS — E60 ZINC DEFICIENCY: ICD-10-CM

## 2025-03-24 DIAGNOSIS — Z00.00 ENCOUNTER FOR ANNUAL WELLNESS EXAM IN MEDICARE PATIENT: Primary | ICD-10-CM

## 2025-03-24 DIAGNOSIS — G47.30 SLEEP APNEA, UNSPECIFIED TYPE: ICD-10-CM

## 2025-03-24 DIAGNOSIS — Z79.899 LONG-TERM CURRENT USE OF PROTON PUMP INHIBITOR THERAPY: ICD-10-CM

## 2025-03-24 DIAGNOSIS — E56.0 VITAMIN E DEFICIENCY: ICD-10-CM

## 2025-03-24 DIAGNOSIS — E83.00 DISORDER OF COPPER METABOLISM, UNSPECIFIED (H): ICD-10-CM

## 2025-03-24 DIAGNOSIS — Z13.29 SCREENING FOR THYROID DISORDER: ICD-10-CM

## 2025-03-24 DIAGNOSIS — G43.109 MIGRAINE WITH AURA AND WITHOUT STATUS MIGRAINOSUS, NOT INTRACTABLE: ICD-10-CM

## 2025-03-24 PROBLEM — K63.8219 SMALL INTESTINAL BACTERIAL OVERGROWTH (SIBO): Status: ACTIVE | Noted: 2023-02-06

## 2025-03-24 LAB
BASOPHILS # BLD AUTO: 0 10E3/UL (ref 0–0.2)
BASOPHILS NFR BLD AUTO: 1 %
EOSINOPHIL # BLD AUTO: 0.1 10E3/UL (ref 0–0.7)
EOSINOPHIL NFR BLD AUTO: 2 %
ERYTHROCYTE [DISTWIDTH] IN BLOOD BY AUTOMATED COUNT: 12.7 % (ref 10–15)
FOLATE SERPL-MCNC: 25 NG/ML (ref 4.6–34.8)
HCT VFR BLD AUTO: 42 % (ref 35–47)
HGB BLD-MCNC: 14.3 G/DL (ref 11.7–15.7)
IMM GRANULOCYTES # BLD: 0 10E3/UL
IMM GRANULOCYTES NFR BLD: 0 %
LYMPHOCYTES # BLD AUTO: 1.3 10E3/UL (ref 0.8–5.3)
LYMPHOCYTES NFR BLD AUTO: 24 %
MCH RBC QN AUTO: 31.4 PG (ref 26.5–33)
MCHC RBC AUTO-ENTMCNC: 34 G/DL (ref 31.5–36.5)
MCV RBC AUTO: 92 FL (ref 78–100)
MONOCYTES # BLD AUTO: 0.5 10E3/UL (ref 0–1.3)
MONOCYTES NFR BLD AUTO: 9 %
NEUTROPHILS # BLD AUTO: 3.5 10E3/UL (ref 1.6–8.3)
NEUTROPHILS NFR BLD AUTO: 64 %
PLATELET # BLD AUTO: 223 10E3/UL (ref 150–450)
RBC # BLD AUTO: 4.56 10E6/UL (ref 3.8–5.2)
WBC # BLD AUTO: 5.4 10E3/UL (ref 4–11)

## 2025-03-24 PROCEDURE — G0439 PPPS, SUBSEQ VISIT: HCPCS | Performed by: INTERNAL MEDICINE

## 2025-03-24 PROCEDURE — 99000 SPECIMEN HANDLING OFFICE-LAB: CPT | Performed by: INTERNAL MEDICINE

## 2025-03-24 PROCEDURE — 82728 ASSAY OF FERRITIN: CPT | Performed by: INTERNAL MEDICINE

## 2025-03-24 PROCEDURE — 83735 ASSAY OF MAGNESIUM: CPT | Performed by: INTERNAL MEDICINE

## 2025-03-24 PROCEDURE — 80061 LIPID PANEL: CPT | Performed by: INTERNAL MEDICINE

## 2025-03-24 PROCEDURE — 80053 COMPREHEN METABOLIC PANEL: CPT | Performed by: INTERNAL MEDICINE

## 2025-03-24 PROCEDURE — 82525 ASSAY OF COPPER: CPT | Mod: 90 | Performed by: INTERNAL MEDICINE

## 2025-03-24 PROCEDURE — 3074F SYST BP LT 130 MM HG: CPT | Performed by: INTERNAL MEDICINE

## 2025-03-24 PROCEDURE — 3078F DIAST BP <80 MM HG: CPT | Performed by: INTERNAL MEDICINE

## 2025-03-24 PROCEDURE — 84443 ASSAY THYROID STIM HORMONE: CPT | Performed by: INTERNAL MEDICINE

## 2025-03-24 PROCEDURE — 84630 ASSAY OF ZINC: CPT | Mod: 90 | Performed by: INTERNAL MEDICINE

## 2025-03-24 PROCEDURE — 85025 COMPLETE CBC W/AUTO DIFF WBC: CPT | Performed by: INTERNAL MEDICINE

## 2025-03-24 PROCEDURE — 84446 ASSAY OF VITAMIN E: CPT | Mod: 90 | Performed by: INTERNAL MEDICINE

## 2025-03-24 PROCEDURE — 82607 VITAMIN B-12: CPT | Performed by: INTERNAL MEDICINE

## 2025-03-24 PROCEDURE — 36415 COLL VENOUS BLD VENIPUNCTURE: CPT | Performed by: INTERNAL MEDICINE

## 2025-03-24 PROCEDURE — 83540 ASSAY OF IRON: CPT | Performed by: INTERNAL MEDICINE

## 2025-03-24 PROCEDURE — 83550 IRON BINDING TEST: CPT | Performed by: INTERNAL MEDICINE

## 2025-03-24 PROCEDURE — 82306 VITAMIN D 25 HYDROXY: CPT | Performed by: INTERNAL MEDICINE

## 2025-03-24 PROCEDURE — 82746 ASSAY OF FOLIC ACID SERUM: CPT | Performed by: INTERNAL MEDICINE

## 2025-03-24 PROCEDURE — 99214 OFFICE O/P EST MOD 30 MIN: CPT | Mod: 25 | Performed by: INTERNAL MEDICINE

## 2025-03-24 RX ORDER — TOPIRAMATE 25 MG/1
25 TABLET, FILM COATED ORAL 2 TIMES DAILY
Qty: 180 TABLET | Refills: 3 | Status: SHIPPED | OUTPATIENT
Start: 2025-03-24

## 2025-03-24 NOTE — PATIENT INSTRUCTIONS
Patient Education   Well Visit, Over 65: Care Instructions  Well visits can help you stay healthy. Your doctor has checked your overall health and may have suggested ways to take good care of yourself. Your doctor also may have recommended tests. You can help prevent illness with healthy eating, good sleep, vaccinations, regular exercise, and other steps.    Get the tests that you and your doctor decide on. Depending on your age and risks, examples might include hearing tests as well as screening for colon, breast, and lung cancer. Screening helps find diseases before any symptoms appear.   Eat healthy foods. Choose fruits, vegetables, whole grains, lean protein, and low-fat dairy foods. Limit saturated fat, and reduce salt.     Limit alcohol. Men should have no more than 2 drinks a day. Women should have no more than 1. For some people, no alcohol is the best choice.   Exercise. It can help prevent falls. Get at least 30 minutes of exercise on most days of the week. Walking, yoga, and carie chi can be good choices.     Reach and stay at your healthy weight. This will lower your risk for many health problems.   Take care of your mental health. Try to stay connected with friends, family, and community, and find ways to manage stress.     If you're feeling depressed or hopeless, talk to someone. A counselor can help. If you don't have a counselor, talk to your doctor.   Talk to your doctor if you think you may have a problem with alcohol or drug use. This includes prescription medicines and illegal drugs.     Avoid tobacco and nicotine: Don't smoke, vape, or chew. If you need help quitting, talk to your doctor.   Practice safer sex. Getting tested, using condoms or dental dams, and limiting sex partners can help prevent STIs.     Make an advance directive. This is a legal way to tell your family and doctor what you want to happen at the end of your life or when you can't speak for yourself.   Prevent problems where you  "can. Protect your skin from too much sun, wash your hands, brush your teeth twice a day, and wear a seat belt in the car.   Where can you learn more?  Go to https://www.[employer].net/patiented  Enter K859 in the search box to learn more about \"Well Visit, Over 65: Care Instructions.\"  Current as of: April 30, 2024  Content Version: 14.4    4450-0242 [employer].   Care instructions adapted under license by your healthcare professional. If you have questions about a medical condition or this instruction, always ask your healthcare professional. [employer] disclaims any warranty or liability for your use of this information.       "

## 2025-03-24 NOTE — TELEPHONE ENCOUNTER
Message sent to Olivia Vasquez PA-C's team in [place] for follow up.    Mary Mcintosh RN, BSN

## 2025-03-24 NOTE — PROGRESS NOTES
"Preventive Care Visit  [facility]  Sanjeev Carmichael MD, Internal Medicine  Mar 24, 2025    Assessment & Plan     Encounter for annual wellness exam in Medicare patient  Adult wellness plan reviewed.  - CBC with platelets and differential    Sleep apnea, unspecified type  Chronic condition.  Patient will continue her use of a CPAP device.    Essential hypertension with goal blood pressure less than 140/90  Patient blood pressure is currently under good control. Assuming no unexpected abnormalities on her outstanding metabolic panel, we will continue her losartan at 50 mg daily, atenolol 50 mg daily, and hydrochlorothiazide 12.5 mg daily. Side effects of each medication were reviewed. Patient was encouraged to monitor blood pressure outside the clinic setting.   - Comprehensive metabolic panel (BMP + Alb, Alk Phos, ALT, AST, Total. Bili, TP); Future    Migraine with aura and without status migrainosus, not intractable  Patient has had a good response to the use of Topamax as a preventative measure for her migraine headaches. We did elect to continue her Topamax at 25 mg per mouth twice per day. Side effects medication reviewed. Patient in no further questions or concerns in this regard.   - topiramate (TOPAMAX) 25 MG tablet; Take 1 tablet (25 mg) by mouth 2 times daily.    Screening for thyroid disorder  - TSH with free T4 reflex    Screening for hyperlipidemia  - Lipid panel reflex to direct LDL Fasting        Patient has been advised of split billing requirements and indicates understanding: Yes        BMI  Estimated body mass index is 28.14 kg/m  as calculated from the following:    Height as of this encounter: 1.67 m (5' 5.75\").    Weight as of this encounter: 78.5 kg (173 lb).       Counseling  Appropriate preventive services were addressed with this patient via screening, questionnaire, or discussion as appropriate for fall prevention, nutrition, physical activity, Tobacco-use " cessation, social engagement, weight loss and cognition.  Checklist reviewing preventive services available has been given to the patient.  Reviewed patient's diet, addressing concerns and/or questions.   The patient was provided with written information regarding signs of hearing loss.   Information on urinary incontinence and treatment options given to patient.       See Patient Instructions    Cherise Garcia is a 69 year old, presenting for the following:  Wellness Visit        3/24/2025     9:09 AM   Additional Questions   Roomed by Karmen         Patient is a 69-year-old  female who presents to the clinic for annual wellness examination.  She does have a complicated past medical history, and she does see several specialists.  Patient does have a history of hypertension, and she has been taking 50 mg of losartan daily, hydrochlorothiazide 12.5 mg daily, and atenolol 50 mg/day.  She is tolerating all medications without issue.  Patient is also struggling with migraine headaches, she does take Topamax 25 mg twice per day for prophylaxis.  Patient has found this medication to be quite helpful.  She does report a stable appetite.  Patient is stooling and voiding without issue.  She is fasting for lab work today.          Advance Care Planning  Patient has a Health Care Directive on file  Advance care planning document is on file and is current.      3/20/2025   General Health   How would you rate your overall physical health? Good   Feel stress (tense, anxious, or unable to sleep) Not at all         3/20/2025   Nutrition   Diet: Low salt    Low fat/cholesterol    Carbohydrate counting    Other   If other, please elaborate: Low Fodmap       Multiple values from one day are sorted in reverse-chronological order         3/20/2025   Exercise   Days per week of moderate/strenous exercise 0 days   Average minutes spent exercising at this level 0 min   (!) EXERCISE CONCERN      3/20/2025   Social Factors    Frequency of gathering with friends or relatives Three times a week   Worry food won't last until get money to buy more No   Food not last or not have enough money for food? No   Do you have housing? (Housing is defined as stable permanent housing and does not include staying ouside in a car, in a tent, in an abandoned building, in an overnight shelter, or couch-surfing.) Yes   Are you worried about losing your housing? No   Lack of transportation? No   Unable to get utilities (heat,electricity)? No         3/20/2025   Fall Risk   Fallen 2 or more times in the past year? No    No   Trouble with walking or balance? No    No       Multiple values from one day are sorted in reverse-chronological order          3/20/2025   Activities of Daily Living- Home Safety   Needs help with the following daily activites None of the above   Safety concerns in the home None of the above         3/20/2025   Dental   Dentist two times every year? Yes         3/20/2025   Hearing Screening   Hearing concerns? (!) IT'S HARD TO FOLLOW A CONVERSATION IN A NOISY RESTAURANT OR CROWDED ROOM.         3/20/2025   Driving Risk Screening   Patient/family members have concerns about driving No         3/20/2025   General Alertness/Fatigue Screening   Have you been more tired than usual lately? No         3/20/2025   Urinary Incontinence Screening   Bothered by leaking urine in past 6 months Yes           3/17/2024   TB Screening   Were you born outside of the [place]? No           Today's PHQ-2 Score:       3/24/2025     8:57 AM   PHQ-2 ( 1999 Pf[employer])   Q1: Little interest or pleasure in doing things 0   Q2: Feeling down, depressed or hopeless 0   PHQ-2 Score 0    Q1: Little interest or pleasure in doing things Not at all   Q2: Feeling down, depressed or hopeless Not at all   PHQ-2 Score 0       Patient-reported           3/20/2025   Substance Use   Alcohol more than 3/day or more than 7/wk No   Do you have a current opioid prescription? No   How  severe/bad is pain from 1 to 10? /10   Do you use any other substances recreationally? (!) OTHER     Social History     Tobacco Use    Smoking status: Never    Smokeless tobacco: Never   Vaping Use    Vaping status: Never Used   Substance Use Topics    Alcohol use: Yes     Comment: Social    Drug use: No           2024   LAST FHS-7 RESULTS   1st degree relative breast or ovarian cancer Yes   Any relative bilateral breast cancer Yes   Any male have breast cancer No   Any ONE woman have BOTH breast AND ovarian cancer No   Any woman with breast cancer before 50yrs No   2 or more relatives with breast AND/OR ovarian cancer No   2 or more relatives with breast AND/OR bowel cancer Yes       Mammogram Screening - Mammogram every 1-2 years updated in Health Maintenance based on mutual decision making      History of abnormal Pap smear: Status post hysterectomy with removal of cervix and no history of CIN2 or greater or cervical cancer. Health Maintenance and Surgical History updated.        Latest Ref Rng & Units 2015    11:06 AM 2015    12:00 AM 2012     1:43 PM   PAP / HPV   PAP (Historical)   NIL  NIL    HPV 16 DNA NEG Negative      HPV 18 DNA NEG Negative      Other HR HPV NEG Negative        ASCVD Risk   The ASCVD Risk score (Ethan CASTILLO, et al., 2019) failed to calculate for the following reasons:    Risk score cannot be calculated because patient has a medical history suggesting prior/existing ASCVD    Fracture Risk Assessment Tool  Link to Frax Calculator  Use the information below to complete the Frax calculator  : 1955  Sex: female  Weight (kg): 78.5 kg (actual weight)  Height (cm): 167 cm  Previous Fragility Fracture:  No  History of parent with fractured hip:  No  Current Smoking:  No  Patient has been on glucocorticoids for more than 3 months (5mg/day or more): No  Rheumatoid Arthritis on Problem List:  No  Secondary Osteoporosis on Problem List:  No  Consumes 3 or more  units of alcohol per day: No  Femoral Neck BMD (g/cm2)    Reviewed and updated as needed this visit by Provider                    Lab work is in process  Current providers sharing in care for this patient include:  Patient Care Team:  Sanjeev Carmichael MD as PCP - General (Internal Medicine)  Olivia Vasquez PA-C as Physician Assistant (Physician Assistant - Medical)  Carolyn Valdivia MD as MD (Urology)  Lita Goodman, RN as Specialty Care Coordinator (Urology)  Lian Martinez MD as Referring Physician (Internal Medicine)  Carolyn Valdivia MD as Assigned Surgical Provider  Lily Yancey MD as MD (OB/Gyn)  Mary Mathews MD as Referring Physician (Internal Medicine)  Olivia Vasquez PA-C as Physician Assistant (Urology)  Alexandra Whitten MD as MD (Infectious Diseases)  Ynes Vuong PA-C as Physician Assistant (Gastroenterology)  Olivia Vasquez PA-C as Physician Assistant (Urology)  Kaitlyn Wolfe MD as Hospitalist (Endocrinology, Diabetes, and Metabolism)  Sylvia Hernández RD as Registered Dietitian (Dietitian, Registered)  Karen Cabrera PA-C as Physician Assistant (Gastroenterology)  Karen Cabrera PA-C as Assigned Gastroenterology Provider  Sanjeev Carmichael MD as Assigned PCP  Olivia Vasquez PA-C as Assigned OBGYN Provider  Kaitlyn Wolfe MD as Assigned Endocrinology Provider  Karen Mcgee PA-C as Physician Assistant (Gastroenterology)    The following health maintenance items are reviewed in Epic and correct as of today:  Health Maintenance   Topic Date Due    ANNUAL REVIEW OF HM ORDERS  10/10/2024    BMP  03/22/2025    MEDICARE ANNUAL WELLNESS VISIT  03/22/2025    COVID-19 Vaccine (8 - 2024-25 season) 04/17/2025    MAMMO SCREENING  07/19/2025    FALL RISK ASSESSMENT  03/24/2026    DIABETES SCREENING  03/22/2027    LIPID  03/22/2029    ADVANCE CARE PLANNING  03/22/2029    DTAP/TDAP/TD IMMUNIZATION (4 - Td or Tdap) 10/29/2030    DEXA   "11/21/2031    COLORECTAL CANCER SCREENING  10/06/2032    HEPATITIS C SCREENING  Completed    PHQ-2 (once per calendar year)  Completed    INFLUENZA VACCINE  Completed    Pneumococcal Vaccine: 50+ Years  Completed    ZOSTER IMMUNIZATION  Completed    RSV VACCINE  Completed    HPV IMMUNIZATION  Aged Out    MENINGITIS IMMUNIZATION  Aged Out         Review of Systems  CONSTITUTIONAL: NEGATIVE for fever, chills, change in weight  INTEGUMENTARY/SKIN: NEGATIVE for worrisome rashes, moles or lesions  ENT/MOUTH: NEGATIVE for ear, mouth and throat problems  RESP: NEGATIVE for significant cough or SOB  CV: NEGATIVE for chest pain, palpitations or peripheral edema  GI: NEGATIVE for nausea, abdominal pain, heartburn, or change in bowel habits  : NEGATIVE for frequency, dysuria, or hematuria  MUSCULOSKELETAL: Positive for arthralgias.  NEURO: Positive for migraines.     Objective    Exam  /70   Pulse 58   Temp 97.3  F (36.3  C) (Tympanic)   Resp 16   Ht 1.67 m (5' 5.75\")   Wt 78.5 kg (173 lb)   LMP  (LMP Unknown)   SpO2 98%   BMI 28.14 kg/m     Estimated body mass index is 28.14 kg/m  as calculated from the following:    Height as of this encounter: 1.67 m (5' 5.75\").    Weight as of this encounter: 78.5 kg (173 lb).    Physical Exam  Constitutional:       Appearance: Normal appearance.   HENT:      Head: Normocephalic and atraumatic.      Right Ear: Tympanic membrane, ear canal and external ear normal.      Left Ear: Tympanic membrane, ear canal and external ear normal.      Mouth/Throat:      Mouth: Mucous membranes are moist.      Pharynx: Oropharynx is clear.   Eyes:      Extraocular Movements: Extraocular movements intact.      Conjunctiva/sclera: Conjunctivae normal.      Pupils: Pupils are equal, round, and reactive to light.   Cardiovascular:      Rate and Rhythm: Normal rate and regular rhythm.      Pulses: Normal pulses.      Heart sounds: Normal heart sounds.   Pulmonary:      Effort: Pulmonary effort " is normal.      Breath sounds: Normal breath sounds.   Abdominal:      General: Bowel sounds are normal.      Palpations: Abdomen is soft.   Musculoskeletal:      Cervical back: Normal range of motion and neck supple.   Skin:     General: Skin is warm and dry.      Capillary Refill: Capillary refill takes less than 2 seconds.   Neurological:      Mental Status: She is alert and oriented to person, place, and time. Mental status is at baseline.               3/24/2025   Mini Cog   Clock Draw Score 2 Normal   3 Item Recall 3 objects recalled   Mini Cog Total Score 5         Signed Electronically by: Sanjeev Carmichael MD

## 2025-03-24 NOTE — TELEPHONE ENCOUNTER
[facility] Call Center    Phone Message    May a detailed message be left on voicemail: yes     Reason for Call: Medication Refill Request    Has the patient contacted the pharmacy for the refill? Yes   Name of medication being requested: Fesoterodine Fumarate 8 MG TB24 [[id number]] (Order [id number])  pt says pharmacy says this is no longer available  Provider who prescribed the medication: carl  [facility]: [facility]  Date medication is needed: asap, pt has been out for a while   Action Taken: Other: urology    Travel Screening: Not Applicable     Date of Service:

## 2025-03-25 DIAGNOSIS — I10 ESSENTIAL HYPERTENSION WITH GOAL BLOOD PRESSURE LESS THAN 140/90: ICD-10-CM

## 2025-03-25 DIAGNOSIS — N39.41 URGE INCONTINENCE: ICD-10-CM

## 2025-03-25 DIAGNOSIS — R39.15 URINARY URGENCY: Primary | ICD-10-CM

## 2025-03-25 DIAGNOSIS — R39.15 URINARY URGENCY: ICD-10-CM

## 2025-03-25 DIAGNOSIS — R00.2 PALPITATIONS: ICD-10-CM

## 2025-03-25 LAB
ALBUMIN SERPL BCG-MCNC: 4.3 G/DL (ref 3.5–5.2)
ALP SERPL-CCNC: 121 U/L (ref 40–150)
ALT SERPL W P-5'-P-CCNC: 24 U/L (ref 0–50)
ANION GAP SERPL CALCULATED.3IONS-SCNC: 10 MMOL/L (ref 7–15)
AST SERPL W P-5'-P-CCNC: 31 U/L (ref 0–45)
BILIRUB SERPL-MCNC: 0.6 MG/DL
BUN SERPL-MCNC: 13.9 MG/DL (ref 8–23)
CALCIUM SERPL-MCNC: 9.3 MG/DL (ref 8.8–10.4)
CHLORIDE SERPL-SCNC: 101 MMOL/L (ref 98–107)
CHOLEST SERPL-MCNC: 173 MG/DL
COPPER SERPL-MCNC: 110.6 UG/DL
CREAT SERPL-MCNC: 0.64 MG/DL (ref 0.51–0.95)
EGFRCR SERPLBLD CKD-EPI 2021: >90 ML/MIN/1.73M2
FASTING STATUS PATIENT QL REPORTED: YES
FASTING STATUS PATIENT QL REPORTED: YES
FERRITIN SERPL-MCNC: 65 NG/ML (ref 11–328)
GLUCOSE SERPL-MCNC: 89 MG/DL (ref 70–99)
HCO3 SERPL-SCNC: 28 MMOL/L (ref 22–29)
HDLC SERPL-MCNC: 75 MG/DL
IRON BINDING CAPACITY (ROCHE): 379 UG/DL (ref 240–430)
IRON SATN MFR SERPL: 32 % (ref 15–46)
IRON SERPL-MCNC: 123 UG/DL (ref 37–145)
LDLC SERPL CALC-MCNC: 87 MG/DL
MAGNESIUM SERPL-MCNC: 2.3 MG/DL (ref 1.7–2.3)
NONHDLC SERPL-MCNC: 98 MG/DL
POTASSIUM SERPL-SCNC: 3.8 MMOL/L (ref 3.4–5.3)
PROT SERPL-MCNC: 6.7 G/DL (ref 6.4–8.3)
SODIUM SERPL-SCNC: 139 MMOL/L (ref 135–145)
TRIGL SERPL-MCNC: 57 MG/DL
TSH SERPL DL<=0.005 MIU/L-ACNC: 1.39 UIU/ML (ref 0.3–4.2)
VIT B12 SERPL-MCNC: 648 PG/ML (ref 232–1245)
VIT D+METAB SERPL-MCNC: 48 NG/ML (ref 20–50)
ZINC SERPL-MCNC: 79.8 UG/DL

## 2025-03-25 RX ORDER — FESOTERODINE FUMARATE 8 MG/1
1 TABLET, FILM COATED, EXTENDED RELEASE ORAL DAILY
Qty: 90 TABLET | Refills: 0 | Status: SHIPPED | OUTPATIENT
Start: 2025-03-25

## 2025-03-25 RX ORDER — HYDROCHLOROTHIAZIDE 12.5 MG/1
12.5 TABLET ORAL DAILY
Qty: 90 TABLET | Refills: 3 | Status: SHIPPED | OUTPATIENT
Start: 2025-03-25

## 2025-03-25 RX ORDER — LOSARTAN POTASSIUM 50 MG/1
50 TABLET ORAL DAILY
Qty: 90 TABLET | Refills: 3 | Status: SHIPPED | OUTPATIENT
Start: 2025-03-25

## 2025-03-25 RX ORDER — ATENOLOL 50 MG/1
50 TABLET ORAL DAILY
Qty: 90 TABLET | Refills: 3 | Status: SHIPPED | OUTPATIENT
Start: 2025-03-25

## 2025-03-25 NOTE — TELEPHONE ENCOUNTER
Prescription is not covered, it is available.  She will use a good rx coupon and pay zavala.  Laurie Johnson LPN

## 2025-03-26 LAB
A-TOCOPHEROL VIT E SERPL-MCNC: 12.7 MG/L
BETA+GAMMA TOCOPHEROL SERPL-MCNC: 0.3 MG/L

## 2025-04-05 ENCOUNTER — MYC MEDICAL ADVICE (OUTPATIENT)
Dept: GASTROENTEROLOGY | Facility: CLINIC | Age: 70
End: 2025-04-05
Payer: MEDICARE

## 2025-04-05 DIAGNOSIS — R19.7 DIARRHEA, UNSPECIFIED TYPE: Primary | ICD-10-CM

## 2025-04-07 RX ORDER — LOPERAMIDE HYDROCHLORIDE 2 MG/1
2 TABLET ORAL PRN
Qty: 100 TABLET | Refills: 1 | Status: SHIPPED | OUTPATIENT
Start: 2025-04-07

## 2025-04-09 ENCOUNTER — MYC REFILL (OUTPATIENT)
Dept: INTERNAL MEDICINE | Facility: CLINIC | Age: 70
End: 2025-04-09
Payer: MEDICARE

## 2025-04-09 DIAGNOSIS — B00.1 RECURRENT COLD SORES: ICD-10-CM

## 2025-04-10 RX ORDER — VALACYCLOVIR HYDROCHLORIDE 1 G/1
2000 TABLET, FILM COATED ORAL 2 TIMES DAILY
Qty: 4 TABLET | Refills: 1 | Status: SHIPPED | OUTPATIENT
Start: 2025-04-10

## 2025-04-12 ENCOUNTER — HEALTH MAINTENANCE LETTER (OUTPATIENT)
Age: 70
End: 2025-04-12

## 2025-04-15 DIAGNOSIS — N95.2 ATROPHIC VAGINITIS: ICD-10-CM

## 2025-06-30 DIAGNOSIS — R39.15 URINARY URGENCY: ICD-10-CM

## 2025-06-30 DIAGNOSIS — N39.41 URGE INCONTINENCE: ICD-10-CM

## 2025-06-30 RX ORDER — FESOTERODINE FUMARATE 8 MG/1
1 TABLET, FILM COATED, EXTENDED RELEASE ORAL DAILY
Qty: 90 TABLET | Refills: 0 | Status: SHIPPED | OUTPATIENT
Start: 2025-06-30

## 2025-07-01 DIAGNOSIS — N39.41 URGE INCONTINENCE: ICD-10-CM

## 2025-07-01 DIAGNOSIS — R39.15 URINARY URGENCY: ICD-10-CM

## 2025-07-01 RX ORDER — FESOTERODINE FUMARATE 8 MG/1
1 TABLET, FILM COATED, EXTENDED RELEASE ORAL DAILY
Qty: 90 TABLET | Refills: 0 | OUTPATIENT
Start: 2025-07-01

## 2025-07-07 NOTE — TELEPHONE ENCOUNTER
Please see Pitzi message and advise.     Thank you.      Axis (Degrees)            69                              R Axis (Degrees)            -78                             T Axis (Degrees)            17                              REPORT TEXT                                             Normal sinus rhythm   Left axis deviation   Minimal voltage criteria for LVH, may be normal variant   (   Connor product   )   Confirmed by RADHA JOHNSON Joseph Berrios, [facility]   

## 2025-07-14 ENCOUNTER — HOSPITAL ENCOUNTER (OUTPATIENT)
Dept: ULTRASOUND IMAGING | Facility: CLINIC | Age: 70
Discharge: HOME | End: 2025-07-14
Attending: INTERNAL MEDICINE | Admitting: INTERNAL MEDICINE
Payer: MEDICARE

## 2025-07-14 DIAGNOSIS — E04.1 THYROID NODULE: ICD-10-CM

## 2025-07-14 PROCEDURE — 76536 US EXAM OF HEAD AND NECK: CPT

## 2025-07-21 ENCOUNTER — VIRTUAL VISIT (OUTPATIENT)
Dept: ENDOCRINOLOGY | Facility: CLINIC | Age: 70
End: 2025-07-21
Payer: MEDICARE

## 2025-07-21 ENCOUNTER — ANCILLARY PROCEDURE (OUTPATIENT)
Dept: MAMMOGRAPHY | Facility: CLINIC | Age: 70
End: 2025-07-21
Attending: INTERNAL MEDICINE
Payer: MEDICARE

## 2025-07-21 DIAGNOSIS — Z12.31 VISIT FOR SCREENING MAMMOGRAM: ICD-10-CM

## 2025-07-21 DIAGNOSIS — E04.1 THYROID NODULE: Primary | ICD-10-CM

## 2025-07-21 PROCEDURE — 98006 SYNCH AUDIO-VIDEO EST MOD 30: CPT | Performed by: INTERNAL MEDICINE

## 2025-07-21 PROCEDURE — 77063 BREAST TOMOSYNTHESIS BI: CPT | Mod: TC | Performed by: RADIOLOGY

## 2025-07-21 PROCEDURE — 1126F AMNT PAIN NOTED NONE PRSNT: CPT | Mod: 95 | Performed by: INTERNAL MEDICINE

## 2025-07-21 PROCEDURE — 77067 SCR MAMMO BI INCL CAD: CPT | Mod: TC | Performed by: RADIOLOGY

## 2025-07-21 NOTE — PATIENT INSTRUCTIONS
[facility]  Dr Wolfe, Endocrinology Department    [facility] [place] [id number]  [place] [address]  Appointment [phone]  Fax: [phone]  [place]: Monday - Thursday      Recommend biopsy of both nodules (#3 and #4) on left.  Tentative US and follow up in 6-7 months.  Please make a lab appointment for blood work and follow up clinic appointment in 1 week after that to discuss results.     [facility] radiology scheduleing   [place]  [phone]   [facility] Radiology scheduling  [place]  [phone]     Please call and schedule the recommended test as discussed in clinic visit. These are the numbers to call.

## 2025-07-21 NOTE — LETTER
"7/21/2025      Jose Coronado  [address] [place] [address]      Dear Colleague,    Thank you for referring your patient, Jose Coronado, to the [facility]. Please see a copy of my visit note below.    THIS IS A VIDEO VISIT:    Phone call visit/virtual visit encounter:    Name of patient: Jose Coronado    Date of encounter: 7/21/2025    Time of start of video visit: 8:28    Video started: 8:39    Video ended: 8:56    Provider location: working from home/ [facility]    Patient location: patients home.    Mode of transmission: [employer] video/ [employer]    Verbal consent: obtained before starting visit. Pt is agreeable.      The patient has been notified of following:      \"This VIDEO visit will be conducted via a call between you and your physician/provider. We have found that certain health care needs can be provided without the need for a physical exam.  This service lets us provide the care you need with a short phone conversation.  If a prescription is necessary we can send it directly to your pharmacy.  If lab work is needed we can place an order for that and you can then stop by our lab to have the test done at a later time.     With new updates with corona virus patient might be billed as clinic visit.     If during the course of the call the physician/provider feels a telephone visit is not appropriate, you will not be charged for this service.\"      Past medical history, social history, family history, allergy and medications were reviewed and updated as appropriate.  Reviewed pertinent labs, notes, imaging studies personally.    Name: Jose Coronado  Seen for follow up of thyroid nodule.   HPI:  Jose Coronado is a 69 year old female who presents for the evaluation of thyroid nodule.   has a past medical history of Anemia, Arthritis (2015), CVA (cerebral vascular accident) (H) (2017), Diffuse cystic mastopathy, HTN, goal below 140/90, Hyperparathyroidism, Infectious " mononucleosis, Irregular heart beat, Labyrinthitis, unspecified, Migraine headache with aura, Osteopenia, Pain in joint, shoulder region, Palpitations, PFO (patent foramen ovale), S/P gastric bypass (June, 2010), Sleep apnea, Sleep apnea, Thyroid nodule (1/13/2025), and Vitamin D deficiencies.      Incidental finding of thyroid nodule on 11/2023 CT chest/abdomen.   Follow-up thyroid ultrasound 12/2023: 3 small thyroid nodules were seen PLUS Hypoechoic, solid structure measuring 2.3 x 1.7 x 1.6 appears inferior  and posterior to the left thyroid gland and may reflect a parathyroid adenoma.     US 1/2025: 3 stable appearing nodules. Stable 2.4 x 1.9 x 1.8 cm solid hypoechoic nodule abutting the inferior to the left lobe, previously 2.3 x 1.7 x 1.6 cm. likely a parathyroid adenoma. ( Nodule #4 -- now showing ill defined margins)-- plan to continue to follow up.    US 7/2025: Thyroid nodules 1-3 are stable.  Nodule 4, that is exophytic to the left thyroid, could be an exophytic thyroid nodule versus a parathyroid nodule. This nodule is slightly larger.     Normal calcium,PTH and TSH.    H/o parathyroid surgery in 2011. (Right inferior parathyroid gland was removed).  Follow-up calcium and PTH levels were in normal range.  H/o gastric bypass in 2010    No FH of thyroid cancer  No history of radiation  No compressive s/s  Thyroid labs in acceptable range.  She is not on thyroid hormone replacement.  No other major risk factors.    Feeling OK. Recovered from sinus infection.  Not sleeping well. Using CPAP.    Palpitations: History of Arrythmia--on beta blocker. H/o PFO (closed) and ASD.  Diarrhea/Constipation:h/o gastric bypass-- lost 120 lbs and gained some back. H/o SIBO, irregular bowel patterns., diarrhea. Followed by GI.  Changes in menses: s/p menopause  Dysphagia or Shortness of breath:No  Tremors:No  Takes calcium supplement and vit D supplement.  Changes in weight: lost wt in 2023-- and now stable.gained about 5  lbs since last visit.  Wt Readings from Last 2 Encounters:   03/24/25 78.5 kg (173 lb)   03/06/25 79.4 kg (175 lb)      PMH/PSH:  Past Medical History:   Diagnosis Date     Anemia      Arthritis 2015    Hands, back, hips. Various dates first noted.     CVA (cerebral vascular accident) (H) 2017    ?migraine, ?pfo--negative vasc w/u, neg hypercoag w/u     Diffuse cystic mastopathy     Fibrocystic breast disease     HTN, goal below 140/90      Hyperparathyroidism      Infectious mononucleosis     Mono at age 16     Irregular heart beat     PAT no afib on 30day monitor     Labyrinthitis, unspecified      Migraine headache with aura      Osteopenia      Pain in joint, shoulder region     Secondary to a fall     Palpitations      PFO (patent foramen ovale)     s/p closure with amplazter device 7/13/17     S/P gastric bypass June, 2010     Sleep apnea     she is on CPAP     Sleep apnea      Thyroid nodule 1/13/2025     Vitamin D deficiencies      Past Surgical History:   Procedure Laterality Date     BIOPSY  1984    Breast biopsies     BREAST SURGERY  1984    Above     CARDIAC SURGERY  7/13/2017    PFO closure     COLONOSCOPY N/A 8/12/2015    Procedure: COLONOSCOPY;  Surgeon: Deandre Brooks MD;  Location:  GI     COLONOSCOPY N/A 10/6/2022    Procedure: COLONOSCOPY, WITH BIOPSIES;  Surgeon: Freddie Gonzalez MD;  Location:  GI     DAVINCI HYSTERECTOMY SUPRACERVICAL, SALPINGO-OOPHORECTOMY INCLUDING BILATERAL N/A 3/16/2020    Procedure: DAVINCI HYSTERECTOMY SUPRACERVICAL, SALPINGO-OOPHORECTOMY INCLUDING BILATERAL WITH EXAM UNDER ANESTHESIA;  Surgeon: Lily Yancey MD;  Location: [facility] OR     DAVINCI SACROCOLPOPEXY, MIDURETHRAL SLING, CYSTOSCOPY N/A 3/16/2020    Procedure: SACROCOLPOPEXY, ROBOT-ASSISTED, LAPAROSCOPIC, WITH INSERTION OF MIDURETHRAL SLING AND CYSTOSCOPY;  Surgeon: Carolyn Valdivia MD;  Location: [facility] OR     ESOPHAGOSCOPY, GASTROSCOPY, DUODENOSCOPY (EGD), COMBINED N/A 12/7/2023    Procedure:  Esophagoscopy, gastroscopy, duodenoscopy (EGD), combined;  Surgeon: Bubba Freeman MD;  Location:  GI     ESOPHAGOSCOPY, GASTROSCOPY, DUODENOSCOPY (EGD), COMBINED N/A 2024    Procedure: Esophagoscopy, gastroscopy, duodenoscopy (EGD), combined;  Surgeon: Bubba Freeman MD;  Location:  GI     GASTRIC BYPASS  2010     GENITOURINARY SURGERY  3/16/2020    Cystocele/rectocele repair     GYN SURGERY  3/16/2020    Hysterectomy     ORTHOPEDIC SURGERY Left 2010    wrist fracture     PARATHYROIDECTOMY  10     ZZC ANEURYSM, INTRACRAN, SIMPLE SURG  2017    coil of aneurysm right posterior paraophthalmic artery     ZZC NONSPECIFIC PROCEDURE      S/P multiple breast biopies - all negative / benign     ZZC NONSPECIFIC PROCEDURE      S/P T&A     ZZC NONSPECIFIC PROCEDURE      [place] teeth extraction     ZZC NONSPECIFIC PROCEDURE      S/P (? unreadable) ankle     Family Hx:  Family History   Problem Relation Age of Onset     Breast Cancer Mother      Hypertension Mother      Arthritis Mother      Thyroid Disease Mother         Hypo     Ulcerative Colitis Mother      Cerebrovascular Disease Mother         Age 78 - Cerebral Hemorrhage,      Anxiety Disorder Mother      Depression Mother      Genetic Disorder Mother         Ulcerative colitis     Obesity Mother      Anesthesia Reaction Mother         Same     Breast Cancer Maternal Aunt      Hypertension Paternal Grandmother      Cerebrovascular Disease Maternal Grandmother         Age 72 -      Family History Negative Maternal Grandfather      Family History Negative Paternal Grandfather      Family History Negative Son      Family History Negative Daughter      Other Cancer Father         Skin - Non-melanoma varieties     Hypertension Father      Asthma Father      Family History Negative Daughter      Ulcerative Colitis Sister      Hypertension Sister      Hyperlipidemia Sister      Anxiety Disorder Sister       Depression Sister      Diabetes Sister      Obesity Sister      Asthma Sister      Anesthesia Reaction Sister         Debilitating headaches     Hypertension Brother      Anxiety Disorder Brother      Depression Brother      Asthma Nephew      Hypertension Sister         Congestive Heart Failure     Anxiety Disorder Sister      Genetic Disorder Sister         Ulcerative colitis     Obesity Sister      Genetic Disorder Niece         Ulcerative colitis     Colon Cancer No family hx of      Coronary Artery Disease No family hx of      Mental Illness No family hx of      Substance Abuse No family hx of      Osteoporosis No family hx of      Deep Vein Thrombosis No family hx of                Social Hx:  Social History     Socioeconomic History     Marital status:      Spouse name: Not on file     Number of children: Not on file     Years of education: Not on file     Highest education level: Not on file   Occupational History     Not on file   Tobacco Use     Smoking status: Never     Smokeless tobacco: Never   Vaping Use     Vaping status: Never Used   Substance and Sexual Activity     Alcohol use: Yes     Comment: Social     Drug use: No     Sexual activity: Not Currently     Partners: Male     Birth control/protection: None   Other Topics Concern     Parent/sibling w/ CABG, MI or angioplasty before 65F 55M? No   Social History Narrative     Not on file     Social Drivers of Health     Financial Resource Strain: Low Risk  (3/20/2025)    Financial Resource Strain      Within the past 12 months, have you or your family members you live with been unable to get utilities (heat, electricity) when it was really needed?: No   Food Insecurity: Low Risk  (3/20/2025)    Food Insecurity      Within the past 12 months, did you worry that your food would run out before you got money to buy more?: No      Within the past 12 months, did the food you bought just not last and you didn t have money to get more?: No    Transportation Needs: Low Risk  (3/20/2025)    Transportation Needs      Within the past 12 months, has lack of transportation kept you from medical appointments, getting your medicines, non-medical meetings or appointments, work, or from getting things that you need?: No   Physical Activity: Inactive (3/20/2025)    Exercise Vital Sign      Days of Exercise per Week: 0 days      Minutes of Exercise per Session: 0 min   Stress: No Stress Concern Present (3/20/2025)    [employer] [place] [place] [employer] - Occupational Stress Questionnaire      Feeling of Stress : Not at all   Social Connections: Unknown (3/20/2025)    Social Connection and Isolation Panel [NHANES]      Frequency of Communication with Friends and Family: Not on file      Frequency of Social Gatherings with Friends and Family: Three times a week      Attends [employer] Services: Not on file      Active Member of Clubs or Organizations: Not on file      Attends Club or Organization Meetings: Not on file      Marital Status: Not on file   Interpersonal Safety: Low Risk  (3/24/2025)    Interpersonal Safety      Do you feel physically and emotionally safe where you currently live?: Yes      Within the past 12 months, have you been hit, slapped, kicked or otherwise physically hurt by someone?: No      Within the past 12 months, have you been humiliated or emotionally abused in other ways by your partner or ex-partner?: No   Housing Stability: Low Risk  (3/20/2025)    Housing Stability      Do you have housing? : Yes      Are you worried about losing your housing?: No          MEDICATIONS:  has a current medication list which includes the following prescription(s): vitamin c, aspirin, atenolol, calcium citrate-vitamin d, compounded non-controlled substance, cranberry, cyanocobalamin, ferrous sulfate, fesoterodine fumarate, hydrochlorothiazide, imipramine, loperamide, losartan, omeprazole, topiramate, UNABLE TO FIND, valacyclovir, cholecalciferol,  vitamin e, and zinc glycinate.    Review of Systems  10 point ROS neg other than the symptoms noted above in the HPI.    Physical Exam   VS: LMP  (LMP Unknown)   GENERAL: healthy, alert and no distress  EYES: Eyes grossly normal to inspection, conjunctivae and sclerae normal  ENT: no nose swelling, nasal discharge.  Thyroid: no apparent thyroid nodules  RESP: no audible wheeze, cough, or visible cyanosis.  No visible retractions or increased work of breathing.  Able to speak fully in complete sentences.  ABDO: not evaluated.  EXTREMITIES: no hand tremors.  NEURO: Cranial nerves grossly intact, mentation intact and speech normal  SKIN: No apparent skin lesions, rash or edema seen   PSYCH: mentation appears normal, affect normal/bright, judgement and insight intact, normal speech and appearance well-groomed    LABS:  TFTs:  TSH   Date Value Ref Range Status   03/24/2025 1.39 0.30 - 4.20 uIU/mL Final   02/14/2022 1.13 0.40 - 4.00 mU/L Final   03/23/2021 2.40 0.40 - 4.00 mU/L Final     Thyroid Ultrasound 12/2023:  IMPRESSION:  1.  Hypoechoic solid structure (2.3 cm) appears inferior and posterior  to the thyroid gland and may reflect a parathyroid adenoma. Correlate  with parathyroid laboratory values.  2.  Additional thyroid nodules as described above..    Thyroid Ultrasound 7/2025:  IMPRESSION:  1.  Thyroid nodules 1-3 are stable.  2.  Nodule 4, that is exophytic to the left thyroid, could be an exophytic thyroid nodule versus a parathyroid nodule. This nodule is slightly larger.    All pertinent notes, labs, and images personally reviewed by me.     A/P  Ms.Jan MERVIN Coronado is a 69 year old here for the evaluation of thyroid nodule:  #1 Thyroid Nodule:  7/2025 US: nodule #1, #2-- stable. Nodule #3: 2.5 cm, solid and nodule #4: interval increase in size--1.9 x 1.2 x 1.0 cm, previously 1.3 x 0.9 x 0.8 cm   No FH of thyroid cancer  No history of radiation  No compressive s/s  Thyroid labs in acceptable range.  She is not  on thyroid hormone replacement.  No other major risk factors.   Plan:  Discussed diagnosis, pathophysiology, management and treatment options of condition with pt.  In the setting of no major compressive symptoms and based on ultrasound results plan to continue to monitor.  Based on 7/2025 thyroid US-- Recommend biopsy of both nodules (#3 and #4) on left.  Tentative US and follow up in 6-7 months.  Please make a lab appointment for blood work and follow up clinic appointment in 1 week after that to discuss results.    #2.  Parathyroid adenoma ?;  Comment: H/o parathyroid surgery in 2011. (Right inferior parathyroid gland was removed).  Thyroid ultrasound showing incidental finding of hypoechoic solid structure measuring 2.3 cm inferior and posterior to left thyroid gland.  Concern for parathyroid adenoma.  History of parathyroidectomy in 2011 demonstrated status post removal of right inferior parathyroid adenoma.  Follow-up calcium and PTH in normal range.  She also has history of gastric bypass.  Plan:  Discussed diagnosis, pathophysiology, management and treatment options of condition with pt.  Normal calcium and PTH.  Plan to continue to monitor and FNA as noted above.    Plan: US Thyroid, US Biopsy Thyroid Fine Needle         Aspiration                 Discussed possible outcomes of biopsy including possible benign, possible malignancy and possible AUS. If AUS indication for molecular marker testing.  Discussed possible compressive symptoms and signs to watch for.  Discussed that Thyroid nodules are common and are frequently benign. Data suggest that the prevalence of palpable thyroid nodules is 3% to 7% in [place]; the prevalence is as high as 50% based on ultrasonography (US) or autopsy data. All patients with a palpable thyroid nodule, however, should undergo US examination. US-guided FNA (US-FNA) is recommended for nodules >=10 mm; US-FNA is suggested for nodules <10 mm only if clinical information or  US features are suspicious.  The frequency of thyroid nodules in general population was discussed. Also discussed possibility of malignancy, potential for thyroid autonomy.     Causes of thyroid Nodules: Benign (Multinodular goiter, Hashimoto s thyroiditis, Simple or hemorrhagic cysts, Follicular adenomas, Subacute thyroiditis) or Malignant(Papillary carcinoma, Follicular carcinoma, Hürthle cell carcinoma, Medullary carcinoma, Anaplastic carcinoma, Primary thyroid lymphoma, or Metastatic malignant lesion).    MultiNodule:  The risk of cancer is not significantly higher in palpable solitary thyroid nodules than in multinodular lesions or in nodules in diffuse goiters. In multinodular thyroid glands, the cytologic sampling should be focused on lesions characterized by suspicious US features rather than on larger or clinically dominant nodules.    Cyst:  Most complex thyroid nodules with a dominant fluid component are benign. USFNA, however, should always be performed because the rare papillary thyroid carcinoma (PTC) can be cystic. An unsatisfactory (nondiagnostic) specimen usually results from a cystic nodule that yields few or no follicular cells. Reaspiration yields satisfactory results in 50% of cases.    Discussed indications, risks and benefits of all medications prescribed, and answered questions to patient's satisfaction.  The longitudinal plan of care for the diagnosis(es)/condition(s) as documented were addressed during this visit. Due to the added complexity in care, I will continue to support Jose in the subsequent management and with ongoing continuity of care.  All questions were answered.  The patient indicates understanding of the above issues and agrees with the plan set forth.    Follow-up:  As noted in AVS    Kaitlyn Wolfe MD  Endocrinology   [facility]    Cc: Sanjeev Carmichael    Addendum to above note and clinic visit:    Labs reviewed.    See result note/telephone  encounter.          Again, thank you for allowing me to participate in the care of your patient.        Sincerely,        Kaitlyn Wolfe MD    Electronically signed

## 2025-07-21 NOTE — PROGRESS NOTES
"THIS IS A VIDEO VISIT:    Phone call visit/virtual visit encounter:    Name of patient: Jose Coronado    Date of encounter: 7/21/2025    Time of start of video visit: 8:28    Video started: 8:39    Video ended: 8:56    Provider location: working from home/ [facility]    Patient location: patients home.    Mode of transmission: [employer] video/ [facility]    Verbal consent: obtained before starting visit. Pt is agreeable.      The patient has been notified of following:      \"This VIDEO visit will be conducted via a call between you and your physician/provider. We have found that certain health care needs can be provided without the need for a physical exam.  This service lets us provide the care you need with a short phone conversation.  If a prescription is necessary we can send it directly to your pharmacy.  If lab work is needed we can place an order for that and you can then stop by our lab to have the test done at a later time.     With new updates with corona virus patient might be billed as clinic visit.     If during the course of the call the physician/provider feels a telephone visit is not appropriate, you will not be charged for this service.\"      Past medical history, social history, family history, allergy and medications were reviewed and updated as appropriate.  Reviewed pertinent labs, notes, imaging studies personally.    Name: Jose Coronado  Seen for follow up of thyroid nodule.   HPI:  Jose Coronado is a 69 year old female who presents for the evaluation of thyroid nodule.   has a past medical history of Anemia, Arthritis (2015), CVA (cerebral vascular accident) (H) (2017), Diffuse cystic mastopathy, HTN, goal below 140/90, Hyperparathyroidism, Infectious mononucleosis, Irregular heart beat, Labyrinthitis, unspecified, Migraine headache with aura, Osteopenia, Pain in joint, shoulder region, Palpitations, PFO (patent foramen ovale), S/P gastric bypass (June, 2010), Sleep apnea, Sleep apnea, " Thyroid nodule (1/13/2025), and Vitamin D deficiencies.      Incidental finding of thyroid nodule on 11/2023 CT chest/abdomen.   Follow-up thyroid ultrasound 12/2023: 3 small thyroid nodules were seen PLUS Hypoechoic, solid structure measuring 2.3 x 1.7 x 1.6 appears inferior  and posterior to the left thyroid gland and may reflect a parathyroid adenoma.     US 1/2025: 3 stable appearing nodules. Stable 2.4 x 1.9 x 1.8 cm solid hypoechoic nodule abutting the inferior to the left lobe, previously 2.3 x 1.7 x 1.6 cm. likely a parathyroid adenoma. ( Nodule #4 -- now showing ill defined margins)-- plan to continue to follow up.    US 7/2025: Thyroid nodules 1-3 are stable.  Nodule 4, that is exophytic to the left thyroid, could be an exophytic thyroid nodule versus a parathyroid nodule. This nodule is slightly larger.     Normal calcium,PTH and TSH.    H/o parathyroid surgery in 2011. (Right inferior parathyroid gland was removed).  Follow-up calcium and PTH levels were in normal range.  H/o gastric bypass in 2010    No FH of thyroid cancer  No history of radiation  No compressive s/s  Thyroid labs in acceptable range.  She is not on thyroid hormone replacement.  No other major risk factors.    Feeling OK. Recovered from sinus infection.  Not sleeping well. Using CPAP.    Palpitations: History of Arrythmia--on beta blocker. H/o PFO (closed) and ASD.  Diarrhea/Constipation:h/o gastric bypass-- lost 120 lbs and gained some back. H/o SIBO, irregular bowel patterns., diarrhea. Followed by GI.  Changes in menses: s/p menopause  Dysphagia or Shortness of breath:No  Tremors:No  Takes calcium supplement and vit D supplement.  Changes in weight: lost wt in 2023-- and now stable.gained about 5 lbs since last visit.  Wt Readings from Last 2 Encounters:   03/24/25 78.5 kg (173 lb)   03/06/25 79.4 kg (175 lb)      PMH/PSH:  Past Medical History:   Diagnosis Date    Anemia     Arthritis 2015    Hands, back, hips. Various dates first  noted.    CVA (cerebral vascular accident) (H) 2017    ?migraine, ?pfo--negative vasc w/u, neg hypercoag w/u    Diffuse cystic mastopathy     Fibrocystic breast disease    HTN, goal below 140/90     Hyperparathyroidism     Infectious mononucleosis     Mono at age 16    Irregular heart beat     PAT no afib on 30day monitor    Labyrinthitis, unspecified     Migraine headache with aura     Osteopenia     Pain in joint, shoulder region     Secondary to a fall    Palpitations     PFO (patent foramen ovale)     s/p closure with amplazter device 7/13/17    S/P gastric bypass June, 2010    Sleep apnea     she is on CPAP    Sleep apnea     Thyroid nodule 1/13/2025    Vitamin D deficiencies      Past Surgical History:   Procedure Laterality Date    BIOPSY  1984    Breast biopsies    BREAST SURGERY  1984    Above    CARDIAC SURGERY  7/13/2017    PFO closure    COLONOSCOPY N/A 8/12/2015    Procedure: COLONOSCOPY;  Surgeon: Deandre Brooks MD;  Location:  GI    COLONOSCOPY N/A 10/6/2022    Procedure: COLONOSCOPY, WITH BIOPSIES;  Surgeon: Freddie Gonzalez MD;  Location:  GI    DAVINCI HYSTERECTOMY SUPRACERVICAL, SALPINGO-OOPHORECTOMY INCLUDING BILATERAL N/A 3/16/2020    Procedure: DAVINCI HYSTERECTOMY SUPRACERVICAL, SALPINGO-OOPHORECTOMY INCLUDING BILATERAL WITH EXAM UNDER ANESTHESIA;  Surgeon: Lily Yancey MD;  Location: [facility]    DAVINCI SACROCOLPOPEXY, MIDURETHRAL SLING, CYSTOSCOPY N/A 3/16/2020    Procedure: SACROCOLPOPEXY, ROBOT-ASSISTED, LAPAROSCOPIC, WITH INSERTION OF MIDURETHRAL SLING AND CYSTOSCOPY;  Surgeon: Carolyn Valdivia MD;  Location: [facility] OR    ESOPHAGOSCOPY, GASTROSCOPY, DUODENOSCOPY (EGD), COMBINED N/A 12/7/2023    Procedure: Esophagoscopy, gastroscopy, duodenoscopy (EGD), combined;  Surgeon: Bubba Freeman MD;  Location:  GI    ESOPHAGOSCOPY, GASTROSCOPY, DUODENOSCOPY (EGD), COMBINED N/A 2/22/2024    Procedure: Esophagoscopy, gastroscopy, duodenoscopy (EGD), combined;  Surgeon:  Bubba Freeman MD;  Location:  GI    GASTRIC BYPASS  2010    GENITOURINARY SURGERY  3/16/2020    Cystocele/rectocele repair    GYN SURGERY  3/16/2020    Hysterectomy    ORTHOPEDIC SURGERY Left     wrist fracture    PARATHYROIDECTOMY  11    Z ANEURYSM, INTRACRAN, SIMPLE SURG  2017    coil of aneurysm right posterior paraophthalmic artery    ZZC NONSPECIFIC PROCEDURE      S/P multiple breast biopies - all negative / benign    ZZC NONSPECIFIC PROCEDURE      S/P T&A    ZZC NONSPECIFIC PROCEDURE      [place] teeth extraction    ZZC NONSPECIFIC PROCEDURE      S/P (? unreadable) ankle     Family Hx:  Family History   Problem Relation Age of Onset    Breast Cancer Mother     Hypertension Mother     Arthritis Mother     Thyroid Disease Mother         Hypo    Ulcerative Colitis Mother     Cerebrovascular Disease Mother         Age 78 - Cerebral Hemorrhage,     Anxiety Disorder Mother     Depression Mother     Genetic Disorder Mother         Ulcerative colitis    Obesity Mother     Anesthesia Reaction Mother         Same    Breast Cancer Maternal Aunt     Hypertension Paternal Grandmother     Cerebrovascular Disease Maternal Grandmother         Age 72 -     Family History Negative Maternal Grandfather     Family History Negative Paternal Grandfather     Family History Negative Son     Family History Negative Daughter     Other Cancer Father         Skin - Non-melanoma varieties    Hypertension Father     Asthma Father     Family History Negative Daughter     Ulcerative Colitis Sister     Hypertension Sister     Hyperlipidemia Sister     Anxiety Disorder Sister     Depression Sister     Diabetes Sister     Obesity Sister     Asthma Sister     Anesthesia Reaction Sister         Debilitating headaches    Hypertension Brother     Anxiety Disorder Brother     Depression Brother     Asthma Nephew     Hypertension Sister         Congestive Heart Failure    Anxiety Disorder Sister      Genetic Disorder Sister         Ulcerative colitis    Obesity Sister     Genetic Disorder Niece         Ulcerative colitis    Colon Cancer No family hx of     Coronary Artery Disease No family hx of     Mental Illness No family hx of     Substance Abuse No family hx of     Osteoporosis No family hx of     Deep Vein Thrombosis No family hx of                Social Hx:  Social History     Socioeconomic History    Marital status:      Spouse name: Not on file    Number of children: Not on file    Years of education: Not on file    Highest education level: Not on file   Occupational History    Not on file   Tobacco Use    Smoking status: Never    Smokeless tobacco: Never   Vaping Use    Vaping status: Never Used   Substance and Sexual Activity    Alcohol use: Yes     Comment: Social    Drug use: No    Sexual activity: Not Currently     Partners: Male     Birth control/protection: None   Other Topics Concern    Parent/sibling w/ CABG, MI or angioplasty before 65F 55M? No   Social History Narrative    Not on file     Social Drivers of Health     Financial Resource Strain: Low Risk  (3/20/2025)    Financial Resource Strain     Within the past 12 months, have you or your family members you live with been unable to get utilities (heat, electricity) when it was really needed?: No   Food Insecurity: Low Risk  (3/20/2025)    Food Insecurity     Within the past 12 months, did you worry that your food would run out before you got money to buy more?: No     Within the past 12 months, did the food you bought just not last and you didn t have money to get more?: No   Transportation Needs: Low Risk  (3/20/2025)    Transportation Needs     Within the past 12 months, has lack of transportation kept you from medical appointments, getting your medicines, non-medical meetings or appointments, work, or from getting things that you need?: No   Physical Activity: Inactive (3/20/2025)    Exercise Vital Sign     Days of Exercise per  Week: 0 days     Minutes of Exercise per Session: 0 min   Stress: No Stress Concern Present (3/20/2025)    [employer] - Occupational Stress Questionnaire     Feeling of Stress : Not at all   Social Connections: Unknown (3/20/2025)    Social Connection and Isolation Panel [NHANES]     Frequency of Communication with Friends and Family: Not on file     Frequency of Social Gatherings with Friends and Family: Three times a week     Attends Bahai Services: Not on file     Active Member of Clubs or Organizations: Not on file     Attends Club or Organization Meetings: Not on file     Marital Status: Not on file   Interpersonal Safety: Low Risk  (3/24/2025)    Interpersonal Safety     Do you feel physically and emotionally safe where you currently live?: Yes     Within the past 12 months, have you been hit, slapped, kicked or otherwise physically hurt by someone?: No     Within the past 12 months, have you been humiliated or emotionally abused in other ways by your partner or ex-partner?: No   Housing Stability: Low Risk  (3/20/2025)    Housing Stability     Do you have housing? : Yes     Are you worried about losing your housing?: No          MEDICATIONS:  has a current medication list which includes the following prescription(s): vitamin c, aspirin, atenolol, calcium citrate-vitamin d, compounded non-controlled substance, cranberry, cyanocobalamin, ferrous sulfate, fesoterodine fumarate, hydrochlorothiazide, imipramine, loperamide, losartan, omeprazole, topiramate, UNABLE TO FIND, valacyclovir, cholecalciferol, vitamin e, and zinc glycinate.    Review of Systems  10 point ROS neg other than the symptoms noted above in the HPI.    Physical Exam   VS: LMP  (LMP Unknown)   GENERAL: healthy, alert and no distress  EYES: Eyes grossly normal to inspection, conjunctivae and sclerae normal  ENT: no nose swelling, nasal discharge.  Thyroid: no apparent thyroid nodules  RESP: no audible wheeze,  cough, or visible cyanosis.  No visible retractions or increased work of breathing.  Able to speak fully in complete sentences.  ABDO: not evaluated.  EXTREMITIES: no hand tremors.  NEURO: Cranial nerves grossly intact, mentation intact and speech normal  SKIN: No apparent skin lesions, rash or edema seen   PSYCH: mentation appears normal, affect normal/bright, judgement and insight intact, normal speech and appearance well-groomed    LABS:  TFTs:  TSH   Date Value Ref Range Status   03/24/2025 1.39 0.30 - 4.20 uIU/mL Final   02/14/2022 1.13 0.40 - 4.00 mU/L Final   03/23/2021 2.40 0.40 - 4.00 mU/L Final     Thyroid Ultrasound 12/2023:  IMPRESSION:  1.  Hypoechoic solid structure (2.3 cm) appears inferior and posterior  to the thyroid gland and may reflect a parathyroid adenoma. Correlate  with parathyroid laboratory values.  2.  Additional thyroid nodules as described above..    Thyroid Ultrasound 7/2025:  IMPRESSION:  1.  Thyroid nodules 1-3 are stable.  2.  Nodule 4, that is exophytic to the left thyroid, could be an exophytic thyroid nodule versus a parathyroid nodule. This nodule is slightly larger.    All pertinent notes, labs, and images personally reviewed by me.     A/P  Ms.Jan MERVIN Coronado is a 69 year old here for the evaluation of thyroid nodule:  #1 Thyroid Nodule:  7/2025 US: nodule #1, #2-- stable. Nodule #3: 2.5 cm, solid and nodule #4: interval increase in size--1.9 x 1.2 x 1.0 cm, previously 1.3 x 0.9 x 0.8 cm   No FH of thyroid cancer  No history of radiation  No compressive s/s  Thyroid labs in acceptable range.  She is not on thyroid hormone replacement.  No other major risk factors.   Plan:  Discussed diagnosis, pathophysiology, management and treatment options of condition with pt.  In the setting of no major compressive symptoms and based on ultrasound results plan to continue to monitor.  Based on 7/2025 thyroid US-- Recommend biopsy of both nodules (#3 and #4) on left.  Tentative US and follow  up in 6-7 months.  Please make a lab appointment for blood work and follow up clinic appointment in 1 week after that to discuss results.    #2.  Parathyroid adenoma ?;  Comment: H/o parathyroid surgery in 2011. (Right inferior parathyroid gland was removed).  Thyroid ultrasound showing incidental finding of hypoechoic solid structure measuring 2.3 cm inferior and posterior to left thyroid gland.  Concern for parathyroid adenoma.  History of parathyroidectomy in 2011 demonstrated status post removal of right inferior parathyroid adenoma.  Follow-up calcium and PTH in normal range.  She also has history of gastric bypass.  Plan:  Discussed diagnosis, pathophysiology, management and treatment options of condition with pt.  Normal calcium and PTH.  Plan to continue to monitor and FNA as noted above.    Plan: US Thyroid, US Biopsy Thyroid Fine Needle         Aspiration                 Discussed possible outcomes of biopsy including possible benign, possible malignancy and possible AUS. If AUS indication for molecular marker testing.  Discussed possible compressive symptoms and signs to watch for.  Discussed that Thyroid nodules are common and are frequently benign. Data suggest that the prevalence of palpable thyroid nodules is 3% to 7% in [place]; the prevalence is as high as 50% based on ultrasonography (US) or autopsy data. All patients with a palpable thyroid nodule, however, should undergo US examination. US-guided FNA (US-FNA) is recommended for nodules >=10 mm; US-FNA is suggested for nodules <10 mm only if clinical information or US features are suspicious.  The frequency of thyroid nodules in general population was discussed. Also discussed possibility of malignancy, potential for thyroid autonomy.     Causes of thyroid Nodules: Benign (Multinodular goiter, Hashimoto s thyroiditis, Simple or hemorrhagic cysts, Follicular adenomas, Subacute thyroiditis) or Malignant(Papillary carcinoma, Follicular  carcinoma, Hürthle cell carcinoma, Medullary carcinoma, Anaplastic carcinoma, Primary thyroid lymphoma, or Metastatic malignant lesion).    MultiNodule:  The risk of cancer is not significantly higher in palpable solitary thyroid nodules than in multinodular lesions or in nodules in diffuse goiters. In multinodular thyroid glands, the cytologic sampling should be focused on lesions characterized by suspicious US features rather than on larger or clinically dominant nodules.    Cyst:  Most complex thyroid nodules with a dominant fluid component are benign. USFNA, however, should always be performed because the rare papillary thyroid carcinoma (PTC) can be cystic. An unsatisfactory (nondiagnostic) specimen usually results from a cystic nodule that yields few or no follicular cells. Reaspiration yields satisfactory results in 50% of cases.    Discussed indications, risks and benefits of all medications prescribed, and answered questions to patient's satisfaction.  The longitudinal plan of care for the diagnosis(es)/condition(s) as documented were addressed during this visit. Due to the added complexity in care, I will continue to support Jose in the subsequent management and with ongoing continuity of care.  All questions were answered.  The patient indicates understanding of the above issues and agrees with the plan set forth.    Follow-up:  As noted in AVS    Kaitlyn Wolfe MD  Endocrinology   [facility]    Cc: Sanjeev Carmichael    Addendum to above note and clinic visit:    Labs reviewed.    See result note/telephone encounter.

## 2025-07-21 NOTE — NURSING NOTE
Current patient location: [facility] [place] [address]    Is the patient currently in the state of [place]? YES    Visit mode: VIDEO    If the visit is dropped, the patient can be reconnected by:VIDEO VISIT: Text to cell phone:   Telephone Information:   Mobile [phone]   Mobile Not on file.    and VIDEO VISIT: Send to e-mail at: [email or web address]    Will anyone else be joining the visit? NO  (If patient encounters technical issues they should call [phone])    Are changes needed to the allergy or medication list? Pt stated no med changes    Are refills needed on medications prescribed by this physician? NO    Rooming Documentation:  Not applicable    Reason for visit: JOSE BARROW

## 2025-07-23 ENCOUNTER — HOSPITAL ENCOUNTER (OUTPATIENT)
Dept: MAMMOGRAPHY | Facility: CLINIC | Age: 70
Discharge: HOME | End: 2025-07-23
Attending: INTERNAL MEDICINE
Payer: MEDICARE

## 2025-07-23 DIAGNOSIS — R92.8 ABNORMAL MAMMOGRAM: ICD-10-CM

## 2025-07-23 PROCEDURE — 77065 DX MAMMO INCL CAD UNI: CPT | Mod: RT

## 2025-08-21 ENCOUNTER — OFFICE VISIT (OUTPATIENT)
Dept: INTERNAL MEDICINE | Facility: CLINIC | Age: 70
End: 2025-08-21
Payer: MEDICARE

## 2025-08-21 VITALS
DIASTOLIC BLOOD PRESSURE: 70 MMHG | HEIGHT: 65 IN | SYSTOLIC BLOOD PRESSURE: 119 MMHG | WEIGHT: 184.1 LBS | RESPIRATION RATE: 16 BRPM | BODY MASS INDEX: 30.67 KG/M2 | TEMPERATURE: 96.5 F | OXYGEN SATURATION: 99 % | HEART RATE: 72 BPM

## 2025-08-21 DIAGNOSIS — R19.00 ABDOMINAL MASS, UNSPECIFIED ABDOMINAL LOCATION: Primary | ICD-10-CM

## 2025-08-21 ASSESSMENT — PAIN SCALES - GENERAL: PAINLEVEL_OUTOF10: NO PAIN (0)

## 2025-08-25 ENCOUNTER — VIRTUAL VISIT (OUTPATIENT)
Dept: ENDOCRINOLOGY | Facility: CLINIC | Age: 70
End: 2025-08-25
Payer: MEDICARE

## 2025-08-25 DIAGNOSIS — E04.1 THYROID NODULE: Primary | ICD-10-CM

## 2025-08-25 PROCEDURE — 1126F AMNT PAIN NOTED NONE PRSNT: CPT | Performed by: INTERNAL MEDICINE

## 2025-08-25 PROCEDURE — 98006 SYNCH AUDIO-VIDEO EST MOD 30: CPT | Performed by: INTERNAL MEDICINE

## (undated) DEVICE — ESU GROUND PAD UNIVERSAL W/O CORD

## (undated) DEVICE — TUBING INSUFFLATION W/FILTER 10FT GS1016

## (undated) DEVICE — PAD CHUX UNDERPAD 30X36" P3036C

## (undated) DEVICE — CATH TRAY FOLEY SURESTEP 16FR WDRAIN BAG STLK LATEX A300316A

## (undated) DEVICE — SPONGE RAY-TEC 4X8" 7318

## (undated) DEVICE — DAVINCI S CANNULA SEAL 8.5-13MM 420206

## (undated) DEVICE — SYR 10ML LL W/O NDL 302995

## (undated) DEVICE — KIT POSITIONER PINK PAD XL ADVANCED 40588

## (undated) DEVICE — GLOVE PROTEXIS MICRO 7.0  2D73PM70

## (undated) DEVICE — DAVINCI SI DRAPE ACCESSORY KIT 3-ARM 420290

## (undated) DEVICE — WIPES FOLEY CARE SURESTEP PROVON DFC100

## (undated) DEVICE — SUCTION MANIFOLD DORNOCH ULTRA CART UL-CL500

## (undated) DEVICE — KIT ENDO TURNOVER/PROCEDURE W/CLEAN A SCOPE LINERS 103888

## (undated) DEVICE — LINEN ORTHO PACK 5446

## (undated) DEVICE — POSITIONER ARMBOARD FOAM 1PAIR LF FP-ARMB1

## (undated) DEVICE — DAVINCI S CANNULA SEAL 8MM 400077

## (undated) DEVICE — SU MONOCRYL 4-0 PS-2 18" UND Y496G

## (undated) DEVICE — LIGHT HANDLE X2

## (undated) DEVICE — RETR ELASTIC STAYS LONE STAR SHARP 5MM 8/PACK 3311-8G

## (undated) DEVICE — GLOVE PROTEXIS W/NEU-THERA 7.0  2D73TE70

## (undated) DEVICE — PACK SET-UP STD 9102

## (undated) DEVICE — RETR RING LONE STAR 14.1X14.1CM 3307G

## (undated) DEVICE — COVER CAMERA IN-LIGHT DISP LT-C02

## (undated) DEVICE — ENDO TROCAR BLUNT TIP KII BALLOON 12X100MM C0R47

## (undated) DEVICE — PEN MARKING SKIN W/PAPER RULER 31145785

## (undated) DEVICE — ADH SKIN CLOSURE PREMIERPRO EXOFIN 1.0ML 3470

## (undated) DEVICE — SOL NACL 0.9% IRRIG 1000ML BOTTLE 2F7124

## (undated) DEVICE — SU STRATAFIX PDS PLUS 3-0 SPIRAL SH 15CM SXPP1B420

## (undated) DEVICE — NDL COUNTER 20CT 31142493

## (undated) DEVICE — DAVINCI S FCP BIPOLAR FENESTRATED 420205

## (undated) DEVICE — GLOVE PROTEXIS BLUE W/NEU-THERA 7.5  2D73EB75

## (undated) DEVICE — SPONGE PACK VAGINAL 2"X9

## (undated) DEVICE — STABILIZER ARCH KOH-EFFICIENT 3.5CM KC-ARCH-35

## (undated) DEVICE — LINEN GOWN X4 5410

## (undated) DEVICE — SYR 70ML TOOMEY 041170

## (undated) DEVICE — ENDO TROCAR FIRST ENTRY KII FIOS ADV FIX 12X100MM CFF73

## (undated) DEVICE — TUBING IRRIG CYSTO/BLADDER SET 81" LF 2C4040

## (undated) DEVICE — DAVINCI S NDL DRIVER MEGA SUTURE CUT 420309

## (undated) DEVICE — PAD PERI INDIV WRAP 11" 2022A

## (undated) DEVICE — DAVINCI S NDL DRIVER MEGA 420194

## (undated) DEVICE — ENDO POUCH UNIV RETRIEVAL SYSTEM INZII 10MM CD001

## (undated) DEVICE — SU VICRYL 3-0 RB-1 27" J305H

## (undated) DEVICE — SU VICRYL 2-0 SH 27" UND J417H

## (undated) DEVICE — Device

## (undated) DEVICE — DAVINCI S GRASPER ENDOWRIST PROGRASP 420093

## (undated) DEVICE — NDL INSUFFLATION 13GA 120MM C2201

## (undated) DEVICE — DRAPE MAYO STAND 23X54 8337

## (undated) DEVICE — SOL WATER IRRIG 1000ML BOTTLE 2F7114

## (undated) DEVICE — DAVINCI S DRAPE ARM INSTRUMENT 420015

## (undated) DEVICE — DRAPE IOBAN INCISE 13X13" 6640EZ

## (undated) DEVICE — DAVINCI OBTURATOR 8MM BLADELESS 420023

## (undated) DEVICE — SU STRATAFIX PDS PLUS 2-0 SPIRAL SH 30CM SXPP1B416

## (undated) DEVICE — DAVINCI S MONOPOLAR SCISSORS PRECURVED HOT SHEARS 420179

## (undated) DEVICE — SOL NACL 0.9% INJ 1000ML BAG 2B1324X

## (undated) DEVICE — TUBING SMOKE EVAC PNEUVIEW 9660-XE

## (undated) DEVICE — UTERINE MANIPULATOR RUMI 6.7MMX8CM UMB678

## (undated) DEVICE — DRAPE WARMER 66X44" ORS-300

## (undated) DEVICE — DAVINCI HOT SHEARS TIP COVER  400180

## (undated) DEVICE — SU CLIP LAPRA TY XC200

## (undated) DEVICE — LINEN TOWEL PACK X5 5464

## (undated) DEVICE — SU VICRYL 0 UR-6 27" J603H

## (undated) DEVICE — ESU CORD BIPOLAR GREEN 10-4000

## (undated) DEVICE — JELLY LUBRICATING SURGILUBE 2OZ TUBE 0281-0205-02

## (undated) DEVICE — GOWN IMPERVIOUS SPECIALTY XLG/XLONG 32474

## (undated) DEVICE — DEVICE SUTURE PASSER 14GA WECK EFX EFXSP2

## (undated) DEVICE — ADAPTER DRAPE ALLY AU-AD

## (undated) RX ORDER — FENTANYL CITRATE 0.05 MG/ML
INJECTION, SOLUTION INTRAMUSCULAR; INTRAVENOUS
Status: DISPENSED
Start: 2022-10-06

## (undated) RX ORDER — AMINOPHYLLINE 25 MG/ML
INJECTION, SOLUTION INTRAVENOUS
Status: DISPENSED
Start: 2018-01-08

## (undated) RX ORDER — GLYCOPYRROLATE 0.2 MG/ML
INJECTION INTRAMUSCULAR; INTRAVENOUS
Status: DISPENSED
Start: 2020-03-16

## (undated) RX ORDER — FENTANYL CITRATE 50 UG/ML
INJECTION, SOLUTION INTRAMUSCULAR; INTRAVENOUS
Status: DISPENSED
Start: 2020-03-16

## (undated) RX ORDER — FENTANYL CITRATE 50 UG/ML
INJECTION, SOLUTION INTRAMUSCULAR; INTRAVENOUS
Status: DISPENSED
Start: 2017-07-13

## (undated) RX ORDER — GLYCOPYRROLATE 0.2 MG/ML
INJECTION, SOLUTION INTRAMUSCULAR; INTRAVENOUS
Status: DISPENSED
Start: 2017-02-16

## (undated) RX ORDER — FENTANYL CITRATE 50 UG/ML
INJECTION, SOLUTION INTRAMUSCULAR; INTRAVENOUS
Status: DISPENSED
Start: 2017-02-16

## (undated) RX ORDER — ACETAMINOPHEN 325 MG/1
TABLET ORAL
Status: DISPENSED
Start: 2020-03-16

## (undated) RX ORDER — PHENYLEPHRINE HCL IN 0.9% NACL 1 MG/10 ML
SYRINGE (ML) INTRAVENOUS
Status: DISPENSED
Start: 2020-03-16

## (undated) RX ORDER — CEFAZOLIN SODIUM 1 G/3ML
INJECTION, POWDER, FOR SOLUTION INTRAMUSCULAR; INTRAVENOUS
Status: DISPENSED
Start: 2020-03-16

## (undated) RX ORDER — EPHEDRINE SULFATE 50 MG/ML
INJECTION, SOLUTION INTRAMUSCULAR; INTRAVENOUS; SUBCUTANEOUS
Status: DISPENSED
Start: 2020-03-16

## (undated) RX ORDER — NALOXONE HYDROCHLORIDE 0.4 MG/ML
INJECTION, SOLUTION INTRAMUSCULAR; INTRAVENOUS; SUBCUTANEOUS
Status: DISPENSED
Start: 2017-02-16

## (undated) RX ORDER — FLUMAZENIL 0.1 MG/ML
INJECTION, SOLUTION INTRAVENOUS
Status: DISPENSED
Start: 2017-02-16

## (undated) RX ORDER — LIDOCAINE HYDROCHLORIDE 20 MG/ML
JELLY TOPICAL
Status: DISPENSED
Start: 2022-05-18

## (undated) RX ORDER — CEFAZOLIN SODIUM 2 G/100ML
INJECTION, SOLUTION INTRAVENOUS
Status: DISPENSED
Start: 2017-07-13

## (undated) RX ORDER — CEFAZOLIN SODIUM 2 G/100ML
INJECTION, SOLUTION INTRAVENOUS
Status: DISPENSED
Start: 2020-03-16

## (undated) RX ORDER — ONDANSETRON 2 MG/ML
INJECTION INTRAMUSCULAR; INTRAVENOUS
Status: DISPENSED
Start: 2020-03-16

## (undated) RX ORDER — ESMOLOL HYDROCHLORIDE 10 MG/ML
INJECTION INTRAVENOUS
Status: DISPENSED
Start: 2020-03-16

## (undated) RX ORDER — DEXAMETHASONE SODIUM PHOSPHATE 4 MG/ML
INJECTION, SOLUTION INTRA-ARTICULAR; INTRALESIONAL; INTRAMUSCULAR; INTRAVENOUS; SOFT TISSUE
Status: DISPENSED
Start: 2020-03-16

## (undated) RX ORDER — BUPIVACAINE HYDROCHLORIDE 2.5 MG/ML
INJECTION, SOLUTION EPIDURAL; INFILTRATION; INTRACAUDAL
Status: DISPENSED
Start: 2017-07-13

## (undated) RX ORDER — HEPARIN SODIUM 1000 [USP'U]/ML
INJECTION, SOLUTION INTRAVENOUS; SUBCUTANEOUS
Status: DISPENSED
Start: 2017-07-13

## (undated) RX ORDER — DEXTROSE MONOHYDRATE 25 G/50ML
INJECTION, SOLUTION INTRAVENOUS
Status: DISPENSED
Start: 2017-02-16

## (undated) RX ORDER — REGADENOSON 0.08 MG/ML
INJECTION, SOLUTION INTRAVENOUS
Status: DISPENSED
Start: 2018-01-08

## (undated) RX ORDER — BUPIVACAINE HYDROCHLORIDE AND EPINEPHRINE 5; 5 MG/ML; UG/ML
INJECTION, SOLUTION EPIDURAL; INTRACAUDAL; PERINEURAL
Status: DISPENSED
Start: 2020-03-16

## (undated) RX ORDER — PROPOFOL 10 MG/ML
INJECTION, EMULSION INTRAVENOUS
Status: DISPENSED
Start: 2020-03-16

## (undated) RX ORDER — PHENAZOPYRIDINE HYDROCHLORIDE 200 MG/1
TABLET, FILM COATED ORAL
Status: DISPENSED
Start: 2020-03-16